# Patient Record
Sex: FEMALE | Race: ASIAN | NOT HISPANIC OR LATINO | URBAN - METROPOLITAN AREA
[De-identification: names, ages, dates, MRNs, and addresses within clinical notes are randomized per-mention and may not be internally consistent; named-entity substitution may affect disease eponyms.]

---

## 2020-07-17 ENCOUNTER — INPATIENT (INPATIENT)
Facility: HOSPITAL | Age: 85
LOS: 4 days | Discharge: NURSING, REHAB OR LONG-TERM CARE | DRG: 552 | End: 2020-07-22
Attending: INTERNAL MEDICINE | Admitting: INTERNAL MEDICINE
Payer: MEDICARE

## 2020-07-17 VITALS
OXYGEN SATURATION: 96 % | DIASTOLIC BLOOD PRESSURE: 65 MMHG | TEMPERATURE: 98 F | HEIGHT: 64 IN | HEART RATE: 79 BPM | WEIGHT: 154.98 LBS | SYSTOLIC BLOOD PRESSURE: 158 MMHG | RESPIRATION RATE: 18 BRPM

## 2020-07-17 DIAGNOSIS — E11.9 TYPE 2 DIABETES MELLITUS WITHOUT COMPLICATIONS: ICD-10-CM

## 2020-07-17 DIAGNOSIS — Z71.89 OTHER SPECIFIED COUNSELING: ICD-10-CM

## 2020-07-17 DIAGNOSIS — N17.9 ACUTE KIDNEY FAILURE, UNSPECIFIED: ICD-10-CM

## 2020-07-17 DIAGNOSIS — I10 ESSENTIAL (PRIMARY) HYPERTENSION: ICD-10-CM

## 2020-07-17 DIAGNOSIS — Z29.9 ENCOUNTER FOR PROPHYLACTIC MEASURES, UNSPECIFIED: ICD-10-CM

## 2020-07-17 DIAGNOSIS — S32.9XXA FRACTURE OF UNSPECIFIED PARTS OF LUMBOSACRAL SPINE AND PELVIS, INITIAL ENCOUNTER FOR CLOSED FRACTURE: ICD-10-CM
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
## 2020-07-17 DIAGNOSIS — R55 SYNCOPE AND COLLAPSE: ICD-10-CM

## 2020-07-17 LAB
ALBUMIN SERPL ELPH-MCNC: 3.5 G/DL — SIGNIFICANT CHANGE UP (ref 3.5–5)
ALP SERPL-CCNC: 141 U/L — HIGH (ref 40–120)
ALT FLD-CCNC: 99 U/L DA — HIGH (ref 10–60)
ANION GAP SERPL CALC-SCNC: 9 MMOL/L — SIGNIFICANT CHANGE UP (ref 5–17)
APTT BLD: 27.4 SEC — LOW (ref 27.5–35.5)
AST SERPL-CCNC: 80 U/L — HIGH (ref 10–40)
BASOPHILS # BLD AUTO: 0.06 K/UL — SIGNIFICANT CHANGE UP (ref 0–0.2)
BASOPHILS NFR BLD AUTO: 0.7 % — SIGNIFICANT CHANGE UP (ref 0–2)
BILIRUB SERPL-MCNC: 0.6 MG/DL — SIGNIFICANT CHANGE UP (ref 0.2–1.2)
BUN SERPL-MCNC: 35 MG/DL — HIGH (ref 7–18)
CALCIUM SERPL-MCNC: 9.9 MG/DL — SIGNIFICANT CHANGE UP (ref 8.4–10.5)
CHLORIDE SERPL-SCNC: 102 MMOL/L — SIGNIFICANT CHANGE UP (ref 96–108)
CO2 SERPL-SCNC: 26 MMOL/L — SIGNIFICANT CHANGE UP (ref 22–31)
CREAT SERPL-MCNC: 1.73 MG/DL — HIGH (ref 0.5–1.3)
EOSINOPHIL # BLD AUTO: 0.22 K/UL — SIGNIFICANT CHANGE UP (ref 0–0.5)
EOSINOPHIL NFR BLD AUTO: 2.7 % — SIGNIFICANT CHANGE UP (ref 0–6)
GLUCOSE BLDC GLUCOMTR-MCNC: 236 MG/DL — HIGH (ref 70–99)
GLUCOSE SERPL-MCNC: 177 MG/DL — HIGH (ref 70–99)
HCT VFR BLD CALC: 40.3 % — SIGNIFICANT CHANGE UP (ref 34.5–45)
HGB BLD-MCNC: 12.4 G/DL — SIGNIFICANT CHANGE UP (ref 11.5–15.5)
IMM GRANULOCYTES NFR BLD AUTO: 1.4 % — SIGNIFICANT CHANGE UP (ref 0–1.5)
INR BLD: 1.05 RATIO — SIGNIFICANT CHANGE UP (ref 0.88–1.16)
LYMPHOCYTES # BLD AUTO: 2.24 K/UL — SIGNIFICANT CHANGE UP (ref 1–3.3)
LYMPHOCYTES # BLD AUTO: 27.5 % — SIGNIFICANT CHANGE UP (ref 13–44)
MCHC RBC-ENTMCNC: 25.7 PG — LOW (ref 27–34)
MCHC RBC-ENTMCNC: 30.8 GM/DL — LOW (ref 32–36)
MCV RBC AUTO: 83.4 FL — SIGNIFICANT CHANGE UP (ref 80–100)
MONOCYTES # BLD AUTO: 0.72 K/UL — SIGNIFICANT CHANGE UP (ref 0–0.9)
MONOCYTES NFR BLD AUTO: 8.8 % — SIGNIFICANT CHANGE UP (ref 2–14)
NEUTROPHILS # BLD AUTO: 4.79 K/UL — SIGNIFICANT CHANGE UP (ref 1.8–7.4)
NEUTROPHILS NFR BLD AUTO: 58.9 % — SIGNIFICANT CHANGE UP (ref 43–77)
NRBC # BLD: 0 /100 WBCS — SIGNIFICANT CHANGE UP (ref 0–0)
PLATELET # BLD AUTO: 338 K/UL — SIGNIFICANT CHANGE UP (ref 150–400)
POTASSIUM SERPL-MCNC: 5.2 MMOL/L — SIGNIFICANT CHANGE UP (ref 3.5–5.3)
POTASSIUM SERPL-SCNC: 5.2 MMOL/L — SIGNIFICANT CHANGE UP (ref 3.5–5.3)
PROT SERPL-MCNC: 7.7 G/DL — SIGNIFICANT CHANGE UP (ref 6–8.3)
PROTHROM AB SERPL-ACNC: 12.3 SEC — SIGNIFICANT CHANGE UP (ref 10.6–13.6)
RBC # BLD: 4.83 M/UL — SIGNIFICANT CHANGE UP (ref 3.8–5.2)
RBC # FLD: 14.8 % — HIGH (ref 10.3–14.5)
SARS-COV-2 RNA SPEC QL NAA+PROBE: SIGNIFICANT CHANGE UP
SODIUM SERPL-SCNC: 137 MMOL/L — SIGNIFICANT CHANGE UP (ref 135–145)
TROPONIN I SERPL-MCNC: 0.01 NG/ML — SIGNIFICANT CHANGE UP (ref 0–0.04)
TROPONIN I SERPL-MCNC: <0.015 NG/ML — SIGNIFICANT CHANGE UP (ref 0–0.04)
WBC # BLD: 8.14 K/UL — SIGNIFICANT CHANGE UP (ref 3.8–10.5)
WBC # FLD AUTO: 8.14 K/UL — SIGNIFICANT CHANGE UP (ref 3.8–10.5)

## 2020-07-17 PROCEDURE — 72125 CT NECK SPINE W/O DYE: CPT | Mod: 26

## 2020-07-17 PROCEDURE — 73552 X-RAY EXAM OF FEMUR 2/>: CPT | Mod: 26,RT

## 2020-07-17 PROCEDURE — 74176 CT ABD & PELVIS W/O CONTRAST: CPT | Mod: 26

## 2020-07-17 PROCEDURE — 73590 X-RAY EXAM OF LOWER LEG: CPT | Mod: 26,RT

## 2020-07-17 PROCEDURE — 70450 CT HEAD/BRAIN W/O DYE: CPT | Mod: 26

## 2020-07-17 PROCEDURE — 99285 EMERGENCY DEPT VISIT HI MDM: CPT

## 2020-07-17 PROCEDURE — 73630 X-RAY EXAM OF FOOT: CPT | Mod: 26,RT

## 2020-07-17 RX ORDER — ENOXAPARIN SODIUM 100 MG/ML
30 INJECTION SUBCUTANEOUS DAILY
Refills: 0 | Status: DISCONTINUED | OUTPATIENT
Start: 2020-07-17 | End: 2020-07-21

## 2020-07-17 RX ORDER — ACETAMINOPHEN 500 MG
975 TABLET ORAL ONCE
Refills: 0 | Status: COMPLETED | OUTPATIENT
Start: 2020-07-17 | End: 2020-07-17

## 2020-07-17 RX ORDER — INSULIN LISPRO 100/ML
VIAL (ML) SUBCUTANEOUS
Refills: 0 | Status: DISCONTINUED | OUTPATIENT
Start: 2020-07-17 | End: 2020-07-20

## 2020-07-17 RX ORDER — OXYCODONE AND ACETAMINOPHEN 5; 325 MG/1; MG/1
1 TABLET ORAL EVERY 6 HOURS
Refills: 0 | Status: DISCONTINUED | OUTPATIENT
Start: 2020-07-17 | End: 2020-07-19

## 2020-07-17 RX ORDER — SODIUM CHLORIDE 9 MG/ML
1000 INJECTION INTRAMUSCULAR; INTRAVENOUS; SUBCUTANEOUS
Refills: 0 | Status: DISCONTINUED | OUTPATIENT
Start: 2020-07-17 | End: 2020-07-21

## 2020-07-17 RX ORDER — ACETAMINOPHEN 500 MG
650 TABLET ORAL EVERY 6 HOURS
Refills: 0 | Status: DISCONTINUED | OUTPATIENT
Start: 2020-07-17 | End: 2020-07-19

## 2020-07-17 RX ADMIN — Medication 975 MILLIGRAM(S): at 18:40

## 2020-07-17 RX ADMIN — Medication 975 MILLIGRAM(S): at 18:10

## 2020-07-17 NOTE — ED PROVIDER NOTE - PHYSICAL EXAMINATION
moderate distress secondary to R hip pain   alert and oriented x3   decreased ROM, to R hip , secondary to pain  tenderness to palpation to R lower extremity

## 2020-07-17 NOTE — ED PROVIDER NOTE - PROGRESS NOTE DETAILS
Patient's imaging shows sacral fx and pubic rami fx. Ortho consulted, stable fx however due to difficulty ambulating with admit to medicine for PT eval.

## 2020-07-17 NOTE — H&P ADULT - ASSESSMENT
89 y M from home, ambulates with walker, with PMHx of HTN, DM, Asthma presents to the ED with R hip and R leg pain after sustaining a fall 2 days ago. Patient [place] in the park [place] and was attempting to get up with walker , when she got up she felt dizzy and fell and landed on her R side. Patient denies any head injuries, back pain, neck pain , chest pain, dizziness, palpitations, diaphoresis, N/V/D, abdominal pain or any other acute complaints.    In ED, /71, HR 76    Pt will be admitted for R sacral and left pubic rami fracture.     PRIMARY TEAM TO CONFIRM MEDICATIONS WITH PHARMACY 89 y F from home, ambulates with walker, with PMHx of HTN, DM, Asthma presents to the ED with R hip and R leg pain after sustaining a fall 2 days ago. Patient states she was in the park 2 days ago and was attempting to get up with walker , when she got up she felt dizzy and fell and landed on her R side. Patient denies any head injuries, back pain, neck pain , chest pain, dizziness, palpitations, diaphoresis, N/V/D, abdominal pain or any other acute complaints.    In ED, /71, HR 76    Pt will be admitted for R sacral and left pubic rami fracture.     PRIMARY TEAM TO CONFIRM MEDICATIONS WITH PHARMACY

## 2020-07-17 NOTE — ED PROVIDER NOTE - OBJECTIVE STATEMENT
89 y.o female with no significant PMHx and no significant PSHx presents to the ED s/p fall and now is feeling pain to R hip and R leg. Patient states she was in the park x2 days ago and was attempting to get up with walker , when she got up she felt dizzy and fell and landed on her r side. Patient states she is on aspirin. Patient speaks adriana and history was obtained by adriana speaking staff. per EMS, patient fell out of bed today while sleeping, patient endorses she only fell when she was in the park. Patient denies any head injuries, back pain, neck pain or any other acute complaints. NKDA

## 2020-07-17 NOTE — ED ADULT NURSE REASSESSMENT NOTE - NS ED NURSE REASSESS COMMENT FT1
recievd pt awake alert ,not in distress,with saline lock intact,no redness no swelling noted ,waiting for telebed.

## 2020-07-17 NOTE — H&P ADULT - HISTORY OF PRESENT ILLNESS
89 y.o female with no significant PMHx and no significant PSHx presents to the ED s/p fall and now is feeling pain to R hip and R leg. Patient states she was in the park x2 days ago and was attempting to get up with walker , when she got up she felt dizzy and fell and landed on her r side. Patient states she is on aspirin. Patient speaks adriana and history was obtained by adriana speaking staff. per EMS, patient fell out of bed today while sleeping, patient endorses she only fell when she was in the park. Patient denies any head injuries, back pain, neck pain or any other acute complaints. 89 y M from home, ambulates with walker, with PMHx of HTN, DM, Asthma presents to the ED with R hip and R leg pain after sustaining a fall 2 days ago. Patient states she was in the park 2 days ago and was attempting to get up with walker , when she got up she felt dizzy and fell and landed on her R side. Patient denies any head injuries, back pain, neck pain , chest pain, dizziness, palpitations, diaphoresis, N/V/D, abdominal pain or any other acute complaints.    In ED, /71, HR 76 89 y F from home, ambulates with walker, with PMHx of HTN, DM, Asthma presents to the ED with R hip and R leg pain after sustaining a fall 2 days ago. Patient states she was in the park 2 days ago and was attempting to get up with walker , when she got up she felt dizzy and fell and landed on her R side. Patient denies any head injuries, back pain, neck pain , chest pain, dizziness, palpitations, diaphoresis, N/V/D, abdominal pain or any other acute complaints.    In ED, /71, HR 76

## 2020-07-17 NOTE — H&P ADULT - ATTENDING COMMENTS
Seen and examined in ED around 6.30 PM 07/20 . C/O RT leg especially foot pain. Ct scans noted. Pelvic and Sacral fracture. Pain control,DVT prophylaxis and PT consult.  Ortho consult pending.

## 2020-07-17 NOTE — ED ADULT NURSE NOTE - NSIMPLEMENTINTERV_GEN_ALL_ED
Implemented All Fall Risk Interventions:  [place] to call system. Call bell, personal items and telephone within reach. Instruct patient to call for assistance. Room bathroom lighting operational. Non-slip footwear when patient is off stretcher. Physically safe environment: no spills, clutter or unnecessary equipment. Stretcher in lowest position, wheels locked, appropriate side rails in place. Provide visual cue, wrist band, yellow gown, etc. Monitor gait and stability. Monitor for mental status changes and reorient to person, place, and time. Review medications for side effects contributing to fall risk. Reinforce activity limits and safety measures with patient and family.

## 2020-07-17 NOTE — H&P ADULT - PROBLEM SELECTOR PLAN 1
-p/w R hip and leg pain after sustaining a fall  -CT A/P: Fractures left superior pubic ramus and right sacrum  -CT head: no acute changes  -XRay Right T+F: no fracture  -XR R foot: no fracture  -XR R femur: no fracture  -Pain management with tylenol and percocet  -Ortho consulted  -PT consulted

## 2020-07-17 NOTE — ED ADULT TRIAGE NOTE - CCCP TRG CHIEF CMPLNT
[facility]  [address] [facility] [place], [address]  [place], [facility]  Office Phone: [phone]  Fax: [phone]      Reason for visit:  No chief complaint on file. HPI:   Mindy Billings is a 39 y.o.  female with past medical history including fibromyalgia, hypertension, hyperlipidemia, who I was asked to see in consultation at the request of Dr. Kim Hunter for evaluation for thrombocytosis. Patient was seen and examined today. She is awake alert oriented x3. On review of systems she denies any B symptoms or lumps and bumps. Reports some joint and muscle pain occasionally from for myalgia 7/10. Denies any epistaxis, hematemesis, or bright red blood per rectum. No hematuria. No menses for 2 years. She quit smoking cigarette in 2010 and only drinks alcohol occasionally. ECOG performance status 0. Independent with ADLs and IADLs. DX   Encounter Diagnosis   Name Primary?  Thrombocytosis ([facility] [address] Yes       Social History     Socioeconomic History    Marital status: UNKNOWN     Spouse name: Not on file    Number of children: Not on file    Years of education: Not on file    Highest education level: Not on file     Review of Systems  All 12 point review of system are negative except for what is in HPI. Objective:  Physical Exam:  There were no vitals taken for this visit. General:  Alert, cooperative, no distress, appears stated age. Head:  Normocephalic, without obvious abnormality, atraumatic. Eyes:  Conjunctivae/corneas clear. PERRL, EOMs intact. Throat: Lips, mucosa, and tongue normal.    Neck: Supple, symmetrical, trachea midline, no adenopathy, thyroid: no enlargement/tenderness/nodules   Back:   Symmetric, no curvature. ROM normal. No CVA tenderness. Lungs:   Clear to auscultation bilaterally. Chest wall:  No tenderness or deformity. Heart:  Regular rate and rhythm, S1, S2 normal, no murmur, click, rub or gallop.        Abdomen:   Soft, fall non-tender. Bowel sounds normal. No masses,  No organomegaly. Extremities: Extremities normal, atraumatic, no cyanosis or edema. Skin: Skin color, texture, turgor normal. No rashes or lesions. Lymph nodes: Cervical, supraclavicular, and axillary nodes normal.   Neurologic: CNII-XII intact. Diagnostic Imaging     No results found for this or any previous visit. Lab Results  No results found for: WBC, HGB, HGBP, HCT, PHCT, RBCH, PLT, MCV, HGBEXT, HCTEXT, PLTEXT    No results found for: NA, K, CL, CO2, AGAP, GLU, BUN, CREA, BUCR, GFRAA, GFRNA, CA, TBIL, GPT, SGOT, AP, TP, ALB, GLOB, AGRAT, ALT    Assessment/Plan:  39 y.o. female with   1. Thrombocytosis: Labs on 5/06/19 showed WBC 5.0, H&H 14.7/43, MCV 86, normal RDW, platelet 382 ([phone]). There was a mild neutrophilia with ANC of 8.4. LFTs were normal.  Platelets were 997 in November 6, 2018. Shewed twice last week. Her thrombocytosis is possibly due to iron deficiency versus myeloproliferative neoplasm.   Plan is  *Check CBC with differential, CMP  *Iron profile and ferritin  *Check JAK2, MPL and CALR    Return in one month

## 2020-07-17 NOTE — ED ADULT NURSE NOTE - OBJECTIVE STATEMENT
Pt arrived BIBA from home, fell 2 days ago in the park, denies LOC, today has severe pain to Rt hip and leg, unable to ambulate due to pain

## 2020-07-17 NOTE — CONSULT NOTE ADULT - SUBJECTIVE AND OBJECTIVE BOX
I saw and evaluatd pt in ER awake and alert but aggitated c/o most pain rith proximal leg and lateral hip.    istory of Present Illness:  History of Present Illness: 	  89 y M from home, ambulates with walker, with PMHx of HTN, DM, Asthma presents to the ED with R hip and R leg pain after sustaining a fall 2 days ago. Patient states she was in the park 2 days ago and was attempting to get up with walker , when she got up she felt dizzy and fell and landed on her R side. Patient denies any head injuries, back pain, neck pain , chest pain, dizziness, palpitations, diaphoresis, N/V/D, abdominal pain or any other acute complaints.    In ED, /71, HR 76     Review of Systems:  Other Review of Systems: All other review of systems negative, except as noted in HPI 	      Allergies and Intolerances:        Allergies:  	No Known Allergies:     Home Medications:   * Outpatient Medication Status not yet specified    Patient History:    Past Medical, Past Surgical, and Family History:  PAST MEDICAL HISTORY:  Asthma     DM (diabetes mellitus)     HTN (hypertension).     Social History:  Social History (marital status, living situation, occupation, tobacco use, alcohol and drug use, and sexual history): Non-smoker, non-alcoholic	    PE: Awake and alert but aggitated.       Tenderness posterior pelvis and most over right lateral hip most over greater trochanter.       No obvious groin pain on rotation of hips.       Right TKR with skin intact and no instability.       Moving legs with gross sensation.       Palpable DP pulses.    X-rays- CT of pelvis shows a small minimally displaced right sacral alar fracture and left pubic rami fractures. No hip fractures seen.              Right TKR in place with no leg fractures seen to foot.    A/P: Fall with minimally displaced right sacral alar fracture and left pelvic rami fractures.         Recommend pain control and mobilization with PT ambulation WBAT.         Follow clinical course.

## 2020-07-17 NOTE — H&P ADULT - PROBLEM SELECTOR PLAN 2
-p/w dizziness and fall  -Sustained Left superior pubic rami and R sacrum fracture  -EKG: NSR  -Troponin X1 negative  -will start on gentle hydration  -f/u US Carotid  -f/u Echo  -PT consulted

## 2020-07-18 LAB
24R-OH-CALCIDIOL SERPL-MCNC: 36.3 NG/ML — SIGNIFICANT CHANGE UP (ref 30–80)
A1C WITH ESTIMATED AVERAGE GLUCOSE RESULT: 7.7 % — HIGH (ref 4–5.6)
ALBUMIN SERPL ELPH-MCNC: 3.2 G/DL — LOW (ref 3.5–5)
ALP SERPL-CCNC: 127 U/L — HIGH (ref 40–120)
ALT FLD-CCNC: 88 U/L DA — HIGH (ref 10–60)
ANION GAP SERPL CALC-SCNC: 7 MMOL/L — SIGNIFICANT CHANGE UP (ref 5–17)
AST SERPL-CCNC: 65 U/L — HIGH (ref 10–40)
BASOPHILS # BLD AUTO: 0.08 K/UL — SIGNIFICANT CHANGE UP (ref 0–0.2)
BASOPHILS NFR BLD AUTO: 1 % — SIGNIFICANT CHANGE UP (ref 0–2)
BILIRUB SERPL-MCNC: 0.6 MG/DL — SIGNIFICANT CHANGE UP (ref 0.2–1.2)
BUN SERPL-MCNC: 29 MG/DL — HIGH (ref 7–18)
CALCIUM SERPL-MCNC: 9.1 MG/DL — SIGNIFICANT CHANGE UP (ref 8.4–10.5)
CHLORIDE SERPL-SCNC: 103 MMOL/L — SIGNIFICANT CHANGE UP (ref 96–108)
CHOLEST SERPL-MCNC: 145 MG/DL — SIGNIFICANT CHANGE UP (ref 10–199)
CO2 SERPL-SCNC: 28 MMOL/L — SIGNIFICANT CHANGE UP (ref 22–31)
CREAT SERPL-MCNC: 1.54 MG/DL — HIGH (ref 0.5–1.3)
EOSINOPHIL # BLD AUTO: 0.32 K/UL — SIGNIFICANT CHANGE UP (ref 0–0.5)
EOSINOPHIL NFR BLD AUTO: 4.2 % — SIGNIFICANT CHANGE UP (ref 0–6)
ESTIMATED AVERAGE GLUCOSE: 174 MG/DL — HIGH (ref 68–114)
FOLATE SERPL-MCNC: >20 NG/ML — SIGNIFICANT CHANGE UP
GLUCOSE BLDC GLUCOMTR-MCNC: 142 MG/DL — HIGH (ref 70–99)
GLUCOSE BLDC GLUCOMTR-MCNC: 155 MG/DL — HIGH (ref 70–99)
GLUCOSE BLDC GLUCOMTR-MCNC: 184 MG/DL — HIGH (ref 70–99)
GLUCOSE BLDC GLUCOMTR-MCNC: 216 MG/DL — HIGH (ref 70–99)
GLUCOSE SERPL-MCNC: 132 MG/DL — HIGH (ref 70–99)
HCT VFR BLD CALC: 37.9 % — SIGNIFICANT CHANGE UP (ref 34.5–45)
HDLC SERPL-MCNC: 41 MG/DL — LOW
HGB BLD-MCNC: 11.8 G/DL — SIGNIFICANT CHANGE UP (ref 11.5–15.5)
IMM GRANULOCYTES NFR BLD AUTO: 1.2 % — SIGNIFICANT CHANGE UP (ref 0–1.5)
INR BLD: 1.1 RATIO — SIGNIFICANT CHANGE UP (ref 0.88–1.16)
LIPID PNL WITH DIRECT LDL SERPL: 77 MG/DL — SIGNIFICANT CHANGE UP
LYMPHOCYTES # BLD AUTO: 2.25 K/UL — SIGNIFICANT CHANGE UP (ref 1–3.3)
LYMPHOCYTES # BLD AUTO: 29.3 % — SIGNIFICANT CHANGE UP (ref 13–44)
MAGNESIUM SERPL-MCNC: 2.4 MG/DL — SIGNIFICANT CHANGE UP (ref 1.6–2.6)
MCHC RBC-ENTMCNC: 25.6 PG — LOW (ref 27–34)
MCHC RBC-ENTMCNC: 31.1 GM/DL — LOW (ref 32–36)
MCV RBC AUTO: 82.2 FL — SIGNIFICANT CHANGE UP (ref 80–100)
MONOCYTES # BLD AUTO: 0.78 K/UL — SIGNIFICANT CHANGE UP (ref 0–0.9)
MONOCYTES NFR BLD AUTO: 10.1 % — SIGNIFICANT CHANGE UP (ref 2–14)
NEUTROPHILS # BLD AUTO: 4.17 K/UL — SIGNIFICANT CHANGE UP (ref 1.8–7.4)
NEUTROPHILS NFR BLD AUTO: 54.2 % — SIGNIFICANT CHANGE UP (ref 43–77)
NRBC # BLD: 0 /100 WBCS — SIGNIFICANT CHANGE UP (ref 0–0)
OSMOLALITY SERPL: 296 MOSMOL/KG — SIGNIFICANT CHANGE UP (ref 280–301)
PHOSPHATE SERPL-MCNC: 4.1 MG/DL — SIGNIFICANT CHANGE UP (ref 2.5–4.5)
PLATELET # BLD AUTO: 304 K/UL — SIGNIFICANT CHANGE UP (ref 150–400)
POTASSIUM SERPL-MCNC: 4.2 MMOL/L — SIGNIFICANT CHANGE UP (ref 3.5–5.3)
POTASSIUM SERPL-SCNC: 4.2 MMOL/L — SIGNIFICANT CHANGE UP (ref 3.5–5.3)
PROT SERPL-MCNC: 7.2 G/DL — SIGNIFICANT CHANGE UP (ref 6–8.3)
PROTHROM AB SERPL-ACNC: 12.8 SEC — SIGNIFICANT CHANGE UP (ref 10.6–13.6)
RBC # BLD: 4.61 M/UL — SIGNIFICANT CHANGE UP (ref 3.8–5.2)
RBC # FLD: 14.6 % — HIGH (ref 10.3–14.5)
SARS-COV-2 IGG SERPL QL IA: NEGATIVE — SIGNIFICANT CHANGE UP
SARS-COV-2 IGM SERPL IA-ACNC: <0.1 INDEX — SIGNIFICANT CHANGE UP
SODIUM SERPL-SCNC: 138 MMOL/L — SIGNIFICANT CHANGE UP (ref 135–145)
TOTAL CHOLESTEROL/HDL RATIO MEASUREMENT: 3.5 RATIO — SIGNIFICANT CHANGE UP (ref 3.3–7.1)
TRIGL SERPL-MCNC: 137 MG/DL — SIGNIFICANT CHANGE UP (ref 10–149)
TSH SERPL-MCNC: 0.94 UU/ML — SIGNIFICANT CHANGE UP (ref 0.34–4.82)
VIT B12 SERPL-MCNC: 316 PG/ML — SIGNIFICANT CHANGE UP (ref 232–1245)
WBC # BLD: 7.69 K/UL — SIGNIFICANT CHANGE UP (ref 3.8–10.5)
WBC # FLD AUTO: 7.69 K/UL — SIGNIFICANT CHANGE UP (ref 3.8–10.5)

## 2020-07-18 RX ORDER — GABAPENTIN 400 MG/1
100 CAPSULE ORAL
Refills: 0 | Status: DISCONTINUED | OUTPATIENT
Start: 2020-07-18 | End: 2020-07-19

## 2020-07-18 RX ADMIN — Medication 650 MILLIGRAM(S): at 12:14

## 2020-07-18 RX ADMIN — OXYCODONE AND ACETAMINOPHEN 1 TABLET(S): 5; 325 TABLET ORAL at 22:24

## 2020-07-18 RX ADMIN — OXYCODONE AND ACETAMINOPHEN 1 TABLET(S): 5; 325 TABLET ORAL at 14:26

## 2020-07-18 RX ADMIN — Medication 650 MILLIGRAM(S): at 13:02

## 2020-07-18 RX ADMIN — OXYCODONE AND ACETAMINOPHEN 1 TABLET(S): 5; 325 TABLET ORAL at 00:05

## 2020-07-18 RX ADMIN — OXYCODONE AND ACETAMINOPHEN 1 TABLET(S): 5; 325 TABLET ORAL at 10:50

## 2020-07-18 RX ADMIN — Medication 650 MILLIGRAM(S): at 07:53

## 2020-07-18 RX ADMIN — Medication 1: at 12:13

## 2020-07-18 RX ADMIN — OXYCODONE AND ACETAMINOPHEN 1 TABLET(S): 5; 325 TABLET ORAL at 07:53

## 2020-07-18 RX ADMIN — OXYCODONE AND ACETAMINOPHEN 1 TABLET(S): 5; 325 TABLET ORAL at 08:31

## 2020-07-18 RX ADMIN — Medication 650 MILLIGRAM(S): at 17:16

## 2020-07-18 RX ADMIN — Medication 650 MILLIGRAM(S): at 05:24

## 2020-07-18 RX ADMIN — SODIUM CHLORIDE 60 MILLILITER(S): 9 INJECTION INTRAMUSCULAR; INTRAVENOUS; SUBCUTANEOUS at 07:54

## 2020-07-18 RX ADMIN — OXYCODONE AND ACETAMINOPHEN 1 TABLET(S): 5; 325 TABLET ORAL at 23:20

## 2020-07-18 RX ADMIN — Medication 2: at 00:05

## 2020-07-18 RX ADMIN — Medication 2: at 17:15

## 2020-07-18 RX ADMIN — OXYCODONE AND ACETAMINOPHEN 1 TABLET(S): 5; 325 TABLET ORAL at 14:55

## 2020-07-18 NOTE — PROGRESS NOTE ADULT - ASSESSMENT
89 y M from home, ambulates with walker, with PMHx of HTN, DM, Asthma presents to the ED with R hip and R leg pain after sustaining a fall 2 days ago. Patient states she was in the park 2 days ago and was attempting to get up with walker , when she got up she felt dizzy and fell and landed on her R side. Patient denies any head injuries, back pain, neck pain , chest pain, dizziness, palpitations, diaphoresis, N/V/D, abdominal pain or any other acute complaints.    In ED, /71, HR 76    Pt will be admitted for R sacral and left pubic rami fracture.     PRIMARY TEAM TO CONFIRM MEDICATIONS WITH PHARMACY     Problem/Plan - 1:  ·  Problem: Fall.  Plan: -p/w R hip and leg pain after sustaining a fall  -CT A/P: Fractures left superior pubic ramus and right sacrum  -CT head: no acute changes  -XRay Right T+F: no fracture  -XR R foot: no fracture  -XR R femur: no fracture  -Pain management with tylenol and percocet  -Ortho consult noted.   -PT consulted.      Problem/Plan - 2:  ·  Problem: Near syncope with ? A fib .  Plan: -p/w dizziness and fall  -Sustained Left superior pubic rami and R sacrum fracture  -EKG: NSR  -Troponin X1 negative  -will start on gentle hydration  -f/u US Carotid  -f/u Echo  -PT consulted.      Problem/Plan - 3:  ·  Problem: TAMANNA (acute kidney injury).  Plan: -Renal consult noted.  -p/w BUN/Cr: 35/1.73  -Likely pre-renal due to dehydration  -on iv fluids  -f/u urine lytes.      Problem/Plan - 4:  ·  Problem: HTN (hypertension).  Plan: -/71  -Confirm meds with pharmacy.      Problem/Plan - 5:  ·  Problem: DM (diabetes mellitus).  Plan: -Confirm meds with pharmacy  -on HSS  -Monitor fingerstick  -f/u HbA1c.      Problem/Plan - 6:  Problem: Need for prophylactic measure. Plan: on lovenox for DVT ppx.     Problem/Plan - 7:  ·  Problem: Goals of care, counseling/discussion.  Plan: Unable to reach family for Goals of care.  Primary team to follow up.

## 2020-07-18 NOTE — CONSULT NOTE ADULT - SUBJECTIVE AND OBJECTIVE BOX
Patient is a 89y Female whom presented to the hospital with   S/P  FALL  IN THE  PARK  89 y FEMALE from home, ambulates with walker, with PMHx of HTN, DM, Asthma presents to the ED with R hip and R leg pain after sustaining a fall 2 days ago. Patient states she was in the park 2 days ago and was attempting to get up with walker , when she got up she felt dizzy and fell and landed on her R side.   ASKED  TO  EVALUATE  HER  AS  SHE  WAS NOTED TO  HAVE  HIGH CR    PAST MEDICAL & SURGICAL HISTORY:  Asthma  HTN (hypertension)  DM (diabetes mellitus)    No Known Allergies    Home Medications Reviewed  Hospital Medications:   MEDICATIONS  (STANDING):  enoxaparin Injectable 30 milliGRAM(s) SubCutaneous daily  insulin lispro (HumaLOG) corrective regimen sliding scale   SubCutaneous three times a day before meals  sodium chloride 0.9%. 1000 milliLiter(s) (60 mL/Hr) IV Continuous <Continuous>    SOCIAL HISTORY:  Denies ETOh,Smoking,   FAMILY HISTORY:    REVIEW OF SYSTEMS:  EATING LUNCH   DOESNT APPEAR  SOB   HAS NO  FEVER/CHILLS   NO COUGH   APPETITE IS OK,  NO  N/V   NO LEG SWELLING   HAS PAIN R  LEG,  JUST GOT PAIN MEDS    VITALS:  T(F): 97.4 (07-18-20 @ 10:22), Max: 98 (07-17-20 @ 20:22)  HR: 83 (07-18-20 @ 10:22)  BP: 120/55 (07-18-20 @ 10:22)  RR: 19 (07-18-20 @ 10:22)  SpO2: 96% (07-18-20 @ 10:22)  Wt(kg): --      PHYSICAL EXAM:  Constitutional: NAD  HEENT: CONJ PINK  Neck: No JVD  Respiratory: CTAB, no wheezes, rales or rhonchi  Cardiovascular: S1, S2, RRR  Gastrointestinal: BS+, soft, NT/ND  Extremities: No peripheral edema  Neurological: A/O x 3,   :  No pena.       LABS:  07-18    138  |  103  |  29<H>  ----------------------------<  132<H>  4.2   |  28  |  1.54<H>    Ca    9.1      18 Jul 2020 06:56  Phos  4.1     07-18  Mg     2.4     07-18    TPro  7.2  /  Alb  3.2<L>  /  TBili  0.6  /  DBili      /  AST  65<H>  /  ALT  88<H>  /  AlkPhos  127<H>  07-18    Creatinine Trend: 1.54 <--, 1.73 <--                        11.8   7.69  )-----------( 304      ( 18 Jul 2020 06:56 )             37.9     Urine Studies:      RADIOLOGY & ADDITIONAL STUDIES:

## 2020-07-18 NOTE — PROGRESS NOTE ADULT - SUBJECTIVE AND OBJECTIVE BOX
INTERVAL HPI/OVERNIGHT EVENTS: Right leg still hurting.   Vital Signs Last 24 Hrs  T(C): 36.4 (18 Jul 2020 15:00), Max: 36.7 (17 Jul 2020 20:22)  T(F): 97.6 (18 Jul 2020 15:00), Max: 98 (17 Jul 2020 20:22)  HR: 80 (18 Jul 2020 15:00) (74 - 87)  BP: 105/68 (18 Jul 2020 15:00) (105/68 - 163/70)  BP(mean): --  RR: 17 (18 Jul 2020 15:00) (17 - 19)  SpO2: 97% (18 Jul 2020 15:00) (96% - 97%)  I&O's Summary    18 Jul 2020 07:01  -  18 Jul 2020 18:45  --------------------------------------------------------  IN: 988 mL / OUT: 3 mL / NET: 985 mL      MEDICATIONS  (STANDING):  enoxaparin Injectable 30 milliGRAM(s) SubCutaneous daily  insulin lispro (HumaLOG) corrective regimen sliding scale   SubCutaneous three times a day before meals  sodium chloride 0.9%. 1000 milliLiter(s) (60 mL/Hr) IV Continuous <Continuous>    MEDICATIONS  (PRN):  acetaminophen   Tablet .. 650 milliGRAM(s) Oral every 6 hours PRN Mild Pain (1 - 3)  oxycodone    5 mG/acetaminophen 325 mG 1 Tablet(s) Oral every 6 hours PRN Moderate Pain (4 - 6)    LABS:                        11.8   7.69  )-----------( 304      ( 18 Jul 2020 06:56 )             37.9     07-18    138  |  103  |  29<H>  ----------------------------<  132<H>  4.2   |  28  |  1.54<H>    Ca    9.1      18 Jul 2020 06:56  Phos  4.1     07-18  Mg     2.4     07-18    TPro  7.2  /  Alb  3.2<L>  /  TBili  0.6  /  DBili  x   /  AST  65<H>  /  ALT  88<H>  /  AlkPhos  127<H>  07-18    PT/INR - ( 18 Jul 2020 06:56 )   PT: 12.8 sec;   INR: 1.10 ratio         PTT - ( 17 Jul 2020 14:50 )  PTT:27.4 sec    CAPILLARY BLOOD GLUCOSE      POCT Blood Glucose.: 216 mg/dL (18 Jul 2020 16:39)  POCT Blood Glucose.: 184 mg/dL (18 Jul 2020 11:55)  POCT Blood Glucose.: 142 mg/dL (18 Jul 2020 08:13)  POCT Blood Glucose.: 236 mg/dL (17 Jul 2020 23:19)          REVIEW OF SYSTEMS:  CONSTITUTIONAL: No fever, weight loss, or fatigue  EYES: No eye pain, visual disturbances, or discharge  ENMT:  No difficulty hearing, tinnitus, vertigo; No sinus or throat pain  NECK: No pain or stiffness  RESPIRATORY: No cough, wheezing, chills or hemoptysis; No shortness of breath  CARDIOVASCULAR: No chest pain, palpitations, dizziness, or leg swelling  GASTROINTESTINAL: No abdominal or epigastric pain. No nausea, vomiting, or hematemesis; No diarrhea or constipation. No melena or hematochezia.  GENITOURINARY: No dysuria, frequency, hematuria, or incontinence  NEUROLOGICAL: No headaches, memory loss, loss of strength, numbness, or tremors      RADIOLOGY & ADDITIONAL TESTS:    Consultant(s) Notes Reviewed:  [x ] YES  [ ] NO    PHYSICAL EXAM:  GENERAL: NAD, well-groomed, well-developed,not in any distress ,  HEAD:  Atraumatic, Normocephalic  EYES: EOMI, PERRLA, conjunctiva and sclera clear  ENMT: No tonsillar erythema, exudates, or enlargement; Moist mucous membranes, Good dentition, No lesions  NECK: Supple, No JVD, Normal thyroid  NERVOUS SYSTEM:  Alert & Oriented X3, No focal deficit   CHEST/LUNG: Good air entry bilateral with no  rales, rhonchi, wheezing, or rubs  HEART: Regular rate and rhythm; No murmurs, rubs, or gallops  ABDOMEN: Soft, Nontender, Nondistended; Bowel sounds present  EXTREMITIES:  2+ Peripheral Pulses, No clubbing, cyanosis, or edema    Care Discussed with Consultants/Other Providers [ x] YES  [ ] NO

## 2020-07-18 NOTE — CONSULT NOTE ADULT - SUBJECTIVE AND OBJECTIVE BOX
HISTORY OF PRESENT ILLNESS: HPI:  89 y M from home, ambulates with walker, with PMHx of HTN, DM, Asthma presents to the ED with R hip and R leg pain after sustaining a fall 2 days ago. Patient [place] was in the park [place] and was attempting to get up with walker , when she got up she felt dizzy and fell and landed on her R side. Patient denies any head injuries, back pain, neck pain , chest pain, LOC, palpitations, diaphoresis, N/V/D, abdominal pain or any other acute complaints.    In ED, /71, HR 76 (17 Jul 2020 18:34)      PAST MEDICAL & SURGICAL HISTORY:  Asthma  HTN (hypertension)  DM (diabetes mellitus)          MEDICATIONS:  MEDICATIONS  (STANDING):  enoxaparin Injectable 30 milliGRAM(s) SubCutaneous daily  insulin lispro (HumaLOG) corrective regimen sliding scale   SubCutaneous three times a day before meals  sodium chloride 0.9%. 1000 milliLiter(s) (60 mL/Hr) IV Continuous <Continuous>      Allergies    No Known Allergies    Intolerances        FAMILY HISTORY:    Non-contributary for premature coronary disease or sudden cardiac death    SOCIAL HISTORY:    [X ] Non-smoker  [ ] Smoker  [ ] Alcohol    REVIEW OF SYSTEMS:  [ ]chest pain  [  ]shortness of breath  [  ]palpitations  [  ]syncope  [ ]near syncope [ ]upper extremity weakness   [ ] lower extremity weakness  [  ]diplopia  [  ]altered mental status   [  ]fevers  [ ]chills [ ]nausea  [ ]vomitting  [  ]dysphagia    [ ]abdominal pain  [ ]melena  [ ]BRBPR    [  ]epistaxis  [  ]rash    [ ]lower extremity edema        [X ] All others negative	  [ ] Unable to obtain      LABS:	 	    CARDIAC MARKERS:  CARDIAC MARKERS ( 17 Jul 2020 21:04 )  <0.015 ng/mL / x     / x     / x     / x      CARDIAC MARKERS ( 17 Jul 2020 11:34 )  0.015 ng/mL / x     / x     / x     / x                                  11.8   7.69  )-----------( 304      ( 18 Jul 2020 06:56 )             37.9     Hb Trend: 11.8<--    07-18    138  |  103  |  29<H>  ----------------------------<  132<H>  4.2   |  28  |  1.54<H>    Ca    9.1      18 Jul 2020 06:56  Phos  4.1     07-18  Mg     2.4     07-18    TPro  7.2  /  Alb  3.2<L>  /  TBili  0.6  /  DBili  x   /  AST  65<H>  /  ALT  88<H>  /  AlkPhos  127<H>  07-18    Creatinine Trend: 1.54<--, 1.73<--    Coags:  PT/INR - ( 18 Jul 2020 06:56 )   PT: 12.8 sec;   INR: 1.10 ratio      TSH: Thyroid Stimulating Hormone, Serum: 0.94 uU/mL (07-18 @ 06:56)          PHYSICAL EXAM:  T(C): 36.4 (07-18-20 @ 15:00), Max: 36.7 (07-17-20 @ 20:22)  HR: 80 (07-18-20 @ 15:00) (74 - 87)  BP: 105/68 (07-18-20 @ 15:00) (105/68 - 163/70)  RR: 17 (07-18-20 @ 15:00) (17 - 19)  SpO2: 97% (07-18-20 @ 15:00) (96% - 97%)  Wt(kg): --   BMI (kg/m2): 26.6 (07-17-20 @ 10:26)  I&O's Summary      Gen: Appears well in NAD  HEENT:  (-)icterus (-)pallor  CV: N S1 S2 1/6 MADELYN (+)2 Pulses B/l  Resp:  Clear to ausculatation B/L, normal effort  GI: (+) BS Soft, NT, ND  Lymph:  (-)Edema, (-)obvious lymphadenopathy  Skin: Warm to touch, Normal turgor  Psych: Appropriate mood and affect        TELEMETRY: 	?afib      ECG:  	Sinus 72 BPM, LAD    RADIOLOGY:         CXR:  < from: Xray Foot AP + Lateral + Oblique, Right (07.17.20 @ 13:32) >  No acute radiographic osseous pathology.    < end of copied text >      ASSESSMENT/PLAN: 	89y Female  PMHx of HTN, DM, Asthma presents to the ED with R hip and R leg pain after sustaining a fall 2 days ago.    - Tele is unclear if she had afib overnight.  - Cont tele  check EKG for irregular rhythms  - If she is confirmed to have Afib would benefit from long term AC if ok from trauma prospective  - ortho f/u    I once again thank you for allowing me to participate in the care of your patient.  If you have any questions or concerns please do not hesitate to contact me.    Dionte Braun MD, [facility]  [facility]PER [phone]

## 2020-07-19 DIAGNOSIS — R74.8 ABNORMAL LEVELS OF OTHER SERUM ENZYMES: ICD-10-CM

## 2020-07-19 LAB
ALBUMIN SERPL ELPH-MCNC: 3.1 G/DL — LOW (ref 3.5–5)
ALP SERPL-CCNC: 139 U/L — HIGH (ref 40–120)
ALT FLD-CCNC: 88 U/L DA — HIGH (ref 10–60)
ANION GAP SERPL CALC-SCNC: 6 MMOL/L — SIGNIFICANT CHANGE UP (ref 5–17)
AST SERPL-CCNC: 65 U/L — HIGH (ref 10–40)
BASOPHILS # BLD AUTO: 0.08 K/UL — SIGNIFICANT CHANGE UP (ref 0–0.2)
BASOPHILS NFR BLD AUTO: 1 % — SIGNIFICANT CHANGE UP (ref 0–2)
BILIRUB SERPL-MCNC: 0.6 MG/DL — SIGNIFICANT CHANGE UP (ref 0.2–1.2)
BUN SERPL-MCNC: 25 MG/DL — HIGH (ref 7–18)
CALCIUM SERPL-MCNC: 9.3 MG/DL — SIGNIFICANT CHANGE UP (ref 8.4–10.5)
CHLORIDE SERPL-SCNC: 100 MMOL/L — SIGNIFICANT CHANGE UP (ref 96–108)
CO2 SERPL-SCNC: 28 MMOL/L — SIGNIFICANT CHANGE UP (ref 22–31)
CREAT SERPL-MCNC: 1.51 MG/DL — HIGH (ref 0.5–1.3)
EOSINOPHIL # BLD AUTO: 0.32 K/UL — SIGNIFICANT CHANGE UP (ref 0–0.5)
EOSINOPHIL NFR BLD AUTO: 4 % — SIGNIFICANT CHANGE UP (ref 0–6)
GLUCOSE BLDC GLUCOMTR-MCNC: 160 MG/DL — HIGH (ref 70–99)
GLUCOSE BLDC GLUCOMTR-MCNC: 206 MG/DL — HIGH (ref 70–99)
GLUCOSE BLDC GLUCOMTR-MCNC: 215 MG/DL — HIGH (ref 70–99)
GLUCOSE BLDC GLUCOMTR-MCNC: 224 MG/DL — HIGH (ref 70–99)
GLUCOSE SERPL-MCNC: 213 MG/DL — HIGH (ref 70–99)
HCT VFR BLD CALC: 39.3 % — SIGNIFICANT CHANGE UP (ref 34.5–45)
HGB BLD-MCNC: 12.2 G/DL — SIGNIFICANT CHANGE UP (ref 11.5–15.5)
IMM GRANULOCYTES NFR BLD AUTO: 0.9 % — SIGNIFICANT CHANGE UP (ref 0–1.5)
LYMPHOCYTES # BLD AUTO: 2.28 K/UL — SIGNIFICANT CHANGE UP (ref 1–3.3)
LYMPHOCYTES # BLD AUTO: 28.2 % — SIGNIFICANT CHANGE UP (ref 13–44)
MCHC RBC-ENTMCNC: 25.7 PG — LOW (ref 27–34)
MCHC RBC-ENTMCNC: 31 GM/DL — LOW (ref 32–36)
MCV RBC AUTO: 82.7 FL — SIGNIFICANT CHANGE UP (ref 80–100)
MONOCYTES # BLD AUTO: 0.72 K/UL — SIGNIFICANT CHANGE UP (ref 0–0.9)
MONOCYTES NFR BLD AUTO: 8.9 % — SIGNIFICANT CHANGE UP (ref 2–14)
NEUTROPHILS # BLD AUTO: 4.62 K/UL — SIGNIFICANT CHANGE UP (ref 1.8–7.4)
NEUTROPHILS NFR BLD AUTO: 57 % — SIGNIFICANT CHANGE UP (ref 43–77)
NRBC # BLD: 0 /100 WBCS — SIGNIFICANT CHANGE UP (ref 0–0)
PLATELET # BLD AUTO: 290 K/UL — SIGNIFICANT CHANGE UP (ref 150–400)
POTASSIUM SERPL-MCNC: 4.5 MMOL/L — SIGNIFICANT CHANGE UP (ref 3.5–5.3)
POTASSIUM SERPL-SCNC: 4.5 MMOL/L — SIGNIFICANT CHANGE UP (ref 3.5–5.3)
PROT SERPL-MCNC: 7.1 G/DL — SIGNIFICANT CHANGE UP (ref 6–8.3)
RBC # BLD: 4.75 M/UL — SIGNIFICANT CHANGE UP (ref 3.8–5.2)
RBC # FLD: 14.6 % — HIGH (ref 10.3–14.5)
SODIUM SERPL-SCNC: 134 MMOL/L — LOW (ref 135–145)
WBC # BLD: 8.09 K/UL — SIGNIFICANT CHANGE UP (ref 3.8–10.5)
WBC # FLD AUTO: 8.09 K/UL — SIGNIFICANT CHANGE UP (ref 3.8–10.5)

## 2020-07-19 RX ORDER — HYDROMORPHONE HYDROCHLORIDE 2 MG/ML
1 INJECTION INTRAMUSCULAR; INTRAVENOUS; SUBCUTANEOUS ONCE
Refills: 0 | Status: DISCONTINUED | OUTPATIENT
Start: 2020-07-19 | End: 2020-07-19

## 2020-07-19 RX ORDER — MORPHINE SULFATE 50 MG/1
1 CAPSULE, EXTENDED RELEASE ORAL ONCE
Refills: 0 | Status: DISCONTINUED | OUTPATIENT
Start: 2020-07-19 | End: 2020-07-19

## 2020-07-19 RX ORDER — HYDROMORPHONE HYDROCHLORIDE 2 MG/ML
0.5 INJECTION INTRAMUSCULAR; INTRAVENOUS; SUBCUTANEOUS EVERY 4 HOURS
Refills: 0 | Status: DISCONTINUED | OUTPATIENT
Start: 2020-07-19 | End: 2020-07-19

## 2020-07-19 RX ORDER — ACETAMINOPHEN 500 MG
1000 TABLET ORAL ONCE
Refills: 0 | Status: COMPLETED | OUTPATIENT
Start: 2020-07-19 | End: 2020-07-19

## 2020-07-19 RX ORDER — HYDROMORPHONE HYDROCHLORIDE 2 MG/ML
0.5 INJECTION INTRAMUSCULAR; INTRAVENOUS; SUBCUTANEOUS EVERY 4 HOURS
Refills: 0 | Status: DISCONTINUED | OUTPATIENT
Start: 2020-07-19 | End: 2020-07-21

## 2020-07-19 RX ORDER — MORPHINE SULFATE 50 MG/1
2 CAPSULE, EXTENDED RELEASE ORAL ONCE
Refills: 0 | Status: DISCONTINUED | OUTPATIENT
Start: 2020-07-19 | End: 2020-07-19

## 2020-07-19 RX ORDER — OXYCODONE AND ACETAMINOPHEN 5; 325 MG/1; MG/1
1 TABLET ORAL EVERY 4 HOURS
Refills: 0 | Status: DISCONTINUED | OUTPATIENT
Start: 2020-07-19 | End: 2020-07-20

## 2020-07-19 RX ORDER — LIDOCAINE 4 G/100G
1 CREAM TOPICAL EVERY 24 HOURS
Refills: 0 | Status: DISCONTINUED | OUTPATIENT
Start: 2020-07-19 | End: 2020-07-20

## 2020-07-19 RX ORDER — SENNA PLUS 8.6 MG/1
2 TABLET ORAL AT BEDTIME
Refills: 0 | Status: DISCONTINUED | OUTPATIENT
Start: 2020-07-19 | End: 2020-07-22

## 2020-07-19 RX ORDER — POLYETHYLENE GLYCOL 3350 17 G/17G
17 POWDER, FOR SOLUTION ORAL ONCE
Refills: 0 | Status: COMPLETED | OUTPATIENT
Start: 2020-07-19 | End: 2020-07-19

## 2020-07-19 RX ORDER — GABAPENTIN 400 MG/1
100 CAPSULE ORAL THREE TIMES A DAY
Refills: 0 | Status: DISCONTINUED | OUTPATIENT
Start: 2020-07-19 | End: 2020-07-20

## 2020-07-19 RX ADMIN — Medication 400 MILLIGRAM(S): at 08:45

## 2020-07-19 RX ADMIN — HYDROMORPHONE HYDROCHLORIDE 0.5 MILLIGRAM(S): 2 INJECTION INTRAMUSCULAR; INTRAVENOUS; SUBCUTANEOUS at 23:02

## 2020-07-19 RX ADMIN — HYDROMORPHONE HYDROCHLORIDE 0.5 MILLIGRAM(S): 2 INJECTION INTRAMUSCULAR; INTRAVENOUS; SUBCUTANEOUS at 11:39

## 2020-07-19 RX ADMIN — Medication 2: at 11:27

## 2020-07-19 RX ADMIN — GABAPENTIN 100 MILLIGRAM(S): 400 CAPSULE ORAL at 22:48

## 2020-07-19 RX ADMIN — HYDROMORPHONE HYDROCHLORIDE 0.5 MILLIGRAM(S): 2 INJECTION INTRAMUSCULAR; INTRAVENOUS; SUBCUTANEOUS at 11:24

## 2020-07-19 RX ADMIN — Medication 1: at 07:39

## 2020-07-19 RX ADMIN — HYDROMORPHONE HYDROCHLORIDE 0.5 MILLIGRAM(S): 2 INJECTION INTRAMUSCULAR; INTRAVENOUS; SUBCUTANEOUS at 17:13

## 2020-07-19 RX ADMIN — GABAPENTIN 100 MILLIGRAM(S): 400 CAPSULE ORAL at 05:50

## 2020-07-19 RX ADMIN — LIDOCAINE 1 PATCH: 4 CREAM TOPICAL at 20:03

## 2020-07-19 RX ADMIN — OXYCODONE AND ACETAMINOPHEN 1 TABLET(S): 5; 325 TABLET ORAL at 07:39

## 2020-07-19 RX ADMIN — SENNA PLUS 2 TABLET(S): 8.6 TABLET ORAL at 22:48

## 2020-07-19 RX ADMIN — POLYETHYLENE GLYCOL 3350 17 GRAM(S): 17 POWDER, FOR SOLUTION ORAL at 09:52

## 2020-07-19 RX ADMIN — HYDROMORPHONE HYDROCHLORIDE 0.5 MILLIGRAM(S): 2 INJECTION INTRAMUSCULAR; INTRAVENOUS; SUBCUTANEOUS at 23:40

## 2020-07-19 RX ADMIN — OXYCODONE AND ACETAMINOPHEN 1 TABLET(S): 5; 325 TABLET ORAL at 08:09

## 2020-07-19 RX ADMIN — GABAPENTIN 100 MILLIGRAM(S): 400 CAPSULE ORAL at 15:35

## 2020-07-19 RX ADMIN — Medication 2: at 17:08

## 2020-07-19 RX ADMIN — LIDOCAINE 1 PATCH: 4 CREAM TOPICAL at 09:52

## 2020-07-19 RX ADMIN — LIDOCAINE 1 PATCH: 4 CREAM TOPICAL at 22:42

## 2020-07-19 RX ADMIN — Medication 1000 MILLIGRAM(S): at 09:00

## 2020-07-19 RX ADMIN — HYDROMORPHONE HYDROCHLORIDE 0.5 MILLIGRAM(S): 2 INJECTION INTRAMUSCULAR; INTRAVENOUS; SUBCUTANEOUS at 17:30

## 2020-07-19 RX ADMIN — ENOXAPARIN SODIUM 30 MILLIGRAM(S): 100 INJECTION SUBCUTANEOUS at 11:24

## 2020-07-19 NOTE — PROGRESS NOTE ADULT - SUBJECTIVE AND OBJECTIVE BOX
Subjective: pt seen and examined, no complaints, ROS - .          acetaminophen   Tablet .. 650 milliGRAM(s) Oral every 6 hours PRN  enoxaparin Injectable 30 milliGRAM(s) SubCutaneous daily  gabapentin 100 milliGRAM(s) Oral two times a day  insulin lispro (HumaLOG) corrective regimen sliding scale   SubCutaneous three times a day before meals  oxycodone    5 mG/acetaminophen 325 mG 1 Tablet(s) Oral every 6 hours PRN  sodium chloride 0.9%. 1000 milliLiter(s) IV Continuous <Continuous>                            11.8   7.69  )-----------( 304      ( 18 Jul 2020 06:56 )             37.9       Hemoglobin: 11.8 g/dL (07-18 @ 06:56)  Hemoglobin: 12.4 g/dL (07-17 @ 14:50)      07-18    138  |  103  |  29<H>  ----------------------------<  132<H>  4.2   |  28  |  1.54<H>    Ca    9.1      18 Jul 2020 06:56  Phos  4.1     07-18  Mg     2.4     07-18    TPro  7.2  /  Alb  3.2<L>  /  TBili  0.6  /  DBili  x   /  AST  65<H>  /  ALT  88<H>  /  AlkPhos  127<H>  07-18    Creatinine Trend: 1.54<--, 1.73<--    COAGS:     CARDIAC MARKERS ( 17 Jul 2020 21:04 )  <0.015 ng/mL / x     / x     / x     / x      CARDIAC MARKERS ( 17 Jul 2020 11:34 )  0.015 ng/mL / x     / x     / x     / x            T(C): 36.4 (07-19-20 @ 05:25), Max: 36.8 (07-18-20 @ 18:52)  HR: 74 (07-19-20 @ 05:25) (72 - 83)  BP: 114/66 (07-19-20 @ 05:25) (105/68 - 134/59)  RR: 18 (07-19-20 @ 05:25) (17 - 19)  SpO2: 96% (07-19-20 @ 05:25) (95% - 99%)  Wt(kg): --    I&O's Summary    18 Jul 2020 07:01  -  19 Jul 2020 07:00  --------------------------------------------------------  IN: 988 mL / OUT: 3 mL / NET: 985 mL      Appearance: Normal	  HEENT:   Normal oral mucosa, PERRL, EOMI	  Lymphatic: No lymphadenopathy , no edema  Cardiovascular: Normal S1 S2, No JVD, No murmurs , Peripheral pulses palpable 2+ bilaterally  Respiratory: Lungs clear to auscultation, normal effort 	  Gastrointestinal:  Soft, Non-tender, + BS	  Skin: No rashes, No ecchymoses, No cyanosis, warm to touch  Musculoskeletal: Normal range of motion, normal strength  Psychiatry:  Mood & affect appropriate    TELEMETRY: 	 NSR      ECG:  	 < from: 12 Lead ECG (07.17.20 @ 11:41) >  Diagnosis Line Normal sinus rhythm  Left axis deviation  Abnormal ECG    < end of copied text >    RADIOLOGY:   DIAGNOSTIC TESTING:  [ ] Echocardiogram:  [ ]  Catheterization:  [ ] Stress Test:    OTHER: 	  CT < from: CT Cervical Spine No Cont (07.17.20 @ 12:36) >    IMPRESSION:    Degenerative change, craniocervical junction, loss left C1 lateral mass height sclerosis left lateral C1 and C2, right lateral displacement of C1 right lateral mass on C2, multilevel degenerative change, no prevertebral soft tissue swelling or acute fracture, CT insensitive for canal contents, MRI may be obtained for improved assessment of the soft tissues and ligaments if there is persistent cervical spine pain as clinically warranted.    MAKEDA BROWN M.D., ATTENDING RADIOLOGIST  This document has been electronically signed. Jul 17 2020  1:02PM    < end of copied text >        ASSESSMENT/PLAN: 	89y Female  PMHx of HTN, DM, Asthma presents to the ED with R hip and R leg pain after sustaining a fall 2 days ago.    Tele stable , NSR   Pain management    GI / DVT prophylaxis.   keep K>4, mag >2.0   no s/s of chf on exam   ECHO pending   If she is confirmed to have Afib would benefit from long term AC if ok from trauma prospective  ortho f/u

## 2020-07-19 NOTE — PROGRESS NOTE ADULT - ASSESSMENT
89 y M from home, ambulates with walker, with PMHx of HTN, DM, Asthma presents to the ED with R hip and R leg pain after sustaining a fall 2 days ago. Patient [place] the park [place] and was attempting to get up with walker , when she got up she felt dizzy and fell and landed on her R side. Patient denies any head injuries, back pain, neck pain , chest pain, dizziness, palpitations, diaphoresis, N/V/D, abdominal pain or any other acute complaints.       Problem/Plan - 1:  ·  Problem: Fall.  Plan: - Still in pain so adjusting pain meds. Will consult pain management in AM.   -p/w R hip and leg pain after sustaining a fall  -CT A/P: Fractures left superior pubic ramus and right sacrum  -CT head: no acute changes  -XRay Right T+F: no fracture  -XR R foot: no fracture  -XR R femur: no fracture  -Pain management with tylenol and percocet  -Ortho consult noted.   -PT consulted.      Problem/Plan - 2:  ·  Problem: Near syncope with ? A fib .  Plan: -p/w dizziness and fall  -Sustained Left superior pubic rami and R sacrum fracture  -EKG: NSR  -Troponin X1 negative  -will start on gentle hydration  -f/u US Carotid  -f/u Echo  -PT consulted.      Problem/Plan - 3:  ·  Problem: CKD stage 3.  Plan: -Renal helping. Creatinine stable.   -p/w BUN/Cr: 35/1.73  -Likely pre-renal due to dehydration  -on iv fluids  -f/u urine lytes.      Problem/Plan - 4:  ·  Problem: HTN (hypertension).  Plan: -/71  -Confirm meds with pharmacy.      Problem/Plan - 5:  ·  Problem: DM (diabetes mellitus).  Plan: -Sugars in acceptable range.   -on HSS  -Monitor fingerstick  -f/u HbA1c.      Problem/Plan - 6:  Problem: Need for prophylactic measure. Plan: on Lovenox for DVT ppx.     Problem/Plan - 7:  ·  Problem: Abnormal LFT .  Plan: Hepatitis profile and US as well trending LFT .   Avoid Hepatotoxic medications.

## 2020-07-19 NOTE — PROGRESS NOTE ADULT - PROBLEM SELECTOR PLAN 3
-p/w BUN/Cr: 35/1.73  -Likely pre-renal due to dehydration  -on iv fluids  -f/u urine lytes  - BUN/Cr (7/18): 25/1.51

## 2020-07-19 NOTE — PROGRESS NOTE ADULT - ASSESSMENT
A/P  ARF  POST  FALL,  CR  WAS  1.7  ON  ADMISSION ,  HAS BEEN  AT  1.5 SINCE  YESTERDAY   UNSURE  OF  HER  BASELINE  IS  NOT ON  NSAIDS OR  ANY  NEPHROTOXIC MEDS  COULD  BE  HER  BASELINE,  WILL  TREND  HAS NO  UA ,  WILL ORDER   S  NA  A  LITTLE  LOW HAS BEEN  ON  NS SINCE  YESTERDAY,   WILL  HOLD OFF ON  IT  FOR  NOW  AND  FOLLOW  S  CR  AND  SODIUM  LEVELS     S/P  FALL, BEING FOLLOWED BY  ORTHO  FOR  PUBIC  RAMUS  FRACTURE

## 2020-07-19 NOTE — PROGRESS NOTE ADULT - PROBLEM SELECTOR PLAN 1
-p/w R hip and leg pain after sustaining a fall  -CT A/P: Fractures left superior pubic ramus and right sacrum  -CT head: no acute changes  -XRay Right T+F: no fracture  -XR R foot: no fracture  -XR R femur: no fracture  -Pain management with percocet and dilaudid  -Ortho consulted: no intervention, F/u with PT  -PT consulted

## 2020-07-19 NOTE — PROGRESS NOTE ADULT - ASSESSMENT
89 y M from home, ambulates with walker, with PMHx of HTN, DM, Asthma presents to the ED with R hip and R leg pain after sustaining a fall 2 days ago. Patient states she was in the park 2 [place] ago and was attempting to get up with walker , when she got up she felt dizzy and fell and landed on her R side. CT showed R sacral and left pubic rami fracture.

## 2020-07-19 NOTE — PROGRESS NOTE ADULT - PROBLEM SELECTOR PLAN 4
- Elevated LFT  - CT abdomen: LIVER: Cirrhotic morphology., BILE DUCTS: Normal caliber, GALLBLADDER: Cholelithiasis  - F/u Hepatitis panel  - avoid hepatotoxic drugs

## 2020-07-19 NOTE — PROGRESS NOTE ADULT - SUBJECTIVE AND OBJECTIVE BOX
PGY-1 Progress Note discussed with attending    PAGER #: [[phone]] TILL 5:00 PM  PLEASE CONTACT ON CALL TEAM:  - On Call Team (Please refer to Agustina) FROM 5:00 PM - 8:30PM  - Nightfloat Team FROM 8:30 -7:30 AM    CHIEF COMPLAINT & BRIEF HOSPITAL COURSE:  89 y M from home, ambulates with walker, with PMHx of HTN, DM, Asthma presents to the ED with R hip and R leg pain after sustaining a fall 2 days ago. Patient states she was in the park 2 [place] ago and was attempting to get up with walker , when she got up she felt dizzy and fell and landed on her R side. CT showed Fractures of the left superior pubis ramus and right sacrum. CT head negative for acute bleed.     INTERVAL HPI/OVERNIGHT EVENTS:   Patient seen and examined at bedside.  used was Mary TEJADA. Patient complains of severe pain involving the right leg, starting from the Hip, down the thigh to the foot. She is unable to characterize the pain. Patient received Percocet and IV Tylenol to no improvement of pain. Her LFTs are noted to be elevated, after discussing with attending, will start patient on 0.5mg of Dilaudid q 4 PRN for severe pain, continue with percocet PRN for moderate pain, and Gabapentin 100mg TID for neuropathic pain. Pain management consult is pending. PT consult is pending. As per nephro, in setting of TAMANNA will avoid NSAIDS or nephrotoxin drugs.    REVIEW OF SYSTEMS:  CONSTITUTIONAL: No fever, weight loss, or fatigue  RESPIRATORY: No cough, wheezing, chills or hemoptysis; No shortness of breath  CARDIOVASCULAR: No chest pain, palpitations, dizziness, or leg swelling  GASTROINTESTINAL: Constipation as per nurse. No abdominal pain. No nausea, vomiting, or hematemesis, No melena or hematochezia.  GENITOURINARY: No dysuria or hematuria, urinary frequency  MUSCULOSKELETAL: Pain on Right lower extremity starting on Hip, down the thigh to the feet.   NEUROLOGICAL: No headaches, memory loss, loss of strength, numbness, or tremors  SKIN: No itching, burning, rashes, or lesions     MEDICATIONS  (STANDING):  enoxaparin Injectable 30 milliGRAM(s) SubCutaneous daily  gabapentin 100 milliGRAM(s) Oral three times a day  insulin lispro (HumaLOG) corrective regimen sliding scale   SubCutaneous three times a day before meals  lidocaine   Patch 1 Patch Transdermal every 24 hours  senna 2 Tablet(s) Oral at bedtime  sodium chloride 0.9%. 1000 milliLiter(s) (60 mL/Hr) IV Continuous <Continuous>    MEDICATIONS  (PRN):  HYDROmorphone  Injectable 0.5 milliGRAM(s) IV Push every 4 hours PRN Severe Pain (7 - 10)  oxycodone    5 mG/acetaminophen 325 mG 1 Tablet(s) Oral every 4 hours PRN Moderate Pain (4 - 6)      Vital Signs Last 24 Hrs  T(C): 36.4 (19 Jul 2020 10:46), Max: 36.8 (18 Jul 2020 18:52)  T(F): 97.6 (19 Jul 2020 10:46), Max: 98.2 (18 Jul 2020 18:52)  HR: 66 (19 Jul 2020 10:46) (66 - 80)  BP: 131/77 (19 Jul 2020 10:46) (105/68 - 134/59)  BP(mean): --  RR: 19 (19 Jul 2020 10:46) (17 - 19)  SpO2: 97% (19 Jul 2020 10:46) (95% - 99%)    PHYSICAL EXAMINATION:  GENERAL: In severe pain, restless in bed, crying.   HEAD:  Atraumatic, Normocephalic  EYES:  conjunctiva and sclera clear  NECK: Supple, No JVD, Normal thyroid  CHEST/LUNG: Clear to auscultation. Clear to percussion bilaterally; No rales, rhonchi, wheezing, or rubs  HEART: Regular rate and rhythm; No murmurs, rubs, or gallops  ABDOMEN: Distended, not tender; Bowel sounds present  NERVOUS SYSTEM:  Alert & Oriented X3,    EXTREMITIES:  2+ Peripheral Pulses; DP and PT present on palpation and doppler, No clubbing, cyanosis, or edema  SKIN: warm dry  MUSCULOSKELETAL: Limited range of motion on Right lower extremity due to pain.                           12.2   8.09  )-----------( 290      ( 19 Jul 2020 10:58 )             39.3     07-19    134<L>  |  100  |  25<H>  ----------------------------<  213<H>  4.5   |  28  |  1.51<H>    Ca    9.3      19 Jul 2020 10:58  Phos  4.1     07-18  Mg     2.4     07-18    TPro  7.1  /  Alb  3.1<L>  /  TBili  0.6  /  DBili  x   /  AST  65<H>  /  ALT  88<H>  /  AlkPhos  139<H>  07-19    LIVER FUNCTIONS - ( 19 Jul 2020 10:58 )  Alb: 3.1 g/dL / Pro: 7.1 g/dL / ALK PHOS: 139 U/L / ALT: 88 U/L DA / AST: 65 U/L / GGT: x           CARDIAC MARKERS ( 17 Jul 2020 21:04 )  <0.015 ng/mL / x     / x     / x     / x          PT/INR - ( 18 Jul 2020 06:56 )   PT: 12.8 sec;   INR: 1.10 ratio         PTT - ( 17 Jul 2020 14:50 )  PTT:27.4 sec    I&O's Summary    18 Jul 2020 07:01  -  19 Jul 2020 07:00  --------------------------------------------------------  IN: 988 mL / OUT: 3 mL / NET: 985 mL            CAPILLARY BLOOD GLUCOSE      RADIOLOGY & ADDITIONAL TESTS:  < from: CT Abdomen and Pelvis No Cont (07.17.20 @ 12:37) >  IMPRESSION:  Limited by lack of IV contrast  Fractures left superior pubic ramus right sacrum as described.  Cholelithiasis.  Diverticulosis coli without acute diverticulitis.    < end of copied text >    < from: CT Cervical Spine No Cont (07.17.20 @ 12:36) >  IMPRESSION:    Degenerative change, craniocervical junction, loss left C1 lateral mass height sclerosis left lateral C1 and C2, right lateral displacement of C1 right lateral mass on C2, multilevel degenerative change, no prevertebral soft tissue swelling or acute fracture, CT insensitive for canal contents, MRI may be obtained for improved assessment of the soft tissues and ligaments if there is persistent cervical spine pain as clinically warranted.    < end of copied text >  < from: CT Head No Cont (07.17.20 @ 12:34) >  IMPRESSION: No CT evidence of acute intracranial hemorrhage.     < end of copied text >      < from: Xray Femur 2 Views, Right (07.17.20 @ 13:30) >  IMPRESSION: No acute fracture. Old left inferior pelvic fracture and productive spondylitic change. Right knee replacement.    < end of copied text >  < from: Xray Tibia + Fibula 2 Views, Right (07.17.20 @ 13:32) >  RIGHT knee prosthetic components intact.  The tibia and fibula are intact. No fracture, gross osseous lytic or blastic lesion or soft tissue abnormality.    IMPRESSION :   No acute radiographic osseous pathology..    < end of copied text >    < from: Xray Foot AP + Lateral + Oblique, Right (07.17.20 @ 13:32) >  FINDINGS:    The bones and joint spaces are preserved.  There are no fractures or dislocations.  The soft tissues are normal.     IMPRESSION:    No acute radiographic osseous pathology.    < end of copied text >

## 2020-07-19 NOTE — PROGRESS NOTE ADULT - SUBJECTIVE AND OBJECTIVE BOX
Patient is a 89y Female with   S/P  FALL   WITH   PELVIC  FRACTURE   SLEEPING  ,  EASILY  AROUSABLE   APPEARS  OK  THOUGH CONT  TO  COMPLAINT OF  PAIN R  LEG  NO ACUTE EVENTS OVERNIGHT    No Known Allergies    Hospital Medications:   MEDICATIONS  (STANDING):  enoxaparin Injectable 30 milliGRAM(s) SubCutaneous daily  gabapentin 100 milliGRAM(s) Oral three times a day  insulin lispro (HumaLOG) corrective regimen sliding scale   SubCutaneous three times a day before meals  lidocaine   Patch 1 Patch Transdermal every 24 hours  senna 2 Tablet(s) Oral at bedtime  sodium chloride 0.9%. 1000 milliLiter(s) (60 mL/Hr) IV Continuous <Continuous>    REVIEW OF SYSTEMS:   HAS NO  FEVER/CHILLS  HAS NO  SOB   APPETITE IS OK ,  FINISHED  HER LUNCH  NO N/V   VOIDING  WELL   C/O  PAIN  R  LEG    VITALS:  T(F): 97.6 (07-19-20 @ 10:46), Max: 98.2 (07-18-20 @ 18:52)  HR: 66 (07-19-20 @ 10:46)  BP: 131/77 (07-19-20 @ 10:46)  RR: 19 (07-19-20 @ 10:46)  SpO2: 97% (07-19-20 @ 10:46)  Wt(kg): --    07-18 @ 07:01  -  07-19 @ 07:00  --------------------------------------------------------  IN: 988 mL / OUT: 3 mL / NET: 985 mL        PHYSICAL EXAM:  Constitutional: NAD  HEENT: CONJ  PINK  Neck: No JVD  Respiratory: CTAB, no wheezes, rales or rhonchi  Cardiovascular: S1, S2, RRR  Gastrointestinal: BS+, soft, NT/ND  Extremities: No peripheral edema  Neurological: A/O x 3,   : No pena.     :    LABS:  07-19    134<L>  |  100  |  25<H>  ----------------------------<  213<H>  4.5   |  28  |  1.51<H>    Ca    9.3      19 Jul 2020 10:58  Phos  4.1     07-18  Mg     2.4     07-18    TPro  7.1  /  Alb  3.1<L>  /  TBili  0.6  /  DBili      /  AST  65<H>  /  ALT  88<H>  /  AlkPhos  139<H>  07-19    Creatinine Trend: 1.51 <--, 1.54 <--, 1.73 <--                        12.2   8.09  )-----------( 294 ( 19 Jul 2020 10:58 )             39.3     Urine Studies:      RADIOLOGY & ADDITIONAL STUDIES:

## 2020-07-19 NOTE — PROGRESS NOTE ADULT - SUBJECTIVE AND OBJECTIVE BOX
INTERVAL HPI/OVERNIGHT EVENTS: Continues to have RT leg pain.   Vital Signs Last 24 Hrs  T(C): 36.4 (19 Jul 2020 10:46), Max: 36.8 (18 Jul 2020 18:52)  T(F): 97.6 (19 Jul 2020 10:46), Max: 98.2 (18 Jul 2020 18:52)  HR: 66 (19 Jul 2020 10:46) (66 - 80)  BP: 131/77 (19 Jul 2020 10:46) (105/68 - 134/59)  BP(mean): --  RR: 19 (19 Jul 2020 10:46) (17 - 19)  SpO2: 97% (19 Jul 2020 10:46) (95% - 99%)  I&O's Summary    18 Jul 2020 07:01  -  19 Jul 2020 07:00  --------------------------------------------------------  IN: 988 mL / OUT: 3 mL / NET: 985 mL      MEDICATIONS  (STANDING):  enoxaparin Injectable 30 milliGRAM(s) SubCutaneous daily  gabapentin 100 milliGRAM(s) Oral three times a day  insulin lispro (HumaLOG) corrective regimen sliding scale   SubCutaneous three times a day before meals  lidocaine   Patch 1 Patch Transdermal every 24 hours  senna 2 Tablet(s) Oral at bedtime  sodium chloride 0.9%. 1000 milliLiter(s) (60 mL/Hr) IV Continuous <Continuous>    MEDICATIONS  (PRN):  HYDROmorphone  Injectable 0.5 milliGRAM(s) IV Push every 4 hours PRN Severe Pain (7 - 10)  oxycodone    5 mG/acetaminophen 325 mG 1 Tablet(s) Oral every 4 hours PRN Moderate Pain (4 - 6)    LABS:                        12.2   8.09  )-----------( 290      ( 19 Jul 2020 10:58 )             39.3     07-19    134<L>  |  100  |  25<H>  ----------------------------<  213<H>  4.5   |  28  |  1.51<H>    Ca    9.3      19 Jul 2020 10:58  Phos  4.1     07-18  Mg     2.4     07-18    TPro  7.1  /  Alb  3.1<L>  /  TBili  0.6  /  DBili  x   /  AST  65<H>  /  ALT  88<H>  /  AlkPhos  139<H>  07-19    PT/INR - ( 18 Jul 2020 06:56 )   PT: 12.8 sec;   INR: 1.10 ratio         PTT - ( 17 Jul 2020 14:50 )  PTT:27.4 sec    CAPILLARY BLOOD GLUCOSE      POCT Blood Glucose.: 215 mg/dL (19 Jul 2020 11:25)  POCT Blood Glucose.: 160 mg/dL (19 Jul 2020 07:27)  POCT Blood Glucose.: 155 mg/dL (18 Jul 2020 22:19)  POCT Blood Glucose.: 216 mg/dL (18 Jul 2020 16:39)          REVIEW OF SYSTEMS:  CONSTITUTIONAL: No fever, weight loss, or fatigue  EYES: No eye pain, visual disturbances, or discharge  ENMT:  No difficulty hearing, tinnitus, vertigo; No sinus or throat pain  NECK: No pain or stiffness  RESPIRATORY: No cough, wheezing, chills or hemoptysis; No shortness of breath  CARDIOVASCULAR: No chest pain, palpitations, dizziness, or leg swelling  GASTROINTESTINAL: No abdominal or epigastric pain. No nausea, vomiting, or hematemesis; No diarrhea or constipation. No melena or hematochezia.  MUSCULOSKELETAL: RLE pain.     Consultant(s) Notes Reviewed:  [x ] YES  [ ] NO    PHYSICAL EXAM:  GENERAL: NAD, well-groomed, well-developed,not in any distress ,  HEAD:  Atraumatic, Normocephalic  EYES: EOMI, PERRLA, conjunctiva and sclera clear  ENMT: No tonsillar erythema, exudates, or enlargement; Moist mucous membranes, Good dentition, No lesions  NECK: Supple, No JVD, Normal thyroid  NERVOUS SYSTEM:  Alert & Oriented X3, No focal deficit   CHEST/LUNG: Good air entry bilateral with no  rales, rhonchi, wheezing, or rubs  HEART: Regular rate and rhythm; No murmurs, rubs, or gallops  ABDOMEN: Soft, Nontender, Nondistended; Bowel sounds present  EXTREMITIES:  2+ Peripheral Pulses, No clubbing, cyanosis, or edema  Rt lower Ext rom painful at hip and ankle.     Care Discussed with Consultants/Other Providers [ x] YES  [ ] NO

## 2020-07-20 ENCOUNTER — TRANSCRIPTION ENCOUNTER (OUTPATIENT)
Age: 85
End: 2020-07-20

## 2020-07-20 DIAGNOSIS — M25.571 PAIN IN RIGHT ANKLE AND JOINTS OF RIGHT FOOT: ICD-10-CM

## 2020-07-20 DIAGNOSIS — S32.10XA UNSPECIFIED FRACTURE OF SACRUM, INITIAL ENCOUNTER FOR CLOSED FRACTURE: ICD-10-CM

## 2020-07-20 LAB
ALBUMIN SERPL ELPH-MCNC: 3 G/DL — LOW (ref 3.5–5)
ALP SERPL-CCNC: 147 U/L — HIGH (ref 40–120)
ALT FLD-CCNC: 77 U/L DA — HIGH (ref 10–60)
ANION GAP SERPL CALC-SCNC: 7 MMOL/L — SIGNIFICANT CHANGE UP (ref 5–17)
AST SERPL-CCNC: 49 U/L — HIGH (ref 10–40)
BASOPHILS # BLD AUTO: 0.06 K/UL — SIGNIFICANT CHANGE UP (ref 0–0.2)
BASOPHILS NFR BLD AUTO: 0.8 % — SIGNIFICANT CHANGE UP (ref 0–2)
BILIRUB SERPL-MCNC: 0.4 MG/DL — SIGNIFICANT CHANGE UP (ref 0.2–1.2)
BUN SERPL-MCNC: 22 MG/DL — HIGH (ref 7–18)
CALCIUM SERPL-MCNC: 9.1 MG/DL — SIGNIFICANT CHANGE UP (ref 8.4–10.5)
CHLORIDE SERPL-SCNC: 105 MMOL/L — SIGNIFICANT CHANGE UP (ref 96–108)
CO2 SERPL-SCNC: 28 MMOL/L — SIGNIFICANT CHANGE UP (ref 22–31)
CREAT SERPL-MCNC: 1.41 MG/DL — HIGH (ref 0.5–1.3)
EOSINOPHIL # BLD AUTO: 0.3 K/UL — SIGNIFICANT CHANGE UP (ref 0–0.5)
EOSINOPHIL NFR BLD AUTO: 4 % — SIGNIFICANT CHANGE UP (ref 0–6)
GLUCOSE BLDC GLUCOMTR-MCNC: 231 MG/DL — HIGH (ref 70–99)
GLUCOSE BLDC GLUCOMTR-MCNC: 238 MG/DL — HIGH (ref 70–99)
GLUCOSE BLDC GLUCOMTR-MCNC: 256 MG/DL — HIGH (ref 70–99)
GLUCOSE BLDC GLUCOMTR-MCNC: 327 MG/DL — HIGH (ref 70–99)
GLUCOSE SERPL-MCNC: 170 MG/DL — HIGH (ref 70–99)
HAV IGM SER-ACNC: SIGNIFICANT CHANGE UP
HBV CORE IGM SER-ACNC: SIGNIFICANT CHANGE UP
HBV SURFACE AG SER-ACNC: SIGNIFICANT CHANGE UP
HCT VFR BLD CALC: 37.7 % — SIGNIFICANT CHANGE UP (ref 34.5–45)
HCV AB S/CO SERPL IA: 0.07 S/CO — SIGNIFICANT CHANGE UP (ref 0–0.99)
HCV AB SERPL-IMP: SIGNIFICANT CHANGE UP
HGB BLD-MCNC: 11.6 G/DL — SIGNIFICANT CHANGE UP (ref 11.5–15.5)
IMM GRANULOCYTES NFR BLD AUTO: 0.9 % — SIGNIFICANT CHANGE UP (ref 0–1.5)
LYMPHOCYTES # BLD AUTO: 2.5 K/UL — SIGNIFICANT CHANGE UP (ref 1–3.3)
LYMPHOCYTES # BLD AUTO: 33.2 % — SIGNIFICANT CHANGE UP (ref 13–44)
MCHC RBC-ENTMCNC: 25.6 PG — LOW (ref 27–34)
MCHC RBC-ENTMCNC: 30.8 GM/DL — LOW (ref 32–36)
MCV RBC AUTO: 83 FL — SIGNIFICANT CHANGE UP (ref 80–100)
MONOCYTES # BLD AUTO: 0.7 K/UL — SIGNIFICANT CHANGE UP (ref 0–0.9)
MONOCYTES NFR BLD AUTO: 9.3 % — SIGNIFICANT CHANGE UP (ref 2–14)
NEUTROPHILS # BLD AUTO: 3.9 K/UL — SIGNIFICANT CHANGE UP (ref 1.8–7.4)
NEUTROPHILS NFR BLD AUTO: 51.8 % — SIGNIFICANT CHANGE UP (ref 43–77)
NRBC # BLD: 0 /100 WBCS — SIGNIFICANT CHANGE UP (ref 0–0)
PLATELET # BLD AUTO: 279 K/UL — SIGNIFICANT CHANGE UP (ref 150–400)
POTASSIUM SERPL-MCNC: 4.4 MMOL/L — SIGNIFICANT CHANGE UP (ref 3.5–5.3)
POTASSIUM SERPL-SCNC: 4.4 MMOL/L — SIGNIFICANT CHANGE UP (ref 3.5–5.3)
PROT SERPL-MCNC: 6.9 G/DL — SIGNIFICANT CHANGE UP (ref 6–8.3)
RBC # BLD: 4.54 M/UL — SIGNIFICANT CHANGE UP (ref 3.8–5.2)
RBC # FLD: 14.7 % — HIGH (ref 10.3–14.5)
SODIUM SERPL-SCNC: 140 MMOL/L — SIGNIFICANT CHANGE UP (ref 135–145)
WBC # BLD: 7.53 K/UL — SIGNIFICANT CHANGE UP (ref 3.8–10.5)
WBC # FLD AUTO: 7.53 K/UL — SIGNIFICANT CHANGE UP (ref 3.8–10.5)

## 2020-07-20 PROCEDURE — 99222 1ST HOSP IP/OBS MODERATE 55: CPT

## 2020-07-20 RX ORDER — TRAMADOL HYDROCHLORIDE 50 MG/1
25 TABLET ORAL EVERY 8 HOURS
Refills: 0 | Status: DISCONTINUED | OUTPATIENT
Start: 2020-07-20 | End: 2020-07-21

## 2020-07-20 RX ORDER — MULTIVIT WITH MIN/MFOLATE/K2 340-15/3 G
1 POWDER (GRAM) ORAL ONCE
Refills: 0 | Status: COMPLETED | OUTPATIENT
Start: 2020-07-20 | End: 2020-07-20

## 2020-07-20 RX ORDER — LACTULOSE 10 G/15ML
10 SOLUTION ORAL ONCE
Refills: 0 | Status: COMPLETED | OUTPATIENT
Start: 2020-07-20 | End: 2020-07-20

## 2020-07-20 RX ORDER — GABAPENTIN 400 MG/1
200 CAPSULE ORAL EVERY 12 HOURS
Refills: 0 | Status: DISCONTINUED | OUTPATIENT
Start: 2020-07-20 | End: 2020-07-21

## 2020-07-20 RX ORDER — ACETAMINOPHEN 500 MG
1000 TABLET ORAL ONCE
Refills: 0 | Status: COMPLETED | OUTPATIENT
Start: 2020-07-20 | End: 2020-07-20

## 2020-07-20 RX ORDER — INSULIN LISPRO 100/ML
VIAL (ML) SUBCUTANEOUS
Refills: 0 | Status: DISCONTINUED | OUTPATIENT
Start: 2020-07-20 | End: 2020-07-22

## 2020-07-20 RX ORDER — ACETAMINOPHEN 500 MG
1000 TABLET ORAL ONCE
Refills: 0 | Status: DISCONTINUED | OUTPATIENT
Start: 2020-07-20 | End: 2020-07-20

## 2020-07-20 RX ORDER — POLYETHYLENE GLYCOL 3350 17 G/17G
17 POWDER, FOR SOLUTION ORAL DAILY
Refills: 0 | Status: DISCONTINUED | OUTPATIENT
Start: 2020-07-20 | End: 2020-07-22

## 2020-07-20 RX ORDER — ACETAMINOPHEN 500 MG
1000 TABLET ORAL ONCE
Refills: 0 | Status: COMPLETED | OUTPATIENT
Start: 2020-07-21 | End: 2020-07-21

## 2020-07-20 RX ADMIN — Medication 4: at 12:11

## 2020-07-20 RX ADMIN — Medication 1000 MILLIGRAM(S): at 18:05

## 2020-07-20 RX ADMIN — TRAMADOL HYDROCHLORIDE 25 MILLIGRAM(S): 50 TABLET ORAL at 22:22

## 2020-07-20 RX ADMIN — GABAPENTIN 100 MILLIGRAM(S): 400 CAPSULE ORAL at 05:52

## 2020-07-20 RX ADMIN — LIDOCAINE 1 PATCH: 4 CREAM TOPICAL at 07:59

## 2020-07-20 RX ADMIN — Medication 2: at 23:49

## 2020-07-20 RX ADMIN — Medication 400 MILLIGRAM(S): at 17:50

## 2020-07-20 RX ADMIN — GABAPENTIN 200 MILLIGRAM(S): 400 CAPSULE ORAL at 17:14

## 2020-07-20 RX ADMIN — POLYETHYLENE GLYCOL 3350 17 GRAM(S): 17 POWDER, FOR SOLUTION ORAL at 12:10

## 2020-07-20 RX ADMIN — HYDROMORPHONE HYDROCHLORIDE 0.5 MILLIGRAM(S): 2 INJECTION INTRAMUSCULAR; INTRAVENOUS; SUBCUTANEOUS at 08:43

## 2020-07-20 RX ADMIN — Medication 1: at 08:34

## 2020-07-20 RX ADMIN — HYDROMORPHONE HYDROCHLORIDE 0.5 MILLIGRAM(S): 2 INJECTION INTRAMUSCULAR; INTRAVENOUS; SUBCUTANEOUS at 14:47

## 2020-07-20 RX ADMIN — HYDROMORPHONE HYDROCHLORIDE 0.5 MILLIGRAM(S): 2 INJECTION INTRAMUSCULAR; INTRAVENOUS; SUBCUTANEOUS at 23:25

## 2020-07-20 RX ADMIN — SENNA PLUS 2 TABLET(S): 8.6 TABLET ORAL at 21:22

## 2020-07-20 RX ADMIN — HYDROMORPHONE HYDROCHLORIDE 0.5 MILLIGRAM(S): 2 INJECTION INTRAMUSCULAR; INTRAVENOUS; SUBCUTANEOUS at 09:00

## 2020-07-20 RX ADMIN — LACTULOSE 10 GRAM(S): 10 SOLUTION ORAL at 16:22

## 2020-07-20 RX ADMIN — ENOXAPARIN SODIUM 30 MILLIGRAM(S): 100 INJECTION SUBCUTANEOUS at 12:10

## 2020-07-20 RX ADMIN — HYDROMORPHONE HYDROCHLORIDE 0.5 MILLIGRAM(S): 2 INJECTION INTRAMUSCULAR; INTRAVENOUS; SUBCUTANEOUS at 15:15

## 2020-07-20 RX ADMIN — LIDOCAINE 1 PATCH: 4 CREAM TOPICAL at 16:36

## 2020-07-20 RX ADMIN — Medication 3: at 17:08

## 2020-07-20 RX ADMIN — TRAMADOL HYDROCHLORIDE 25 MILLIGRAM(S): 50 TABLET ORAL at 21:22

## 2020-07-20 NOTE — PROGRESS NOTE ADULT - PROBLEM SELECTOR PLAN 2
-p/w dizziness and fall  -Sustained Left superior pubic rami and R sacrum fracture  -EKG: NSR  -Troponin X1 negative  -will start on gentle hydration  - f/u US Carotid  - Echo: dyastolic dysfunction grade 1, mild enlarged left atrium  - PT consulted

## 2020-07-20 NOTE — PHYSICAL THERAPY INITIAL EVALUATION ADULT - PERTINENT HX OF CURRENT PROBLEM, REHAB EVAL
Pt. presented to the ED with right hip and right leg pain after sustaining a fall 2 days ago. Patient [place] the park [place] and was attempting to get up with walker , when she got up she felt dizzy and fell and landed on her right side. Imaging revealed no fractures.

## 2020-07-20 NOTE — PROGRESS NOTE ADULT - ASSESSMENT
A/P  ARF  POST  FALL,  CR  WAS  1.7  ON  ADMISSION, IMPROVED TO 1.4   UNCLEAR   BASELINE SCR  NOT ON  NSAIDS OR  ANY  NEPHROTOXIC MEDS  COULD  BE  HER  BASELINE,  WILL  TREND  URINALYSIS PENDING.  ON F/U BY    ORTHO  FOR  PUBIC  RAMUS  FRACTURE

## 2020-07-20 NOTE — PROGRESS NOTE ADULT - PROBLEM SELECTOR PLAN 3
-p/w BUN/Cr: 35/1.73  -Likely pre-renal due to dehydration  - on iv fluids  - f/u urine lytes  - BUN/Cr (7/20): 22/1.41

## 2020-07-20 NOTE — PHYSICAL THERAPY INITIAL EVALUATION ADULT - GENERAL OBSERVATIONS, REHAB EVAL
Pt. found lying supine; c/o right ankle pain. Pt. speaks Rivera only; offered  phone services but she declined and preferred her primary RN (who speaks Rivera) to interpret.

## 2020-07-20 NOTE — DISCHARGE NOTE PROVIDER - NSDCCPCAREPLAN_GEN_ALL_CORE_FT
PRINCIPAL DISCHARGE DIAGNOSIS  Diagnosis: Pelvic fracture  Assessment and Plan of Treatment: Patient presented to the ED with right hip and right leg pain after sustaining a fall 2 days prior to arrival.   Admitted for right sacral and left pubic rami fracture.   CT scan of abdomen showed fracture of left superior pubic ramus and right sacrum. CT head showed no acute changes. X-ray of foot showed no fracture. X-ray right foot showed no fracture. X-ray of right femur showed no fracture. Pain management consulted. Ortho consulted and recommended for conservative management. Physical therapy evaluated patient and recommended for rehab.   Continue care with physical therapy at facility. Continue pain control with medications as directed in medication reconciliation. Follow up with your PCP for further evaluation and management.      SECONDARY DISCHARGE DIAGNOSES  Diagnosis: Asthma  Assessment and Plan of Treatment: Continue your home medications for asthma. Follow up with your PCP for further evaluation and management.    Diagnosis: HTN (hypertension)  Assessment and Plan of Treatment: Recommended to have a low salt/DASH diet. Continue with blood pressure medications. Follow up with PCP for management of hypertension.    Diagnosis: TAMANNA (acute kidney injury)  Assessment and Plan of Treatment: Noted to have elevated creatinine level on admission. Creatinine level improved during stay and returned to normal limits. Ensure adequate hydration to prevent further rise in creatinine levels. Follow up with your PCP and have basic metabolic lab panel test done within 3 days to ensure creatinine is stable.    Diagnosis: DM (diabetes mellitus)  Assessment and Plan of Treatment: Recommended to have a low carbohydrate diet. Continue to take diabetic medications. Continue to monitor blood sugar. Follow up with PCP for management of diabetes.

## 2020-07-20 NOTE — PROGRESS NOTE ADULT - SUBJECTIVE AND OBJECTIVE BOX
Patient denies CP, SOB Review of systems otherwise (-)    enoxaparin Injectable 30 milliGRAM(s) SubCutaneous daily  gabapentin 200 milliGRAM(s) Oral every 12 hours  HYDROmorphone  Injectable 0.5 milliGRAM(s) IV Push every 4 hours PRN  insulin lispro (HumaLOG) corrective regimen sliding scale   SubCutaneous three times a day before meals  lidocaine   Patch 1 Patch Transdermal every 24 hours  oxycodone    5 mG/acetaminophen 325 mG 1 Tablet(s) Oral every 4 hours PRN  polyethylene glycol 3350 17 Gram(s) Oral daily  senna 2 Tablet(s) Oral at bedtime  sodium chloride 0.9%. 1000 milliLiter(s) IV Continuous <Continuous>                            11.6   7.53  )-----------( 279      ( 20 Jul 2020 05:38 )             37.7       Hemoglobin: 11.6 g/dL (07-20 @ 05:38)  Hemoglobin: 12.2 g/dL (07-19 @ 10:58)  Hemoglobin: 11.8 g/dL (07-18 @ 06:56)  Hemoglobin: 12.4 g/dL (07-17 @ 14:50)      07-20    140  |  105  |  22<H>  ----------------------------<  170<H>  4.4   |  28  |  1.41<H>    Ca    9.1      20 Jul 2020 05:38    TPro  6.9  /  Alb  3.0<L>  /  TBili  0.4  /  DBili  x   /  AST  49<H>  /  ALT  77<H>  /  AlkPhos  147<H>  07-20    Creatinine Trend: 1.41<--, 1.51<--, 1.54<--, 1.73<--    COAGS:     CARDIAC MARKERS ( 17 Jul 2020 21:04 )  <0.015 ng/mL / x     / x     / x     / x            T(C): 36.7 (07-20-20 @ 10:39), Max: 36.8 (07-19-20 @ 18:48)  HR: 94 (07-20-20 @ 11:00) (69 - 94)  BP: 97/65 (07-20-20 @ 11:00) (97/65 - 131/55)  RR: 18 (07-20-20 @ 10:39) (17 - 18)  SpO2: 93% (07-20-20 @ 11:00) (93% - 98%)  Wt(kg): --    Gen: Appears well in NAD  HEENT:  (-)icterus (-)pallor  CV: N S1 S2 1/6 MADELYN (+)2 Pulses B/l  Resp:  Clear to ausculatation B/L, normal effort  GI: (+) BS Soft, NT, ND  Lymph:  (-)Edema, (-)obvious lymphadenopathy  Skin: Warm to touch, Normal turgor  Psych: Appropriate mood and affect    tele: off    ASSESSMENT/PLAN: 	89y Female  PMHx of HTN, DM, Asthma presents to the ED with R hip and R leg pain after sustaining a fall 2 days ago.    Tele stable , NSR   Pain management    GI / DVT prophylaxis.   keep K>4, mag >2.0   no s/s of chf on exam   no pertinent findings on echo  If she is confirmed to have Afib would benefit from long term AC if ok from trauma prospective Patient denies CP, SOB Review of systems otherwise (-)    enoxaparin Injectable 30 milliGRAM(s) SubCutaneous daily  gabapentin 200 milliGRAM(s) Oral every 12 hours  HYDROmorphone  Injectable 0.5 milliGRAM(s) IV Push every 4 hours PRN  insulin lispro (HumaLOG) corrective regimen sliding scale   SubCutaneous three times a day before meals  lidocaine   Patch 1 Patch Transdermal every 24 hours  oxycodone    5 mG/acetaminophen 325 mG 1 Tablet(s) Oral every 4 hours PRN  polyethylene glycol 3350 17 Gram(s) Oral daily  senna 2 Tablet(s) Oral at bedtime  sodium chloride 0.9%. 1000 milliLiter(s) IV Continuous <Continuous>                            11.6   7.53  )-----------( 279      ( 20 Jul 2020 05:38 )             37.7       Hemoglobin: 11.6 g/dL (07-20 @ 05:38)  Hemoglobin: 12.2 g/dL (07-19 @ 10:58)  Hemoglobin: 11.8 g/dL (07-18 @ 06:56)  Hemoglobin: 12.4 g/dL (07-17 @ 14:50)      07-20    140  |  105  |  22<H>  ----------------------------<  170<H>  4.4   |  28  |  1.41<H>    Ca    9.1      20 Jul 2020 05:38    TPro  6.9  /  Alb  3.0<L>  /  TBili  0.4  /  DBili  x   /  AST  49<H>  /  ALT  77<H>  /  AlkPhos  147<H>  07-20    Creatinine Trend: 1.41<--, 1.51<--, 1.54<--, 1.73<--    COAGS:     CARDIAC MARKERS ( 17 Jul 2020 21:04 )  <0.015 ng/mL / x     / x     / x     / x            T(C): 36.7 (07-20-20 @ 10:39), Max: 36.8 (07-19-20 @ 18:48)  HR: 94 (07-20-20 @ 11:00) (69 - 94)  BP: 97/65 (07-20-20 @ 11:00) (97/65 - 131/55)  RR: 18 (07-20-20 @ 10:39) (17 - 18)  SpO2: 93% (07-20-20 @ 11:00) (93% - 98%)  Wt(kg): --    Gen: Appears well in NAD  HEENT:  (-)icterus (-)pallor  CV: N S1 S2 1/6 MADELYN (+)2 Pulses B/l  Resp:  Clear to ausculatation B/L, normal effort  GI: (+) BS Soft, NT, ND  Lymph:  (-)Edema, (-)obvious lymphadenopathy  Skin: Warm to touch, Normal turgor  Psych: Appropriate mood and affect    tele: off    ASSESSMENT/PLAN: 	89y Female  PMHx of HTN, DM, Asthma presents to the ED with R hip and R leg pain after sustaining a fall 2 days ago.    Tele stable , NSR   Pain management    GI / DVT prophylaxis.   keep K>4, mag >2.0   no s/s of chf on exam   no pertinent findings on echo  tele strips in chart reviewed, no evidence of afib    Dionte Braun MD, [facility]  BEEPER [phone]

## 2020-07-20 NOTE — PHYSICAL THERAPY INITIAL EVALUATION ADULT - LIVES WITH, PROFILE
Son and daughter in law in an apartment with elevator access; no stairs to negotiate to enter the building. Patient has [facility]HA for 20 hrs/5 days a week (9am-1pm mon through fri).

## 2020-07-20 NOTE — PHYSICAL THERAPY INITIAL EVALUATION ADULT - PASSIVE RANGE OF MOTION EXAMINATION, REHAB EVAL
bilateral upper extremity Passive ROM was WFL (within functional limits)/bilateral lower extremity Passive ROM was WFL (within functional limits)/Louis. knewe flex 0-90; right ankle 0-5 dorsiflex and 0-15 plabtarflexion./deficits as listed below

## 2020-07-20 NOTE — CONSULT NOTE ADULT - ASSESSMENT
A/P   PT  S/P  FAL  WITH   R  SACRAL AND  L   PUBIC  RAMUS  FRACTURE   WITH   H/O  DMII  AND  HIGH A1C    IN  WITH  CR  OF   1.7 ,  THIS  AM  IS  BETTER AT 1.5   UNSURE  OF  THE  BASELINE  WILL TREND   HAS NO  UA  AVAILABLE,  PL  SEND  URINE  FOR   PROTEIN,  M  ALB   BP  IS    CONTROLLED   HAS NO  VOL  ISSUES    BEING FOLLOWED BY ORHTO  AVOID  NSAID  AND  NEPHROTOXIC  MEDS,  CONTRAST   WILL FOLLOW
<javascript:void(0)>   Update Personal Info  </doh2/applinks/comdir/personupdate/?self=T> FAQ  </doh2/applinks/cspnp/FAQ> Search Results Help  </doh2/applinks/cspnp/SearchResultsHelp> Help  </doh2/applinks/cspnp/Help>  × If you receive an error when searching the  Registry clear your cache and log back in. Using Chrome: Delete your browsing History. Using Internet Explorer: Clear your browsing data.   Patient Search </doh2/applinks/cspnp/singlePatientSearch>   Multi-Patient Search </doh2/applinks/cspnp/multiPatientSearch>   Reports </doh2/applinks/cspnp/reports>   Drug Listing </doh2/applinks/cspnp/drugListing>   Designation </doh2/applinks/cspnp/designations>   My SAMPSON Numbers </doh2/applinks/cspnp/myDeaNumbers>  Data Detail Level: Printer-Friendly View Extended View   Confidential Drug Utilization Report  Search Terms: maria isabel yost, 05/01/1930   Search Date: 07/20/2020 16:34:13 PM   The Drug Utilization Report below displays all of the controlled substance prescriptions, if any, that your patient has filled in the last twelve months. The information displayed on this report is compiled from pharmacy submissions to the Department, and accurately reflects the information as submitted by the pharmacies.  This report was requested by: Virginie Pittman | Reference #: [id number]   There are no results for the search terms that you entered.

## 2020-07-20 NOTE — DISCHARGE NOTE PROVIDER - HOSPITAL COURSE
Patient is a 89 year old male, with PMH of HTN, DM, and asthma, who presented to the ED with right hip and right leg pain after sustaining a fall 2 days prior to arrival.         Patient admitted for right sacral and left pubic rami fracture.     CT scan of abdomen showed fracture of left superior pubic ramus and right sacrum.    CT head showed no acute changes. X-ray of foot showed no fracture. X-ray right foot showed no fracture. X-ray of right femur showed no fracture. Pain management consulted. Ortho consulted and recommeded for conservative management. PT evaluated patient and recommended for JAYSON. TAMANNA noted on presentation which improved during admission. ECHO done showed EF of more than 55%.     Mitral annular calcification. Mild mitral regurgitation. Mild aortic regurgitation.    Mildly dilated left atrium.  LA volume index = 38 cc/m2. Normal left ventricular internal dimensions and wall    thicknesses. Endocardium not well visualized; grossly normal left    ventricular systolic function.  Mild diastolic dysfunction    (stage I). Normal right ventricular size and function.  A prominent epicardial fat pad is noted.        Patient is medically stable for discharge. Case was discussed with attending. Patient is a 89 year old female, with PMH of HTN, DM, and asthma, who presented to the ED with right hip and right leg pain after sustaining a fall 2 days prior to arrival.         Patient admitted for right sacral and left pubic rami fracture.     CT scan of abdomen showed fracture of left superior pubic ramus and right sacrum.    CT head showed no acute changes. X-ray of foot showed no fracture. X-ray right foot showed no fracture. X-ray of right femur showed no fracture. Pain management consulted. Ortho consulted and recommeded for conservative management. PT evaluated patient and recommended for JAYSON. TAMANNA noted on presentation which improved during admission. ECHO done showed EF of more than 55%.     Mitral annular calcification. Mild mitral regurgitation. Mild aortic regurgitation.    Mildly dilated left atrium.  LA volume index = 38 cc/m2. Normal left ventricular internal dimensions and wall    thicknesses. Endocardium not well visualized; grossly normal left    ventricular systolic function.  Mild diastolic dysfunction    (stage I). Normal right ventricular size and function.  A prominent epicardial fat pad is noted.        Patient is medically stable for discharge. Case was discussed with attending.

## 2020-07-20 NOTE — CONSULT NOTE ADULT - PROBLEM SELECTOR RECOMMENDATION 9
- Xrays neg on right ankle. consider CT if pain persists.  Minimally comminuted slightly displaced fracture right sacral ala. Old left pubic rami fractures.    Mild Pain ( Pain Level 1-3)  -	Non-pharmacological agents such as imagery, relaxation, PT, distraction, ice or heat packs to affected area  Moderate Pain  (Pain Level - 4-6)  -	will hold off on this parameter  Severe pain ( Pain Level 7-10)  -	hydromorphone 0.5mg ivp q 4 hours prn  Bowel Regimen   -	Senna 2tabs po q hs  -	Miralax 17g po daily – hold for loose stool  -             Laculose stat     - PT/OOB  - pt in severe pain and tolerating iv hydromorphone - tramadol 25mg po q 8 hours for 3 days.    Nonopioid regimen - acetaminophen 1 gram ivpb for 2 doses as ordered, one today and one in am.  LFTS trending down.    - increase gabapentin to 200mg po q 12 hours and titrate upward  - lidocaine patch to right ankle  - High Risk Meds Reviewed - Avoid polypharmacy, benzodiazepines and anticholinergic medications.  Non-pharmacological strategies to reduce delirium include sensory and mobility enhancement, frequent re-orientation and cognitive stimulation, nutritional and fluid enhancement, sleep enhancement and medication review.

## 2020-07-20 NOTE — PROGRESS NOTE ADULT - SUBJECTIVE AND OBJECTIVE BOX
[facility] : Progress Note :: [phone], (office [phone]),   Dr Roper / Dr Wise / Dr Gann / Dr Grubbs / Dr Erum RASMUSSEN / Dr Howell / Dr Wilson / Dr Ayan grijalva  _____________________________________________________________________________________________    no events overnight    No Known Allergies    Hospital Medications:   MEDICATIONS  (STANDING):  enoxaparin Injectable 30 milliGRAM(s) SubCutaneous daily  gabapentin 200 milliGRAM(s) Oral every 12 hours  insulin lispro (HumaLOG) corrective regimen sliding scale   SubCutaneous three times a day before meals  lactulose Syrup 10 Gram(s) Oral once  polyethylene glycol 3350 17 Gram(s) Oral daily  senna 2 Tablet(s) Oral at bedtime  sodium chloride 0.9%. 1000 milliLiter(s) (60 mL/Hr) IV Continuous <Continuous>        VITALS:  T(F): 98.4 (07-20-20 @ 14:41), Max: 98.4 (07-20-20 @ 14:41)  HR: 83 (07-20-20 @ 14:41)  BP: 122/58 (07-20-20 @ 14:41)  RR: 18 (07-20-20 @ 14:41)  SpO2: 98% (07-20-20 @ 14:41)  Wt(kg): --      PHYSICAL EXAM:  Constitutional: elderly female in no distress  HEENT: anicteric sclera, oropharynx clear, MMM  Neck: No JVD  Respiratory: CTAB, no wheezes, rales or rhonchi  Cardiovascular: S1, S2, RRR  Gastrointestinal: BS+, soft, NT/ND  Extremities: No cyanosis or clubbing. peripheral edema+  Neurological: A/O x 3, no focal deficits  : No CVA tenderness. No pena.       LABS:  07-20    140  |  105  |  22<H>  ----------------------------<  170<H>  4.4   |  28  |  1.41<H>    Ca    9.1      20 Jul 2020 05:38    TPro  6.9  /  Alb  3.0<L>  /  TBili  0.4  /  DBili      /  AST  49<H>  /  ALT  77<H>  /  AlkPhos  147<H>  07-20    Creatinine Trend: 1.41 <--, 1.51 <--, 1.54 <--, 1.73 <--                        11.6   7.53  )-----------( 279      ( 20 Jul 2020 05:38 )             37.7     Urine Studies:      RADIOLOGY & ADDITIONAL STUDIES:

## 2020-07-20 NOTE — PHYSICAL THERAPY INITIAL EVALUATION ADULT - CRITERIA FOR SKILLED THERAPEUTIC INTERVENTIONS
anticipated discharge recommendation/rehab potential/therapy frequency/risk reduction/prevention/predicted duration of therapy intervention/impairments found/functional limitations in following categories

## 2020-07-20 NOTE — PROGRESS NOTE ADULT - PROBLEM SELECTOR PLAN 1
-p/w R hip and leg pain after sustaining a fall  -CT A/P: Fractures left superior pubic ramus and right sacrum  -CT head: no acute changes  -XRay Right T+F: no fracture  -XR R foot: no fracture  -XR R femur: no fracture  -continue Pain management with percocet and Dilaudid 0.5mg  -F/u with pain management consult  -Ortho consulted: no intervention, F/u with PT  -PT consulted

## 2020-07-20 NOTE — DISCHARGE NOTE PROVIDER - NSDCMRMEDTOKEN_GEN_ALL_CORE_FT
Advair Diskus 250 mcg-50 mcg inhalation powder: 1 puff(s) inhaled 2 times a day  ClearLax oral powder for reconstitution: Mix one cup (17 gram(s)) with 8oz of liquid and drink once orally , As Needed for constipation  diclofenac 1% topical gel: Apply topically to affected area 2 times a day  gabapentin 300 mg oral capsule: 1 cap(s) orally every 12 hours  glimepiride 2 mg oral tablet: 1 tab(s) orally 2 times a day with meals  lisinopril-hydrochlorothiazide 20 mg-12.5 mg oral tablet: 1 tab(s) orally once a day  loratadine 10 mg oral tablet: 1 tab(s) orally once a day  magnesium hydroxide 8% oral suspension: 30 milliliter(s) orally once a day, As needed, Constipation  metFORMIN 1000 mg oral tablet: 1 tab(s) orally 2 times a day  NovoLOG Mix 70/30 subcutaneous suspension: 40 unit(s) subcutaneous with breakfast and dinner 2 times a day  oxyCODONE 10 mg oral tablet: 1 tab(s) orally every 4 hours, As needed, Severe Pain (7 - 10)  oxyCODONE 5 mg oral tablet: 1 tab(s) orally every 4 hours, As needed, Moderate Pain (4 - 6)  ProAir HFA 90 mcg/inh inhalation aerosol: 2 puff(s) inhaled every 6 hours  Qtern 10 mg-5 mg oral tablet: 1 tab(s) orally once a day (in the morning)  senna oral tablet: 2 tab(s) orally once a day (at bedtime)  Tab-A-Rachael oral tablet: 1 tab(s) orally once a day

## 2020-07-20 NOTE — PROGRESS NOTE ADULT - SUBJECTIVE AND OBJECTIVE BOX
PGY-1 Progress Note discussed with attending    PAGER #: [[phone]] TILL 5:00 PM  PLEASE CONTACT ON CALL TEAM:  - On Call Team (Please refer to Agustina) FROM 5:00 PM - 8:30PM  - Nightfloat Team FROM 8:30 -7:30 AM    CHIEF COMPLAINT & BRIEF HOSPITAL COURSE:  89 y F [place] speaking, from home, ambulates with walker, with PMHx of HTN, DM, Asthma presents to the ED with R hip and R leg pain after sustaining a fall 2 days ago. Patient [place] was in the park 2 [place] and was attempting to get up with walker , when she got up she felt dizzy and fell and landed on her R side. CT showed Fractures of the left superior pubis ramus and right sacrum. CT head negative for acute bleed. Xray of ankle, femur, tibia and fibula are negative for fractures.       INTERVAL HPI/OVERNIGHT EVENTS:   Patient seen and examined at bedside.  used was [employer]. Patient complains of pain involving the right leg, slightly improved from yesterday, but she states meds aren't helping controlling the pain. She is unable to characterize the pain, localizes it more to the ankle than the knee or the hip, and mentions that it improves with ice packs. She also complains of constipation, will give Senna and Miralax.  Patient was on Percocet and IV Tylenol to no improvement of pain. Her LFTs are noted to be elevated, after discussing with attending, will start patient on 0.5mg of Dilaudid q 4 PRN for severe pain, continue with percocet PRN for moderate pain, and Gabapentin 100mg TID for neuropathic pain. Pain management consult is pending. PT consult is pending. As per nephro, in setting of TAMANNA will avoid NSAIDS or nephrotoxin drugs.    REVIEW OF SYSTEMS:  CONSTITUTIONAL: No fever, weight loss, or fatigue  RESPIRATORY: No cough, wheezing, chills or hemoptysis; No shortness of breath  CARDIOVASCULAR: No chest pain, palpitations, dizziness, or leg swelling  GASTROINTESTINAL: + Constipation, No abdominal pain. No nausea, vomiting, or hematemesis; No melena or hematochezia.  GENITOURINARY: No dysuria or hematuria, urinary frequency  MUSCULOSKELETAL: pain on right leg, mostly on ankle, Limited ROM due to pain  NEUROLOGICAL: No headaches, memory loss, loss of strength, numbness, or tremors  SKIN: No itching, burning, rashes, or lesions     MEDICATIONS  (STANDING):  enoxaparin Injectable 30 milliGRAM(s) SubCutaneous daily  gabapentin 100 milliGRAM(s) Oral three times a day  insulin lispro (HumaLOG) corrective regimen sliding scale   SubCutaneous three times a day before meals  lidocaine   Patch 1 Patch Transdermal every 24 hours  senna 2 Tablet(s) Oral at bedtime  sodium chloride 0.9%. 1000 milliLiter(s) (60 mL/Hr) IV Continuous <Continuous>    MEDICATIONS  (PRN):  HYDROmorphone  Injectable 0.5 milliGRAM(s) IV Push every 4 hours PRN Severe Pain (7 - 10)  oxycodone    5 mG/acetaminophen 325 mG 1 Tablet(s) Oral every 4 hours PRN Moderate Pain (4 - 6)      Vital Signs Last 24 Hrs  T(C): 36.6 (20 Jul 2020 05:44), Max: 36.8 (19 Jul 2020 18:48)  T(F): 97.8 (20 Jul 2020 05:44), Max: 98.2 (19 Jul 2020 18:48)  HR: 72 (20 Jul 2020 05:44) (66 - 82)  BP: 126/69 (20 Jul 2020 05:44) (113/74 - 131/77)  BP(mean): --  RR: 18 (20 Jul 2020 05:44) (17 - 19)  SpO2: 95% (20 Jul 2020 05:44) (94% - 97%)    PHYSICAL EXAMINATION:  GENERAL: In severe pain, restless in bed, crying.   HEAD:  Atraumatic, Normocephalic  EYES:  conjunctiva and sclera clear  NECK: Supple, No JVD, Normal thyroid  CHEST/LUNG: Clear to auscultation. Clear to percussion bilaterally; No rales, rhonchi, wheezing, or rubs  HEART: Regular rate and rhythm; No murmurs, rubs, or gallops  ABDOMEN: Distended, not tender; Bowel sounds present  NERVOUS SYSTEM:  Alert & Oriented X3,    EXTREMITIES:  2+ Peripheral Pulses; DP and PT present on palpation, No clubbing, cyanosis, or edema  SKIN: warm dry  MUSCULOSKELETAL: Lidocaine patch and ice packs in place in ankle, no swelling or hematomas noted, patient very sensitive to touch.                         11.6   7.53  )-----------( 279      ( 20 Jul 2020 05:38 )             37.7     07-20    140  |  105  |  22<H>  ----------------------------<  170<H>  4.4   |  28  |  1.41<H>    Ca    9.1      20 Jul 2020 05:38    TPro  6.9  /  Alb  3.0<L>  /  TBili  0.4  /  DBili  x   /  AST  49<H>  /  ALT  77<H>  /  AlkPhos  147<H>  07-20    LIVER FUNCTIONS - ( 20 Jul 2020 05:38 )  Alb: 3.0 g/dL / Pro: 6.9 g/dL / ALK PHOS: 147 U/L / ALT: 77 U/L DA / AST: 49 U/L / GGT: x                   I&O's Summary          CAPILLARY BLOOD GLUCOSE      RADIOLOGY & ADDITIONAL TESTS:  < from: CT Abdomen and Pelvis No Cont (07.17.20 @ 12:37) >  IMPRESSION:  Limited by lack of IV contrast  Fractures left superior pubic ramus right sacrum as described.  Cholelithiasis.  Diverticulosis coli without acute diverticulitis.    < end of copied text >    < from: CT Cervical Spine No Cont (07.17.20 @ 12:36) >  IMPRESSION:    Degenerative change, craniocervical junction, loss left C1 lateral mass height sclerosis left lateral C1 and C2, right lateral displacement of C1 right lateral mass on C2, multilevel degenerative change, no prevertebral soft tissue swelling or acute fracture, CT insensitive for canal contents, MRI may be obtained for improved assessment of the soft tissues and ligaments if there is persistent cervical spine pain as clinically warranted.    < end of copied text >  < from: CT Head No Cont (07.17.20 @ 12:34) >  IMPRESSION: No CT evidence of acute intracranial hemorrhage.     < end of copied text >      < from: Xray Femur 2 Views, Right (07.17.20 @ 13:30) >  IMPRESSION: No acute fracture. Old left inferior pelvic fracture and productive spondylitic change. Right knee replacement.    < end of copied text >  < from: Xray Tibia + Fibula 2 Views, Right (07.17.20 @ 13:32) >  RIGHT knee prosthetic components intact.  The tibia and fibula are intact. No fracture, gross osseous lytic or blastic lesion or soft tissue abnormality.    IMPRESSION :   No acute radiographic osseous pathology..    < end of copied text >    < from: Xray Foot AP + Lateral + Oblique, Right (07.17.20 @ 13:32) >  FINDINGS:    The bones and joint spaces are preserved.  There are no fractures or dislocations.  The soft tissues are normal.     IMPRESSION:    No acute radiographic osseous pathology.    < end of copied text >    < from: Transthoracic Echocardiogram (07.18.20 @ 06:55) >  CONCLUSIONS:  1. Mitral annular calcification. Mild mitral regurgitation.  2.  Mild aortic regurgitation.  3. Mildly dilated left atrium.  LA volume index = 38 cc/m2.  4. Normal left ventricular internal dimensions and wall  thicknesses.  5. Endocardium not well visualized; grossly normal left  ventricular systolic function.   Mild diastolic dysfunction  (stage I).  6. Normal right ventricular size and function.  7. A prominent epicardial fat pad is noted.    < end of copied text > PGY-1 Progress Note discussed with attending    PAGER #: [[phone]] TILL 5:00 PM  PLEASE CONTACT ON CALL TEAM:  - On Call Team (Please refer to Agustina) FROM 5:00 PM - 8:30PM  - Nightfloat Team FROM 8:30 -7:30 AM    CHIEF COMPLAINT & BRIEF HOSPITAL COURSE:  89 y F [place] speaking, from home, ambulates with walker, with PMHx of HTN, DM, Asthma presents to the ED with R hip and R leg pain after sustaining a fall 2 days ago. Patient states she was in the park 2 days ago and was attempting to get up with walker , when she got up she felt dizzy and fell and landed on her R side. CT showed Fractures of the left superior pubis ramus and right sacrum. CT head negative for acute bleed. Xray of ankle, femur, tibia and fibula are negative for fractures.       INTERVAL HPI/OVERNIGHT EVENTS:   Patient seen and examined at bedside.  used was [employer]. Patient complains of pain involving the right leg, slightly improved from yesterday, but she states meds aren't helping controlling the pain. She is unable to characterize the pain, localizes it more to the ankle than the knee or the hip, and mentions that it improves with ice packs. She also complains of constipation, will give Senna and Miralax.  Patient was on Percocet and IV Tylenol to no improvement of pain. Her LFTs are noted to be elevated, after discussing with attending, will start patient on 0.5mg of Dilaudid q 4 PRN for severe pain, continue with percocet PRN for moderate pain, and Gabapentin 100mg TID for neuropathic pain. Pain management consult is pending. PT consult is pending. As per nephro, in setting of TAMANNA will avoid NSAIDS or nephrotoxin drugs.    REVIEW OF SYSTEMS:  CONSTITUTIONAL: No fever, weight loss, or fatigue  RESPIRATORY: No cough, wheezing, chills or hemoptysis; No shortness of breath  CARDIOVASCULAR: No chest pain, palpitations, dizziness, or leg swelling  GASTROINTESTINAL: + Constipation, No abdominal pain. No nausea, vomiting, or hematemesis; No melena or hematochezia.  GENITOURINARY: No dysuria or hematuria, urinary frequency  MUSCULOSKELETAL: pain on right leg, mostly on ankle, Limited ROM due to pain  NEUROLOGICAL: No headaches, memory loss, loss of strength, numbness, or tremors  SKIN: No itching, burning, rashes, or lesions     MEDICATIONS  (STANDING):  enoxaparin Injectable 30 milliGRAM(s) SubCutaneous daily  gabapentin 200 milliGRAM(s) Oral every 12 hours  insulin lispro (HumaLOG) corrective regimen sliding scale   SubCutaneous three times a day before meals  senna 2 Tablet(s) Oral at bedtime  sodium chloride 0.9%. 1000 milliLiter(s) (60 mL/Hr) IV Continuous <Continuous>    MEDICATIONS  (PRN):  HYDROmorphone  Injectable 0.5 milliGRAM(s) IV Push every 4 hours PRN Severe Pain (7 - 10)  oxycodone    5 mG/acetaminophen 325 mG 1 Tablet(s) Oral every 4 hours PRN Moderate Pain (4 - 6)      Vital Signs Last 24 Hrs  T(C): 36.6 (20 Jul 2020 05:44), Max: 36.8 (19 Jul 2020 18:48)  T(F): 97.8 (20 Jul 2020 05:44), Max: 98.2 (19 Jul 2020 18:48)  HR: 72 (20 Jul 2020 05:44) (66 - 82)  BP: 126/69 (20 Jul 2020 05:44) (113/74 - 131/77)  BP(mean): --  RR: 18 (20 Jul 2020 05:44) (17 - 19)  SpO2: 95% (20 Jul 2020 05:44) (94% - 97%)    PHYSICAL EXAMINATION:  GENERAL: In severe pain, restless in bed, crying.   HEAD:  Atraumatic, Normocephalic  EYES:  conjunctiva and sclera clear  NECK: Supple, No JVD, Normal thyroid  CHEST/LUNG: Clear to auscultation. Clear to percussion bilaterally; No rales, rhonchi, wheezing, or rubs  HEART: Regular rate and rhythm; No murmurs, rubs, or gallops  ABDOMEN: Distended, not tender; Bowel sounds present  NERVOUS SYSTEM:  Alert & Oriented X3,    EXTREMITIES:  2+ Peripheral Pulses; DP and PT present on palpation, No clubbing, cyanosis, or edema  SKIN: warm dry  MUSCULOSKELETAL: Lidocaine patch and ice packs in place in ankle, no swelling or hematomas noted, patient very sensitive to touch.                         11.6   7.53  )-----------( 279      ( 20 Jul 2020 05:38 )             37.7     07-20    140  |  105  |  22<H>  ----------------------------<  170<H>  4.4   |  28  |  1.41<H>    Ca    9.1      20 Jul 2020 05:38    TPro  6.9  /  Alb  3.0<L>  /  TBili  0.4  /  DBili  x   /  AST  49<H>  /  ALT  77<H>  /  AlkPhos  147<H>  07-20    LIVER FUNCTIONS - ( 20 Jul 2020 05:38 )  Alb: 3.0 g/dL / Pro: 6.9 g/dL / ALK PHOS: 147 U/L / ALT: 77 U/L DA / AST: 49 U/L / GGT: x                   I&O's Summary          CAPILLARY BLOOD GLUCOSE      RADIOLOGY & ADDITIONAL TESTS:  < from: CT Abdomen and Pelvis No Cont (07.17.20 @ 12:37) >  IMPRESSION:  Limited by lack of IV contrast  Fractures left superior pubic ramus right sacrum as described.  Cholelithiasis.  Diverticulosis coli without acute diverticulitis.    < end of copied text >    < from: CT Cervical Spine No Cont (07.17.20 @ 12:36) >  IMPRESSION:    Degenerative change, craniocervical junction, loss left C1 lateral mass height sclerosis left lateral C1 and C2, right lateral displacement of C1 right lateral mass on C2, multilevel degenerative change, no prevertebral soft tissue swelling or acute fracture, CT insensitive for canal contents, MRI may be obtained for improved assessment of the soft tissues and ligaments if there is persistent cervical spine pain as clinically warranted.    < end of copied text >  < from: CT Head No Cont (07.17.20 @ 12:34) >  IMPRESSION: No CT evidence of acute intracranial hemorrhage.     < end of copied text >      < from: Xray Femur 2 Views, Right (07.17.20 @ 13:30) >  IMPRESSION: No acute fracture. Old left inferior pelvic fracture and productive spondylitic change. Right knee replacement.    < end of copied text >  < from: Xray Tibia + Fibula 2 Views, Right (07.17.20 @ 13:32) >  RIGHT knee prosthetic components intact.  The tibia and fibula are intact. No fracture, gross osseous lytic or blastic lesion or soft tissue abnormality.    IMPRESSION :   No acute radiographic osseous pathology..    < end of copied text >    < from: Xray Foot AP + Lateral + Oblique, Right (07.17.20 @ 13:32) >  FINDINGS:    The bones and joint spaces are preserved.  There are no fractures or dislocations.  The soft tissues are normal.     IMPRESSION:    No acute radiographic osseous pathology.    < end of copied text >    < from: Transthoracic Echocardiogram (07.18.20 @ 06:55) >  CONCLUSIONS:  1. Mitral annular calcification. Mild mitral regurgitation.  2.  Mild aortic regurgitation.  3. Mildly dilated left atrium.  LA volume index = 38 cc/m2.  4. Normal left ventricular internal dimensions and wall  thicknesses.  5. Endocardium not well visualized; grossly normal left  ventricular systolic function.   Mild diastolic dysfunction  (stage I).  6. Normal right ventricular size and function.  7. A prominent epicardial fat pad is noted.    < end of copied text >

## 2020-07-21 LAB
ALBUMIN SERPL ELPH-MCNC: 3 G/DL — LOW (ref 3.5–5)
ALP SERPL-CCNC: 164 U/L — HIGH (ref 40–120)
ALT FLD-CCNC: 66 U/L DA — HIGH (ref 10–60)
ANION GAP SERPL CALC-SCNC: 5 MMOL/L — SIGNIFICANT CHANGE UP (ref 5–17)
AST SERPL-CCNC: 40 U/L — SIGNIFICANT CHANGE UP (ref 10–40)
BASOPHILS # BLD AUTO: 0.06 K/UL — SIGNIFICANT CHANGE UP (ref 0–0.2)
BASOPHILS NFR BLD AUTO: 0.7 % — SIGNIFICANT CHANGE UP (ref 0–2)
BILIRUB SERPL-MCNC: 0.5 MG/DL — SIGNIFICANT CHANGE UP (ref 0.2–1.2)
BUN SERPL-MCNC: 21 MG/DL — HIGH (ref 7–18)
CALCIUM SERPL-MCNC: 9.3 MG/DL — SIGNIFICANT CHANGE UP (ref 8.4–10.5)
CHLORIDE SERPL-SCNC: 102 MMOL/L — SIGNIFICANT CHANGE UP (ref 96–108)
CO2 SERPL-SCNC: 28 MMOL/L — SIGNIFICANT CHANGE UP (ref 22–31)
CREAT SERPL-MCNC: 1.18 MG/DL — SIGNIFICANT CHANGE UP (ref 0.5–1.3)
EOSINOPHIL # BLD AUTO: 0.38 K/UL — SIGNIFICANT CHANGE UP (ref 0–0.5)
EOSINOPHIL NFR BLD AUTO: 4.7 % — SIGNIFICANT CHANGE UP (ref 0–6)
GLUCOSE BLDC GLUCOMTR-MCNC: 211 MG/DL — HIGH (ref 70–99)
GLUCOSE BLDC GLUCOMTR-MCNC: 249 MG/DL — HIGH (ref 70–99)
GLUCOSE BLDC GLUCOMTR-MCNC: 260 MG/DL — HIGH (ref 70–99)
GLUCOSE BLDC GLUCOMTR-MCNC: 277 MG/DL — HIGH (ref 70–99)
GLUCOSE SERPL-MCNC: 198 MG/DL — HIGH (ref 70–99)
HCT VFR BLD CALC: 39.7 % — SIGNIFICANT CHANGE UP (ref 34.5–45)
HGB BLD-MCNC: 12.1 G/DL — SIGNIFICANT CHANGE UP (ref 11.5–15.5)
IMM GRANULOCYTES NFR BLD AUTO: 1 % — SIGNIFICANT CHANGE UP (ref 0–1.5)
LYMPHOCYTES # BLD AUTO: 2.69 K/UL — SIGNIFICANT CHANGE UP (ref 1–3.3)
LYMPHOCYTES # BLD AUTO: 32.9 % — SIGNIFICANT CHANGE UP (ref 13–44)
MCHC RBC-ENTMCNC: 25.4 PG — LOW (ref 27–34)
MCHC RBC-ENTMCNC: 30.5 GM/DL — LOW (ref 32–36)
MCV RBC AUTO: 83.4 FL — SIGNIFICANT CHANGE UP (ref 80–100)
MONOCYTES # BLD AUTO: 0.76 K/UL — SIGNIFICANT CHANGE UP (ref 0–0.9)
MONOCYTES NFR BLD AUTO: 9.3 % — SIGNIFICANT CHANGE UP (ref 2–14)
NEUTROPHILS # BLD AUTO: 4.2 K/UL — SIGNIFICANT CHANGE UP (ref 1.8–7.4)
NEUTROPHILS NFR BLD AUTO: 51.4 % — SIGNIFICANT CHANGE UP (ref 43–77)
NRBC # BLD: 0 /100 WBCS — SIGNIFICANT CHANGE UP (ref 0–0)
PLATELET # BLD AUTO: 280 K/UL — SIGNIFICANT CHANGE UP (ref 150–400)
POTASSIUM SERPL-MCNC: 4.3 MMOL/L — SIGNIFICANT CHANGE UP (ref 3.5–5.3)
POTASSIUM SERPL-SCNC: 4.3 MMOL/L — SIGNIFICANT CHANGE UP (ref 3.5–5.3)
PROT SERPL-MCNC: 7 G/DL — SIGNIFICANT CHANGE UP (ref 6–8.3)
RBC # BLD: 4.76 M/UL — SIGNIFICANT CHANGE UP (ref 3.8–5.2)
RBC # FLD: 14.6 % — HIGH (ref 10.3–14.5)
SARS-COV-2 RNA SPEC QL NAA+PROBE: SIGNIFICANT CHANGE UP
SODIUM SERPL-SCNC: 135 MMOL/L — SIGNIFICANT CHANGE UP (ref 135–145)
WBC # BLD: 8.17 K/UL — SIGNIFICANT CHANGE UP (ref 3.8–10.5)
WBC # FLD AUTO: 8.17 K/UL — SIGNIFICANT CHANGE UP (ref 3.8–10.5)

## 2020-07-21 PROCEDURE — 72131 CT LUMBAR SPINE W/O DYE: CPT | Mod: 26

## 2020-07-21 PROCEDURE — 99231 SBSQ HOSP IP/OBS SF/LOW 25: CPT

## 2020-07-21 RX ORDER — GABAPENTIN 400 MG/1
300 CAPSULE ORAL EVERY 12 HOURS
Refills: 0 | Status: DISCONTINUED | OUTPATIENT
Start: 2020-07-21 | End: 2020-07-22

## 2020-07-21 RX ORDER — MAGNESIUM HYDROXIDE 400 MG/1
30 TABLET, CHEWABLE ORAL DAILY
Refills: 0 | Status: DISCONTINUED | OUTPATIENT
Start: 2020-07-21 | End: 2020-07-22

## 2020-07-21 RX ORDER — OXYCODONE HYDROCHLORIDE 5 MG/1
5 TABLET ORAL EVERY 4 HOURS
Refills: 0 | Status: DISCONTINUED | OUTPATIENT
Start: 2020-07-21 | End: 2020-07-22

## 2020-07-21 RX ORDER — OXYCODONE HYDROCHLORIDE 5 MG/1
10 TABLET ORAL EVERY 4 HOURS
Refills: 0 | Status: DISCONTINUED | OUTPATIENT
Start: 2020-07-21 | End: 2020-07-22

## 2020-07-21 RX ORDER — ENOXAPARIN SODIUM 100 MG/ML
40 INJECTION SUBCUTANEOUS DAILY
Refills: 0 | Status: DISCONTINUED | OUTPATIENT
Start: 2020-07-21 | End: 2020-07-22

## 2020-07-21 RX ADMIN — Medication 2: at 22:51

## 2020-07-21 RX ADMIN — GABAPENTIN 200 MILLIGRAM(S): 400 CAPSULE ORAL at 06:00

## 2020-07-21 RX ADMIN — Medication 1000 MILLIGRAM(S): at 09:40

## 2020-07-21 RX ADMIN — POLYETHYLENE GLYCOL 3350 17 GRAM(S): 17 POWDER, FOR SOLUTION ORAL at 11:38

## 2020-07-21 RX ADMIN — SENNA PLUS 2 TABLET(S): 8.6 TABLET ORAL at 21:52

## 2020-07-21 RX ADMIN — TRAMADOL HYDROCHLORIDE 25 MILLIGRAM(S): 50 TABLET ORAL at 06:00

## 2020-07-21 RX ADMIN — ENOXAPARIN SODIUM 40 MILLIGRAM(S): 100 INJECTION SUBCUTANEOUS at 11:38

## 2020-07-21 RX ADMIN — TRAMADOL HYDROCHLORIDE 25 MILLIGRAM(S): 50 TABLET ORAL at 07:55

## 2020-07-21 RX ADMIN — OXYCODONE HYDROCHLORIDE 10 MILLIGRAM(S): 5 TABLET ORAL at 22:45

## 2020-07-21 RX ADMIN — Medication 400 MILLIGRAM(S): at 09:10

## 2020-07-21 RX ADMIN — GABAPENTIN 300 MILLIGRAM(S): 400 CAPSULE ORAL at 17:44

## 2020-07-21 RX ADMIN — Medication 3: at 17:43

## 2020-07-21 RX ADMIN — Medication 2: at 08:02

## 2020-07-21 RX ADMIN — OXYCODONE HYDROCHLORIDE 10 MILLIGRAM(S): 5 TABLET ORAL at 21:52

## 2020-07-21 RX ADMIN — Medication 3: at 11:37

## 2020-07-21 RX ADMIN — HYDROMORPHONE HYDROCHLORIDE 0.5 MILLIGRAM(S): 2 INJECTION INTRAMUSCULAR; INTRAVENOUS; SUBCUTANEOUS at 00:10

## 2020-07-21 NOTE — PROGRESS NOTE ADULT - PROBLEM SELECTOR PLAN 1
Pt with right ankle pain which is somatic and neuropathic in nature. X ray negative Right ankle. CT shows Comminuted slightly displaced fracture left superior pubic ramus near the issue pubic junction. Minimally comminuted slightly displaced fracture right sacral ala.   Opioid pain recommendations   - Will discontinue Tramadol standing.  - Start Oxycodone 5/10 mg PO q4 hours PRN moderate/ severe pain. Monitor for sedation/ respiratory depression.   Non-opioid pain recommendations   - Continue Acetaminophen 1 gram IV daily x 24hrs. Monitor LFTs  - Increase Gabapentin to 300mg po q 12 hours. Monitor renal function.   Bowel Regimen  - Continue Miralax 17G PO daily  - Continue Senna 2 tablets at bedtime for constipation  - Continue Magnesium hydroxide 30ml daily PRN constipation  Mild pain   - Non-pharmacological pain treatment recommendations  - Warm/ Cool packs PRN   - Repositioning, imagery, relaxation, distraction.  - Physical therapy OOB if no contraindications   Recommendations discussed with primary team and RN

## 2020-07-21 NOTE — PROGRESS NOTE ADULT - SUBJECTIVE AND OBJECTIVE BOX
PGY-1 Progress Note discussed with attending    PAGER #: [[phone]] TILL 5:00 PM  PLEASE CONTACT ON CALL TEAM:  - On Call Team (Please refer to Agustina) FROM 5:00 PM - 8:30PM  - Nightfloat Team FROM 8:30 -7:30 AM    CHIEF COMPLAINT & BRIEF HOSPITAL COURSE:  89 y F [place] speaking, from home, ambulates with walker, with PMHx of HTN, DM, Asthma presents to the ED with R hip and R leg pain after sustaining a fall 2 days ago. Patient [place] was in the park [place] and was attempting to get up with walker , when she got up she felt dizzy and fell and landed on her R side. CT showed Fractures of the left superior pubis ramus and right sacrum. CT head negative for acute bleed. Xray of ankle, femur, tibia and fibula are negative for fractures.     Patient was on Percocet and IV Tylenol to no improvement of pain. Her LFTs were noted to be elevated, after discussing with attending, patient was started on 0.5mg of Dilaudid q 4 PRN for severe pain,  percocet PRN for moderate pain, and Gabapentin 100mg TID for neuropathic pain. Pain management saw the patient and modified her medications to Gabapentin 300 q12. PO oxycodone 5 prn for moderate pain and po oxycodone 10 PRN for severe pain.   PT consult recommends JAYSON. As per nephro, in setting of TAMANNA will avoid NSAIDS or nephrotoxin drugs.      INTERVAL HPI/OVERNIGHT EVENTS:   Patient seen and examined at bedside.  used. Patient still complains of pain involving the right leg, specifically the ankle, she states meds aren't helping controlling the pain. today she describes it more as a stabbing pain. She had a bowel movement yesterday after receiving Senna and Miralax.  Patient was seen by Pain management and her meds are being adjusted to control her pain. She went for CT of lumbar spine, results pending. Plan is to DC patient to [facility] soon.      REVIEW OF SYSTEMS:  CONSTITUTIONAL: No fever, weight loss, or fatigue  RESPIRATORY: No cough, wheezing, chills or hemoptysis; No shortness of breath  CARDIOVASCULAR: No chest pain, palpitations, dizziness, or leg swelling  GASTROINTESTINAL: No abdominal pain. No nausea, vomiting, or hematemesis; No diarrhea or constipation. No melena or hematochezia.  GENITOURINARY: No dysuria or hematuria, urinary frequency  MUSCULOSKELETAL: No pain, noLimited ROM  NEUROLOGICAL: No headaches, memory loss, loss of strength, numbness, or tremors  SKIN: No itching, burning, rashes, or lesions     MEDICATIONS  (STANDING):  enoxaparin Injectable 40 milliGRAM(s) SubCutaneous daily  gabapentin 300 milliGRAM(s) Oral every 12 hours  insulin lispro (HumaLOG) corrective regimen sliding scale   SubCutaneous Before meals and at bedtime  polyethylene glycol 3350 17 Gram(s) Oral daily  senna 2 Tablet(s) Oral at bedtime    MEDICATIONS  (PRN):  magnesium hydroxide Suspension 30 milliLiter(s) Oral daily PRN Constipation  oxyCODONE    IR 5 milliGRAM(s) Oral every 4 hours PRN Moderate Pain (4 - 6)  oxyCODONE    IR 10 milliGRAM(s) Oral every 4 hours PRN Severe Pain (7 - 10)      Vital Signs Last 24 Hrs  T(C): 36.7 (21 Jul 2020 05:13), Max: 36.9 (20 Jul 2020 14:41)  T(F): 98 (21 Jul 2020 05:13), Max: 98.4 (20 Jul 2020 14:41)  HR: 72 (21 Jul 2020 05:13) (72 - 83)  BP: 139/76 (21 Jul 2020 05:13) (92/56 - 139/76)  BP(mean): --  RR: 18 (21 Jul 2020 05:13) (18 - 18)  SpO2: 95% (21 Jul 2020 05:13) (95% - 98%)    PHYSICAL EXAMINATION:  GENERAL: NAD, well built  HEAD:  Atraumatic, Normocephalic  EYES:  conjunctiva and sclera clear  NECK: Supple, No JVD, Normal thyroid  CHEST/LUNG: Clear to auscultation. Clear to percussion bilaterally; No rales, rhonchi, wheezing, or rubs  HEART: Regular rate and rhythm; No murmurs, rubs, or gallops  ABDOMEN: Soft, Nontender, Nondistended; Bowel sounds present  NERVOUS SYSTEM:  Alert & Oriented X3,    EXTREMITIES:  2+ Peripheral Pulses, No clubbing, cyanosis, or edema  SKIN: warm dry                          12.1   8.17  )-----------( 280      ( 21 Jul 2020 06:39 )             39.7     07-21    135  |  102  |  21<H>  ----------------------------<  198<H>  4.3   |  28  |  1.18    Ca    9.3      21 Jul 2020 06:39    TPro  7.0  /  Alb  3.0<L>  /  TBili  0.5  /  DBili  x   /  AST  40  /  ALT  66<H>  /  AlkPhos  164<H>  07-21    LIVER FUNCTIONS - ( 21 Jul 2020 06:39 )  Alb: 3.0 g/dL / Pro: 7.0 g/dL / ALK PHOS: 164 U/L / ALT: 66 U/L DA / AST: 40 U/L / GGT: x                 I&O's Summary        RADIOLOGY & ADDITIONAL TESTS: PGY-1 Progress Note discussed with attending    PAGER #: [[phone]] TILL 5:00 PM  PLEASE CONTACT ON CALL TEAM:  - On Call Team (Please refer to Agustina) FROM 5:00 PM - 8:30PM  - Nightfloat Team FROM 8:30 -7:30 AM    CHIEF COMPLAINT & BRIEF HOSPITAL COURSE:  89 y F [place] speaking, from home, ambulates with walker, with PMHx of HTN, DM, Asthma presents to the ED with R hip and R leg pain after sustaining a fall 2 days ago. Patient states she was in the park [place] ago and was attempting to get up with walker , when she got up she felt dizzy and fell and landed on her R side. CT showed Fractures of the left superior pubis ramus and right sacrum. CT head negative for acute bleed. Xray of ankle, femur, tibia and fibula are negative for fractures.     Patient was on Percocet and IV Tylenol to no improvement of pain. Her LFTs were noted to be elevated, after discussing with attending, patient was started on 0.5mg of Dilaudid q 4 PRN for severe pain,  percocet PRN for moderate pain, and Gabapentin 100mg TID for neuropathic pain. Pain management saw the patient and modified her medications to Gabapentin 300 q12. PO oxycodone 5 prn for moderate pain and po oxycodone 10 PRN for severe pain.   PT consult recommends JAYSON. As per nephro, in setting of TAMANNA will avoid NSAIDS or nephrotoxin drugs.      INTERVAL HPI/OVERNIGHT EVENTS:   Patient seen and examined at bedside.  used. Patient still complains of pain involving the right leg, specifically the ankle, she states meds aren't helping controlling the pain. today she describes it more as a stabbing pain. She had a bowel movement yesterday after receiving Senna and Miralax.  Patient was seen by Pain management and her meds are being adjusted to control her pain. She went for CT of lumbar spine, results pending. Plan is to DC patient to [facility] soon.      REVIEW OF SYSTEMS:  CONSTITUTIONAL: No fever, weight loss, or fatigue  RESPIRATORY: No cough, wheezing, chills or hemoptysis; No shortness of breath  CARDIOVASCULAR: No chest pain, palpitations, dizziness, or leg swelling  GASTROINTESTINAL: + Constipation, No abdominal pain. No nausea, vomiting, or hematemesis; No melena or hematochezia.  GENITOURINARY: No dysuria or hematuria, urinary frequency  MUSCULOSKELETAL: pain on right leg, mostly on ankle, Limited ROM due to pain  NEUROLOGICAL: No headaches, memory loss, loss of strength, numbness, or tremors  SKIN: No itching, burning, rashes, or lesions       MEDICATIONS  (STANDING):  enoxaparin Injectable 40 milliGRAM(s) SubCutaneous daily  gabapentin 300 milliGRAM(s) Oral every 12 hours  insulin lispro (HumaLOG) corrective regimen sliding scale   SubCutaneous Before meals and at bedtime  polyethylene glycol 3350 17 Gram(s) Oral daily  senna 2 Tablet(s) Oral at bedtime    MEDICATIONS  (PRN):  magnesium hydroxide Suspension 30 milliLiter(s) Oral daily PRN Constipation  oxyCODONE    IR 5 milliGRAM(s) Oral every 4 hours PRN Moderate Pain (4 - 6)  oxyCODONE    IR 10 milliGRAM(s) Oral every 4 hours PRN Severe Pain (7 - 10)      Vital Signs Last 24 Hrs  T(C): 36.7 (21 Jul 2020 05:13), Max: 36.9 (20 Jul 2020 14:41)  T(F): 98 (21 Jul 2020 05:13), Max: 98.4 (20 Jul 2020 14:41)  HR: 72 (21 Jul 2020 05:13) (72 - 83)  BP: 139/76 (21 Jul 2020 05:13) (92/56 - 139/76)  BP(mean): --  RR: 18 (21 Jul 2020 05:13) (18 - 18)  SpO2: 95% (21 Jul 2020 05:13) (95% - 98%)    PHYSICAL EXAMINATION:  GENERAL: In pain, restless in bed, yelling and crying, when Drs or RNs go in the room. calm rest of the time.  HEAD:  Atraumatic, Normocephalic  EYES:  conjunctiva and sclera clear  NECK: Supple, No JVD, Normal thyroid  CHEST/LUNG: Clear to auscultation. Clear to percussion bilaterally; No rales, rhonchi, wheezing, or rubs  HEART: Regular rate and rhythm; No murmurs, rubs, or gallops  ABDOMEN: Soft, not tender; Bowel sounds present  NERVOUS SYSTEM:  Alert & Oriented X3,    EXTREMITIES:  2+ Peripheral Pulses; DP and PT present on palpation, No clubbing, cyanosis, or edema  SKIN: warm dry  MUSCULOSKELETAL: ice packs in place in ankle, no swelling or hematomas noted, patient very sensitive to touch.                           12.1   8.17  )-----------( 280      ( 21 Jul 2020 06:39 )             39.7     07-21    135  |  102  |  21<H>  ----------------------------<  198<H>  4.3   |  28  |  1.18    Ca    9.3      21 Jul 2020 06:39    TPro  7.0  /  Alb  3.0<L>  /  TBili  0.5  /  DBili  x   /  AST  40  /  ALT  66<H>  /  AlkPhos  164<H>  07-21    LIVER FUNCTIONS - ( 21 Jul 2020 06:39 )  Alb: 3.0 g/dL / Pro: 7.0 g/dL / ALK PHOS: 164 U/L / ALT: 66 U/L DA / AST: 40 U/L / GGT: x           I&O's Summary        RADIOLOGY & ADDITIONAL TESTS:

## 2020-07-21 NOTE — PROGRESS NOTE ADULT - SUBJECTIVE AND OBJECTIVE BOX
[facility] : Progress Note :: [phone], (office [phone]),   Dr Roper / Dr Wise / Dr Gann / Dr Grubbs / Dr Erum RASMUSSEN / Dr Howell / Dr Wilson / Dr Ayan grijalva  _____________________________________________________________________________________________  no events overnight    No Known Allergies    Hospital Medications:   MEDICATIONS  (STANDING):  enoxaparin Injectable 40 milliGRAM(s) SubCutaneous daily  gabapentin 300 milliGRAM(s) Oral every 12 hours  insulin lispro (HumaLOG) corrective regimen sliding scale   SubCutaneous Before meals and at bedtime  polyethylene glycol 3350 17 Gram(s) Oral daily  senna 2 Tablet(s) Oral at bedtime        VITALS:  T(F): 97.6 (07-21-20 @ 14:36), Max: 98.4 (07-20-20 @ 21:06)  HR: 77 (07-21-20 @ 14:36)  BP: 140/53 (07-21-20 @ 14:36)  RR: 18 (07-21-20 @ 14:36)  SpO2: 97% (07-21-20 @ 14:36)  Wt(kg): --      PHYSICAL EXAM:  Constitutional: NAD  HEENT: anicteric sclera, oropharynx clear.  Neck: No JVD  Respiratory: CTAB, no wheezes, rales or rhonchi  Cardiovascular: S1, S2, RRR  Gastrointestinal: BS+, soft, NT/ND  Extremities:   peripheral edema+  Neurological: A/O x 3, no focal deficits  : No CVA tenderness. No pena.       LABS:  07-21    135  |  102  |  21<H>  ----------------------------<  198<H>  4.3   |  28  |  1.18    Ca    9.3      21 Jul 2020 06:39    TPro  7.0  /  Alb  3.0<L>  /  TBili  0.5  /  DBili      /  AST  40  /  ALT  66<H>  /  AlkPhos  164<H>  07-21    Creatinine Trend: 1.18 <--, 1.41 <--, 1.51 <--, 1.54 <--, 1.73 <--                        12.1   8.17  )-----------( 388      ( 21 Jul 2020 06:39 )             39.7     Urine Studies:      RADIOLOGY & ADDITIONAL STUDIES:

## 2020-07-21 NOTE — PROGRESS NOTE ADULT - PROBLEM SELECTOR PLAN 1
-p/w R hip and leg pain after sustaining a fall  -CT A/P: Fractures left superior pubic ramus and right sacrum  -CT head: no acute changes  -XRay Right T+F: no fracture  -XR R foot: no fracture  -XR R femur: no fracture  -continue Pain management with percocet and Dilaudid 0.5mg  -F/u with pain management consult  -Ortho consulted: no intervention, F/u with PT  -PT consulted -p/w R hip and leg pain after sustaining a fall  -CT A/P: Fractures left superior pubic ramus and right sacrum  -CT head: no acute changes  -XRay Right T+F: no fracture  -XR R foot: no fracture  -XR R femur: no fracture  -continue Pain control as per Pain Management recommendation   -F/u with pain management consult  -Ortho consulted: no intervention, F/u with PT  -PT consulted: Recommend [facility]  - CT lumbar spine: pending results

## 2020-07-21 NOTE — PROGRESS NOTE ADULT - ASSESSMENT
<javascript:void(0)>   Update Personal Info  </[email or web address]/applinks/comdir/personupdate/?self=T> FAQ  </doh2/applinks/cspnp/FAQ> Search Results Help  </doh2/applinks/cspnp/SearchResultsHelp> Help  </doh2/applinks/cspnp/Help>  × If you receive an error when searching the  Registry clear your cache and log back in. Using Chrome: Delete your browsing History. Using Internet Explorer: Clear your browsing data.   Patient Search </doh2/applinks/cspnp/singlePatientSearch>   Multi-Patient Search </doh2/applinks/cspnp/multiPatientSearch>   Reports </doh2/applinks/cspnp/reports>   Drug Listing </doh2/applinks/cspnp/drugListing>   Designation </doh2/applinks/cspnp/designations>   My SAMPSON Numbers </doh2/applinks/cspnp/myDeaNumbers>  Data Detail Level: Printer-Friendly View Extended View   Confidential Drug Utilization Report  Search Terms: maria isabel yost, 05/01/1930   Search Date: 07/20/2020 16:34:13 PM   The Drug Utilization Report below displays all of the controlled substance prescriptions, if any, that your patient has filled in the last twelve months. The information displayed on this report is compiled from pharmacy submissions to the Department, and accurately reflects the information as submitted by the pharmacies.  This report was requested by: Virginie Pittman | Reference #: [id number]   There are no results for the search terms that you entered.

## 2020-07-21 NOTE — PROGRESS NOTE ADULT - ASSESSMENT
A/P  RESOLVING TAMANNA.  POST  FALL,  CR  WAS  1.7  ON  ADMISSION, IMPROVED TO 1.18  NOT ON  NSAIDS OR  ANY  NEPHROTOXIC MEDS  ON F/U BY    ORTHO  FOR  PUBIC  RAMUS  FRACTURE    WILL SIGN OFF.  THANKS

## 2020-07-21 NOTE — PROGRESS NOTE ADULT - SUBJECTIVE AND OBJECTIVE BOX
Source of information: , Chart review, patient  Patient language: Rivera  : [id number]    HPI:  89 y M from home, ambulates with walker, with PMHx of HTN, DM, Asthma presents to the ED with R hip and R leg pain after sustaining a fall 2 days ago. Patient states [place] in the park 2 [place] and was attempting to get up with walker , when she got up she felt dizzy and fell and landed on her R side. Patient denies any head injuries, back pain, neck pain , chest pain, dizziness, palpitations, diaphoresis, N/V/D, abdominal pain or any other acute complaints.    In ED, /71, HR 76 (17 Jul 2020 18:34)      This is a Patient is a 89y old  Female who presents with a chief complaint of Fall and right ankle pain.  Pt fell 2 days ago in park and landed on right side. CT shows Comminuted slightly displaced fracture left superior pubic ramus near the issue pubic junction. Minimally comminuted slightly displaced fracture right sacral ala.   Pt seen and examined at bedside.  Speaking via  phone  is having difficult time understanding patient. Pt speaking in a very loud volume. Per  pt states she is wants IV medication and she wants food. RN made aware.     PAST MEDICAL & SURGICAL HISTORY:  Asthma  HTN (hypertension)  DM (diabetes mellitus)    SOCIAL HISTORY:  [x ] Denies Smoking, Alcohol, or Drug Use    Allergies    No Known Allergies    MEDICATIONS  (STANDING):  enoxaparin Injectable 40 milliGRAM(s) SubCutaneous daily  gabapentin 300 milliGRAM(s) Oral every 12 hours  insulin lispro (HumaLOG) corrective regimen sliding scale   SubCutaneous Before meals and at bedtime  polyethylene glycol 3350 17 Gram(s) Oral daily  senna 2 Tablet(s) Oral at bedtime    MEDICATIONS  (PRN):  oxyCODONE    IR 5 milliGRAM(s) Oral every 4 hours PRN Moderate Pain (4 - 6)  oxyCODONE    IR 10 milliGRAM(s) Oral every 4 hours PRN Severe Pain (7 - 10)      Vital Signs Last 24 Hrs  T(C): 36.7 (21 Jul 2020 05:13), Max: 36.9 (20 Jul 2020 14:41)  T(F): 98 (21 Jul 2020 05:13), Max: 98.4 (20 Jul 2020 14:41)  HR: 72 (21 Jul 2020 05:13) (72 - 83)  BP: 139/76 (21 Jul 2020 05:13) (92/56 - 139/76)  BP(mean): --  RR: 18 (21 Jul 2020 05:13) (18 - 18)  SpO2: 95% (21 Jul 2020 05:13) (95% - 98%)  COVID-19 PCR: NotDetec (20 Jul 2020 15:50)    LABS: Reviewed                          12.1   8.17  )-----------( 280      ( 21 Jul 2020 06:39 )             39.7     07-21    135  |  102  |  21<H>  ----------------------------<  198<H>  4.3   |  28  |  1.18    Ca    9.3      21 Jul 2020 06:39    TPro  7.0  /  Alb  3.0<L>  /  TBili  0.5  /  DBili  x   /  AST  40  /  ALT  66<H>  /  AlkPhos  164<H>  07-21      LIVER FUNCTIONS - ( 21 Jul 2020 06:39 )  Alb: 3.0 g/dL / Pro: 7.0 g/dL / ALK PHOS: 164 U/L / ALT: 66 U/L DA / AST: 40 U/L / GGT: x             CAPILLARY BLOOD GLUCOSE      POCT Blood Glucose.: 277 mg/dL (21 Jul 2020 11:26)  POCT Blood Glucose.: 211 mg/dL (21 Jul 2020 07:50)  POCT Blood Glucose.: 231 mg/dL (20 Jul 2020 23:48)  POCT Blood Glucose.: 238 mg/dL (20 Jul 2020 21:18)  POCT Blood Glucose.: 256 mg/dL (20 Jul 2020 16:40)    COVID-19 PCR: NotDetec (20 Jul 2020 15:50)    REVIEW OF SYSTEMS:  CONSTITUTIONAL: No fever or fatigue  RESPIRATORY: No cough, wheezing, chills or hemoptysis; No shortness of breath  CARDIOVASCULAR: No chest pain, palpitations, dizziness, or leg swelling  GASTROINTESTINAL: No abdominal or epigastric pain. No nausea, vomiting; + constipation  GENITOURINARY: No dysuria, frequency, hematuria, retention or incontinence  MUSCULOSKELETAL: +  right ankle pain + left leg stiffness    NEURO: No weakness, no numbness   PSYCHIATRIC: No depression, anxiety, mood swings, or difficulty sleeping    PHYSICAL EXAM:  GENERAL:  Alert, NAD, Good concentration, anxious, Hard of hearing   CHEST/LUNG: Clear to auscultation bilaterally; No rales, rhonchi, wheezing, or rubs  HEART: Regular rate and rhythm; No murmurs, rubs, or gallops  ABDOMEN: distended, non tender   : no incontinence, no flank pain  EXTREMITIES:  2+ Peripheral Pulses, No cyanosis, or edema  MUSCULOSKELETAL:+ lower back discomfort, + right ankle hyperalgesia, + tenderness on palpation + decreased rom   NEUROLOGICAL: awake and alert and oriented   SKIN: No rashes or lesions    Radiology:  < from: Xray Foot AP + Lateral + Oblique, Right (07.17.20 @ 13:32) >  EXAM:  FOOT RIGHT (MINIMUM 3 VIEWS)                            PROCEDURE DATE:  07/17/2020          INTERPRETATION:  RIGHT foot     CLINICAL INFORMATION:Injury with  Pain.    TECHNIQUE: AP,lateral views.    FINDINGS:    The bones and joint spaces are preserved.  There are no fractures or dislocations.  The soft tissues are normal.     IMPRESSION:    No acute radiographic osseous pathology.    < end of copied text >      Drug Screen:  enoxaparin Injectable 30 milliGRAM(s) SubCutaneous daily      < from: Xray Femur 2 Views, Right (07.17.20 @ 13:30) >  EXAM:  XR FEMUR 2 VIEWS RT                            PROCEDURE DATE:  07/17/2020          INTERPRETATION:  Right femur. Patient had a fall with local trauma. 6 views.    There is productive lower lumbar spondylitic change.    There are old fractures of the lateral left pubic ramus and left ischio pubic ramus.    Hips are relatively free of degeneration and are symmetric.    There is a right knee replacement with normal-appearing alignment.    No acute fractures are seen.    IMPRESSION: No acute fracture. Old left inferior pelvic fracture and productive spondylitic change. Right knee replacement.    < end of copied text >  < from: CT Abdomen and Pelvis No Cont (07.17.20 @ 12:37) >    EXAM:  CT ABDOMEN AND PELVIS                            PROCEDURE DATE:  07/17/2020          INTERPRETATION:  CLINICAL INFORMATION: Fall    COMPARISON: None.    PROCEDURE:   CT of the Abdomen and Pelvis was performed without intravenous contrast.   Intravenous contrast: None.  Oral contrast: None.  Sagittal and coronal reformats were performed.    FINDINGS:  Lack of IV contrast limits evaluation vascular structures and solid organ parenchyma.  LOWER CHEST: Mild cardiomegaly with coronary arterycalcification.    LIVER: Cirrhotic morphology.  BILE DUCTS: Normal caliber.  GALLBLADDER: Cholelithiasis.  SPLEEN: Within normal limits.  PANCREAS: Within normal limits.  ADRENALS: Indeterminate subcentimeter right adrenal nodule. Correlate with MR if there are no contraindications.  KIDNEYS/URETERS: Within normal limits.    BLADDER: Within normal limits.  REPRODUCTIVE ORGANS: Normal for age    BOWEL: No bowel obstruction. Colonic diverticula without diverticulitis.. Appendix no appendicitis.  PERITONEUM: No ascites.  VESSELS: Nonaneurysmal.  RETROPERITONEUM/LYMPH NODES: No lymphadenopathy.    ABDOMINAL WALL: Small fat-containing umbilical hernia  BONES: Comminuted slightly displaced fracture left superior pubic ramus near the issue pubic junction. Minimally comminuted slightly displaced fracture right sacral ala.    IMPRESSION:  Limited by lack of IV contrast  Fractures left superior pubic ramus right sacrum as described.  Cholelithiasis.  Diverticulosis coli without acute diverticulitis.    < end of copied text >      ORT Score   Family Hx of substance abuse	Female	Male  Alcohol 	                                              1              3  Illegal drugs	                                      2              3  Rx drugs                                               4	      4    Personal Hx of substance abuse		  Alcohol 	                                               3	      3  Illegal drugs                                  	       4	      4  Rx drugs                                                5	      5  Age between 16- 45 years	               1             1  hx preadolescent sexual abuse	       3	      0  Psychological disease		  ADD, OCD, bipolar, schizophrenia	2	      2  Depression                                    	1	      1  Score totals : 0		  		  a score of 3 or lower indicates low risk for opioid abuse		  a score of 4-7 indicates moderate risk for opioid abuse		  a score of 8 or higher indicates high risk for opioid abuse	    Risk factors associated with adverse outcomes related to opioid treatment  [ ]  Concurrent benzodiazepine use  [ ]  History/ Active substance use or alcohol use disorder  [ ] Psychiatric co-morbidity  [ ] Sleep apnea  [ ] COPD  [ ] BMI> 35  [ ] Liver dysfunction  [ x Renal dysfunction  [ ] CHF  [ ] Smoker  [x]  Age > 60 years      [x ]  [place]  Reviewed and Copied to Chart. See below.    07-21-20 @ 12:59

## 2020-07-21 NOTE — PROGRESS NOTE ADULT - SUBJECTIVE AND OBJECTIVE BOX
Patient denies CP, SOB Review of systems otherwise (-)    acetaminophen  IVPB .. 1000 milliGRAM(s) IV Intermittent once  enoxaparin Injectable 30 milliGRAM(s) SubCutaneous daily  gabapentin 200 milliGRAM(s) Oral every 12 hours  HYDROmorphone  Injectable 0.5 milliGRAM(s) IV Push every 4 hours PRN  insulin lispro (HumaLOG) corrective regimen sliding scale   SubCutaneous Before meals and at bedtime  polyethylene glycol 3350 17 Gram(s) Oral daily  senna 2 Tablet(s) Oral at bedtime  sodium chloride 0.9%. 1000 milliLiter(s) IV Continuous <Continuous>  traMADol 25 milliGRAM(s) Oral every 8 hours                            12.1   8.17  )-----------( 280      ( 21 Jul 2020 06:39 )             39.7       Hemoglobin: 12.1 g/dL (07-21 @ 06:39)  Hemoglobin: 11.6 g/dL (07-20 @ 05:38)  Hemoglobin: 12.2 g/dL (07-19 @ 10:58)  Hemoglobin: 11.8 g/dL (07-18 @ 06:56)  Hemoglobin: 12.4 g/dL (07-17 @ 14:50)      07-21    135  |  102  |  21<H>  ----------------------------<  198<H>  4.3   |  28  |  1.18    Ca    9.3      21 Jul 2020 06:39    TPro  7.0  /  Alb  3.0<L>  /  TBili  0.5  /  DBili  x   /  AST  40  /  ALT  66<H>  /  AlkPhos  164<H>  07-21    Creatinine Trend: 1.18<--, 1.41<--, 1.51<--, 1.54<--, 1.73<--    COAGS:           T(C): 36.7 (07-21-20 @ 05:13), Max: 36.9 (07-20-20 @ 14:41)  HR: 72 (07-21-20 @ 05:13) (72 - 94)  BP: 139/76 (07-21-20 @ 05:13) (92/56 - 139/76)  RR: 18 (07-21-20 @ 05:13) (18 - 18)  SpO2: 95% (07-21-20 @ 05:13) (93% - 98%)  Wt(kg): --    I&O's Summary    Gen: Appears well in NAD  HEENT:  (-)icterus (-)pallor  CV: N S1 S2 1/6 MADELYN (+)2 Pulses B/l  Resp:  Clear to ausculatation B/L, normal effort  GI: (+) BS Soft, NT, ND  Lymph:  (-)Edema, (-)obvious lymphadenopathy  Skin: Warm to touch, Normal turgor  Psych: Appropriate mood and affect    tele: off    ASSESSMENT/PLAN: 	89y Female  PMHx of HTN, DM, Asthma presents to the ED with R hip and R leg pain after sustaining a fall 2 days ago.    Pain management    GI / DVT prophylaxis.   keep K>4, mag >2.0   no s/s of chf on exam   no pertinent findings on echo  tele strips in chart reviewed, no evidence of afib    Dionte Braun MD, FACC  BEEPER [phone]

## 2020-07-21 NOTE — PROGRESS NOTE ADULT - ATTENDING COMMENTS
Called son and left a message
Seen and examined . RTLE pain improving. Will get CT LS spine to R/O Nerve compression. PT helping. Pain management
Seen and examined . CT LS spine noted and has spinal stenosis . Will get spinal ortho to see pt. TAMANNA resolved. Pain control . DC planning to [facility].

## 2020-07-21 NOTE — PROGRESS NOTE ADULT - PROBLEM SELECTOR PROBLEM 7
HTN (hypertension)
Need for prophylactic measure

## 2020-07-21 NOTE — PROGRESS NOTE ADULT - PROBLEM SELECTOR PLAN 2
-p/w dizziness and fall  -Sustained Left superior pubic rami and R sacrum fracture  -EKG: NSR  -Troponin X1 negative  -will start on gentle hydration  - f/u US Carotid  - Echo: dyastolic dysfunction grade 1, mild enlarged left atrium  - PT consulted -p/w dizziness and fall  -Sustained Left superior pubic rami and R sacrum fracture  -EKG: NSR  -Troponin X1 negative  -will start on gentle hydration  - Echo: dyastolic dysfunction grade 1, mild enlarged left atrium  - PT consulted: [facility]

## 2020-07-22 ENCOUNTER — TRANSCRIPTION ENCOUNTER (OUTPATIENT)
Age: 85
End: 2020-07-22

## 2020-07-22 VITALS
RESPIRATION RATE: 19 BRPM | DIASTOLIC BLOOD PRESSURE: 58 MMHG | HEART RATE: 82 BPM | SYSTOLIC BLOOD PRESSURE: 153 MMHG | OXYGEN SATURATION: 100 % | TEMPERATURE: 98 F

## 2020-07-22 LAB
GLUCOSE BLDC GLUCOMTR-MCNC: 207 MG/DL — HIGH (ref 70–99)
GLUCOSE BLDC GLUCOMTR-MCNC: 338 MG/DL — HIGH (ref 70–99)

## 2020-07-22 PROCEDURE — 87635 SARS-COV-2 COVID-19 AMP PRB: CPT

## 2020-07-22 PROCEDURE — 84484 ASSAY OF TROPONIN QUANT: CPT

## 2020-07-22 PROCEDURE — 99285 EMERGENCY DEPT VISIT HI MDM: CPT | Mod: 25

## 2020-07-22 PROCEDURE — 80074 ACUTE HEPATITIS PANEL: CPT

## 2020-07-22 PROCEDURE — 84100 ASSAY OF PHOSPHORUS: CPT

## 2020-07-22 PROCEDURE — 72125 CT NECK SPINE W/O DYE: CPT

## 2020-07-22 PROCEDURE — 85027 COMPLETE CBC AUTOMATED: CPT

## 2020-07-22 PROCEDURE — 82746 ASSAY OF FOLIC ACID SERUM: CPT

## 2020-07-22 PROCEDURE — 93005 ELECTROCARDIOGRAM TRACING: CPT

## 2020-07-22 PROCEDURE — 73630 X-RAY EXAM OF FOOT: CPT

## 2020-07-22 PROCEDURE — 83735 ASSAY OF MAGNESIUM: CPT

## 2020-07-22 PROCEDURE — U0003: CPT

## 2020-07-22 PROCEDURE — 80053 COMPREHEN METABOLIC PANEL: CPT

## 2020-07-22 PROCEDURE — 93306 TTE W/DOPPLER COMPLETE: CPT

## 2020-07-22 PROCEDURE — 82607 VITAMIN B-12: CPT

## 2020-07-22 PROCEDURE — 84443 ASSAY THYROID STIM HORMONE: CPT

## 2020-07-22 PROCEDURE — 97162 PT EVAL MOD COMPLEX 30 MIN: CPT

## 2020-07-22 PROCEDURE — 70450 CT HEAD/BRAIN W/O DYE: CPT

## 2020-07-22 PROCEDURE — 86769 SARS-COV-2 COVID-19 ANTIBODY: CPT

## 2020-07-22 PROCEDURE — 82306 VITAMIN D 25 HYDROXY: CPT

## 2020-07-22 PROCEDURE — 73590 X-RAY EXAM OF LOWER LEG: CPT

## 2020-07-22 PROCEDURE — 85610 PROTHROMBIN TIME: CPT

## 2020-07-22 PROCEDURE — 80061 LIPID PANEL: CPT

## 2020-07-22 PROCEDURE — 72131 CT LUMBAR SPINE W/O DYE: CPT

## 2020-07-22 PROCEDURE — 83036 HEMOGLOBIN GLYCOSYLATED A1C: CPT

## 2020-07-22 PROCEDURE — 83930 ASSAY OF BLOOD OSMOLALITY: CPT

## 2020-07-22 PROCEDURE — 36415 COLL VENOUS BLD VENIPUNCTURE: CPT

## 2020-07-22 PROCEDURE — 73552 X-RAY EXAM OF FEMUR 2/>: CPT

## 2020-07-22 PROCEDURE — 82962 GLUCOSE BLOOD TEST: CPT

## 2020-07-22 PROCEDURE — 74176 CT ABD & PELVIS W/O CONTRAST: CPT

## 2020-07-22 PROCEDURE — 85730 THROMBOPLASTIN TIME PARTIAL: CPT

## 2020-07-22 RX ORDER — GABAPENTIN 400 MG/1
1 CAPSULE ORAL
Qty: 0 | Refills: 0 | DISCHARGE
Start: 2020-07-22

## 2020-07-22 RX ORDER — SENNA PLUS 8.6 MG/1
2 TABLET ORAL
Qty: 0 | Refills: 0 | DISCHARGE
Start: 2020-07-22

## 2020-07-22 RX ORDER — MAGNESIUM HYDROXIDE 400 MG/1
30 TABLET, CHEWABLE ORAL
Qty: 0 | Refills: 0 | DISCHARGE
Start: 2020-07-22

## 2020-07-22 RX ORDER — OXYCODONE HYDROCHLORIDE 5 MG/1
1 TABLET ORAL
Qty: 0 | Refills: 0 | DISCHARGE
Start: 2020-07-22

## 2020-07-22 RX ADMIN — GABAPENTIN 300 MILLIGRAM(S): 400 CAPSULE ORAL at 06:55

## 2020-07-22 RX ADMIN — Medication 4: at 11:37

## 2020-07-22 RX ADMIN — Medication 2: at 08:13

## 2020-07-22 RX ADMIN — OXYCODONE HYDROCHLORIDE 10 MILLIGRAM(S): 5 TABLET ORAL at 11:30

## 2020-07-22 RX ADMIN — POLYETHYLENE GLYCOL 3350 17 GRAM(S): 17 POWDER, FOR SOLUTION ORAL at 11:07

## 2020-07-22 RX ADMIN — ENOXAPARIN SODIUM 40 MILLIGRAM(S): 100 INJECTION SUBCUTANEOUS at 11:07

## 2020-07-22 RX ADMIN — OXYCODONE HYDROCHLORIDE 10 MILLIGRAM(S): 5 TABLET ORAL at 10:47

## 2020-07-22 NOTE — PROGRESS NOTE ADULT - PROVIDER SPECIALTY LIST ADULT
Cardiology
Cardiology
Internal Medicine
Nephrology
Pain Medicine
Cardiology
Cardiology

## 2020-07-22 NOTE — PROGRESS NOTE ADULT - ASSESSMENT
89 y M from home, ambulates with walker, with PMHx of HTN, DM, Asthma presents to the ED with R hip and R leg pain after sustaining a fall 2 days ago. Patient [place] and was attempting to get up with walker , when she got up she felt dizzy and fell and landed on her R side. CT showed R sacral and left pubic rami fracture.          Problem/Plan - 1:  ·  Problem: Fall.  Plan: -p/w R hip and leg pain after sustaining a fall  -CT A/P: Fractures left superior pubic ramus and right sacrum  -CT head: no acute changes  -XRay Right T+F: no fracture  -XR R foot: no fracture  -XR R femur: no fracture  -continue Pain control as per Pain Management recommendation   -F/u with pain management consult  -Ortho consulted: no intervention, F/u with PT  -PT consulted: Recommend [facility]  - CT lumbar spine noted. Ortho consulted and no surgical intervention .     Problem/Plan - 2:  ·  Problem: Near syncope.  Plan: -p/w dizziness and fall  -Sustained Left superior pubic rami and R sacrum fracture  -EKG: NSR  -Troponin X1 negative  -will start on gentle hydration  - Echo: dyastolic dysfunction grade 1, mild enlarged left atrium  - PT consulted: [facility].      Problem/Plan - 3:  ·  Problem: TAMANNA (acute kidney injury).  Plan: - Resolved. p/w BUN/Cr: 35/1.73  -Likely pre-renal due to dehydration  - on iv fluids  - f/u urine lytes  - BUN/Cr (7/20): 22/1.41.      Problem/Plan - 4:  ·  Problem: Liver enzyme elevation.  Plan: - Elevated LFT. Improved.   - CT abdomen: LIVER: Cirrhotic morphology., BILE DUCTS: Normal caliber, GALLBLADDER: Cholelithiasis  - F/u Hepatitis panel  - avoid hepatotoxic drugs.      Problem/Plan - 5:  ·  Problem: HTN (hypertension).  Plan: -/66  -Confirm meds with pharmacy.      Problem/Plan - 6:  Problem: DM (diabetes mellitus). Plan: -Confirm meds with pharmacy  -on HSS  -Monitor fingerstick  -HbA1c: 7.7.     Problem/Plan - 7:  ·  Problem: Need for prophylactic measure.  Plan: -on lovenox for DVT ppx.

## 2020-07-22 NOTE — PROGRESS NOTE ADULT - SUBJECTIVE AND OBJECTIVE BOX
INTERVAL HPI/OVERNIGHT EVENTS: Pain less.   Vital Signs Last 24 Hrs  T(C): 36.8 (22 Jul 2020 14:25), Max: 36.8 (22 Jul 2020 14:25)  T(F): 98.3 (22 Jul 2020 14:25), Max: 98.3 (22 Jul 2020 14:25)  HR: 82 (22 Jul 2020 14:25) (72 - 89)  BP: 153/58 (22 Jul 2020 14:25) (122/58 - 153/58)  BP(mean): 83 (22 Jul 2020 14:25) (83 - 83)  RR: 19 (22 Jul 2020 14:25) (17 - 19)  SpO2: 100% (22 Jul 2020 14:25) (95% - 100%)  I&O's Summary    MEDICATIONS  (STANDING):  enoxaparin Injectable 40 milliGRAM(s) SubCutaneous daily  gabapentin 300 milliGRAM(s) Oral every 12 hours  insulin lispro (HumaLOG) corrective regimen sliding scale   SubCutaneous Before meals and at bedtime  polyethylene glycol 3350 17 Gram(s) Oral daily  senna 2 Tablet(s) Oral at bedtime    MEDICATIONS  (PRN):  magnesium hydroxide Suspension 30 milliLiter(s) Oral daily PRN Constipation  oxyCODONE    IR 5 milliGRAM(s) Oral every 4 hours PRN Moderate Pain (4 - 6)  oxyCODONE    IR 10 milliGRAM(s) Oral every 4 hours PRN Severe Pain (7 - 10)    LABS:                        12.1   8.17  )-----------( 280      ( 21 Jul 2020 06:39 )             39.7     07-21    135  |  102  |  21<H>  ----------------------------<  198<H>  4.3   |  28  |  1.18    Ca    9.3      21 Jul 2020 06:39    TPro  7.0  /  Alb  3.0<L>  /  TBili  0.5  /  DBili  x   /  AST  40  /  ALT  66<H>  /  AlkPhos  164<H>  07-21        CAPILLARY BLOOD GLUCOSE      POCT Blood Glucose.: 338 mg/dL (22 Jul 2020 11:29)  POCT Blood Glucose.: 207 mg/dL (22 Jul 2020 07:54)  POCT Blood Glucose.: 249 mg/dL (21 Jul 2020 22:25)              Consultant(s) Notes Reviewed:  [x ] YES  [ ] NO    PHYSICAL EXAM:  GENERAL: NAD, well-groomed, well-developed ,not in any distress ,  HEAD:  Atraumatic, Normocephalic  EYES: EOMI, PERRLA, conjunctiva and sclera clear  ENMT: No tonsillar erythema, exudates, or enlargement; Moist mucous membranes, Good dentition, No lesions  NECK: Supple, No JVD, Normal thyroid  NERVOUS SYSTEM:  Alert & Oriented X3, No focal deficit   CHEST/LUNG: Good air entry bilateral with no  rales, rhonchi, wheezing, or rubs  HEART: Regular rate and rhythm; No murmurs, rubs, or gallops  ABDOMEN: Soft, Nontender, Nondistended; Bowel sounds present  EXTREMITIES:  2+ Peripheral Pulses, No clubbing, cyanosis, or edema  ROM at Rt hip painful     Care Discussed with Consultants/Other Providers [ x] YES  [ ] NO

## 2020-07-22 NOTE — PROGRESS NOTE ADULT - SUBJECTIVE AND OBJECTIVE BOX
Patient denies CP, SOB Review of systems otherwise (-)    enoxaparin Injectable 40 milliGRAM(s) SubCutaneous daily  gabapentin 300 milliGRAM(s) Oral every 12 hours  insulin lispro (HumaLOG) corrective regimen sliding scale   SubCutaneous Before meals and at bedtime  magnesium hydroxide Suspension 30 milliLiter(s) Oral daily PRN  oxyCODONE    IR 5 milliGRAM(s) Oral every 4 hours PRN  oxyCODONE    IR 10 milliGRAM(s) Oral every 4 hours PRN  polyethylene glycol 3350 17 Gram(s) Oral daily  senna 2 Tablet(s) Oral at bedtime                            12.1   8.17  )-----------( 280      ( 21 Jul 2020 06:39 )             39.7       Hemoglobin: 12.1 g/dL (07-21 @ 06:39)  Hemoglobin: 11.6 g/dL (07-20 @ 05:38)  Hemoglobin: 12.2 g/dL (07-19 @ 10:58)  Hemoglobin: 11.8 g/dL (07-18 @ 06:56)  Hemoglobin: 12.4 g/dL (07-17 @ 14:50)      07-21    135  |  102  |  21<H>  ----------------------------<  198<H>  4.3   |  28  |  1.18    Ca    9.3      21 Jul 2020 06:39    TPro  7.0  /  Alb  3.0<L>  /  TBili  0.5  /  DBili  x   /  AST  40  /  ALT  66<H>  /  AlkPhos  164<H>  07-21    Creatinine Trend: 1.18<--, 1.41<--, 1.51<--, 1.54<--, 1.73<--    COAGS:           T(C): 36.4 (07-22-20 @ 05:06), Max: 36.4 (07-21-20 @ 14:36)  HR: 73 (07-22-20 @ 05:06) (73 - 89)  BP: 146/64 (07-22-20 @ 05:06) (122/58 - 146/64)  RR: 18 (07-22-20 @ 05:06) (17 - 18)  SpO2: 95% (07-22-20 @ 05:06) (95% - 98%)  Wt(kg): --    I&O's Summary      Gen: Appears well in NAD  HEENT:  (-)icterus (-)pallor  CV: N S1 S2 1/6 MADELYN (+)2 Pulses B/l  Resp:  Clear to ausculatation B/L, normal effort  GI: (+) BS Soft, NT, ND  Lymph:  (-)Edema, (-)obvious lymphadenopathy  Skin: Warm to touch, Normal turgor  Psych: Appropriate mood and affect    tele: off    ASSESSMENT/PLAN: 	89y Female  PMHx of HTN, DM, Asthma presents to the ED with R hip and R leg pain after sustaining a fall 2 days ago.    Pain management    GI / DVT prophylaxis.  no pertinent findings on echo  tele strips in chart reviewed, no evidence of afib  D/C planning per malinda Braun MD, [facility]  BEEPER [phone]

## 2020-07-22 NOTE — DISCHARGE NOTE NURSING/CASE MANAGEMENT/SOCIAL WORK - PATIENT PORTAL LINK FT
You can access the FollowMyHealth Patient Portal offered by [facility] by registering at the following website: [email or web address]. By joining [facility]’s FollowMyHealth portal, you will also be able to view your health information using other applications (apps) compatible with our system.

## 2020-07-24 LAB — GLUCOSE BLDC GLUCOMTR-MCNC: 191 MG/DL — HIGH (ref 70–99)

## 2020-12-14 ENCOUNTER — INPATIENT (INPATIENT)
Facility: HOSPITAL | Age: 85
LOS: 20 days | Discharge: HOME | DRG: 177 | End: 2021-01-04
Attending: INTERNAL MEDICINE | Admitting: INTERNAL MEDICINE
Payer: MEDICARE

## 2020-12-14 VITALS
DIASTOLIC BLOOD PRESSURE: 74 MMHG | WEIGHT: 175.05 LBS | HEIGHT: 64 IN | SYSTOLIC BLOOD PRESSURE: 131 MMHG | OXYGEN SATURATION: 96 % | TEMPERATURE: 98 F | RESPIRATION RATE: 18 BRPM | HEART RATE: 95 BPM

## 2020-12-14 DIAGNOSIS — I10 ESSENTIAL (PRIMARY) HYPERTENSION: ICD-10-CM

## 2020-12-14 DIAGNOSIS — I24.9 ACUTE ISCHEMIC HEART DISEASE, UNSPECIFIED: ICD-10-CM

## 2020-12-14 DIAGNOSIS — B34.2 CORONAVIRUS INFECTION, UNSPECIFIED: ICD-10-CM

## 2020-12-14 DIAGNOSIS — D64.9 ANEMIA, UNSPECIFIED: ICD-10-CM

## 2020-12-14 DIAGNOSIS — N28.9 DISORDER OF KIDNEY AND URETER, UNSPECIFIED: ICD-10-CM

## 2020-12-14 DIAGNOSIS — J45.909 UNSPECIFIED ASTHMA, UNCOMPLICATED: ICD-10-CM

## 2020-12-14 DIAGNOSIS — U07.1 COVID-19: ICD-10-CM

## 2020-12-14 DIAGNOSIS — E11.9 TYPE 2 DIABETES MELLITUS WITHOUT COMPLICATIONS: ICD-10-CM

## 2020-12-14 DIAGNOSIS — Z29.9 ENCOUNTER FOR PROPHYLACTIC MEASURES, UNSPECIFIED: ICD-10-CM

## 2020-12-14 LAB
ALBUMIN SERPL ELPH-MCNC: 3 G/DL — LOW (ref 3.5–5)
ALP SERPL-CCNC: 100 U/L — SIGNIFICANT CHANGE UP (ref 40–120)
ALT FLD-CCNC: 28 U/L DA — SIGNIFICANT CHANGE UP (ref 10–60)
ANION GAP SERPL CALC-SCNC: 8 MMOL/L — SIGNIFICANT CHANGE UP (ref 5–17)
APTT BLD: 30.3 SEC — SIGNIFICANT CHANGE UP (ref 27.5–35.5)
AST SERPL-CCNC: 29 U/L — SIGNIFICANT CHANGE UP (ref 10–40)
BASOPHILS # BLD AUTO: 0.03 K/UL — SIGNIFICANT CHANGE UP (ref 0–0.2)
BASOPHILS NFR BLD AUTO: 0.4 % — SIGNIFICANT CHANGE UP (ref 0–2)
BILIRUB SERPL-MCNC: 0.3 MG/DL — SIGNIFICANT CHANGE UP (ref 0.2–1.2)
BUN SERPL-MCNC: 19 MG/DL — HIGH (ref 7–18)
CALCIUM SERPL-MCNC: 8.9 MG/DL — SIGNIFICANT CHANGE UP (ref 8.4–10.5)
CHLORIDE SERPL-SCNC: 105 MMOL/L — SIGNIFICANT CHANGE UP (ref 96–108)
CO2 SERPL-SCNC: 26 MMOL/L — SIGNIFICANT CHANGE UP (ref 22–31)
CREAT ?TM UR-MCNC: 20 MG/DL — SIGNIFICANT CHANGE UP
CREAT SERPL-MCNC: 1.61 MG/DL — HIGH (ref 0.5–1.3)
EOSINOPHIL # BLD AUTO: 0.02 K/UL — SIGNIFICANT CHANGE UP (ref 0–0.5)
EOSINOPHIL NFR BLD AUTO: 0.2 % — SIGNIFICANT CHANGE UP (ref 0–6)
GLUCOSE BLDC GLUCOMTR-MCNC: 234 MG/DL — HIGH (ref 70–99)
GLUCOSE BLDC GLUCOMTR-MCNC: 347 MG/DL — HIGH (ref 70–99)
GLUCOSE SERPL-MCNC: 143 MG/DL — HIGH (ref 70–99)
HCT VFR BLD CALC: 36.4 % — SIGNIFICANT CHANGE UP (ref 34.5–45)
HGB BLD-MCNC: 10.9 G/DL — LOW (ref 11.5–15.5)
IMM GRANULOCYTES NFR BLD AUTO: 0.7 % — SIGNIFICANT CHANGE UP (ref 0–1.5)
INR BLD: 0.98 RATIO — SIGNIFICANT CHANGE UP (ref 0.88–1.16)
LACTATE SERPL-SCNC: 1.7 MMOL/L — SIGNIFICANT CHANGE UP (ref 0.7–2)
LYMPHOCYTES # BLD AUTO: 1.65 K/UL — SIGNIFICANT CHANGE UP (ref 1–3.3)
LYMPHOCYTES # BLD AUTO: 20.4 % — SIGNIFICANT CHANGE UP (ref 13–44)
MAGNESIUM SERPL-MCNC: 2.3 MG/DL — SIGNIFICANT CHANGE UP (ref 1.6–2.6)
MCHC RBC-ENTMCNC: 26.2 PG — LOW (ref 27–34)
MCHC RBC-ENTMCNC: 29.9 GM/DL — LOW (ref 32–36)
MCV RBC AUTO: 87.5 FL — SIGNIFICANT CHANGE UP (ref 80–100)
MONOCYTES # BLD AUTO: 0.66 K/UL — SIGNIFICANT CHANGE UP (ref 0–0.9)
MONOCYTES NFR BLD AUTO: 8.1 % — SIGNIFICANT CHANGE UP (ref 2–14)
NEUTROPHILS # BLD AUTO: 5.68 K/UL — SIGNIFICANT CHANGE UP (ref 1.8–7.4)
NEUTROPHILS NFR BLD AUTO: 70.2 % — SIGNIFICANT CHANGE UP (ref 43–77)
NRBC # BLD: 0 /100 WBCS — SIGNIFICANT CHANGE UP (ref 0–0)
NT-PROBNP SERPL-SCNC: 458 PG/ML — HIGH (ref 0–450)
OSMOLALITY UR: 192 MOS/KG — SIGNIFICANT CHANGE UP (ref 50–1200)
PLATELET # BLD AUTO: 252 K/UL — SIGNIFICANT CHANGE UP (ref 150–400)
POTASSIUM SERPL-MCNC: 4.2 MMOL/L — SIGNIFICANT CHANGE UP (ref 3.5–5.3)
POTASSIUM SERPL-SCNC: 4.2 MMOL/L — SIGNIFICANT CHANGE UP (ref 3.5–5.3)
PROT SERPL-MCNC: 6.6 G/DL — SIGNIFICANT CHANGE UP (ref 6–8.3)
PROTHROM AB SERPL-ACNC: 11.7 SEC — SIGNIFICANT CHANGE UP (ref 10.6–13.6)
RAPID RVP RESULT: DETECTED
RBC # BLD: 4.16 M/UL — SIGNIFICANT CHANGE UP (ref 3.8–5.2)
RBC # FLD: 15 % — HIGH (ref 10.3–14.5)
SARS-COV-2 RNA SPEC QL NAA+PROBE: DETECTED
SODIUM SERPL-SCNC: 139 MMOL/L — SIGNIFICANT CHANGE UP (ref 135–145)
SODIUM UR-SCNC: 41 MMOL/L — SIGNIFICANT CHANGE UP
TROPONIN I SERPL-MCNC: 0.05 NG/ML — HIGH (ref 0–0.04)
TROPONIN I SERPL-MCNC: 0.06 NG/ML — HIGH (ref 0–0.04)
WBC # BLD: 8.1 K/UL — SIGNIFICANT CHANGE UP (ref 3.8–10.5)
WBC # FLD AUTO: 8.1 K/UL — SIGNIFICANT CHANGE UP (ref 3.8–10.5)

## 2020-12-14 PROCEDURE — 99285 EMERGENCY DEPT VISIT HI MDM: CPT | Mod: CS

## 2020-12-14 PROCEDURE — 71045 X-RAY EXAM CHEST 1 VIEW: CPT | Mod: 26

## 2020-12-14 RX ORDER — INSULIN GLARGINE 100 [IU]/ML
10 INJECTION, SOLUTION SUBCUTANEOUS AT BEDTIME
Refills: 0 | Status: DISCONTINUED | OUTPATIENT
Start: 2020-12-14 | End: 2020-12-19

## 2020-12-14 RX ORDER — LORATADINE 10 MG/1
10 TABLET ORAL DAILY
Refills: 0 | Status: DISCONTINUED | OUTPATIENT
Start: 2020-12-14 | End: 2021-01-04

## 2020-12-14 RX ORDER — DEXTROSE 50 % IN WATER 50 %
25 SYRINGE (ML) INTRAVENOUS ONCE
Refills: 0 | Status: DISCONTINUED | OUTPATIENT
Start: 2020-12-14 | End: 2020-12-15

## 2020-12-14 RX ORDER — BUDESONIDE AND FORMOTEROL FUMARATE DIHYDRATE 160; 4.5 UG/1; UG/1
2 AEROSOL RESPIRATORY (INHALATION)
Refills: 0 | Status: DISCONTINUED | OUTPATIENT
Start: 2020-12-14 | End: 2021-01-04

## 2020-12-14 RX ORDER — IPRATROPIUM/ALBUTEROL SULFATE 18-103MCG
3 AEROSOL WITH ADAPTER (GRAM) INHALATION ONCE
Refills: 0 | Status: COMPLETED | OUTPATIENT
Start: 2020-12-14 | End: 2020-12-14

## 2020-12-14 RX ORDER — HEPARIN SODIUM 5000 [USP'U]/ML
5000 INJECTION INTRAVENOUS; SUBCUTANEOUS EVERY 8 HOURS
Refills: 0 | Status: DISCONTINUED | OUTPATIENT
Start: 2020-12-14 | End: 2020-12-25

## 2020-12-14 RX ORDER — ALBUTEROL 90 UG/1
2 AEROSOL, METERED ORAL EVERY 6 HOURS
Refills: 0 | Status: DISCONTINUED | OUTPATIENT
Start: 2020-12-14 | End: 2020-12-17

## 2020-12-14 RX ORDER — GABAPENTIN 400 MG/1
300 CAPSULE ORAL EVERY 12 HOURS
Refills: 0 | Status: DISCONTINUED | OUTPATIENT
Start: 2020-12-14 | End: 2021-01-04

## 2020-12-14 RX ORDER — DEXTROSE 50 % IN WATER 50 %
15 SYRINGE (ML) INTRAVENOUS ONCE
Refills: 0 | Status: DISCONTINUED | OUTPATIENT
Start: 2020-12-14 | End: 2020-12-15

## 2020-12-14 RX ORDER — PANTOPRAZOLE SODIUM 20 MG/1
40 TABLET, DELAYED RELEASE ORAL
Refills: 0 | Status: DISCONTINUED | OUTPATIENT
Start: 2020-12-14 | End: 2021-01-04

## 2020-12-14 RX ORDER — SODIUM CHLORIDE 9 MG/ML
1000 INJECTION, SOLUTION INTRAVENOUS
Refills: 0 | Status: DISCONTINUED | OUTPATIENT
Start: 2020-12-14 | End: 2020-12-15

## 2020-12-14 RX ORDER — DEXAMETHASONE 0.5 MG/5ML
6 ELIXIR ORAL ONCE
Refills: 0 | Status: COMPLETED | OUTPATIENT
Start: 2020-12-14 | End: 2020-12-14

## 2020-12-14 RX ORDER — DICLOFENAC SODIUM 30 MG/G
1 GEL TOPICAL
Qty: 0 | Refills: 0 | DISCHARGE

## 2020-12-14 RX ORDER — ZINC SULFATE TAB 220 MG (50 MG ZINC EQUIVALENT) 220 (50 ZN) MG
220 TAB ORAL DAILY
Refills: 0 | Status: DISCONTINUED | OUTPATIENT
Start: 2020-12-14 | End: 2021-01-04

## 2020-12-14 RX ORDER — CHOLECALCIFEROL (VITAMIN D3) 125 MCG
1000 CAPSULE ORAL DAILY
Refills: 0 | Status: DISCONTINUED | OUTPATIENT
Start: 2020-12-14 | End: 2021-01-04

## 2020-12-14 RX ORDER — ASCORBIC ACID 60 MG
500 TABLET,CHEWABLE ORAL DAILY
Refills: 0 | Status: DISCONTINUED | OUTPATIENT
Start: 2020-12-14 | End: 2021-01-04

## 2020-12-14 RX ORDER — INSULIN LISPRO 100/ML
VIAL (ML) SUBCUTANEOUS
Refills: 0 | Status: DISCONTINUED | OUTPATIENT
Start: 2020-12-14 | End: 2020-12-31

## 2020-12-14 RX ORDER — DEXAMETHASONE 0.5 MG/5ML
6 ELIXIR ORAL DAILY
Refills: 0 | Status: DISCONTINUED | OUTPATIENT
Start: 2020-12-14 | End: 2020-12-19

## 2020-12-14 RX ORDER — GLUCAGON INJECTION, SOLUTION 0.5 MG/.1ML
1 INJECTION, SOLUTION SUBCUTANEOUS ONCE
Refills: 0 | Status: DISCONTINUED | OUTPATIENT
Start: 2020-12-14 | End: 2020-12-17

## 2020-12-14 RX ORDER — FERROUS SULFATE 325(65) MG
325 TABLET ORAL DAILY
Refills: 0 | Status: DISCONTINUED | OUTPATIENT
Start: 2020-12-14 | End: 2021-01-04

## 2020-12-14 RX ORDER — SODIUM CHLORIDE 9 MG/ML
1000 INJECTION INTRAMUSCULAR; INTRAVENOUS; SUBCUTANEOUS
Refills: 0 | Status: DISCONTINUED | OUTPATIENT
Start: 2020-12-14 | End: 2020-12-25

## 2020-12-14 RX ADMIN — INSULIN GLARGINE 10 UNIT(S): 100 INJECTION, SOLUTION SUBCUTANEOUS at 21:47

## 2020-12-14 RX ADMIN — BUDESONIDE AND FORMOTEROL FUMARATE DIHYDRATE 2 PUFF(S): 160; 4.5 AEROSOL RESPIRATORY (INHALATION) at 21:47

## 2020-12-14 RX ADMIN — Medication 6 MILLIGRAM(S): at 13:11

## 2020-12-14 RX ADMIN — SODIUM CHLORIDE 50 MILLILITER(S): 9 INJECTION INTRAMUSCULAR; INTRAVENOUS; SUBCUTANEOUS at 19:48

## 2020-12-14 RX ADMIN — Medication 3 MILLILITER(S): at 13:10

## 2020-12-14 RX ADMIN — Medication 2: at 17:40

## 2020-12-14 RX ADMIN — HEPARIN SODIUM 5000 UNIT(S): 5000 INJECTION INTRAVENOUS; SUBCUTANEOUS at 21:46

## 2020-12-14 NOTE — H&P ADULT - PROBLEM SELECTOR PLAN 8
RISK                                                          Points  [] Previous VTE                                           3  [] Thrombophilia                                        2  [] Lower limb paralysis                              2   [] Current Cancer                                       2   [x] Immobilization > 24 hrs                        1  [] ICU/CCU stay > 24 hours                       1  [x] Age > 60                                                   1  Improve score 2  Lovenox 40mg SQ

## 2020-12-14 NOTE — ED PROVIDER NOTE - OBJECTIVE STATEMENT
89 y/o female with PMHx of asthma, HTN, DM presents to the ED c/o shortness of breath x 3 days. Pt notes worsening shortness of breath with associated subjective fever, chills, chest pain and cough. Pt notes grandson is positive for COVID. Pt noted to be a poor historian. Pt also with chronic leg pain. Pt denies nausea, vomiting, or any other complaints. NKDA. 89 y/o female with PMHx of asthma, HTN, DM presents to the ED c/o shortness of breath x 3 days. Pt notes worsening shortness of breath with associated subjective fever, chills, chest pain and cough. Pt notes grandson is positive for COVID. Pt also with chronic leg pain. Denies nausea, emesis, abdominal pain, dysuria, hematuria, diarrhea, constipation, or any other acute complaints.

## 2020-12-14 NOTE — H&P ADULT - PROBLEM SELECTOR PLAN 5
RISK                                                          Points  [] Previous VTE                                           3  [] Thrombophilia                                        2  [] Lower limb paralysis                              2   [] Current Cancer                                       2   [x] Immobilization > 24 hrs                        1  [] ICU/CCU stay > 24 hours                       1  [x] Age > 60                                                   1 PAtient takes Novolog 70/30 40 U BID , Metformin and Glipizide  - blood sugar is elevated  - Will start on sliding scale , Lantus 10U at night,   - Will hold pre meal insulin for now   - Diabetic diet  - f/u A1C   f/u A1C, Continue Albuterol  Dexamethasone for covid infection Hb is 10.9  which is lower than her baseline 12 from July  She is taking iron supplement, will continue   Hold Laxatives as patient is having diarrhea  - will get anemia panel  - FOBT

## 2020-12-14 NOTE — ED ADULT TRIAGE NOTE - CHIEF COMPLAINT QUOTE
R sided chest pain ,cough ,fever with chills x 2-3 days ,reports grandson tested positive for covid recently

## 2020-12-14 NOTE — H&P ADULT - PROBLEM SELECTOR PLAN 4
Continue Albuterol  Dexamethasone for covid infection Patient takes Lisinopril, HCTZ   - blood pressure is in acceptable range for now  - will hold medications for now in light of infection,   primary team can start as needed  - f/u lipid profile, TSH, A1C,   - Diabetic abd dash diet

## 2020-12-14 NOTE — H&P ADULT - HISTORY OF PRESENT ILLNESS
89 y/o female with PMHx of asthma, HTN, DM presents to the ED c/o shortness of breath x 3 days. Pt notes worsening shortness of breath with associated subjective fever, chills, chest pain and cough. Pt notes grandson is positive for COVID. Pt also with chronic leg pain. Denies nausea, emesis, abdominal pain, dysuria, hematuria, diarrhea, constipation, or any other acute complaints. 89 y/o female Rivera Speaking, with PMHx of asthma, HTN, DM presents to the ED c/o shortness of breath and diarrhea, fever, chills x 1 days. Patient was very irritable at the time of encounter, AAo x3, but non-cooperative so main history was obtained from ED chart.  Pt reports worsening shortness of breath with associated subjective fever, chills, chest pain and cough. Pt reports that her grandson is positive for COVID-19.. Pt also has chronic leg pain.   Denies nausea, emesis, abdominal pain, dysuria, hematuria, dconstipation, or any other acute complaints.  ED Course:  Vital Signs Last 24 Hrs  T(C): 36.5 (14 Dec 2020 15:40), Max: 37.7 (14 Dec 2020 12:47)  T(F): 97.7 (14 Dec 2020 15:40), Max: 99.8 (14 Dec 2020 12:47)  HR: 84 (14 Dec 2020 15:40) (84 - 95)  BP: 116/66 (14 Dec 2020 15:40) (116/66 - 131/74)  RR: 18 (14 Dec 2020 15:40) (18 - 18)  SpO2: 96% (14 Dec 2020 15:40) (96% - 96%)    Troponin I : 0.056  EKG: NSR

## 2020-12-14 NOTE — ED ADULT NURSE NOTE - NSIMPLEMENTINTERV_GEN_ALL_ED
Implemented All Universal Safety Interventions:  [place] to call system. Call bell, personal items and telephone within reach. Instruct patient to call for assistance. Room bathroom lighting operational. Non-slip footwear when patient is off stretcher. Physically safe environment: no spills, clutter or unnecessary equipment. Stretcher in lowest position, wheels locked, appropriate side rails in place.

## 2020-12-14 NOTE — H&P ADULT - ATTENDING COMMENTS
89 F with pmhx asthma, dm, htn p/w Covid Pneumonia    T(C): 37.6 (12-14-20 @ 17:15), Max: 37.7 (12-14-20 @ 12:47)  HR: 90 (12-14-20 @ 17:15) (84 - 95)  BP: 121/53 (12-14-20 @ 17:15) (116/66 - 131/74)  RR: 19 (12-14-20 @ 17:15) (18 - 19)  SpO2: 97% (12-14-20 @ 17:15) (96% - 97%)    Reviewed labs and imaging.       Acute Hypoxic resp Failure   Pneumonia   ACS     Continue with Dexamethasone, add Remdesivir as studies shows no impact of renal clearance on Remdesivir.   Hold HTN meds.   HISS. Follow up A1C.

## 2020-12-14 NOTE — H&P ADULT - PROBLEM SELECTOR PLAN 1
Patient is coming with fever chills , diarrhea since 1 day,  - Sick contacts at home,   - CXR is negative for pleural effusion, infiltrates or consolidation  - patient is saturating 100% on room air, however, her saturation drops when she is agitated  - No chest pain.  - s/p 1 dose of Dexamethasone in ED, will start on IV Dexa 6mg for 10 doses  - CrCL <30, Patient wont qualify for Remdesivir,  - f/u Covid antibodies,   - ID consult..  - f/u covid markers in AM   - Patient is coming with fever chills , diarrhea since 1 day,  - Sick contacts at home,   - CXR is negative for pleural effusion, infiltrates or consolidation  - patient is saturating 100% on room air, however, her saturation drops when she is agitated  - No chest pain.  - s/p 1 dose of Dexamethasone in ED, will start on IV Dexa 6mg for 10 doses  - CrCL <30, Patient wont qualify for Remdesivir as per Dr Del Angel   - f/u Covid antibodies,   - f/u covid markers in AM

## 2020-12-14 NOTE — ED PROVIDER NOTE - PROGRESS NOTE DETAILS
patient feeling improved. With trace wheezing. Discussed admission with patient and agreeable. also discussed with son Henrique.

## 2020-12-14 NOTE — H&P ADULT - PROBLEM SELECTOR PLAN 7
RISK                                                          Points  [] Previous VTE                                           3  [] Thrombophilia                                        2  [] Lower limb paralysis                              2   [] Current Cancer                                       2   [x] Immobilization > 24 hrs                        1  [] ICU/CCU stay > 24 hours                       1  [x] Age > 60                                                   1  Improve score 2  Lovenox 40mg SQ PAtient takes Novolog 70/30 40 U BID , Metformin and Glipizide  - blood sugar is elevated  - Will start on sliding scale , Lantus 10U at night,   - Will hold pre meal insulin for now   - Diabetic diet  - f/u A1C   f/u A1C,

## 2020-12-14 NOTE — H&P ADULT - PROBLEM SELECTOR PLAN 3
Patient takes Lisinopril, HCTZ   - blood pressure is in acceptable range for now  - will hold medications for now in light of infection,   primary team can start as needed  - f/u lipid profile, TSH, A1C,   - Diabetic abd dash diet Cr 1.6 which is elevated from her baseline (1.180, Ericka pre-renal  - GFR 28,   - f/u UA, Urine lytes   - f/u SPEP and UPEP  - calculate FeNa  - If creatinine continues to trend up, can get renal ultrasound Cr 1.6 which is elevated from her baseline (1.180, Likely pre-renal  - GFR 28,   - f/u UA, Urine lytes   - f/u SPEP and UPEP  - calculate FeNa  - If creatinine continues to trend up, can get renal ultrasound Cr 1.6 which is elevated from her baseline (1.180, Likely pre-renal  - GFR 28,   - f/u UA, Urine lytes   -Will give gentle hydration fo 5 hours  - f/u SPEP and UPEP  - calculate FeNa  - If creatinine continues to trend up, can get renal ultrasound

## 2020-12-14 NOTE — PATIENT PROFILE ADULT - VISION (WITH CORRECTIVE LENSES IF THE PATIENT USUALLY WEARS THEM):
unable to asses as patient is becoming agitated and combative/Normal vision: sees adequately in most situations; can see medication labels, newsprint

## 2020-12-14 NOTE — H&P ADULT - ASSESSMENT
89 y/o female Rivera Speaking, with PMHx of asthma, HTN, DM is admitted to the hospital for covid-19 infection 89 y/o female Rivera Speaking, with PMHx of asthma, HTN, DM is admitted to the hospital for covid-19 infection

## 2020-12-14 NOTE — ED PROVIDER NOTE - PRINCIPAL DIAGNOSIS
REVIEW OF SYSTEMS:    GENERAL:  Patient denies fever, chills, tiredness, malaise.  EYES:  Patient denies blurred vision, double vision, pain, burning and itching.   NEUROLOGIC:  Patient denies tremors, dizzy spells, numbness, tingling, vision change, loss of balance.  ENDOCRINE:  Patient denies excessive thirst, heat intolerance, lymphnode enlargement.  GASTROINTESTINAL:  Patient denies abdominal pain, nausea/vomiting, indigestion/heartburn, diarrhea, constipation.  CARDIOVASCUALR:  Patient denies chest pain, varicose veins, high blood pressure.  SKIN:  Patient denies skin rashes, boils, persistent itching, acne.  MUSCULOSKELETAL:  Patient denies joint pain, neck pain, back pain, leg swelling.  ENT/MOUTH:  Patient denies ear infections, sore throats, sinus problems, hearing loss.  RESPIRATORY:  Patient denies wheezing, frequent cough, shortness of breath.   :  Patient denies urine retention, painful urination, urinary frequency, blood in urine, nocturia.  HEME/LYMPHATICE:  Patient denies swollen glands, blood clotting problems.   PSYCHOLOGICAL:  Patient denies anxiety, depression.       Coronavirus infection

## 2020-12-14 NOTE — H&P ADULT - PROBLEM SELECTOR PLAN 6
RISK                                                          Points  [] Previous VTE                                           3  [] Thrombophilia                                        2  [] Lower limb paralysis                              2   [] Current Cancer                                       2   [x] Immobilization > 24 hrs                        1  [] ICU/CCU stay > 24 hours                       1  [x] Age > 60                                                   1  Improve score 2  Lovenox 40mg SQ PAtient takes Novolog 70/30 40 U BID , Metformin and Glipizide  - blood sugar is elevated  - Will start on sliding scale , Lantus 10U at night,   - Will hold pre meal insulin for now   - Diabetic diet  - f/u A1C   f/u A1C, Continue Albuterol  Dexamethasone for covid infection

## 2020-12-14 NOTE — H&P ADULT - NSHPREVIEWOFSYSTEMS_GEN_ALL_CORE
.  CONSTITUTIONAL: No weakness, fevers or chills  EYES/ENT: No visual changes;  No vertigo or throat pain   NECK: No pain or stiffness  RESPIRATORY: No cough, wheezing, hemoptysis; No shortness of breath  CARDIOVASCULAR: No chest pain or palpitations  GASTROINTESTINAL: No abdominal or epigastric pain. No nausea, vomiting, or hematemesis; No diarrhea or constipation. No melena or hematochezia.  GENITOURINARY: No dysuria, frequency or hematuria  NEUROLOGICAL: No numbness or weakness  SKIN: No itching, burning, rashes, or lesions   All other review of systems is negative unless indicated above. CONSTITUTIONAL: No weakness, fevers or chills  EYES/ENT: No visual changes;  No vertigo or throat pain   NECK: No pain or stiffness  RESPIRATORY: No cough, wheezing, hemoptysis; shortness of breath  CARDIOVASCULAR: No chest pain or palpitations  GASTROINTESTINAL: No abdominal or epigastric pain. No nausea, vomiting, or hematemesis; No diarrhea or constipation. No melena or hematochezia.  GENITOURINARY: No dysuria, frequency or hematuria  NEUROLOGICAL: No numbness or weakness  SKIN: No itching, burning, rashes, or lesions   All other review of systems is negative unless indicated above.

## 2020-12-14 NOTE — H&P ADULT - PROBLEM SELECTOR PLAN 2
Patient has history of Hypertension and diabetes   -Coming with elevated troponin. however, denies chest pain.,  - CHELO score 3, ( 1 for age>65, Aspirin use, positive cardiac marker)  - Likely demand ischemia,  - admit to tele floor   - Start Aspirin and Statin   - cardio consult Dr Trevino Patient has history of Hypertension and diabetes   -Coming with elevated troponin. however, denies chest pain.,  - CHELO score 3, ( 1 for age>65, Aspirin use, positive cardiac marker)  - Likely demand ischemia, trend cardiac enzymes  - admit to tele floor   - Start Aspirin and Statin   - Echo from July shows EF >55% and Grossly normal Systolic function, grade I diastolic dysfunction

## 2020-12-14 NOTE — ED PROVIDER NOTE - CLINICAL SUMMARY MEDICAL DECISION MAKING FREE TEXT BOX
89 yo F with fevers and SOB. Patient with wheezing and tachypnea here today. Improved with decadron and neb treatment. Covid positive. Will admit for further treatment and evaluation. Endorsed to Dr. Carrizales.

## 2020-12-15 ENCOUNTER — TRANSCRIPTION ENCOUNTER (OUTPATIENT)
Age: 85
End: 2020-12-15

## 2020-12-15 LAB
% ALBUMIN: 53.4 % — SIGNIFICANT CHANGE UP
% ALPHA 1: 5.6 % — SIGNIFICANT CHANGE UP
% ALPHA 2: 16.2 % — SIGNIFICANT CHANGE UP
% BETA: 14.4 % — SIGNIFICANT CHANGE UP
% GAMMA: 10.4 % — SIGNIFICANT CHANGE UP
A1C WITH ESTIMATED AVERAGE GLUCOSE RESULT: 7.2 % — HIGH (ref 4–5.6)
ALBUMIN SERPL ELPH-MCNC: 2.7 G/DL — LOW (ref 3.5–5)
ALBUMIN SERPL ELPH-MCNC: 2.9 G/DL — LOW (ref 3.6–5.5)
ALBUMIN/GLOB SERPL ELPH: 1.1 RATIO — SIGNIFICANT CHANGE UP
ALP SERPL-CCNC: 81 U/L — SIGNIFICANT CHANGE UP (ref 40–120)
ALPHA1 GLOB SERPL ELPH-MCNC: 0.3 G/DL — SIGNIFICANT CHANGE UP (ref 0.1–0.4)
ALPHA2 GLOB SERPL ELPH-MCNC: 0.9 G/DL — SIGNIFICANT CHANGE UP (ref 0.5–1)
ALT FLD-CCNC: 23 U/L DA — SIGNIFICANT CHANGE UP (ref 10–60)
ANION GAP SERPL CALC-SCNC: 11 MMOL/L — SIGNIFICANT CHANGE UP (ref 5–17)
AST SERPL-CCNC: 19 U/L — SIGNIFICANT CHANGE UP (ref 10–40)
B-GLOBULIN SERPL ELPH-MCNC: 0.8 G/DL — SIGNIFICANT CHANGE UP (ref 0.5–1)
BASOPHILS # BLD AUTO: 0.01 K/UL — SIGNIFICANT CHANGE UP (ref 0–0.2)
BASOPHILS NFR BLD AUTO: 0.2 % — SIGNIFICANT CHANGE UP (ref 0–2)
BILIRUB SERPL-MCNC: 0.2 MG/DL — SIGNIFICANT CHANGE UP (ref 0.2–1.2)
BUN SERPL-MCNC: 20 MG/DL — HIGH (ref 7–18)
CALCIUM SERPL-MCNC: 10 MG/DL — SIGNIFICANT CHANGE UP (ref 8.4–10.5)
CHLORIDE SERPL-SCNC: 104 MMOL/L — SIGNIFICANT CHANGE UP (ref 96–108)
CHOLEST SERPL-MCNC: 139 MG/DL — SIGNIFICANT CHANGE UP
CO2 SERPL-SCNC: 25 MMOL/L — SIGNIFICANT CHANGE UP (ref 22–31)
CREAT SERPL-MCNC: 1.36 MG/DL — HIGH (ref 0.5–1.3)
EOSINOPHIL # BLD AUTO: 0.01 K/UL — SIGNIFICANT CHANGE UP (ref 0–0.5)
EOSINOPHIL NFR BLD AUTO: 0.2 % — SIGNIFICANT CHANGE UP (ref 0–6)
ESTIMATED AVERAGE GLUCOSE: 160 MG/DL — HIGH (ref 68–114)
FERRITIN SERPL-MCNC: 116 NG/ML — SIGNIFICANT CHANGE UP (ref 15–150)
FOLATE SERPL-MCNC: >20 NG/ML — SIGNIFICANT CHANGE UP
GAMMA GLOBULIN: 0.6 G/DL — SIGNIFICANT CHANGE UP (ref 0.6–1.6)
GLUCOSE BLDC GLUCOMTR-MCNC: 137 MG/DL — HIGH (ref 70–99)
GLUCOSE BLDC GLUCOMTR-MCNC: 284 MG/DL — HIGH (ref 70–99)
GLUCOSE BLDC GLUCOMTR-MCNC: 298 MG/DL — HIGH (ref 70–99)
GLUCOSE BLDC GLUCOMTR-MCNC: 346 MG/DL — HIGH (ref 70–99)
GLUCOSE SERPL-MCNC: 142 MG/DL — HIGH (ref 70–99)
HCT VFR BLD CALC: 35.4 % — SIGNIFICANT CHANGE UP (ref 34.5–45)
HDLC SERPL-MCNC: 46 MG/DL — LOW
HGB BLD-MCNC: 10.6 G/DL — LOW (ref 11.5–15.5)
IMM GRANULOCYTES NFR BLD AUTO: 0.5 % — SIGNIFICANT CHANGE UP (ref 0–1.5)
INTERPRETATION SERPL IFE-IMP: SIGNIFICANT CHANGE UP
IRON SATN MFR SERPL: 24 UG/DL — LOW (ref 40–150)
IRON SATN MFR SERPL: 9 % — LOW (ref 15–50)
LIPID PNL WITH DIRECT LDL SERPL: 67 MG/DL — SIGNIFICANT CHANGE UP
LYMPHOCYTES # BLD AUTO: 1.78 K/UL — SIGNIFICANT CHANGE UP (ref 1–3.3)
LYMPHOCYTES # BLD AUTO: 32 % — SIGNIFICANT CHANGE UP (ref 13–44)
MAGNESIUM SERPL-MCNC: 2.4 MG/DL — SIGNIFICANT CHANGE UP (ref 1.6–2.6)
MCHC RBC-ENTMCNC: 26 PG — LOW (ref 27–34)
MCHC RBC-ENTMCNC: 29.9 GM/DL — LOW (ref 32–36)
MCV RBC AUTO: 87 FL — SIGNIFICANT CHANGE UP (ref 80–100)
MONOCYTES # BLD AUTO: 0.72 K/UL — SIGNIFICANT CHANGE UP (ref 0–0.9)
MONOCYTES NFR BLD AUTO: 12.9 % — SIGNIFICANT CHANGE UP (ref 2–14)
NEUTROPHILS # BLD AUTO: 3.02 K/UL — SIGNIFICANT CHANGE UP (ref 1.8–7.4)
NEUTROPHILS NFR BLD AUTO: 54.2 % — SIGNIFICANT CHANGE UP (ref 43–77)
NON HDL CHOLESTEROL: 93 MG/DL — SIGNIFICANT CHANGE UP
NRBC # BLD: 0 /100 WBCS — SIGNIFICANT CHANGE UP (ref 0–0)
PHOSPHATE SERPL-MCNC: 3.8 MG/DL — SIGNIFICANT CHANGE UP (ref 2.5–4.5)
PLATELET # BLD AUTO: 230 K/UL — SIGNIFICANT CHANGE UP (ref 150–400)
POTASSIUM SERPL-MCNC: 4 MMOL/L — SIGNIFICANT CHANGE UP (ref 3.5–5.3)
POTASSIUM SERPL-SCNC: 4 MMOL/L — SIGNIFICANT CHANGE UP (ref 3.5–5.3)
PROT PATTERN SERPL ELPH-IMP: SIGNIFICANT CHANGE UP
PROT SERPL-MCNC: 5.5 G/DL — LOW (ref 6–8.3)
PROT SERPL-MCNC: 5.5 G/DL — LOW (ref 6–8.3)
PROT SERPL-MCNC: 6.1 G/DL — SIGNIFICANT CHANGE UP (ref 6–8.3)
RBC # BLD: 4.07 M/UL — SIGNIFICANT CHANGE UP (ref 3.8–5.2)
RBC # FLD: 14.9 % — HIGH (ref 10.3–14.5)
SARS-COV-2 IGG SERPL QL IA: NEGATIVE — SIGNIFICANT CHANGE UP
SARS-COV-2 IGM SERPL IA-ACNC: 0.06 INDEX — SIGNIFICANT CHANGE UP
SODIUM SERPL-SCNC: 140 MMOL/L — SIGNIFICANT CHANGE UP (ref 135–145)
TIBC SERPL-MCNC: 264 UG/DL — SIGNIFICANT CHANGE UP (ref 250–450)
TRIGL SERPL-MCNC: 128 MG/DL — SIGNIFICANT CHANGE UP
TROPONIN I SERPL-MCNC: 0.05 NG/ML — HIGH (ref 0–0.04)
TSH SERPL-MCNC: 0.38 UU/ML — SIGNIFICANT CHANGE UP (ref 0.34–4.82)
UIBC SERPL-MCNC: 240 UG/DL — SIGNIFICANT CHANGE UP (ref 110–370)
VIT B12 SERPL-MCNC: 446 PG/ML — SIGNIFICANT CHANGE UP (ref 232–1245)
WBC # BLD: 5.57 K/UL — SIGNIFICANT CHANGE UP (ref 3.8–10.5)
WBC # FLD AUTO: 5.57 K/UL — SIGNIFICANT CHANGE UP (ref 3.8–10.5)

## 2020-12-15 RX ADMIN — BUDESONIDE AND FORMOTEROL FUMARATE DIHYDRATE 2 PUFF(S): 160; 4.5 AEROSOL RESPIRATORY (INHALATION) at 21:53

## 2020-12-15 RX ADMIN — HEPARIN SODIUM 5000 UNIT(S): 5000 INJECTION INTRAVENOUS; SUBCUTANEOUS at 06:01

## 2020-12-15 RX ADMIN — LORATADINE 10 MILLIGRAM(S): 10 TABLET ORAL at 12:17

## 2020-12-15 RX ADMIN — GABAPENTIN 300 MILLIGRAM(S): 400 CAPSULE ORAL at 17:28

## 2020-12-15 RX ADMIN — PANTOPRAZOLE SODIUM 40 MILLIGRAM(S): 20 TABLET, DELAYED RELEASE ORAL at 06:02

## 2020-12-15 RX ADMIN — BUDESONIDE AND FORMOTEROL FUMARATE DIHYDRATE 2 PUFF(S): 160; 4.5 AEROSOL RESPIRATORY (INHALATION) at 10:15

## 2020-12-15 RX ADMIN — Medication 1 TABLET(S): at 12:17

## 2020-12-15 RX ADMIN — Medication 4: at 16:56

## 2020-12-15 RX ADMIN — HEPARIN SODIUM 5000 UNIT(S): 5000 INJECTION INTRAVENOUS; SUBCUTANEOUS at 21:53

## 2020-12-15 RX ADMIN — ZINC SULFATE TAB 220 MG (50 MG ZINC EQUIVALENT) 220 MILLIGRAM(S): 220 (50 ZN) TAB at 12:16

## 2020-12-15 RX ADMIN — HEPARIN SODIUM 5000 UNIT(S): 5000 INJECTION INTRAVENOUS; SUBCUTANEOUS at 14:25

## 2020-12-15 RX ADMIN — GABAPENTIN 300 MILLIGRAM(S): 400 CAPSULE ORAL at 06:01

## 2020-12-15 RX ADMIN — Medication 500 MILLIGRAM(S): at 12:16

## 2020-12-15 RX ADMIN — Medication 6 MILLIGRAM(S): at 06:01

## 2020-12-15 RX ADMIN — Medication 325 MILLIGRAM(S): at 12:17

## 2020-12-15 RX ADMIN — Medication 3: at 12:19

## 2020-12-15 RX ADMIN — Medication 1000 UNIT(S): at 12:16

## 2020-12-15 RX ADMIN — INSULIN GLARGINE 10 UNIT(S): 100 INJECTION, SOLUTION SUBCUTANEOUS at 21:52

## 2020-12-15 NOTE — PHYSICAL THERAPY INITIAL EVALUATION ADULT - CRITERIA FOR SKILLED THERAPEUTIC INTERVENTIONS
therapy frequency/anticipated discharge recommendation/risk reduction/prevention/rehab potential/functional limitations in following categories/impairments found/predicted duration of therapy intervention

## 2020-12-15 NOTE — PROGRESS NOTE ADULT - PROBLEM SELECTOR PLAN 7
PAtient takes Novolog 70/30 40 U BID , Metformin and Glipizide  - blood sugar is elevated  - Will start on sliding scale , Lantus 10U at night,   - Will hold pre meal insulin for now   - Diabetic diet  - f/u A1C   f/u A1C, PAtient takes Novolog 70/30 40 U BID , Metformin and Glipizide  - blood sugar is elevated  - C/W sliding scale , Lantus 10U at night,   - Will hold pre meal insulin for now   - Diabetic diet  - A1C 7.2

## 2020-12-15 NOTE — PROGRESS NOTE ADULT - SUBJECTIVE AND OBJECTIVE BOX
PGY-1 Progress Note discussed with attending    PAGER #: [[phone]] TILL 5:00 PM  PLEASE CONTACT ON CALL TEAM:  - On Call Team (Please refer to Agustina) FROM 5:00 PM - 8:30PM  - Nightfloat Team FROM 8:30 -7:30 AM    CHIEF COMPLAINT & BRIEF HOSPITAL COURSE:  89 y/o female Rivera Speaking, with PMHx of asthma, HTN, DM presents to the ED c/o shortness of breath and diarrhea, fever, chills x 1 days. Patient was very irritable at the time of encounter, AAo x3, but non-cooperative so main history was obtained from ED chart.  Pt reports worsening shortness of breath with associated subjective fever, chills, chest pain and cough. Pt reports that her grandson is positive for COVID-19.. Pt also has chronic leg pain.   Denies nausea, emesis, abdominal pain, dysuria, hematuria, dconstipation, or any other acute complaints.      INTERVAL HPI/OVERNIGHT EVENTS:   No acute overnight events. Pt saturating well on room air. On Decadron     MEDICATIONS  (STANDING):  ascorbic acid 500 milliGRAM(s) Oral daily  budesonide  80 MICROgram(s)/formoterol 4.5 MICROgram(s) Inhaler 2 Puff(s) Inhalation two times a day  cholecalciferol 1000 Unit(s) Oral daily  dexAMETHasone  Injectable 6 milliGRAM(s) IV Push daily  dextrose 40% Gel 15 Gram(s) Oral once  dextrose 5%. 1000 milliLiter(s) (50 mL/Hr) IV Continuous <Continuous>  dextrose 5%. 1000 milliLiter(s) (100 mL/Hr) IV Continuous <Continuous>  dextrose 50% Injectable 25 Gram(s) IV Push once  ferrous    sulfate 325 milliGRAM(s) Oral daily  gabapentin 300 milliGRAM(s) Oral every 12 hours  glucagon  Injectable 1 milliGRAM(s) IntraMuscular once  heparin   Injectable 5000 Unit(s) SubCutaneous every 8 hours  insulin glargine Injectable (LANTUS) 10 Unit(s) SubCutaneous at bedtime  insulin lispro (ADMELOG) corrective regimen sliding scale   SubCutaneous three times a day before meals  loratadine 10 milliGRAM(s) Oral daily  multivitamin 1 Tablet(s) Oral daily  pantoprazole    Tablet 40 milliGRAM(s) Oral before breakfast  sodium chloride 0.9%. 1000 milliLiter(s) (50 mL/Hr) IV Continuous <Continuous>  zinc sulfate 220 milliGRAM(s) Oral daily    MEDICATIONS  (PRN):  ALBUTerol    90 MICROgram(s) HFA Inhaler 2 Puff(s) Inhalation every 6 hours PRN Bronchospasm      REVIEW OF SYSTEMS:  CONSTITUTIONAL: No fever, weight loss, or fatigue  RESPIRATORY: No cough, wheezing, chills or hemoptysis; No shortness of breath  CARDIOVASCULAR: No chest pain, palpitations, dizziness, or leg swelling  GASTROINTESTINAL: No abdominal pain. No nausea, vomiting, or hematemesis; No diarrhea or constipation. No melena or hematochezia.  GENITOURINARY: No dysuria or hematuria, urinary frequency  NEUROLOGICAL: No headaches, memory loss, loss of strength, numbness, or tremors  SKIN: No itching, burning, rashes, or lesions     Vital Signs Last 24 Hrs  T(C): 36.8 (15 Dec 2020 11:23), Max: 37.6 (14 Dec 2020 17:15)  T(F): 98.2 (15 Dec 2020 11:23), Max: 99.6 (14 Dec 2020 17:15)  HR: 83 (15 Dec 2020 11:23) (68 - 90)  BP: 122/50 (15 Dec 2020 11:23) (102/50 - 127/54)  BP(mean): 65 (15 Dec 2020 09:28) (65 - 65)  RR: 18 (15 Dec 2020 11:23) (18 - 19)  SpO2: 97% (15 Dec 2020 11:23) (96% - 100%)    PHYSICAL EXAMINATION:  GENERAL: NAD, well built  HEAD:  Atraumatic, Normocephalic  EYES:  conjunctiva and sclera clear  NECK: Supple, No JVD, Normal thyroid  CHEST/LUNG: Clear to auscultation. Clear to percussion bilaterally; No rales, rhonchi, wheezing, or rubs  HEART: Regular rate and rhythm; No murmurs, rubs, or gallops  ABDOMEN: Soft, Nontender, Nondistended; Bowel sounds present  NERVOUS SYSTEM:  Alert & Oriented X3,    EXTREMITIES:  2+ Peripheral Pulses, No clubbing, cyanosis, or edema  SKIN: warm dry                          10.6   5.57  )-----------( 230      ( 15 Dec 2020 07:46 )             35.4     12-15    140  |  104  |  20<H>  ----------------------------<  142<H>  4.0   |  25  |  1.36<H>    Ca    10.0      15 Dec 2020 07:46  Phos  3.8     12-15  Mg     2.4     12-15    TPro  5.5<L>  /  Alb  x   /  TBili  x   /  DBili  x   /  AST  x   /  ALT  x   /  AlkPhos  x   12-15    LIVER FUNCTIONS - ( 15 Dec 2020 11:01 )  Alb: x     / Pro: 5.5 g/dL / ALK PHOS: x     / ALT: x     / AST: x     / GGT: x           CARDIAC MARKERS ( 15 Dec 2020 07:46 )  0.049 ng/mL / x     / x     / x     / x      CARDIAC MARKERS ( 14 Dec 2020 21:36 )  0.048 ng/mL / x     / x     / x     / x      CARDIAC MARKERS ( 14 Dec 2020 13:31 )  0.056 ng/mL / x     / x     / x     / x          PT/INR - ( 14 Dec 2020 13:31 )   PT: 11.7 sec;   INR: 0.98 ratio         PTT - ( 14 Dec 2020 13:31 )  PTT:30.3 sec    CAPILLARY BLOOD GLUCOSE      POCT Blood Glucose.: 298 mg/dL (15 Dec 2020 11:37)          RADIOLOGY & ADDITIONAL TESTS:                   PGY-1 Progress Note discussed with attending    PAGER #: [[phone]] TILL 5:00 PM  PLEASE CONTACT ON CALL TEAM:  - On Call Team (Please refer to Agustina) FROM 5:00 PM - 8:30PM  - Nightfloat Team FROM 8:30 -7:30 AM    CHIEF COMPLAINT & BRIEF HOSPITAL COURSE:  89 y/o female Rivera Speaking, with PMHx of asthma, HTN, DM presents to the ED c/o shortness of breath and diarrhea, fever, chills x 1 days. Patient was very irritable at the time of encounter, AAo x3, but non-cooperative so main history was obtained from ED chart.  Pt reports worsening shortness of breath with associated subjective fever, chills, chest pain and cough. Pt reports that her grandson is positive for COVID-19.. Pt also has chronic leg pain.   Denies nausea, emesis, abdominal pain, dysuria, hematuria, dconstipation, or any other acute complaints.      INTERVAL HPI/OVERNIGHT EVENTS:   No acute overnight events. Pt saturating well on room air. On Decadron Day#2    MEDICATIONS  (STANDING):  ascorbic acid 500 milliGRAM(s) Oral daily  budesonide  80 MICROgram(s)/formoterol 4.5 MICROgram(s) Inhaler 2 Puff(s) Inhalation two times a day  cholecalciferol 1000 Unit(s) Oral daily  dexAMETHasone  Injectable 6 milliGRAM(s) IV Push daily  dextrose 40% Gel 15 Gram(s) Oral once  dextrose 5%. 1000 milliLiter(s) (50 mL/Hr) IV Continuous <Continuous>  dextrose 5%. 1000 milliLiter(s) (100 mL/Hr) IV Continuous <Continuous>  dextrose 50% Injectable 25 Gram(s) IV Push once  ferrous    sulfate 325 milliGRAM(s) Oral daily  gabapentin 300 milliGRAM(s) Oral every 12 hours  glucagon  Injectable 1 milliGRAM(s) IntraMuscular once  heparin   Injectable 5000 Unit(s) SubCutaneous every 8 hours  insulin glargine Injectable (LANTUS) 10 Unit(s) SubCutaneous at bedtime  insulin lispro (ADMELOG) corrective regimen sliding scale   SubCutaneous three times a day before meals  loratadine 10 milliGRAM(s) Oral daily  multivitamin 1 Tablet(s) Oral daily  pantoprazole    Tablet 40 milliGRAM(s) Oral before breakfast  sodium chloride 0.9%. 1000 milliLiter(s) (50 mL/Hr) IV Continuous <Continuous>  zinc sulfate 220 milliGRAM(s) Oral daily    MEDICATIONS  (PRN):  ALBUTerol    90 MICROgram(s) HFA Inhaler 2 Puff(s) Inhalation every 6 hours PRN Bronchospasm      REVIEW OF SYSTEMS:  CONSTITUTIONAL: No fever, weight loss, or fatigue  RESPIRATORY: No cough, wheezing, chills or hemoptysis; No shortness of breath  CARDIOVASCULAR: No chest pain, palpitations, dizziness, or leg swelling  GASTROINTESTINAL: No abdominal pain. No nausea, vomiting, or hematemesis; No diarrhea or constipation. No melena or hematochezia.  GENITOURINARY: No dysuria or hematuria, urinary frequency  NEUROLOGICAL: No headaches, memory loss, loss of strength, numbness, or tremors  SKIN: No itching, burning, rashes, or lesions     Vital Signs Last 24 Hrs  T(C): 36.8 (15 Dec 2020 11:23), Max: 37.6 (14 Dec 2020 17:15)  T(F): 98.2 (15 Dec 2020 11:23), Max: 99.6 (14 Dec 2020 17:15)  HR: 83 (15 Dec 2020 11:23) (68 - 90)  BP: 122/50 (15 Dec 2020 11:23) (102/50 - 127/54)  BP(mean): 65 (15 Dec 2020 09:28) (65 - 65)  RR: 18 (15 Dec 2020 11:23) (18 - 19)  SpO2: 97% (15 Dec 2020 11:23) (96% - 100%)    PHYSICAL EXAMINATION:  GENERAL: NAD, well built  HEAD:  Atraumatic, Normocephalic  EYES:  conjunctiva and sclera clear  NECK: Supple, No JVD, Normal thyroid  CHEST/LUNG: Clear to auscultation. Clear to percussion bilaterally; No rales, rhonchi, wheezing, or rubs  HEART: Regular rate and rhythm; No murmurs, rubs, or gallops  ABDOMEN: Soft, Nontender, Nondistended; Bowel sounds present  NERVOUS SYSTEM:  Alert & Oriented X3,    EXTREMITIES:  2+ Peripheral Pulses, No clubbing, cyanosis, or edema  SKIN: warm dry                          10.6   5.57  )-----------( 230      ( 15 Dec 2020 07:46 )             35.4     12-15    140  |  104  |  20<H>  ----------------------------<  142<H>  4.0   |  25  |  1.36<H>    Ca    10.0      15 Dec 2020 07:46  Phos  3.8     12-15  Mg     2.4     12-15    TPro  5.5<L>  /  Alb  x   /  TBili  x   /  DBili  x   /  AST  x   /  ALT  x   /  AlkPhos  x   12-15    LIVER FUNCTIONS - ( 15 Dec 2020 11:01 )  Alb: x     / Pro: 5.5 g/dL / ALK PHOS: x     / ALT: x     / AST: x     / GGT: x           CARDIAC MARKERS ( 15 Dec 2020 07:46 )  0.049 ng/mL / x     / x     / x     / x      CARDIAC MARKERS ( 14 Dec 2020 21:36 )  0.048 ng/mL / x     / x     / x     / x      CARDIAC MARKERS ( 14 Dec 2020 13:31 )  0.056 ng/mL / x     / x     / x     / x          PT/INR - ( 14 Dec 2020 13:31 )   PT: 11.7 sec;   INR: 0.98 ratio         PTT - ( 14 Dec 2020 13:31 )  PTT:30.3 sec    CAPILLARY BLOOD GLUCOSE      POCT Blood Glucose.: 298 mg/dL (15 Dec 2020 11:37)          RADIOLOGY & ADDITIONAL TESTS:

## 2020-12-15 NOTE — PROGRESS NOTE ADULT - PROBLEM SELECTOR PLAN 1
Patient is coming with fever chills , diarrhea since 1 day,  - Sick contacts at home,   - CXR is negative for pleural effusion, infiltrates or consolidation  - patient is saturating 98% on room air, however, her saturation drops when she is agitated  - No chest pain.  - s/p 1 dose of Dexamethasone in ED, c/w IV Dexa 6mg for 10 doses  - CrCL <30, Patient wont qualify for Remdesivir as per Dr Del Anegl   - Covid antibodies negative  - f/u covid markers in AM Patient is coming with fever chills , diarrhea since 1 day,  - Sick contacts at home,   - CXR is negative for pleural effusion, infiltrates or consolidation  - patient is saturating 98% on room air, however, her saturation drops when she is agitated  - No chest pain.  - s/p 1 dose of Dexamethasone in ED, c/w IV Dexa 6mg for 10 doses  - CrCL <30, Patient wont qualify for Remdesivir as per Dr Del Angel   - Covid antibodies negative

## 2020-12-15 NOTE — PROGRESS NOTE ADULT - PROBLEM SELECTOR PLAN 5
Hb is 10.9  which is lower than her baseline 12 from July  She is taking iron supplement, will continue   Hold Laxatives as patient is having diarrhea  - will get anemia panel  - FOBT

## 2020-12-15 NOTE — DISCHARGE NOTE PROVIDER - NSDCCPCAREPLAN_GEN_ALL_CORE_FT
PRINCIPAL DISCHARGE DIAGNOSIS  Diagnosis: Coronavirus infection  Assessment and Plan of Treatment:       SECONDARY DISCHARGE DIAGNOSES  Diagnosis: HTN (hypertension)  Assessment and Plan of Treatment: HTN (hypertension)    Diagnosis: DM (diabetes mellitus)  Assessment and Plan of Treatment: DM (diabetes mellitus)     PRINCIPAL DISCHARGE DIAGNOSIS  Diagnosis: Coronavirus infection  Assessment and Plan of Treatment:       SECONDARY DISCHARGE DIAGNOSES  Diagnosis: HTN (hypertension)  Assessment and Plan of Treatment: You have high blood pressure, for which you have been started on medications. It is important to continue to take your medications on time,  monitor your blood pressure at home, keep a log of your home readings and follow up with your Primary Care Physician. As per AHA/ACC guidelines, it is important to adhere to a DASH Diet of fresh fruits, vegetables, lean meats such as poultry and fish, and whole wheat carbs. 30 minutes of aerobic exercise per day 3-4 times a week, reducing salt intake <1.5g/day, and cutting down on highly processed foods are also shown to reduce BP. Uncontrolled BP may result in organ damage to your eyes, brain, heart, and kidneys by causing strokes, heart attacks, kidney failure, and bleeds in your eye.    Diagnosis: DM (diabetes mellitus)  Assessment and Plan of Treatment: Your hemoglobin A1C is  7.2%. Please eat healthy and low sugar diet (less sweets & bread or rice more vegetables), take prescribed medications and follow up with your primary care doctor and have eye and foot exam regularly.     PRINCIPAL DISCHARGE DIAGNOSIS  Diagnosis: Coronavirus infection  Assessment and Plan of Treatment: You came with worsening shortness of breath with associated subjective fever, chills, chest pain and cough. Your oxygen saturation was 96% (14 Dec 2020 15:40) on room air and your COVID PCR nasal swab was positive. You were admitted for management of Covid-19 pneumonia. Your cardiac enzyme troponin was elevated on admission, which trended down : 0.056, 0.048, 0.049. You were started on dexamethasone 6 g daily and your symptoms improved. Your blood cultures were negative. Repeat chest x-ray on 12/18 shows opacities at lung bases on both sides, slightly progressed on left. Plan was for CT chest to evaluate the infiltrate but you refused. You improved clinically and are to be discharged home on a taper dose of steroids.      SECONDARY DISCHARGE DIAGNOSES  Diagnosis: HTN (hypertension)  Assessment and Plan of Treatment: You have high blood pressure, for which you have been started on medications. It is important to continue to take your medications on time,  monitor your blood pressure at home, keep a log of your home readings and follow up with your Primary Care Physician. As per AHA/ACC guidelines, it is important to adhere to a DASH Diet of fresh fruits, vegetables, lean meats such as poultry and fish, and whole wheat carbs. 30 minutes of aerobic exercise per day 3-4 times a week, reducing salt intake <1.5g/day, and cutting down on highly processed foods are also shown to reduce BP. Uncontrolled BP may result in organ damage to your eyes, brain, heart, and kidneys by causing strokes, heart attacks, kidney failure, and bleeds in your eye.    Diagnosis: DM (diabetes mellitus)  Assessment and Plan of Treatment: Your hemoglobin A1C is  7.2%. Please eat healthy and low sugar diet (less sweets & bread or rice more vegetables), take prescribed medications and follow up with your primary care doctor and have eye and foot exam regularly. Endocrine Dr. Britt evaluated you for elevated sugars in the setting of steroid medication use, we have optimized your insulin. Please follow up with your primary physician.     PRINCIPAL DISCHARGE DIAGNOSIS  Diagnosis: Coronavirus infection  Assessment and Plan of Treatment: You came with worsening shortness of breath with associated subjective fever, chills, chest pain and cough. Your oxygen saturation was 96% (14 Dec 2020 15:40) on room air and your COVID PCR nasal swab was positive. You were admitted for management of Covid-19 pneumonia. Your cardiac enzyme troponin was elevated on admission, which trended down : 0.056, 0.048, 0.049. You were started on dexamethasone 6 g daily and your symptoms improved. Your blood cultures were negative. Repeat chest x-ray on 12/18 shows opacities at lung bases on both sides, slightly progressed on left. Plan was for CT chest to evaluate the infiltrate but you refused. You improved clinically and are to be discharged home on a taper dose of steroids.      SECONDARY DISCHARGE DIAGNOSES  Diagnosis: HTN (hypertension)  Assessment and Plan of Treatment: You have high blood pressure, for which you have been started on medications. It is important to continue to take your medications on time,  monitor your blood pressure at home, keep a log of your home readings and follow up with your Primary Care Physician. As per AHA/ACC guidelines, it is important to adhere to a DASH Diet of fresh fruits, vegetables, lean meats such as poultry and fish, and whole wheat carbs. 30 minutes of aerobic exercise per day 3-4 times a week, reducing salt intake <1.5g/day, and cutting down on highly processed foods are also shown to reduce BP. Uncontrolled BP may result in organ damage to your eyes, brain, heart, and kidneys by causing strokes, heart attacks, kidney failure, and bleeds in your eye.    Diagnosis: DM (diabetes mellitus)  Assessment and Plan of Treatment: Your hemoglobin A1C is  7.2%. Please eat healthy and low sugar diet (less sweets & bread or rice more vegetables), take prescribed medications and follow up with your primary care doctor and have eye and foot exam regularly. Endocrine Dr. Britt evaluated you for elevated sugars in the setting of steroid medication use, we have optimized your insulin. Please follow up with your primary physician.  Please take Insulin 70/30 40 units in the morning and 30 units at night. Please do not take oral medications for diabetes. Please follow up with your primary care doctor in 5 days and you might need to decrease Insulin 70/30 once your are off prednisone. PLease check your blood glucose level at home daily.

## 2020-12-15 NOTE — PHYSICAL THERAPY INITIAL EVALUATION ADULT - GENERAL OBSERVATIONS, REHAB EVAL
Pt. received supine in bed, on telemetry. She was very irritated stating that she needs to be washed. Nursing staff aware. Pt offered  phone but refused as she was becoming restless as well. Pt. however appears oriented x 3; not confused.

## 2020-12-15 NOTE — DISCHARGE NOTE PROVIDER - HOSPITAL COURSE
89 y/o female Rivera Speaking, with PMHx of asthma, HTN, DM presents to the ED c/o shortness of breath and diarrhea, fever, chills x 1 days. Patient was very irritable at the time of encounter, AAo x3, but non-cooperative so main history was obtained from ED chart.  Pt reports worsening shortness of breath with associated subjective fever, chills, chest pain and cough. Pt reports that her grandson is positive for COVID-19.. Pt also has chronic leg pain.   Denies nausea, emesis, abdominal pain, dysuria, hematuria, dconstipation, or any other acute complaint  SpO2: 96% (14 Dec 2020 15:40) on RA.   EKG: NSR  Pt was admitted for management of Covid-19 PNA.    Troponin was elevated on admission, which trended : 0.056, 0.048, 0.049.  Pt was started on dexamethasone 6g daily and symptoms improved.    The patient is medically stable and ready for discharge. Case discussed with attending doctor.       89 y/o female Rivera Speaking, with PMHx of asthma, HTN, DM presents to the ED c/o shortness of breath and diarrhea, fever, chills x 1 days. Patient was very irritable at the time of encounter, AAo x3, but non-cooperative so main history was obtained from ED chart.  Pt reports worsening shortness of breath with associated subjective fever, chills, chest pain and cough. Pt reports that her grandson is positive for COVID-19.. Pt also has chronic leg pain. Denied nausea, emesis, abdominal pain, dysuria, hematuria, dconstipation, or any other acute complaints. SpO2: 96% (14 Dec 2020 15:40) on RA. EKG: NSR. Pt was admitted for management of Covid-19 PNA.  Troponin was elevated on admission, which trended : 0.056, 0.048, 0.049. Pt was started on dexamethasone 6g daily and symptoms improved. BCx NGTD. Repeat CXR on 12/18 shows b/l opacities at lung bases, slightly progressed on left. Plan for CT chest to eval infiltrate but patient refused. 3 episodes of loose stools prior to any Abx, sent for C.diff 12/18 which was negative. Endocrine Dr. Britt consulted for elevated blood sugars in the setting of steroid and refusal of insulin    The patient is medically stable and ready for discharge. Case discussed with attending doctor. 89 y/o female Rivera Speaking, with PMHx of asthma, HTN, DM presents to the ED c/o worsening shortness of breath with associated subjective fever, chills, chest pain and cough found positive for COVID19, admitted to medicine for acute respiratory distress due to CIVID PNA.  Pt was started on dexamethasone 6g daily, not a Remdesevir candidate due to low GFR, and symptoms improved. BCx NGTD. Repeat CXR on 12/18 shows b/l opacities at lung bases, slightly progressed on left.  CXR 12/18/20->  Mild opacities lung bases slightly progressed on the left.  No pleural effusion  Heart size cannot be accurately assessed in this projection.  Mildly prominent right hilum which may be related to pulmonary vessel  US Duplex LEs->No evidence of deep venous thrombosis in either lower extremity.  CXR 12/28/20->  Bilateral airspace opacities overall improving.  No pleural effusion.  Heart size cannot be accurately assessed in this projection.    Plan for CT chest to eval infiltrate but patient refused. 3 episodes of loose stools prior to any Abx, sent for C.diff 12/18 which was negative. Endocrine Dr. Britt consulted for elevated blood sugars in the setting of steroid and refusal of insulin    The patient is medically stable and ready for discharge. Case discussed with attending doctor. 89 y/o female Rivera Speaking, with PMHx of asthma, HTN, DM presents to the ED c/o worsening shortness of breath with associated subjective fever, chills, chest pain and cough found positive for COVID19, admitted to medicine for acute respiratory distress due to CIVID PNA.  Pt was started on dexamethasone 6g daily, not a Remdesevir candidate due to low GFR, and symptoms improved. BCx NGTD. Repeat CXR on 12/18 shows b/l opacities at lung bases, slightly progressed on left.  CXR 12/18/20->  Mild opacities lung bases slightly progressed on the left.  No pleural effusion  Heart size cannot be accurately assessed in this projection.  Mildly prominent right hilum which may be related to pulmonary vessel  US Duplex LEs->No evidence of deep venous thrombosis in either lower extremity.  CXR 12/28/20->  Bilateral airspace opacities overall improving.  No pleural effusion.  Heart size cannot be accurately assessed in this projection.    Plan for CT chest to eval infiltrate but patient refused. 3 episodes of loose stools prior to any Abx, sent for C.diff 12/18 which was negative. Endocrine Dr. Britt consulted for elevated blood sugars in the setting of steroid and refusal of insulin  Pt. was recommended [facility]YSON by PT however pt. and family want discharge to hoe.    The patient is medically stable and ready for discharge. Case discussed with attending doctor. 89 y/o female Rivera Speaking, with PMHx of asthma, HTN, DM presents to the ED c/o worsening shortness of breath with associated subjective fever, chills, chest pain and cough found positive for COVID19, admitted to medicine for acute respiratory distress due to COVID PNA.  Pt was started on dexamethasone 6g daily, not a Remdesevir candidate due to low GFR, and symptoms improved. BCx NGTD. Repeat CXR on 12/18 shows b/l opacities at lung bases, slightly progressed on left.  CXR 12/18/20->  Mild opacities lung bases slightly progressed on the left.  No pleural effusion  Heart size cannot be accurately assessed in this projection.  Mildly prominent right hilum which may be related to pulmonary vessel  US Duplex LEs->No evidence of deep venous thrombosis in either lower extremity.  CXR 12/28/20->  Bilateral airspace opacities overall improving.  No pleural effusion.  Heart size cannot be accurately assessed in this projection.    Plan for CT chest to eval infiltrate but patient refused. 3 episodes of loose stools prior to any Abx, sent for C.diff 12/18 which was negative. Endocrine Dr. Britt consulted for elevated blood sugars in the setting of steroid and refusal of insulin  Pt. was recommended [facility]YSON by PT however pt. and family want discharge to home.  Pt. saturates 94% on RA, attempts made to assess pt.'s O2 sat with ambulation as she will likely desaturate with exertion, however pt. is unable to ambulate more than 2 steps with PT, unable to obtain O2 sat justifying home O2.  Pt.'s son and daughter called several times today requesting discharge to home despite explanations that pt. is very weak, unable to ambulate and likely will need O2.  Family understand explanations but keep insisting that pt. discharges to home as pt. is calling them and crying, begging to go home.    Case discussed with attending doctor. 89 y/o female Rivera Speaking, with PMHx of asthma, HTN, DM presents to the ED c/o worsening shortness of breath with associated subjective fever, chills, chest pain and cough found positive for COVID19, admitted to medicine for acute respiratory distress due to COVID PNA.  Pt was started on dexamethasone 6g daily, not a Remdesevir candidate due to low GFR, and symptoms improved. BCx NGTD. Repeat CXR on 12/18 shows b/l opacities at lung bases, slightly progressed on left.  CXR 12/18/20->  Mild opacities lung bases slightly progressed on the left.  No pleural effusion  Heart size cannot be accurately assessed in this projection.  Mildly prominent right hilum which may be related to pulmonary vessel  US Duplex LEs->No evidence of deep venous thrombosis in either lower extremity.  CXR 12/28/20->  Bilateral airspace opacities overall improving.  No pleural effusion.  Heart size cannot be accurately assessed in this projection.    Plan for CT chest to eval infiltrate but patient refused. 3 episodes of loose stools prior to any Abx, sent for C.diff 12/18 which was negative. Endocrine Dr. Britt consulted for elevated blood sugars in the setting of steroid and refusal of insulin  Pt. was recommended [facility]YSON by PT however pt. and family want discharge to home.  Pt. saturates 94% on RA, attempts made to assess pt.'s O2 sat with ambulation as she will likely desaturate with exertion, however pt. is unable to ambulate more than 2 steps with PT, O2 sat with two steps ambulation 94%.  Pt.'s son and daughter called several times today requesting discharge to home despite explanations that pt. is very weak and unable to ambulate.  Family understand explanations but keep insisting that pt. discharges to home as pt. is calling them and crying, begging to go home.    Case discussed with attending doctor. 89 y/o female Rivera Speaking, with PMHx of asthma, HTN, DM presents to the ED c/o worsening shortness of breath with associated subjective fever, chills, chest pain and cough found positive for COVID19, admitted to medicine for acute respiratory distress due to COVID PNA.  Pt was started on dexamethasone 6g daily, not a Remdesevir candidate due to low GFR, and symptoms improved. BCx NGTD. Repeat CXR on 12/18 shows b/l opacities at lung bases, slightly progressed on left.  CXR 12/18/20->  Mild opacities lung bases slightly progressed on the left.  No pleural effusion  Heart size cannot be accurately assessed in this projection.  Mildly prominent right hilum which may be related to pulmonary vessel  US Duplex LEs->No evidence of deep venous thrombosis in either lower extremity.  CXR 12/28/20->  Bilateral airspace opacities overall improving.  No pleural effusion.  Heart size cannot be accurately assessed in this projection.    Plan for CT chest to eval infiltrate but patient refused. 3 episodes of loose stools prior to any Abx, sent for C.diff 12/18 which was negative. Endocrine Dr. Britt consulted for elevated blood sugars in the setting of steroid and refusal of insulin  Pt. was recommended [facility] by PT however pt. and family want discharge to home.  Pt. saturates 94% on RA, attempts made to assess pt.'s O2 sat with ambulation as she will likely desaturate with exertion, however pt. is unable to ambulate more than 2 steps with PT, O2 sat with two steps ambulation 94%.  Pt.'s son and daughter called several times today requesting discharge to home despite explanations that pt. is very weak and unable to ambulate.  Family understand explanations but keep insisting that pt. discharges to home as pt. is calling them and crying, begging to go home.    Case discussed with attending doctor.  Patient ambulted 2 steps with PT with Sat around 93-94%, safe to go home without Oxygen.

## 2020-12-15 NOTE — PROGRESS NOTE ADULT - PROBLEM SELECTOR PLAN 2
Patient has history of Hypertension and diabetes   -Coming with elevated troponin. however, denies chest pain,  - CHELO score 3, ( 1 for age>65, Aspirin use, positive cardiac marker)  - Likely demand ischemia  - admit to tele floor   - c/w Aspirin and Statin   - Echo from July shows EF >55% and Grossly normal Systolic function, grade I diastolic dysfunction

## 2020-12-15 NOTE — DISCHARGE NOTE PROVIDER - NSDCMRMEDTOKEN_GEN_ALL_CORE_FT
Advair Diskus 250 mcg-50 mcg inhalation powder: 1 puff(s) inhaled 2 times a day  ClearLax oral powder for reconstitution: Mix one cup (17 gram(s)) with 8oz of liquid and drink once orally , As Needed for constipation  gabapentin 300 mg oral capsule: 1 cap(s) orally every 12 hours  glimepiride 2 mg oral tablet: 1 tab(s) orally 2 times a day with meals  lisinopril-hydrochlorothiazide 20 mg-12.5 mg oral tablet: 1 tab(s) orally once a day  loratadine 10 mg oral tablet: 1 tab(s) orally once a day  magnesium hydroxide 8% oral suspension: 30 milliliter(s) orally once a day, As needed, Constipation  metFORMIN 1000 mg oral tablet: 1 tab(s) orally 2 times a day  NovoLOG Mix 70/30 subcutaneous suspension: 40 unit(s) subcutaneous with breakfast and dinner 2 times a day  ProAir HFA 90 mcg/inh inhalation aerosol: 2 puff(s) inhaled every 6 hours  Qtern 10 mg-5 mg oral tablet: 1 tab(s) orally once a day (in the morning)  senna oral tablet: 2 tab(s) orally once a day (at bedtime)  Slow Fe (as elemental iron) 45 mg oral tablet, extended release: 1 tab(s) orally once a day  Tab-A-Rachael oral tablet: 1 tab(s) orally once a day   acetaminophen 325 mg oral tablet: 2 tab(s) orally every 6 hours, As needed, Temp greater or equal to 38C (100.4F), Mild Pain (1 - 3)  Advair Diskus 250 mcg-50 mcg inhalation powder: 1 puff(s) inhaled 2 times a day  budesonide-formoterol 80 mcg-4.5 mcg/inh inhalation aerosol: 2 puff(s) inhaled 2 times a day   ClearLax oral powder for reconstitution: Mix one cup (17 gram(s)) with 8oz of liquid and drink once orally , As Needed for constipation  gabapentin 300 mg oral capsule: 1 cap(s) orally every 12 hours  lisinopril-hydrochlorothiazide 20 mg-12.5 mg oral tablet: 1 tab(s) orally once a day  loratadine 10 mg oral tablet: 1 tab(s) orally once a day  magnesium hydroxide 8% oral suspension: 30 milliliter(s) orally once a day, As needed, Constipation  montelukast 10 mg oral tablet: 1 tab(s) orally once a day  NovoLOG Mix 70/30 subcutaneous suspension:  40 unit(s) subcutaneous in the morning and 30 Units at bedtime   pantoprazole 40 mg oral delayed release tablet: 1 tab(s) orally once a day (before a meal)  predniSONE 20 mg oral tablet: 2 tab(s) orally once a day  ProAir HFA 90 mcg/inh inhalation aerosol: 2 puff(s) inhaled every 6 hours  senna oral tablet: 2 tab(s) orally once a day (at bedtime)  Slow Fe (as elemental iron) 45 mg oral tablet, extended release: 1 tab(s) orally once a day  Tab-A-Rachael oral tablet: 1 tab(s) orally once a day  zinc sulfate 220 mg oral capsule: 1 cap(s) orally once a day   acetaminophen 325 mg oral tablet: 2 tab(s) orally every 6 hours, As needed, Temp greater or equal to 38C (100.4F), Mild Pain (1 - 3)  Advair Diskus 250 mcg-50 mcg inhalation powder: 1 puff(s) inhaled 2 times a day  budesonide-formoterol 80 mcg-4.5 mcg/inh inhalation aerosol: 2 puff(s) inhaled 2 times a day   ClearLax oral powder for reconstitution: Mix one cup (17 gram(s)) with 8oz of liquid and drink once orally , As Needed for constipation  gabapentin 300 mg oral capsule: 1 cap(s) orally every 12 hours  lisinopril-hydrochlorothiazide 20 mg-12.5 mg oral tablet: 1 tab(s) orally once a day  loratadine 10 mg oral tablet: 1 tab(s) orally once a day  magnesium hydroxide 8% oral suspension: 30 milliliter(s) orally once a day, As needed, Constipation  montelukast 10 mg oral tablet: 1 tab(s) orally once a day  NovoLOG Mix 70/30 subcutaneous suspension:  40 unit(s) subcutaneous in the morning and 30 Units at bedtime   BEFORE MEALS  pantoprazole 40 mg oral delayed release tablet: 1 tab(s) orally once a day (before a meal)  predniSONE 20 mg oral tablet: 2 tab(s) orally once a day  ProAir HFA 90 mcg/inh inhalation aerosol: 2 puff(s) inhaled every 6 hours  senna oral tablet: 2 tab(s) orally once a day (at bedtime)  Slow Fe (as elemental iron) 45 mg oral tablet, extended release: 1 tab(s) orally once a day  Tab-A-Rachael oral tablet: 1 tab(s) orally once a day  zinc sulfate 220 mg oral capsule: 1 cap(s) orally once a day

## 2020-12-15 NOTE — PROGRESS NOTE ADULT - PROBLEM SELECTOR PLAN 4
Patient takes Lisinopril, HCTZ   - blood pressure is in acceptable range for now  - will hold medications for now in light of infection,   primary team can start as needed  - f/u lipid profile, TSH, A1C,   - Diabetic abd dash diet Patient takes Lisinopril, HCTZ   - blood pressure is in acceptable range for now  - will hold medications for now in light of infection,   primary team can start as needed  - Diabetic and dash diet

## 2020-12-15 NOTE — PROGRESS NOTE ADULT - PROBLEM SELECTOR PLAN 3
Cr 1.6 on admission, 1.36 today. Baseline (1.180)  - GFR 34  - f/u UA  - f/u SPEP and UPEP  - calculate FeNa  - If creatinine continues to trend up, can get renal ultrasound Cr 1.6 on admission, 1.36 today. Baseline (1.180)  - GFR 34  - f/u UA  - f/u SPEP and UPEP  Nephrologist Dr Dove following

## 2020-12-15 NOTE — PHYSICAL THERAPY INITIAL EVALUATION ADULT - PASSIVE RANGE OF MOTION EXAMINATION, REHAB EVAL
no Passive ROM deficits were identified/except for bilateral knees in flexion (right 0-40 and left 0-45 degrees)

## 2020-12-16 LAB
ANION GAP SERPL CALC-SCNC: 10 MMOL/L — SIGNIFICANT CHANGE UP (ref 5–17)
BUN SERPL-MCNC: 20 MG/DL — HIGH (ref 7–18)
CALCIUM SERPL-MCNC: 8.9 MG/DL — SIGNIFICANT CHANGE UP (ref 8.4–10.5)
CHLORIDE SERPL-SCNC: 104 MMOL/L — SIGNIFICANT CHANGE UP (ref 96–108)
CO2 SERPL-SCNC: 25 MMOL/L — SIGNIFICANT CHANGE UP (ref 22–31)
CREAT SERPL-MCNC: 1.31 MG/DL — HIGH (ref 0.5–1.3)
GLUCOSE BLDC GLUCOMTR-MCNC: 115 MG/DL — HIGH (ref 70–99)
GLUCOSE BLDC GLUCOMTR-MCNC: 193 MG/DL — HIGH (ref 70–99)
GLUCOSE BLDC GLUCOMTR-MCNC: 266 MG/DL — HIGH (ref 70–99)
GLUCOSE BLDC GLUCOMTR-MCNC: 299 MG/DL — HIGH (ref 70–99)
GLUCOSE SERPL-MCNC: 107 MG/DL — HIGH (ref 70–99)
HCT VFR BLD CALC: 35.7 % — SIGNIFICANT CHANGE UP (ref 34.5–45)
HGB BLD-MCNC: 10.9 G/DL — LOW (ref 11.5–15.5)
MAGNESIUM SERPL-MCNC: 1.9 MG/DL — SIGNIFICANT CHANGE UP (ref 1.6–2.6)
MCHC RBC-ENTMCNC: 26.1 PG — LOW (ref 27–34)
MCHC RBC-ENTMCNC: 30.5 GM/DL — LOW (ref 32–36)
MCV RBC AUTO: 85.6 FL — SIGNIFICANT CHANGE UP (ref 80–100)
NRBC # BLD: 0 /100 WBCS — SIGNIFICANT CHANGE UP (ref 0–0)
PHOSPHATE SERPL-MCNC: 2.7 MG/DL — SIGNIFICANT CHANGE UP (ref 2.5–4.5)
PLATELET # BLD AUTO: 243 K/UL — SIGNIFICANT CHANGE UP (ref 150–400)
POTASSIUM SERPL-MCNC: 4 MMOL/L — SIGNIFICANT CHANGE UP (ref 3.5–5.3)
POTASSIUM SERPL-SCNC: 4 MMOL/L — SIGNIFICANT CHANGE UP (ref 3.5–5.3)
RBC # BLD: 4.17 M/UL — SIGNIFICANT CHANGE UP (ref 3.8–5.2)
RBC # FLD: 15 % — HIGH (ref 10.3–14.5)
SODIUM SERPL-SCNC: 139 MMOL/L — SIGNIFICANT CHANGE UP (ref 135–145)
TROPONIN I SERPL-MCNC: 0.06 NG/ML — HIGH (ref 0–0.04)
WBC # BLD: 7.33 K/UL — SIGNIFICANT CHANGE UP (ref 3.8–10.5)
WBC # FLD AUTO: 7.33 K/UL — SIGNIFICANT CHANGE UP (ref 3.8–10.5)

## 2020-12-16 RX ADMIN — BUDESONIDE AND FORMOTEROL FUMARATE DIHYDRATE 2 PUFF(S): 160; 4.5 AEROSOL RESPIRATORY (INHALATION) at 16:55

## 2020-12-16 RX ADMIN — HEPARIN SODIUM 5000 UNIT(S): 5000 INJECTION INTRAVENOUS; SUBCUTANEOUS at 15:01

## 2020-12-16 RX ADMIN — Medication 3: at 16:54

## 2020-12-16 RX ADMIN — PANTOPRAZOLE SODIUM 40 MILLIGRAM(S): 20 TABLET, DELAYED RELEASE ORAL at 06:43

## 2020-12-16 RX ADMIN — Medication 1 TABLET(S): at 12:14

## 2020-12-16 RX ADMIN — LORATADINE 10 MILLIGRAM(S): 10 TABLET ORAL at 12:14

## 2020-12-16 RX ADMIN — Medication 1000 UNIT(S): at 12:14

## 2020-12-16 RX ADMIN — INSULIN GLARGINE 10 UNIT(S): 100 INJECTION, SOLUTION SUBCUTANEOUS at 21:46

## 2020-12-16 RX ADMIN — GABAPENTIN 300 MILLIGRAM(S): 400 CAPSULE ORAL at 06:43

## 2020-12-16 RX ADMIN — ZINC SULFATE TAB 220 MG (50 MG ZINC EQUIVALENT) 220 MILLIGRAM(S): 220 (50 ZN) TAB at 12:14

## 2020-12-16 RX ADMIN — Medication 500 MILLIGRAM(S): at 12:14

## 2020-12-16 RX ADMIN — GABAPENTIN 300 MILLIGRAM(S): 400 CAPSULE ORAL at 17:35

## 2020-12-16 RX ADMIN — Medication 325 MILLIGRAM(S): at 12:32

## 2020-12-16 RX ADMIN — Medication 6 MILLIGRAM(S): at 06:42

## 2020-12-16 RX ADMIN — HEPARIN SODIUM 5000 UNIT(S): 5000 INJECTION INTRAVENOUS; SUBCUTANEOUS at 06:42

## 2020-12-16 RX ADMIN — ALBUTEROL 2 PUFF(S): 90 AEROSOL, METERED ORAL at 12:14

## 2020-12-16 RX ADMIN — BUDESONIDE AND FORMOTEROL FUMARATE DIHYDRATE 2 PUFF(S): 160; 4.5 AEROSOL RESPIRATORY (INHALATION) at 21:45

## 2020-12-16 RX ADMIN — ALBUTEROL 2 PUFF(S): 90 AEROSOL, METERED ORAL at 04:34

## 2020-12-16 RX ADMIN — HEPARIN SODIUM 5000 UNIT(S): 5000 INJECTION INTRAVENOUS; SUBCUTANEOUS at 21:45

## 2020-12-16 RX ADMIN — Medication 3: at 12:14

## 2020-12-16 NOTE — PROGRESS NOTE ADULT - SUBJECTIVE AND OBJECTIVE BOX
[facility]   MD FAUSTO PALM MD RUORU WONG, PA    TEL:  OFFICE: [phone]  DR LEE CELL: [phone]  CHARLEY WILLIAM CELL: [phone]  DR. GARCIA CELL: [phone]  DR. MARTIN CELL: [phone]    FROM 5 PM - 7 AM PLEASE CALL ANSWERING SERVICE: [phone]    RENAL FOLLOW UP NOTE--Date of Service 12-16-20 @ 16:29  --------------------------------------------------------------------------------  HPI:      Pt covid 19+    PAST HISTORY  --------------------------------------------------------------------------------  No significant changes to PMH, PSH, FHx, SHx, unless otherwise noted    ALLERGIES & MEDICATIONS  --------------------------------------------------------------------------------  Allergies    No Known Allergies    Intolerances      Standing Inpatient Medications  ascorbic acid 500 milliGRAM(s) Oral daily  budesonide  80 MICROgram(s)/formoterol 4.5 MICROgram(s) Inhaler 2 Puff(s) Inhalation two times a day  cholecalciferol 1000 Unit(s) Oral daily  dexAMETHasone  Injectable 6 milliGRAM(s) IV Push daily  ferrous    sulfate 325 milliGRAM(s) Oral daily  gabapentin 300 milliGRAM(s) Oral every 12 hours  glucagon  Injectable 1 milliGRAM(s) IntraMuscular once  heparin   Injectable 5000 Unit(s) SubCutaneous every 8 hours  insulin glargine Injectable (LANTUS) 10 Unit(s) SubCutaneous at bedtime  insulin lispro (ADMELOG) corrective regimen sliding scale   SubCutaneous three times a day before meals  loratadine 10 milliGRAM(s) Oral daily  multivitamin 1 Tablet(s) Oral daily  pantoprazole    Tablet 40 milliGRAM(s) Oral before breakfast  sodium chloride 0.9%. 1000 milliLiter(s) IV Continuous <Continuous>  zinc sulfate 220 milliGRAM(s) Oral daily    PRN Inpatient Medications  ALBUTerol    90 MICROgram(s) HFA Inhaler 2 Puff(s) Inhalation every 6 hours PRN      REVIEW OF SYSTEMS  --------------------------------------------------------------------------------  General: no fever    MSK: no edema     VITALS/PHYSICAL EXAM  --------------------------------------------------------------------------------  T(C): 36.6 (12-16-20 @ 15:20), Max: 37.6 (12-16-20 @ 11:30)  HR: 78 (12-16-20 @ 15:20) (72 - 90)  BP: 134/57 (12-16-20 @ 15:20) (121/66 - 134/57)  RR: 19 (12-16-20 @ 15:20) (19 - 22)  SpO2: 99% (12-16-20 @ 15:20) (95% - 99%)  Wt(kg): --        12-15-20 @ 07:01  -  12-16-20 @ 07:00  --------------------------------------------------------  IN: 0 mL / OUT: 4 mL / NET: -4 mL        LABS/STUDIES  --------------------------------------------------------------------------------              10.9   7.33  >-----------<  243      [12-16-20 @ 08:04]              35.7     139  |  104  |  20  ----------------------------<  107      [12-16-20 @ 08:04]  4.0   |  25  |  1.31        Ca     8.9     [12-16-20 @ 08:04]      Mg     1.9     [12-16-20 @ 08:04]      Phos  2.7     [12-16-20 @ 08:04]    TPro  5.5  /  Alb  2.9  /  TBili  x   /  DBili  x   /  AST  x   /  ALT  x   /  AlkPhos  x   [12-15-20 @ 11:01]        Troponin 0.059      [12-16-20 @ 08:04]    Creatinine Trend:  SCr 1.31 [12-16 @ 08:04]  SCr 1.36 [12-15 @ 07:46]  SCr 1.61 [12-14 @ 13:31]      Urine Creatinine 20      [12-14-20 @ 17:02]  Urine Sodium 41      [12-14-20 @ 17:02]  Urine Osmolality 192      [12-14-20 @ 17:02]    Iron 24, TIBC 264, %sat 9      [12-15-20 @ 07:46]  Ferritin 116      [12-15-20 @ 10:25]  Vitamin D (25OH) 36.3      [07-18-20 @ 11:35]  TSH 0.38      [12-15-20 @ 07:46]  Lipid: chol 139, , HDL 46, LDL --      [12-15-20 @ 07:46]      Immunofixation Serum:   No Monoclonal Band Identified    Reference Range: None Detected      [12-15-20 @ 11:01]  SPEP Interpretation: Normal Electrophoresis Pattern      [12-15-20 @ 11:01]

## 2020-12-16 NOTE — PROGRESS NOTE ADULT - PROBLEM SELECTOR PLAN 2
Patient has history of Hypertension and diabetes   -Coming with mildly elevated troponin. however, denies chest pain,  - CHELO score 3, ( 1 for age>65, Aspirin use, positive cardiac marker)  - Likely demand ischemia 2/2 covid  - admitted to tele floor   - c/w Aspirin and Statin   - Echo from July shows EF >55% and Grossly normal Systolic function, grade I diastolic dysfunction

## 2020-12-16 NOTE — PROGRESS NOTE ADULT - PROBLEM SELECTOR PLAN 7
PAtient takes Novolog 70/30 40 U BID , Metformin and Glipizide  - blood sugar is elevated  - C/W sliding scale , Lantus 10U at night,   - Will hold pre meal insulin for now   - Diabetic diet  - A1C 7.2

## 2020-12-16 NOTE — PROGRESS NOTE ADULT - PROBLEM SELECTOR PLAN 3
Cr 1.6 on admission, 1.31 today. Baseline (1.180)  - GFR 36  - f/u UA    Nephrologist Dr Dove following

## 2020-12-16 NOTE — PROGRESS NOTE ADULT - SUBJECTIVE AND OBJECTIVE BOX
PGY-1 Progress Note discussed with attending    PAGER #: [[phone]] TILL 5:00 PM  PLEASE CONTACT ON CALL TEAM:  - On Call Team (Please refer to Agustina) FROM 5:00 PM - 8:30PM  - Nightfloat Team FROM 8:30 -7:30 AM    CHIEF COMPLAINT & BRIEF HOSPITAL COURSE:    89 y/o female [place] Speaking, with PMHx of asthma, HTN, DM presents to the ED c/o shortness of breath and diarrhea, fever, chills x 1 days. Patient was very irritable at the time of encounter, AAo x3, but non-cooperative so main history was obtained from ED chart.  Pt reports worsening shortness of breath with associated subjective fever, chills, chest pain and cough. Pt reports that her grandson is positive for COVID-19.. Pt also has chronic leg pain.   Denies nausea, emesis, abdominal pain, dysuria, hematuria, dconstipation, or any other acute complaints.  Patient tested Pt covid + and was started on Decadron for wheezing. Saturating well on room air and not spiking fevers.     INTERVAL HPI/OVERNIGHT EVENTS:   No acute overnight events. Complaining of generalized muscle aches. Pt still wheezing on exam but not desaturating. Still on room air and decadron (day3).     MEDICATIONS  (STANDING):  ascorbic acid 500 milliGRAM(s) Oral daily  budesonide  80 MICROgram(s)/formoterol 4.5 MICROgram(s) Inhaler 2 Puff(s) Inhalation two times a day  cholecalciferol 1000 Unit(s) Oral daily  dexAMETHasone  Injectable 6 milliGRAM(s) IV Push daily  ferrous    sulfate 325 milliGRAM(s) Oral daily  gabapentin 300 milliGRAM(s) Oral every 12 hours  glucagon  Injectable 1 milliGRAM(s) IntraMuscular once  heparin   Injectable 5000 Unit(s) SubCutaneous every 8 hours  insulin glargine Injectable (LANTUS) 10 Unit(s) SubCutaneous at bedtime  insulin lispro (ADMELOG) corrective regimen sliding scale   SubCutaneous three times a day before meals  loratadine 10 milliGRAM(s) Oral daily  multivitamin 1 Tablet(s) Oral daily  pantoprazole    Tablet 40 milliGRAM(s) Oral before breakfast  sodium chloride 0.9%. 1000 milliLiter(s) (50 mL/Hr) IV Continuous <Continuous>  zinc sulfate 220 milliGRAM(s) Oral daily    MEDICATIONS  (PRN):  ALBUTerol    90 MICROgram(s) HFA Inhaler 2 Puff(s) Inhalation every 6 hours PRN Bronchospasm      REVIEW OF SYSTEMS:  CONSTITUTIONAL: Fatigue and muscle aches  RESPIRATORY: No cough, wheezing, chills or hemoptysis; No shortness of breath  CARDIOVASCULAR: No chest pain, palpitations, dizziness, or leg swelling  GASTROINTESTINAL: No abdominal pain. No nausea, vomiting, or hematemesis; No diarrhea or constipation. No melena or hematochezia.  GENITOURINARY: No dysuria or hematuria, urinary frequency  NEUROLOGICAL: No headaches, memory loss, loss of strength, numbness, or tremors  SKIN: No itching, burning, rashes, or lesions     Vital Signs Last 24 Hrs  T(C): 37.6 (16 Dec 2020 11:30), Max: 37.6 (16 Dec 2020 11:30)  T(F): 99.7 (16 Dec 2020 11:30), Max: 99.7 (16 Dec 2020 11:30)  HR: 90 (16 Dec 2020 11:30) (72 - 91)  BP: 121/66 (16 Dec 2020 11:30) (121/66 - 130/58)  BP(mean): 66 (15 Dec 2020 16:17) (66 - 66)  RR: 20 (16 Dec 2020 11:30) (19 - 22)  SpO2: 96% (16 Dec 2020 11:30) (95% - 99%)    PHYSICAL EXAMINATION:  GENERAL: NAD  HEAD:  Atraumatic, Normocephalic  EYES:  conjunctiva and sclera clear  NECK: Supple, No JVD, Normal thyroid  CHEST/LUNG: Bilateral wheezing  HEART: Regular rate and rhythm; No murmurs, rubs, or gallops  ABDOMEN: Soft, Nontender, Nondistended; Bowel sounds present  NERVOUS SYSTEM:  Alert & Oriented X3,    EXTREMITIES:  2+ Peripheral Pulses, No clubbing, cyanosis, or edema  SKIN: warm dry                          10.9   7.33  )-----------( 243      ( 16 Dec 2020 08:04 )             35.7     12-16    139  |  104  |  20<H>  ----------------------------<  107<H>  4.0   |  25  |  1.31<H>    Ca    8.9      16 Dec 2020 08:04  Phos  2.7     12-16  Mg     1.9     12-16    TPro  5.5<L>  /  Alb  2.9<L>  /  TBili  x   /  DBili  x   /  AST  x   /  ALT  x   /  AlkPhos  x   12-15    LIVER FUNCTIONS - ( 15 Dec 2020 11:01 )  Alb: 2.9 g/dL / Pro: 5.5 g/dL / ALK PHOS: x     / ALT: x     / AST: x     / GGT: x           CARDIAC MARKERS ( 16 Dec 2020 08:04 )  0.059 ng/mL / x     / x     / x     / x      CARDIAC MARKERS ( 15 Dec 2020 07:46 )  0.049 ng/mL / x     / x     / x     / x      CARDIAC MARKERS ( 14 Dec 2020 21:36 )  0.048 ng/mL / x     / x     / x     / x              CAPILLARY BLOOD GLUCOSE      POCT Blood Glucose.: 299 mg/dL (16 Dec 2020 11:46)        Culture - Blood (collected 14 Dec 2020 18:59)  Source: .Blood Blood-Peripheral  Preliminary Report (15 Dec 2020 19:01):    No growth to date.    Culture - Blood (collected 14 Dec 2020 18:58)  Source: .Blood Blood-Peripheral  Preliminary Report (15 Dec 2020 19:01):    No growth to date.        RADIOLOGY & ADDITIONAL TESTS:

## 2020-12-16 NOTE — PROGRESS NOTE ADULT - PROBLEM SELECTOR PLAN 5
Hb is 10.9  which is lower than her baseline 12 from July  She is taking iron supplement, will continue

## 2020-12-16 NOTE — PROGRESS NOTE ADULT - PROBLEM SELECTOR PLAN 1
Patient is coming with fever chills , diarrhea since 1 day,  - Sick contacts at home,   - CXR is negative for pleural effusion, infiltrates or consolidation  - patient is saturating 98% on room air, however, her saturation drops when she is agitated  - No chest pain.  - s/p 1 dose of Dexamethasone in ED, c/w IV Dexa 6mg for 10 doses (started 12/14)  - CrCL <30, Patient wont qualify for Remdesivir as per Dr Del Angel   - Covid antibodies negative

## 2020-12-16 NOTE — PROGRESS NOTE ADULT - PROBLEM SELECTOR PLAN 4
Patient takes Lisinopril, HCTZ   - blood pressure is in acceptable range for now  - will hold medications for now in light of infection,   primary team can start as needed  - Diabetic and dash diet

## 2020-12-17 LAB
D DIMER BLD IA.RAPID-MCNC: 446 NG/ML DDU — HIGH
GLUCOSE BLDC GLUCOMTR-MCNC: 162 MG/DL — HIGH (ref 70–99)
GLUCOSE BLDC GLUCOMTR-MCNC: 224 MG/DL — HIGH (ref 70–99)
GLUCOSE BLDC GLUCOMTR-MCNC: 301 MG/DL — HIGH (ref 70–99)
GLUCOSE BLDC GLUCOMTR-MCNC: 343 MG/DL — HIGH (ref 70–99)

## 2020-12-17 RX ORDER — ACETAMINOPHEN 500 MG
650 TABLET ORAL EVERY 6 HOURS
Refills: 0 | Status: DISCONTINUED | OUTPATIENT
Start: 2020-12-17 | End: 2021-01-04

## 2020-12-17 RX ORDER — MONTELUKAST 4 MG/1
10 TABLET, CHEWABLE ORAL DAILY
Refills: 0 | Status: DISCONTINUED | OUTPATIENT
Start: 2020-12-17 | End: 2021-01-04

## 2020-12-17 RX ORDER — ALBUTEROL 90 UG/1
2 AEROSOL, METERED ORAL EVERY 6 HOURS
Refills: 0 | Status: DISCONTINUED | OUTPATIENT
Start: 2020-12-17 | End: 2020-12-23

## 2020-12-17 RX ORDER — ALPRAZOLAM 0.25 MG
0.25 TABLET ORAL ONCE
Refills: 0 | Status: DISCONTINUED | OUTPATIENT
Start: 2020-12-17 | End: 2020-12-17

## 2020-12-17 RX ADMIN — LORATADINE 10 MILLIGRAM(S): 10 TABLET ORAL at 12:27

## 2020-12-17 RX ADMIN — ALBUTEROL 2 PUFF(S): 90 AEROSOL, METERED ORAL at 17:34

## 2020-12-17 RX ADMIN — GABAPENTIN 300 MILLIGRAM(S): 400 CAPSULE ORAL at 05:53

## 2020-12-17 RX ADMIN — Medication 1 TABLET(S): at 12:26

## 2020-12-17 RX ADMIN — Medication 500 MILLIGRAM(S): at 12:27

## 2020-12-17 RX ADMIN — GABAPENTIN 300 MILLIGRAM(S): 400 CAPSULE ORAL at 17:45

## 2020-12-17 RX ADMIN — MONTELUKAST 10 MILLIGRAM(S): 4 TABLET, CHEWABLE ORAL at 21:21

## 2020-12-17 RX ADMIN — PANTOPRAZOLE SODIUM 40 MILLIGRAM(S): 20 TABLET, DELAYED RELEASE ORAL at 08:38

## 2020-12-17 RX ADMIN — Medication 0.25 MILLIGRAM(S): at 10:08

## 2020-12-17 RX ADMIN — Medication 325 MILLIGRAM(S): at 12:26

## 2020-12-17 RX ADMIN — BUDESONIDE AND FORMOTEROL FUMARATE DIHYDRATE 2 PUFF(S): 160; 4.5 AEROSOL RESPIRATORY (INHALATION) at 10:08

## 2020-12-17 RX ADMIN — HEPARIN SODIUM 5000 UNIT(S): 5000 INJECTION INTRAVENOUS; SUBCUTANEOUS at 05:49

## 2020-12-17 RX ADMIN — Medication 650 MILLIGRAM(S): at 15:55

## 2020-12-17 RX ADMIN — Medication 1000 UNIT(S): at 12:26

## 2020-12-17 RX ADMIN — BUDESONIDE AND FORMOTEROL FUMARATE DIHYDRATE 2 PUFF(S): 160; 4.5 AEROSOL RESPIRATORY (INHALATION) at 21:22

## 2020-12-17 RX ADMIN — ZINC SULFATE TAB 220 MG (50 MG ZINC EQUIVALENT) 220 MILLIGRAM(S): 220 (50 ZN) TAB at 12:26

## 2020-12-17 RX ADMIN — HEPARIN SODIUM 5000 UNIT(S): 5000 INJECTION INTRAVENOUS; SUBCUTANEOUS at 13:37

## 2020-12-17 RX ADMIN — INSULIN GLARGINE 10 UNIT(S): 100 INJECTION, SOLUTION SUBCUTANEOUS at 21:22

## 2020-12-17 RX ADMIN — Medication 4: at 12:18

## 2020-12-17 RX ADMIN — Medication 6 MILLIGRAM(S): at 05:57

## 2020-12-17 RX ADMIN — ALBUTEROL 2 PUFF(S): 90 AEROSOL, METERED ORAL at 21:22

## 2020-12-17 RX ADMIN — ALBUTEROL 2 PUFF(S): 90 AEROSOL, METERED ORAL at 10:13

## 2020-12-17 RX ADMIN — Medication 4: at 16:59

## 2020-12-17 RX ADMIN — HEPARIN SODIUM 5000 UNIT(S): 5000 INJECTION INTRAVENOUS; SUBCUTANEOUS at 21:20

## 2020-12-17 RX ADMIN — Medication 650 MILLIGRAM(S): at 13:37

## 2020-12-17 RX ADMIN — Medication 1: at 08:38

## 2020-12-17 NOTE — PROGRESS NOTE ADULT - PROBLEM SELECTOR PLAN 6
Continue Albuterol  Dexamethasone for covid infection - Continue Albuterol, incr PRN to standing on 12/17  - Dexamethasone for covid infection  - Pulm Dr. Templeton consulter 12/16; pending recs

## 2020-12-17 NOTE — PROGRESS NOTE ADULT - PROBLEM SELECTOR PLAN 8
RISK                                                          Points  [] Previous VTE                                           3  [] Thrombophilia                                        2  [] Lower limb paralysis                              2   [] Current Cancer                                       2   [x] Immobilization > 24 hrs                        1  [] ICU/CCU stay > 24 hours                       1  [x] Age > 60                                                   1  Improve score 2  Lovenox 40mg SQ RISK                                                          Points  [] Previous VTE                                           3  [] Thrombophilia                                        2  [] Lower limb paralysis                              2   [] Current Cancer                                       2   [x] Immobilization > 24 hrs                        1  [] ICU/CCU stay > 24 hours                       1  [x] Age > 60                                                   1  Improve score 2  Heparin 5000 sc q8

## 2020-12-17 NOTE — PROGRESS NOTE ADULT - PROBLEM SELECTOR PLAN 3
Cr 1.6 on admission, 1.31 today. Baseline (1.180)  - GFR 36  - f/u UA    Nephrologist Dr Dove following Cr 1.6 on admission, 1.31 today. Baseline (1.180)  - GFR 36  - Ucreat 20, Brice 41, UOsmol 192  Nephrologist Dr Dove following  - - Scr 1.6 on admission   - - Renal function improving  - - Check UA  - - MOnitor BMP   - - AVoid further nephrotoxics, NSAIDS RCA

## 2020-12-17 NOTE — PROGRESS NOTE ADULT - PROBLEM SELECTOR PLAN 1
Patient is coming with fever chills , diarrhea since 1 day,  - Sick contacts at home,   - CXR is negative for pleural effusion, infiltrates or consolidation  - patient is saturating 98% on room air, however, her saturation drops when she is agitated  - No chest pain.  - s/p 1 dose of Dexamethasone in ED, c/w IV Dexa 6mg for 10 doses (started 12/14)  - CrCL <30, Patient wont qualify for Remdesivir as per Dr Del Angel   - Covid antibodies negative Patient is coming with fever chills , diarrhea since 1 day,  - Sick contacts at home,   - CXR is negative for pleural effusion, infiltrates or consolidation  - patient is saturating 98% on room air, however, her saturation drops when she is agitated  - No chest pain  - s/p 1 dose of Dexamethasone in ED, c/w IV Dexa 6mg for 10 doses (started 12/14 - 12/24)  - CrCL <30, Patient wont qualify for Remdesivir as per Dr Del Angel   - COVID Ab negative  - stable on Room Air, tx underlying asthma as patient is wheezing

## 2020-12-17 NOTE — PROGRESS NOTE ADULT - SUBJECTIVE AND OBJECTIVE BOX
[facility]   MD FAUSTO PALM MD RUORU WONG, PA    TEL:  OFFICE: [phone]  DR LEE CELL: [phone]  CHARLEY WILLIAM CELL: [phone]  DR. GARCIA CELL: [phone]  DR. MARTIN CELL: [phone]    FROM 5 PM - 7 AM PLEASE CALL ANSWERING SERVICE: [phone]    RENAL FOLLOW UP NOTE--Date of Service 12-17-20 @ 13:40  --------------------------------------------------------------------------------  HPI:      Pt covid 19+  PAST HISTORY  --------------------------------------------------------------------------------  No significant changes to PMH, PSH, FHx, SHx, unless otherwise noted    ALLERGIES & MEDICATIONS  --------------------------------------------------------------------------------  Allergies    No Known Allergies    Intolerances      Standing Inpatient Medications  ALBUTerol    90 MICROgram(s) HFA Inhaler 2 Puff(s) Inhalation every 6 hours  ascorbic acid 500 milliGRAM(s) Oral daily  budesonide  80 MICROgram(s)/formoterol 4.5 MICROgram(s) Inhaler 2 Puff(s) Inhalation two times a day  cholecalciferol 1000 Unit(s) Oral daily  dexAMETHasone  Injectable 6 milliGRAM(s) IV Push daily  ferrous    sulfate 325 milliGRAM(s) Oral daily  gabapentin 300 milliGRAM(s) Oral every 12 hours  heparin   Injectable 5000 Unit(s) SubCutaneous every 8 hours  insulin glargine Injectable (LANTUS) 10 Unit(s) SubCutaneous at bedtime  insulin lispro (ADMELOG) corrective regimen sliding scale   SubCutaneous three times a day before meals  loratadine 10 milliGRAM(s) Oral daily  multivitamin 1 Tablet(s) Oral daily  pantoprazole    Tablet 40 milliGRAM(s) Oral before breakfast  sodium chloride 0.9%. 1000 milliLiter(s) IV Continuous <Continuous>  zinc sulfate 220 milliGRAM(s) Oral daily    PRN Inpatient Medications  acetaminophen   Tablet .. 650 milliGRAM(s) Oral every 6 hours PRN      REVIEW OF SYSTEMS  --------------------------------------------------------------------------------  General: no fever    MSK: no edema     VITALS/PHYSICAL EXAM  --------------------------------------------------------------------------------  T(C): 37.8 (12-17-20 @ 11:06), Max: 38 (12-17-20 @ 08:09)  HR: 87 (12-17-20 @ 11:06) (78 - 89)  BP: 128/53 (12-17-20 @ 11:06) (112/50 - 144/58)  RR: 18 (12-17-20 @ 11:06) (18 - 19)  SpO2: 96% (12-17-20 @ 11:06) (95% - 99%)  Wt(kg): --          LABS/STUDIES  --------------------------------------------------------------------------------              10.9   7.33  >-----------<  243      [12-16-20 @ 08:04]              35.7     139  |  104  |  20  ----------------------------<  107      [12-16-20 @ 08:04]  4.0   |  25  |  1.31        Ca     8.9     [12-16-20 @ 08:04]      Mg     1.9     [12-16-20 @ 08:04]      Phos  2.7     [12-16-20 @ 08:04]          Troponin 0.059      [12-16-20 @ 08:04]    Creatinine Trend:  SCr 1.31 [12-16 @ 08:04]  SCr 1.36 [12-15 @ 07:46]  SCr 1.61 [12-14 @ 13:31]      Urine Creatinine 20      [12-14-20 @ 17:02]  Urine Sodium 41      [12-14-20 @ 17:02]  Urine Osmolality 192      [12-14-20 @ 17:02]    Iron 24, TIBC 264, %sat 9      [12-15-20 @ 07:46]  Ferritin 116      [12-15-20 @ 10:25]  Vitamin D (25OH) 36.3      [07-18-20 @ 11:35]  TSH 0.38      [12-15-20 @ 07:46]  Lipid: chol 139, , HDL 46, LDL --      [12-15-20 @ 07:46]      Immunofixation Serum:   No Monoclonal Band Identified    Reference Range: None Detected      [12-15-20 @ 11:01]  SPEP Interpretation: Normal Electrophoresis Pattern      [12-15-20 @ 11:01]

## 2020-12-17 NOTE — PROGRESS NOTE ADULT - SUBJECTIVE AND OBJECTIVE BOX
PGY-1 Progress Note discussed with attending    PAGER #: [[phone]] TILL 5:00 PM  PLEASE CONTACT ON CALL TEAM:   - On Call Team (Please refer to Agustina) FROM 5:00 PM - 8:30PM  - Nightfloat Team FROM 8:30 -7:30 AM    CHIEF COMPLAINT & BRIEF HOSPITAL COURSE:      INTERVAL HPI/OVERNIGHT EVENTS:       REVIEW OF SYSTEMS:  CONSTITUTIONAL: No fever, weight loss, or fatigue  RESPIRATORY: No cough, wheezing, chills or hemoptysis; No shortness of breath  CARDIOVASCULAR: No chest pain, palpitations, dizziness, or leg swelling  GASTROINTESTINAL: No abdominal pain. No nausea, vomiting, or hematemesis; No diarrhea or constipation. No melena or hematochezia.  GENITOURINARY: No dysuria or hematuria, urinary frequency  NEUROLOGICAL: No headaches, memory loss, loss of strength, numbness, or tremors  SKIN: No itching, burning, rashes, or lesions     MEDICATIONS  (STANDING):  ascorbic acid 500 milliGRAM(s) Oral daily  budesonide  80 MICROgram(s)/formoterol 4.5 MICROgram(s) Inhaler 2 Puff(s) Inhalation two times a day  cholecalciferol 1000 Unit(s) Oral daily  dexAMETHasone  Injectable 6 milliGRAM(s) IV Push daily  ferrous    sulfate 325 milliGRAM(s) Oral daily  gabapentin 300 milliGRAM(s) Oral every 12 hours  glucagon  Injectable 1 milliGRAM(s) IntraMuscular once  heparin   Injectable 5000 Unit(s) SubCutaneous every 8 hours  insulin glargine Injectable (LANTUS) 10 Unit(s) SubCutaneous at bedtime  insulin lispro (ADMELOG) corrective regimen sliding scale   SubCutaneous three times a day before meals  loratadine 10 milliGRAM(s) Oral daily  multivitamin 1 Tablet(s) Oral daily  pantoprazole    Tablet 40 milliGRAM(s) Oral before breakfast  sodium chloride 0.9%. 1000 milliLiter(s) (50 mL/Hr) IV Continuous <Continuous>  zinc sulfate 220 milliGRAM(s) Oral daily    MEDICATIONS  (PRN):  ALBUTerol    90 MICROgram(s) HFA Inhaler 2 Puff(s) Inhalation every 6 hours PRN Bronchospasm      Vital Signs Last 24 Hrs  T(C): 38 (17 Dec 2020 08:09), Max: 38 (17 Dec 2020 08:09)  T(F): 100.4 (17 Dec 2020 08:09), Max: 100.4 (17 Dec 2020 08:09)  HR: 89 (17 Dec 2020 08:09) (78 - 90)  BP: 144/58 (17 Dec 2020 08:09) (112/50 - 144/58)  BP(mean): --  RR: 18 (17 Dec 2020 08:09) (18 - 20)  SpO2: 95% (17 Dec 2020 08:09) (95% - 99%)    PHYSICAL EXAMINATION:  GENERAL: NAD, well built  HEAD:  Atraumatic, Normocephalic  EYES:  conjunctiva and sclera clear  NECK: Supple, No JVD, Normal thyroid  CHEST/LUNG: Clear to auscultation. Clear to percussion bilaterally; No rales, rhonchi, wheezing, or rubs  HEART: Regular rate and rhythm; No murmurs, rubs, or gallops  ABDOMEN: Soft, Nontender, Nondistended; Bowel sounds present  NERVOUS SYSTEM:  Alert & Oriented X3,    EXTREMITIES:  2+ Peripheral Pulses, No clubbing, cyanosis, or edema  SKIN: warm dry                          10.9   7.33  )-----------( 243      ( 16 Dec 2020 08:04 )             35.7     12-16    139  |  104  |  20<H>  ----------------------------<  107<H>  4.0   |  25  |  1.31<H>    Ca    8.9      16 Dec 2020 08:04  Phos  2.7     12-16  Mg     1.9     12-16    TPro  5.5<L>  /  Alb  2.9<L>  /  TBili  x   /  DBili  x   /  AST  x   /  ALT  x   /  AlkPhos  x   12-15    LIVER FUNCTIONS - ( 15 Dec 2020 11:01 )  Alb: 2.9 g/dL / Pro: 5.5 g/dL / ALK PHOS: x     / ALT: x     / AST: x     / GGT: x           CARDIAC MARKERS ( 16 Dec 2020 08:04 )  0.059 ng/mL / x     / x     / x     / x                Culture - Blood (collected 14 Dec 2020 18:59)  Source: .Blood Blood-Peripheral  Preliminary Report (15 Dec 2020 19:01):    No growth to date.    Culture - Blood (collected 14 Dec 2020 18:58)  Source: .Blood Blood-Peripheral  Preliminary Report (15 Dec 2020 19:01):    No growth to date.        I&O's Summary      CAPILLARY BLOOD GLUCOSE      POCT Blood Glucose.: 162 mg/dL (17 Dec 2020 07:55)    CAPILLARY BLOOD GLUCOSE      POCT Blood Glucose.: 162 mg/dL (17 Dec 2020 07:55)  POCT Blood Glucose.: 193 mg/dL (16 Dec 2020 21:30)  POCT Blood Glucose.: 266 mg/dL (16 Dec 2020 16:46)  POCT Blood Glucose.: 299 mg/dL (16 Dec 2020 11:46)      RADIOLOGY & ADDITIONAL TESTS:                   PGY-1 Progress Note discussed with attending    PAGER #: [[phone]] TILL 5:00 PM  PLEASE CONTACT ON CALL TEAM:   - On Call Team (Please refer to Agustina) FROM 5:00 PM - 8:30PM  - Nightfloat Team FROM 8:30 -7:30 AM    CHIEF COMPLAINT & BRIEF HOSPITAL COURSE:  89 y/o female Rivera Speaking, with PMHx of asthma, HTN, DM p/w c/o shortness of breath and diarrhea, fever, chills x 1 days. Pt's grandson is positive for COVID-19. Admitted for COVID PCR + and Ab negative, CXR w/out consolidation or pleural effusion. Pt started on Decadron 12/14 for wheezing. Did NOT qualify for remdesivir per ID Dr. Del Angel as creatinine clearance was low. Saturating well on room air. BCx NGTD.    INTERVAL HPI/OVERNIGHT EVENTS:    # [id number] Mars   Patient continues to be wheezing on Exam and developing low grade fever T max 100.4, on day 4 steroid. Changed rescue inhaler to around the clock. No complaints of SOB but is tearful on exam and asking for more food. States she hasnt been fed but the lunch tray is in front of her with eaten food. Family called and will be bringing home cooked meal again today. She was given meal yesterday and ate it. Likely an element of delirium combined with underlying dementia with illness and hospitalization. Pulmonology to see patient today.      REVIEW OF SYSTEMS:  CONSTITUTIONAL: + low fever, weight loss, or fatigue + malaise  RESPIRATORY: + cough, + wheezing, + chills No hemoptysis; No shortness of breath  CARDIOVASCULAR: No chest pain, palpitations, dizziness, or leg swelling  GASTROINTESTINAL: No abdominal pain. No nausea, vomiting, or hematemesis; No diarrhea or constipation. No melena or hematochezia.  GENITOURINARY: No dysuria or hematuria, urinary frequency  NEUROLOGICAL: No headaches, memory loss, loss of strength, numbness, or tremors  SKIN: No itching, burning, rashes, or lesions     MEDICATIONS  (STANDING):  ascorbic acid 500 milliGRAM(s) Oral daily  budesonide  80 MICROgram(s)/formoterol 4.5 MICROgram(s) Inhaler 2 Puff(s) Inhalation two times a day  cholecalciferol 1000 Unit(s) Oral daily  dexAMETHasone  Injectable 6 milliGRAM(s) IV Push daily  ferrous    sulfate 325 milliGRAM(s) Oral daily  gabapentin 300 milliGRAM(s) Oral every 12 hours  glucagon  Injectable 1 milliGRAM(s) IntraMuscular once  heparin   Injectable 5000 Unit(s) SubCutaneous every 8 hours  insulin glargine Injectable (LANTUS) 10 Unit(s) SubCutaneous at bedtime  insulin lispro (ADMELOG) corrective regimen sliding scale   SubCutaneous three times a day before meals  loratadine 10 milliGRAM(s) Oral daily  multivitamin 1 Tablet(s) Oral daily  pantoprazole    Tablet 40 milliGRAM(s) Oral before breakfast  sodium chloride 0.9%. 1000 milliLiter(s) (50 mL/Hr) IV Continuous <Continuous>  zinc sulfate 220 milliGRAM(s) Oral daily    MEDICATIONS  (PRN):  ALBUTerol    90 MICROgram(s) HFA Inhaler 2 Puff(s) Inhalation every 6 hours PRN Bronchospasm      Vital Signs Last 24 Hrs  T(C): 38 (17 Dec 2020 08:09), Max: 38 (17 Dec 2020 08:09)  T(F): 100.4 (17 Dec 2020 08:09), Max: 100.4 (17 Dec 2020 08:09)  HR: 89 (17 Dec 2020 08:09) (78 - 90)  BP: 144/58 (17 Dec 2020 08:09) (112/50 - 144/58)  BP(mean): --  RR: 18 (17 Dec 2020 08:09) (18 - 20)  SpO2: 95% (17 Dec 2020 08:09) (95% - 99%)    PHYSICAL EXAMINATION:  GENERAL: NAD, overweight, tearful, breakfast plate in front of patient with consumed food  HEAD:  Atraumatic, Normocephalic  EYES:  conjunctiva and sclera clear  NECK: Supple, No JVD,   CHEST/LUNG: Diffuse wheezing throughout lung fields; No rales, rhonci  HEART: Regular rate and rhythm; No murmurs, rubs, or gallops  ABDOMEN: Soft, Nontender, Nondistended; Bowel sounds present  NERVOUS SYSTEM:  Unable to ascertain orientation as patient not responding to questions appropriately, keeps stating that she has not been fed. Re-assured patient she has food and family will bring food.  EXTREMITIES:  2+ Peripheral Pulses, No clubbing, cyanosis, or edema  SKIN: warm dry                          10.9   7.33  )-----------( 243      ( 16 Dec 2020 08:04 )             35.7     12-16    139  |  104  |  20<H>  ----------------------------<  107<H>  4.0   |  25  |  1.31<H>    Ca    8.9      16 Dec 2020 08:04  Phos  2.7     12-16  Mg     1.9     12-16    TPro  5.5<L>  /  Alb  2.9<L>  /  TBili  x   /  DBili  x   /  AST  x   /  ALT  x   /  AlkPhos  x   12-15    LIVER FUNCTIONS - ( 15 Dec 2020 11:01 )  Alb: 2.9 g/dL / Pro: 5.5 g/dL / ALK PHOS: x     / ALT: x     / AST: x     / GGT: x           CARDIAC MARKERS ( 16 Dec 2020 08:04 )  0.059 ng/mL / x     / x     / x     / x          Culture - Blood (collected 14 Dec 2020 18:59)  Source: .Blood Blood-Peripheral  Preliminary Report (15 Dec 2020 19:01):    No growth to date.    Culture - Blood (collected 14 Dec 2020 18:58)  Source: .Blood Blood-Peripheral  Preliminary Report (15 Dec 2020 19:01):    No growth to date.        I&O's Summary      CAPILLARY BLOOD GLUCOSE  POCT Blood Glucose.: 162 mg/dL (17 Dec 2020 07:55)    CAPILLARY BLOOD GLUCOSE  POCT Blood Glucose.: 162 mg/dL (17 Dec 2020 07:55)  POCT Blood Glucose.: 193 mg/dL (16 Dec 2020 21:30)  POCT Blood Glucose.: 266 mg/dL (16 Dec 2020 16:46)  POCT Blood Glucose.: 299 mg/dL (16 Dec 2020 11:46)      RADIOLOGY & ADDITIONAL TESTS:    < from: Xray Chest 1 View- PORTABLE-Urgent (12.14.20 @ 12:49) >    EXAM:  XR CHEST PORTABLE URGENT 1V                        PROCEDURE DATE:  12/14/2020        INTERPRETATION:  CLINICAL STATEMENT: Chest Pain.    TECHNIQUE: AP view of the chest.    COMPARISON: None available.    FINDINGS/  IMPRESSION:  No consolidation or pleural effusion    Heart size cannot be accurately assessed in this projection.    PRAVEENA DAMON MD; Attending Radiologist  This document has been electronically signed. Dec 14 2020  2:22PM    < end of copied text >                 PGY-1 Progress Note discussed with attending    PAGER #: [[phone]] TILL 5:00 PM  PLEASE CONTACT ON CALL TEAM:   - On Call Team (Please refer to Agustina) FROM 5:00 PM - 8:30PM  - Nightfloat Team FROM 8:30 -7:30 AM    CHIEF COMPLAINT & BRIEF HOSPITAL COURSE:  89 y/o female Rivera Speaking, with PMHx of asthma, HTN, DM p/w c/o shortness of breath and diarrhea, fever, chills x 1 days. Pt's grandson is positive for COVID-19. Admitted for COVID PCR + and Ab negative, CXR w/out consolidation or pleural effusion. Pt started on Decadron 12/14 for wheezing. Did NOT qualify for remdesivir per ID Dr. Del Angel as creatinine clearance was low. Saturating well on room air. BCx NGTD.    INTERVAL HPI/OVERNIGHT EVENTS:    # [id number] Mars   REFUSING LABS this AM. Agitated and tearful. Patient continues to be wheezing on Exam and developing low grade fever T max 100.4, on day 4 steroid. Changed rescue inhaler to around the clock. No complaints of SOB but is tearful on exam and asking for more food. States she hasn't been fed but the lunch tray is in front of her with eaten food. Family called and will be bringing home cooked meal again today. She was given meal yesterday and ate it. Likely an element of delirium combined with underlying dementia with illness and hospitalization. Pulmonology to see patient today.       REVIEW OF SYSTEMS:  CONSTITUTIONAL: + low fever, weight loss, or fatigue + malaise  RESPIRATORY: + cough, + wheezing, + chills No hemoptysis; No shortness of breath  CARDIOVASCULAR: No chest pain, palpitations, dizziness, or leg swelling  GASTROINTESTINAL: No abdominal pain. No nausea, vomiting, or hematemesis; No diarrhea or constipation. No melena or hematochezia.  GENITOURINARY: No dysuria or hematuria, urinary frequency  NEUROLOGICAL: No headaches, memory loss, loss of strength, numbness, or tremors  SKIN: No itching, burning, rashes, or lesions     MEDICATIONS  (STANDING):  ascorbic acid 500 milliGRAM(s) Oral daily  budesonide  80 MICROgram(s)/formoterol 4.5 MICROgram(s) Inhaler 2 Puff(s) Inhalation two times a day  cholecalciferol 1000 Unit(s) Oral daily  dexAMETHasone  Injectable 6 milliGRAM(s) IV Push daily  ferrous    sulfate 325 milliGRAM(s) Oral daily  gabapentin 300 milliGRAM(s) Oral every 12 hours  glucagon  Injectable 1 milliGRAM(s) IntraMuscular once  heparin   Injectable 5000 Unit(s) SubCutaneous every 8 hours  insulin glargine Injectable (LANTUS) 10 Unit(s) SubCutaneous at bedtime  insulin lispro (ADMELOG) corrective regimen sliding scale   SubCutaneous three times a day before meals  loratadine 10 milliGRAM(s) Oral daily  multivitamin 1 Tablet(s) Oral daily  pantoprazole    Tablet 40 milliGRAM(s) Oral before breakfast  sodium chloride 0.9%. 1000 milliLiter(s) (50 mL/Hr) IV Continuous <Continuous>  zinc sulfate 220 milliGRAM(s) Oral daily    MEDICATIONS  (PRN):  ALBUTerol    90 MICROgram(s) HFA Inhaler 2 Puff(s) Inhalation every 6 hours PRN Bronchospasm      Vital Signs Last 24 Hrs  T(C): 38 (17 Dec 2020 08:09), Max: 38 (17 Dec 2020 08:09)  T(F): 100.4 (17 Dec 2020 08:09), Max: 100.4 (17 Dec 2020 08:09)  HR: 89 (17 Dec 2020 08:09) (78 - 90)  BP: 144/58 (17 Dec 2020 08:09) (112/50 - 144/58)  BP(mean): --  RR: 18 (17 Dec 2020 08:09) (18 - 20)  SpO2: 95% (17 Dec 2020 08:09) (95% - 99%)    PHYSICAL EXAMINATION:  GENERAL: NAD, overweight, tearful, breakfast plate in front of patient with consumed food  HEAD:  Atraumatic, Normocephalic  EYES:  conjunctiva and sclera clear  NECK: Supple, No JVD,   CHEST/LUNG: Diffuse wheezing throughout lung fields; No rales, rhonci  HEART: Regular rate and rhythm; No murmurs, rubs, or gallops  ABDOMEN: Soft, Nontender, Nondistended; Bowel sounds present  NERVOUS SYSTEM:  Unable to ascertain orientation as patient not responding to questions appropriately, keeps stating that she has not been fed. Re-assured patient she has food and family will bring food.  EXTREMITIES:  2+ Peripheral Pulses, No clubbing, cyanosis, or edema  SKIN: warm dry                          10.9   7.33  )-----------( 243      ( 16 Dec 2020 08:04 )             35.7     12-16    139  |  104  |  20<H>  ----------------------------<  107<H>  4.0   |  25  |  1.31<H>    Ca    8.9      16 Dec 2020 08:04  Phos  2.7     12-16  Mg     1.9     12-16    TPro  5.5<L>  /  Alb  2.9<L>  /  TBili  x   /  DBili  x   /  AST  x   /  ALT  x   /  AlkPhos  x   12-15    LIVER FUNCTIONS - ( 15 Dec 2020 11:01 )  Alb: 2.9 g/dL / Pro: 5.5 g/dL / ALK PHOS: x     / ALT: x     / AST: x     / GGT: x           CARDIAC MARKERS ( 16 Dec 2020 08:04 )  0.059 ng/mL / x     / x     / x     / x          Culture - Blood (collected 14 Dec 2020 18:59)  Source: .Blood Blood-Peripheral  Preliminary Report (15 Dec 2020 19:01):    No growth to date.    Culture - Blood (collected 14 Dec 2020 18:58)  Source: .Blood Blood-Peripheral  Preliminary Report (15 Dec 2020 19:01):    No growth to date.        I&O's Summary      CAPILLARY BLOOD GLUCOSE  POCT Blood Glucose.: 162 mg/dL (17 Dec 2020 07:55)    CAPILLARY BLOOD GLUCOSE  POCT Blood Glucose.: 162 mg/dL (17 Dec 2020 07:55)  POCT Blood Glucose.: 193 mg/dL (16 Dec 2020 21:30)  POCT Blood Glucose.: 266 mg/dL (16 Dec 2020 16:46)  POCT Blood Glucose.: 299 mg/dL (16 Dec 2020 11:46)      RADIOLOGY & ADDITIONAL TESTS:    < from: Xray Chest 1 View- PORTABLE-Urgent (12.14.20 @ 12:49) >    EXAM:  XR CHEST PORTABLE URGENT 1V                        PROCEDURE DATE:  12/14/2020        INTERPRETATION:  CLINICAL STATEMENT: Chest Pain.    TECHNIQUE: AP view of the chest.    COMPARISON: None available.    FINDINGS/  IMPRESSION:  No consolidation or pleural effusion    Heart size cannot be accurately assessed in this projection.    PRAVEENA DAMON MD; Attending Radiologist  This document has been electronically signed. Dec 14 2020  2:22PM    < end of copied text >

## 2020-12-17 NOTE — PROGRESS NOTE ADULT - PROBLEM SELECTOR PLAN 5
Hb is 10.9  which is lower than her baseline 12 from July  She is taking iron supplement, will continue Hb is 10.9  which is lower than her baseline 12 from July  She is taking iron supplement, will continue  - c/w home med

## 2020-12-17 NOTE — PROGRESS NOTE ADULT - PROBLEM SELECTOR PLAN 7
PAtient takes Novolog 70/30 40 U BID , Metformin and Glipizide  - blood sugar is elevated  - C/W sliding scale , Lantus 10U at night,   - Will hold pre meal insulin for now   - Diabetic diet  - A1C 7.2 Patient takes Novolog 70/30 40 U BID , Metformin and Glipizide  - blood sugar is elevated  - C/W sliding scale , Lantus 10U at night,   - Will hold pre meal insulin for now   - Diabetic diet  - A1C 7.2

## 2020-12-18 LAB
ANION GAP SERPL CALC-SCNC: 10 MMOL/L — SIGNIFICANT CHANGE UP (ref 5–17)
BUN SERPL-MCNC: 23 MG/DL — HIGH (ref 7–18)
CALCIUM SERPL-MCNC: 9.1 MG/DL — SIGNIFICANT CHANGE UP (ref 8.4–10.5)
CHLORIDE SERPL-SCNC: 100 MMOL/L — SIGNIFICANT CHANGE UP (ref 96–108)
CO2 SERPL-SCNC: 25 MMOL/L — SIGNIFICANT CHANGE UP (ref 22–31)
CREAT SERPL-MCNC: 1.36 MG/DL — HIGH (ref 0.5–1.3)
GLUCOSE BLDC GLUCOMTR-MCNC: 169 MG/DL — HIGH (ref 70–99)
GLUCOSE BLDC GLUCOMTR-MCNC: 259 MG/DL — HIGH (ref 70–99)
GLUCOSE BLDC GLUCOMTR-MCNC: 371 MG/DL — HIGH (ref 70–99)
GLUCOSE BLDC GLUCOMTR-MCNC: 384 MG/DL — HIGH (ref 70–99)
GLUCOSE SERPL-MCNC: 153 MG/DL — HIGH (ref 70–99)
HCT VFR BLD CALC: 40.2 % — SIGNIFICANT CHANGE UP (ref 34.5–45)
HGB BLD-MCNC: 12.3 G/DL — SIGNIFICANT CHANGE UP (ref 11.5–15.5)
MAGNESIUM SERPL-MCNC: 1.7 MG/DL — SIGNIFICANT CHANGE UP (ref 1.6–2.6)
MCHC RBC-ENTMCNC: 25.8 PG — LOW (ref 27–34)
MCHC RBC-ENTMCNC: 30.6 GM/DL — LOW (ref 32–36)
MCV RBC AUTO: 84.5 FL — SIGNIFICANT CHANGE UP (ref 80–100)
NRBC # BLD: 0 /100 WBCS — SIGNIFICANT CHANGE UP (ref 0–0)
PHOSPHATE SERPL-MCNC: 2.6 MG/DL — SIGNIFICANT CHANGE UP (ref 2.5–4.5)
PLATELET # BLD AUTO: 267 K/UL — SIGNIFICANT CHANGE UP (ref 150–400)
POTASSIUM SERPL-MCNC: 4.2 MMOL/L — SIGNIFICANT CHANGE UP (ref 3.5–5.3)
POTASSIUM SERPL-SCNC: 4.2 MMOL/L — SIGNIFICANT CHANGE UP (ref 3.5–5.3)
RBC # BLD: 4.76 M/UL — SIGNIFICANT CHANGE UP (ref 3.8–5.2)
RBC # FLD: 14.6 % — HIGH (ref 10.3–14.5)
SODIUM SERPL-SCNC: 135 MMOL/L — SIGNIFICANT CHANGE UP (ref 135–145)
WBC # BLD: 6.92 K/UL — SIGNIFICANT CHANGE UP (ref 3.8–10.5)
WBC # FLD AUTO: 6.92 K/UL — SIGNIFICANT CHANGE UP (ref 3.8–10.5)

## 2020-12-18 PROCEDURE — 71045 X-RAY EXAM CHEST 1 VIEW: CPT | Mod: 26

## 2020-12-18 RX ORDER — MAGNESIUM SULFATE 500 MG/ML
1 VIAL (ML) INJECTION ONCE
Refills: 0 | Status: COMPLETED | OUTPATIENT
Start: 2020-12-18 | End: 2020-12-18

## 2020-12-18 RX ORDER — AZITHROMYCIN 500 MG/1
500 TABLET, FILM COATED ORAL EVERY 24 HOURS
Refills: 0 | Status: DISCONTINUED | OUTPATIENT
Start: 2020-12-18 | End: 2020-12-18

## 2020-12-18 RX ORDER — SODIUM,POTASSIUM PHOSPHATES 278-250MG
1 POWDER IN PACKET (EA) ORAL THREE TIMES A DAY
Refills: 0 | Status: DISCONTINUED | OUTPATIENT
Start: 2020-12-18 | End: 2021-01-04

## 2020-12-18 RX ORDER — INSULIN LISPRO 100/ML
3 VIAL (ML) SUBCUTANEOUS
Refills: 0 | Status: DISCONTINUED | OUTPATIENT
Start: 2020-12-18 | End: 2020-12-19

## 2020-12-18 RX ORDER — ALPRAZOLAM 0.25 MG
0.25 TABLET ORAL ONCE
Refills: 0 | Status: DISCONTINUED | OUTPATIENT
Start: 2020-12-18 | End: 2020-12-18

## 2020-12-18 RX ORDER — CEFTRIAXONE 500 MG/1
1000 INJECTION, POWDER, FOR SOLUTION INTRAMUSCULAR; INTRAVENOUS EVERY 24 HOURS
Refills: 0 | Status: DISCONTINUED | OUTPATIENT
Start: 2020-12-18 | End: 2020-12-18

## 2020-12-18 RX ADMIN — Medication 0.25 MILLIGRAM(S): at 10:38

## 2020-12-18 RX ADMIN — LORATADINE 10 MILLIGRAM(S): 10 TABLET ORAL at 13:52

## 2020-12-18 RX ADMIN — BUDESONIDE AND FORMOTEROL FUMARATE DIHYDRATE 2 PUFF(S): 160; 4.5 AEROSOL RESPIRATORY (INHALATION) at 22:23

## 2020-12-18 RX ADMIN — Medication 3 UNIT(S): at 17:15

## 2020-12-18 RX ADMIN — INSULIN GLARGINE 10 UNIT(S): 100 INJECTION, SOLUTION SUBCUTANEOUS at 22:22

## 2020-12-18 RX ADMIN — HEPARIN SODIUM 5000 UNIT(S): 5000 INJECTION INTRAVENOUS; SUBCUTANEOUS at 22:21

## 2020-12-18 RX ADMIN — PANTOPRAZOLE SODIUM 40 MILLIGRAM(S): 20 TABLET, DELAYED RELEASE ORAL at 06:13

## 2020-12-18 RX ADMIN — HEPARIN SODIUM 5000 UNIT(S): 5000 INJECTION INTRAVENOUS; SUBCUTANEOUS at 06:13

## 2020-12-18 RX ADMIN — Medication 5: at 17:15

## 2020-12-18 RX ADMIN — AZITHROMYCIN 255 MILLIGRAM(S): 500 TABLET, FILM COATED ORAL at 13:50

## 2020-12-18 RX ADMIN — GABAPENTIN 300 MILLIGRAM(S): 400 CAPSULE ORAL at 18:13

## 2020-12-18 RX ADMIN — Medication 1 TABLET(S): at 13:51

## 2020-12-18 RX ADMIN — HEPARIN SODIUM 5000 UNIT(S): 5000 INJECTION INTRAVENOUS; SUBCUTANEOUS at 13:56

## 2020-12-18 RX ADMIN — Medication 6 MILLIGRAM(S): at 06:23

## 2020-12-18 RX ADMIN — Medication 5: at 12:07

## 2020-12-18 RX ADMIN — ALBUTEROL 2 PUFF(S): 90 AEROSOL, METERED ORAL at 03:00

## 2020-12-18 RX ADMIN — MONTELUKAST 10 MILLIGRAM(S): 4 TABLET, CHEWABLE ORAL at 15:13

## 2020-12-18 RX ADMIN — GABAPENTIN 300 MILLIGRAM(S): 400 CAPSULE ORAL at 06:23

## 2020-12-18 RX ADMIN — Medication 1000 UNIT(S): at 13:53

## 2020-12-18 RX ADMIN — Medication 1: at 08:24

## 2020-12-18 RX ADMIN — ALBUTEROL 2 PUFF(S): 90 AEROSOL, METERED ORAL at 09:56

## 2020-12-18 RX ADMIN — ALBUTEROL 2 PUFF(S): 90 AEROSOL, METERED ORAL at 15:14

## 2020-12-18 RX ADMIN — Medication 325 MILLIGRAM(S): at 13:50

## 2020-12-18 RX ADMIN — ALBUTEROL 2 PUFF(S): 90 AEROSOL, METERED ORAL at 22:25

## 2020-12-18 RX ADMIN — Medication 100 GRAM(S): at 17:16

## 2020-12-18 RX ADMIN — BUDESONIDE AND FORMOTEROL FUMARATE DIHYDRATE 2 PUFF(S): 160; 4.5 AEROSOL RESPIRATORY (INHALATION) at 10:45

## 2020-12-18 RX ADMIN — CEFTRIAXONE 100 MILLIGRAM(S): 500 INJECTION, POWDER, FOR SOLUTION INTRAMUSCULAR; INTRAVENOUS at 13:50

## 2020-12-18 RX ADMIN — ZINC SULFATE TAB 220 MG (50 MG ZINC EQUIVALENT) 220 MILLIGRAM(S): 220 (50 ZN) TAB at 13:51

## 2020-12-18 RX ADMIN — Medication 500 MILLIGRAM(S): at 13:55

## 2020-12-18 RX ADMIN — Medication 1 TABLET(S): at 22:26

## 2020-12-18 NOTE — PROGRESS NOTE ADULT - SUBJECTIVE AND OBJECTIVE BOX
[facility]   MD FAUSTO PALM MD RUORU WONG, PA    TEL:  OFFICE: [phone]  DR LEE CELL: [phone]  CHARLEY WILLIAM CELL: [phone]  DR. GARCIA CELL: [phone]  DR. MARTIN CELL: [phone]    FROM 5 PM - 7 AM PLEASE CALL ANSWERING SERVICE: [phone]    RENAL FOLLOW UP NOTE--Date of Service 12-18-20 @ 14:04  --------------------------------------------------------------------------------  HPI:      Pt covid 19+    PAST HISTORY  --------------------------------------------------------------------------------  No significant changes to PMH, PSH, FHx, SHx, unless otherwise noted    ALLERGIES & MEDICATIONS  --------------------------------------------------------------------------------  Allergies    No Known Allergies    Intolerances      Standing Inpatient Medications  ALBUTerol    90 MICROgram(s) HFA Inhaler 2 Puff(s) Inhalation every 6 hours  ascorbic acid 500 milliGRAM(s) Oral daily  azithromycin  IVPB 500 milliGRAM(s) IV Intermittent every 24 hours  budesonide  80 MICROgram(s)/formoterol 4.5 MICROgram(s) Inhaler 2 Puff(s) Inhalation two times a day  cefTRIAXone   IVPB 1000 milliGRAM(s) IV Intermittent every 24 hours  cholecalciferol 1000 Unit(s) Oral daily  dexAMETHasone  Injectable 6 milliGRAM(s) IV Push daily  ferrous    sulfate 325 milliGRAM(s) Oral daily  gabapentin 300 milliGRAM(s) Oral every 12 hours  heparin   Injectable 5000 Unit(s) SubCutaneous every 8 hours  insulin glargine Injectable (LANTUS) 10 Unit(s) SubCutaneous at bedtime  insulin lispro (ADMELOG) corrective regimen sliding scale   SubCutaneous three times a day before meals  loratadine 10 milliGRAM(s) Oral daily  montelukast 10 milliGRAM(s) Oral daily  multivitamin 1 Tablet(s) Oral daily  pantoprazole    Tablet 40 milliGRAM(s) Oral before breakfast  sodium chloride 0.9%. 1000 milliLiter(s) IV Continuous <Continuous>  zinc sulfate 220 milliGRAM(s) Oral daily    PRN Inpatient Medications  acetaminophen   Tablet .. 650 milliGRAM(s) Oral every 6 hours PRN      REVIEW OF SYSTEMS  --------------------------------------------------------------------------------  General: no fever    MSK: no edema     VITALS/PHYSICAL EXAM  --------------------------------------------------------------------------------  T(C): 37.7 (12-18-20 @ 11:12), Max: 37.7 (12-18-20 @ 11:12)  HR: 91 (12-18-20 @ 11:12) (68 - 91)  BP: 104/48 (12-18-20 @ 11:12) (104/48 - 153/74)  RR: 18 (12-18-20 @ 11:12) (17 - 22)  SpO2: 95% (12-18-20 @ 11:12) (95% - 97%)  Wt(kg): --        LABS/STUDIES  --------------------------------------------------------------------------------              12.3   6.92  >-----------<  267      [12-18-20 @ 08:19]              40.2     135  |  100  |  23  ----------------------------<  153      [12-18-20 @ 08:19]  4.2   |  25  |  1.36        Ca     9.1     [12-18-20 @ 08:19]      Mg     1.7     [12-18-20 @ 08:19]      Phos  2.6     [12-18-20 @ 08:19]            Creatinine Trend:  SCr 1.36 [12-18 @ 08:19]  SCr 1.31 [12-16 @ 08:04]  SCr 1.36 [12-15 @ 07:46]  SCr 1.61 [12-14 @ 13:31]      Urine Creatinine 20      [12-14-20 @ 17:02]  Urine Sodium 41      [12-14-20 @ 17:02]  Urine Osmolality 192      [12-14-20 @ 17:02]    Iron 24, TIBC 264, %sat 9      [12-15-20 @ 07:46]  Ferritin 116      [12-15-20 @ 10:25]  Vitamin D (25OH) 36.3      [07-18-20 @ 11:35]  TSH 0.38      [12-15-20 @ 07:46]  Lipid: chol 139, , HDL 46, LDL --      [12-15-20 @ 07:46]      Immunofixation Serum:   No Monoclonal Band Identified    Reference Range: None Detected      [12-15-20 @ 11:01]  SPEP Interpretation: Normal Electrophoresis Pattern      [12-15-20 @ 11:01]

## 2020-12-18 NOTE — PROGRESS NOTE ADULT - PROBLEM SELECTOR PLAN 7
Patient takes Novolog 70/30 40 U BID , Metformin and Glipizide  - blood sugar is elevated  - C/W sliding scale , Lantus 10U at night,   - added pre-meal admelog 3U TID on 12/18 for elevated daytime sugars  - Diabetic diet  - A1C 7.2

## 2020-12-18 NOTE — PROGRESS NOTE ADULT - PROBLEM SELECTOR PLAN 6
- Continue Albuterol, incr PRN to standing on 12/17  - Dexamethasone for covid infection  - added Montelukast 12/17 with improvement in wheezing  - Pulm Dr. Templeton/Sachin consulted 12/16; pending recs  - f/u CT chest

## 2020-12-18 NOTE — PROGRESS NOTE ADULT - SUBJECTIVE AND OBJECTIVE BOX
PGY-1 Progress Note discussed with attending    PAGER #: [[phone]] TILL 5:00 PM  PLEASE CONTACT ON CALL TEAM:   - On Call Team (Please refer to Agustina) FROM 5:00 PM - 8:30PM  - Nightfloat Team FROM 8:30 -7:30 AM    CHIEF COMPLAINT & BRIEF HOSPITAL COURSE:  89 y/o female Rivera Speaking, with PMHx of asthma, HTN, DM p/w c/o shortness of breath and diarrhea, fever, chills x 1 days. Pt's grandson is positive for COVID-19. Admitted for COVID PCR + and Ab negative, CXR w/out consolidation or pleural effusion. Pt started on Decadron 12/14 for wheezing. Did NOT qualify for remdesivir per ID Dr. Del Angel as creatinine clearance was low. Saturating well on room air. BCx NGTD. Repeat CXR on 12/18 shows b/l opacities at lung bases, slightly progressed on left. Plan for CT chest to eval infiltrate. 3 episodes of loose stools prior to any Abx, sent for C.diff 12/18.    INTERVAL HPI/OVERNIGHT EVENTS:   T max 99.9 on day 5 steroid. No wheezing on exam today, comfortable on RA saturating well. Vital signs stable. 3 episodes of diarrhea today will send for C.diff and place on isolation. Pending CT chest non-contrast today. BG elevated during day, incr pre-meal admelog to 3U TID.    REVIEW OF SYSTEMS:  CONSTITUTIONAL: + sub-febrile (99.9), + anxiety  RESPIRATORY: + cough, wheezing (resolved); No shortness of breath  CARDIOVASCULAR: No chest pain, palpitations, dizziness, or leg swelling  GASTROINTESTINAL: No abdominal pain. No nausea, vomiting, or hematemesis; No diarrhea or constipation. No melena or hematochezia.  GENITOURINARY: No dysuria or hematuria, urinary frequency  NEUROLOGICAL: No headaches, memory loss, loss of strength, numbness, or tremors  SKIN: No itching, burning, rashes, or lesions     MEDICATIONS  (STANDING):  ALBUTerol    90 MICROgram(s) HFA Inhaler 2 Puff(s) Inhalation every 6 hours  ascorbic acid 500 milliGRAM(s) Oral daily  budesonide  80 MICROgram(s)/formoterol 4.5 MICROgram(s) Inhaler 2 Puff(s) Inhalation two times a day  cholecalciferol 1000 Unit(s) Oral daily  dexAMETHasone  Injectable 6 milliGRAM(s) IV Push daily  ferrous    sulfate 325 milliGRAM(s) Oral daily  gabapentin 300 milliGRAM(s) Oral every 12 hours  heparin   Injectable 5000 Unit(s) SubCutaneous every 8 hours  insulin glargine Injectable (LANTUS) 10 Unit(s) SubCutaneous at bedtime  insulin lispro (ADMELOG) corrective regimen sliding scale   SubCutaneous three times a day before meals  loratadine 10 milliGRAM(s) Oral daily  montelukast 10 milliGRAM(s) Oral daily  multivitamin 1 Tablet(s) Oral daily  pantoprazole    Tablet 40 milliGRAM(s) Oral before breakfast  sodium chloride 0.9%. 1000 milliLiter(s) (50 mL/Hr) IV Continuous <Continuous>  zinc sulfate 220 milliGRAM(s) Oral daily    MEDICATIONS  (PRN):  acetaminophen   Tablet .. 650 milliGRAM(s) Oral every 6 hours PRN Temp greater or equal to 38C (100.4F), Mild Pain (1 - 3)      Vital Signs Last 24 Hrs  T(C): 37.7 (18 Dec 2020 11:12), Max: 37.7 (18 Dec 2020 11:12)  T(F): 99.9 (18 Dec 2020 11:12), Max: 99.9 (18 Dec 2020 11:12)  HR: 91 (18 Dec 2020 11:12) (68 - 91)  BP: 104/48 (18 Dec 2020 11:12) (104/48 - 153/74)  BP(mean): --  RR: 18 (18 Dec 2020 11:12) (17 - 22)  SpO2: 95% (18 Dec 2020 11:12) (95% - 97%)    PHYSICAL EXAMINATION:  GENERAL: NAD, overweight, tearful,   HEAD:  Atraumatic, Normocephalic  EYES:  conjunctiva and sclera clear  NECK: Supple, No JVD,   CHEST/LUNG: Diffuse wheezing throughout lung fields; No rales, rhonci  HEART: Regular rate and rhythm; No murmurs, rubs, or gallops  ABDOMEN: Soft, Nontender, Nondistended; Bowel sounds present  NERVOUS SYSTEM:  Unable to ascertain orientation as patient not responding to questions appropriately, keeps stating that she has not been fed. Re-assured patient she has food and family will bring food.  EXTREMITIES:  2+ Peripheral Pulses, No clubbing, cyanosis, or edema  SKIN: warm dry                        12.3   6.92  )-----------( 267      ( 18 Dec 2020 08:19 )             40.2     12-18    135  |  100  |  23<H>  ----------------------------<  153<H>  4.2   |  25  |  1.36<H>    Ca    9.1      18 Dec 2020 08:19  Phos  2.6     12-18  Mg     1.7     12-18    I&O's Summary      CAPILLARY BLOOD GLUCOSE  POCT Blood Glucose.: 371 mg/dL (18 Dec 2020 11:27)    CAPILLARY BLOOD GLUCOSE  POCT Blood Glucose.: 371 mg/dL (18 Dec 2020 11:27)  POCT Blood Glucose.: 169 mg/dL (18 Dec 2020 07:38)  POCT Blood Glucose.: 224 mg/dL (17 Dec 2020 21:17)  POCT Blood Glucose.: 343 mg/dL (17 Dec 2020 16:42)      RADIOLOGY & ADDITIONAL TESTS:    < from: Xray Chest 1 View- PORTABLE-Urgent (Xray Chest 1 View- PORTABLE-Urgent .) (12.18.20 @ 11:50) >    EXAM:  XR CHEST PORTABLE URGENT 1V                        PROCEDURE DATE:  12/18/2020      INTERPRETATION:  CLINICAL STATEMENT: Follow-up chest pain.    TECHNIQUE: AP view of the chest.    COMPARISON: 12/14/2020    FINDINGS/  IMPRESSION:  Mild opacities lung bases slightly progressed on the left.    No pleural effusion    Heart size cannot be accurately assessed in this projection.    Mildly prominent right hilum which may be related to pulmonary vessel      PRAVEENA DAMON MD; Attending Radiologist  This document has been electronically signed. Dec 18 2020  2:00PM    < end of copied text >

## 2020-12-18 NOTE — PROGRESS NOTE ADULT - PROBLEM SELECTOR PLAN 1
Patient is coming with fever chills , diarrhea since 1 day,  - Sick contacts at home,   - CXR is negative for pleural effusion, infiltrates or consolidation  - patient is saturating 98% on room air, however, her saturation drops when she is agitated  - No chest pain  - s/p 1 dose of Dexamethasone in ED  - c/w IV Dexa 6mg for 10 doses (started 12/14 - 12/24)  - CrCL <30, Patient wont qualify for Remdesivir as per Dr Del Angel   - COVID Ab negative  - stable on Room Air  - CXR on 12/18 with mild opacities lung bases slightly progressed on the left.  - CT chest ordered 12/18

## 2020-12-18 NOTE — PROGRESS NOTE ADULT - PROBLEM SELECTOR PLAN 2
Patient has history of Hypertension and diabetes   - Coming with mildly elevated troponin. however, denies chest pain,  - CHELO score 3, ( 1 for age>65, Aspirin use, positive cardiac marker)  - Likely demand ischemia 2/2 covid  - admitted to tele floor   - c/w Aspirin and Statin   - Echo from July shows EF >55% and Grossly normal Systolic function, grade I diastolic dysfunction

## 2020-12-18 NOTE — PROGRESS NOTE ADULT - PROBLEM SELECTOR PLAN 3
Cr 1.6 on admission, 1.31 today. Baseline (1.180)  - GFR 36  - Ucreat 20, Brice 41, UOsmol 192  - 23/1.36 on 12/18  Nephrologist Dr Dove following  - - Scr 1.6 on admission   - - Renal function improving  - - Check UA  - - MOnitor BMP   - - AVoid further nephrotoxics, NSAIDS RCA

## 2020-12-18 NOTE — PROGRESS NOTE ADULT - PROBLEM SELECTOR PLAN 5
Hb is 10.9  which is lower than her baseline 12 from July  She is taking iron supplement, will continue  - c/w home med

## 2020-12-18 NOTE — PROGRESS NOTE ADULT - PROBLEM SELECTOR PLAN 8
RISK                                                          Points  [] Previous VTE                                           3  [] Thrombophilia                                        2  [] Lower limb paralysis                              2   [] Current Cancer                                       2   [x] Immobilization > 24 hrs                        1  [] ICU/CCU stay > 24 hours                       1  [x] Age > 60                                                   1  Improve score 2  Heparin 5000 sc q8

## 2020-12-19 LAB
ANION GAP SERPL CALC-SCNC: 13 MMOL/L — SIGNIFICANT CHANGE UP (ref 5–17)
BUN SERPL-MCNC: 25 MG/DL — HIGH (ref 7–18)
C DIFF BY PCR RESULT: SIGNIFICANT CHANGE UP
C DIFF TOX GENS STL QL NAA+PROBE: SIGNIFICANT CHANGE UP
CALCIUM SERPL-MCNC: 9.1 MG/DL — SIGNIFICANT CHANGE UP (ref 8.4–10.5)
CHLORIDE SERPL-SCNC: 98 MMOL/L — SIGNIFICANT CHANGE UP (ref 96–108)
CO2 SERPL-SCNC: 24 MMOL/L — SIGNIFICANT CHANGE UP (ref 22–31)
CREAT SERPL-MCNC: 1.46 MG/DL — HIGH (ref 0.5–1.3)
CULTURE RESULTS: SIGNIFICANT CHANGE UP
CULTURE RESULTS: SIGNIFICANT CHANGE UP
GLUCOSE BLDC GLUCOMTR-MCNC: 243 MG/DL — HIGH (ref 70–99)
GLUCOSE BLDC GLUCOMTR-MCNC: 289 MG/DL — HIGH (ref 70–99)
GLUCOSE BLDC GLUCOMTR-MCNC: 319 MG/DL — HIGH (ref 70–99)
GLUCOSE BLDC GLUCOMTR-MCNC: 326 MG/DL — HIGH (ref 70–99)
GLUCOSE SERPL-MCNC: 188 MG/DL — HIGH (ref 70–99)
HCT VFR BLD CALC: 41.5 % — SIGNIFICANT CHANGE UP (ref 34.5–45)
HGB BLD-MCNC: 12.8 G/DL — SIGNIFICANT CHANGE UP (ref 11.5–15.5)
MAGNESIUM SERPL-MCNC: 2.2 MG/DL — SIGNIFICANT CHANGE UP (ref 1.6–2.6)
MCHC RBC-ENTMCNC: 26.2 PG — LOW (ref 27–34)
MCHC RBC-ENTMCNC: 30.8 GM/DL — LOW (ref 32–36)
MCV RBC AUTO: 84.9 FL — SIGNIFICANT CHANGE UP (ref 80–100)
NRBC # BLD: 0 /100 WBCS — SIGNIFICANT CHANGE UP (ref 0–0)
PHOSPHATE SERPL-MCNC: 3.5 MG/DL — SIGNIFICANT CHANGE UP (ref 2.5–4.5)
PLATELET # BLD AUTO: 277 K/UL — SIGNIFICANT CHANGE UP (ref 150–400)
POTASSIUM SERPL-MCNC: 4.1 MMOL/L — SIGNIFICANT CHANGE UP (ref 3.5–5.3)
POTASSIUM SERPL-SCNC: 4.1 MMOL/L — SIGNIFICANT CHANGE UP (ref 3.5–5.3)
RBC # BLD: 4.89 M/UL — SIGNIFICANT CHANGE UP (ref 3.8–5.2)
RBC # FLD: 14.5 % — SIGNIFICANT CHANGE UP (ref 10.3–14.5)
SODIUM SERPL-SCNC: 135 MMOL/L — SIGNIFICANT CHANGE UP (ref 135–145)
SPECIMEN SOURCE: SIGNIFICANT CHANGE UP
SPECIMEN SOURCE: SIGNIFICANT CHANGE UP
WBC # BLD: 7.33 K/UL — SIGNIFICANT CHANGE UP (ref 3.8–10.5)
WBC # FLD AUTO: 7.33 K/UL — SIGNIFICANT CHANGE UP (ref 3.8–10.5)

## 2020-12-19 RX ORDER — SODIUM CHLORIDE 9 MG/ML
1000 INJECTION, SOLUTION INTRAVENOUS
Refills: 0 | Status: DISCONTINUED | OUTPATIENT
Start: 2020-12-19 | End: 2021-01-04

## 2020-12-19 RX ORDER — INSULIN LISPRO 100/ML
5 VIAL (ML) SUBCUTANEOUS
Refills: 0 | Status: DISCONTINUED | OUTPATIENT
Start: 2020-12-19 | End: 2020-12-21

## 2020-12-19 RX ORDER — INSULIN GLARGINE 100 [IU]/ML
16 INJECTION, SOLUTION SUBCUTANEOUS AT BEDTIME
Refills: 0 | Status: DISCONTINUED | OUTPATIENT
Start: 2020-12-19 | End: 2020-12-21

## 2020-12-19 RX ORDER — INSULIN LISPRO 100/ML
VIAL (ML) SUBCUTANEOUS AT BEDTIME
Refills: 0 | Status: DISCONTINUED | OUTPATIENT
Start: 2020-12-19 | End: 2021-01-04

## 2020-12-19 RX ORDER — GLUCAGON INJECTION, SOLUTION 0.5 MG/.1ML
1 INJECTION, SOLUTION SUBCUTANEOUS ONCE
Refills: 0 | Status: DISCONTINUED | OUTPATIENT
Start: 2020-12-19 | End: 2021-01-04

## 2020-12-19 RX ADMIN — BUDESONIDE AND FORMOTEROL FUMARATE DIHYDRATE 2 PUFF(S): 160; 4.5 AEROSOL RESPIRATORY (INHALATION) at 12:05

## 2020-12-19 RX ADMIN — Medication 4: at 12:03

## 2020-12-19 RX ADMIN — GABAPENTIN 300 MILLIGRAM(S): 400 CAPSULE ORAL at 06:41

## 2020-12-19 RX ADMIN — ALBUTEROL 2 PUFF(S): 90 AEROSOL, METERED ORAL at 08:31

## 2020-12-19 RX ADMIN — Medication 1 TABLET(S): at 06:39

## 2020-12-19 RX ADMIN — Medication 1 TABLET(S): at 12:06

## 2020-12-19 RX ADMIN — ALBUTEROL 2 PUFF(S): 90 AEROSOL, METERED ORAL at 21:29

## 2020-12-19 RX ADMIN — INSULIN GLARGINE 16 UNIT(S): 100 INJECTION, SOLUTION SUBCUTANEOUS at 21:51

## 2020-12-19 RX ADMIN — Medication 3 UNIT(S): at 17:30

## 2020-12-19 RX ADMIN — Medication 2: at 21:40

## 2020-12-19 RX ADMIN — GABAPENTIN 300 MILLIGRAM(S): 400 CAPSULE ORAL at 18:23

## 2020-12-19 RX ADMIN — Medication 325 MILLIGRAM(S): at 12:06

## 2020-12-19 RX ADMIN — HEPARIN SODIUM 5000 UNIT(S): 5000 INJECTION INTRAVENOUS; SUBCUTANEOUS at 14:00

## 2020-12-19 RX ADMIN — Medication 3 UNIT(S): at 08:31

## 2020-12-19 RX ADMIN — ALBUTEROL 2 PUFF(S): 90 AEROSOL, METERED ORAL at 03:00

## 2020-12-19 RX ADMIN — LORATADINE 10 MILLIGRAM(S): 10 TABLET ORAL at 12:07

## 2020-12-19 RX ADMIN — Medication 40 MILLIGRAM(S): at 21:32

## 2020-12-19 RX ADMIN — Medication 100 MILLIGRAM(S): at 21:37

## 2020-12-19 RX ADMIN — Medication 3: at 17:30

## 2020-12-19 RX ADMIN — Medication 1 TABLET(S): at 21:30

## 2020-12-19 RX ADMIN — Medication 2: at 08:30

## 2020-12-19 RX ADMIN — BUDESONIDE AND FORMOTEROL FUMARATE DIHYDRATE 2 PUFF(S): 160; 4.5 AEROSOL RESPIRATORY (INHALATION) at 21:29

## 2020-12-19 RX ADMIN — Medication 3 UNIT(S): at 12:03

## 2020-12-19 RX ADMIN — HEPARIN SODIUM 5000 UNIT(S): 5000 INJECTION INTRAVENOUS; SUBCUTANEOUS at 06:39

## 2020-12-19 RX ADMIN — ALBUTEROL 2 PUFF(S): 90 AEROSOL, METERED ORAL at 15:30

## 2020-12-19 RX ADMIN — ZINC SULFATE TAB 220 MG (50 MG ZINC EQUIVALENT) 220 MILLIGRAM(S): 220 (50 ZN) TAB at 12:06

## 2020-12-19 RX ADMIN — Medication 1000 UNIT(S): at 12:06

## 2020-12-19 RX ADMIN — PANTOPRAZOLE SODIUM 40 MILLIGRAM(S): 20 TABLET, DELAYED RELEASE ORAL at 06:43

## 2020-12-19 RX ADMIN — Medication 1 TABLET(S): at 18:20

## 2020-12-19 RX ADMIN — Medication 6 MILLIGRAM(S): at 06:47

## 2020-12-19 RX ADMIN — HEPARIN SODIUM 5000 UNIT(S): 5000 INJECTION INTRAVENOUS; SUBCUTANEOUS at 21:29

## 2020-12-19 RX ADMIN — Medication 500 MILLIGRAM(S): at 12:06

## 2020-12-19 RX ADMIN — MONTELUKAST 10 MILLIGRAM(S): 4 TABLET, CHEWABLE ORAL at 12:06

## 2020-12-19 NOTE — PROGRESS NOTE ADULT - SUBJECTIVE AND OBJECTIVE BOX
CHIEF COMPLAINT & BRIEF HOSPITAL COURSE:  89 y/o female Rivera Speaking, with PMHx of asthma, HTN, DM p/w c/o shortness of breath and diarrhea, fever, chills x 1 days. Pt's grandson is positive for COVID-19. Admitted for COVID PCR + and Ab negative, CXR w/out consolidation or pleural effusion. Pt started on Decadron 12/14 for wheezing. Did NOT qualify for remdesivir per ID Dr. Del Angel as creatinine clearance was low. Saturating well on room air. BCx NGTD. Repeat CXR on 12/18 shows b/l opacities at lung bases, slightly progressed on left. Plan for CT chest to eval infiltrate. 3 episodes of loose stools prior to any Abx, sent for C.diff 12/18.    INTERVAL HPI/OVERNIGHT EVENTS:   T max 99.9 on day 5 steroid. No wheezing on exam today, comfortable on RA saturating well. Vital signs stable. 3 episodes of diarrhea today will send for C.diff and place on isolation. Pending CT chest non-contrast today. BG elevated during day, incr pre-meal admelog to 3U TID.    REVIEW OF SYSTEMS:  CONSTITUTIONAL: + sub-febrile (99.9), + anxiety  RESPIRATORY: + cough, wheezing (resolved); No shortness of breath  CARDIOVASCULAR: No chest pain, palpitations, dizziness, or leg swelling  GASTROINTESTINAL: No abdominal pain. No nausea, vomiting, or hematemesis; No diarrhea or constipation. No melena or hematochezia.  GENITOURINARY: No dysuria or hematuria, urinary frequency  NEUROLOGICAL: No headaches, memory loss, loss of strength, numbness, or tremors  SKIN: No itching, burning, rashes, or lesions     T(C): 37.1 (12-19-20 @ 15:22), Max: 37.3 (12-19-20 @ 11:38)  HR: 86 (12-19-20 @ 19:39) (79 - 89)  BP: 137/58 (12-19-20 @ 19:39) (120/47 - 137/58)  RR: 18 (12-19-20 @ 19:39) (18 - 20)  SpO2: 93% (12-19-20 @ 19:39) (93% - 96%)        MEDICATIONS  (STANDING):  ALBUTerol    90 MICROgram(s) HFA Inhaler 2 Puff(s) Inhalation every 6 hours  ascorbic acid 500 milliGRAM(s) Oral daily  benzonatate 100 milliGRAM(s) Oral every 8 hours  budesonide  80 MICROgram(s)/formoterol 4.5 MICROgram(s) Inhaler 2 Puff(s) Inhalation two times a day  cholecalciferol 1000 Unit(s) Oral daily  dextrose 5%. 1000 milliLiter(s) (100 mL/Hr) IV Continuous <Continuous>  ferrous    sulfate 325 milliGRAM(s) Oral daily  gabapentin 300 milliGRAM(s) Oral every 12 hours  glucagon  Injectable 1 milliGRAM(s) IntraMuscular once  heparin   Injectable 5000 Unit(s) SubCutaneous every 8 hours  insulin glargine Injectable (LANTUS) 16 Unit(s) SubCutaneous at bedtime  insulin lispro (ADMELOG) corrective regimen sliding scale   SubCutaneous at bedtime  insulin lispro (ADMELOG) corrective regimen sliding scale   SubCutaneous three times a day before meals  insulin lispro Injectable (ADMELOG) 5 Unit(s) SubCutaneous three times a day before meals  loratadine 10 milliGRAM(s) Oral daily  methylPREDNISolone sodium succinate Injectable 40 milliGRAM(s) IV Push every 8 hours  montelukast 10 milliGRAM(s) Oral daily  multivitamin 1 Tablet(s) Oral daily  pantoprazole    Tablet 40 milliGRAM(s) Oral before breakfast  potassium phosphate / sodium phosphate Tablet (K-PHOS No. 2) 1 Tablet(s) Oral three times a day  sodium chloride 0.9%. 1000 milliLiter(s) (50 mL/Hr) IV Continuous <Continuous>  zinc sulfate 220 milliGRAM(s) Oral daily    MEDICATIONS  (PRN):  acetaminophen   Tablet .. 650 milliGRAM(s) Oral every 6 hours PRN Temp greater or equal to 38C (100.4F), Mild Pain (1 - 3)  PHYSICAL EXAMINATION:  GENERAL: NAD, overweight, tearful,   HEAD:  Atraumatic, Normocephalic  EYES:  conjunctiva and sclera clear  NECK: Supple, No JVD,   CHEST/LUNG: Diffuse wheezing throughout lung fields; No rales, rhonci  HEART: Regular rate and rhythm; No murmurs, rubs, or gallops  ABDOMEN: Soft, Nontender, Nondistended; Bowel sounds present  NERVOUS SYSTEM:  Unable to ascertain orientation as patient not responding to questions appropriately, keeps stating that she has not been fed. Re-assured patient she has food and family will bring food.  EXTREMITIES:  2+ Peripheral Pulses, No clubbing, cyanosis, or edema  SKIN: warm dry                                             12.8   7.33  )-----------( 277      ( 19 Dec 2020 08:21 )             41.5               135|98|25<188  4.1|24|1.46  9.1,2.2,3.5  12-19 @ 08:21  RADIOLOGY & ADDITIONAL TESTS:    < from: Xray Chest 1 View- PORTABLE-Urgent (Xray Chest 1 View- PORTABLE-Urgent .) (12.18.20 @ 11:50) >    EXAM:  XR CHEST PORTABLE URGENT 1V                        PROCEDURE DATE:  12/18/2020      INTERPRETATION:  CLINICAL STATEMENT: Follow-up chest pain.    TECHNIQUE: AP view of the chest.    COMPARISON: 12/14/2020    FINDINGS/  IMPRESSION:  Mild opacities lung bases slightly progressed on the left.    No pleural effusion    Heart size cannot be accurately assessed in this projection.    Mildly prominent right hilum which may be related to pulmonary vessel      PRAVEENA DAMON MD; Attending Radiologist  This document has been electronically signed. Dec 18 2020  2:00PM    < end of copied text >

## 2020-12-19 NOTE — PROGRESS NOTE ADULT - PROBLEM SELECTOR PLAN 3
Cr 1.6 on admission, 1.31 today. Baseline (1.180)  - GFR 36  - Ucreat 20, Brice 41, UOsmol 192  - 23/1.36 on 12/18  Nephrologist Dr Dove following  - - Scr 1.6 on admission   - - Renal function improving  - - MOnitor BMP   - - AVoid further nephrotoxics, NSAIDS RCA

## 2020-12-19 NOTE — PROGRESS NOTE ADULT - SUBJECTIVE AND OBJECTIVE BOX
[facility]   MD Grady Betancourt MD, D.O. Ruoru Wong, PA    From 7 AM - 5 PM:  OFFICE: [phone]  Dr. Teixeira cell: [phone]  Dr. Avila cell: [phone]  Dr. Funes cell: [phone]  KARRIE Martínez cell: [phone]    From 5 PM - 7 AM: Answering Service: [phone]  Date of serivice 12-19-20 @ 15:05    RENAL FOLLOW UP NOTE  --------------------------------------------------------------------------------  HPI:  Pt covid +    PAST HISTORY  --------------------------------------------------------------------------------  No significant changes to PMH, PSH, FHx, SHx, unless otherwise noted    ALLERGIES & MEDICATIONS  --------------------------------------------------------------------------------  Allergies    No Known Allergies    Intolerances      Standing Inpatient Medications  ALBUTerol    90 MICROgram(s) HFA Inhaler 2 Puff(s) Inhalation every 6 hours  ascorbic acid 500 milliGRAM(s) Oral daily  budesonide  80 MICROgram(s)/formoterol 4.5 MICROgram(s) Inhaler 2 Puff(s) Inhalation two times a day  cholecalciferol 1000 Unit(s) Oral daily  dexAMETHasone  Injectable 6 milliGRAM(s) IV Push daily  ferrous    sulfate 325 milliGRAM(s) Oral daily  gabapentin 300 milliGRAM(s) Oral every 12 hours  heparin   Injectable 5000 Unit(s) SubCutaneous every 8 hours  insulin glargine Injectable (LANTUS) 10 Unit(s) SubCutaneous at bedtime  insulin lispro (ADMELOG) corrective regimen sliding scale   SubCutaneous three times a day before meals  insulin lispro Injectable (ADMELOG) 3 Unit(s) SubCutaneous three times a day with meals  loratadine 10 milliGRAM(s) Oral daily  montelukast 10 milliGRAM(s) Oral daily  multivitamin 1 Tablet(s) Oral daily  pantoprazole    Tablet 40 milliGRAM(s) Oral before breakfast  potassium phosphate / sodium phosphate Tablet (K-PHOS No. 2) 1 Tablet(s) Oral three times a day  sodium chloride 0.9%. 1000 milliLiter(s) IV Continuous <Continuous>  zinc sulfate 220 milliGRAM(s) Oral daily    PRN Inpatient Medications  acetaminophen   Tablet .. 650 milliGRAM(s) Oral every 6 hours PRN      REVIEW OF SYSTEMS  --------------------------------------------------------------------------------  Unable to obtain     VITALS/PHYSICAL EXAM  --------------------------------------------------------------------------------  T(C): 37.3 (12-19-20 @ 11:38), Max: 37.7 (12-19-20 @ 08:51)  HR: 89 (12-19-20 @ 11:38) (72 - 91)  BP: 126/53 (12-19-20 @ 11:38) (119/46 - 138/58)  RR: 20 (12-19-20 @ 11:38) (18 - 20)  SpO2: 96% (12-19-20 @ 11:38) (95% - 98%)  Wt(kg): --      LABS/STUDIES  --------------------------------------------------------------------------------              12.8   7.33  >-----------<  277      [12-19-20 @ 08:21]              41.5     135  |  98  |  25  ----------------------------<  188      [12-19-20 @ 08:21]  4.1   |  24  |  1.46        Ca     9.1     [12-19-20 @ 08:21]      Mg     2.2     [12-19-20 @ 08:21]      Phos  3.5     [12-19-20 @ 08:21]      Creatinine Trend:  SCr 1.46 [12-19 @ 08:21]  SCr 1.36 [12-18 @ 08:19]  SCr 1.31 [12-16 @ 08:04]  SCr 1.36 [12-15 @ 07:46]  SCr 1.61 [12-14 @ 13:31]      Urine Creatinine 20      [12-14-20 @ 17:02]  Urine Sodium 41      [12-14-20 @ 17:02]  Urine Osmolality 192      [12-14-20 @ 17:02]    Iron 24, TIBC 264, %sat 9      [12-15-20 @ 07:46]  Ferritin 116      [12-15-20 @ 10:25]  Vitamin D (25OH) 36.3      [07-18-20 @ 11:35]  TSH 0.38      [12-15-20 @ 07:46]  Lipid: chol 139, , HDL 46, LDL --      [12-15-20 @ 07:46]      Immunofixation Serum:   No Monoclonal Band Identified    Reference Range: None Detected      [12-15-20 @ 11:01]  SPEP Interpretation: Normal Electrophoresis Pattern      [12-15-20 @ 11:01]

## 2020-12-19 NOTE — PROGRESS NOTE ADULT - PROBLEM SELECTOR PLAN 7
Patient takes Novolog 70/30 40 U BID , Metformin and Glipizide  - blood sugar is elevated  - C/W sliding scale , Lantus 10U at night,   - added pre-meal admelog 3U TID on 12/18 for elevated daytime sugars  - Diabetic diet  - A1C 7.2 Patient takes Novolog 70/30 40 U BID , Metformin and Glipizide  - blood sugar is elevated  - C/W sliding scale , Lantus 19 U at night, pre-meal admelog 5 U TID on 12/18 for elevated daytime sugars  - Diabetic diet  - A1C 7.2

## 2020-12-19 NOTE — PROGRESS NOTE ADULT - PROBLEM SELECTOR PLAN 1
Patient is coming with fever chills , diarrhea since 1 day,  - Sick contacts at home,   - CXR is negative for pleural effusion, infiltrates or consolidation  - patient is saturating 98% on room air, however, her saturation drops when she is agitated  - No chest pain  - s/p 1 dose of Dexamethasone in ED  - s/p IV Dexa 6mg for 10 doses (started 12/14 - 12/24)  - CrCL <30, Patient wont qualify for Remdesivir as per Dr Del Angel   - COVID Ab negative  - stable on Room Air  - CXR on 12/18 with mild opacities lung bases slightly progressed on the left.  - CT chest ordered 12/18  -Started patient on Solumedrol

## 2020-12-20 LAB
GLUCOSE BLDC GLUCOMTR-MCNC: 368 MG/DL — HIGH (ref 70–99)
GLUCOSE BLDC GLUCOMTR-MCNC: 487 MG/DL — CRITICAL HIGH (ref 70–99)

## 2020-12-20 RX ADMIN — Medication 6: at 17:25

## 2020-12-20 RX ADMIN — PANTOPRAZOLE SODIUM 40 MILLIGRAM(S): 20 TABLET, DELAYED RELEASE ORAL at 08:37

## 2020-12-20 RX ADMIN — ALBUTEROL 2 PUFF(S): 90 AEROSOL, METERED ORAL at 17:51

## 2020-12-20 RX ADMIN — Medication 100 MILLIGRAM(S): at 05:28

## 2020-12-20 RX ADMIN — Medication 500 MILLIGRAM(S): at 12:29

## 2020-12-20 RX ADMIN — Medication 100 MILLIGRAM(S): at 21:40

## 2020-12-20 RX ADMIN — BUDESONIDE AND FORMOTEROL FUMARATE DIHYDRATE 2 PUFF(S): 160; 4.5 AEROSOL RESPIRATORY (INHALATION) at 12:30

## 2020-12-20 RX ADMIN — HEPARIN SODIUM 5000 UNIT(S): 5000 INJECTION INTRAVENOUS; SUBCUTANEOUS at 21:39

## 2020-12-20 RX ADMIN — Medication 1000 UNIT(S): at 12:29

## 2020-12-20 RX ADMIN — ALBUTEROL 2 PUFF(S): 90 AEROSOL, METERED ORAL at 21:39

## 2020-12-20 RX ADMIN — Medication 5 UNIT(S): at 17:32

## 2020-12-20 RX ADMIN — Medication 100 MILLIGRAM(S): at 17:50

## 2020-12-20 RX ADMIN — Medication 325 MILLIGRAM(S): at 12:29

## 2020-12-20 RX ADMIN — ALBUTEROL 2 PUFF(S): 90 AEROSOL, METERED ORAL at 12:31

## 2020-12-20 RX ADMIN — Medication 1 TABLET(S): at 12:29

## 2020-12-20 RX ADMIN — Medication 40 MILLIGRAM(S): at 17:50

## 2020-12-20 RX ADMIN — Medication 5: at 08:23

## 2020-12-20 RX ADMIN — Medication 1 TABLET(S): at 05:28

## 2020-12-20 RX ADMIN — Medication 1 TABLET(S): at 17:50

## 2020-12-20 RX ADMIN — LORATADINE 10 MILLIGRAM(S): 10 TABLET ORAL at 12:29

## 2020-12-20 RX ADMIN — Medication 5 UNIT(S): at 12:14

## 2020-12-20 RX ADMIN — Medication 40 MILLIGRAM(S): at 05:29

## 2020-12-20 RX ADMIN — ZINC SULFATE TAB 220 MG (50 MG ZINC EQUIVALENT) 220 MILLIGRAM(S): 220 (50 ZN) TAB at 12:29

## 2020-12-20 RX ADMIN — Medication 5 UNIT(S): at 08:24

## 2020-12-20 RX ADMIN — Medication 6: at 12:14

## 2020-12-20 RX ADMIN — BUDESONIDE AND FORMOTEROL FUMARATE DIHYDRATE 2 PUFF(S): 160; 4.5 AEROSOL RESPIRATORY (INHALATION) at 21:39

## 2020-12-20 RX ADMIN — Medication 40 MILLIGRAM(S): at 21:41

## 2020-12-20 RX ADMIN — HEPARIN SODIUM 5000 UNIT(S): 5000 INJECTION INTRAVENOUS; SUBCUTANEOUS at 17:50

## 2020-12-20 RX ADMIN — MONTELUKAST 10 MILLIGRAM(S): 4 TABLET, CHEWABLE ORAL at 12:29

## 2020-12-20 RX ADMIN — GABAPENTIN 300 MILLIGRAM(S): 400 CAPSULE ORAL at 17:58

## 2020-12-20 RX ADMIN — HEPARIN SODIUM 5000 UNIT(S): 5000 INJECTION INTRAVENOUS; SUBCUTANEOUS at 05:29

## 2020-12-20 RX ADMIN — GABAPENTIN 300 MILLIGRAM(S): 400 CAPSULE ORAL at 05:31

## 2020-12-20 RX ADMIN — Medication 1 TABLET(S): at 21:41

## 2020-12-20 NOTE — PROGRESS NOTE ADULT - SUBJECTIVE AND OBJECTIVE BOX
PGY-1 Progress Note discussed with attending    PAGER #: [[phone]] TILL 5:00 PM  PLEASE CONTACT ON CALL TEAM:   - On Call Team (Please refer to Agustina) FROM 5:00 PM - 8:30PM  - Nightfloat Team FROM 8:30 -7:30 AM    CHIEF COMPLAINT & BRIEF HOSPITAL COURSE:  89 y/o female Rivera Speaking, with PMHx of asthma, HTN, DM p/w c/o shortness of breath and diarrhea, fever, chills x 1 days. Pt's grandson is positive for COVID-19. Admitted for COVID PCR + and Ab negative, CXR w/out consolidation or pleural effusion. Pt started on Decadron 12/14 for wheezing. Did NOT qualify for remdesivir per ID Dr. Del Angel as creatinine clearance was low. Saturating well on room air. BCx NGTD. Repeat CXR on 12/18 shows b/l opacities at lung bases, slightly progressed on left. Plan for CT chest to eval infiltrate. 3 episodes of loose stools prior to any Abx, sent for C.diff 12/18 which was negative.     INTERVAL HPI/OVERNIGHT EVENTS:   Lantus changed to 16 and pre-meal to 5 and added bedtime sliding scale for high blood sugars. CT chest pending.    REVIEW OF SYSTEMS:  CONSTITUTIONAL: No fever, weight loss, or fatigue  RESPIRATORY: No cough, wheezing, chills or hemoptysis; No shortness of breath  CARDIOVASCULAR: No chest pain, palpitations, dizziness, or leg swelling  GASTROINTESTINAL: No abdominal pain. No nausea, vomiting, or hematemesis; No diarrhea or constipation. No melena or hematochezia.  GENITOURINARY: No dysuria or hematuria, urinary frequency  NEUROLOGICAL: No headaches, memory loss, loss of strength, numbness, or tremors  SKIN: No itching, burning, rashes, or lesions     MEDICATIONS  (STANDING):  ALBUTerol    90 MICROgram(s) HFA Inhaler 2 Puff(s) Inhalation every 6 hours  ascorbic acid 500 milliGRAM(s) Oral daily  benzonatate 100 milliGRAM(s) Oral every 8 hours  budesonide  80 MICROgram(s)/formoterol 4.5 MICROgram(s) Inhaler 2 Puff(s) Inhalation two times a day  cholecalciferol 1000 Unit(s) Oral daily  dextrose 5%. 1000 milliLiter(s) (100 mL/Hr) IV Continuous <Continuous>  ferrous    sulfate 325 milliGRAM(s) Oral daily  gabapentin 300 milliGRAM(s) Oral every 12 hours  glucagon  Injectable 1 milliGRAM(s) IntraMuscular once  heparin   Injectable 5000 Unit(s) SubCutaneous every 8 hours  insulin glargine Injectable (LANTUS) 16 Unit(s) SubCutaneous at bedtime  insulin lispro (ADMELOG) corrective regimen sliding scale   SubCutaneous at bedtime  insulin lispro (ADMELOG) corrective regimen sliding scale   SubCutaneous three times a day before meals  insulin lispro Injectable (ADMELOG) 5 Unit(s) SubCutaneous three times a day before meals  loratadine 10 milliGRAM(s) Oral daily  methylPREDNISolone sodium succinate Injectable 40 milliGRAM(s) IV Push every 8 hours  montelukast 10 milliGRAM(s) Oral daily  multivitamin 1 Tablet(s) Oral daily  pantoprazole    Tablet 40 milliGRAM(s) Oral before breakfast  potassium phosphate / sodium phosphate Tablet (K-PHOS No. 2) 1 Tablet(s) Oral three times a day  sodium chloride 0.9%. 1000 milliLiter(s) (50 mL/Hr) IV Continuous <Continuous>  zinc sulfate 220 milliGRAM(s) Oral daily    MEDICATIONS  (PRN):  acetaminophen   Tablet .. 650 milliGRAM(s) Oral every 6 hours PRN Temp greater or equal to 38C (100.4F), Mild Pain (1 - 3)      Vital Signs Last 24 Hrs  T(C): 36.8 (19 Dec 2020 23:55), Max: 37.7 (19 Dec 2020 08:51)  T(F): 98.2 (19 Dec 2020 23:55), Max: 99.9 (19 Dec 2020 08:51)  HR: 81 (19 Dec 2020 23:55) (79 - 91)  BP: 128/63 (19 Dec 2020 23:55) (120/47 - 138/58)  BP(mean): --  RR: 18 (19 Dec 2020 23:55) (18 - 20)  SpO2: 96% (19 Dec 2020 23:55) (93% - 96%)    PHYSICAL EXAMINATION:  GENERAL: NAD, well built  HEAD:  Atraumatic, Normocephalic  EYES:  conjunctiva and sclera clear  NECK: Supple, No JVD, Normal thyroid  CHEST/LUNG: Clear to auscultation. Clear to percussion bilaterally; No rales, rhonchi, wheezing, or rubs  HEART: Regular rate and rhythm; No murmurs, rubs, or gallops  ABDOMEN: Soft, Nontender, Nondistended; Bowel sounds present  NERVOUS SYSTEM:  Alert & Oriented X3,    EXTREMITIES:  2+ Peripheral Pulses, No clubbing, cyanosis, or edema  SKIN: warm dry                          12.8   7.33  )-----------( 277      ( 19 Dec 2020 08:21 )             41.5     12-19    135  |  98  |  25<H>  ----------------------------<  188<H>  4.1   |  24  |  1.46<H>    Ca    9.1      19 Dec 2020 08:21  Phos  3.5     12-19  Mg     2.2     12-19                    I&O's Summary      CAPILLARY BLOOD GLUCOSE      POCT Blood Glucose.: 326 mg/dL (19 Dec 2020 21:17)    CAPILLARY BLOOD GLUCOSE      POCT Blood Glucose.: 326 mg/dL (19 Dec 2020 21:17)  POCT Blood Glucose.: 289 mg/dL (19 Dec 2020 17:03)  POCT Blood Glucose.: 319 mg/dL (19 Dec 2020 11:46)  POCT Blood Glucose.: 243 mg/dL (19 Dec 2020 08:18)      RADIOLOGY & ADDITIONAL TESTS:                   PGY-1 Progress Note discussed with attending    PAGER #: [[phone]] TILL 5:00 PM  PLEASE CONTACT ON CALL TEAM:   - On Call Team (Please refer to Agustina) FROM 5:00 PM - 8:30PM  - Nightfloat Team FROM 8:30 -7:30 AM    CHIEF COMPLAINT & BRIEF HOSPITAL COURSE:  89 y/o female Rivera Speaking, with PMHx of asthma, HTN, DM p/w c/o shortness of breath and diarrhea, fever, chills x 1 days. Pt's grandson is positive for COVID-19. Admitted for COVID PCR + and Ab negative, CXR w/out consolidation or pleural effusion. Pt started on Decadron 12/14 for wheezing. Did NOT qualify for remdesivir per ID Dr. Del Angel as creatinine clearance was low. Saturating well on room air. BCx NGTD. Repeat CXR on 12/18 shows b/l opacities at lung bases, slightly progressed on left. Plan for CT chest to eval infiltrate. 3 episodes of loose stools prior to any Abx, sent for C.diff 12/18 which was negative.     INTERVAL HPI/OVERNIGHT EVENTS:   Lantus changed to 16 and pre-meal to 5 and added bedtime sliding scale for high blood sugars. CT chest pending. No new complaints. Anxiety improved.    REVIEW OF SYSTEMS:  CONSTITUTIONAL: no fever + anxiety  RESPIRATORY: + cough, wheezing (resolved); No shortness of breath  CARDIOVASCULAR: No chest pain, palpitations, dizziness, or leg swelling  GASTROINTESTINAL: No abdominal pain. No nausea, vomiting, or hematemesis; No diarrhea or constipation. No melena or hematochezia.  GENITOURINARY: No dysuria or hematuria, urinary frequency  NEUROLOGICAL: No headaches, memory loss, loss of strength, numbness, or tremors  SKIN: No itching, burning, rashes, or lesions     MEDICATIONS  (STANDING):  ALBUTerol    90 MICROgram(s) HFA Inhaler 2 Puff(s) Inhalation every 6 hours  ascorbic acid 500 milliGRAM(s) Oral daily  benzonatate 100 milliGRAM(s) Oral every 8 hours  budesonide  80 MICROgram(s)/formoterol 4.5 MICROgram(s) Inhaler 2 Puff(s) Inhalation two times a day  cholecalciferol 1000 Unit(s) Oral daily  dextrose 5%. 1000 milliLiter(s) (100 mL/Hr) IV Continuous <Continuous>  ferrous    sulfate 325 milliGRAM(s) Oral daily  gabapentin 300 milliGRAM(s) Oral every 12 hours  glucagon  Injectable 1 milliGRAM(s) IntraMuscular once  heparin   Injectable 5000 Unit(s) SubCutaneous every 8 hours  insulin glargine Injectable (LANTUS) 16 Unit(s) SubCutaneous at bedtime  insulin lispro (ADMELOG) corrective regimen sliding scale   SubCutaneous at bedtime  insulin lispro (ADMELOG) corrective regimen sliding scale   SubCutaneous three times a day before meals  insulin lispro Injectable (ADMELOG) 5 Unit(s) SubCutaneous three times a day before meals  loratadine 10 milliGRAM(s) Oral daily  methylPREDNISolone sodium succinate Injectable 40 milliGRAM(s) IV Push every 8 hours  montelukast 10 milliGRAM(s) Oral daily  multivitamin 1 Tablet(s) Oral daily  pantoprazole    Tablet 40 milliGRAM(s) Oral before breakfast  potassium phosphate / sodium phosphate Tablet (K-PHOS No. 2) 1 Tablet(s) Oral three times a day  sodium chloride 0.9%. 1000 milliLiter(s) (50 mL/Hr) IV Continuous <Continuous>  zinc sulfate 220 milliGRAM(s) Oral daily    MEDICATIONS  (PRN):  acetaminophen   Tablet .. 650 milliGRAM(s) Oral every 6 hours PRN Temp greater or equal to 38C (100.4F), Mild Pain (1 - 3)      Vital Signs Last 24 Hrs  T(C): 36.8 (19 Dec 2020 23:55), Max: 37.7 (19 Dec 2020 08:51)  T(F): 98.2 (19 Dec 2020 23:55), Max: 99.9 (19 Dec 2020 08:51)  HR: 81 (19 Dec 2020 23:55) (79 - 91)  BP: 128/63 (19 Dec 2020 23:55) (120/47 - 138/58)  BP(mean): --  RR: 18 (19 Dec 2020 23:55) (18 - 20)  SpO2: 96% (19 Dec 2020 23:55) (93% - 96%)    PHYSICAL EXAMINATION:  GENERAL: NAD, overweight, tearful,   HEAD:  Atraumatic, Normocephalic  EYES:  conjunctiva and sclera clear  NECK: Supple, No JVD,   CHEST/LUNG: Diffuse wheezing throughout lung fields; No rales, rhonchi  HEART: Regular rate and rhythm; No murmurs, rubs, or gallops  ABDOMEN: Soft, Nontender, Nondistended; Bowel sounds present  NERVOUS SYSTEM:  Unable to ascertain orientation as patient not responding to questions appropriately, keeps stating that she has not been fed. Re-assured patient she has food and family will bring food.  EXTREMITIES:  2+ Peripheral Pulses, No clubbing, cyanosis, or edema  SKIN: warm dry                          12.8   7.33  )-----------( 277      ( 19 Dec 2020 08:21 )             41.5     12-19    135  |  98  |  25<H>  ----------------------------<  188<H>  4.1   |  24  |  1.46<H>    Ca    9.1      19 Dec 2020 08:21  Phos  3.5     12-19  Mg     2.2     12-19    I&O's Summary      CAPILLARY BLOOD GLUCOSE  POCT Blood Glucose.: 326 mg/dL (19 Dec 2020 21:17)    CAPILLARY BLOOD GLUCOSE  POCT Blood Glucose.: 326 mg/dL (19 Dec 2020 21:17)  POCT Blood Glucose.: 289 mg/dL (19 Dec 2020 17:03)  POCT Blood Glucose.: 319 mg/dL (19 Dec 2020 11:46)  POCT Blood Glucose.: 243 mg/dL (19 Dec 2020 08:18)                       PGY-1 Progress Note discussed with attending    PAGER #: [[phone]] TILL 5:00 PM  PLEASE CONTACT ON CALL TEAM:   - On Call Team (Please refer to Agustina) FROM 5:00 PM - 8:30PM  - Nightfloat Team FROM 8:30 -7:30 AM    CHIEF COMPLAINT & BRIEF HOSPITAL COURSE:  89 y/o female Rivera Speaking, with PMHx of asthma, HTN, DM p/w c/o shortness of breath and diarrhea, fever, chills x 1 days. Pt's grandson is positive for COVID-19. Admitted for COVID PCR + and Ab negative, CXR w/out consolidation or pleural effusion. Pt started on Decadron 12/14 for wheezing. Did NOT qualify for remdesivir per ID Dr. Del Angel as creatinine clearance was low. Saturating well on room air. BCx NGTD. Repeat CXR on 12/18 shows b/l opacities at lung bases, slightly progressed on left. Plan for CT chest to eval infiltrate. 3 episodes of loose stools prior to any Abx, sent for C.diff 12/18 which was negative.     INTERVAL HPI/OVERNIGHT EVENTS:   Lantus changed to 16 and pre-meal to 5 and added bedtime sliding scale for high blood sugars. CT chest pending.  Anxiety improved.    REVIEW OF SYSTEMS:  CONSTITUTIONAL: no fever + anxiety  RESPIRATORY: + cough, wheezing (resolved); No shortness of breath  CARDIOVASCULAR: No chest pain, palpitations, dizziness, or leg swelling  GASTROINTESTINAL: No abdominal pain. No nausea, vomiting, or hematemesis; No diarrhea or constipation. No melena or hematochezia.  GENITOURINARY: No dysuria or hematuria, urinary frequency  NEUROLOGICAL: No headaches, memory loss, loss of strength, numbness, or tremors  SKIN: No itching, burning, rashes, or lesions     MEDICATIONS  (STANDING):  ALBUTerol    90 MICROgram(s) HFA Inhaler 2 Puff(s) Inhalation every 6 hours  ascorbic acid 500 milliGRAM(s) Oral daily  benzonatate 100 milliGRAM(s) Oral every 8 hours  budesonide  80 MICROgram(s)/formoterol 4.5 MICROgram(s) Inhaler 2 Puff(s) Inhalation two times a day  cholecalciferol 1000 Unit(s) Oral daily  dextrose 5%. 1000 milliLiter(s) (100 mL/Hr) IV Continuous <Continuous>  ferrous    sulfate 325 milliGRAM(s) Oral daily  gabapentin 300 milliGRAM(s) Oral every 12 hours  glucagon  Injectable 1 milliGRAM(s) IntraMuscular once  heparin   Injectable 5000 Unit(s) SubCutaneous every 8 hours  insulin glargine Injectable (LANTUS) 16 Unit(s) SubCutaneous at bedtime  insulin lispro (ADMELOG) corrective regimen sliding scale   SubCutaneous at bedtime  insulin lispro (ADMELOG) corrective regimen sliding scale   SubCutaneous three times a day before meals  insulin lispro Injectable (ADMELOG) 5 Unit(s) SubCutaneous three times a day before meals  loratadine 10 milliGRAM(s) Oral daily  methylPREDNISolone sodium succinate Injectable 40 milliGRAM(s) IV Push every 8 hours  montelukast 10 milliGRAM(s) Oral daily  multivitamin 1 Tablet(s) Oral daily  pantoprazole    Tablet 40 milliGRAM(s) Oral before breakfast  potassium phosphate / sodium phosphate Tablet (K-PHOS No. 2) 1 Tablet(s) Oral three times a day  sodium chloride 0.9%. 1000 milliLiter(s) (50 mL/Hr) IV Continuous <Continuous>  zinc sulfate 220 milliGRAM(s) Oral daily    MEDICATIONS  (PRN):  acetaminophen   Tablet .. 650 milliGRAM(s) Oral every 6 hours PRN Temp greater or equal to 38C (100.4F), Mild Pain (1 - 3)      Vital Signs Last 24 Hrs  T(C): 36.8 (19 Dec 2020 23:55), Max: 37.7 (19 Dec 2020 08:51)  T(F): 98.2 (19 Dec 2020 23:55), Max: 99.9 (19 Dec 2020 08:51)  HR: 81 (19 Dec 2020 23:55) (79 - 91)  BP: 128/63 (19 Dec 2020 23:55) (120/47 - 138/58)  BP(mean): --  RR: 18 (19 Dec 2020 23:55) (18 - 20)  SpO2: 96% (19 Dec 2020 23:55) (93% - 96%)    PHYSICAL EXAMINATION:  GENERAL: NAD, overweight, tearful,   HEAD:  Atraumatic, Normocephalic  EYES:  conjunctiva and sclera clear  NECK: Supple, No JVD,   CHEST/LUNG: Diffuse wheezing throughout lung fields; No rales, rhonchi  HEART: Regular rate and rhythm; No murmurs, rubs, or gallops  ABDOMEN: Soft, Nontender, Nondistended; Bowel sounds present  NERVOUS SYSTEM:  Unable to ascertain orientation as patient not responding to questions appropriately  EXTREMITIES:  2+ Peripheral Pulses, No clubbing, cyanosis, or edema  SKIN: warm dry                          12.8   7.33  )-----------( 277      ( 19 Dec 2020 08:21 )             41.5     12-19    135  |  98  |  25<H>  ----------------------------<  188<H>  4.1   |  24  |  1.46<H>    Ca    9.1      19 Dec 2020 08:21  Phos  3.5     12-19  Mg     2.2     12-19    I&O's Summary      CAPILLARY BLOOD GLUCOSE  POCT Blood Glucose.: 326 mg/dL (19 Dec 2020 21:17)    CAPILLARY BLOOD GLUCOSE  POCT Blood Glucose.: 326 mg/dL (19 Dec 2020 21:17)  POCT Blood Glucose.: 289 mg/dL (19 Dec 2020 17:03)  POCT Blood Glucose.: 319 mg/dL (19 Dec 2020 11:46)  POCT Blood Glucose.: 243 mg/dL (19 Dec 2020 08:18)

## 2020-12-20 NOTE — PROGRESS NOTE ADULT - PROBLEM SELECTOR PLAN 3
Cr 1.6 on admission, 1.31 today. Baseline (1.180)  - GFR 36  - Ucreat 20, Brice 41, UOsmol 192  - 23/1.36 on 12/18  Nephrologist Dr Dove following  - - Scr 1.6 on admission   - - Renal function improving  - - Check UA  - - MOnitor BMP   - - AVoid further nephrotoxics, NSAIDS RCA Cr 1.6 on admission, 1.31 today. Baseline (1.180)  - GFR 36  - U creat 20, Brice 41, UOsmol 192  - 25/1.46 on 12/19  Nephrologist Dr Dove following  - - Scr 1.6 on admission   - - Renal function improving  - - Check UA  - - Monitor BMP   - - AVoid further nephrotoxics, NSAIDS RCA

## 2020-12-20 NOTE — PROGRESS NOTE ADULT - PROBLEM SELECTOR PLAN 1
Patient is coming with fever chills , diarrhea since 1 day,  - Sick contacts at home,   - CXR is negative for pleural effusion, infiltrates or consolidation  - patient is saturating 98% on room air, however, her saturation drops when she is agitated  - No chest pain  - s/p 1 dose of Dexamethasone in ED  - c/w IV Dexa 6mg for 10 doses (started 12/14 - 12/24)  - CrCL <30, Patient wont qualify for Remdesivir as per Dr Del Angel   - COVID Ab negative  - stable on Room Air  - CXR on 12/18 with mild opacities lung bases slightly progressed on the left.  - CT chest ordered 12/18 Patient is coming with fever chills , diarrhea since 1 day,  - Sick contacts at home,   - CXR is negative for pleural effusion, infiltrates or consolidation  - patient is saturating 98% on room air, however, her saturation drops when she is agitated  - No chest pain  - s/p 1 dose of Dexamethasone in ED  - c/w IV Dexa 6mg for 10 doses (started 12/14 - 12/24)  - CrCL <30, Patient wont qualify for Remdesivir as per Dr Del Angel   - COVID Ab negative  - stable on Room Air  - CXR on 12/18 with mild opacities lung bases slightly progressed on the left.  - CT chest ordered 12/18 - pending

## 2020-12-20 NOTE — PROGRESS NOTE ADULT - PROBLEM SELECTOR PLAN 7
Patient takes Novolog 70/30 40 U BID , Metformin and Glipizide  - blood sugar is elevated  - C/W sliding scale , Lantus 10U at night,   - added pre-meal admelog 3U TID on 12/18 for elevated daytime sugars  - Diabetic diet  - A1C 7.2 Patient takes Novolog 70/30 40 U BID , Metformin and Glipizide  - blood sugar is elevated  - C/W sliding scale , incr Lantus 16 U at night,   - incr pre-meal admelog 3U > 5U TID on 12/18 for elevated daytime sugars  - Diabetic diet  - A1C 7.2

## 2020-12-20 NOTE — PROGRESS NOTE ADULT - SUBJECTIVE AND OBJECTIVE BOX
[facility]   MD Grady Betancourt MD, D.O. Ruoru Wong, PA    From 7 AM - 5 PM:  OFFICE: [phone]  Dr. Teixeira cell: [phone]  Dr. Avila cell: [phone]  Dr. Funes cell: [phone]  KARRIE Martínez cell: [phone]    From 5 PM - 7 AM: Answering Service: [phone]  Date of service: 12-20-20 @ 12:41    RENAL FOLLOW UP NOTE  --------------------------------------------------------------------------------  HPI:  Pt covid +    PAST HISTORY  --------------------------------------------------------------------------------  No significant changes to PMH, PSH, FHx, SHx, unless otherwise noted    ALLERGIES & MEDICATIONS  --------------------------------------------------------------------------------  Allergies    No Known Allergies    Intolerances      Standing Inpatient Medications  ALBUTerol    90 MICROgram(s) HFA Inhaler 2 Puff(s) Inhalation every 6 hours  ascorbic acid 500 milliGRAM(s) Oral daily  benzonatate 100 milliGRAM(s) Oral every 8 hours  budesonide  80 MICROgram(s)/formoterol 4.5 MICROgram(s) Inhaler 2 Puff(s) Inhalation two times a day  cholecalciferol 1000 Unit(s) Oral daily  dextrose 5%. 1000 milliLiter(s) IV Continuous <Continuous>  ferrous    sulfate 325 milliGRAM(s) Oral daily  gabapentin 300 milliGRAM(s) Oral every 12 hours  glucagon  Injectable 1 milliGRAM(s) IntraMuscular once  heparin   Injectable 5000 Unit(s) SubCutaneous every 8 hours  insulin glargine Injectable (LANTUS) 16 Unit(s) SubCutaneous at bedtime  insulin lispro (ADMELOG) corrective regimen sliding scale   SubCutaneous at bedtime  insulin lispro (ADMELOG) corrective regimen sliding scale   SubCutaneous three times a day before meals  insulin lispro Injectable (ADMELOG) 5 Unit(s) SubCutaneous three times a day before meals  loratadine 10 milliGRAM(s) Oral daily  methylPREDNISolone sodium succinate Injectable 40 milliGRAM(s) IV Push every 8 hours  montelukast 10 milliGRAM(s) Oral daily  multivitamin 1 Tablet(s) Oral daily  pantoprazole    Tablet 40 milliGRAM(s) Oral before breakfast  potassium phosphate / sodium phosphate Tablet (K-PHOS No. 2) 1 Tablet(s) Oral three times a day  sodium chloride 0.9%. 1000 milliLiter(s) IV Continuous <Continuous>  zinc sulfate 220 milliGRAM(s) Oral daily    PRN Inpatient Medications  acetaminophen   Tablet .. 650 milliGRAM(s) Oral every 6 hours PRN      REVIEW OF SYSTEMS  --------------------------------------------------------------------------------  Unable to obtain   VITALS/PHYSICAL EXAM  --------------------------------------------------------------------------------  T(C): 36.5 (12-20-20 @ 12:10), Max: 37.1 (12-19-20 @ 15:22)  HR: 83 (12-20-20 @ 12:10) (79 - 86)  BP: 135/59 (12-20-20 @ 12:10) (120/47 - 148/66)  RR: 20 (12-20-20 @ 12:10) (18 - 20)  SpO2: 98% (12-20-20 @ 12:10) (93% - 98%)  Wt(kg): --    LABS/STUDIES  --------------------------------------------------------------------------------              12.8   7.33  >-----------<  277      [12-19-20 @ 08:21]              41.5     135  |  98  |  25  ----------------------------<  188      [12-19-20 @ 08:21]  4.1   |  24  |  1.46        Ca     9.1     [12-19-20 @ 08:21]      Mg     2.2     [12-19-20 @ 08:21]      Phos  3.5     [12-19-20 @ 08:21]      Creatinine Trend:  SCr 1.46 [12-19 @ 08:21]  SCr 1.36 [12-18 @ 08:19]  SCr 1.31 [12-16 @ 08:04]  SCr 1.36 [12-15 @ 07:46]  SCr 1.61 [12-14 @ 13:31]      Urine Creatinine 20      [12-14-20 @ 17:02]  Urine Sodium 41      [12-14-20 @ 17:02]  Urine Osmolality 192      [12-14-20 @ 17:02]    Iron 24, TIBC 264, %sat 9      [12-15-20 @ 07:46]  Ferritin 116      [12-15-20 @ 10:25]  Vitamin D (25OH) 36.3      [07-18-20 @ 11:35]  TSH 0.38      [12-15-20 @ 07:46]  Lipid: chol 139, , HDL 46, LDL --      [12-15-20 @ 07:46]      Immunofixation Serum:   No Monoclonal Band Identified    Reference Range: None Detected      [12-15-20 @ 11:01]  SPEP Interpretation: Normal Electrophoresis Pattern      [12-15-20 @ 11:01]

## 2020-12-21 DIAGNOSIS — E11.65 TYPE 2 DIABETES MELLITUS WITH HYPERGLYCEMIA: ICD-10-CM

## 2020-12-21 LAB
ANION GAP SERPL CALC-SCNC: 10 MMOL/L — SIGNIFICANT CHANGE UP (ref 5–17)
BUN SERPL-MCNC: 32 MG/DL — HIGH (ref 7–18)
CALCIUM SERPL-MCNC: 9.1 MG/DL — SIGNIFICANT CHANGE UP (ref 8.4–10.5)
CHLORIDE SERPL-SCNC: 103 MMOL/L — SIGNIFICANT CHANGE UP (ref 96–108)
CO2 SERPL-SCNC: 21 MMOL/L — LOW (ref 22–31)
CREAT SERPL-MCNC: 1.3 MG/DL — SIGNIFICANT CHANGE UP (ref 0.5–1.3)
GLUCOSE BLDC GLUCOMTR-MCNC: 128 MG/DL — HIGH (ref 70–99)
GLUCOSE BLDC GLUCOMTR-MCNC: 245 MG/DL — HIGH (ref 70–99)
GLUCOSE BLDC GLUCOMTR-MCNC: 376 MG/DL — HIGH (ref 70–99)
GLUCOSE SERPL-MCNC: 356 MG/DL — HIGH (ref 70–99)
HCT VFR BLD CALC: 38.7 % — SIGNIFICANT CHANGE UP (ref 34.5–45)
HGB BLD-MCNC: 12.1 G/DL — SIGNIFICANT CHANGE UP (ref 11.5–15.5)
MAGNESIUM SERPL-MCNC: 2.2 MG/DL — SIGNIFICANT CHANGE UP (ref 1.6–2.6)
MCHC RBC-ENTMCNC: 26.2 PG — LOW (ref 27–34)
MCHC RBC-ENTMCNC: 31.3 GM/DL — LOW (ref 32–36)
MCV RBC AUTO: 83.8 FL — SIGNIFICANT CHANGE UP (ref 80–100)
NRBC # BLD: 0 /100 WBCS — SIGNIFICANT CHANGE UP (ref 0–0)
PHOSPHATE SERPL-MCNC: 4.2 MG/DL — SIGNIFICANT CHANGE UP (ref 2.5–4.5)
PLATELET # BLD AUTO: 315 K/UL — SIGNIFICANT CHANGE UP (ref 150–400)
POTASSIUM SERPL-MCNC: 4.9 MMOL/L — SIGNIFICANT CHANGE UP (ref 3.5–5.3)
POTASSIUM SERPL-SCNC: 4.9 MMOL/L — SIGNIFICANT CHANGE UP (ref 3.5–5.3)
RBC # BLD: 4.62 M/UL — SIGNIFICANT CHANGE UP (ref 3.8–5.2)
RBC # FLD: 14 % — SIGNIFICANT CHANGE UP (ref 10.3–14.5)
SODIUM SERPL-SCNC: 134 MMOL/L — LOW (ref 135–145)
WBC # BLD: 9.97 K/UL — SIGNIFICANT CHANGE UP (ref 3.8–10.5)
WBC # FLD AUTO: 9.97 K/UL — SIGNIFICANT CHANGE UP (ref 3.8–10.5)

## 2020-12-21 RX ORDER — INSULIN GLARGINE 100 [IU]/ML
16 INJECTION, SOLUTION SUBCUTANEOUS AT BEDTIME
Refills: 0 | Status: DISCONTINUED | OUTPATIENT
Start: 2020-12-21 | End: 2020-12-22

## 2020-12-21 RX ORDER — INSULIN GLARGINE 100 [IU]/ML
20 INJECTION, SOLUTION SUBCUTANEOUS AT BEDTIME
Refills: 0 | Status: DISCONTINUED | OUTPATIENT
Start: 2020-12-21 | End: 2020-12-21

## 2020-12-21 RX ORDER — INSULIN LISPRO 100/ML
8 VIAL (ML) SUBCUTANEOUS
Refills: 0 | Status: DISCONTINUED | OUTPATIENT
Start: 2020-12-21 | End: 2020-12-24

## 2020-12-21 RX ORDER — ALPRAZOLAM 0.25 MG
0.25 TABLET ORAL ONCE
Refills: 0 | Status: DISCONTINUED | OUTPATIENT
Start: 2020-12-21 | End: 2020-12-21

## 2020-12-21 RX ADMIN — GABAPENTIN 300 MILLIGRAM(S): 400 CAPSULE ORAL at 06:11

## 2020-12-21 RX ADMIN — Medication 500 MILLIGRAM(S): at 16:31

## 2020-12-21 RX ADMIN — ALBUTEROL 2 PUFF(S): 90 AEROSOL, METERED ORAL at 21:26

## 2020-12-21 RX ADMIN — Medication 0.25 MILLIGRAM(S): at 09:09

## 2020-12-21 RX ADMIN — ZINC SULFATE TAB 220 MG (50 MG ZINC EQUIVALENT) 220 MILLIGRAM(S): 220 (50 ZN) TAB at 12:40

## 2020-12-21 RX ADMIN — INSULIN GLARGINE 16 UNIT(S): 100 INJECTION, SOLUTION SUBCUTANEOUS at 21:29

## 2020-12-21 RX ADMIN — Medication 1 TABLET(S): at 06:08

## 2020-12-21 RX ADMIN — Medication 1 TABLET(S): at 12:40

## 2020-12-21 RX ADMIN — Medication 8 UNIT(S): at 17:34

## 2020-12-21 RX ADMIN — Medication 40 MILLIGRAM(S): at 06:09

## 2020-12-21 RX ADMIN — Medication 1 TABLET(S): at 21:25

## 2020-12-21 RX ADMIN — BUDESONIDE AND FORMOTEROL FUMARATE DIHYDRATE 2 PUFF(S): 160; 4.5 AEROSOL RESPIRATORY (INHALATION) at 21:26

## 2020-12-21 RX ADMIN — HEPARIN SODIUM 5000 UNIT(S): 5000 INJECTION INTRAVENOUS; SUBCUTANEOUS at 21:26

## 2020-12-21 RX ADMIN — LORATADINE 10 MILLIGRAM(S): 10 TABLET ORAL at 12:40

## 2020-12-21 RX ADMIN — Medication 100 MILLIGRAM(S): at 21:25

## 2020-12-21 RX ADMIN — BUDESONIDE AND FORMOTEROL FUMARATE DIHYDRATE 2 PUFF(S): 160; 4.5 AEROSOL RESPIRATORY (INHALATION) at 12:42

## 2020-12-21 RX ADMIN — Medication 40 MILLIGRAM(S): at 19:07

## 2020-12-21 RX ADMIN — GABAPENTIN 300 MILLIGRAM(S): 400 CAPSULE ORAL at 19:26

## 2020-12-21 RX ADMIN — Medication 5: at 17:34

## 2020-12-21 RX ADMIN — ALBUTEROL 2 PUFF(S): 90 AEROSOL, METERED ORAL at 16:35

## 2020-12-21 RX ADMIN — Medication 0.5 MILLIGRAM(S): at 16:31

## 2020-12-21 RX ADMIN — HEPARIN SODIUM 5000 UNIT(S): 5000 INJECTION INTRAVENOUS; SUBCUTANEOUS at 06:09

## 2020-12-21 RX ADMIN — Medication 1 TABLET(S): at 19:07

## 2020-12-21 RX ADMIN — HEPARIN SODIUM 5000 UNIT(S): 5000 INJECTION INTRAVENOUS; SUBCUTANEOUS at 16:31

## 2020-12-21 RX ADMIN — MONTELUKAST 10 MILLIGRAM(S): 4 TABLET, CHEWABLE ORAL at 12:40

## 2020-12-21 RX ADMIN — PANTOPRAZOLE SODIUM 40 MILLIGRAM(S): 20 TABLET, DELAYED RELEASE ORAL at 06:08

## 2020-12-21 RX ADMIN — Medication 100 MILLIGRAM(S): at 06:08

## 2020-12-21 RX ADMIN — Medication 325 MILLIGRAM(S): at 16:31

## 2020-12-21 RX ADMIN — ALBUTEROL 2 PUFF(S): 90 AEROSOL, METERED ORAL at 12:42

## 2020-12-21 RX ADMIN — Medication 1000 UNIT(S): at 12:40

## 2020-12-21 RX ADMIN — Medication 100 MILLIGRAM(S): at 16:31

## 2020-12-21 NOTE — DIETITIAN INITIAL EVALUATION ADULT. - PERTINENT MEDS FT
MEDICATIONS  (STANDING):  ALBUTerol    90 MICROgram(s) HFA Inhaler 2 Puff(s) Inhalation every 6 hours  ascorbic acid 500 milliGRAM(s) Oral daily  benzonatate 100 milliGRAM(s) Oral every 8 hours  budesonide  80 MICROgram(s)/formoterol 4.5 MICROgram(s) Inhaler 2 Puff(s) Inhalation two times a day  cholecalciferol 1000 Unit(s) Oral daily  dextrose 5%. 1000 milliLiter(s) (100 mL/Hr) IV Continuous <Continuous>  ferrous    sulfate 325 milliGRAM(s) Oral daily  gabapentin 300 milliGRAM(s) Oral every 12 hours  glucagon  Injectable 1 milliGRAM(s) IntraMuscular once  heparin   Injectable 5000 Unit(s) SubCutaneous every 8 hours  insulin glargine Injectable (LANTUS) 16 Unit(s) SubCutaneous at bedtime  insulin lispro (ADMELOG) corrective regimen sliding scale   SubCutaneous at bedtime  insulin lispro (ADMELOG) corrective regimen sliding scale   SubCutaneous three times a day before meals  insulin lispro Injectable (ADMELOG) 5 Unit(s) SubCutaneous three times a day before meals  loratadine 10 milliGRAM(s) Oral daily  methylPREDNISolone sodium succinate Injectable 40 milliGRAM(s) IV Push once  montelukast 10 milliGRAM(s) Oral daily  multivitamin 1 Tablet(s) Oral daily  pantoprazole    Tablet 40 milliGRAM(s) Oral before breakfast  potassium phosphate / sodium phosphate Tablet (K-PHOS No. 2) 1 Tablet(s) Oral three times a day  sodium chloride 0.9%. 1000 milliLiter(s) (50 mL/Hr) IV Continuous <Continuous>  zinc sulfate 220 milliGRAM(s) Oral daily

## 2020-12-21 NOTE — PROGRESS NOTE ADULT - PROBLEM SELECTOR PLAN 6
- Continue Albuterol, incr PRN to standing on 12/17  - Dexamethasone for covid infection  - added Montelukast 12/17 with improvement in wheezing  - Pulm Dr. Templeton/Sachin consulted 12/16; pending recs  - f/u CT chest pending

## 2020-12-21 NOTE — PROGRESS NOTE ADULT - PROBLEM SELECTOR PLAN 7
Patient takes Novolog 70/30 40 U BID , Metformin and Glipizide  - blood sugar is elevated  - C/W sliding scale , incr Lantus 16 U at night,   - incr pre-meal admelog 3U > 5U TID on 12/18 for elevated daytime sugars  - Diabetic diet  - A1C 7.2  - Dr. Britt Endocrine consulted 12/21

## 2020-12-21 NOTE — DIETITIAN INITIAL EVALUATION ADULT. - OTHER INFO
Pt lives home with family PTA, alert, oriented, COVID19+hubert, in airborne/contact isolation room, Unable to conduct a face to face interview due to limited contact restrictions related to pt's medical condition and isolation precautions, Rivera speaking, attempt made to contact pt (bedside # [phone]) via [employer]  ID # [id number], not answering, then attempt made to contact family ( Son Henrique at [phone]), not available at present; pt tolerating meals well per nursing, no nutrition related issues reported at present Pt lives home with family PTA, alert, oriented, COVID19+hubert, in airborne/contact isolation room, Unable to conduct a face to face interview due to limited contact restrictions related to pt's medical condition and isolation precautions, Rivera speaking, attempt made to contact pt (bedside # [phone]) via [employer]  ID # [phone], not answering, then attempt made to contact family ( Son Henrique at [phone]), not available at present. Pt very agitated at times, refusing fingersticks drawn, but tolerating meals well per nursing, no nutrition related issues reported at present

## 2020-12-21 NOTE — CONSULT NOTE ADULT - SUBJECTIVE AND OBJECTIVE BOX
[facility]   MD FAUSTO PALM MD RUORU WONG, PA    TEL:  OFFICE: [phone]  DR LEE CELL: [phone]  CHARLEY WILLIAM CELL: [phone]  DR. GARCIA CELL: [phone]  DR. MARTIN CELl: [phone]    FROM 5 PM- 7 AM PLEASE CALL ANSWERING SERVICE AT [phone]    -- INITIAL RENAL CONSULT NOTE --- Date Of service 12-15-20 @ 14:33  --------------------------------------------------------------------------------  HPI:    89 y/o female Rivera Speaking, with PMHx of asthma, HTN, DM presents to the ED c/o shortness of breath and diarrhea, fever, chills x 1 days  Nephrology consulted for TAMANNA    PAST HISTORY  --------------------------------------------------------------------------------  PAST MEDICAL & SURGICAL HISTORY:  Asthma    HTN (hypertension)    DM (diabetes mellitus)    No significant past surgical history      FAMILY HISTORY:    PAST SOCIAL HISTORY:    ALLERGIES & MEDICATIONS  --------------------------------------------------------------------------------  Allergies    No Known Allergies    Intolerances      Standing Inpatient Medications  ascorbic acid 500 milliGRAM(s) Oral daily  budesonide  80 MICROgram(s)/formoterol 4.5 MICROgram(s) Inhaler 2 Puff(s) Inhalation two times a day  cholecalciferol 1000 Unit(s) Oral daily  dexAMETHasone  Injectable 6 milliGRAM(s) IV Push daily  dextrose 40% Gel 15 Gram(s) Oral once  dextrose 5%. 1000 milliLiter(s) IV Continuous <Continuous>  dextrose 5%. 1000 milliLiter(s) IV Continuous <Continuous>  dextrose 50% Injectable 25 Gram(s) IV Push once  ferrous    sulfate 325 milliGRAM(s) Oral daily  gabapentin 300 milliGRAM(s) Oral every 12 hours  glucagon  Injectable 1 milliGRAM(s) IntraMuscular once  heparin   Injectable 5000 Unit(s) SubCutaneous every 8 hours  insulin glargine Injectable (LANTUS) 10 Unit(s) SubCutaneous at bedtime  insulin lispro (ADMELOG) corrective regimen sliding scale   SubCutaneous three times a day before meals  loratadine 10 milliGRAM(s) Oral daily  multivitamin 1 Tablet(s) Oral daily  pantoprazole    Tablet 40 milliGRAM(s) Oral before breakfast  sodium chloride 0.9%. 1000 milliLiter(s) IV Continuous <Continuous>  zinc sulfate 220 milliGRAM(s) Oral daily    PRN Inpatient Medications  ALBUTerol    90 MICROgram(s) HFA Inhaler 2 Puff(s) Inhalation every 6 hours PRN      REVIEW OF SYSTEMS  --------------------------------------------------------------------------------  Gen: No fevers/chills  Skin: No rashes  Head/Eyes/Ears: Normal hearing,  Normal vision   Respiratory: + dyspnea, cough  CV: No chest pain  GI: +, diarrhea,   : No dysuria, hematuria  MSK: No  edema  Heme: No easy bruising or bleeding  Psych: No significant depression    All other systems were reviewed and are negative, except as noted.    VITALS/PHYSICAL EXAM  --------------------------------------------------------------------------------  T(C): 36.8 (12-15-20 @ 11:23), Max: 37.6 (12-14-20 @ 17:15)  HR: 83 (12-15-20 @ 11:23) (68 - 90)  BP: 122/50 (12-15-20 @ 11:23) (102/50 - 127/54)  RR: 18 (12-15-20 @ 11:23) (18 - 19)  SpO2: 97% (12-15-20 @ 11:23) (96% - 100%)  Wt(kg): --  Height (cm): 162.6 (12-14-20 @ 11:53)  Weight (kg): 79.4 (12-14-20 @ 11:53)  BMI (kg/m2): 30 (12-14-20 @ 11:53)  BSA (m2): 1.85 (12-14-20 @ 11:53)      12-14-20 @ 07:01  -  12-15-20 @ 07:00  --------------------------------------------------------  IN: 0 mL / OUT: 1 mL / NET: -1 mL        LABS/STUDIES  --------------------------------------------------------------------------------              10.6   5.57  >-----------<  230      [12-15-20 @ 07:46]              35.4     140  |  104  |  20  ----------------------------<  142      [12-15-20 @ 07:46]  4.0   |  25  |  1.36        Ca     10.0     [12-15-20 @ 07:46]      Mg     2.4     [12-15-20 @ 07:46]      Phos  3.8     [12-15-20 @ 07:46]    TPro  5.5  /  Alb  x   /  TBili  x   /  DBili  x   /  AST  x   /  ALT  x   /  AlkPhos  x   [12-15-20 @ 11:01]    PT/INR: PT 11.7 , INR 0.98       [12-14-20 @ 13:31]  PTT: 30.3       [12-14-20 @ 13:31]    Troponin 0.049      [12-15-20 @ 07:46]    Creatinine Trend:  SCr 1.36 [12-15 @ 07:46]  SCr 1.61 [12-14 @ 13:31]      Urine Creatinine 20      [12-14-20 @ 17:02]  Urine Sodium 41      [12-14-20 @ 17:02]  Urine Osmolality 192      [12-14-20 @ 17:02]    Iron 24, TIBC 264, %sat 9      [12-15-20 @ 07:46]  Ferritin 116      [12-15-20 @ 10:25]  Vitamin D (25OH) 36.3      [07-18-20 @ 11:35]  TSH 0.38      [12-15-20 @ 07:46]  Lipid: chol 139, , HDL 46, LDL --      [12-15-20 @ 07:46]    HBsAg Nonreact      [07-20-20 @ 09:21]  HCV 0.07, Nonreact      [07-20-20 @ 09:21]    
Time of visit:    CHIEF COMPLAINT: Patient is a 89y old  Female who presents with a chief complaint of Fever, chills, Covid positive (20 Dec 2020 12:41)      HPI:  89 y/o female Rivera Speaking, with PMHx of asthma, HTN, DM presents to the ED c/o shortness of breath and diarrhea, fever, chills x 1 days. Patient was very irritable at the time of encounter, AAo x3, but non-cooperative so main history was obtained from ED chart.  Pt reports worsening shortness of breath with associated subjective fever, chills, chest pain and cough. Pt reports that her grandson is positive for COVID-19.. Pt also has chronic leg pain.   Denies nausea, emesis, abdominal pain, dysuria, hematuria, dconstipation, or any other acute complaints.  ED Course:  Vital Signs Last 24 Hrs  T(C): 36.5 (14 Dec 2020 15:40), Max: 37.7 (14 Dec 2020 12:47)  T(F): 97.7 (14 Dec 2020 15:40), Max: 99.8 (14 Dec 2020 12:47)  HR: 84 (14 Dec 2020 15:40) (84 - 95)  BP: 116/66 (14 Dec 2020 15:40) (116/66 - 131/74)  RR: 18 (14 Dec 2020 15:40) (18 - 18)  SpO2: 96% (14 Dec 2020 15:40) (96% - 96%)    Troponin I : 0.056  EKG: NSR   (14 Dec 2020 15:41)   Patient seen and examined.     PAST MEDICAL & SURGICAL HISTORY:  Asthma    HTN (hypertension)    DM (diabetes mellitus)    No significant past surgical history        Allergies    No Known Allergies    Intolerances        MEDICATIONS  (STANDING):  ALBUTerol    90 MICROgram(s) HFA Inhaler 2 Puff(s) Inhalation every 6 hours  ascorbic acid 500 milliGRAM(s) Oral daily  benzonatate 100 milliGRAM(s) Oral every 8 hours  budesonide  80 MICROgram(s)/formoterol 4.5 MICROgram(s) Inhaler 2 Puff(s) Inhalation two times a day  cholecalciferol 1000 Unit(s) Oral daily  dextrose 5%. 1000 milliLiter(s) (100 mL/Hr) IV Continuous <Continuous>  ferrous    sulfate 325 milliGRAM(s) Oral daily  gabapentin 300 milliGRAM(s) Oral every 12 hours  glucagon  Injectable 1 milliGRAM(s) IntraMuscular once  heparin   Injectable 5000 Unit(s) SubCutaneous every 8 hours  insulin glargine Injectable (LANTUS) 16 Unit(s) SubCutaneous at bedtime  insulin lispro (ADMELOG) corrective regimen sliding scale   SubCutaneous at bedtime  insulin lispro (ADMELOG) corrective regimen sliding scale   SubCutaneous three times a day before meals  insulin lispro Injectable (ADMELOG) 5 Unit(s) SubCutaneous three times a day before meals  loratadine 10 milliGRAM(s) Oral daily  methylPREDNISolone sodium succinate Injectable 40 milliGRAM(s) IV Push every 8 hours  montelukast 10 milliGRAM(s) Oral daily  multivitamin 1 Tablet(s) Oral daily  pantoprazole    Tablet 40 milliGRAM(s) Oral before breakfast  potassium phosphate / sodium phosphate Tablet (K-PHOS No. 2) 1 Tablet(s) Oral three times a day  sodium chloride 0.9%. 1000 milliLiter(s) (50 mL/Hr) IV Continuous <Continuous>  zinc sulfate 220 milliGRAM(s) Oral daily      MEDICATIONS  (PRN):  acetaminophen   Tablet .. 650 milliGRAM(s) Oral every 6 hours PRN Temp greater or equal to 38C (100.4F), Mild Pain (1 - 3)   Medications up to date at time of exam.    Medications up to date at time of exam.    FAMILY HISTORY:      SOCIAL HISTORY   Smoking History: [   ]  non smoking/smoke exposure, [   ] former smoker  Living Condition: [  x ] apartment, [   ] private house  Work History:   Travel History: denies recent travel  Illicit Substance Use: denies  Alcohol Use: denies    REVIEW OF SYSTEMS: as per EMR    CONSTITUTIONAL:  denies fevers, chills, sweats, weight loss    HEENT:  denies diplopia or blurred vision, sore throat or runny nose.    CARDIOVASCULAR:  denies pressure, squeezing, tightness, or heaviness about the chest; no palpitations.    RESPIRATORY:  denies SOB, cough, DELGADO, wheezing.    GASTROINTESTINAL:  denies abdominal pain, nausea, vomiting or diarrhea.    GENITOURINARY: denies dysuria, frequency or urgency.    NEUROLOGIC:  denies numbness, tingling, seizures or weakness.    PSYCHIATRIC:  denies disorder of thought or mood.    MSK: denies swelling, redness      PHYSICAL EXAMINATION:    GENERAL: The patient is a well-developed, well-nourished, in no apparent distress.     Vital Signs Last 24 Hrs  T(C): 36.5 (20 Dec 2020 12:10), Max: 36.8 (19 Dec 2020 23:55)  T(F): 97.7 (20 Dec 2020 12:10), Max: 98.2 (19 Dec 2020 23:55)  HR: 83 (20 Dec 2020 12:10) (81 - 86)  BP: 135/59 (20 Dec 2020 12:10) (127/61 - 148/66)  BP(mean): --  RR: 20 (20 Dec 2020 12:10) (18 - 20)  SpO2: 98% (20 Dec 2020 12:10) (93% - 98%)   (if applicable)    Chest Tube (if applicable)    HEENT: Head is normocephalic and atraumatic. Extraocular muscles are intact. Mucous membranes are moist.     NECK: Supple, no palpable adenopathy.    LUNGS: Clear to auscultation, no wheezing, rales, or rhonchi.    HEART: Regular rate and rhythm without murmur.    ABDOMEN: Soft, nontender, and nondistended.  No hepatosplenomegaly is noted.    RENAL: No difficulty voiding, no pelvic pain    EXTREMITIES: Without any cyanosis, clubbing, rash, lesions or edema.    NEUROLOGIC: Awake, alert    SKIN: Warm, dry, good turgor.      LABS:                        12.8   7.33  )-----------( 277      ( 19 Dec 2020 08:21 )             41.5     12-19    135  |  98  |  25<H>  ----------------------------<  188<H>  4.1   |  24  |  1.46<H>    Ca    9.1      19 Dec 2020 08:21  Phos  3.5     12-19  Mg     2.2     12-19                          MICROBIOLOGY: (if applicable)    RADIOLOGY & ADDITIONAL STUDIES:  EKG:   CXR:< from: Xray Chest 1 View- PORTABLE-Urgent (Xray Chest 1 View- PORTABLE-Urgent .) (12.18.20 @ 11:50) >    EXAM:  XR CHEST PORTABLE URGENT 1V                            PROCEDURE DATE:  12/18/2020          INTERPRETATION:  CLINICAL STATEMENT: Follow-up chest pain.    TECHNIQUE: AP view of the chest.    COMPARISON: 12/14/2020    FINDINGS/  IMPRESSION:  Mild opacities lung bases slightly progressed on the left.    No pleural effusion    Heart size cannot be accurately assessed in this projection.    Mildly prominent right hilum which may be related to pulmonary vessel              PRAVEENA DAMON MD; Attending Radiologist  This document has been electronically signed. Dec 18 2020  2:00PM    < end of copied text >    ECHO:    IMPRESSION: 89y Female PAST MEDICAL & SURGICAL HISTORY:  Asthma    HTN (hypertension)    DM (diabetes mellitus)    No significant past surgical history     p/w           IMP: This is a 89 yr old woman Rivera Speaking, with  asthma, HTN, DM presents to the ED c/o shortness of breath and diarrhea, fever, chills x 1 days.   Pt reports worsening shortness of breath with associated subjective fever, chills, chest pain and cough. Pt reports that her grandson is positive for COVID-19..     - Acute Hypoxic Resp failure  - Covid-19 pna   - PNA b/l   - DM uncontrol   - HTN  - Asthma   - TAMANNA on CKD      Sugg;  -continue isolation : air borne and contact   -blood sugar control   -completed 10 days of decadron 12/9  -please taper decadron daily .. pat is gettting solumedrol .. taper solumedrol every day   -pat is not a candidate for Remdesivir due to low GFR  -continue O2 supp as needed to keep Sat >90%  -doubt PE but agreed with CT chest non contrast  -if pat become more symptomatic or requiring higher O2 supp , then check biomarkers  -dvt/gi prophy  -monitor renal fx   -PPI/ DVT  -PT eval  -OOB to chair     
Patient is a 89y old  Female who presents with a chief complaint of Fever, chills, Covid positive (21 Dec 2020 12:21)      HPI:  89 y/o female Rivera Speaking, with PMHx of asthma, HTN, DM presents to the ED c/o shortness of breath and diarrhea, fever, chills x 1 days. Patient was very irritable at the time of encounter, AAo x3, but non-cooperative so main history was obtained from ED chart.  Pt reports worsening shortness of breath with associated subjective fever, chills, chest pain and cough. Pt reports that her grandson is positive for COVID-19.. Pt also has chronic leg pain.   Denies nausea, emesis, abdominal pain, dysuria, hematuria, dconstipation, or any other acute complaints.    Endocrinology consult: Pt keeps asking to go to home. Pt seems disoriented. Likely has dementia. She was able to mention that she has DM since > 15 years. She mentioned shr takes insulin injection once a day. She was not sure of doses.           Vital Signs Last 24 Hrs  T(C): 36.5 (14 Dec 2020 15:40), Max: 37.7 (14 Dec 2020 12:47)  T(F): 97.7 (14 Dec 2020 15:40), Max: 99.8 (14 Dec 2020 12:47)  HR: 84 (14 Dec 2020 15:40) (84 - 95)  BP: 116/66 (14 Dec 2020 15:40) (116/66 - 131/74)  RR: 18 (14 Dec 2020 15:40) (18 - 18)  SpO2: 96% (14 Dec 2020 15:40) (96% - 96%)    Troponin I : 0.056  EKG: NSR   (14 Dec 2020 15:41)      PAST MEDICAL & SURGICAL HISTORY:  Asthma    HTN (hypertension)    DM (diabetes mellitus)    No significant past surgical history           MEDICATIONS  (STANDING):  ALBUTerol    90 MICROgram(s) HFA Inhaler 2 Puff(s) Inhalation every 6 hours  ascorbic acid 500 milliGRAM(s) Oral daily  benzonatate 100 milliGRAM(s) Oral every 8 hours  budesonide  80 MICROgram(s)/formoterol 4.5 MICROgram(s) Inhaler 2 Puff(s) Inhalation two times a day  cholecalciferol 1000 Unit(s) Oral daily  dextrose 5%. 1000 milliLiter(s) (100 mL/Hr) IV Continuous <Continuous>  ferrous    sulfate 325 milliGRAM(s) Oral daily  gabapentin 300 milliGRAM(s) Oral every 12 hours  glucagon  Injectable 1 milliGRAM(s) IntraMuscular once  heparin   Injectable 5000 Unit(s) SubCutaneous every 8 hours  insulin glargine Injectable (LANTUS) 16 Unit(s) SubCutaneous at bedtime  insulin lispro (ADMELOG) corrective regimen sliding scale   SubCutaneous three times a day before meals  insulin lispro (ADMELOG) corrective regimen sliding scale   SubCutaneous at bedtime  insulin lispro Injectable (ADMELOG) 5 Unit(s) SubCutaneous three times a day before meals  loratadine 10 milliGRAM(s) Oral daily  methylPREDNISolone sodium succinate Injectable 40 milliGRAM(s) IV Push once  montelukast 10 milliGRAM(s) Oral daily  multivitamin 1 Tablet(s) Oral daily  pantoprazole    Tablet 40 milliGRAM(s) Oral before breakfast  potassium phosphate / sodium phosphate Tablet (K-PHOS No. 2) 1 Tablet(s) Oral three times a day  sodium chloride 0.9%. 1000 milliLiter(s) (50 mL/Hr) IV Continuous <Continuous>  zinc sulfate 220 milliGRAM(s) Oral daily    MEDICATIONS  (PRN):  acetaminophen   Tablet .. 650 milliGRAM(s) Oral every 6 hours PRN Temp greater or equal to 38C (100.4F), Mild Pain (1 - 3)      FAMILY HISTORY:      SOCIAL HISTORY:      REVIEW OF SYSTEMS:  Unable to access in detail as pt is demented. Keeps asking to go home. 	        Vital Signs Last 24 Hrs  T(C): 36.8 (21 Dec 2020 11:15), Max: 37.1 (20 Dec 2020 16:08)  T(F): 98.3 (21 Dec 2020 11:15), Max: 98.7 (20 Dec 2020 16:08)  HR: 76 (21 Dec 2020 11:15) (68 - 76)  BP: 120/73 (21 Dec 2020 11:15) (119/50 - 137/65)  BP(mean): --  RR: 20 (21 Dec 2020 11:15) (18 - 20)  SpO2: 97% (21 Dec 2020 11:15) (95% - 99%)      Constitutional:    NC/AT:    HEENT:    Neck:  No JVD, bruits or thyromegaly    Respiratory:  B/L wheeze    Cardiovascular:  RR without murmur, rub or gallop.    Gastrointestinal: Soft without hepatosplenomegaly.    Extremities: without cyanosis, clubbing or edema.    Neurological:  Oriented   x  2    . No gross sensory or motor defects.        LABS:                        12.1   9.97  )-----------( 315      ( 21 Dec 2020 07:12 )             38.7     12-21    134<L>  |  103  |  32<H>  ----------------------------<  356<H>  4.9   |  21<L>  |  1.30    Ca    9.1      21 Dec 2020 07:12  Phos  4.2     12-21  Mg     2.2     12-21              CAPILLARY BLOOD GLUCOSE      POCT Blood Glucose.: 128 mg/dL (21 Dec 2020 07:58)      RADIOLOGY & ADDITIONAL STUDIES: < from: Xray Chest 1 View- PORTABLE-Urgent (Xray Chest 1 View- PORTABLE-Urgent .) (12.18.20 @ 11:50) >    EXAM:  XR CHEST PORTABLE URGENT 1V                            PROCEDURE DATE:  12/18/2020          INTERPRETATION:  CLINICAL STATEMENT: Follow-up chest pain.    TECHNIQUE: AP view of the chest.    COMPARISON: 12/14/2020    FINDINGS/  IMPRESSION:  Mild opacities lung bases slightly progressed on the left.    No pleural effusion    Heart size cannot be accurately assessed in this projection.    Mildly prominent right hilum which may be related to pulmonary

## 2020-12-21 NOTE — PROGRESS NOTE ADULT - SUBJECTIVE AND OBJECTIVE BOX
Time of visit:    CHIEF COMPLAINT: Patient is a 89y old  Female who presents with a chief complaint of Fever, chills, Covid positive (21 Dec 2020 13:28)      HPI:  89 y/o female Rivera Speaking, with PMHx of asthma, HTN, DM presents to the ED c/o shortness of breath and diarrhea, fever, chills x 1 days. Patient was very irritable at the time of encounter, AAo x3, but non-cooperative so main history was obtained from ED chart.  Pt reports worsening shortness of breath with associated subjective fever, chills, chest pain and cough. Pt reports that her grandson is positive for COVID-19.. Pt also has chronic leg pain.   Denies nausea, emesis, abdominal pain, dysuria, hematuria, dconstipation, or any other acute complaints.  ED Course:  Vital Signs Last 24 Hrs  T(C): 36.5 (14 Dec 2020 15:40), Max: 37.7 (14 Dec 2020 12:47)  T(F): 97.7 (14 Dec 2020 15:40), Max: 99.8 (14 Dec 2020 12:47)  HR: 84 (14 Dec 2020 15:40) (84 - 95)  BP: 116/66 (14 Dec 2020 15:40) (116/66 - 131/74)  RR: 18 (14 Dec 2020 15:40) (18 - 18)  SpO2: 96% (14 Dec 2020 15:40) (96% - 96%)    Troponin I : 0.056  EKG: NSR   (14 Dec 2020 15:41)   Patient seen and examined.     PAST MEDICAL & SURGICAL HISTORY:  Asthma    HTN (hypertension)    DM (diabetes mellitus)    No significant past surgical history        Allergies    No Known Allergies    Intolerances        MEDICATIONS  (STANDING):  ALBUTerol    90 MICROgram(s) HFA Inhaler 2 Puff(s) Inhalation every 6 hours  ascorbic acid 500 milliGRAM(s) Oral daily  benzonatate 100 milliGRAM(s) Oral every 8 hours  budesonide  80 MICROgram(s)/formoterol 4.5 MICROgram(s) Inhaler 2 Puff(s) Inhalation two times a day  cholecalciferol 1000 Unit(s) Oral daily  dextrose 5%. 1000 milliLiter(s) (100 mL/Hr) IV Continuous <Continuous>  ferrous    sulfate 325 milliGRAM(s) Oral daily  gabapentin 300 milliGRAM(s) Oral every 12 hours  glucagon  Injectable 1 milliGRAM(s) IntraMuscular once  heparin   Injectable 5000 Unit(s) SubCutaneous every 8 hours  insulin glargine Injectable (LANTUS) 16 Unit(s) SubCutaneous at bedtime  insulin lispro (ADMELOG) corrective regimen sliding scale   SubCutaneous three times a day before meals  insulin lispro (ADMELOG) corrective regimen sliding scale   SubCutaneous at bedtime  insulin lispro Injectable (ADMELOG) 8 Unit(s) SubCutaneous three times a day before meals  loratadine 10 milliGRAM(s) Oral daily  methylPREDNISolone sodium succinate Injectable 40 milliGRAM(s) IV Push once  montelukast 10 milliGRAM(s) Oral daily  multivitamin 1 Tablet(s) Oral daily  pantoprazole    Tablet 40 milliGRAM(s) Oral before breakfast  potassium phosphate / sodium phosphate Tablet (K-PHOS No. 2) 1 Tablet(s) Oral three times a day  sodium chloride 0.9%. 1000 milliLiter(s) (50 mL/Hr) IV Continuous <Continuous>  zinc sulfate 220 milliGRAM(s) Oral daily      MEDICATIONS  (PRN):  acetaminophen   Tablet .. 650 milliGRAM(s) Oral every 6 hours PRN Temp greater or equal to 38C (100.4F), Mild Pain (1 - 3)   Medications up to date at time of exam.    Medications up to date at time of exam.    FAMILY HISTORY:      SOCIAL HISTORY  Smoking History: [   ] smoking/smoke exposure, [   ] former smoker  Living Condition: [   ] apartment, [   ] private house  Work History:   Travel History: denies recent travel  Illicit Substance Use: denies  Alcohol Use: denies    REVIEW OF SYSTEMS:    CONSTITUTIONAL:  denies fevers, chills, sweats, weight loss    HEENT:  denies diplopia or blurred vision, sore throat or runny nose.    CARDIOVASCULAR:  denies pressure, squeezing, tightness, or heaviness about the chest; no palpitations.    RESPIRATORY:  denies SOB, cough, DELGADO, wheezing.    GASTROINTESTINAL:  denies abdominal pain, nausea, vomiting or diarrhea.    GENITOURINARY: denies dysuria, frequency or urgency.    NEUROLOGIC:  denies numbness, tingling, seizures or weakness.    PSYCHIATRIC:  denies disorder of thought or mood.    MSK: denies swelling, redness      PHYSICAL EXAMINATION:    GENERAL: The patient is a well-developed, well-nourished, in no apparent distress.     Vital Signs Last 24 Hrs  T(C): 36.8 (21 Dec 2020 11:15), Max: 37.1 (20 Dec 2020 16:08)  T(F): 98.3 (21 Dec 2020 11:15), Max: 98.7 (20 Dec 2020 16:08)  HR: 76 (21 Dec 2020 11:15) (68 - 76)  BP: 120/73 (21 Dec 2020 11:15) (119/50 - 137/65)  BP(mean): --  RR: 20 (21 Dec 2020 11:15) (18 - 20)  SpO2: 97% (21 Dec 2020 11:15) (95% - 99%)   (if applicable)    Chest Tube (if applicable)    HEENT: Head is normocephalic and atraumatic. Extraocular muscles are intact. Mucous membranes are moist.     NECK: Supple, no palpable adenopathy.    LUNGS: Clear to auscultation, no wheezing, rales, or rhonchi.    HEART: Regular rate and rhythm without murmur.    ABDOMEN: Soft, nontender, and nondistended.  No hepatosplenomegaly is noted.    RENAL: No difficulty voiding, no pelvic pain    EXTREMITIES: Without any cyanosis, clubbing, rash, lesions or edema.    NEUROLOGIC: Awake, alert, oriented, grossly intact    SKIN: Warm, dry, good turgor.      LABS:                        12.1   9.97  )-----------( 315      ( 21 Dec 2020 07:12 )             38.7     12-21    134<L>  |  103  |  32<H>  ----------------------------<  356<H>  4.9   |  21<L>  |  1.30    Ca    9.1      21 Dec 2020 07:12  Phos  4.2     12-21  Mg     2.2     12-21                          MICROBIOLOGY: (if applicable)    RADIOLOGY & ADDITIONAL STUDIES:  EKG:   CXR:  ECHO:    IMPRESSION: 89y Female PAST MEDICAL & SURGICAL HISTORY:  Asthma    HTN (hypertension)    DM (diabetes mellitus)    No significant past surgical history     p/w                   RECOMMENDATIONS:   Time of visit:    CHIEF COMPLAINT: Patient is a 89y old  Female who presents with a chief complaint of Fever, chills, Covid positive (21 Dec 2020 13:28)      HPI:  89 y/o female Rivera Speaking, with PMHx of asthma, HTN, DM presents to the ED c/o shortness of breath and diarrhea, fever, chills x 1 days. Patient was very irritable at the time of encounter, AAo x3, but non-cooperative so main history was obtained from ED chart.  Pt reports worsening shortness of breath with associated subjective fever, chills, chest pain and cough. Pt reports that her grandson is positive for COVID-19.. Pt also has chronic leg pain.   Denies nausea, emesis, abdominal pain, dysuria, hematuria, dconstipation, or any other acute complaints.  ED Course:  Vital Signs Last 24 Hrs  T(C): 36.5 (14 Dec 2020 15:40), Max: 37.7 (14 Dec 2020 12:47)  T(F): 97.7 (14 Dec 2020 15:40), Max: 99.8 (14 Dec 2020 12:47)  HR: 84 (14 Dec 2020 15:40) (84 - 95)  BP: 116/66 (14 Dec 2020 15:40) (116/66 - 131/74)  RR: 18 (14 Dec 2020 15:40) (18 - 18)  SpO2: 96% (14 Dec 2020 15:40) (96% - 96%)    Troponin I : 0.056  EKG: NSR   (14 Dec 2020 15:41)   Patient seen and examined.     PAST MEDICAL & SURGICAL HISTORY:  Asthma    HTN (hypertension)    DM (diabetes mellitus)    No significant past surgical history        Allergies    No Known Allergies    Intolerances        MEDICATIONS  (STANDING):  ALBUTerol    90 MICROgram(s) HFA Inhaler 2 Puff(s) Inhalation every 6 hours  ascorbic acid 500 milliGRAM(s) Oral daily  benzonatate 100 milliGRAM(s) Oral every 8 hours  budesonide  80 MICROgram(s)/formoterol 4.5 MICROgram(s) Inhaler 2 Puff(s) Inhalation two times a day  cholecalciferol 1000 Unit(s) Oral daily  dextrose 5%. 1000 milliLiter(s) (100 mL/Hr) IV Continuous <Continuous>  ferrous    sulfate 325 milliGRAM(s) Oral daily  gabapentin 300 milliGRAM(s) Oral every 12 hours  glucagon  Injectable 1 milliGRAM(s) IntraMuscular once  heparin   Injectable 5000 Unit(s) SubCutaneous every 8 hours  insulin glargine Injectable (LANTUS) 16 Unit(s) SubCutaneous at bedtime  insulin lispro (ADMELOG) corrective regimen sliding scale   SubCutaneous three times a day before meals  insulin lispro (ADMELOG) corrective regimen sliding scale   SubCutaneous at bedtime  insulin lispro Injectable (ADMELOG) 8 Unit(s) SubCutaneous three times a day before meals  loratadine 10 milliGRAM(s) Oral daily  methylPREDNISolone sodium succinate Injectable 40 milliGRAM(s) IV Push once  montelukast 10 milliGRAM(s) Oral daily  multivitamin 1 Tablet(s) Oral daily  pantoprazole    Tablet 40 milliGRAM(s) Oral before breakfast  potassium phosphate / sodium phosphate Tablet (K-PHOS No. 2) 1 Tablet(s) Oral three times a day  sodium chloride 0.9%. 1000 milliLiter(s) (50 mL/Hr) IV Continuous <Continuous>  zinc sulfate 220 milliGRAM(s) Oral daily      MEDICATIONS  (PRN):  acetaminophen   Tablet .. 650 milliGRAM(s) Oral every 6 hours PRN Temp greater or equal to 38C (100.4F), Mild Pain (1 - 3)   Medications up to date at time of exam.    Medications up to date at time of exam.    FAMILY HISTORY:      SOCIAL HISTORY  Smoking History: [   ] smoking/smoke exposure, [   ] former smoker  Living Condition: [   ] apartment, [   ] private house  Work History:   Travel History: denies recent travel  Illicit Substance Use: denies  Alcohol Use: denies    REVIEW OF SYSTEMS:    CONSTITUTIONAL:  denies fevers, chills, sweats, weight loss    HEENT:  denies diplopia or blurred vision, sore throat or runny nose.    CARDIOVASCULAR:  denies pressure, squeezing, tightness, or heaviness about the chest; no palpitations.    RESPIRATORY:  denies SOB, cough, DELGADO, wheezing.    GASTROINTESTINAL:  denies abdominal pain, nausea, vomiting or diarrhea.    GENITOURINARY: denies dysuria, frequency or urgency.    NEUROLOGIC:  denies numbness, tingling, seizures or weakness.    PSYCHIATRIC:  denies disorder of thought or mood.    MSK: denies swelling, redness      PHYSICAL EXAMINATION:    GENERAL: The patient is a well-developed, well-nourished, in no apparent distress.     Vital Signs Last 24 Hrs  T(C): 36.8 (21 Dec 2020 11:15), Max: 37.1 (20 Dec 2020 16:08)  T(F): 98.3 (21 Dec 2020 11:15), Max: 98.7 (20 Dec 2020 16:08)  HR: 76 (21 Dec 2020 11:15) (68 - 76)  BP: 120/73 (21 Dec 2020 11:15) (119/50 - 137/65)  BP(mean): --  RR: 20 (21 Dec 2020 11:15) (18 - 20)  SpO2: 97% (21 Dec 2020 11:15) (95% - 99%)   (if applicable)    Chest Tube (if applicable)    HEENT: Head is normocephalic and atraumatic. Extraocular muscles are intact. Mucous membranes are moist.     NECK: Supple, no palpable adenopathy.    LUNGS: Clear to auscultation, no wheezing, rales, or rhonchi.    HEART: Regular rate and rhythm without murmur.    ABDOMEN: Soft, nontender, and nondistended.  No hepatosplenomegaly is noted.    RENAL: No difficulty voiding, no pelvic pain    EXTREMITIES: Without any cyanosis, clubbing, rash, lesions or edema.    NEUROLOGIC: Awake, alert, oriented, grossly intact    SKIN: Warm, dry, good turgor.      LABS:                        12.1   9.97  )-----------( 315      ( 21 Dec 2020 07:12 )             38.7     12-21    134<L>  |  103  |  32<H>  ----------------------------<  356<H>  4.9   |  21<L>  |  1.30    Ca    9.1      21 Dec 2020 07:12  Phos  4.2     12-21  Mg     2.2     12-21                          MICROBIOLOGY: (if applicable)    RADIOLOGY & ADDITIONAL STUDIES:  EKG:   CXR:  ECHO:    IMPRESSION: 89y Female PAST MEDICAL & SURGICAL HISTORY:  Asthma    HTN (hypertension)    DM (diabetes mellitus)    No significant past surgical history     p/w               IMP: This is a 89 yr old woman Rivera Speaking, with  asthma, HTN, DM presents to the ED c/o shortness of breath and diarrhea, fever, chills x 1 days.   Pt reports worsening shortness of breath with associated subjective fever, chills, chest pain and cough. Pt reports that her grandson is positive for COVID-19..     - Acute Hypoxic Resp failure  - Covid-19 pna   - PNA b/l   - DM uncontrol   - HTN  - Asthma   - TAMANNA on CKD      Sugg;  -continue isolation : air borne and contact   -blood sugar control   -completed 10 days of decadron 12/9  -please taper decadron daily .. pat is gettting solumedrol .. taper solumedrol every day   -pat is not a candidate for Remdesivir due to low GFR  -continue O2 supp as needed to keep Sat >90%  -doubt PE but agreed with CT chest non contrast  -if pat become more symptomatic or requiring higher O2 supp , then check biomarkers  -dvt/gi prophy  -monitor renal fx   -PPI/ DVT  -PT eval  -OOB to chair

## 2020-12-21 NOTE — PROGRESS NOTE ADULT - PROBLEM SELECTOR PLAN 3
Cr 1.6 on admission, 1.31 today. Baseline (1.180)  - GFR 36  - U creat 20, Brice 41, UOsmol 192  - 32/1.3 on 12/19  Nephrologist Dr Dove following  - - Scr 1.6 on admission   - - Renal function improving  - - Check UA  - - Monitor BMP   - - AVoid further nephrotoxics, NSAIDS RCA

## 2020-12-21 NOTE — CONSULT NOTE ADULT - ASSESSMENT
89 y/o female Rivera Speaking, with PMHx of asthma, HTN, DM presents to the ED c/o shortness of breath and diarrhea, fever, chills x 1 days. Admitted for covid-19 related pneumonia.  Endocrinology was consulted for uncontrolled DM.
89 y/o female Rivera Speaking, with PMHx of asthma, HTN, DM presents to the ED c/o shortness of breath and diarrhea, fever, chills x 1 days    TAMANNA  Scr 1.6 on admission   Renal function improving  Check UA  MOnitor BMP   AVoid further nephrotoxics, NSAIDS RCA    HTN  BP stable  Monitor BP    COVID PNA  Monitor temp/ O2  Follow up ID/ Pulm

## 2020-12-21 NOTE — CONSULT NOTE ADULT - PROBLEM SELECTOR RECOMMENDATION 9
Hyperglycemia likely  due to uncontrolled DM in addition to iv steroid   HbA1C;  7.2  Pt takes  Novolog 70/30 40 U BID , Metformin and Glipizide  Continue moderate sliding scale Humalog Hyperglycemia likely  due to uncontrolled DM in addition to iv steroid   HbA1C;  7.2  Pt takes  Novolog 70/30 40 U BID , Metformin and Glipizide  Continue moderate sliding scale Humalog  Continue Lantus 16 U  Increase pre meal Humalog to 8 u tid  Monitor blood glucose Hyperglycemia likely  due to uncontrolled DM in addition to iv steroid   HbA1C;  7.2  Pt takes  Novolog 70/30 40 U BID , Metformin and Glipizide  Continue moderate sliding scale Humalog  Continue Lantus 16 U- was held last night dose  Increase pre meal Humalog to 8 u tid  Monitor blood glucose

## 2020-12-21 NOTE — PROGRESS NOTE ADULT - PROBLEM SELECTOR PLAN 1
Patient is coming with fever chills , diarrhea since 1 day,  - Sick contacts at home,   - CXR is negative for pleural effusion, infiltrates or consolidation  - patient is saturating 98% on room air, however, her saturation drops when she is agitated  - No chest pain  - s/p 1 dose of Dexamethasone in ED  - c/w IV Dexa 6mg for 10 doses (started 12/14 - 12/24)  - CrCL <30, Patient wont qualify for Remdesivir as per Dr Del Angel   - COVID Ab negative  - stable on Room Air  - CXR on 12/18 with mild opacities lung bases slightly progressed on the left.  - CT chest ordered 12/18 - pending

## 2020-12-21 NOTE — PROGRESS NOTE ADULT - SUBJECTIVE AND OBJECTIVE BOX
PGY-1 Progress Note discussed with attending    PAGER #: [[phone]] TILL 5:00 PM  PLEASE CONTACT ON CALL TEAM:   - On Call Team (Please refer to Agustina) FROM 5:00 PM - 8:30PM  - Nightfloat Team FROM 8:30 -7:30 AM    CHIEF COMPLAINT & BRIEF HOSPITAL COURSE:  89 y/o female Rivera Speaking, with PMHx of asthma, HTN, DM p/w c/o shortness of breath and diarrhea, fever, chills x 1 days. Pt's grandson is positive for COVID-19. Admitted for COVID PCR + and Ab negative, CXR w/out consolidation or pleural effusion. Pt started on Decadron 12/14 for wheezing. Did NOT qualify for remdesivir per ID Dr. Del Angel as creatinine clearance was low. Saturating well on room air. BCx NGTD. Repeat CXR on 12/18 shows b/l opacities at lung bases, slightly progressed on left. Plan for CT chest to eval infiltrate. 3 episodes of loose stools prior to any Abx, sent for C.diff 12/18 which was negative. Endocrine consulted ***    INTERVAL HPI/OVERNIGHT EVENTS:       REVIEW OF SYSTEMS:  CONSTITUTIONAL: No fever, weight loss, or fatigue  RESPIRATORY: No cough, wheezing, chills or hemoptysis; No shortness of breath  CARDIOVASCULAR: No chest pain, palpitations, dizziness, or leg swelling  GASTROINTESTINAL: No abdominal pain. No nausea, vomiting, or hematemesis; No diarrhea or constipation. No melena or hematochezia.  GENITOURINARY: No dysuria or hematuria, urinary frequency  NEUROLOGICAL: No headaches, memory loss, loss of strength, numbness, or tremors  SKIN: No itching, burning, rashes, or lesions     MEDICATIONS  (STANDING):  ALBUTerol    90 MICROgram(s) HFA Inhaler 2 Puff(s) Inhalation every 6 hours  ascorbic acid 500 milliGRAM(s) Oral daily  benzonatate 100 milliGRAM(s) Oral every 8 hours  budesonide  80 MICROgram(s)/formoterol 4.5 MICROgram(s) Inhaler 2 Puff(s) Inhalation two times a day  cholecalciferol 1000 Unit(s) Oral daily  dextrose 5%. 1000 milliLiter(s) (100 mL/Hr) IV Continuous <Continuous>  ferrous    sulfate 325 milliGRAM(s) Oral daily  gabapentin 300 milliGRAM(s) Oral every 12 hours  glucagon  Injectable 1 milliGRAM(s) IntraMuscular once  heparin   Injectable 5000 Unit(s) SubCutaneous every 8 hours  insulin glargine Injectable (LANTUS) 16 Unit(s) SubCutaneous at bedtime  insulin lispro (ADMELOG) corrective regimen sliding scale   SubCutaneous at bedtime  insulin lispro (ADMELOG) corrective regimen sliding scale   SubCutaneous three times a day before meals  insulin lispro Injectable (ADMELOG) 5 Unit(s) SubCutaneous three times a day before meals  loratadine 10 milliGRAM(s) Oral daily  methylPREDNISolone sodium succinate Injectable 40 milliGRAM(s) IV Push once  montelukast 10 milliGRAM(s) Oral daily  multivitamin 1 Tablet(s) Oral daily  pantoprazole    Tablet 40 milliGRAM(s) Oral before breakfast  potassium phosphate / sodium phosphate Tablet (K-PHOS No. 2) 1 Tablet(s) Oral three times a day  sodium chloride 0.9%. 1000 milliLiter(s) (50 mL/Hr) IV Continuous <Continuous>  zinc sulfate 220 milliGRAM(s) Oral daily    MEDICATIONS  (PRN):  acetaminophen   Tablet .. 650 milliGRAM(s) Oral every 6 hours PRN Temp greater or equal to 38C (100.4F), Mild Pain (1 - 3)      Vital Signs Last 24 Hrs  T(C): 36.8 (21 Dec 2020 11:15), Max: 37.1 (20 Dec 2020 16:08)  T(F): 98.3 (21 Dec 2020 11:15), Max: 98.7 (20 Dec 2020 16:08)  HR: 76 (21 Dec 2020 11:15) (68 - 76)  BP: 120/73 (21 Dec 2020 11:15) (119/50 - 137/65)  BP(mean): --  RR: 20 (21 Dec 2020 11:15) (18 - 20)  SpO2: 97% (21 Dec 2020 11:15) (95% - 99%)    PHYSICAL EXAMINATION:  GENERAL: NAD, well built  HEAD:  Atraumatic, Normocephalic  EYES:  conjunctiva and sclera clear  NECK: Supple, No JVD, Normal thyroid  CHEST/LUNG: Clear to auscultation. Clear to percussion bilaterally; No rales, rhonchi, wheezing, or rubs  HEART: Regular rate and rhythm; No murmurs, rubs, or gallops  ABDOMEN: Soft, Nontender, Nondistended; Bowel sounds present  NERVOUS SYSTEM:  Alert & Oriented X3,    EXTREMITIES:  2+ Peripheral Pulses, No clubbing, cyanosis, or edema  SKIN: warm dry                          12.1   9.97  )-----------( 315      ( 21 Dec 2020 07:12 )             38.7     12-21    134<L>  |  103  |  32<H>  ----------------------------<  356<H>  4.9   |  21<L>  |  1.30    Ca    9.1      21 Dec 2020 07:12  Phos  4.2     12-21  Mg     2.2     12-21                    I&O's Summary      CAPILLARY BLOOD GLUCOSE      POCT Blood Glucose.: 128 mg/dL (21 Dec 2020 07:58)    CAPILLARY BLOOD GLUCOSE      POCT Blood Glucose.: 128 mg/dL (21 Dec 2020 07:58)      RADIOLOGY & ADDITIONAL TESTS:                   PGY-1 Progress Note discussed with attending    PAGER #: [[phone]] TILL 5:00 PM  PLEASE CONTACT ON CALL TEAM:   - On Call Team (Please refer to Agustina) FROM 5:00 PM - 8:30PM  - Nightfloat Team FROM 8:30 -7:30 AM    CHIEF COMPLAINT & BRIEF HOSPITAL COURSE:  89 y/o female Rivera Speaking, with PMHx of asthma, HTN, DM p/w c/o shortness of breath and diarrhea, fever, chills x 1 days. Pt's grandson is positive for COVID-19. Admitted for COVID PCR + and Ab negative, CXR w/out consolidation or pleural effusion. Pt started on Decadron 12/14 for wheezing. Did NOT qualify for remdesivir per ID Dr. Del Angel as creatinine clearance was low. Saturating well on room air. BCx NGTD. Repeat CXR on 12/18 shows b/l opacities at lung bases, slightly progressed on left. Plan for CT chest to eval infiltrate. 3 episodes of loose stools prior to any Abx, sent for C.diff 12/18 which was negative. Endocrine Dr. Britt consulted for elevated blood sugars in the setting of steroid and refusal of insulin. Pending CT chest.    INTERVAL HPI/OVERNIGHT EVENTS:   Patient refused her Lantus 16 U last night, was having BG elevations 300-400+. This AM patient is tearful and asking to leave. No new complaints. Still wheezing when upset.      REVIEW OF SYSTEMS:  CONSTITUTIONAL: No fever, weight loss, or fatigue  RESPIRATORY: + cough, + wheezing. No chills or hemoptysis; + shortness of breath  CARDIOVASCULAR: No chest pain, palpitations, dizziness, or leg swelling  GASTROINTESTINAL: No abdominal pain. No nausea, vomiting, or hematemesis; No diarrhea or constipation. No melena or hematochezia.  GENITOURINARY: No dysuria or hematuria, urinary frequency  NEUROLOGICAL: No headaches, memory loss, loss of strength, numbness, or tremors  SKIN: No itching, burning, rashes, or lesions     MEDICATIONS  (STANDING):  ALBUTerol    90 MICROgram(s) HFA Inhaler 2 Puff(s) Inhalation every 6 hours  ascorbic acid 500 milliGRAM(s) Oral daily  benzonatate 100 milliGRAM(s) Oral every 8 hours  budesonide  80 MICROgram(s)/formoterol 4.5 MICROgram(s) Inhaler 2 Puff(s) Inhalation two times a day  cholecalciferol 1000 Unit(s) Oral daily  dextrose 5%. 1000 milliLiter(s) (100 mL/Hr) IV Continuous <Continuous>  ferrous    sulfate 325 milliGRAM(s) Oral daily  gabapentin 300 milliGRAM(s) Oral every 12 hours  glucagon  Injectable 1 milliGRAM(s) IntraMuscular once  heparin   Injectable 5000 Unit(s) SubCutaneous every 8 hours  insulin glargine Injectable (LANTUS) 16 Unit(s) SubCutaneous at bedtime  insulin lispro (ADMELOG) corrective regimen sliding scale   SubCutaneous at bedtime  insulin lispro (ADMELOG) corrective regimen sliding scale   SubCutaneous three times a day before meals  insulin lispro Injectable (ADMELOG) 5 Unit(s) SubCutaneous three times a day before meals  loratadine 10 milliGRAM(s) Oral daily  methylPREDNISolone sodium succinate Injectable 40 milliGRAM(s) IV Push once  montelukast 10 milliGRAM(s) Oral daily  multivitamin 1 Tablet(s) Oral daily  pantoprazole    Tablet 40 milliGRAM(s) Oral before breakfast  potassium phosphate / sodium phosphate Tablet (K-PHOS No. 2) 1 Tablet(s) Oral three times a day  sodium chloride 0.9%. 1000 milliLiter(s) (50 mL/Hr) IV Continuous <Continuous>  zinc sulfate 220 milliGRAM(s) Oral daily    MEDICATIONS  (PRN):  acetaminophen   Tablet .. 650 milliGRAM(s) Oral every 6 hours PRN Temp greater or equal to 38C (100.4F), Mild Pain (1 - 3)      Vital Signs Last 24 Hrs  T(C): 36.8 (21 Dec 2020 11:15), Max: 37.1 (20 Dec 2020 16:08)  T(F): 98.3 (21 Dec 2020 11:15), Max: 98.7 (20 Dec 2020 16:08)  HR: 76 (21 Dec 2020 11:15) (68 - 76)  BP: 120/73 (21 Dec 2020 11:15) (119/50 - 137/65)  BP(mean): --  RR: 20 (21 Dec 2020 11:15) (18 - 20)  SpO2: 97% (21 Dec 2020 11:15) (95% - 99%)    PHYSICAL EXAMINATION:  GENERAL: NAD, overweight, tearful,   HEAD:  Atraumatic, Normocephalic  EYES:  conjunctiva and sclera clear  NECK: Supple, No JVD,   CHEST/LUNG: Diffuse wheezing throughout lung fields; No rales, rhonchi  HEART: Regular rate and rhythm; No murmurs, rubs, or gallops  ABDOMEN: Soft, Nontender, Nondistended; Bowel sounds present  NERVOUS SYSTEM:  Unable to ascertain orientation as patient not responding to questions appropriately  EXTREMITIES:  2+ Peripheral Pulses, No clubbing, cyanosis, or edema  SKIN: warm dry                          12.1   9.97  )-----------( 315      ( 21 Dec 2020 07:12 )             38.7     12-21    134<L>  |  103  |  32<H>  ----------------------------<  356<H>  4.9   |  21<L>  |  1.30    Ca    9.1      21 Dec 2020 07:12  Phos  4.2     12-21  Mg     2.2     12-21      I&O's Summary      CAPILLARY BLOOD GLUCOSE  POCT Blood Glucose.: 128 mg/dL (21 Dec 2020 07:58)    CAPILLARY BLOOD GLUCOSE  POCT Blood Glucose.: 128 mg/dL (21 Dec 2020 07:58)      RADIOLOGY & ADDITIONAL TESTS:                     CHIEF COMPLAINT & BRIEF HOSPITAL COURSE:  89 y/o female Rivera Speaking, with PMHx of asthma, HTN, DM p/w c/o shortness of breath and diarrhea, fever, chills x 1 days. Pt's grandson is positive for COVID-19. Admitted for COVID PCR + and Ab negative, CXR w/out consolidation or pleural effusion. Pt started on Decadron 12/14 for wheezing. Did NOT qualify for remdesivir per ID Dr. Del Angel as creatinine clearance was low. Saturating well on room air. BCx NGTD. Repeat CXR on 12/18 shows b/l opacities at lung bases, slightly progressed on left. Plan for CT chest to eval infiltrate. 3 episodes of loose stools prior to any Abx, sent for C.diff 12/18 which was negative. Endocrine Dr. Britt consulted for elevated blood sugars in the setting of steroid and refusal of insulin. Pending CT chest.    INTERVAL HPI/OVERNIGHT EVENTS:   Patient refused her Lantus 16 U last night, was having BG elevations 300-400+. This AM patient is tearful and asking to leave. No new complaints. Still wheezing when upset.      REVIEW OF SYSTEMS:  CONSTITUTIONAL: No fever, weight loss, or fatigue  RESPIRATORY: + cough, + wheezing. No chills or hemoptysis; + shortness of breath  CARDIOVASCULAR: No chest pain, palpitations, dizziness, or leg swelling  GASTROINTESTINAL: No abdominal pain. No nausea, vomiting, or hematemesis; No diarrhea or constipation. No melena or hematochezia.  GENITOURINARY: No dysuria or hematuria, urinary frequency  NEUROLOGICAL: No headaches, memory loss, loss of strength, numbness, or tremors  SKIN: No itching, burning, rashes, or lesions     MEDICATIONS  (STANDING):  ALBUTerol    90 MICROgram(s) HFA Inhaler 2 Puff(s) Inhalation every 6 hours  ascorbic acid 500 milliGRAM(s) Oral daily  benzonatate 100 milliGRAM(s) Oral every 8 hours  budesonide  80 MICROgram(s)/formoterol 4.5 MICROgram(s) Inhaler 2 Puff(s) Inhalation two times a day  cholecalciferol 1000 Unit(s) Oral daily  dextrose 5%. 1000 milliLiter(s) (100 mL/Hr) IV Continuous <Continuous>  ferrous    sulfate 325 milliGRAM(s) Oral daily  gabapentin 300 milliGRAM(s) Oral every 12 hours  glucagon  Injectable 1 milliGRAM(s) IntraMuscular once  heparin   Injectable 5000 Unit(s) SubCutaneous every 8 hours  insulin glargine Injectable (LANTUS) 16 Unit(s) SubCutaneous at bedtime  insulin lispro (ADMELOG) corrective regimen sliding scale   SubCutaneous at bedtime  insulin lispro (ADMELOG) corrective regimen sliding scale   SubCutaneous three times a day before meals  insulin lispro Injectable (ADMELOG) 5 Unit(s) SubCutaneous three times a day before meals  loratadine 10 milliGRAM(s) Oral daily  methylPREDNISolone sodium succinate Injectable 40 milliGRAM(s) IV Push once  montelukast 10 milliGRAM(s) Oral daily  multivitamin 1 Tablet(s) Oral daily  pantoprazole    Tablet 40 milliGRAM(s) Oral before breakfast  potassium phosphate / sodium phosphate Tablet (K-PHOS No. 2) 1 Tablet(s) Oral three times a day  sodium chloride 0.9%. 1000 milliLiter(s) (50 mL/Hr) IV Continuous <Continuous>  zinc sulfate 220 milliGRAM(s) Oral daily    MEDICATIONS  (PRN):  acetaminophen   Tablet .. 650 milliGRAM(s) Oral every 6 hours PRN Temp greater or equal to 38C (100.4F), Mild Pain (1 - 3)      Vital Signs Last 24 Hrs  T(C): 36.8 (21 Dec 2020 11:15), Max: 37.1 (20 Dec 2020 16:08)  T(F): 98.3 (21 Dec 2020 11:15), Max: 98.7 (20 Dec 2020 16:08)  HR: 76 (21 Dec 2020 11:15) (68 - 76)  BP: 120/73 (21 Dec 2020 11:15) (119/50 - 137/65)  BP(mean): --  RR: 20 (21 Dec 2020 11:15) (18 - 20)  SpO2: 97% (21 Dec 2020 11:15) (95% - 99%)    PHYSICAL EXAMINATION:  GENERAL: NAD, overweight, tearful,   HEAD:  Atraumatic, Normocephalic  EYES:  conjunctiva and sclera clear  NECK: Supple, No JVD,   CHEST/LUNG: Diffuse wheezing throughout lung fields; No rales, rhonchi  HEART: Regular rate and rhythm; No murmurs, rubs, or gallops  ABDOMEN: Soft, Nontender, Nondistended; Bowel sounds present  NERVOUS SYSTEM:  Unable to ascertain orientation as patient not responding to questions appropriately  EXTREMITIES:  2+ Peripheral Pulses, No clubbing, cyanosis, or edema  SKIN: warm dry                          12.1   9.97  )-----------( 315      ( 21 Dec 2020 07:12 )             38.7     12-21    134<L>  |  103  |  32<H>  ----------------------------<  356<H>  4.9   |  21<L>  |  1.30    Ca    9.1      21 Dec 2020 07:12  Phos  4.2     12-21  Mg     2.2     12-21      I&O's Summary      CAPILLARY BLOOD GLUCOSE  POCT Blood Glucose.: 128 mg/dL (21 Dec 2020 07:58)    CAPILLARY BLOOD GLUCOSE  POCT Blood Glucose.: 128 mg/dL (21 Dec 2020 07:58)      RADIOLOGY & ADDITIONAL TESTS:

## 2020-12-21 NOTE — PROGRESS NOTE ADULT - SUBJECTIVE AND OBJECTIVE BOX
Dr. Teixeira  Office [phone]  Cell [phone]  Vidhi YOON  Cell [phone]    RENAL PROGRESS NOTE: DATE OF SERVICE 12-21-20 @ 15:25    Patient is a 89y old  Female who presents with a chief complaint of Fever, chills, Covid positive (21 Dec 2020 14:51)      Patient seen and examined at bedside. No apparent distress    VITALS:  T(F): 98.3 (12-21-20 @ 11:15), Max: 98.7 (12-20-20 @ 16:08)  HR: 76 (12-21-20 @ 11:15)  BP: 120/73 (12-21-20 @ 11:15)  RR: 20 (12-21-20 @ 11:15)  SpO2: 97% (12-21-20 @ 11:15)  Wt(kg): --        Hospital Medications:   MEDICATIONS  (STANDING):  ALBUTerol    90 MICROgram(s) HFA Inhaler 2 Puff(s) Inhalation every 6 hours  ascorbic acid 500 milliGRAM(s) Oral daily  benzonatate 100 milliGRAM(s) Oral every 8 hours  budesonide  80 MICROgram(s)/formoterol 4.5 MICROgram(s) Inhaler 2 Puff(s) Inhalation two times a day  cholecalciferol 1000 Unit(s) Oral daily  dextrose 5%. 1000 milliLiter(s) (100 mL/Hr) IV Continuous <Continuous>  ferrous    sulfate 325 milliGRAM(s) Oral daily  gabapentin 300 milliGRAM(s) Oral every 12 hours  glucagon  Injectable 1 milliGRAM(s) IntraMuscular once  heparin   Injectable 5000 Unit(s) SubCutaneous every 8 hours  insulin glargine Injectable (LANTUS) 16 Unit(s) SubCutaneous at bedtime  insulin lispro (ADMELOG) corrective regimen sliding scale   SubCutaneous three times a day before meals  insulin lispro (ADMELOG) corrective regimen sliding scale   SubCutaneous at bedtime  insulin lispro Injectable (ADMELOG) 8 Unit(s) SubCutaneous three times a day before meals  loratadine 10 milliGRAM(s) Oral daily  methylPREDNISolone sodium succinate Injectable 40 milliGRAM(s) IV Push once  montelukast 10 milliGRAM(s) Oral daily  multivitamin 1 Tablet(s) Oral daily  pantoprazole    Tablet 40 milliGRAM(s) Oral before breakfast  potassium phosphate / sodium phosphate Tablet (K-PHOS No. 2) 1 Tablet(s) Oral three times a day  sodium chloride 0.9%. 1000 milliLiter(s) (50 mL/Hr) IV Continuous <Continuous>  zinc sulfate 220 milliGRAM(s) Oral daily      LABS:  12-21    134<L>  |  103  |  32<H>  ----------------------------<  356<H>  4.9   |  21<L>  |  1.30    Ca    9.1      21 Dec 2020 07:12  Phos  4.2     12-21  Mg     2.2     12-21      Creatinine Trend: 1.30 <--, 1.46 <--, 1.36 <--, 1.31 <--, 1.36 <--    Phosphorus Level, Serum: 4.2 mg/dL (12-21 @ 07:12)                              12.1   9.97  )-----------( 315      ( 21 Dec 2020 07:12 )             38.7     Urine Studies:    Urine Creatinine 20      [12-14-20 @ 17:02]  Urine Sodium 41      [12-14-20 @ 17:02]  Urine Osmolality 192      [12-14-20 @ 17:02]    Iron 24, TIBC 264, %sat 9      [12-15-20 @ 07:46]  Ferritin 116      [12-15-20 @ 10:25]  Vitamin D (25OH) 36.3      [07-18-20 @ 11:35]  TSH 0.38      [12-15-20 @ 07:46]  Lipid: chol 139, , HDL 46, LDL --      [12-15-20 @ 07:46]      Immunofixation Serum:   No Monoclonal Band Identified    Reference Range: None Detected      [12-15-20 @ 11:01]  SPEP Interpretation: Normal Electrophoresis Pattern      [12-15-20 @ 11:01]    RADIOLOGY & ADDITIONAL STUDIES:

## 2020-12-21 NOTE — DIETITIAN INITIAL EVALUATION ADULT. - PERTINENT LABORATORY DATA
12-21 Na134 mmol/L<L> Glu 356 mg/dL<H> K+ 4.9 mmol/L Cr  1.30 mg/dL BUN 32 mg/dL<H>   12-21 Phos 4.2 mg/dL   12-15 Alb 2.9 g/dL<L>    Finger stick wbnrs=054 to 487   12-15 Chol 139 mg/dL LDL --    HDL 46 mg/dL<L> Trig 128 mg/dL  12-15-20 @ 10:17 HgbA1C 7.2 [4.0 - 5.6]

## 2020-12-21 NOTE — CONSULT NOTE ADULT - PROBLEM SELECTOR RECOMMENDATION 2
Isolation precaution   Pt is on solumedrol  Not on remdesevir due to low Cr clearance Isolation precaution   Pt is on solumedrol  Not on remdesevir due to low Cr clearance  Increase Lantus to 20 U at bedtime and pre meal Humalog to 8 u tid  Monitor blood glucose

## 2020-12-22 LAB
ALBUMIN SERPL ELPH-MCNC: 2.8 G/DL — LOW (ref 3.5–5)
ALP SERPL-CCNC: 101 U/L — SIGNIFICANT CHANGE UP (ref 40–120)
ALT FLD-CCNC: 56 U/L DA — SIGNIFICANT CHANGE UP (ref 10–60)
ANION GAP SERPL CALC-SCNC: 12 MMOL/L — SIGNIFICANT CHANGE UP (ref 5–17)
APTT BLD: 53.8 SEC — HIGH (ref 27.5–35.5)
AST SERPL-CCNC: 51 U/L — HIGH (ref 10–40)
BASOPHILS # BLD AUTO: 0 K/UL — SIGNIFICANT CHANGE UP (ref 0–0.2)
BASOPHILS NFR BLD AUTO: 0 % — SIGNIFICANT CHANGE UP (ref 0–2)
BILIRUB SERPL-MCNC: 0.4 MG/DL — SIGNIFICANT CHANGE UP (ref 0.2–1.2)
BUN SERPL-MCNC: 40 MG/DL — HIGH (ref 7–18)
CALCIUM SERPL-MCNC: 9.2 MG/DL — SIGNIFICANT CHANGE UP (ref 8.4–10.5)
CHLORIDE SERPL-SCNC: 100 MMOL/L — SIGNIFICANT CHANGE UP (ref 96–108)
CO2 SERPL-SCNC: 22 MMOL/L — SIGNIFICANT CHANGE UP (ref 22–31)
CREAT SERPL-MCNC: 1.42 MG/DL — HIGH (ref 0.5–1.3)
D DIMER BLD IA.RAPID-MCNC: 259 NG/ML DDU — HIGH
EOSINOPHIL # BLD AUTO: 0 K/UL — SIGNIFICANT CHANGE UP (ref 0–0.5)
EOSINOPHIL NFR BLD AUTO: 0 % — SIGNIFICANT CHANGE UP (ref 0–6)
GLUCOSE BLDC GLUCOMTR-MCNC: 226 MG/DL — HIGH (ref 70–99)
GLUCOSE BLDC GLUCOMTR-MCNC: 256 MG/DL — HIGH (ref 70–99)
GLUCOSE BLDC GLUCOMTR-MCNC: 284 MG/DL — HIGH (ref 70–99)
GLUCOSE BLDC GLUCOMTR-MCNC: 351 MG/DL — HIGH (ref 70–99)
GLUCOSE SERPL-MCNC: 334 MG/DL — HIGH (ref 70–99)
HCT VFR BLD CALC: 42.2 % — SIGNIFICANT CHANGE UP (ref 34.5–45)
HGB BLD-MCNC: 13.3 G/DL — SIGNIFICANT CHANGE UP (ref 11.5–15.5)
INR BLD: 1.02 RATIO — SIGNIFICANT CHANGE UP (ref 0.88–1.16)
LYMPHOCYTES # BLD AUTO: 1.42 K/UL — SIGNIFICANT CHANGE UP (ref 1–3.3)
LYMPHOCYTES # BLD AUTO: 12 % — LOW (ref 13–44)
MAGNESIUM SERPL-MCNC: 2.2 MG/DL — SIGNIFICANT CHANGE UP (ref 1.6–2.6)
MCHC RBC-ENTMCNC: 26.3 PG — LOW (ref 27–34)
MCHC RBC-ENTMCNC: 31.5 GM/DL — LOW (ref 32–36)
MCV RBC AUTO: 83.6 FL — SIGNIFICANT CHANGE UP (ref 80–100)
MONOCYTES # BLD AUTO: 0.59 K/UL — SIGNIFICANT CHANGE UP (ref 0–0.9)
MONOCYTES NFR BLD AUTO: 5 % — SIGNIFICANT CHANGE UP (ref 2–14)
NEUTROPHILS # BLD AUTO: 9.84 K/UL — HIGH (ref 1.8–7.4)
NEUTROPHILS NFR BLD AUTO: 83 % — HIGH (ref 43–77)
PLATELET # BLD AUTO: 319 K/UL — SIGNIFICANT CHANGE UP (ref 150–400)
POTASSIUM SERPL-MCNC: 5.4 MMOL/L — HIGH (ref 3.5–5.3)
POTASSIUM SERPL-SCNC: 5.4 MMOL/L — HIGH (ref 3.5–5.3)
PROT SERPL-MCNC: 7.6 G/DL — SIGNIFICANT CHANGE UP (ref 6–8.3)
PROTHROM AB SERPL-ACNC: 12.1 SEC — SIGNIFICANT CHANGE UP (ref 10.6–13.6)
RBC # BLD: 5.05 M/UL — SIGNIFICANT CHANGE UP (ref 3.8–5.2)
RBC # FLD: 13.9 % — SIGNIFICANT CHANGE UP (ref 10.3–14.5)
SODIUM SERPL-SCNC: 134 MMOL/L — LOW (ref 135–145)
WBC # BLD: 11.86 K/UL — HIGH (ref 3.8–10.5)
WBC # FLD AUTO: 11.86 K/UL — HIGH (ref 3.8–10.5)

## 2020-12-22 PROCEDURE — 71045 X-RAY EXAM CHEST 1 VIEW: CPT | Mod: 26

## 2020-12-22 RX ORDER — INSULIN HUMAN 100 [IU]/ML
5 INJECTION, SOLUTION SUBCUTANEOUS ONCE
Refills: 0 | Status: COMPLETED | OUTPATIENT
Start: 2020-12-22 | End: 2020-12-22

## 2020-12-22 RX ORDER — SODIUM ZIRCONIUM CYCLOSILICATE 10 G/10G
5 POWDER, FOR SUSPENSION ORAL THREE TIMES A DAY
Refills: 0 | Status: DISCONTINUED | OUTPATIENT
Start: 2020-12-22 | End: 2020-12-22

## 2020-12-22 RX ORDER — SODIUM ZIRCONIUM CYCLOSILICATE 10 G/10G
5 POWDER, FOR SUSPENSION ORAL THREE TIMES A DAY
Refills: 0 | Status: DISCONTINUED | OUTPATIENT
Start: 2020-12-22 | End: 2020-12-23

## 2020-12-22 RX ORDER — INSULIN GLARGINE 100 [IU]/ML
22 INJECTION, SOLUTION SUBCUTANEOUS AT BEDTIME
Refills: 0 | Status: DISCONTINUED | OUTPATIENT
Start: 2020-12-22 | End: 2020-12-24

## 2020-12-22 RX ADMIN — Medication 1000 UNIT(S): at 12:07

## 2020-12-22 RX ADMIN — ALBUTEROL 2 PUFF(S): 90 AEROSOL, METERED ORAL at 23:00

## 2020-12-22 RX ADMIN — Medication 100 MILLIGRAM(S): at 06:49

## 2020-12-22 RX ADMIN — BUDESONIDE AND FORMOTEROL FUMARATE DIHYDRATE 2 PUFF(S): 160; 4.5 AEROSOL RESPIRATORY (INHALATION) at 09:31

## 2020-12-22 RX ADMIN — Medication 1 TABLET(S): at 15:25

## 2020-12-22 RX ADMIN — GABAPENTIN 300 MILLIGRAM(S): 400 CAPSULE ORAL at 17:46

## 2020-12-22 RX ADMIN — INSULIN GLARGINE 22 UNIT(S): 100 INJECTION, SOLUTION SUBCUTANEOUS at 22:50

## 2020-12-22 RX ADMIN — MONTELUKAST 10 MILLIGRAM(S): 4 TABLET, CHEWABLE ORAL at 12:07

## 2020-12-22 RX ADMIN — Medication 650 MILLIGRAM(S): at 01:14

## 2020-12-22 RX ADMIN — Medication 3: at 08:31

## 2020-12-22 RX ADMIN — Medication 8 UNIT(S): at 08:31

## 2020-12-22 RX ADMIN — ALBUTEROL 2 PUFF(S): 90 AEROSOL, METERED ORAL at 06:48

## 2020-12-22 RX ADMIN — BUDESONIDE AND FORMOTEROL FUMARATE DIHYDRATE 2 PUFF(S): 160; 4.5 AEROSOL RESPIRATORY (INHALATION) at 23:02

## 2020-12-22 RX ADMIN — Medication 40 MILLIGRAM(S): at 06:50

## 2020-12-22 RX ADMIN — PANTOPRAZOLE SODIUM 40 MILLIGRAM(S): 20 TABLET, DELAYED RELEASE ORAL at 07:42

## 2020-12-22 RX ADMIN — INSULIN HUMAN 5 UNIT(S): 100 INJECTION, SOLUTION SUBCUTANEOUS at 17:06

## 2020-12-22 RX ADMIN — ALBUTEROL 2 PUFF(S): 90 AEROSOL, METERED ORAL at 15:26

## 2020-12-22 RX ADMIN — Medication 100 MILLIGRAM(S): at 22:47

## 2020-12-22 RX ADMIN — Medication 650 MILLIGRAM(S): at 02:15

## 2020-12-22 RX ADMIN — LORATADINE 10 MILLIGRAM(S): 10 TABLET ORAL at 12:07

## 2020-12-22 RX ADMIN — Medication 1 TABLET(S): at 06:49

## 2020-12-22 RX ADMIN — SODIUM ZIRCONIUM CYCLOSILICATE 5 GRAM(S): 10 POWDER, FOR SUSPENSION ORAL at 21:00

## 2020-12-22 RX ADMIN — Medication 500 MILLIGRAM(S): at 12:06

## 2020-12-22 RX ADMIN — Medication 5: at 12:06

## 2020-12-22 RX ADMIN — HEPARIN SODIUM 5000 UNIT(S): 5000 INJECTION INTRAVENOUS; SUBCUTANEOUS at 06:49

## 2020-12-22 RX ADMIN — Medication 325 MILLIGRAM(S): at 12:06

## 2020-12-22 RX ADMIN — Medication 8 UNIT(S): at 17:07

## 2020-12-22 RX ADMIN — Medication 100 MILLIGRAM(S): at 15:25

## 2020-12-22 RX ADMIN — Medication 8 UNIT(S): at 12:06

## 2020-12-22 RX ADMIN — Medication 1 TABLET(S): at 22:51

## 2020-12-22 RX ADMIN — HEPARIN SODIUM 5000 UNIT(S): 5000 INJECTION INTRAVENOUS; SUBCUTANEOUS at 15:25

## 2020-12-22 RX ADMIN — Medication 1 TABLET(S): at 12:06

## 2020-12-22 RX ADMIN — GABAPENTIN 300 MILLIGRAM(S): 400 CAPSULE ORAL at 06:52

## 2020-12-22 RX ADMIN — HEPARIN SODIUM 5000 UNIT(S): 5000 INJECTION INTRAVENOUS; SUBCUTANEOUS at 22:47

## 2020-12-22 RX ADMIN — ZINC SULFATE TAB 220 MG (50 MG ZINC EQUIVALENT) 220 MILLIGRAM(S): 220 (50 ZN) TAB at 12:06

## 2020-12-22 RX ADMIN — Medication 3: at 17:07

## 2020-12-22 NOTE — PROGRESS NOTE ADULT - PROBLEM SELECTOR PLAN 6
- Continue Albuterol, incr PRN to standing on 12/17  - on IV solumedrol   - added Montelukast 12/17 with improvement in wheezing  - Pulm Dr. Templeton/Sachin consulted 12/16  - f/u CT chest pending

## 2020-12-22 NOTE — PROGRESS NOTE ADULT - SUBJECTIVE AND OBJECTIVE BOX
Per MD request, pt called and notified that blood work today confirms she is in menopause. Pt verbalizes understanding and has no further questions at this time.   [facility]   MD FAUSTO PALM MD RUORU WONG, PA    TEL:  OFFICE: [phone]  DR LEE CELL: [phone]  CHARLEY WILLIAM CELL: [phone]  DR. GARCIA CELL: [phone]  DR. MARTIN CELL: [phone]    FROM 5 PM - 7 AM PLEASE CALL ANSWERING SERVICE: [phone]    RENAL FOLLOW UP NOTE--Date of Service 12-22-20 @ 11:13  --------------------------------------------------------------------------------  HPI:      Pt covid 19+    PAST HISTORY  --------------------------------------------------------------------------------  No significant changes to PMH, PSH, FHx, SHx, unless otherwise noted    ALLERGIES & MEDICATIONS  --------------------------------------------------------------------------------  Allergies    No Known Allergies    Intolerances      Standing Inpatient Medications  ALBUTerol    90 MICROgram(s) HFA Inhaler 2 Puff(s) Inhalation every 6 hours  ascorbic acid 500 milliGRAM(s) Oral daily  benzonatate 100 milliGRAM(s) Oral every 8 hours  budesonide  80 MICROgram(s)/formoterol 4.5 MICROgram(s) Inhaler 2 Puff(s) Inhalation two times a day  cholecalciferol 1000 Unit(s) Oral daily  dextrose 5%. 1000 milliLiter(s) IV Continuous <Continuous>  ferrous    sulfate 325 milliGRAM(s) Oral daily  gabapentin 300 milliGRAM(s) Oral every 12 hours  glucagon  Injectable 1 milliGRAM(s) IntraMuscular once  heparin   Injectable 5000 Unit(s) SubCutaneous every 8 hours  insulin glargine Injectable (LANTUS) 22 Unit(s) SubCutaneous at bedtime  insulin lispro (ADMELOG) corrective regimen sliding scale   SubCutaneous at bedtime  insulin lispro (ADMELOG) corrective regimen sliding scale   SubCutaneous three times a day before meals  insulin lispro Injectable (ADMELOG) 8 Unit(s) SubCutaneous three times a day before meals  loratadine 10 milliGRAM(s) Oral daily  methylPREDNISolone sodium succinate Injectable 40 milliGRAM(s) IV Push daily  montelukast 10 milliGRAM(s) Oral daily  multivitamin 1 Tablet(s) Oral daily  pantoprazole    Tablet 40 milliGRAM(s) Oral before breakfast  potassium phosphate / sodium phosphate Tablet (K-PHOS No. 2) 1 Tablet(s) Oral three times a day  sodium chloride 0.9%. 1000 milliLiter(s) IV Continuous <Continuous>  zinc sulfate 220 milliGRAM(s) Oral daily    PRN Inpatient Medications  acetaminophen   Tablet .. 650 milliGRAM(s) Oral every 6 hours PRN      REVIEW OF SYSTEMS  --------------------------------------------------------------------------------  General: no fever    MSK: no edema     VITALS/PHYSICAL EXAM  --------------------------------------------------------------------------------  T(C): 36.5 (12-22-20 @ 08:43), Max: 38 (12-22-20 @ 01:14)  HR: 76 (12-22-20 @ 08:43) (69 - 83)  BP: 150/61 (12-22-20 @ 08:43) (102/77 - 150/61)  RR: 18 (12-22-20 @ 08:43) (17 - 20)  SpO2: 99% (12-22-20 @ 08:43) (94% - 100%)  Wt(kg): --          LABS/STUDIES  --------------------------------------------------------------------------------              12.1   9.97  >-----------<  315      [12-21-20 @ 07:12]              38.7     134  |  103  |  32  ----------------------------<  356      [12-21-20 @ 07:12]  4.9   |  21  |  1.30        Ca     9.1     [12-21-20 @ 07:12]      Mg     2.2     [12-21-20 @ 07:12]      Phos  4.2     [12-21-20 @ 07:12]            Creatinine Trend:  SCr 1.30 [12-21 @ 07:12]  SCr 1.46 [12-19 @ 08:21]  SCr 1.36 [12-18 @ 08:19]  SCr 1.31 [12-16 @ 08:04]  SCr 1.36 [12-15 @ 07:46]        Iron 24, TIBC 264, %sat 9      [12-15-20 @ 07:46]  Ferritin 116      [12-15-20 @ 10:25]  Vitamin D (25OH) 36.3      [07-18-20 @ 11:35]  TSH 0.38      [12-15-20 @ 07:46]  Lipid: chol 139, , HDL 46, LDL --      [12-15-20 @ 07:46]      Immunofixation Serum:   No Monoclonal Band Identified    Reference Range: None Detected      [12-15-20 @ 11:01]  SPEP Interpretation: Normal Electrophoresis Pattern      [12-15-20 @ 11:01]

## 2020-12-22 NOTE — PROGRESS NOTE ADULT - PROBLEM SELECTOR PLAN 2
Patient has history of Hypertension and diabetes   - Coming with mildly elevated troponin. however, denies chest pain,  - CHELO score 3, ( 1 for age>65, Aspirin use, positive cardiac marker)  - Likely demand ischemia 2/2 covid  - c/w Aspirin and Statin   - Echo from July shows EF >55% and Grossly normal Systolic function, grade I diastolic dysfunction  - Tele discontinued   - Out pt cardiology follow up

## 2020-12-22 NOTE — PROGRESS NOTE ADULT - PROBLEM SELECTOR PLAN 1
Hyperglycemia likely  due to uncontrolled DM in addition to iv steroid   HbA1C;  7.2  Pt takes  Novolog 70/30 40 U BID , Metformin and Glipizide  Continue moderate sliding scale Humalog  Increase Lantus too 22 U HS  Continue pre meal Humalog to 8 u tid  Monitor blood glucose. Hyperglycemia likely  due to uncontrolled DM in addition to iv steroid   HbA1C;  7.2  Pt takes  Novolog 70/30 40 U BID , Metformin and Glipizide- likely hypoglycemic   Continue moderate sliding scale Humalog  Increase Lantus to 22 U HS  Continue pre meal Humalog to 8 u tid  Monitor blood glucose.

## 2020-12-22 NOTE — PROGRESS NOTE ADULT - PROBLEM SELECTOR PLAN 3
Cr 1.6 on admission, 1.31 today. Baseline (1.180)  - GFR 36  - U creat 20, Brice 41, UOsmol 192  - 32/1.3 on 12/19  Nephrologist Dr Dove following  - - Scr 1.6 on admission   - - Renal function improving   - - Check UA  - - Monitor BMP   - - AVoid further nephrotoxics, NSAIDS RCA Cr 1.6 on admission, 1.31 today. Baseline (1.180)  - GFR 36  - U creat 20, Brice 41, UOsmol 192  - 32/1.3 on 12/19  Nephrologist Dr Dove following  - - Scr 1.6 on admission   - - Renal function improving   - - Check UA  - - Monitor BMP   - - AVoid further nephrotoxics, NSAIDS RCA  --hyperkalemia

## 2020-12-22 NOTE — PROGRESS NOTE ADULT - SUBJECTIVE AND OBJECTIVE BOX
PGY 3 Note discussed with primary attending    Patient is a 89y old  Female who presents with a chief complaint of Fever, chills, Covid positive (21 Dec 2020 15:25)      INTERVAL HPI/OVERNIGHT EVENTS: offers no new complaints; current symptoms resolving    MEDICATIONS  (STANDING):  ALBUTerol    90 MICROgram(s) HFA Inhaler 2 Puff(s) Inhalation every 6 hours  ascorbic acid 500 milliGRAM(s) Oral daily  benzonatate 100 milliGRAM(s) Oral every 8 hours  budesonide  80 MICROgram(s)/formoterol 4.5 MICROgram(s) Inhaler 2 Puff(s) Inhalation two times a day  cholecalciferol 1000 Unit(s) Oral daily  dextrose 5%. 1000 milliLiter(s) (100 mL/Hr) IV Continuous <Continuous>  ferrous    sulfate 325 milliGRAM(s) Oral daily  gabapentin 300 milliGRAM(s) Oral every 12 hours  glucagon  Injectable 1 milliGRAM(s) IntraMuscular once  heparin   Injectable 5000 Unit(s) SubCutaneous every 8 hours  insulin glargine Injectable (LANTUS) 16 Unit(s) SubCutaneous at bedtime  insulin lispro (ADMELOG) corrective regimen sliding scale   SubCutaneous at bedtime  insulin lispro (ADMELOG) corrective regimen sliding scale   SubCutaneous three times a day before meals  insulin lispro Injectable (ADMELOG) 8 Unit(s) SubCutaneous three times a day before meals  loratadine 10 milliGRAM(s) Oral daily  methylPREDNISolone sodium succinate Injectable 40 milliGRAM(s) IV Push daily  montelukast 10 milliGRAM(s) Oral daily  multivitamin 1 Tablet(s) Oral daily  pantoprazole    Tablet 40 milliGRAM(s) Oral before breakfast  potassium phosphate / sodium phosphate Tablet (K-PHOS No. 2) 1 Tablet(s) Oral three times a day  sodium chloride 0.9%. 1000 milliLiter(s) (50 mL/Hr) IV Continuous <Continuous>  zinc sulfate 220 milliGRAM(s) Oral daily    MEDICATIONS  (PRN):  acetaminophen   Tablet .. 650 milliGRAM(s) Oral every 6 hours PRN Temp greater or equal to 38C (100.4F), Mild Pain (1 - 3)      __________________________________________________    Vital Signs Last 24 Hrs  T(C): 37.2 (22 Dec 2020 02:15), Max: 38 (22 Dec 2020 01:14)  T(F): 98.9 (22 Dec 2020 02:15), Max: 100.4 (22 Dec 2020 01:14)  HR: 71 (22 Dec 2020 06:20) (69 - 83)  BP: 132/63 (22 Dec 2020 06:20) (102/77 - 144/63)  BP(mean): --  RR: 17 (22 Dec 2020 01:14) (17 - 20)  SpO2: 100% (22 Dec 2020 06:20) (94% - 100%)    ________________________________________________  PHYSICAL EXAM:  GENERAL: NAD  HEENT: Normocephalic;  conjunctivae and sclerae clear; moist mucous membranes;   NECK : supple  CHEST/LUNG: Clear to auscultation bilaterally with good air entry   HEART: S1 S2  regular; no murmurs, gallops or rubs  ABDOMEN: Soft, Nontender, Nondistended; Bowel sounds present  EXTREMITIES: no cyanosis; no edema; no calf tenderness  SKIN: warm and dry; no rash  NERVOUS SYSTEM:  Awake and alert; Oriented  to place, person and time ; no new deficits    _________________________________________________  LABS:                        12.1   9.97  )-----------( 315      ( 21 Dec 2020 07:12 )             38.7     12-21    134<L>  |  103  |  32<H>  ----------------------------<  356<H>  4.9   |  21<L>  |  1.30    Ca    9.1      21 Dec 2020 07:12  Phos  4.2     12-21  Mg     2.2     12-21          CAPILLARY BLOOD GLUCOSE      POCT Blood Glucose.: 245 mg/dL (21 Dec 2020 21:22)  POCT Blood Glucose.: 376 mg/dL (21 Dec 2020 16:50)        RADIOLOGY & ADDITIONAL TESTS:    Imaging Personally Reviewed:  YES/NO    Consultant(s) Notes Reviewed:   YES/ No    Care Discussed with Consultants :     Plan of care was discussed with patient and /or primary care giver; all questions and concerns were addressed and care was aligned with patient's wishes.

## 2020-12-22 NOTE — PROGRESS NOTE ADULT - PROBLEM SELECTOR PLAN 4
Patient takes Lisinopril, HCTZ   - blood pressure is in acceptable range for now  - currently off HTN meds   primary team can start as needed  - Diabetic and dash diet

## 2020-12-22 NOTE — PROGRESS NOTE ADULT - PROBLEM SELECTOR PLAN 1
Patient is coming with fever chills , diarrhea since 1 day,  - Sick contacts at home,   - CXR is negative for pleural effusion, infiltrates or consolidation  - patient is saturating 98% on room air, however, her saturation drops when she is agitated  - No chest pain  - s/p 1 dose of Dexamethasone in ED  - was on IV Dexa 6mg (started 12/14 - 12/19) but was started on IV solumedrol on 12/19 c/w taper steroid   - did not get remdesivir as her CrCl was <30  - COVID Ab negative  - stable on Room Air  - CXR on 12/18 with mild opacities lung bases slightly progressed on the left.  - CT chest ordered 12/18 - pending

## 2020-12-22 NOTE — PROGRESS NOTE ADULT - SUBJECTIVE AND OBJECTIVE BOX
Interval Events: NAD. Pt keeps asking to go home. Pt seems disoriented.       Allergies    No Known Allergies    Intolerances      Endocrine/Metabolic Medications:  glucagon  Injectable 1 milliGRAM(s) IntraMuscular once  insulin glargine Injectable (LANTUS) 22 Unit(s) SubCutaneous at bedtime  insulin lispro (ADMELOG) corrective regimen sliding scale   SubCutaneous at bedtime  insulin lispro (ADMELOG) corrective regimen sliding scale   SubCutaneous three times a day before meals  insulin lispro Injectable (ADMELOG) 8 Unit(s) SubCutaneous three times a day before meals  methylPREDNISolone sodium succinate Injectable 40 milliGRAM(s) IV Push daily      Vital Signs Last 24 Hrs  T(C): 36.5 (22 Dec 2020 08:43), Max: 38 (22 Dec 2020 01:14)  T(F): 97.7 (22 Dec 2020 08:43), Max: 100.4 (22 Dec 2020 01:14)  HR: 76 (22 Dec 2020 08:43) (69 - 83)  BP: 150/61 (22 Dec 2020 08:43) (102/77 - 150/61)  BP(mean): --  RR: 18 (22 Dec 2020 08:43) (17 - 20)  SpO2: 99% (22 Dec 2020 08:43) (94% - 100%)      PHYSICAL EXAM  All physical exam findings normal, except those marked:  General:	Disoriented   Neck		Normal: supple, no cervical adenopathy, no palpable thyroid  		Cardiovascular	Normal: regular rate, normal S1, S2, no murmurs  .		  Respiratory	[] Abnormal: B/l Wheeze noted   Abdominal	Normal: soft, ND, NT, bowel sounds present, no masses, no organomegaly  .		[] Abnormal:    Extremities	Normal: FROM x4  .		[] Abnormal:  Skin		Normal: intact and not indurated, no rash, no acanthosis nigricans  .		[] Abnormal:  Neurologic	  .		[] Abnormal: AOx1 1-2. No focal deficit    LABS        CAPILLARY BLOOD GLUCOSE      POCT Blood Glucose.: 256 mg/dL (22 Dec 2020 08:25)  POCT Blood Glucose.: 245 mg/dL (21 Dec 2020 21:22)  POCT Blood Glucose.: 376 mg/dL (21 Dec 2020 16:50)

## 2020-12-23 DIAGNOSIS — Z02.9 ENCOUNTER FOR ADMINISTRATIVE EXAMINATIONS, UNSPECIFIED: ICD-10-CM

## 2020-12-23 LAB
ALBUMIN SERPL ELPH-MCNC: 2.9 G/DL — LOW (ref 3.5–5)
ALP SERPL-CCNC: 100 U/L — SIGNIFICANT CHANGE UP (ref 40–120)
ALT FLD-CCNC: 63 U/L DA — HIGH (ref 10–60)
ANION GAP SERPL CALC-SCNC: 12 MMOL/L — SIGNIFICANT CHANGE UP (ref 5–17)
AST SERPL-CCNC: 51 U/L — HIGH (ref 10–40)
BILIRUB SERPL-MCNC: 0.4 MG/DL — SIGNIFICANT CHANGE UP (ref 0.2–1.2)
BUN SERPL-MCNC: 36 MG/DL — HIGH (ref 7–18)
CALCIUM SERPL-MCNC: 9.1 MG/DL — SIGNIFICANT CHANGE UP (ref 8.4–10.5)
CHLORIDE SERPL-SCNC: 100 MMOL/L — SIGNIFICANT CHANGE UP (ref 96–108)
CO2 SERPL-SCNC: 23 MMOL/L — SIGNIFICANT CHANGE UP (ref 22–31)
CREAT SERPL-MCNC: 1.22 MG/DL — SIGNIFICANT CHANGE UP (ref 0.5–1.3)
CRP SERPL-MCNC: 2.31 MG/DL — HIGH (ref 0–0.4)
FERRITIN SERPL-MCNC: 395 NG/ML — HIGH (ref 15–150)
GLUCOSE BLDC GLUCOMTR-MCNC: 154 MG/DL — HIGH (ref 70–99)
GLUCOSE BLDC GLUCOMTR-MCNC: 178 MG/DL — HIGH (ref 70–99)
GLUCOSE BLDC GLUCOMTR-MCNC: 265 MG/DL — HIGH (ref 70–99)
GLUCOSE BLDC GLUCOMTR-MCNC: 277 MG/DL — HIGH (ref 70–99)
GLUCOSE BLDC GLUCOMTR-MCNC: 312 MG/DL — HIGH (ref 70–99)
GLUCOSE BLDC GLUCOMTR-MCNC: 551 MG/DL — CRITICAL HIGH (ref 70–99)
GLUCOSE SERPL-MCNC: 200 MG/DL — HIGH (ref 70–99)
HCT VFR BLD CALC: 42.4 % — SIGNIFICANT CHANGE UP (ref 34.5–45)
HGB BLD-MCNC: 13.2 G/DL — SIGNIFICANT CHANGE UP (ref 11.5–15.5)
MCHC RBC-ENTMCNC: 25.6 PG — LOW (ref 27–34)
MCHC RBC-ENTMCNC: 31.1 GM/DL — LOW (ref 32–36)
MCV RBC AUTO: 82.3 FL — SIGNIFICANT CHANGE UP (ref 80–100)
NRBC # BLD: 0 /100 WBCS — SIGNIFICANT CHANGE UP (ref 0–0)
PLATELET # BLD AUTO: 359 K/UL — SIGNIFICANT CHANGE UP (ref 150–400)
POTASSIUM SERPL-MCNC: 4.3 MMOL/L — SIGNIFICANT CHANGE UP (ref 3.5–5.3)
POTASSIUM SERPL-SCNC: 4.3 MMOL/L — SIGNIFICANT CHANGE UP (ref 3.5–5.3)
PROT SERPL-MCNC: 7.3 G/DL — SIGNIFICANT CHANGE UP (ref 6–8.3)
RBC # BLD: 5.15 M/UL — SIGNIFICANT CHANGE UP (ref 3.8–5.2)
RBC # FLD: 13.9 % — SIGNIFICANT CHANGE UP (ref 10.3–14.5)
SARS-COV-2 RNA SPEC QL NAA+PROBE: DETECTED
SODIUM SERPL-SCNC: 135 MMOL/L — SIGNIFICANT CHANGE UP (ref 135–145)
WBC # BLD: 11.81 K/UL — HIGH (ref 3.8–10.5)
WBC # FLD AUTO: 11.81 K/UL — HIGH (ref 3.8–10.5)

## 2020-12-23 PROCEDURE — 93970 EXTREMITY STUDY: CPT | Mod: 26

## 2020-12-23 RX ORDER — PANTOPRAZOLE SODIUM 20 MG/1
1 TABLET, DELAYED RELEASE ORAL
Qty: 14 | Refills: 0
Start: 2020-12-23 | End: 2021-01-05

## 2020-12-23 RX ORDER — MONTELUKAST 4 MG/1
1 TABLET, CHEWABLE ORAL
Qty: 30 | Refills: 0
Start: 2020-12-23 | End: 2021-01-21

## 2020-12-23 RX ORDER — METFORMIN HYDROCHLORIDE 850 MG/1
1 TABLET ORAL
Qty: 0 | Refills: 0 | DISCHARGE

## 2020-12-23 RX ORDER — ALBUTEROL 90 UG/1
2 AEROSOL, METERED ORAL EVERY 6 HOURS
Refills: 0 | Status: DISCONTINUED | OUTPATIENT
Start: 2020-12-23 | End: 2021-01-04

## 2020-12-23 RX ORDER — INSULIN ASPART 100 [IU]/ML
40 INJECTION, SUSPENSION SUBCUTANEOUS
Qty: 0 | Refills: 0 | DISCHARGE

## 2020-12-23 RX ORDER — GLIMEPIRIDE 1 MG
1 TABLET ORAL
Qty: 0 | Refills: 0 | DISCHARGE

## 2020-12-23 RX ORDER — DAPAGLIFLOZIN AND SAXAGLIPTIN 5; 5 MG/1; MG/1
1 TABLET, FILM COATED ORAL
Qty: 0 | Refills: 0 | DISCHARGE

## 2020-12-23 RX ORDER — ACETAMINOPHEN 500 MG
2 TABLET ORAL
Qty: 0 | Refills: 0 | DISCHARGE
Start: 2020-12-23

## 2020-12-23 RX ORDER — ZINC SULFATE TAB 220 MG (50 MG ZINC EQUIVALENT) 220 (50 ZN) MG
1 TAB ORAL
Qty: 30 | Refills: 0
Start: 2020-12-23 | End: 2021-01-21

## 2020-12-23 RX ORDER — BUDESONIDE AND FORMOTEROL FUMARATE DIHYDRATE 160; 4.5 UG/1; UG/1
2 AEROSOL RESPIRATORY (INHALATION)
Qty: 1 | Refills: 0
Start: 2020-12-23

## 2020-12-23 RX ADMIN — HEPARIN SODIUM 5000 UNIT(S): 5000 INJECTION INTRAVENOUS; SUBCUTANEOUS at 05:07

## 2020-12-23 RX ADMIN — Medication 1 TABLET(S): at 12:20

## 2020-12-23 RX ADMIN — GABAPENTIN 300 MILLIGRAM(S): 400 CAPSULE ORAL at 06:26

## 2020-12-23 RX ADMIN — ALBUTEROL 2 PUFF(S): 90 AEROSOL, METERED ORAL at 03:04

## 2020-12-23 RX ADMIN — Medication 100 MILLIGRAM(S): at 21:16

## 2020-12-23 RX ADMIN — LORATADINE 10 MILLIGRAM(S): 10 TABLET ORAL at 12:20

## 2020-12-23 RX ADMIN — Medication 1000 UNIT(S): at 12:20

## 2020-12-23 RX ADMIN — Medication 1 TABLET(S): at 06:27

## 2020-12-23 RX ADMIN — MONTELUKAST 10 MILLIGRAM(S): 4 TABLET, CHEWABLE ORAL at 12:20

## 2020-12-23 RX ADMIN — Medication 40 MILLIGRAM(S): at 05:06

## 2020-12-23 RX ADMIN — Medication 3: at 09:05

## 2020-12-23 RX ADMIN — Medication 650 MILLIGRAM(S): at 21:15

## 2020-12-23 RX ADMIN — Medication 100 MILLIGRAM(S): at 13:45

## 2020-12-23 RX ADMIN — Medication 650 MILLIGRAM(S): at 20:50

## 2020-12-23 RX ADMIN — BUDESONIDE AND FORMOTEROL FUMARATE DIHYDRATE 2 PUFF(S): 160; 4.5 AEROSOL RESPIRATORY (INHALATION) at 23:37

## 2020-12-23 RX ADMIN — Medication 500 MILLIGRAM(S): at 12:20

## 2020-12-23 RX ADMIN — SODIUM ZIRCONIUM CYCLOSILICATE 5 GRAM(S): 10 POWDER, FOR SUSPENSION ORAL at 05:05

## 2020-12-23 RX ADMIN — Medication 8 UNIT(S): at 12:20

## 2020-12-23 RX ADMIN — Medication 3: at 12:21

## 2020-12-23 RX ADMIN — PANTOPRAZOLE SODIUM 40 MILLIGRAM(S): 20 TABLET, DELAYED RELEASE ORAL at 07:28

## 2020-12-23 RX ADMIN — HEPARIN SODIUM 5000 UNIT(S): 5000 INJECTION INTRAVENOUS; SUBCUTANEOUS at 21:16

## 2020-12-23 RX ADMIN — Medication 8 UNIT(S): at 09:05

## 2020-12-23 RX ADMIN — HEPARIN SODIUM 5000 UNIT(S): 5000 INJECTION INTRAVENOUS; SUBCUTANEOUS at 13:45

## 2020-12-23 RX ADMIN — BUDESONIDE AND FORMOTEROL FUMARATE DIHYDRATE 2 PUFF(S): 160; 4.5 AEROSOL RESPIRATORY (INHALATION) at 12:22

## 2020-12-23 RX ADMIN — Medication 1 TABLET(S): at 21:16

## 2020-12-23 RX ADMIN — ALBUTEROL 2 PUFF(S): 90 AEROSOL, METERED ORAL at 09:06

## 2020-12-23 RX ADMIN — Medication 325 MILLIGRAM(S): at 12:20

## 2020-12-23 RX ADMIN — INSULIN GLARGINE 22 UNIT(S): 100 INJECTION, SOLUTION SUBCUTANEOUS at 21:16

## 2020-12-23 RX ADMIN — Medication 100 MILLIGRAM(S): at 06:25

## 2020-12-23 RX ADMIN — Medication 1 TABLET(S): at 13:45

## 2020-12-23 RX ADMIN — GABAPENTIN 300 MILLIGRAM(S): 400 CAPSULE ORAL at 17:29

## 2020-12-23 RX ADMIN — Medication 4: at 17:27

## 2020-12-23 RX ADMIN — ZINC SULFATE TAB 220 MG (50 MG ZINC EQUIVALENT) 220 MILLIGRAM(S): 220 (50 ZN) TAB at 12:20

## 2020-12-23 RX ADMIN — Medication 8 UNIT(S): at 17:27

## 2020-12-23 NOTE — SWALLOW BEDSIDE ASSESSMENT ADULT - SLP PERTINENT HISTORY OF CURRENT PROBLEM
89 y/o female [place]jar[place] Speaking, with PMHx of asthma, HTN, DM is admitted to the hospital for COVID-19 infection

## 2020-12-23 NOTE — PROGRESS NOTE ADULT - PROBLEM SELECTOR PLAN 1
Improving   not requiring oxygen supplementation   not a candidate for remdesivir initially given TAMANNA, but currently not requiring supplemental oxygen  afebrile over last 24 hours   d-dmier in 200s but will do doppler given pedal edema  repeat covid today

## 2020-12-23 NOTE — SWALLOW BEDSIDE ASSESSMENT ADULT - SWALLOW EVAL: RECOMMENDED FEEDING/EATING TECHNIQUES
allow for swallow between intakes/alternate food with liquid/oral hygiene/position upright (90 degrees)

## 2020-12-23 NOTE — PROGRESS NOTE ADULT - PROBLEM SELECTOR PLAN 7
Patient takes Novolog 70/30 40 U BID , Metformin and Glipizide  - blood sugar is elevated, likely in the setting of iv steroids (currently being tapered)  - currently on lantus 22 units and premeal 8 units   - Diabetic diet  - A1C 7.2  - Discussed with Dr. Britt upon discharge pt would go on insulin 70/30 40 units in the morning and 30 units at home if she is on prednisone and adjust insulin out pt.   - no po diabetic meds upon discharge given concern for hypoglycemia and lopez  - Dr. Britt Endocrine consulted 12/21

## 2020-12-23 NOTE — PROGRESS NOTE ADULT - SUBJECTIVE AND OBJECTIVE BOX
[facility]   MD FAUSTO PALM MD RUORU WONG, PA    TEL:  OFFICE: [phone]  DR LEE CELL: [phone]  CHARLEY WILLIAM CELL: [phone]  DR. GARCIA CELL: [phone]  DR. MARTIN CELL: [phone]    FROM 5 PM - 7 AM PLEASE CALL ANSWERING SERVICE: [phone]    RENAL FOLLOW UP NOTE--Date of Service 12-23-20 @ 11:26  --------------------------------------------------------------------------------  HPI:      Pt covid 19+    PAST HISTORY  --------------------------------------------------------------------------------  No significant changes to PMH, PSH, FHx, SHx, unless otherwise noted    ALLERGIES & MEDICATIONS  --------------------------------------------------------------------------------  Allergies    No Known Allergies    Intolerances      Standing Inpatient Medications  ALBUTerol    90 MICROgram(s) HFA Inhaler 2 Puff(s) Inhalation every 6 hours  ascorbic acid 500 milliGRAM(s) Oral daily  benzonatate 100 milliGRAM(s) Oral every 8 hours  budesonide  80 MICROgram(s)/formoterol 4.5 MICROgram(s) Inhaler 2 Puff(s) Inhalation two times a day  cholecalciferol 1000 Unit(s) Oral daily  dextrose 5%. 1000 milliLiter(s) IV Continuous <Continuous>  ferrous    sulfate 325 milliGRAM(s) Oral daily  gabapentin 300 milliGRAM(s) Oral every 12 hours  glucagon  Injectable 1 milliGRAM(s) IntraMuscular once  heparin   Injectable 5000 Unit(s) SubCutaneous every 8 hours  insulin glargine Injectable (LANTUS) 22 Unit(s) SubCutaneous at bedtime  insulin lispro (ADMELOG) corrective regimen sliding scale   SubCutaneous at bedtime  insulin lispro (ADMELOG) corrective regimen sliding scale   SubCutaneous three times a day before meals  insulin lispro Injectable (ADMELOG) 8 Unit(s) SubCutaneous three times a day before meals  loratadine 10 milliGRAM(s) Oral daily  montelukast 10 milliGRAM(s) Oral daily  multivitamin 1 Tablet(s) Oral daily  pantoprazole    Tablet 40 milliGRAM(s) Oral before breakfast  potassium phosphate / sodium phosphate Tablet (K-PHOS No. 2) 1 Tablet(s) Oral three times a day  sodium chloride 0.9%. 1000 milliLiter(s) IV Continuous <Continuous>  sodium zirconium cyclosilicate 5 Gram(s) Oral three times a day  zinc sulfate 220 milliGRAM(s) Oral daily    PRN Inpatient Medications  acetaminophen   Tablet .. 650 milliGRAM(s) Oral every 6 hours PRN      REVIEW OF SYSTEMS  --------------------------------------------------------------------------------  General: no fever    MSK: no edema     VITALS/PHYSICAL EXAM  --------------------------------------------------------------------------------  T(C): 36.7 (12-23-20 @ 08:12), Max: 36.7 (12-23-20 @ 08:12)  HR: 87 (12-23-20 @ 08:12) (73 - 88)  BP: 128/73 (12-23-20 @ 08:12) (124/54 - 137/58)  RR: 18 (12-23-20 @ 08:12) (18 - 18)  SpO2: 97% (12-23-20 @ 08:12) (96% - 97%)  Wt(kg): --          	  LABS/STUDIES  --------------------------------------------------------------------------------              13.2   11.81 >-----------<  359      [12-23-20 @ 07:42]              42.4     135  |  100  |  36  ----------------------------<  200      [12-23-20 @ 07:42]  4.3   |  23  |  1.22        Ca     9.1     [12-23-20 @ 07:42]      Mg     2.2     [12-22-20 @ 14:56]    TPro  7.3  /  Alb  2.9  /  TBili  0.4  /  DBili  x   /  AST  51  /  ALT  63  /  AlkPhos  100  [12-23-20 @ 07:42]    PT/INR: PT 12.1 , INR 1.02       [12-22-20 @ 14:57]  PTT: 53.8       [12-22-20 @ 14:57]      Creatinine Trend:  SCr 1.22 [12-23 @ 07:42]  SCr 1.42 [12-22 @ 14:56]  SCr 1.30 [12-21 @ 07:12]  SCr 1.46 [12-19 @ 08:21]  SCr 1.36 [12-18 @ 08:19]        Iron 24, TIBC 264, %sat 9      [12-15-20 @ 07:46]  Ferritin 395      [12-23-20 @ 00:51]  Vitamin D (25OH) 36.3      [07-18-20 @ 11:35]  TSH 0.38      [12-15-20 @ 07:46]  Lipid: chol 139, , HDL 46, LDL --      [12-15-20 @ 07:46]      Immunofixation Serum:   No Monoclonal Band Identified    Reference Range: None Detected      [12-15-20 @ 11:01]  SPEP Interpretation: Normal Electrophoresis Pattern      [12-15-20 @ 11:01]

## 2020-12-23 NOTE — SWALLOW BEDSIDE ASSESSMENT ADULT - COMMENTS
Pt Alert/awake; OOB, seated upright in chair for exam. Very [place], unable to hear Rivera phone . Refused all solids, offered c/o severe discomfort 2/2 "acid" & gas. Pt Alert/awake; OOB, seated upright in chair for exam. Very [place], unable to hear [place] phone . Refused all solids, offered c/o severe discomfort 2/2 "acid" & gas. Continually screamed on phone to  that she wanted to go home, and "does not need to be here".

## 2020-12-23 NOTE — PROGRESS NOTE ADULT - PROBLEM SELECTOR PLAN 6
- Improving   - on IV solumedrol   - prednisone 40 daily for 4 days from tomorrow   - c/w bronchodilators   - added Montelukast 12/17 with improvement in wheezing  - Pulm Dr. Templeton/Sachin consulted 12/16

## 2020-12-23 NOTE — PHARMACOTHERAPY INTERVENTION NOTE - COMMENTS
COVID+ Pt is on albuterol HFA q6h standing and on concomitant Symbicort, recommended to change Albuterol to PRN as duplication with LABA in symbicort

## 2020-12-23 NOTE — PROGRESS NOTE ADULT - PROBLEM SELECTOR PLAN 3
Cr 1.6 on admission, 1.31 today. Baseline (1.180)  - GFR 36  - U creat 20, Brice 41, UOsmol 192  - 32/1.3 on 12/19  Nephrologist Dr Dove following  - - Scr 1.6 on admission   - - Renal function improving   - - Check UA  - - Monitor BMP   - - AVoid further nephrotoxics, NSAIDS RCA  --hyperkalemia- resolved with Insulin and lokelma

## 2020-12-23 NOTE — PROGRESS NOTE ADULT - SUBJECTIVE AND OBJECTIVE BOX
Interval Events:  NAD. Pt eating well.    Allergies    No Known Allergies    Intolerances      Endocrine/Metabolic Medications:  glucagon  Injectable 1 milliGRAM(s) IntraMuscular once  insulin glargine Injectable (LANTUS) 22 Unit(s) SubCutaneous at bedtime  insulin lispro (ADMELOG) corrective regimen sliding scale   SubCutaneous at bedtime  insulin lispro (ADMELOG) corrective regimen sliding scale   SubCutaneous three times a day before meals  insulin lispro Injectable (ADMELOG) 8 Unit(s) SubCutaneous three times a day before meals      Vital Signs Last 24 Hrs  T(C): 36.7 (23 Dec 2020 08:12), Max: 36.7 (23 Dec 2020 08:12)  T(F): 98 (23 Dec 2020 08:12), Max: 98 (23 Dec 2020 08:12)  HR: 87 (23 Dec 2020 08:12) (73 - 88)  BP: 128/73 (23 Dec 2020 08:12) (124/54 - 137/58)  BP(mean): --  RR: 18 (23 Dec 2020 08:12) (18 - 18)  SpO2: 97% (23 Dec 2020 08:12) (96% - 97%)      PHYSICAL EXAM  All physical exam findings normal, except those marked:  General:	Disoriented.		[] Abnormal:  Neck		Normal: supple, no cervical adenopathy, no palpable thyroid  .		[] Abnormal:  Cardiovascular	Normal: regular rate, normal S1, S2, no murmurs  .		[] Abnormal:  Respiratory	Normal: no chest wall deformity, normal respiratory pattern, CTA B/L  .		[] Abnormal:  Abdominal	Normal: soft, ND, NT, bowel sounds present, no masses, no organomegaly  .		[] Abnormal:    Extremities	Normal: FROM x4  .		[] Abnormal:  Skin		Normal: intact and not indurated, no rash, no acanthosis nigricans  .		[] Abnormal:  Neurologic	Normal: grossly intact  .		[] Abnormal: AOx 2     LABS                        13.2   11.81 )-----------( 359      ( 23 Dec 2020 07:42 )             42.4                               135    |  100    |  36                  Calcium: 9.1   / iCa: x      (12-23 @ 07:42)    ----------------------------<  200       Magnesium: x                                4.3     |  23     |  1.22             Phosphorous: x        TPro  7.3    /  Alb  2.9    /  TBili  0.4    /  DBili  x      /  AST  51     /  ALT  63     /  AlkPhos  100    23 Dec 2020 07:42    CAPILLARY BLOOD GLUCOSE      POCT Blood Glucose.: 277 mg/dL (23 Dec 2020 08:35)  POCT Blood Glucose.: 178 mg/dL (23 Dec 2020 06:16)  POCT Blood Glucose.: 226 mg/dL (22 Dec 2020 21:11)  POCT Blood Glucose.: 284 mg/dL (22 Dec 2020 17:02)  POCT Blood Glucose.: 351 mg/dL (22 Dec 2020 11:32)        Assesment/plan

## 2020-12-23 NOTE — SWALLOW BEDSIDE ASSESSMENT ADULT - SWALLOW EVAL: DIAGNOSIS
Pt p/w oral & pharyngeal phases swallow WNL for tolerating thin liquids: Good labial seal, Functional bolus manipulation & propulsion; intact oropharyngeal swallow with no overt s/s of laryngeal penetration or aspiration at this exam.

## 2020-12-23 NOTE — PROGRESS NOTE ADULT - PROBLEM SELECTOR PLAN 1
Hyperglycemia likely  due to uncontrolled DM in addition to iv steroid   HbA1C;  7.2  Pt takes  Novolog 70/30 40 U BID , Metformin and Glipizide- likely hypoglycemic   Continue moderate sliding scale Humalog  Continue  Lantus 22 U HS  Continue pre meal Humalog to 8 u tid  Upon discharge Continue Novolog 70/30 40 U at am and 30 U at bedtime before meal. Discontinue oral hypoglycemic agents.   Monitor blood glucose. Hyperglycemia likely  due to uncontrolled DM in addition to iv steroid   HbA1C;  7.2  Pt takes  Novolog 70/30 40 U BID , Metformin and Glipizide- likely hypoglycemic   Continue moderate sliding scale Humalog  Continue  Lantus 22 U HS  Continue pre meal Humalog to 8 u tid  Upon discharge Continue Novolog 70/30 40 U at am and 30 U at bedtime before meal. Discontinue oral hypoglycemic agents.   Monitor blood glucose.  fine tuning as out pt

## 2020-12-23 NOTE — PROGRESS NOTE ADULT - SUBJECTIVE AND OBJECTIVE BOX
Time of Visit:  Patient seen and examined.     MEDICATIONS  (STANDING):  ascorbic acid 500 milliGRAM(s) Oral daily  benzonatate 100 milliGRAM(s) Oral every 8 hours  budesonide  80 MICROgram(s)/formoterol 4.5 MICROgram(s) Inhaler 2 Puff(s) Inhalation two times a day  cholecalciferol 1000 Unit(s) Oral daily  dextrose 5%. 1000 milliLiter(s) (100 mL/Hr) IV Continuous <Continuous>  ferrous    sulfate 325 milliGRAM(s) Oral daily  gabapentin 300 milliGRAM(s) Oral every 12 hours  glucagon  Injectable 1 milliGRAM(s) IntraMuscular once  heparin   Injectable 5000 Unit(s) SubCutaneous every 8 hours  insulin glargine Injectable (LANTUS) 22 Unit(s) SubCutaneous at bedtime  insulin lispro (ADMELOG) corrective regimen sliding scale   SubCutaneous at bedtime  insulin lispro (ADMELOG) corrective regimen sliding scale   SubCutaneous three times a day before meals  insulin lispro Injectable (ADMELOG) 8 Unit(s) SubCutaneous three times a day before meals  loratadine 10 milliGRAM(s) Oral daily  montelukast 10 milliGRAM(s) Oral daily  multivitamin 1 Tablet(s) Oral daily  pantoprazole    Tablet 40 milliGRAM(s) Oral before breakfast  potassium phosphate / sodium phosphate Tablet (K-PHOS No. 2) 1 Tablet(s) Oral three times a day  sodium chloride 0.9%. 1000 milliLiter(s) (50 mL/Hr) IV Continuous <Continuous>  zinc sulfate 220 milliGRAM(s) Oral daily      MEDICATIONS  (PRN):  acetaminophen   Tablet .. 650 milliGRAM(s) Oral every 6 hours PRN Temp greater or equal to 38C (100.4F), Mild Pain (1 - 3)  ALBUTerol    90 MICROgram(s) HFA Inhaler 2 Puff(s) Inhalation every 6 hours PRN Shortness of Breath and/or Wheezing       Medications up to date at time of exam.      PHYSICAL EXAMINATION:  Patient has no new complaints.  GENERAL: The patient is a well-developed, well-nourished, in no apparent distress.     Vital Signs Last 24 Hrs  T(C): 36.9 (23 Dec 2020 15:25), Max: 36.9 (23 Dec 2020 15:25)  T(F): 98.4 (23 Dec 2020 15:25), Max: 98.4 (23 Dec 2020 15:25)  HR: 82 (23 Dec 2020 15:25) (82 - 88)  BP: 131/60 (23 Dec 2020 15:25) (108/54 - 137/58)  BP(mean): --  RR: 18 (23 Dec 2020 15:25) (18 - 18)  SpO2: 100% (23 Dec 2020 15:25) (96% - 100%)   (if applicable)    Chest Tube (if applicable)    HEENT: Head is normocephalic and atraumatic. Extraocular muscles are intact. Mucous membranes are moist.     NECK: Supple, no palpable adenopathy.    LUNGS: Clear to auscultation, no wheezing, rales, or rhonchi.    HEART: Regular rate and rhythm without murmur.    ABDOMEN: Soft, nontender, and nondistended.  No hepatosplenomegaly is noted.    : No painful voiding, no pelvic pain    EXTREMITIES: Without any cyanosis, clubbing, rash, lesions or edema.    NEUROLOGIC: Awake, alert,     SKIN: Warm, dry, good turgor.      LABS:                        13.2   11.81 )-----------( 359      ( 23 Dec 2020 07:42 )             42.4     12-23    135  |  100  |  36<H>  ----------------------------<  200<H>  4.3   |  23  |  1.22    Ca    9.1      23 Dec 2020 07:42  Mg     2.2     12-22    TPro  7.3  /  Alb  2.9<L>  /  TBili  0.4  /  DBili  x   /  AST  51<H>  /  ALT  63<H>  /  AlkPhos  100  12-23    PT/INR - ( 22 Dec 2020 14:57 )   PT: 12.1 sec;   INR: 1.02 ratio         PTT - ( 22 Dec 2020 14:57 )  PTT:53.8 sec                    MICROBIOLOGY: (if applicable)    RADIOLOGY & ADDITIONAL STUDIES:  EKG:   CXR:  ECHO:    IMPRESSION: 89y Female PAST MEDICAL & SURGICAL HISTORY:  Asthma    HTN (hypertension)    DM (diabetes mellitus)    No significant past surgical history     p/w           IMP: This is a 89 yr old woman [place] Speaking, with  asthma, HTN, DM presents to the ED c/o shortness of breath and diarrhea, fever, chills x 1 days.   Pt reports worsening shortness of breath with associated subjective fever, chills, chest pain and cough. Pt reports that her grandson is positive for COVID-19..     - Acute Hypoxic Resp failure  - Covid-19 pna   - PNA b/l   - DM uncontrol   - HTN  - Asthma   - TAMANNA on CKD      Sugg;  -continue isolation : air borne and contact   -repeat covid-19 pcr  -blood sugar control   -completed 10 days of decadron 12/9  -pat is not a candidate for Remdesivir due to low GFR  -continue O2 supp as needed to keep Sat >90%  -doubt PE but agreed with CT chest non contrast.. f/u   -if pat become more symptomatic or requiring higher O2 supp , then check biomarkers  -dvt/gi prophy  -monitor renal fx   -PPI/ DVT  -PT eval  -OOB to chair

## 2020-12-23 NOTE — PROGRESS NOTE ADULT - PROBLEM SELECTOR PLAN 9
Pt is medically stable for discharge but Me and  are not able to get in touch with family tried to call son Henrique twice today   Pt has HHA

## 2020-12-24 LAB
ALBUMIN SERPL ELPH-MCNC: 2.4 G/DL — LOW (ref 3.5–5)
ALP SERPL-CCNC: 112 U/L — SIGNIFICANT CHANGE UP (ref 40–120)
ALT FLD-CCNC: 77 U/L DA — HIGH (ref 10–60)
ANION GAP SERPL CALC-SCNC: 10 MMOL/L — SIGNIFICANT CHANGE UP (ref 5–17)
AST SERPL-CCNC: 62 U/L — HIGH (ref 10–40)
BILIRUB SERPL-MCNC: 0.4 MG/DL — SIGNIFICANT CHANGE UP (ref 0.2–1.2)
BUN SERPL-MCNC: 37 MG/DL — HIGH (ref 7–18)
CALCIUM SERPL-MCNC: 9.1 MG/DL — SIGNIFICANT CHANGE UP (ref 8.4–10.5)
CHLORIDE SERPL-SCNC: 99 MMOL/L — SIGNIFICANT CHANGE UP (ref 96–108)
CO2 SERPL-SCNC: 24 MMOL/L — SIGNIFICANT CHANGE UP (ref 22–31)
CREAT SERPL-MCNC: 1.25 MG/DL — SIGNIFICANT CHANGE UP (ref 0.5–1.3)
GLUCOSE BLDC GLUCOMTR-MCNC: 185 MG/DL — HIGH (ref 70–99)
GLUCOSE BLDC GLUCOMTR-MCNC: 247 MG/DL — HIGH (ref 70–99)
GLUCOSE BLDC GLUCOMTR-MCNC: 252 MG/DL — HIGH (ref 70–99)
GLUCOSE BLDC GLUCOMTR-MCNC: 364 MG/DL — HIGH (ref 70–99)
GLUCOSE SERPL-MCNC: 385 MG/DL — HIGH (ref 70–99)
HCT VFR BLD CALC: 40.4 % — SIGNIFICANT CHANGE UP (ref 34.5–45)
HGB BLD-MCNC: 12.8 G/DL — SIGNIFICANT CHANGE UP (ref 11.5–15.5)
MCHC RBC-ENTMCNC: 26.1 PG — LOW (ref 27–34)
MCHC RBC-ENTMCNC: 31.7 GM/DL — LOW (ref 32–36)
MCV RBC AUTO: 82.3 FL — SIGNIFICANT CHANGE UP (ref 80–100)
NRBC # BLD: 0 /100 WBCS — SIGNIFICANT CHANGE UP (ref 0–0)
PLATELET # BLD AUTO: 368 K/UL — SIGNIFICANT CHANGE UP (ref 150–400)
POTASSIUM SERPL-MCNC: 4.4 MMOL/L — SIGNIFICANT CHANGE UP (ref 3.5–5.3)
POTASSIUM SERPL-SCNC: 4.4 MMOL/L — SIGNIFICANT CHANGE UP (ref 3.5–5.3)
PROT SERPL-MCNC: 6.8 G/DL — SIGNIFICANT CHANGE UP (ref 6–8.3)
RBC # BLD: 4.91 M/UL — SIGNIFICANT CHANGE UP (ref 3.8–5.2)
RBC # FLD: 13.5 % — SIGNIFICANT CHANGE UP (ref 10.3–14.5)
SODIUM SERPL-SCNC: 133 MMOL/L — LOW (ref 135–145)
WBC # BLD: 12.04 K/UL — HIGH (ref 3.8–10.5)
WBC # FLD AUTO: 12.04 K/UL — HIGH (ref 3.8–10.5)

## 2020-12-24 RX ORDER — INSULIN GLARGINE 100 [IU]/ML
30 INJECTION, SOLUTION SUBCUTANEOUS AT BEDTIME
Refills: 0 | Status: DISCONTINUED | OUTPATIENT
Start: 2020-12-24 | End: 2020-12-28

## 2020-12-24 RX ORDER — INSULIN LISPRO 100/ML
12 VIAL (ML) SUBCUTANEOUS
Refills: 0 | Status: DISCONTINUED | OUTPATIENT
Start: 2020-12-24 | End: 2020-12-28

## 2020-12-24 RX ADMIN — Medication 2: at 17:18

## 2020-12-24 RX ADMIN — Medication 100 MILLIGRAM(S): at 21:40

## 2020-12-24 RX ADMIN — GABAPENTIN 300 MILLIGRAM(S): 400 CAPSULE ORAL at 06:03

## 2020-12-24 RX ADMIN — HEPARIN SODIUM 5000 UNIT(S): 5000 INJECTION INTRAVENOUS; SUBCUTANEOUS at 13:02

## 2020-12-24 RX ADMIN — ZINC SULFATE TAB 220 MG (50 MG ZINC EQUIVALENT) 220 MILLIGRAM(S): 220 (50 ZN) TAB at 12:21

## 2020-12-24 RX ADMIN — Medication 1 TABLET(S): at 12:21

## 2020-12-24 RX ADMIN — Medication 500 MILLIGRAM(S): at 12:21

## 2020-12-24 RX ADMIN — PANTOPRAZOLE SODIUM 40 MILLIGRAM(S): 20 TABLET, DELAYED RELEASE ORAL at 06:03

## 2020-12-24 RX ADMIN — Medication 12 UNIT(S): at 17:17

## 2020-12-24 RX ADMIN — Medication 1000 UNIT(S): at 12:21

## 2020-12-24 RX ADMIN — INSULIN GLARGINE 30 UNIT(S): 100 INJECTION, SOLUTION SUBCUTANEOUS at 21:49

## 2020-12-24 RX ADMIN — Medication 8 UNIT(S): at 08:36

## 2020-12-24 RX ADMIN — LORATADINE 10 MILLIGRAM(S): 10 TABLET ORAL at 12:22

## 2020-12-24 RX ADMIN — Medication 5: at 12:20

## 2020-12-24 RX ADMIN — GABAPENTIN 300 MILLIGRAM(S): 400 CAPSULE ORAL at 17:20

## 2020-12-24 RX ADMIN — HEPARIN SODIUM 5000 UNIT(S): 5000 INJECTION INTRAVENOUS; SUBCUTANEOUS at 21:40

## 2020-12-24 RX ADMIN — BUDESONIDE AND FORMOTEROL FUMARATE DIHYDRATE 2 PUFF(S): 160; 4.5 AEROSOL RESPIRATORY (INHALATION) at 21:47

## 2020-12-24 RX ADMIN — HEPARIN SODIUM 5000 UNIT(S): 5000 INJECTION INTRAVENOUS; SUBCUTANEOUS at 06:02

## 2020-12-24 RX ADMIN — Medication 1 TABLET(S): at 06:02

## 2020-12-24 RX ADMIN — Medication 325 MILLIGRAM(S): at 12:21

## 2020-12-24 RX ADMIN — Medication 100 MILLIGRAM(S): at 13:02

## 2020-12-24 RX ADMIN — Medication 1 TABLET(S): at 13:02

## 2020-12-24 RX ADMIN — Medication 100 MILLIGRAM(S): at 06:03

## 2020-12-24 RX ADMIN — Medication 3: at 08:36

## 2020-12-24 RX ADMIN — BUDESONIDE AND FORMOTEROL FUMARATE DIHYDRATE 2 PUFF(S): 160; 4.5 AEROSOL RESPIRATORY (INHALATION) at 12:22

## 2020-12-24 RX ADMIN — MONTELUKAST 10 MILLIGRAM(S): 4 TABLET, CHEWABLE ORAL at 12:21

## 2020-12-24 RX ADMIN — Medication 40 MILLIGRAM(S): at 06:03

## 2020-12-24 RX ADMIN — Medication 8 UNIT(S): at 12:20

## 2020-12-24 RX ADMIN — Medication 1 TABLET(S): at 21:39

## 2020-12-24 NOTE — PROGRESS NOTE ADULT - PROBLEM SELECTOR PLAN 6
- Improving   - on IV solumedrol   - prednisone 40 daily for 3 days from tomorrow   - c/w bronchodilators   - added Montelukast 12/17 with improvement in wheezing  - Pulm Dr. Templeton/Sachin consulted 12/16

## 2020-12-24 NOTE — PROGRESS NOTE ADULT - PROBLEM SELECTOR PLAN 1
Hyperglycemia likely  due to uncontrolled DM in addition to steroid   HbA1C;  7.2  Pt takes  Novolog 70/30 40 U BID , Metformin and Glipizide- likely hypoglycemic   Continue moderate sliding scale Humalog  Increase  Lantus to  30 U HS  Increase pre meal Humalog to 12  u tid before meal   Upon discharge Continue Novolog 70/30 40 U at am and 30 U at bedtime before meal. Discontinue oral hypoglycemic agents.   Monitor blood glucose.  fine tuning as out pt Hyperglycemia likely  due to uncontrolled DM in addition to steroid   HbA1C;  7.2  Pt takes  Novolog 70/30 40 U BID , Metformin and Glipizide- likely hypoglycemic   Continue moderate sliding scale Humalog  Increase  Lantus to  30 U HS  Increase pre meal Humalog to 12  u tid before meal   Upon discharge Continue Novolog 70/30 40 U at am and 30 U at bedtime before meal. Discontinue oral hypoglycemic agents.   Monitor blood glucose.  fine tuning as out pt.  d/w hs

## 2020-12-24 NOTE — PROGRESS NOTE ADULT - SUBJECTIVE AND OBJECTIVE BOX
PGY-1 Progress Note discussed with attending    PAGER #: [-----] TILL 5:00 PM  PLEASE CONTACT ON CALL TEAM:  - On Call Team (Please refer to Agustina) FROM 5:00 PM - 8:30PM  - Nightfloat Team FROM 8:30 -7:30 AM      INTERVAL HPI/OVERNIGHT EVENTS: No acute events; Patient is saturating 92% on room air.     MEDICATIONS:  acetaminophen   Tablet .. 650 milliGRAM(s) Oral every 6 hours PRN  ALBUTerol    90 MICROgram(s) HFA Inhaler 2 Puff(s) Inhalation every 6 hours PRN  ascorbic acid 500 milliGRAM(s) Oral daily  benzonatate 100 milliGRAM(s) Oral every 8 hours  budesonide  80 MICROgram(s)/formoterol 4.5 MICROgram(s) Inhaler 2 Puff(s) Inhalation two times a day  cholecalciferol 1000 Unit(s) Oral daily  dextrose 5%. 1000 milliLiter(s) IV Continuous <Continuous>  ferrous    sulfate 325 milliGRAM(s) Oral daily  gabapentin 300 milliGRAM(s) Oral every 12 hours  glucagon  Injectable 1 milliGRAM(s) IntraMuscular once  heparin   Injectable 5000 Unit(s) SubCutaneous every 8 hours  insulin glargine Injectable (LANTUS) 22 Unit(s) SubCutaneous at bedtime  insulin lispro (ADMELOG) corrective regimen sliding scale   SubCutaneous at bedtime  insulin lispro (ADMELOG) corrective regimen sliding scale   SubCutaneous three times a day before meals  insulin lispro Injectable (ADMELOG) 8 Unit(s) SubCutaneous three times a day before meals  loratadine 10 milliGRAM(s) Oral daily  montelukast 10 milliGRAM(s) Oral daily  multivitamin 1 Tablet(s) Oral daily  pantoprazole    Tablet 40 milliGRAM(s) Oral before breakfast  potassium phosphate / sodium phosphate Tablet (K-PHOS No. 2) 1 Tablet(s) Oral three times a day  predniSONE   Tablet 40 milliGRAM(s) Oral daily  sodium chloride 0.9%. 1000 milliLiter(s) IV Continuous <Continuous>  zinc sulfate 220 milliGRAM(s) Oral daily      REVIEW OF SYSTEMS:  CONSTITUTIONAL: No fever, weight loss, or fatigue  RESPIRATORY: No cough, wheezing, chills or hemoptysis; No shortness of breath  CARDIOVASCULAR: No chest pain, palpitations, dizziness, or leg swelling  GASTROINTESTINAL: No abdominal pain. No nausea, vomiting, or hematemesis; No diarrhea or constipation. No melena or hematochezia.  GENITOURINARY: No dysuria or hematuria, urinary frequency  NEUROLOGICAL: No headaches, memory loss, loss of strength, numbness, or tremors  SKIN: No itching, burning, rashes, or lesions     Vital Signs Last 24 Hrs  T(C): 36.7 (24 Dec 2020 08:44), Max: 36.9 (23 Dec 2020 15:25)  T(F): 98 (24 Dec 2020 08:44), Max: 98.4 (23 Dec 2020 15:25)  HR: 82 (24 Dec 2020 08:44) (82 - 82)  BP: 145/68 (24 Dec 2020 08:44) (131/60 - 151/71)  BP(mean): --  RR: 18 (24 Dec 2020 08:44) (18 - 18)  SpO2: 96% (24 Dec 2020 09:30) (93% - 100%)    PHYSICAL EXAMINATION:  GENERAL: NAD, well built  HEAD:  Atraumatic, Normocephalic  EYES:  conjunctiva and sclera clear  NECK: Supple, No JVD, Normal thyroid  CHEST/LUNG: Clear to auscultation. Clear to percussion bilaterally; No rales, rhonchi, wheezing, or rubs  HEART: Regular rate and rhythm; No murmurs, rubs, or gallops  ABDOMEN: Soft, Nontender, Nondistended; Bowel sounds present  NERVOUS SYSTEM:  Alert & Oriented X3,    EXTREMITIES:  2+ Peripheral Pulses, No clubbing, cyanosis, or edema  SKIN: warm dry                          13.2   11.81 )-----------( 359      ( 23 Dec 2020 07:42 )             42.4     12-23    135  |  100  |  36<H>  ----------------------------<  200<H>  4.3   |  23  |  1.22    Ca    9.1      23 Dec 2020 07:42  Mg     2.2     12-22    TPro  7.3  /  Alb  2.9<L>  /  TBili  0.4  /  DBili  x   /  AST  51<H>  /  ALT  63<H>  /  AlkPhos  100  12-23    LIVER FUNCTIONS - ( 23 Dec 2020 07:42 )  Alb: 2.9 g/dL / Pro: 7.3 g/dL / ALK PHOS: 100 U/L / ALT: 63 U/L DA / AST: 51 U/L / GGT: x               PT/INR - ( 22 Dec 2020 14:57 )   PT: 12.1 sec;   INR: 1.02 ratio         PTT - ( 22 Dec 2020 14:57 )  PTT:53.8 sec    CAPILLARY BLOOD GLUCOSE      RADIOLOGY & ADDITIONAL TESTS:

## 2020-12-24 NOTE — PROGRESS NOTE ADULT - PROBLEM SELECTOR PLAN 3
Cr 1.6 on admission, 1.2 today. Baseline (1.180)  - GFR 36  - U creat 20, Brice 41, UOsmol 192  - 32/1.3 on 12/19  Nephrologist Dr Dove following  - - Scr 1.6 on admission   - - Renal function improving   - - Check UA  - - Monitor BMP   - - AVoid further nephrotoxics, NSAIDS RCA  --hyperkalemia- resolved with Insulin and lokelma

## 2020-12-24 NOTE — PROGRESS NOTE ADULT - PROBLEM SELECTOR PLAN 1
Improving   not requiring oxygen supplementation   not a candidate for remdesivir initially given TAMANNA, but currently not requiring supplemental oxygen  afebrile over last 24 hours   d-dmier in 200s but will do doppler given pedal edema  repeated covid is still positive

## 2020-12-24 NOTE — PROGRESS NOTE ADULT - SUBJECTIVE AND OBJECTIVE BOX
[facility] [place] [facility]   MD FAUSTO PALM MD RUORU WONG, PA    TEL:  OFFICE: [phone]  DR LEE CELL: [phone]  CHARLEY WILLIAM CELL: [phone]  DR. GARCIA CELL: [phone]  DR. MARTIN CELL: [phone]    FROM 5 PM - 7 AM PLEASE CALL ANSWERING SERVICE: [phone]    RENAL FOLLOW UP NOTE--Date of Service 12-24-20 @ 11:50  --------------------------------------------------------------------------------  HPI:      Pt covid 19+    PAST HISTORY  --------------------------------------------------------------------------------  No significant changes to PMH, PSH, FHx, SHx, unless otherwise noted    ALLERGIES & MEDICATIONS  --------------------------------------------------------------------------------  Allergies    No Known Allergies    Intolerances      Standing Inpatient Medications  ascorbic acid 500 milliGRAM(s) Oral daily  benzonatate 100 milliGRAM(s) Oral every 8 hours  budesonide  80 MICROgram(s)/formoterol 4.5 MICROgram(s) Inhaler 2 Puff(s) Inhalation two times a day  cholecalciferol 1000 Unit(s) Oral daily  dextrose 5%. 1000 milliLiter(s) IV Continuous <Continuous>  ferrous    sulfate 325 milliGRAM(s) Oral daily  gabapentin 300 milliGRAM(s) Oral every 12 hours  glucagon  Injectable 1 milliGRAM(s) IntraMuscular once  heparin   Injectable 5000 Unit(s) SubCutaneous every 8 hours  insulin glargine Injectable (LANTUS) 30 Unit(s) SubCutaneous at bedtime  insulin lispro (ADMELOG) corrective regimen sliding scale   SubCutaneous at bedtime  insulin lispro (ADMELOG) corrective regimen sliding scale   SubCutaneous three times a day before meals  insulin lispro Injectable (ADMELOG) 8 Unit(s) SubCutaneous three times a day before meals  loratadine 10 milliGRAM(s) Oral daily  montelukast 10 milliGRAM(s) Oral daily  multivitamin 1 Tablet(s) Oral daily  pantoprazole    Tablet 40 milliGRAM(s) Oral before breakfast  potassium phosphate / sodium phosphate Tablet (K-PHOS No. 2) 1 Tablet(s) Oral three times a day  predniSONE   Tablet 40 milliGRAM(s) Oral daily  sodium chloride 0.9%. 1000 milliLiter(s) IV Continuous <Continuous>  zinc sulfate 220 milliGRAM(s) Oral daily    PRN Inpatient Medications  acetaminophen   Tablet .. 650 milliGRAM(s) Oral every 6 hours PRN  ALBUTerol    90 MICROgram(s) HFA Inhaler 2 Puff(s) Inhalation every 6 hours PRN      REVIEW OF SYSTEMS  --------------------------------------------------------------------------------  General: no fever    MSK: no edema     VITALS/PHYSICAL EXAM  --------------------------------------------------------------------------------  T(C): 36.7 (12-24-20 @ 08:44), Max: 36.9 (12-23-20 @ 15:25)  HR: 82 (12-24-20 @ 08:44) (82 - 82)  BP: 145/68 (12-24-20 @ 08:44) (131/60 - 151/71)  RR: 18 (12-24-20 @ 08:44) (18 - 18)  SpO2: 96% (12-24-20 @ 09:30) (93% - 100%)  Wt(kg): --        LABS/STUDIES  --------------------------------------------------------------------------------              12.8   12.04 >-----------<  368      [12-24-20 @ 10:39]              40.4     133  |  99  |  37  ----------------------------<  385      [12-24-20 @ 10:39]  4.4   |  24  |  1.25        Ca     9.1     [12-24-20 @ 10:39]      Mg     2.2     [12-22-20 @ 14:56]    TPro  6.8  /  Alb  2.4  /  TBili  0.4  /  DBili  x   /  AST  62  /  ALT  77  /  AlkPhos  112  [12-24-20 @ 10:39]    PT/INR: PT 12.1 , INR 1.02       [12-22-20 @ 14:57]  PTT: 53.8       [12-22-20 @ 14:57]      Creatinine Trend:  SCr 1.25 [12-24 @ 10:39]  SCr 1.22 [12-23 @ 07:42]  SCr 1.42 [12-22 @ 14:56]  SCr 1.30 [12-21 @ 07:12]  SCr 1.46 [12-19 @ 08:21]        Iron 24, TIBC 264, %sat 9      [12-15-20 @ 07:46]  Ferritin 395      [12-23-20 @ 00:51]  Vitamin D (25OH) 36.3      [07-18-20 @ 11:35]  TSH 0.38      [12-15-20 @ 07:46]  Lipid: chol 139, , HDL 46, LDL --      [12-15-20 @ 07:46]      Immunofixation Serum:   No Monoclonal Band Identified    Reference Range: None Detected      [12-15-20 @ 11:01]  SPEP Interpretation: Normal Electrophoresis Pattern      [12-15-20 @ 11:01]

## 2020-12-24 NOTE — PROGRESS NOTE ADULT - SUBJECTIVE AND OBJECTIVE BOX
Interval Events: NAD. Pt comfortably lying on bed.  Blood glucose elevated today, likely due to steroids      Allergies    No Known Allergies    Intolerances      Endocrine/Metabolic Medications:  glucagon  Injectable 1 milliGRAM(s) IntraMuscular once  insulin glargine Injectable (LANTUS) 30 Unit(s) SubCutaneous at bedtime  insulin lispro (ADMELOG) corrective regimen sliding scale   SubCutaneous at bedtime  insulin lispro (ADMELOG) corrective regimen sliding scale   SubCutaneous three times a day before meals  insulin lispro Injectable (ADMELOG) 8 Unit(s) SubCutaneous three times a day before meals  predniSONE   Tablet 40 milliGRAM(s) Oral daily      Vital Signs Last 24 Hrs  T(C): 36.7 (24 Dec 2020 08:44), Max: 36.9 (23 Dec 2020 15:25)  T(F): 98 (24 Dec 2020 08:44), Max: 98.4 (23 Dec 2020 15:25)  HR: 82 (24 Dec 2020 08:44) (82 - 82)  BP: 145/68 (24 Dec 2020 08:44) (131/60 - 151/71)  BP(mean): --  RR: 18 (24 Dec 2020 08:44) (18 - 18)  SpO2: 96% (24 Dec 2020 09:30) (93% - 100%)      PHYSICAL EXAM  All physical exam findings normal, except those marked:  General:	 NAD  .		[] Abnormal:  Neck		Normal: supple, no cervical adenopathy, no palpable thyroid  .		[] Abnormal:  Cardiovascular	Normal: regular rate, normal S1, S2, no murmurs  .		[] Abnormal:  Respiratory	Normal: no chest wall deformity, normal respiratory pattern, CTA B/L  .		[] Abnormal:  Abdominal	Normal: soft, ND, NT, bowel sounds present, no masses, no organomegaly  .		[] Abnormal:    Extremities	Normal: FROM x4  .		[] Abnormal:  Skin		Normal: intact and not indurated, no rash, no acanthosis nigricans  .		[] Abnormal:  Neurologic	Normal:   .		[] Abnormal: AOx 2     LABS                        12.8   12.04 )-----------( 368      ( 24 Dec 2020 10:39 )             40.4                               133    |  99     |  37                  Calcium: 9.1   / iCa: x      (12-24 @ 10:39)    ----------------------------<  385       Magnesium: x                                4.4     |  24     |  1.25             Phosphorous: x        TPro  6.8    /  Alb  2.4    /  TBili  0.4    /  DBili  x      /  AST  62     /  ALT  77     /  AlkPhos  112    24 Dec 2020 10:39    CAPILLARY BLOOD GLUCOSE      POCT Blood Glucose.: 364 mg/dL (24 Dec 2020 12:07)  POCT Blood Glucose.: 252 mg/dL (24 Dec 2020 08:30)  POCT Blood Glucose.: 154 mg/dL (23 Dec 2020 21:07)  POCT Blood Glucose.: 312 mg/dL (23 Dec 2020 16:58)  POCT Blood Glucose.: 551 mg/dL (23 Dec 2020 16:56)

## 2020-12-25 LAB
ALBUMIN SERPL ELPH-MCNC: 2.5 G/DL — LOW (ref 3.5–5)
ALP SERPL-CCNC: 114 U/L — SIGNIFICANT CHANGE UP (ref 40–120)
ALT FLD-CCNC: 78 U/L DA — HIGH (ref 10–60)
ANION GAP SERPL CALC-SCNC: 10 MMOL/L — SIGNIFICANT CHANGE UP (ref 5–17)
AST SERPL-CCNC: 45 U/L — HIGH (ref 10–40)
BASOPHILS # BLD AUTO: 0 K/UL — SIGNIFICANT CHANGE UP (ref 0–0.2)
BASOPHILS NFR BLD AUTO: 0 % — SIGNIFICANT CHANGE UP (ref 0–2)
BILIRUB SERPL-MCNC: 0.6 MG/DL — SIGNIFICANT CHANGE UP (ref 0.2–1.2)
BUN SERPL-MCNC: 28 MG/DL — HIGH (ref 7–18)
CALCIUM SERPL-MCNC: 8.9 MG/DL — SIGNIFICANT CHANGE UP (ref 8.4–10.5)
CHLORIDE SERPL-SCNC: 102 MMOL/L — SIGNIFICANT CHANGE UP (ref 96–108)
CO2 SERPL-SCNC: 24 MMOL/L — SIGNIFICANT CHANGE UP (ref 22–31)
CREAT SERPL-MCNC: 1.08 MG/DL — SIGNIFICANT CHANGE UP (ref 0.5–1.3)
EOSINOPHIL # BLD AUTO: 0 K/UL — SIGNIFICANT CHANGE UP (ref 0–0.5)
EOSINOPHIL NFR BLD AUTO: 0 % — SIGNIFICANT CHANGE UP (ref 0–6)
GLUCOSE BLDC GLUCOMTR-MCNC: 186 MG/DL — HIGH (ref 70–99)
GLUCOSE BLDC GLUCOMTR-MCNC: 207 MG/DL — HIGH (ref 70–99)
GLUCOSE BLDC GLUCOMTR-MCNC: 257 MG/DL — HIGH (ref 70–99)
GLUCOSE SERPL-MCNC: 173 MG/DL — HIGH (ref 70–99)
HCT VFR BLD CALC: 41.5 % — SIGNIFICANT CHANGE UP (ref 34.5–45)
HGB BLD-MCNC: 13.1 G/DL — SIGNIFICANT CHANGE UP (ref 11.5–15.5)
LYMPHOCYTES # BLD AUTO: 2.68 K/UL — SIGNIFICANT CHANGE UP (ref 1–3.3)
LYMPHOCYTES # BLD AUTO: 21 % — SIGNIFICANT CHANGE UP (ref 13–44)
MANUAL SMEAR VERIFICATION: SIGNIFICANT CHANGE UP
MCHC RBC-ENTMCNC: 25.8 PG — LOW (ref 27–34)
MCHC RBC-ENTMCNC: 31.6 GM/DL — LOW (ref 32–36)
MCV RBC AUTO: 81.9 FL — SIGNIFICANT CHANGE UP (ref 80–100)
MONOCYTES # BLD AUTO: 0.89 K/UL — SIGNIFICANT CHANGE UP (ref 0–0.9)
MONOCYTES NFR BLD AUTO: 7 % — SIGNIFICANT CHANGE UP (ref 2–14)
NEUTROPHILS # BLD AUTO: 8.92 K/UL — HIGH (ref 1.8–7.4)
NEUTROPHILS NFR BLD AUTO: 70 % — SIGNIFICANT CHANGE UP (ref 43–77)
NRBC # BLD: 0 /100 — SIGNIFICANT CHANGE UP (ref 0–0)
OVALOCYTES BLD QL SMEAR: SLIGHT — SIGNIFICANT CHANGE UP
PLAT MORPH BLD: NORMAL — SIGNIFICANT CHANGE UP
PLATELET # BLD AUTO: 345 K/UL — SIGNIFICANT CHANGE UP (ref 150–400)
POIKILOCYTOSIS BLD QL AUTO: SLIGHT — SIGNIFICANT CHANGE UP
POTASSIUM SERPL-MCNC: 4.2 MMOL/L — SIGNIFICANT CHANGE UP (ref 3.5–5.3)
POTASSIUM SERPL-SCNC: 4.2 MMOL/L — SIGNIFICANT CHANGE UP (ref 3.5–5.3)
PROT SERPL-MCNC: 7 G/DL — SIGNIFICANT CHANGE UP (ref 6–8.3)
RBC # BLD: 5.07 M/UL — SIGNIFICANT CHANGE UP (ref 3.8–5.2)
RBC # FLD: 13.6 % — SIGNIFICANT CHANGE UP (ref 10.3–14.5)
RBC BLD AUTO: ABNORMAL
SODIUM SERPL-SCNC: 136 MMOL/L — SIGNIFICANT CHANGE UP (ref 135–145)
VARIANT LYMPHS # BLD: 2 % — SIGNIFICANT CHANGE UP (ref 0–6)
WBC # BLD: 12.74 K/UL — HIGH (ref 3.8–10.5)
WBC # FLD AUTO: 12.74 K/UL — HIGH (ref 3.8–10.5)

## 2020-12-25 RX ORDER — ENOXAPARIN SODIUM 100 MG/ML
40 INJECTION SUBCUTANEOUS
Refills: 0 | Status: DISCONTINUED | OUTPATIENT
Start: 2020-12-25 | End: 2021-01-04

## 2020-12-25 RX ADMIN — Medication 1 TABLET(S): at 05:57

## 2020-12-25 RX ADMIN — Medication 2: at 08:50

## 2020-12-25 RX ADMIN — Medication 650 MILLIGRAM(S): at 23:00

## 2020-12-25 RX ADMIN — Medication 40 MILLIGRAM(S): at 05:58

## 2020-12-25 RX ADMIN — GABAPENTIN 300 MILLIGRAM(S): 400 CAPSULE ORAL at 17:52

## 2020-12-25 RX ADMIN — GABAPENTIN 300 MILLIGRAM(S): 400 CAPSULE ORAL at 05:57

## 2020-12-25 RX ADMIN — Medication 100 MILLIGRAM(S): at 13:28

## 2020-12-25 RX ADMIN — Medication 325 MILLIGRAM(S): at 11:21

## 2020-12-25 RX ADMIN — INSULIN GLARGINE 30 UNIT(S): 100 INJECTION, SOLUTION SUBCUTANEOUS at 22:08

## 2020-12-25 RX ADMIN — PANTOPRAZOLE SODIUM 40 MILLIGRAM(S): 20 TABLET, DELAYED RELEASE ORAL at 05:57

## 2020-12-25 RX ADMIN — Medication 3: at 17:53

## 2020-12-25 RX ADMIN — Medication 12 UNIT(S): at 12:14

## 2020-12-25 RX ADMIN — Medication 1000 UNIT(S): at 11:21

## 2020-12-25 RX ADMIN — Medication 12 UNIT(S): at 08:50

## 2020-12-25 RX ADMIN — ZINC SULFATE TAB 220 MG (50 MG ZINC EQUIVALENT) 220 MILLIGRAM(S): 220 (50 ZN) TAB at 11:21

## 2020-12-25 RX ADMIN — ENOXAPARIN SODIUM 40 MILLIGRAM(S): 100 INJECTION SUBCUTANEOUS at 17:53

## 2020-12-25 RX ADMIN — Medication 1 TABLET(S): at 11:21

## 2020-12-25 RX ADMIN — Medication 500 MILLIGRAM(S): at 11:21

## 2020-12-25 RX ADMIN — HEPARIN SODIUM 5000 UNIT(S): 5000 INJECTION INTRAVENOUS; SUBCUTANEOUS at 05:58

## 2020-12-25 RX ADMIN — Medication 1 TABLET(S): at 13:28

## 2020-12-25 RX ADMIN — Medication 2: at 12:13

## 2020-12-25 RX ADMIN — Medication 650 MILLIGRAM(S): at 22:09

## 2020-12-25 RX ADMIN — Medication 12 UNIT(S): at 17:53

## 2020-12-25 RX ADMIN — Medication 100 MILLIGRAM(S): at 05:57

## 2020-12-25 RX ADMIN — BUDESONIDE AND FORMOTEROL FUMARATE DIHYDRATE 2 PUFF(S): 160; 4.5 AEROSOL RESPIRATORY (INHALATION) at 22:08

## 2020-12-25 RX ADMIN — Medication 1 TABLET(S): at 22:09

## 2020-12-25 RX ADMIN — Medication 100 MILLIGRAM(S): at 22:09

## 2020-12-25 RX ADMIN — MONTELUKAST 10 MILLIGRAM(S): 4 TABLET, CHEWABLE ORAL at 11:21

## 2020-12-25 RX ADMIN — BUDESONIDE AND FORMOTEROL FUMARATE DIHYDRATE 2 PUFF(S): 160; 4.5 AEROSOL RESPIRATORY (INHALATION) at 10:37

## 2020-12-25 RX ADMIN — LORATADINE 10 MILLIGRAM(S): 10 TABLET ORAL at 11:21

## 2020-12-25 NOTE — PROGRESS NOTE ADULT - PROBLEM SELECTOR PLAN 7
Patient takes Novolog 70/30 40 U BID , Metformin and Glipizide  - blood sugar is elevated, likely in the setting of iv steroids (currently being tapered)  - currently on lantus 30 units and premeal 12 units,  today morning    - Diabetic diet  - A1C 7.2  - Discussed with Dr. Britt upon discharge pt would go on insulin 70/30 40 units in the morning and 30 units at home if she is on prednisone and adjust insulin out pt.   - no po diabetic meds upon discharge given concern for hypoglycemia and lopez  - Dr. Britt Endocrine consulted 12/21

## 2020-12-25 NOTE — PROGRESS NOTE ADULT - PROBLEM SELECTOR PLAN 3
Cr 1.6 on admission, 1.0 today. Baseline (1.180)  - GFR 36  - U creat 20, Brice 41, UOsmol 192  - 32/1.3 on 12/19  Nephrologist Dr Dove following  - - Scr 1.6 on admission   - - Renal function improving   - - Check UA  - - Monitor BMP   - - AVoid further nephrotoxics, NSAIDS RCA  --hyperkalemia- resolved with Insulin and lokelma

## 2020-12-25 NOTE — PROGRESS NOTE ADULT - PROBLEM SELECTOR PLAN 8
RISK                                                          Points  [] Previous VTE                                           3  [] Thrombophilia                                        2  [] Lower limb paralysis                              2   [] Current Cancer                                       2   [x] Immobilization > 24 hrs                        1  [] ICU/CCU stay > 24 hours                       1  [x] Age > 60                                                   1  Improve score 2  Lovenox 40 sq bid

## 2020-12-25 NOTE — PROGRESS NOTE ADULT - PROBLEM SELECTOR PLAN 1
Improving   not requiring oxygen supplementation   not a candidate for remdesivir initially given TAMANNA, but currently not requiring supplemental oxygen try to taper off   d-dmier in 200s, doppler negative   repeated covid is still positive

## 2020-12-25 NOTE — PROGRESS NOTE ADULT - SUBJECTIVE AND OBJECTIVE BOX
PAULA MARIO  89y  Patient is a 89y old  Female who presents with a chief complaint of Fever, chills, Covid positive (25 Dec 2020 09:26)    HPI:  Admitted with COVID-19, followed for TAMANNA. No new complaints.  Not Oliguric.    HEALTH ISSUES - PROBLEM Dx:  Discharge planning issues  Discharge planning issues    Uncontrolled diabetes mellitus  Uncontrolled diabetes mellitus    Anemia  Anemia    Acute on chronic renal insufficiency  Acute on chronic renal insufficiency    ACS (acute coronary syndrome)  ACS (acute coronary syndrome)    Prophylactic measure  Prophylactic measure    DM (diabetes mellitus)  DM (diabetes mellitus)    Asthma  Asthma    HTN (hypertension)  HTN (hypertension)    Pneumonia due to COVID-19 virus  Pneumonia due to COVID-19 virus          MEDICATIONS  (STANDING):  ascorbic acid 500 milliGRAM(s) Oral daily  benzonatate 100 milliGRAM(s) Oral every 8 hours  budesonide  80 MICROgram(s)/formoterol 4.5 MICROgram(s) Inhaler 2 Puff(s) Inhalation two times a day  cholecalciferol 1000 Unit(s) Oral daily  dextrose 5%. 1000 milliLiter(s) (100 mL/Hr) IV Continuous <Continuous>  enoxaparin Injectable 40 milliGRAM(s) SubCutaneous two times a day  ferrous    sulfate 325 milliGRAM(s) Oral daily  gabapentin 300 milliGRAM(s) Oral every 12 hours  glucagon  Injectable 1 milliGRAM(s) IntraMuscular once  insulin glargine Injectable (LANTUS) 30 Unit(s) SubCutaneous at bedtime  insulin lispro (ADMELOG) corrective regimen sliding scale   SubCutaneous three times a day before meals  insulin lispro (ADMELOG) corrective regimen sliding scale   SubCutaneous at bedtime  insulin lispro Injectable (ADMELOG) 12 Unit(s) SubCutaneous three times a day before meals  loratadine 10 milliGRAM(s) Oral daily  montelukast 10 milliGRAM(s) Oral daily  multivitamin 1 Tablet(s) Oral daily  pantoprazole    Tablet 40 milliGRAM(s) Oral before breakfast  potassium phosphate / sodium phosphate Tablet (K-PHOS No. 2) 1 Tablet(s) Oral three times a day  predniSONE   Tablet 40 milliGRAM(s) Oral daily  zinc sulfate 220 milliGRAM(s) Oral daily    MEDICATIONS  (PRN):  acetaminophen   Tablet .. 650 milliGRAM(s) Oral every 6 hours PRN Temp greater or equal to 38C (100.4F), Mild Pain (1 - 3)  ALBUTerol    90 MICROgram(s) HFA Inhaler 2 Puff(s) Inhalation every 6 hours PRN Shortness of Breath and/or Wheezing    Vital Signs Last 24 Hrs  T(C): 36.4 (25 Dec 2020 15:39), Max: 36.4 (25 Dec 2020 15:39)  T(F): 97.6 (25 Dec 2020 15:39), Max: 97.6 (25 Dec 2020 15:39)  HR: 75 (25 Dec 2020 15:39) (75 - 83)  BP: 135/60 (25 Dec 2020 15:39) (125/38 - 135/60)  BP(mean): --  RR: 18 (25 Dec 2020 15:39) (17 - 18)  SpO2: 94% (25 Dec 2020 15:39) (94% - 95%)  Daily     Daily     PHYSICAL EXAM:  Constitutional:  She appears comfortable and not distressed. Not diaphoretic.    Respiratory: The lungs are clear to auscultation. No dullness and expansion is normal.    Cardiovascular: S1 and S2 are normal. No mummurs, rubs or gallops are present.    Gastrointestinal: The abdomen is soft. No tenderness is present. No masses are present. Bowel sounds are normal.    Genitourinary: The bladder is not distended. No CVA tenderness is present.    Extremities: No edema is noted. No deformities are present.    Neurological: . Tone, power and sensation are normal.     Skin: No lesions are seen  or palpated.                            13.1   12.74 )-----------( 345      ( 25 Dec 2020 07:53 )             41.5     12-25    136  |  102  |  28<H>  ----------------------------<  173<H>  4.2   |  24  |  1.08    Ca    8.9      25 Dec 2020 07:53    TPro  7.0  /  Alb  2.5<L>  /  TBili  0.6  /  DBili  x   /  AST  45<H>  /  ALT  78<H>  /  AlkPhos  114  12-25

## 2020-12-25 NOTE — PROGRESS NOTE ADULT - SUBJECTIVE AND OBJECTIVE BOX
PGY 3 Note discussed with primary attending    Patient is a 89y old  Female who presents with a chief complaint of Fever, chills, Covid positive (24 Dec 2020 14:16)      INTERVAL HPI/OVERNIGHT EVENTS: offers no new complaints; current symptoms resolving    MEDICATIONS  (STANDING):  ascorbic acid 500 milliGRAM(s) Oral daily  benzonatate 100 milliGRAM(s) Oral every 8 hours  budesonide  80 MICROgram(s)/formoterol 4.5 MICROgram(s) Inhaler 2 Puff(s) Inhalation two times a day  cholecalciferol 1000 Unit(s) Oral daily  dextrose 5%. 1000 milliLiter(s) (100 mL/Hr) IV Continuous <Continuous>  ferrous    sulfate 325 milliGRAM(s) Oral daily  gabapentin 300 milliGRAM(s) Oral every 12 hours  glucagon  Injectable 1 milliGRAM(s) IntraMuscular once  heparin   Injectable 5000 Unit(s) SubCutaneous every 8 hours  insulin glargine Injectable (LANTUS) 30 Unit(s) SubCutaneous at bedtime  insulin lispro (ADMELOG) corrective regimen sliding scale   SubCutaneous at bedtime  insulin lispro (ADMELOG) corrective regimen sliding scale   SubCutaneous three times a day before meals  insulin lispro Injectable (ADMELOG) 12 Unit(s) SubCutaneous three times a day before meals  loratadine 10 milliGRAM(s) Oral daily  montelukast 10 milliGRAM(s) Oral daily  multivitamin 1 Tablet(s) Oral daily  pantoprazole    Tablet 40 milliGRAM(s) Oral before breakfast  potassium phosphate / sodium phosphate Tablet (K-PHOS No. 2) 1 Tablet(s) Oral three times a day  predniSONE   Tablet 40 milliGRAM(s) Oral daily  sodium chloride 0.9%. 1000 milliLiter(s) (50 mL/Hr) IV Continuous <Continuous>  zinc sulfate 220 milliGRAM(s) Oral daily    MEDICATIONS  (PRN):  acetaminophen   Tablet .. 650 milliGRAM(s) Oral every 6 hours PRN Temp greater or equal to 38C (100.4F), Mild Pain (1 - 3)  ALBUTerol    90 MICROgram(s) HFA Inhaler 2 Puff(s) Inhalation every 6 hours PRN Shortness of Breath and/or Wheezing      __________________________________________________  REVIEW OF SYSTEMS:    CONSTITUTIONAL: No fever,   EYES: no acute visual disturbances  NECK: No pain or stiffness  RESPIRATORY: No cough; No shortness of breath  CARDIOVASCULAR: No chest pain, no palpitations  GASTROINTESTINAL: No pain. No nausea or vomiting; No diarrhea   NEUROLOGICAL: No headache or numbness, no tremors  MUSCULOSKELETAL: No joint pain, no muscle pain  GENITOURINARY: no dysuria, no frequency, no hesitancy  PSYCHIATRY: no depression , no anxiety  ALL OTHER  ROS negative        Vital Signs Last 24 Hrs  T(C): 36.2 (25 Dec 2020 08:12), Max: 36.4 (24 Dec 2020 16:44)  T(F): 97.2 (25 Dec 2020 08:12), Max: 97.5 (24 Dec 2020 16:44)  HR: 83 (25 Dec 2020 08:12) (80 - 83)  BP: 125/38 (25 Dec 2020 08:12) (125/38 - 147/63)  BP(mean): --  RR: 17 (25 Dec 2020 08:12) (17 - 18)  SpO2: 94% (25 Dec 2020 08:12) (94% - 96%)    ________________________________________________  PHYSICAL EXAM:  GENERAL: NAD  HEENT: Normocephalic;  conjunctivae and sclerae clear; moist mucous membranes;   NECK : supple  CHEST/LUNG: Clear to auscultation bilaterally with good air entry   HEART: S1 S2  regular; no murmurs, gallops or rubs  ABDOMEN: Soft, Nontender, Nondistended; Bowel sounds present  EXTREMITIES: no cyanosis; no edema; no calf tenderness  SKIN: warm and dry; no rash  NERVOUS SYSTEM:  Awake and alert; Oriented  to place, person and time ; no new deficits    _________________________________________________  LABS:                        13.1   12.74 )-----------( 345      ( 25 Dec 2020 07:53 )             41.5     12-25    136  |  102  |  28<H>  ----------------------------<  173<H>  4.2   |  24  |  1.08    Ca    8.9      25 Dec 2020 07:53    TPro  7.0  /  Alb  2.5<L>  /  TBili  0.6  /  DBili  x   /  AST  45<H>  /  ALT  78<H>  /  AlkPhos  114  12-25        CAPILLARY BLOOD GLUCOSE      POCT Blood Glucose.: 207 mg/dL (25 Dec 2020 08:38)  POCT Blood Glucose.: 185 mg/dL (24 Dec 2020 21:42)  POCT Blood Glucose.: 247 mg/dL (24 Dec 2020 16:53)  POCT Blood Glucose.: 364 mg/dL (24 Dec 2020 12:07)        RADIOLOGY & ADDITIONAL TESTS:    Imaging Personally Reviewed:  YES/NO    Consultant(s) Notes Reviewed:   YES/ No    Care Discussed with Consultants :     Plan of care was discussed with patient and /or primary care giver; all questions and concerns were addressed and care was aligned with patient's wishes.

## 2020-12-25 NOTE — PROGRESS NOTE ADULT - PROBLEM SELECTOR PLAN 6
- Improving   - prednisone 40 daily for 2/4 days   - c/w bronchodilators   - C/w Montelukast 12/17 with improvement in wheezing  - Pulm Dr. Templeton/Sachin consulted 12/16

## 2020-12-25 NOTE — PROGRESS NOTE ADULT - PROBLEM SELECTOR PLAN 9
Called Son yesterday with Social work, Son did not answer left voice mail   will f/u with son today regarding discharge planning

## 2020-12-25 NOTE — PROGRESS NOTE ADULT - PROBLEM SELECTOR PLAN 4
Patient takes Lisinopril, HCTZ   - blood pressure is in acceptable range for now  - currently off HTN meds   - Restart as needed  - Diabetic and dash diet

## 2020-12-26 LAB
GLUCOSE BLDC GLUCOMTR-MCNC: 191 MG/DL — HIGH (ref 70–99)
GLUCOSE BLDC GLUCOMTR-MCNC: 251 MG/DL — HIGH (ref 70–99)
GLUCOSE BLDC GLUCOMTR-MCNC: 296 MG/DL — HIGH (ref 70–99)
GLUCOSE BLDC GLUCOMTR-MCNC: 348 MG/DL — HIGH (ref 70–99)
SARS-COV-2 RNA SPEC QL NAA+PROBE: DETECTED

## 2020-12-26 RX ADMIN — ENOXAPARIN SODIUM 40 MILLIGRAM(S): 100 INJECTION SUBCUTANEOUS at 05:50

## 2020-12-26 RX ADMIN — LORATADINE 10 MILLIGRAM(S): 10 TABLET ORAL at 11:39

## 2020-12-26 RX ADMIN — MONTELUKAST 10 MILLIGRAM(S): 4 TABLET, CHEWABLE ORAL at 11:40

## 2020-12-26 RX ADMIN — Medication 100 MILLIGRAM(S): at 05:49

## 2020-12-26 RX ADMIN — Medication 12 UNIT(S): at 08:49

## 2020-12-26 RX ADMIN — Medication 1 TABLET(S): at 11:39

## 2020-12-26 RX ADMIN — Medication 12 UNIT(S): at 17:10

## 2020-12-26 RX ADMIN — Medication 1: at 08:49

## 2020-12-26 RX ADMIN — INSULIN GLARGINE 30 UNIT(S): 100 INJECTION, SOLUTION SUBCUTANEOUS at 21:31

## 2020-12-26 RX ADMIN — ZINC SULFATE TAB 220 MG (50 MG ZINC EQUIVALENT) 220 MILLIGRAM(S): 220 (50 ZN) TAB at 11:39

## 2020-12-26 RX ADMIN — BUDESONIDE AND FORMOTEROL FUMARATE DIHYDRATE 2 PUFF(S): 160; 4.5 AEROSOL RESPIRATORY (INHALATION) at 10:14

## 2020-12-26 RX ADMIN — Medication 1 TABLET(S): at 13:14

## 2020-12-26 RX ADMIN — Medication 325 MILLIGRAM(S): at 11:39

## 2020-12-26 RX ADMIN — BUDESONIDE AND FORMOTEROL FUMARATE DIHYDRATE 2 PUFF(S): 160; 4.5 AEROSOL RESPIRATORY (INHALATION) at 21:31

## 2020-12-26 RX ADMIN — Medication 1000 UNIT(S): at 11:39

## 2020-12-26 RX ADMIN — Medication 4: at 12:05

## 2020-12-26 RX ADMIN — Medication 12 UNIT(S): at 12:06

## 2020-12-26 RX ADMIN — Medication 500 MILLIGRAM(S): at 11:39

## 2020-12-26 RX ADMIN — Medication 100 MILLIGRAM(S): at 13:14

## 2020-12-26 RX ADMIN — Medication 100 MILLIGRAM(S): at 21:32

## 2020-12-26 RX ADMIN — Medication 1 TABLET(S): at 21:32

## 2020-12-26 RX ADMIN — Medication 1: at 21:31

## 2020-12-26 RX ADMIN — GABAPENTIN 300 MILLIGRAM(S): 400 CAPSULE ORAL at 17:06

## 2020-12-26 RX ADMIN — Medication 40 MILLIGRAM(S): at 05:49

## 2020-12-26 RX ADMIN — GABAPENTIN 300 MILLIGRAM(S): 400 CAPSULE ORAL at 05:49

## 2020-12-26 RX ADMIN — ENOXAPARIN SODIUM 40 MILLIGRAM(S): 100 INJECTION SUBCUTANEOUS at 17:06

## 2020-12-26 RX ADMIN — PANTOPRAZOLE SODIUM 40 MILLIGRAM(S): 20 TABLET, DELAYED RELEASE ORAL at 05:51

## 2020-12-26 RX ADMIN — Medication 1 TABLET(S): at 05:50

## 2020-12-26 RX ADMIN — Medication 3: at 17:10

## 2020-12-26 NOTE — PROGRESS NOTE ADULT - SUBJECTIVE AND OBJECTIVE BOX
Interval Events:  Pt in nad    Allergies    No Known Allergies    Intolerances      Endocrine/Metabolic Medications:  glucagon  Injectable 1 milliGRAM(s) IntraMuscular once  insulin glargine Injectable (LANTUS) 30 Unit(s) SubCutaneous at bedtime  insulin lispro (ADMELOG) corrective regimen sliding scale   SubCutaneous three times a day before meals  insulin lispro (ADMELOG) corrective regimen sliding scale   SubCutaneous at bedtime  insulin lispro Injectable (ADMELOG) 12 Unit(s) SubCutaneous three times a day before meals  predniSONE   Tablet 40 milliGRAM(s) Oral daily      Vital Signs Last 24 Hrs  T(C): 36.8 (26 Dec 2020 09:03), Max: 36.8 (26 Dec 2020 09:03)  T(F): 98.2 (26 Dec 2020 09:03), Max: 98.2 (26 Dec 2020 09:03)  HR: 89 (26 Dec 2020 09:03) (72 - 89)  BP: 139/59 (26 Dec 2020 09:03) (135/60 - 145/60)  BP(mean): --  RR: 18 (26 Dec 2020 09:03) (17 - 18)  SpO2: 97% (26 Dec 2020 09:03) (94% - 97%)      PHYSICAL EXAM  All physical exam findings normal, except those marked:  General:	Alert, active, cooperative, NAD, well hydrated  .		[] Abnormal:  Neck		Normal: supple, no cervical adenopathy, no palpable thyroid  .		[] Abnormal:  Cardiovascular	Normal: regular rate, normal S1, S2, no murmurs  .		[] Abnormal:  Respiratory	Normal: no chest wall deformity, normal respiratory pattern, CTA B/L  .		[] Abnormal:  Abdominal	Normal: soft, ND, NT, bowel sounds present, no masses, no organomegaly  .		[] Abnormal:  		Normal normal genitalia, testes descended, circumcised/uncircumcised  .		Uma stage:			Breast uma:  .		Menstrual history:  .		[] Abnormal:  Extremities	Normal: FROM x4  .		[] Abnormal:  Skin		Normal: intact and not indurated, no rash, no acanthosis nigricans  .		[] Abnormal:  Neurologic	Normal: grossly intact  .		[] Abnormal:    LABS        CAPILLARY BLOOD GLUCOSE      POCT Blood Glucose.: 348 mg/dL (26 Dec 2020 11:56)  POCT Blood Glucose.: 191 mg/dL (26 Dec 2020 08:35)  POCT Blood Glucose.: 186 mg/dL (25 Dec 2020 21:53)  POCT Blood Glucose.: 257 mg/dL (25 Dec 2020 17:36)        Assesment/plan    89 y/o female Rivera Speaking, with PMHx of asthma, HTN, DM presents to the ED c/o shortness of breath and diarrhea, fever, chills x 1 days. Admitted for covid-19 related pneumonia.  Endocrinology was consulted for uncontrolled DM.     Problem/Plan - 1:  ·  Problem: Uncontrolled diabetes mellitus.  Plan: Hyperglycemia likely  due to uncontrolled DM in addition to steroid   HbA1C;  7.2  Pt takes  Novolog 70/30 40 U BID , Metformin and Glipizide- likely hypoglycemic   Continue moderate sliding scale Humalog  cont Lantus 30 U HS  and pre meal Humalog 12 u tid before meals  as pt cont to be on prednisone   Upon discharge Continue Novolog 70/30 40 U at am and 30 U at bedtime before meal. Discontinue oral hypoglycemic agents.   Monitor blood glucose.  fine tuning as out pt.  d/w hs.     Problem/Plan - 2:  ·  Problem: Pneumonia due to COVID-19 virus.  Plan: Isolation precaution   Not on remdesevir due to low Cr clearance.      Problem/Plan - 3:  ·  Problem: Acute on chronic renal insufficiency.  Plan: Monitor BMP  Management as per primary team.      Problem/Plan - 4:  ·  Problem: HTN (hypertension).  Plan: Continue BP meds  Monitor blood pressure.

## 2020-12-26 NOTE — PROGRESS NOTE ADULT - SUBJECTIVE AND OBJECTIVE BOX
PGY 3 Note discussed with primary attending    Patient is a 89y old  Female who presents with a chief complaint of Fever, chills, Covid positive (24 Dec 2020 14:16)      INTERVAL HPI/OVERNIGHT EVENTS: offers no new complaints; current symptoms resolving    T(C): 36.4 (12-26-20 @ 16:29), Max: 36.4 (12-26-20 @ 16:29)  HR: 79 (12-26-20 @ 16:29) (79 - 79)  BP: 135/59 (12-26-20 @ 16:29) (135/59 - 135/59)  RR: 18 (12-26-20 @ 16:29) (18 - 18)  SpO2: 97% (12-26-20 @ 16:29) (97% - 97%)    MEDICATIONS  (STANDING):  ascorbic acid 500 milliGRAM(s) Oral daily  benzonatate 100 milliGRAM(s) Oral every 8 hours  budesonide  80 MICROgram(s)/formoterol 4.5 MICROgram(s) Inhaler 2 Puff(s) Inhalation two times a day  cholecalciferol 1000 Unit(s) Oral daily  dextrose 5%. 1000 milliLiter(s) (100 mL/Hr) IV Continuous <Continuous>  enoxaparin Injectable 40 milliGRAM(s) SubCutaneous two times a day  ferrous    sulfate 325 milliGRAM(s) Oral daily  gabapentin 300 milliGRAM(s) Oral every 12 hours  glucagon  Injectable 1 milliGRAM(s) IntraMuscular once  insulin glargine Injectable (LANTUS) 30 Unit(s) SubCutaneous at bedtime  insulin lispro (ADMELOG) corrective regimen sliding scale   SubCutaneous at bedtime  insulin lispro (ADMELOG) corrective regimen sliding scale   SubCutaneous three times a day before meals  insulin lispro Injectable (ADMELOG) 12 Unit(s) SubCutaneous three times a day before meals  loratadine 10 milliGRAM(s) Oral daily  montelukast 10 milliGRAM(s) Oral daily  multivitamin 1 Tablet(s) Oral daily  pantoprazole    Tablet 40 milliGRAM(s) Oral before breakfast  potassium phosphate / sodium phosphate Tablet (K-PHOS No. 2) 1 Tablet(s) Oral three times a day  predniSONE   Tablet 40 milliGRAM(s) Oral daily  zinc sulfate 220 milliGRAM(s) Oral daily    MEDICATIONS  (PRN):  acetaminophen   Tablet .. 650 milliGRAM(s) Oral every 6 hours PRN Temp greater or equal to 38C (100.4F), Mild Pain (1 - 3)  ALBUTerol    90 MICROgram(s) HFA Inhaler 2 Puff(s) Inhalation every 6 hours PRN Shortness of Breath and/or Wheezing    __________________________________________________  REVIEW OF SYSTEMS:    CONSTITUTIONAL: No fever,   EYES: no acute visual disturbances  NECK: No pain or stiffness  RESPIRATORY: No cough; No shortness of breath  CARDIOVASCULAR: No chest pain, no palpitations  GASTROINTESTINAL: No pain. No nausea or vomiting; No diarrhea   NEUROLOGICAL: No headache or numbness, no tremors  MUSCULOSKELETAL: No joint pain, no muscle pain  GENITOURINARY: no dysuria, no frequency, no hesitancy  PSYCHIATRY: no depression , no anxiety  ALL OTHER  ROS negative        ________________________________________________  PHYSICAL EXAM:  GENERAL: NAD  HEENT: Normocephalic;  conjunctivae and sclerae clear; moist mucous membranes;   NECK : supple  CHEST/LUNG: Clear to auscultation bilaterally with good air entry   HEART: S1 S2  regular; no murmurs, gallops or rubs  ABDOMEN: Soft, Nontender, Nondistended; Bowel sounds present  EXTREMITIES: no cyanosis; no edema; no calf tenderness  SKIN: warm and dry; no rash  NERVOUS SYSTEM:  Awake and alert; Oriented  to place, person and time ; no new deficits    _________________________________________________                        13.1   12.74 )-----------( 345      ( 25 Dec 2020 07:53 )             41.5           LIVER FUNCTIONS - ( 25 Dec 2020 07:53 )  Alb: 2.5 g/dL / Pro: 7.0 g/dL / ALK PHOS: 114 U/L / ALT: 78 U/L DA / AST: 45 U/L / GGT: x             CAPILLARY BLOOD GLUCOSE      POCT Blood Glucose.: 251 mg/dL (26 Dec 2020 21:26)  POCT Blood Glucose.: 296 mg/dL (26 Dec 2020 17:07)  POCT Blood Glucose.: 348 mg/dL (26 Dec 2020 11:56)  POCT Blood Glucose.: 191 mg/dL (26 Dec 2020 08:35)      RADIOLOGY & ADDITIONAL TESTS:    Imaging Personally Reviewed:  YES/NO    Consultant(s) Notes Reviewed:   YES/ No    Care Discussed with Consultants :     Plan of care was discussed with patient and /or primary care giver; all questions and concerns were addressed and care was aligned with patient's wishes.

## 2020-12-27 LAB
ALBUMIN SERPL ELPH-MCNC: 2.4 G/DL — LOW (ref 3.5–5)
ALP SERPL-CCNC: 120 U/L — SIGNIFICANT CHANGE UP (ref 40–120)
ALT FLD-CCNC: 75 U/L DA — HIGH (ref 10–60)
ANION GAP SERPL CALC-SCNC: 11 MMOL/L — SIGNIFICANT CHANGE UP (ref 5–17)
AST SERPL-CCNC: 45 U/L — HIGH (ref 10–40)
BILIRUB SERPL-MCNC: 0.6 MG/DL — SIGNIFICANT CHANGE UP (ref 0.2–1.2)
BUN SERPL-MCNC: 23 MG/DL — HIGH (ref 7–18)
CALCIUM SERPL-MCNC: 9.4 MG/DL — SIGNIFICANT CHANGE UP (ref 8.4–10.5)
CHLORIDE SERPL-SCNC: 102 MMOL/L — SIGNIFICANT CHANGE UP (ref 96–108)
CO2 SERPL-SCNC: 24 MMOL/L — SIGNIFICANT CHANGE UP (ref 22–31)
CREAT SERPL-MCNC: 1 MG/DL — SIGNIFICANT CHANGE UP (ref 0.5–1.3)
GLUCOSE BLDC GLUCOMTR-MCNC: 192 MG/DL — HIGH (ref 70–99)
GLUCOSE BLDC GLUCOMTR-MCNC: 194 MG/DL — HIGH (ref 70–99)
GLUCOSE BLDC GLUCOMTR-MCNC: 235 MG/DL — HIGH (ref 70–99)
GLUCOSE BLDC GLUCOMTR-MCNC: 278 MG/DL — HIGH (ref 70–99)
GLUCOSE BLDC GLUCOMTR-MCNC: 373 MG/DL — HIGH (ref 70–99)
GLUCOSE SERPL-MCNC: 148 MG/DL — HIGH (ref 70–99)
HCT VFR BLD CALC: 41.1 % — SIGNIFICANT CHANGE UP (ref 34.5–45)
HGB BLD-MCNC: 12.9 G/DL — SIGNIFICANT CHANGE UP (ref 11.5–15.5)
MCHC RBC-ENTMCNC: 25.9 PG — LOW (ref 27–34)
MCHC RBC-ENTMCNC: 31.4 GM/DL — LOW (ref 32–36)
MCV RBC AUTO: 82.5 FL — SIGNIFICANT CHANGE UP (ref 80–100)
NRBC # BLD: 0 /100 WBCS — SIGNIFICANT CHANGE UP (ref 0–0)
PLATELET # BLD AUTO: 432 K/UL — HIGH (ref 150–400)
POTASSIUM SERPL-MCNC: 4.8 MMOL/L — SIGNIFICANT CHANGE UP (ref 3.5–5.3)
POTASSIUM SERPL-SCNC: 4.8 MMOL/L — SIGNIFICANT CHANGE UP (ref 3.5–5.3)
PROT SERPL-MCNC: 7.1 G/DL — SIGNIFICANT CHANGE UP (ref 6–8.3)
RBC # BLD: 4.98 M/UL — SIGNIFICANT CHANGE UP (ref 3.8–5.2)
RBC # FLD: 13.5 % — SIGNIFICANT CHANGE UP (ref 10.3–14.5)
SODIUM SERPL-SCNC: 137 MMOL/L — SIGNIFICANT CHANGE UP (ref 135–145)
WBC # BLD: 11.67 K/UL — HIGH (ref 3.8–10.5)
WBC # FLD AUTO: 11.67 K/UL — HIGH (ref 3.8–10.5)

## 2020-12-27 RX ADMIN — INSULIN GLARGINE 30 UNIT(S): 100 INJECTION, SOLUTION SUBCUTANEOUS at 21:21

## 2020-12-27 RX ADMIN — Medication 1 TABLET(S): at 11:50

## 2020-12-27 RX ADMIN — GABAPENTIN 300 MILLIGRAM(S): 400 CAPSULE ORAL at 05:42

## 2020-12-27 RX ADMIN — BUDESONIDE AND FORMOTEROL FUMARATE DIHYDRATE 2 PUFF(S): 160; 4.5 AEROSOL RESPIRATORY (INHALATION) at 10:18

## 2020-12-27 RX ADMIN — PANTOPRAZOLE SODIUM 40 MILLIGRAM(S): 20 TABLET, DELAYED RELEASE ORAL at 05:41

## 2020-12-27 RX ADMIN — Medication 40 MILLIGRAM(S): at 05:42

## 2020-12-27 RX ADMIN — ZINC SULFATE TAB 220 MG (50 MG ZINC EQUIVALENT) 220 MILLIGRAM(S): 220 (50 ZN) TAB at 11:50

## 2020-12-27 RX ADMIN — ENOXAPARIN SODIUM 40 MILLIGRAM(S): 100 INJECTION SUBCUTANEOUS at 18:23

## 2020-12-27 RX ADMIN — Medication 5: at 11:47

## 2020-12-27 RX ADMIN — MONTELUKAST 10 MILLIGRAM(S): 4 TABLET, CHEWABLE ORAL at 11:50

## 2020-12-27 RX ADMIN — Medication 100 MILLIGRAM(S): at 05:42

## 2020-12-27 RX ADMIN — Medication 1: at 08:23

## 2020-12-27 RX ADMIN — GABAPENTIN 300 MILLIGRAM(S): 400 CAPSULE ORAL at 18:24

## 2020-12-27 RX ADMIN — Medication 100 MILLIGRAM(S): at 21:21

## 2020-12-27 RX ADMIN — ENOXAPARIN SODIUM 40 MILLIGRAM(S): 100 INJECTION SUBCUTANEOUS at 05:42

## 2020-12-27 RX ADMIN — Medication 1 TABLET(S): at 05:42

## 2020-12-27 RX ADMIN — Medication 3: at 18:32

## 2020-12-27 RX ADMIN — Medication 12 UNIT(S): at 18:33

## 2020-12-27 RX ADMIN — Medication 12 UNIT(S): at 11:49

## 2020-12-27 RX ADMIN — Medication 1 TABLET(S): at 13:59

## 2020-12-27 RX ADMIN — Medication 500 MILLIGRAM(S): at 11:50

## 2020-12-27 RX ADMIN — Medication 325 MILLIGRAM(S): at 11:50

## 2020-12-27 RX ADMIN — Medication 1 TABLET(S): at 21:21

## 2020-12-27 RX ADMIN — Medication 1000 UNIT(S): at 11:50

## 2020-12-27 RX ADMIN — LORATADINE 10 MILLIGRAM(S): 10 TABLET ORAL at 11:50

## 2020-12-27 RX ADMIN — BUDESONIDE AND FORMOTEROL FUMARATE DIHYDRATE 2 PUFF(S): 160; 4.5 AEROSOL RESPIRATORY (INHALATION) at 21:19

## 2020-12-27 RX ADMIN — Medication 100 MILLIGRAM(S): at 13:59

## 2020-12-27 RX ADMIN — Medication 12 UNIT(S): at 08:24

## 2020-12-27 NOTE — PROGRESS NOTE ADULT - PROBLEM SELECTOR PLAN 7
Patient takes Novolog 70/30 40 U BID , Metformin and Glipizide  - currently on lantus 30 units and premeal 12 units,   - A1C 7.2  - Discussed with Dr. Britt upon discharge pt would go on insulin 70/30 40 units   - Dr. Britt Endocrine consulted 12/21

## 2020-12-27 NOTE — PROGRESS NOTE ADULT - PROBLEM SELECTOR PLAN 3
Nephrologist Dr Dove following  - - Scr 1.6 on admission   - - Renal function improving   - - Monitor BMP   - - AVoid further nephrotoxics, NSAIDS RCA  --hyperkalemia- resolved with Insulin and lokelma

## 2020-12-27 NOTE — PROGRESS NOTE ADULT - SUBJECTIVE AND OBJECTIVE BOX
Patient is a 89y old  Female who presents with a chief complaint of Fever, chills, Covid positive (24 Dec 2020 14:16)      INTERVAL HPI/OVERNIGHT EVENTS: offers no new complaints; current symptoms resolving    T(C): 36.5 (12-27-20 @ 15:33), Max: 36.5 (12-27-20 @ 15:33)  HR: 77 (12-27-20 @ 15:33) (77 - 80)  BP: 131/51 (12-27-20 @ 15:33) (131/51 - 132/55)  RR: 18 (12-27-20 @ 15:33) (18 - 18)  SpO2: 98% (12-27-20 @ 15:33) (92% - 98%)    MEDICATIONS  (STANDING):  ascorbic acid 500 milliGRAM(s) Oral daily  benzonatate 100 milliGRAM(s) Oral every 8 hours  budesonide  80 MICROgram(s)/formoterol 4.5 MICROgram(s) Inhaler 2 Puff(s) Inhalation two times a day  cholecalciferol 1000 Unit(s) Oral daily  dextrose 5%. 1000 milliLiter(s) (100 mL/Hr) IV Continuous <Continuous>  enoxaparin Injectable 40 milliGRAM(s) SubCutaneous two times a day  ferrous    sulfate 325 milliGRAM(s) Oral daily  gabapentin 300 milliGRAM(s) Oral every 12 hours  glucagon  Injectable 1 milliGRAM(s) IntraMuscular once  insulin glargine Injectable (LANTUS) 30 Unit(s) SubCutaneous at bedtime  insulin lispro (ADMELOG) corrective regimen sliding scale   SubCutaneous at bedtime  insulin lispro (ADMELOG) corrective regimen sliding scale   SubCutaneous three times a day before meals  insulin lispro Injectable (ADMELOG) 12 Unit(s) SubCutaneous three times a day before meals  loratadine 10 milliGRAM(s) Oral daily  montelukast 10 milliGRAM(s) Oral daily  multivitamin 1 Tablet(s) Oral daily  pantoprazole    Tablet 40 milliGRAM(s) Oral before breakfast  potassium phosphate / sodium phosphate Tablet (K-PHOS No. 2) 1 Tablet(s) Oral three times a day  zinc sulfate 220 milliGRAM(s) Oral daily    MEDICATIONS  (PRN):  acetaminophen   Tablet .. 650 milliGRAM(s) Oral every 6 hours PRN Temp greater or equal to 38C (100.4F), Mild Pain (1 - 3)  ALBUTerol    90 MICROgram(s) HFA Inhaler 2 Puff(s) Inhalation every 6 hours PRN Shortness of Breath and/or Wheezing      __________________________________________________  REVIEW OF SYSTEMS:    CONSTITUTIONAL: No fever,   EYES: no acute visual disturbances  NECK: No pain or stiffness  RESPIRATORY: No cough; No shortness of breath  CARDIOVASCULAR: No chest pain, no palpitations  GASTROINTESTINAL: No pain. No nausea or vomiting; No diarrhea   NEUROLOGICAL: No headache or numbness, no tremors  MUSCULOSKELETAL: No joint pain, no muscle pain  GENITOURINARY: no dysuria, no frequency, no hesitancy  PSYCHIATRY: no depression , no anxiety  ALL OTHER  ROS negative        ________________________________________________  PHYSICAL EXAM:  GENERAL: NAD  HEENT: Normocephalic;  conjunctivae and sclerae clear; moist mucous membranes;   NECK : supple  CHEST/LUNG: Clear to auscultation bilaterally with good air entry   HEART: S1 S2  regular; no murmurs, gallops or rubs  ABDOMEN: Soft, Nontender, Nondistended; Bowel sounds present  EXTREMITIES: no cyanosis; no edema; no calf tenderness  SKIN: warm and dry; no rash  NERVOUS SYSTEM:  Awake and alert; Oriented  to place, person and time ; no new deficits    _________________________________________________                                   12.9   11.67 )-----------( 432      ( 27 Dec 2020 06:39 )             41.1           LIVER FUNCTIONS - ( 27 Dec 2020 06:39 )  Alb: 2.4 g/dL / Pro: 7.1 g/dL / ALK PHOS: 120 U/L / ALT: 75 U/L DA / AST: 45 U/L / GGT: x             137|102|23<148  4.8|24|1.00  9.4,--,--  12-27 @ 06:39        CAPILLARY BLOOD GLUCOSE      POCT Blood Glucose.: 278 mg/dL (27 Dec 2020 18:28)  POCT Blood Glucose.: 194 mg/dL (27 Dec 2020 16:54)  POCT Blood Glucose.: 373 mg/dL (27 Dec 2020 11:34)  POCT Blood Glucose.: 192 mg/dL (27 Dec 2020 08:02)  POCT Blood Glucose.: 251 mg/dL (26 Dec 2020 21:26)    RADIOLOGY & ADDITIONAL TESTS:    Imaging Personally Reviewed:  YES/NO    Consultant(s) Notes Reviewed:   YES/ No    Care Discussed with Consultants :     Plan of care was discussed with patient and /or primary care giver; all questions and concerns were addressed and care was aligned with patient's wishes.

## 2020-12-27 NOTE — PROGRESS NOTE ADULT - SUBJECTIVE AND OBJECTIVE BOX
Interval Events:  pt in nad    Allergies    No Known Allergies    Intolerances      Endocrine/Metabolic Medications:  glucagon  Injectable 1 milliGRAM(s) IntraMuscular once  insulin glargine Injectable (LANTUS) 30 Unit(s) SubCutaneous at bedtime  insulin lispro (ADMELOG) corrective regimen sliding scale   SubCutaneous at bedtime  insulin lispro (ADMELOG) corrective regimen sliding scale   SubCutaneous three times a day before meals  insulin lispro Injectable (ADMELOG) 12 Unit(s) SubCutaneous three times a day before meals      Vital Signs Last 24 Hrs  T(C): 36.4 (27 Dec 2020 08:13), Max: 36.7 (27 Dec 2020 00:13)  T(F): 97.6 (27 Dec 2020 08:13), Max: 98.1 (27 Dec 2020 00:13)  HR: 80 (27 Dec 2020 08:13) (79 - 80)  BP: 132/55 (27 Dec 2020 08:13) (132/55 - 143/61)  BP(mean): --  RR: 18 (27 Dec 2020 08:13) (17 - 18)  SpO2: 92% (27 Dec 2020 08:13) (92% - 97%)      PHYSICAL EXAM  All physical exam findings normal, except those marked:  General:	Alert, active, cooperative, NAD, well hydrated  .		[] Abnormal:  Neck		Normal: supple, no cervical adenopathy, no palpable thyroid  .		[] Abnormal:  Cardiovascular	Normal: regular rate, normal S1, S2, no murmurs  .		[] Abnormal:  Respiratory	Normal: no chest wall deformity, normal respiratory pattern, CTA B/L  .		[] Abnormal:  Abdominal	Normal: soft, ND, NT, bowel sounds present, no masses, no organomegaly  .		[] Abnormal:  		Normal normal genitalia, testes descended, circumcised/uncircumcised  .		Uma stage:			Breast uma:  .		Menstrual history:  .		[] Abnormal:  Extremities	Normal: FROM x4  .		[] Abnormal:  Skin		Normal: intact and not indurated, no rash, no acanthosis nigricans  .		[] Abnormal:  Neurologic	Normal: grossly intact  .		[] Abnormal:    LABS                        12.9   11.67 )-----------( 432      ( 27 Dec 2020 06:39 )             41.1                               137    |  102    |  23                  Calcium: 9.4   / iCa: x      (12-27 @ 06:39)    ----------------------------<  148       Magnesium: x                                4.8     |  24     |  1.00             Phosphorous: x        TPro  7.1    /  Alb  2.4    /  TBili  0.6    /  DBili  x      /  AST  45     /  ALT  75     /  AlkPhos  120    27 Dec 2020 06:39    CAPILLARY BLOOD GLUCOSE      POCT Blood Glucose.: 192 mg/dL (27 Dec 2020 08:02)  POCT Blood Glucose.: 251 mg/dL (26 Dec 2020 21:26)  POCT Blood Glucose.: 296 mg/dL (26 Dec 2020 17:07)  POCT Blood Glucose.: 348 mg/dL (26 Dec 2020 11:56)        Assesment/plan    89 y/o female [place] Speaking, with PMHx of asthma, HTN, DM presents to the ED c/o shortness of breath and diarrhea, fever, chills x 1 days. Admitted for covid-19 related pneumonia.  Endocrinology was consulted for uncontrolled DM.     Problem/Plan - 1:  ·  Problem: Uncontrolled diabetes mellitus.  Plan: Hyperglycemia likely  due to uncontrolled DM in addition to steroid   HbA1C;  7.2  Pt takes  Novolog 70/30 40 U BID , Metformin and Glipizide- likely hypoglycemic  now post meal hyperglycemia due to prednisone   cont Lantus 30 U HS  and pre meal Humalog 12 u tid before meals  prednisone last dose today?   Upon discharge Continue Novolog 70/30 40 U at am and 30 U at bedtime before meal. Discontinue oral hypoglycemic agents.   Monitor blood glucose.  fine tuning as out pt.  d/w hs.     Problem/Plan - 2:  ·  Problem: Pneumonia due to COVID-19 virus.  Plan: Isolation precaution   Not on remdesevir due to low Cr clearance.      Problem/Plan - 3:  ·  Problem: Acute on chronic renal insufficiency.  Plan: Monitor BMP  Management as per primary team.      Problem/Plan - 4:  ·  Problem: HTN (hypertension).  Plan: Continue BP meds  Monitor blood pressure.

## 2020-12-27 NOTE — PROGRESS NOTE ADULT - PROBLEM SELECTOR PLAN 6
- Improving   - COMPLETED prednisone   - c/w bronchodilators   - C/w Montelukast 12/17 with improvement in wheezing  - Pulm Dr. Templeton/Sachin consulted 12/16

## 2020-12-28 DIAGNOSIS — J45.901 UNSPECIFIED ASTHMA WITH (ACUTE) EXACERBATION: ICD-10-CM

## 2020-12-28 LAB
ALBUMIN SERPL ELPH-MCNC: 2.5 G/DL — LOW (ref 3.5–5)
ALP SERPL-CCNC: 123 U/L — HIGH (ref 40–120)
ALT FLD-CCNC: 74 U/L DA — HIGH (ref 10–60)
ANION GAP SERPL CALC-SCNC: 10 MMOL/L — SIGNIFICANT CHANGE UP (ref 5–17)
AST SERPL-CCNC: 40 U/L — SIGNIFICANT CHANGE UP (ref 10–40)
BILIRUB SERPL-MCNC: 0.5 MG/DL — SIGNIFICANT CHANGE UP (ref 0.2–1.2)
BUN SERPL-MCNC: 20 MG/DL — HIGH (ref 7–18)
CALCIUM SERPL-MCNC: 9.5 MG/DL — SIGNIFICANT CHANGE UP (ref 8.4–10.5)
CHLORIDE SERPL-SCNC: 101 MMOL/L — SIGNIFICANT CHANGE UP (ref 96–108)
CO2 SERPL-SCNC: 26 MMOL/L — SIGNIFICANT CHANGE UP (ref 22–31)
CREAT SERPL-MCNC: 1 MG/DL — SIGNIFICANT CHANGE UP (ref 0.5–1.3)
D DIMER BLD IA.RAPID-MCNC: 248 NG/ML DDU — HIGH
GLUCOSE BLDC GLUCOMTR-MCNC: 117 MG/DL — HIGH (ref 70–99)
GLUCOSE BLDC GLUCOMTR-MCNC: 178 MG/DL — HIGH (ref 70–99)
GLUCOSE BLDC GLUCOMTR-MCNC: 236 MG/DL — HIGH (ref 70–99)
GLUCOSE BLDC GLUCOMTR-MCNC: 90 MG/DL — SIGNIFICANT CHANGE UP (ref 70–99)
GLUCOSE SERPL-MCNC: 64 MG/DL — LOW (ref 70–99)
HCT VFR BLD CALC: 41.6 % — SIGNIFICANT CHANGE UP (ref 34.5–45)
HGB BLD-MCNC: 12.9 G/DL — SIGNIFICANT CHANGE UP (ref 11.5–15.5)
MCHC RBC-ENTMCNC: 25.7 PG — LOW (ref 27–34)
MCHC RBC-ENTMCNC: 31 GM/DL — LOW (ref 32–36)
MCV RBC AUTO: 82.9 FL — SIGNIFICANT CHANGE UP (ref 80–100)
NRBC # BLD: 0 /100 WBCS — SIGNIFICANT CHANGE UP (ref 0–0)
PLATELET # BLD AUTO: 481 K/UL — HIGH (ref 150–400)
POTASSIUM SERPL-MCNC: 4.6 MMOL/L — SIGNIFICANT CHANGE UP (ref 3.5–5.3)
POTASSIUM SERPL-SCNC: 4.6 MMOL/L — SIGNIFICANT CHANGE UP (ref 3.5–5.3)
PROT SERPL-MCNC: 7 G/DL — SIGNIFICANT CHANGE UP (ref 6–8.3)
RBC # BLD: 5.02 M/UL — SIGNIFICANT CHANGE UP (ref 3.8–5.2)
RBC # FLD: 13.5 % — SIGNIFICANT CHANGE UP (ref 10.3–14.5)
SODIUM SERPL-SCNC: 137 MMOL/L — SIGNIFICANT CHANGE UP (ref 135–145)
WBC # BLD: 11.59 K/UL — HIGH (ref 3.8–10.5)
WBC # FLD AUTO: 11.59 K/UL — HIGH (ref 3.8–10.5)

## 2020-12-28 PROCEDURE — 71045 X-RAY EXAM CHEST 1 VIEW: CPT | Mod: 26

## 2020-12-28 RX ORDER — INSULIN GLARGINE 100 [IU]/ML
24 INJECTION, SOLUTION SUBCUTANEOUS AT BEDTIME
Refills: 0 | Status: DISCONTINUED | OUTPATIENT
Start: 2020-12-28 | End: 2020-12-29

## 2020-12-28 RX ORDER — INSULIN LISPRO 100/ML
6 VIAL (ML) SUBCUTANEOUS
Refills: 0 | Status: DISCONTINUED | OUTPATIENT
Start: 2020-12-28 | End: 2020-12-29

## 2020-12-28 RX ADMIN — Medication 100 MILLIGRAM(S): at 05:33

## 2020-12-28 RX ADMIN — Medication 100 MILLIGRAM(S): at 21:33

## 2020-12-28 RX ADMIN — BUDESONIDE AND FORMOTEROL FUMARATE DIHYDRATE 2 PUFF(S): 160; 4.5 AEROSOL RESPIRATORY (INHALATION) at 08:32

## 2020-12-28 RX ADMIN — Medication 1000 UNIT(S): at 12:14

## 2020-12-28 RX ADMIN — PANTOPRAZOLE SODIUM 40 MILLIGRAM(S): 20 TABLET, DELAYED RELEASE ORAL at 05:34

## 2020-12-28 RX ADMIN — Medication 100 MILLIGRAM(S): at 12:17

## 2020-12-28 RX ADMIN — Medication 1 TABLET(S): at 05:33

## 2020-12-28 RX ADMIN — Medication 1: at 12:14

## 2020-12-28 RX ADMIN — ZINC SULFATE TAB 220 MG (50 MG ZINC EQUIVALENT) 220 MILLIGRAM(S): 220 (50 ZN) TAB at 12:14

## 2020-12-28 RX ADMIN — BUDESONIDE AND FORMOTEROL FUMARATE DIHYDRATE 2 PUFF(S): 160; 4.5 AEROSOL RESPIRATORY (INHALATION) at 21:33

## 2020-12-28 RX ADMIN — Medication 500 MILLIGRAM(S): at 12:15

## 2020-12-28 RX ADMIN — GABAPENTIN 300 MILLIGRAM(S): 400 CAPSULE ORAL at 17:40

## 2020-12-28 RX ADMIN — GABAPENTIN 300 MILLIGRAM(S): 400 CAPSULE ORAL at 05:33

## 2020-12-28 RX ADMIN — Medication 1 TABLET(S): at 21:33

## 2020-12-28 RX ADMIN — Medication 6 UNIT(S): at 12:13

## 2020-12-28 RX ADMIN — INSULIN GLARGINE 24 UNIT(S): 100 INJECTION, SOLUTION SUBCUTANEOUS at 21:33

## 2020-12-28 RX ADMIN — LORATADINE 10 MILLIGRAM(S): 10 TABLET ORAL at 12:14

## 2020-12-28 RX ADMIN — Medication 325 MILLIGRAM(S): at 12:14

## 2020-12-28 RX ADMIN — Medication 12 UNIT(S): at 08:31

## 2020-12-28 RX ADMIN — Medication 1 TABLET(S): at 12:15

## 2020-12-28 RX ADMIN — Medication 1 TABLET(S): at 12:14

## 2020-12-28 RX ADMIN — ENOXAPARIN SODIUM 40 MILLIGRAM(S): 100 INJECTION SUBCUTANEOUS at 17:36

## 2020-12-28 RX ADMIN — Medication 6 UNIT(S): at 17:28

## 2020-12-28 RX ADMIN — MONTELUKAST 10 MILLIGRAM(S): 4 TABLET, CHEWABLE ORAL at 12:14

## 2020-12-28 RX ADMIN — ENOXAPARIN SODIUM 40 MILLIGRAM(S): 100 INJECTION SUBCUTANEOUS at 05:34

## 2020-12-28 NOTE — PROGRESS NOTE ADULT - SUBJECTIVE AND OBJECTIVE BOX
NP Note discussed with  Primary Attending    Patient is a 89y old  Female who presents with a chief complaint of Fever, chills, Covid positive (28 Dec 2020 08:51)      INTERVAL HPI/OVERNIGHT EVENTS: no new complaints    MEDICATIONS  (STANDING):  ascorbic acid 500 milliGRAM(s) Oral daily  benzonatate 100 milliGRAM(s) Oral every 8 hours  budesonide  80 MICROgram(s)/formoterol 4.5 MICROgram(s) Inhaler 2 Puff(s) Inhalation two times a day  cholecalciferol 1000 Unit(s) Oral daily  dextrose 5%. 1000 milliLiter(s) (100 mL/Hr) IV Continuous <Continuous>  enoxaparin Injectable 40 milliGRAM(s) SubCutaneous two times a day  ferrous    sulfate 325 milliGRAM(s) Oral daily  gabapentin 300 milliGRAM(s) Oral every 12 hours  glucagon  Injectable 1 milliGRAM(s) IntraMuscular once  insulin glargine Injectable (LANTUS) 24 Unit(s) SubCutaneous at bedtime  insulin lispro (ADMELOG) corrective regimen sliding scale   SubCutaneous at bedtime  insulin lispro (ADMELOG) corrective regimen sliding scale   SubCutaneous three times a day before meals  insulin lispro Injectable (ADMELOG) 6 Unit(s) SubCutaneous three times a day before meals  loratadine 10 milliGRAM(s) Oral daily  montelukast 10 milliGRAM(s) Oral daily  multivitamin 1 Tablet(s) Oral daily  pantoprazole    Tablet 40 milliGRAM(s) Oral before breakfast  potassium phosphate / sodium phosphate Tablet (K-PHOS No. 2) 1 Tablet(s) Oral three times a day  zinc sulfate 220 milliGRAM(s) Oral daily    MEDICATIONS  (PRN):  acetaminophen   Tablet .. 650 milliGRAM(s) Oral every 6 hours PRN Temp greater or equal to 38C (100.4F), Mild Pain (1 - 3)  ALBUTerol    90 MICROgram(s) HFA Inhaler 2 Puff(s) Inhalation every 6 hours PRN Shortness of Breath and/or Wheezing      __________________________________________________  REVIEW OF SYSTEMS:  Rivera speaking, slightly anxious    CONSTITUTIONAL: No fever,   EYES: no acute visual disturbances  NECK: No pain or stiffness  RESPIRATORY: No cough; No shortness of breath  CARDIOVASCULAR: No chest pain, no palpitations  GASTROINTESTINAL: No pain. No nausea or vomiting; No diarrhea   NEUROLOGICAL: No headache or numbness, no tremors  MUSCULOSKELETAL: No joint pain, no muscle pain  GENITOURINARY: no dysuria, no frequency, no hesitancy  PSYCHIATRY: no depression , no anxiety  ALL OTHER  ROS negative        Vital Signs Last 24 Hrs  T(C): 36.4 (28 Dec 2020 08:34), Max: 36.5 (27 Dec 2020 15:33)  T(F): 97.6 (28 Dec 2020 08:34), Max: 97.7 (27 Dec 2020 15:33)  HR: 84 (28 Dec 2020 08:34) (76 - 84)  BP: 126/62 (28 Dec 2020 08:34) (126/62 - 134/57)  BP(mean): 75 (28 Dec 2020 08:34) (75 - 75)  RR: 18 (28 Dec 2020 08:34) (17 - 18)  SpO2: 93% (28 Dec 2020 08:34) (93% - 98%)    ________________________________________________  PHYSICAL EXAM:  In mod resp distress  GENERAL: NAD  HEENT: Normocephalic;  conjunctivae and sclerae clear; moist mucous membranes;   NECK : supple  CHEST/LUNG: Left upper and lower lungs wheezing, intermittent cough, on N/C 2L 100%  HEART: S1 S2  regular; no murmurs, gallops or rubs  ABDOMEN: Soft, Nontender, Nondistended; Bowel sounds present  EXTREMITIES: no cyanosis; no edema; no calf tenderness  SKIN: warm and dry; no rash  NERVOUS SYSTEM:  Awake and alert; Oriented  to place, person and time ; no new deficits    _________________________________________________  LABS:                        12.9   11.59 )-----------( 481      ( 28 Dec 2020 06:55 )             41.6     12-28    137  |  101  |  20<H>  ----------------------------<  64<L>  4.6   |  26  |  1.00    Ca    9.5      28 Dec 2020 06:55    TPro  7.0  /  Alb  2.5<L>  /  TBili  0.5  /  DBili  x   /  AST  40  /  ALT  74<H>  /  AlkPhos  123<H>  12-28        CAPILLARY BLOOD GLUCOSE      POCT Blood Glucose.: 117 mg/dL (28 Dec 2020 08:21)  POCT Blood Glucose.: 235 mg/dL (27 Dec 2020 20:52)  POCT Blood Glucose.: 278 mg/dL (27 Dec 2020 18:28)  POCT Blood Glucose.: 194 mg/dL (27 Dec 2020 16:54)  POCT Blood Glucose.: 373 mg/dL (27 Dec 2020 11:34)        RADIOLOGY & ADDITIONAL TESTS:  US Duplex Venous Lower Ext Complete, Bilateral (12.23.20 @ 11:22) >  EXAM:  US DPLX LWR EXT VEINS COMPL BI                            PROCEDURE DATE:  12/23/2020          INTERPRETATION:  CLINICAL INFORMATION: COVID protocol, lower extremity swelling, rule out DVT.    COMPARISON: None available.    TECHNIQUE: Duplexsonography of the BILATERAL LOWER extremity veins with color and spectral Doppler, with and without compression.    FINDINGS:    There is normal compressibility of the bilateral common femoral, femoral and popliteal veins.  Doppler examination shows normal spontaneous and phasic flow.    The calf veins were not diagnostically imaged.    IMPRESSION:  No evidence of deep venous thrombosis in either lower extremity.    < end of copied text >    Xray Chest 1 View- PORTABLE-Routine (Xray Chest 1 View- PORTABLE-Routine .) (12.22.20 @ 13:33) >  EXAM:  XR CHEST PORTABLE ROUTINE 1V                            PROCEDURE DATE:  12/22/2020          INTERPRETATION:  INDICATION: Shortness of breath; Covid disease.    PRIORS: 12/18/2020.    VIEWS: Portable AP radiography of the chest performed.    FINDINGS: Heart size appears within normal limits. No superior mediastinal widening is identified. Bilateral lung parenchymal airspace opacities are redemonstrated, increasing. No pleural effusion. No pneumothorax. No mediastinal shift. No acute osseous fractures are noted.    IMPRESSION: Increasing bilateral lung parenchymal airspace opacities.    < end of copied text >    Xray Chest 1 View- PORTABLE-Urgent (Xray Chest 1 View- PORTABLE-Urgent .) (12.18.20 @ 11:50) >  EXAM:  XR CHEST PORTABLE URGENT 1V                            PROCEDURE DATE:  12/18/2020          INTERPRETATION:  CLINICAL STATEMENT: Follow-up chest pain.    TECHNIQUE: AP view of the chest.    COMPARISON: 12/14/2020    FINDINGS/  IMPRESSION:  Mild opacities lung bases slightly progressed on the left.    No pleural effusion    Heart size cannot be accurately assessed in this projection.    Mildly prominent right hilum which may be related to pulmonary vessel    < end of copied text >    COVID-19 PCR . (12.26.20 @ 14:57)    COVID-19 PCR: Detected:     COVID-19 PCR . (12.23.20 @ 21:04)    COVID-19 PCR:     COVID-19  Antibody - for prior infection screening (12.15.20 @ 00:52)    COVID-19 IgG Antibody Index: 0.06: Roche ECLIA Total AB (TEO)  NOTE: This result index represents a total antibody measurement,  which  includes IgG, IgA, and IgM  Negative <= 0.99 Index  Positive >= 1.00 Index Index    COVID-19 IgG Antibody Interpretation: Negative:           Imaging Personally Reviewed:  YES    Consultant(s) Notes Reviewed:   YES  Care Discussed with Consultants :     Plan of care was discussed with patient and /or primary care giver; all questions and concerns were addressed and care was aligned with patient's wishes.

## 2020-12-28 NOTE — PROGRESS NOTE ADULT - PROBLEM SELECTOR PLAN 7
- Improving   - prednisone 40 daily for 2/4 days   - c/w bronchodilators   - C/w Montelukast 12/17 with improvement in wheezing  - Pulm Dr. Templeton/Sachin consulted 12/16 Patient takes Novolog 70/30 40 U BID , Metformin and Glipizide  - blood sugar is elevated, likely in the setting of iv steroids (currently being tapered)  - currently on lantus 30 units and premeal 12 units,  today morning    - Diabetic diet  - A1C 7.2  - Discussed with Dr. Britt upon discharge pt would go on insulin 70/30 40 units in the morning and 30 units at home if she is on prednisone and adjust insulin out pt.   - no po diabetic meds upon discharge given concern for hypoglycemia and lopez  - Dr. Britt Endocrine consulted 12/21

## 2020-12-28 NOTE — PROGRESS NOTE ADULT - PROBLEM SELECTOR PLAN 4
Cr 1.6 on admission, 1.0 today. Baseline (1.180)  - GFR 36  - U creat 20, Brice 41, UOsmol 192  - 32/1.3 on 12/19  Nephrologist Dr Dove following  - - Scr 1.6 on admission   - - Renal function improving   - - Check UA  - - Monitor BMP   - - AVoid further nephrotoxics, NSAIDS RCA  --hyperkalemia- resolved with Insulin and lokelma TAMANNA resolved  -Cr 1.6 on admission, 1.0 today. Baseline (1.180)  -GFR 36  - U creat 20, Brice 41, UOsmol 192  - 32/1.3 on 12/19  -Nephrologist Dr Dove following  -AVoid further nephrotoxics, NSAIDS RCA

## 2020-12-28 NOTE — PROGRESS NOTE ADULT - PROBLEM SELECTOR PLAN 8
Patient takes Novolog 70/30 40 U BID , Metformin and Glipizide  - blood sugar is elevated, likely in the setting of iv steroids (currently being tapered)  - currently on lantus 30 units and premeal 12 units,  today morning    - Diabetic diet  - A1C 7.2  - Discussed with Dr. Britt upon discharge pt would go on insulin 70/30 40 units in the morning and 30 units at home if she is on prednisone and adjust insulin out pt.   - no po diabetic meds upon discharge given concern for hypoglycemia and lopez  - Dr. Britt Endocrine consulted 12/21 RISK                                                          Points  [] Previous VTE                                           3  [] Thrombophilia                                        2  [] Lower limb paralysis                              2   [] Current Cancer                                       2   [x] Immobilization > 24 hrs                        1  [] ICU/CCU stay > 24 hours                       1  [x] Age > 60                                                   1  Improve score 2  Lovenox 40 sq bid

## 2020-12-28 NOTE — PROGRESS NOTE ADULT - SUBJECTIVE AND OBJECTIVE BOX
Time of Visit:  Patient seen and examined.     MEDICATIONS  (STANDING):  ascorbic acid 500 milliGRAM(s) Oral daily  benzonatate 100 milliGRAM(s) Oral every 8 hours  budesonide  80 MICROgram(s)/formoterol 4.5 MICROgram(s) Inhaler 2 Puff(s) Inhalation two times a day  cholecalciferol 1000 Unit(s) Oral daily  dextrose 5%. 1000 milliLiter(s) (100 mL/Hr) IV Continuous <Continuous>  enoxaparin Injectable 40 milliGRAM(s) SubCutaneous two times a day  ferrous    sulfate 325 milliGRAM(s) Oral daily  gabapentin 300 milliGRAM(s) Oral every 12 hours  glucagon  Injectable 1 milliGRAM(s) IntraMuscular once  insulin glargine Injectable (LANTUS) 24 Unit(s) SubCutaneous at bedtime  insulin lispro (ADMELOG) corrective regimen sliding scale   SubCutaneous three times a day before meals  insulin lispro (ADMELOG) corrective regimen sliding scale   SubCutaneous at bedtime  insulin lispro Injectable (ADMELOG) 6 Unit(s) SubCutaneous three times a day before meals  loratadine 10 milliGRAM(s) Oral daily  montelukast 10 milliGRAM(s) Oral daily  multivitamin 1 Tablet(s) Oral daily  pantoprazole    Tablet 40 milliGRAM(s) Oral before breakfast  potassium phosphate / sodium phosphate Tablet (K-PHOS No. 2) 1 Tablet(s) Oral three times a day  predniSONE   Tablet 40 milliGRAM(s) Oral daily  zinc sulfate 220 milliGRAM(s) Oral daily      MEDICATIONS  (PRN):  acetaminophen   Tablet .. 650 milliGRAM(s) Oral every 6 hours PRN Temp greater or equal to 38C (100.4F), Mild Pain (1 - 3)  ALBUTerol    90 MICROgram(s) HFA Inhaler 2 Puff(s) Inhalation every 6 hours PRN Shortness of Breath and/or Wheezing       Medications up to date at time of exam.      PHYSICAL EXAMINATION:  Patient has no new complaints.  GENERAL: The patient is a well-developed, well-nourished, in no apparent distress.     Vital Signs Last 24 Hrs  T(C): 37 (28 Dec 2020 16:18), Max: 37 (28 Dec 2020 16:18)  T(F): 98.6 (28 Dec 2020 16:18), Max: 98.6 (28 Dec 2020 16:18)  HR: 95 (28 Dec 2020 16:18) (76 - 95)  BP: 134/59 (28 Dec 2020 16:18) (126/62 - 134/59)  BP(mean): 75 (28 Dec 2020 08:34) (75 - 75)  RR: 18 (28 Dec 2020 16:18) (17 - 18)  SpO2: 96% (28 Dec 2020 16:18) (93% - 96%)   (if applicable)    Chest Tube (if applicable)    HEENT: Head is normocephalic and atraumatic. Extraocular muscles are intact. Mucous membranes are moist.     NECK: Supple, no palpable adenopathy.    LUNGS: Clear to auscultation, no wheezing, rales, or rhonchi.    HEART: Regular rate and rhythm without murmur.    ABDOMEN: Soft, nontender, and nondistended.  No hepatosplenomegaly is noted.    : No painful voiding, no pelvic pain    EXTREMITIES: Without any cyanosis, clubbing, rash, lesions or edema.    NEUROLOGIC: Awake, alert, oriented, grossly intact    SKIN: Warm, dry, good turgor.      LABS:                        12.9   11.59 )-----------( 481      ( 28 Dec 2020 06:55 )             41.6     12-28    137  |  101  |  20<H>  ----------------------------<  64<L>  4.6   |  26  |  1.00    Ca    9.5      28 Dec 2020 06:55    TPro  7.0  /  Alb  2.5<L>  /  TBili  0.5  /  DBili  x   /  AST  40  /  ALT  74<H>  /  AlkPhos  123<H>  12-28              D-Dimer Assay, Quantitative: 248 ng/mL DDU (12-28-20 @ 06:55)            MICROBIOLOGY: (if applicable)  Covid-19 :COVID-19 PCR . (12.26.20 @ 14:57)    COVID-19 PCR: Detected:     RADIOLOGY & ADDITIONAL STUDIES:  EKG:   CXR:  ECHO:    IMPRESSION: 89y Female PAST MEDICAL & SURGICAL HISTORY:  Asthma    HTN (hypertension)    DM (diabetes mellitus)    No significant past surgical history     p/w         IMP: This is a 89 yr old woman Rivera Speaking, with  asthma, HTN, DM presents to the ED c/o shortness of breath and diarrhea, fever, chills x 1 days.   Pt reports worsening shortness of breath with associated subjective fever, chills, chest pain and cough. Pt reports that her grandson is positive for COVID-19..     - Acute Hypoxic Resp failure  - Covid-19 pna   - PNA b/l   - DM uncontrol   - HTN  - Asthma   - TAMANNA on CKD      Sugg;  -continue isolation : air borne and contact   -covid-19 positive   -blood sugar control   -completed 10 days of decadron 12/9  -pat is not a candidate for Remdesivir due to low GFR  -continue O2 supp as needed to keep Sat >90%  -may need O2 supp to go home or to [facility]  -dvt/gi prophy  -monitor renal fx   -PPI/ DVT  -PT eval  -OOB to chair   -D/C planning

## 2020-12-28 NOTE — PROGRESS NOTE ADULT - SUBJECTIVE AND OBJECTIVE BOX
Interval Events:  pt in nad    Allergies    No Known Allergies    Intolerances      Endocrine/Metabolic Medications:  glucagon  Injectable 1 milliGRAM(s) IntraMuscular once  insulin glargine Injectable (LANTUS) 30 Unit(s) SubCutaneous at bedtime  insulin lispro (ADMELOG) corrective regimen sliding scale   SubCutaneous at bedtime  insulin lispro (ADMELOG) corrective regimen sliding scale   SubCutaneous three times a day before meals  insulin lispro Injectable (ADMELOG) 12 Unit(s) SubCutaneous three times a day before meals      Vital Signs Last 24 Hrs  T(C): 36.4 (28 Dec 2020 08:34), Max: 36.5 (27 Dec 2020 15:33)  T(F): 97.6 (28 Dec 2020 08:34), Max: 97.7 (27 Dec 2020 15:33)  HR: 84 (28 Dec 2020 08:34) (76 - 84)  BP: 126/62 (28 Dec 2020 08:34) (126/62 - 134/57)  BP(mean): 75 (28 Dec 2020 08:34) (75 - 75)  RR: 18 (28 Dec 2020 08:34) (17 - 18)  SpO2: 93% (28 Dec 2020 08:34) (93% - 98%)      PHYSICAL EXAM  All physical exam findings normal, except those marked:  General:	Alert, active, cooperative, NAD, well hydrated  .		[] Abnormal:  Neck		Normal: supple, no cervical adenopathy, no palpable thyroid  .		[] Abnormal:  Cardiovascular	Normal: regular rate, normal S1, S2, no murmurs  .		[] Abnormal:  Respiratory	Normal: no chest wall deformity, normal respiratory pattern, CTA B/L  .		[] Abnormal:  Abdominal	Normal: soft, ND, NT, bowel sounds present, no masses, no organomegaly  .		[] Abnormal:  		Normal normal genitalia, testes descended, circumcised/uncircumcised  .		Uma stage:			Breast mua:  .		Menstrual history:  .		[] Abnormal:  Extremities	Normal: FROM x4  .		[] Abnormal:  Skin		Normal: intact and not indurated, no rash, no acanthosis nigricans  .		[] Abnormal:  Neurologic	Normal: grossly intact  .		[] Abnormal:    LABS                        12.9   11.59 )-----------( 481      ( 28 Dec 2020 06:55 )             41.6                               137    |  101    |  20                  Calcium: 9.5   / iCa: x      (12-28 @ 06:55)    ----------------------------<  64        Magnesium: x                                4.6     |  26     |  1.00             Phosphorous: x        TPro  7.0    /  Alb  2.5    /  TBili  0.5    /  DBili  x      /  AST  40     /  ALT  74     /  AlkPhos  123    28 Dec 2020 06:55    CAPILLARY BLOOD GLUCOSE      POCT Blood Glucose.: 117 mg/dL (28 Dec 2020 08:21)  POCT Blood Glucose.: 235 mg/dL (27 Dec 2020 20:52)  POCT Blood Glucose.: 278 mg/dL (27 Dec 2020 18:28)  POCT Blood Glucose.: 194 mg/dL (27 Dec 2020 16:54)  POCT Blood Glucose.: 373 mg/dL (27 Dec 2020 11:34)        Assesment/plan    89 y/o female Rivera Speaking, with PMHx of asthma, HTN, DM presents to the ED c/o shortness of breath and diarrhea, fever, chills x 1 days. Admitted for covid-19 related pneumonia.  Endocrinology was consulted for uncontrolled DM.     Problem/Plan - 1:  ·  Problem: Uncontrolled diabetes mellitus.  Plan: Hyperglycemia likely  due to uncontrolled DM in addition to steroid   HbA1C;  7.2  Pt takes  Novolog 70/30 40 U BID , Metformin and Glipizide- likely hypoglycemic  now with hypoglycemia off of prednisone    chnage Lantus to 24 U HS  and pre meal Humalog to 6 u tid before meals  Upon discharge Continue Novolog 70/30 40 U at am and 30 U at bedtime before meal. Discontinue oral hypoglycemic agents.   Monitor blood glucose.  fine tuning as out pt.  d/w hs.     Problem/Plan - 2:  ·  Problem: Pneumonia due to COVID-19 virus.  Plan: Isolation precaution   Not on remdesevir due to low Cr clearance.      Problem/Plan - 3:  ·  Problem: Acute on chronic renal insufficiency.  Plan: Monitor BMP  Management as per primary team.      Problem/Plan - 4:  ·  Problem: HTN (hypertension).  Plan: Continue BP meds  Monitor blood pressure.

## 2020-12-28 NOTE — PROGRESS NOTE ADULT - PROBLEM SELECTOR PLAN 2
Asthma exacerbation due to COVID PNA  -Left sided wheezing persists  -Cont bronchodilation, benzonatate  -f/u repeat CXR Asthma exacerbation due to COVID PNA  -Left sided wheezing persists  -Cont bronchodilation, benzonatate  -f/u repeat CXR  -Pulm Dr. Templeton/Sachin consulted 12/16

## 2020-12-28 NOTE — PROGRESS NOTE ADULT - PROBLEM SELECTOR PLAN 1
CXR 12/22-> Increasing bilateral lung parenchymal airspace opacities.  -Left lung wheezing,  intermittent productive cough  -Still on 2L N/C despite O2 sat 100% as nursing reports increased dyspnea on RA   -D-dmier in 200s, doppler negative   -Repeated COVID 12/22 is still positive

## 2020-12-28 NOTE — PROGRESS NOTE ADULT - PROBLEM SELECTOR PLAN 9
RISK                                                          Points  [] Previous VTE                                           3  [] Thrombophilia                                        2  [] Lower limb paralysis                              2   [] Current Cancer                                       2   [x] Immobilization > 24 hrs                        1  [] ICU/CCU stay > 24 hours                       1  [x] Age > 60                                                   1  Improve score 2  Lovenox 40 sq bid C/M collaborating with pt.'s son re LTC placement as family.  As reported pt.'s son stated pt. can not return home however pt. is refusing NH placement.  Will f/u with C/M

## 2020-12-28 NOTE — PROGRESS NOTE ADULT - PROBLEM SELECTOR PLAN 6
Hb is 10.9  which is lower than her baseline 12 from July  She is taking iron supplement, will continue  - c/w home med Improved  Hb is 12. today, her baseline 12 from July  -Cont iron, MVI, Vit D3, Zinc  -Noted with excellent appetite

## 2020-12-28 NOTE — PROGRESS NOTE ADULT - SUBJECTIVE AND OBJECTIVE BOX
[facility] [place] [facility]   MD FAUSTO PALM MD RUORU WONG, PA    TEL:  OFFICE: [phone]  DR LEE CELL: [phone]  CHARLEY WILLIAM CELL: [phone]  DR. GARCIA CELL: [phone]  DR. MARTIN CELL: [phone]    FROM 5 PM - 7 AM PLEASE CALL ANSWERING SERVICE: [phone]    RENAL FOLLOW UP NOTE--Date of Service 12-28-20 @ 13:11  --------------------------------------------------------------------------------  HPI:      Pt covid 19+  PAST HISTORY  --------------------------------------------------------------------------------  No significant changes to PMH, PSH, FHx, SHx, unless otherwise noted    ALLERGIES & MEDICATIONS  --------------------------------------------------------------------------------  Allergies    No Known Allergies    Intolerances      Standing Inpatient Medications  ascorbic acid 500 milliGRAM(s) Oral daily  benzonatate 100 milliGRAM(s) Oral every 8 hours  budesonide  80 MICROgram(s)/formoterol 4.5 MICROgram(s) Inhaler 2 Puff(s) Inhalation two times a day  cholecalciferol 1000 Unit(s) Oral daily  dextrose 5%. 1000 milliLiter(s) IV Continuous <Continuous>  enoxaparin Injectable 40 milliGRAM(s) SubCutaneous two times a day  ferrous    sulfate 325 milliGRAM(s) Oral daily  gabapentin 300 milliGRAM(s) Oral every 12 hours  glucagon  Injectable 1 milliGRAM(s) IntraMuscular once  insulin glargine Injectable (LANTUS) 24 Unit(s) SubCutaneous at bedtime  insulin lispro (ADMELOG) corrective regimen sliding scale   SubCutaneous at bedtime  insulin lispro (ADMELOG) corrective regimen sliding scale   SubCutaneous three times a day before meals  insulin lispro Injectable (ADMELOG) 6 Unit(s) SubCutaneous three times a day before meals  loratadine 10 milliGRAM(s) Oral daily  montelukast 10 milliGRAM(s) Oral daily  multivitamin 1 Tablet(s) Oral daily  pantoprazole    Tablet 40 milliGRAM(s) Oral before breakfast  potassium phosphate / sodium phosphate Tablet (K-PHOS No. 2) 1 Tablet(s) Oral three times a day  zinc sulfate 220 milliGRAM(s) Oral daily    PRN Inpatient Medications  acetaminophen   Tablet .. 650 milliGRAM(s) Oral every 6 hours PRN  ALBUTerol    90 MICROgram(s) HFA Inhaler 2 Puff(s) Inhalation every 6 hours PRN      REVIEW OF SYSTEMS  --------------------------------------------------------------------------------  General: no fever    MSK: no edema     VITALS/PHYSICAL EXAM  --------------------------------------------------------------------------------  T(C): 36.4 (12-28-20 @ 08:34), Max: 36.5 (12-27-20 @ 15:33)  HR: 85 (12-28-20 @ 10:34) (76 - 85)  BP: 126/62 (12-28-20 @ 08:34) (126/62 - 134/57)  RR: 18 (12-28-20 @ 08:34) (17 - 18)  SpO2: 93% (12-28-20 @ 10:34) (93% - 98%)  Wt(kg): --        12-27-20 @ 07:01  -  12-28-20 @ 07:00  --------------------------------------------------------  IN: 250 mL / OUT: 0 mL / NET: 250 mL      LABS/STUDIES  --------------------------------------------------------------------------------              12.9   11.59 >-----------<  481      [12-28-20 @ 06:55]              41.6     137  |  101  |  20  ----------------------------<  64      [12-28-20 @ 06:55]  4.6   |  26  |  1.00        Ca     9.5     [12-28-20 @ 06:55]    TPro  7.0  /  Alb  2.5  /  TBili  0.5  /  DBili  x   /  AST  40  /  ALT  74  /  AlkPhos  123  [12-28-20 @ 06:55]          Creatinine Trend:  SCr 1.00 [12-28 @ 06:55]  SCr 1.00 [12-27 @ 06:39]  SCr 1.08 [12-25 @ 07:53]  SCr 1.25 [12-24 @ 10:39]  SCr 1.22 [12-23 @ 07:42]        Iron 24, TIBC 264, %sat 9      [12-15-20 @ 07:46]  Ferritin 395      [12-23-20 @ 00:51]  Vitamin D (25OH) 36.3      [07-18-20 @ 11:35]  TSH 0.38      [12-15-20 @ 07:46]  Lipid: chol 139, , HDL 46, LDL --      [12-15-20 @ 07:46]      Immunofixation Serum:   No Monoclonal Band Identified    Reference Range: None Detected      [12-15-20 @ 11:01]  SPEP Interpretation: Normal Electrophoresis Pattern      [12-15-20 @ 11:01]

## 2020-12-29 LAB
ALBUMIN SERPL ELPH-MCNC: 2.6 G/DL — LOW (ref 3.5–5)
ALP SERPL-CCNC: 135 U/L — HIGH (ref 40–120)
ALT FLD-CCNC: 67 U/L DA — HIGH (ref 10–60)
ANION GAP SERPL CALC-SCNC: 15 MMOL/L — SIGNIFICANT CHANGE UP (ref 5–17)
AST SERPL-CCNC: 36 U/L — SIGNIFICANT CHANGE UP (ref 10–40)
BILIRUB SERPL-MCNC: 0.7 MG/DL — SIGNIFICANT CHANGE UP (ref 0.2–1.2)
BUN SERPL-MCNC: 20 MG/DL — HIGH (ref 7–18)
CALCIUM SERPL-MCNC: 9.1 MG/DL — SIGNIFICANT CHANGE UP (ref 8.4–10.5)
CHLORIDE SERPL-SCNC: 96 MMOL/L — SIGNIFICANT CHANGE UP (ref 96–108)
CO2 SERPL-SCNC: 23 MMOL/L — SIGNIFICANT CHANGE UP (ref 22–31)
CREAT SERPL-MCNC: 1.09 MG/DL — SIGNIFICANT CHANGE UP (ref 0.5–1.3)
GLUCOSE BLDC GLUCOMTR-MCNC: 161 MG/DL — HIGH (ref 70–99)
GLUCOSE BLDC GLUCOMTR-MCNC: 230 MG/DL — HIGH (ref 70–99)
GLUCOSE BLDC GLUCOMTR-MCNC: 358 MG/DL — HIGH (ref 70–99)
GLUCOSE BLDC GLUCOMTR-MCNC: 395 MG/DL — HIGH (ref 70–99)
GLUCOSE BLDC GLUCOMTR-MCNC: 416 MG/DL — HIGH (ref 70–99)
GLUCOSE SERPL-MCNC: 219 MG/DL — HIGH (ref 70–99)
HCT VFR BLD CALC: 40.8 % — SIGNIFICANT CHANGE UP (ref 34.5–45)
HGB BLD-MCNC: 12.9 G/DL — SIGNIFICANT CHANGE UP (ref 11.5–15.5)
MCHC RBC-ENTMCNC: 25.8 PG — LOW (ref 27–34)
MCHC RBC-ENTMCNC: 31.6 GM/DL — LOW (ref 32–36)
MCV RBC AUTO: 81.6 FL — SIGNIFICANT CHANGE UP (ref 80–100)
NRBC # BLD: 0 /100 WBCS — SIGNIFICANT CHANGE UP (ref 0–0)
PLATELET # BLD AUTO: 462 K/UL — HIGH (ref 150–400)
POTASSIUM SERPL-MCNC: 4.7 MMOL/L — SIGNIFICANT CHANGE UP (ref 3.5–5.3)
POTASSIUM SERPL-SCNC: 4.7 MMOL/L — SIGNIFICANT CHANGE UP (ref 3.5–5.3)
PROT SERPL-MCNC: 6.9 G/DL — SIGNIFICANT CHANGE UP (ref 6–8.3)
RBC # BLD: 5 M/UL — SIGNIFICANT CHANGE UP (ref 3.8–5.2)
RBC # FLD: 13.5 % — SIGNIFICANT CHANGE UP (ref 10.3–14.5)
SODIUM SERPL-SCNC: 134 MMOL/L — LOW (ref 135–145)
WBC # BLD: 10.18 K/UL — SIGNIFICANT CHANGE UP (ref 3.8–10.5)
WBC # FLD AUTO: 10.18 K/UL — SIGNIFICANT CHANGE UP (ref 3.8–10.5)

## 2020-12-29 RX ORDER — INSULIN LISPRO 100/ML
12 VIAL (ML) SUBCUTANEOUS
Refills: 0 | Status: DISCONTINUED | OUTPATIENT
Start: 2020-12-29 | End: 2021-01-04

## 2020-12-29 RX ORDER — INSULIN GLARGINE 100 [IU]/ML
30 INJECTION, SOLUTION SUBCUTANEOUS AT BEDTIME
Refills: 0 | Status: DISCONTINUED | OUTPATIENT
Start: 2020-12-29 | End: 2021-01-04

## 2020-12-29 RX ADMIN — Medication 1 TABLET(S): at 05:23

## 2020-12-29 RX ADMIN — ZINC SULFATE TAB 220 MG (50 MG ZINC EQUIVALENT) 220 MILLIGRAM(S): 220 (50 ZN) TAB at 11:22

## 2020-12-29 RX ADMIN — MONTELUKAST 10 MILLIGRAM(S): 4 TABLET, CHEWABLE ORAL at 11:22

## 2020-12-29 RX ADMIN — Medication 12 UNIT(S): at 17:06

## 2020-12-29 RX ADMIN — Medication 2: at 17:05

## 2020-12-29 RX ADMIN — GABAPENTIN 300 MILLIGRAM(S): 400 CAPSULE ORAL at 05:23

## 2020-12-29 RX ADMIN — Medication 1 TABLET(S): at 11:22

## 2020-12-29 RX ADMIN — Medication 6: at 08:43

## 2020-12-29 RX ADMIN — GABAPENTIN 300 MILLIGRAM(S): 400 CAPSULE ORAL at 17:09

## 2020-12-29 RX ADMIN — Medication 100 MILLIGRAM(S): at 13:32

## 2020-12-29 RX ADMIN — BUDESONIDE AND FORMOTEROL FUMARATE DIHYDRATE 2 PUFF(S): 160; 4.5 AEROSOL RESPIRATORY (INHALATION) at 21:54

## 2020-12-29 RX ADMIN — Medication 325 MILLIGRAM(S): at 11:22

## 2020-12-29 RX ADMIN — Medication 1 TABLET(S): at 21:52

## 2020-12-29 RX ADMIN — Medication 6 UNIT(S): at 08:44

## 2020-12-29 RX ADMIN — Medication 100 MILLIGRAM(S): at 05:23

## 2020-12-29 RX ADMIN — Medication 12 UNIT(S): at 12:23

## 2020-12-29 RX ADMIN — Medication 40 MILLIGRAM(S): at 05:23

## 2020-12-29 RX ADMIN — LORATADINE 10 MILLIGRAM(S): 10 TABLET ORAL at 11:22

## 2020-12-29 RX ADMIN — PANTOPRAZOLE SODIUM 40 MILLIGRAM(S): 20 TABLET, DELAYED RELEASE ORAL at 05:23

## 2020-12-29 RX ADMIN — Medication 1000 UNIT(S): at 11:22

## 2020-12-29 RX ADMIN — BUDESONIDE AND FORMOTEROL FUMARATE DIHYDRATE 2 PUFF(S): 160; 4.5 AEROSOL RESPIRATORY (INHALATION) at 09:17

## 2020-12-29 RX ADMIN — Medication 1 TABLET(S): at 13:32

## 2020-12-29 RX ADMIN — Medication 500 MILLIGRAM(S): at 11:22

## 2020-12-29 RX ADMIN — ENOXAPARIN SODIUM 40 MILLIGRAM(S): 100 INJECTION SUBCUTANEOUS at 05:23

## 2020-12-29 RX ADMIN — INSULIN GLARGINE 30 UNIT(S): 100 INJECTION, SOLUTION SUBCUTANEOUS at 21:53

## 2020-12-29 RX ADMIN — Medication 100 MILLIGRAM(S): at 21:52

## 2020-12-29 RX ADMIN — Medication 0: at 21:53

## 2020-12-29 RX ADMIN — ENOXAPARIN SODIUM 40 MILLIGRAM(S): 100 INJECTION SUBCUTANEOUS at 17:10

## 2020-12-29 RX ADMIN — Medication 5: at 12:22

## 2020-12-29 NOTE — PROGRESS NOTE ADULT - SUBJECTIVE AND OBJECTIVE BOX
HPI:  89 YO Rivera Speaking female, with PMHx of asthma, HTN, DM presents to the ED c/o shortness of breath and diarrhea, fever, chills x 1 days.  Pt completed course of decadron.  Family has agreed for patient to come home when she is medically stable for discharge.  Pt is still COVID+ and requiring 2L via n/c.  Patient examined at bedside, resting comfortably, denies pain or NV.    OVERNIGHT EVENTS:  No new overnight events.     REVIEW OF SYSTEMS:      CONSTITUTIONAL: No fever,   EYES: no acute visual disturbances  NECK: No pain or stiffness  RESPIRATORY: No cough; No shortness of breath  CARDIOVASCULAR: No chest pain, no palpitations  GASTROINTESTINAL: No pain. No nausea, vomiting or diarrhea   NEUROLOGICAL: No headache or numbness, no tremors  MUSCULOSKELETAL: No joint pain, no muscle pain  GENITOURINARY: no dysuria, no frequency, no hesitancy  PSYCHIATRY: no depression , no anxiety  ALL OTHER  ROS negative        Vital Signs Last 24 Hrs  T(C): 37 (29 Dec 2020 08:00), Max: 37 (28 Dec 2020 16:18)  T(F): 98.6 (29 Dec 2020 08:00), Max: 98.6 (28 Dec 2020 16:18)  HR: 96 (29 Dec 2020 08:00) (88 - 96)  BP: 140/66 (29 Dec 2020 08:00) (117/58 - 140/66)  BP(mean): --  RR: 18 (29 Dec 2020 08:00) (18 - 18)  SpO2: 96% (29 Dec 2020 08:00) (96% - 98%)    ________________________________________________  PHYSICAL EXAM:    GENERAL: NAD  HEENT: Normocephalic; conjunctivae and sclerae clear; moist mucous membranes;   NECK : supple, no JVD  CHEST/LUNG: Scattered bilat wheeze to auscultation  HEART: S1 S2  regular  ABDOMEN: Soft, Nontender, Nondistended; Bowel sounds present  EXTREMITIES: no cyanosis; no LE edema; no calf tenderness  SKIN: warm and dry; no rash  NERVOUS SYSTEM:  Alert & Oriented x3; no new deficits    _________________________________________________  CURRENT MEDICATIONS:    MEDICATIONS  (STANDING):  ascorbic acid 500 milliGRAM(s) Oral daily  benzonatate 100 milliGRAM(s) Oral every 8 hours  budesonide  80 MICROgram(s)/formoterol 4.5 MICROgram(s) Inhaler 2 Puff(s) Inhalation two times a day  cholecalciferol 1000 Unit(s) Oral daily  dextrose 5%. 1000 milliLiter(s) (100 mL/Hr) IV Continuous <Continuous>  enoxaparin Injectable 40 milliGRAM(s) SubCutaneous two times a day  ferrous    sulfate 325 milliGRAM(s) Oral daily  gabapentin 300 milliGRAM(s) Oral every 12 hours  glucagon  Injectable 1 milliGRAM(s) IntraMuscular once  insulin glargine Injectable (LANTUS) 30 Unit(s) SubCutaneous at bedtime  insulin lispro (ADMELOG) corrective regimen sliding scale   SubCutaneous at bedtime  insulin lispro (ADMELOG) corrective regimen sliding scale   SubCutaneous three times a day before meals  insulin lispro Injectable (ADMELOG) 12 Unit(s) SubCutaneous three times a day before meals  loratadine 10 milliGRAM(s) Oral daily  montelukast 10 milliGRAM(s) Oral daily  multivitamin 1 Tablet(s) Oral daily  pantoprazole    Tablet 40 milliGRAM(s) Oral before breakfast  potassium phosphate / sodium phosphate Tablet (K-PHOS No. 2) 1 Tablet(s) Oral three times a day  predniSONE   Tablet 40 milliGRAM(s) Oral daily  zinc sulfate 220 milliGRAM(s) Oral daily    MEDICATIONS  (PRN):  acetaminophen   Tablet .. 650 milliGRAM(s) Oral every 6 hours PRN Temp greater or equal to 38C (100.4F), Mild Pain (1 - 3)  ALBUTerol    90 MICROgram(s) HFA Inhaler 2 Puff(s) Inhalation every 6 hours PRN Shortness of Breath and/or Wheezing      __________________________________________________  LABS:                          12.9   10.18 )-----------( 462      ( 29 Dec 2020 06:26 )             40.8     12-29    134<L>  |  96  |  20<H>  ----------------------------<  219<H>  4.7   |  23  |  1.09    Ca    9.1      29 Dec 2020 06:26    TPro  6.9  /  Alb  2.6<L>  /  TBili  0.7  /  DBili  x   /  AST  36  /  ALT  67<H>  /  AlkPhos  135<H>  12-29        CAPILLARY BLOOD GLUCOSE      POCT Blood Glucose.: 358 mg/dL (29 Dec 2020 12:18)  POCT Blood Glucose.: 416 mg/dL (29 Dec 2020 08:40)  POCT Blood Glucose.: 395 mg/dL (29 Dec 2020 08:36)  POCT Blood Glucose.: 236 mg/dL (28 Dec 2020 21:26)  POCT Blood Glucose.: 90 mg/dL (28 Dec 2020 16:55)      __________________________________________________  RADIOLOGY & ADDITIONAL TESTS:    Imaging Personally Reviewed:  YES    < from: Xray Chest 1 View-PORTABLE IMMEDIATE (Xray Chest 1 View-PORTABLE IMMEDIATE .) (12.28.20 @ 11:25) >  FINDINGS/  IMPRESSION:  Bilateral airspace opacities overall improving.    No pleural effusion.    Heart size cannot be accurately assessed in this projection.    < end of copied text >    Consultant(s) Notes Reviewed:   YES     Plan of care was discussed with patient and /or primary care giver; all questions and concerns were addressed and care was aligned with patient's wishes.    Plan discussed with attending and consulting physicians.

## 2020-12-29 NOTE — PROGRESS NOTE ADULT - PROBLEM SELECTOR PLAN 4
Controlled  Pt takes Lisinopril 20 mg and HCTZ 12.5 mg at home  Resume home medications with parameters when medically appropriate  Continue to monitor BP   Continue with diabetic/DASH diet

## 2020-12-29 NOTE — PROGRESS NOTE ADULT - PROBLEM SELECTOR PLAN 8
Dr. Carrizales spoke to the pts son on 12/28 and he has agreed to take patient home when medically ready  CM & SW following

## 2020-12-29 NOTE — PROGRESS NOTE ADULT - PROBLEM SELECTOR PLAN 2
Asthma exacerbation likely secondary to COVID PNA  Scattered bilat wheezes  Continue with bronchodilation  Dr. Templeton (Pulmonary) following

## 2020-12-29 NOTE — PROGRESS NOTE ADULT - SUBJECTIVE AND OBJECTIVE BOX
[facility]   MD FAUSTO PALM MD RUORU WONG, PA    TEL:  OFFICE: [phone]  DR LEE CELL: [phone]  CHARLEY WILLIAM CELL: [phone]  DR. GARCIA CELL: [phone]  DR. MARTIN CELL: [phone]    FROM 5 PM - 7 AM PLEASE CALL ANSWERING SERVICE: [phone]    RENAL FOLLOW UP NOTE--Date of Service 12-29-20 @ 11:22  --------------------------------------------------------------------------------  HPI:      Pt covid 19+    PAST HISTORY  --------------------------------------------------------------------------------  No significant changes to PMH, PSH, FHx, SHx, unless otherwise noted    ALLERGIES & MEDICATIONS  --------------------------------------------------------------------------------  Allergies    No Known Allergies    Intolerances      Standing Inpatient Medications  ascorbic acid 500 milliGRAM(s) Oral daily  benzonatate 100 milliGRAM(s) Oral every 8 hours  budesonide  80 MICROgram(s)/formoterol 4.5 MICROgram(s) Inhaler 2 Puff(s) Inhalation two times a day  cholecalciferol 1000 Unit(s) Oral daily  dextrose 5%. 1000 milliLiter(s) IV Continuous <Continuous>  enoxaparin Injectable 40 milliGRAM(s) SubCutaneous two times a day  ferrous    sulfate 325 milliGRAM(s) Oral daily  gabapentin 300 milliGRAM(s) Oral every 12 hours  glucagon  Injectable 1 milliGRAM(s) IntraMuscular once  insulin glargine Injectable (LANTUS) 30 Unit(s) SubCutaneous at bedtime  insulin lispro (ADMELOG) corrective regimen sliding scale   SubCutaneous at bedtime  insulin lispro (ADMELOG) corrective regimen sliding scale   SubCutaneous three times a day before meals  insulin lispro Injectable (ADMELOG) 12 Unit(s) SubCutaneous three times a day before meals  loratadine 10 milliGRAM(s) Oral daily  montelukast 10 milliGRAM(s) Oral daily  multivitamin 1 Tablet(s) Oral daily  pantoprazole    Tablet 40 milliGRAM(s) Oral before breakfast  potassium phosphate / sodium phosphate Tablet (K-PHOS No. 2) 1 Tablet(s) Oral three times a day  predniSONE   Tablet 40 milliGRAM(s) Oral daily  zinc sulfate 220 milliGRAM(s) Oral daily    PRN Inpatient Medications  acetaminophen   Tablet .. 650 milliGRAM(s) Oral every 6 hours PRN  ALBUTerol    90 MICROgram(s) HFA Inhaler 2 Puff(s) Inhalation every 6 hours PRN      REVIEW OF SYSTEMS  --------------------------------------------------------------------------------  General: no fever  MSK: no edema     VITALS/PHYSICAL EXAM  --------------------------------------------------------------------------------  T(C): 37 (12-29-20 @ 08:00), Max: 37 (12-28-20 @ 16:18)  HR: 96 (12-29-20 @ 08:00) (88 - 96)  BP: 140/66 (12-29-20 @ 08:00) (117/58 - 140/66)  RR: 18 (12-29-20 @ 08:00) (18 - 18)  SpO2: 96% (12-29-20 @ 08:00) (96% - 98%)  Wt(kg): --          LABS/STUDIES  --------------------------------------------------------------------------------              12.9   10.18 >-----------<  462      [12-29-20 @ 06:26]              40.8     134  |  96  |  20  ----------------------------<  219      [12-29-20 @ 06:26]  4.7   |  23  |  1.09        Ca     9.1     [12-29-20 @ 06:26]    TPro  6.9  /  Alb  2.6  /  TBili  0.7  /  DBili  x   /  AST  36  /  ALT  67  /  AlkPhos  135  [12-29-20 @ 06:26]          Creatinine Trend:  SCr 1.09 [12-29 @ 06:26]  SCr 1.00 [12-28 @ 06:55]  SCr 1.00 [12-27 @ 06:39]  SCr 1.08 [12-25 @ 07:53]  SCr 1.25 [12-24 @ 10:39]        Iron 24, TIBC 264, %sat 9      [12-15-20 @ 07:46]  Ferritin 395      [12-23-20 @ 00:51]  Vitamin D (25OH) 36.3      [07-18-20 @ 11:35]  TSH 0.38      [12-15-20 @ 07:46]  Lipid: chol 139, , HDL 46, LDL --      [12-15-20 @ 07:46]      Immunofixation Serum:   No Monoclonal Band Identified    Reference Range: None Detected      [12-15-20 @ 11:01]  SPEP Interpretation: Normal Electrophoresis Pattern      [12-15-20 @ 11:01]

## 2020-12-29 NOTE — PROGRESS NOTE ADULT - SUBJECTIVE AND OBJECTIVE BOX
Interval Events:      Allergies    No Known Allergies    Intolerances      Endocrine/Metabolic Medications:  glucagon  Injectable 1 milliGRAM(s) IntraMuscular once  insulin glargine Injectable (LANTUS) 24 Unit(s) SubCutaneous at bedtime  insulin lispro (ADMELOG) corrective regimen sliding scale   SubCutaneous at bedtime  insulin lispro (ADMELOG) corrective regimen sliding scale   SubCutaneous three times a day before meals  insulin lispro Injectable (ADMELOG) 6 Unit(s) SubCutaneous three times a day before meals  predniSONE   Tablet 40 milliGRAM(s) Oral daily      Vital Signs Last 24 Hrs  T(C): 37 (29 Dec 2020 08:00), Max: 37 (28 Dec 2020 16:18)  T(F): 98.6 (29 Dec 2020 08:00), Max: 98.6 (28 Dec 2020 16:18)  HR: 96 (29 Dec 2020 08:00) (85 - 96)  BP: 140/66 (29 Dec 2020 08:00) (117/58 - 140/66)  BP(mean): --  RR: 18 (29 Dec 2020 08:00) (18 - 18)  SpO2: 96% (29 Dec 2020 08:00) (93% - 98%)      PHYSICAL EXAM  All physical exam findings normal, except those marked:  General:	Alert, active, cooperative, NAD, well hydrated  .		[] Abnormal:  Neck		Normal: supple, no cervical adenopathy, no palpable thyroid  .		[] Abnormal:  Cardiovascular	Normal: regular rate, normal S1, S2, no murmurs  .		[] Abnormal:  Respiratory	Normal: no chest wall deformity, normal respiratory pattern, CTA B/L  .		[] Abnormal:  Abdominal	Normal: soft, ND, NT, bowel sounds present, no masses, no organomegaly  .		[] Abnormal:  		Normal normal genitalia, testes descended, circumcised/uncircumcised  .		Uma stage:			Breast uma:  .		Menstrual history:  .		[] Abnormal:  Extremities	Normal: FROM x4  .		[] Abnormal:  Skin		Normal: intact and not indurated, no rash, no acanthosis nigricans  .		[] Abnormal:  Neurologic	Normal: grossly intact  .		[] Abnormal:    LABS                        12.9   10.18 )-----------( 462      ( 29 Dec 2020 06:26 )             40.8                               134    |  96     |  20                  Calcium: 9.1   / iCa: x      (12-29 @ 06:26)    ----------------------------<  219       Magnesium: x                                4.7     |  23     |  1.09             Phosphorous: x        TPro  6.9    /  Alb  2.6    /  TBili  0.7    /  DBili  x      /  AST  36     /  ALT  67     /  AlkPhos  135    29 Dec 2020 06:26    CAPILLARY BLOOD GLUCOSE      POCT Blood Glucose.: 416 mg/dL (29 Dec 2020 08:40)  POCT Blood Glucose.: 395 mg/dL (29 Dec 2020 08:36)  POCT Blood Glucose.: 236 mg/dL (28 Dec 2020 21:26)  POCT Blood Glucose.: 90 mg/dL (28 Dec 2020 16:55)  POCT Blood Glucose.: 178 mg/dL (28 Dec 2020 11:48)        Assesment/plan       Interval Events:  pt in nad    Allergies    No Known Allergies    Intolerances      Endocrine/Metabolic Medications:  glucagon  Injectable 1 milliGRAM(s) IntraMuscular once  insulin glargine Injectable (LANTUS) 24 Unit(s) SubCutaneous at bedtime  insulin lispro (ADMELOG) corrective regimen sliding scale   SubCutaneous at bedtime  insulin lispro (ADMELOG) corrective regimen sliding scale   SubCutaneous three times a day before meals  insulin lispro Injectable (ADMELOG) 6 Unit(s) SubCutaneous three times a day before meals  predniSONE   Tablet 40 milliGRAM(s) Oral daily      Vital Signs Last 24 Hrs  T(C): 37 (29 Dec 2020 08:00), Max: 37 (28 Dec 2020 16:18)  T(F): 98.6 (29 Dec 2020 08:00), Max: 98.6 (28 Dec 2020 16:18)  HR: 96 (29 Dec 2020 08:00) (85 - 96)  BP: 140/66 (29 Dec 2020 08:00) (117/58 - 140/66)  BP(mean): --  RR: 18 (29 Dec 2020 08:00) (18 - 18)  SpO2: 96% (29 Dec 2020 08:00) (93% - 98%)      PHYSICAL EXAM  All physical exam findings normal, except those marked:  General:	Alert, active, cooperative, NAD, well hydrated  .		[] Abnormal:  Neck		Normal: supple, no cervical adenopathy, no palpable thyroid  .		[] Abnormal:  Cardiovascular	Normal: regular rate, normal S1, S2, no murmurs  .		[] Abnormal:  Respiratory	Normal: no chest wall deformity, normal respiratory pattern, CTA B/L  .		[] Abnormal:  Abdominal	Normal: soft, ND, NT, bowel sounds present, no masses, no organomegaly  .		[] Abnormal:  		Normal normal genitalia, testes descended, circumcised/uncircumcised  .		Uma stage:			Breast uma:  .		Menstrual history:  .		[] Abnormal:  Extremities	Normal: FROM x4  .		[] Abnormal:  Skin		Normal: intact and not indurated, no rash, no acanthosis nigricans  .		[] Abnormal:  Neurologic	Normal: grossly intact  .		[] Abnormal:    LABS                        12.9   10.18 )-----------( 462      ( 29 Dec 2020 06:26 )             40.8                               134    |  96     |  20                  Calcium: 9.1   / iCa: x      (12-29 @ 06:26)    ----------------------------<  219       Magnesium: x                                4.7     |  23     |  1.09             Phosphorous: x        TPro  6.9    /  Alb  2.6    /  TBili  0.7    /  DBili  x      /  AST  36     /  ALT  67     /  AlkPhos  135    29 Dec 2020 06:26    CAPILLARY BLOOD GLUCOSE      POCT Blood Glucose.: 416 mg/dL (29 Dec 2020 08:40)  POCT Blood Glucose.: 395 mg/dL (29 Dec 2020 08:36)  POCT Blood Glucose.: 236 mg/dL (28 Dec 2020 21:26)  POCT Blood Glucose.: 90 mg/dL (28 Dec 2020 16:55)  POCT Blood Glucose.: 178 mg/dL (28 Dec 2020 11:48)        Assesment/plan      89 y/o female Rivera Speaking, with PMHx of asthma, HTN, DM presents to the ED c/o shortness of breath and diarrhea, fever, chills x 1 days. Admitted for covid-19 related pneumonia.  Endocrinology was consulted for uncontrolled DM.     Problem/Plan - 1:  ·  Problem: Uncontrolled diabetes mellitus.  Plan: Hyperglycemia likely  due to uncontrolled DM in addition to steroid   HbA1C;  7.2  Pt takes  Novolog 70/30 40 U BID , Metformin and Glipizide- likely hypoglycemic as out pt  now hyperglycemic back on prednisone    change Lantus to 30 U HS  and pre meal Humalog to 12 u tid before meals  fsg ac and hs  fine tuning as out pt.  d/w hs.     Problem/Plan - 2:  ·  Problem: Pneumonia due to COVID-19 virus.  Plan: Isolation precaution   Not on remdesevir due to low Cr clearance.      Problem/Plan - 3:  ·  Problem: Acute on chronic renal insufficiency.  Plan: Monitor BMP  Management as per primary team.      Problem/Plan - 4:  ·  Problem: HTN (hypertension).  Plan: Continue BP meds  Monitor blood pressure.

## 2020-12-29 NOTE — PROGRESS NOTE ADULT - PROBLEM SELECTOR PLAN 6
Uncontrolled as evidenced by A1c 7.2  Pt takes Novolog 70/30 40 U BID  at home  Continue with sliding scale and standing dose insulin coverage  Continue with Lantus coverage QHS   Continue with glucose monitoring  Continue with diabetic/DASH diet    Per Dr. Britt (Endocrine) pt should be discharged on 70/30 insulin, 40 units in the morning and 30 units at home if she is on prednisone and adjust insulin out pt. No PO diabetic meds upon discharge given concern for hypoglycemia and TAMANNA.  Dr. Britt (Endocrine) following

## 2020-12-30 LAB
GLUCOSE BLDC GLUCOMTR-MCNC: 205 MG/DL — HIGH (ref 70–99)
GLUCOSE BLDC GLUCOMTR-MCNC: 210 MG/DL — HIGH (ref 70–99)
GLUCOSE BLDC GLUCOMTR-MCNC: 221 MG/DL — HIGH (ref 70–99)
GLUCOSE BLDC GLUCOMTR-MCNC: 307 MG/DL — HIGH (ref 70–99)
SARS-COV-2 RNA SPEC QL NAA+PROBE: DETECTED

## 2020-12-30 RX ADMIN — ZINC SULFATE TAB 220 MG (50 MG ZINC EQUIVALENT) 220 MILLIGRAM(S): 220 (50 ZN) TAB at 11:48

## 2020-12-30 RX ADMIN — Medication 325 MILLIGRAM(S): at 11:48

## 2020-12-30 RX ADMIN — Medication 2: at 08:47

## 2020-12-30 RX ADMIN — MONTELUKAST 10 MILLIGRAM(S): 4 TABLET, CHEWABLE ORAL at 11:48

## 2020-12-30 RX ADMIN — INSULIN GLARGINE 30 UNIT(S): 100 INJECTION, SOLUTION SUBCUTANEOUS at 23:10

## 2020-12-30 RX ADMIN — BUDESONIDE AND FORMOTEROL FUMARATE DIHYDRATE 2 PUFF(S): 160; 4.5 AEROSOL RESPIRATORY (INHALATION) at 21:28

## 2020-12-30 RX ADMIN — Medication 1 TABLET(S): at 06:16

## 2020-12-30 RX ADMIN — Medication 100 MILLIGRAM(S): at 23:10

## 2020-12-30 RX ADMIN — Medication 1 TABLET(S): at 13:18

## 2020-12-30 RX ADMIN — Medication 1 TABLET(S): at 11:48

## 2020-12-30 RX ADMIN — Medication 40 MILLIGRAM(S): at 06:16

## 2020-12-30 RX ADMIN — Medication 12 UNIT(S): at 17:24

## 2020-12-30 RX ADMIN — BUDESONIDE AND FORMOTEROL FUMARATE DIHYDRATE 2 PUFF(S): 160; 4.5 AEROSOL RESPIRATORY (INHALATION) at 09:16

## 2020-12-30 RX ADMIN — Medication 1000 UNIT(S): at 11:48

## 2020-12-30 RX ADMIN — Medication 2: at 17:24

## 2020-12-30 RX ADMIN — ENOXAPARIN SODIUM 40 MILLIGRAM(S): 100 INJECTION SUBCUTANEOUS at 06:16

## 2020-12-30 RX ADMIN — Medication 500 MILLIGRAM(S): at 11:48

## 2020-12-30 RX ADMIN — PANTOPRAZOLE SODIUM 40 MILLIGRAM(S): 20 TABLET, DELAYED RELEASE ORAL at 06:16

## 2020-12-30 RX ADMIN — Medication 100 MILLIGRAM(S): at 13:18

## 2020-12-30 RX ADMIN — Medication 12 UNIT(S): at 08:48

## 2020-12-30 RX ADMIN — LORATADINE 10 MILLIGRAM(S): 10 TABLET ORAL at 11:48

## 2020-12-30 RX ADMIN — Medication 4: at 11:53

## 2020-12-30 RX ADMIN — GABAPENTIN 300 MILLIGRAM(S): 400 CAPSULE ORAL at 17:23

## 2020-12-30 RX ADMIN — ENOXAPARIN SODIUM 40 MILLIGRAM(S): 100 INJECTION SUBCUTANEOUS at 17:23

## 2020-12-30 RX ADMIN — GABAPENTIN 300 MILLIGRAM(S): 400 CAPSULE ORAL at 06:18

## 2020-12-30 RX ADMIN — Medication 1 TABLET(S): at 21:28

## 2020-12-30 RX ADMIN — Medication 100 MILLIGRAM(S): at 06:16

## 2020-12-30 RX ADMIN — Medication 12 UNIT(S): at 11:53

## 2020-12-30 NOTE — PROGRESS NOTE ADULT - SUBJECTIVE AND OBJECTIVE BOX
HPI:  89 YO [place] Speaking female, with PMHx of asthma, HTN, DM presents to the ED c/o shortness of breath and diarrhea, fever, chills x 1 days.  Pt completed course of decadron.  Family has agreed for patient to come home when she is medically stable for discharge.  Pt is still COVID+ and requiring 2L via n/c.  Patient examined at bedside, resting comfortably, denies pain or NV.    OVERNIGHT EVENTS:  No new overnight events.    REVIEW OF SYSTEMS:      CONSTITUTIONAL: No fever,   EYES: no acute visual disturbances  NECK: No pain or stiffness  RESPIRATORY: No cough; No shortness of breath  CARDIOVASCULAR: No chest pain, no palpitations  GASTROINTESTINAL: No pain. No nausea, vomiting or diarrhea   NEUROLOGICAL: No headache or numbness, no tremors  MUSCULOSKELETAL: No joint pain, no muscle pain  GENITOURINARY: no dysuria, no frequency, no hesitancy  PSYCHIATRY: no depression , no anxiety  ALL OTHER  ROS negative        Vital Signs Last 24 Hrs  T(C): 36.8 (30 Dec 2020 15:44), Max: 36.8 (30 Dec 2020 15:44)  T(F): 98.3 (30 Dec 2020 15:44), Max: 98.3 (30 Dec 2020 15:44)  HR: 84 (30 Dec 2020 15:44) (80 - 84)  BP: 123/56 (30 Dec 2020 15:44) (123/56 - 146/65)  BP(mean): --  RR: 18 (30 Dec 2020 15:44) (16 - 18)  SpO2: 96% (30 Dec 2020 15:44) (95% - 98%)    ________________________________________________  PHYSICAL EXAM:    GENERAL: NAD  HEENT: Normocephalic; conjunctivae and sclerae clear; moist mucous membranes;   NECK : supple, no JVD  CHEST/LUNG: Clear to auscultation bilaterally   HEART: S1 S2  regular  ABDOMEN: Soft, Nontender, Nondistended; Bowel sounds present  EXTREMITIES: no cyanosis; no LE edema; no calf tenderness  SKIN: warm and dry; no rash  NERVOUS SYSTEM:  Alert & Oriented x3; no new deficits    _________________________________________________  CURRENT MEDICATIONS:    MEDICATIONS  (STANDING):  ascorbic acid 500 milliGRAM(s) Oral daily  benzonatate 100 milliGRAM(s) Oral every 8 hours  budesonide  80 MICROgram(s)/formoterol 4.5 MICROgram(s) Inhaler 2 Puff(s) Inhalation two times a day  cholecalciferol 1000 Unit(s) Oral daily  dextrose 5%. 1000 milliLiter(s) (100 mL/Hr) IV Continuous <Continuous>  enoxaparin Injectable 40 milliGRAM(s) SubCutaneous two times a day  ferrous    sulfate 325 milliGRAM(s) Oral daily  gabapentin 300 milliGRAM(s) Oral every 12 hours  glucagon  Injectable 1 milliGRAM(s) IntraMuscular once  insulin glargine Injectable (LANTUS) 30 Unit(s) SubCutaneous at bedtime  insulin lispro (ADMELOG) corrective regimen sliding scale   SubCutaneous at bedtime  insulin lispro (ADMELOG) corrective regimen sliding scale   SubCutaneous three times a day before meals  insulin lispro Injectable (ADMELOG) 12 Unit(s) SubCutaneous three times a day before meals  loratadine 10 milliGRAM(s) Oral daily  montelukast 10 milliGRAM(s) Oral daily  multivitamin 1 Tablet(s) Oral daily  pantoprazole    Tablet 40 milliGRAM(s) Oral before breakfast  potassium phosphate / sodium phosphate Tablet (K-PHOS No. 2) 1 Tablet(s) Oral three times a day  predniSONE   Tablet 40 milliGRAM(s) Oral daily  zinc sulfate 220 milliGRAM(s) Oral daily    MEDICATIONS  (PRN):  acetaminophen   Tablet .. 650 milliGRAM(s) Oral every 6 hours PRN Temp greater or equal to 38C (100.4F), Mild Pain (1 - 3)  ALBUTerol    90 MICROgram(s) HFA Inhaler 2 Puff(s) Inhalation every 6 hours PRN Shortness of Breath and/or Wheezing      __________________________________________________  LABS:                          12.9   10.18 )-----------( 462      ( 29 Dec 2020 06:26 )             40.8     12-29    134<L>  |  96  |  20<H>  ----------------------------<  219<H>  4.7   |  23  |  1.09    Ca    9.1      29 Dec 2020 06:26    TPro  6.9  /  Alb  2.6<L>  /  TBili  0.7  /  DBili  x   /  AST  36  /  ALT  67<H>  /  AlkPhos  135<H>  12-29        CAPILLARY BLOOD GLUCOSE      POCT Blood Glucose.: 205 mg/dL (30 Dec 2020 17:15)  POCT Blood Glucose.: 307 mg/dL (30 Dec 2020 11:37)  POCT Blood Glucose.: 221 mg/dL (30 Dec 2020 08:38)  POCT Blood Glucose.: 161 mg/dL (29 Dec 2020 21:36)      __________________________________________________  RADIOLOGY & ADDITIONAL TESTS:    Imaging Personally Reviewed:  YES    < from: Xray Chest 1 View-PORTABLE IMMEDIATE (Xray Chest 1 View-PORTABLE IMMEDIATE .) (12.28.20 @ 11:25) >  FINDINGS/  IMPRESSION:  Bilateral airspace opacities overall improving.    No pleural effusion.    Heart size cannot be accurately assessed in this projection.    < end of copied text >    Consultant(s) Notes Reviewed:   YES     Plan of care was discussed with patient and /or primary care giver; all questions and concerns were addressed and care was aligned with patient's wishes.    Plan discussed with attending and consulting physicians.

## 2020-12-30 NOTE — PROGRESS NOTE ADULT - SUBJECTIVE AND OBJECTIVE BOX
[facility] [place] [facility]   MD FAUSTO PALM MD RUORU WONG, PA    TEL:  OFFICE: [phone]  DR LEE CELL: [phone]  CHARLEY WILLIAM CELL: [phone]  DR. GARCIA CELL: [phone]  DR. MARTIN CELL: [phone]    FROM 5 PM - 7 AM PLEASE CALL ANSWERING SERVICE: [phone]    RENAL FOLLOW UP NOTE--Date of Service 12-30-20 @ 10:41  --------------------------------------------------------------------------------  HPI:      Pt  covid 19+    PAST HISTORY  --------------------------------------------------------------------------------  No significant changes to PMH, PSH, FHx, SHx, unless otherwise noted    ALLERGIES & MEDICATIONS  --------------------------------------------------------------------------------  Allergies    No Known Allergies    Intolerances      Standing Inpatient Medications  ascorbic acid 500 milliGRAM(s) Oral daily  benzonatate 100 milliGRAM(s) Oral every 8 hours  budesonide  80 MICROgram(s)/formoterol 4.5 MICROgram(s) Inhaler 2 Puff(s) Inhalation two times a day  cholecalciferol 1000 Unit(s) Oral daily  dextrose 5%. 1000 milliLiter(s) IV Continuous <Continuous>  enoxaparin Injectable 40 milliGRAM(s) SubCutaneous two times a day  ferrous    sulfate 325 milliGRAM(s) Oral daily  gabapentin 300 milliGRAM(s) Oral every 12 hours  glucagon  Injectable 1 milliGRAM(s) IntraMuscular once  insulin glargine Injectable (LANTUS) 30 Unit(s) SubCutaneous at bedtime  insulin lispro (ADMELOG) corrective regimen sliding scale   SubCutaneous at bedtime  insulin lispro (ADMELOG) corrective regimen sliding scale   SubCutaneous three times a day before meals  insulin lispro Injectable (ADMELOG) 12 Unit(s) SubCutaneous three times a day before meals  loratadine 10 milliGRAM(s) Oral daily  montelukast 10 milliGRAM(s) Oral daily  multivitamin 1 Tablet(s) Oral daily  pantoprazole    Tablet 40 milliGRAM(s) Oral before breakfast  potassium phosphate / sodium phosphate Tablet (K-PHOS No. 2) 1 Tablet(s) Oral three times a day  predniSONE   Tablet 40 milliGRAM(s) Oral daily  zinc sulfate 220 milliGRAM(s) Oral daily    PRN Inpatient Medications  acetaminophen   Tablet .. 650 milliGRAM(s) Oral every 6 hours PRN  ALBUTerol    90 MICROgram(s) HFA Inhaler 2 Puff(s) Inhalation every 6 hours PRN      REVIEW OF SYSTEMS  --------------------------------------------------------------------------------  General: no fever    MSK: no edema     VITALS/PHYSICAL EXAM  --------------------------------------------------------------------------------  T(C): 36.6 (12-30-20 @ 08:00), Max: 36.6 (12-30-20 @ 00:24)  HR: 83 (12-30-20 @ 08:00) (80 - 88)  BP: 143/67 (12-30-20 @ 08:00) (118/52 - 146/65)  RR: 18 (12-30-20 @ 08:00) (16 - 18)  SpO2: 95% (12-30-20 @ 08:00) (95% - 98%)  Wt(kg): --        12-29-20 @ 07:01  -  12-30-20 @ 07:00  --------------------------------------------------------  IN: 0 mL / OUT: 2 mL / NET: -2 mL        LABS/STUDIES  --------------------------------------------------------------------------------              12.9   10.18 >-----------<  462      [12-29-20 @ 06:26]              40.8     134  |  96  |  20  ----------------------------<  219      [12-29-20 @ 06:26]  4.7   |  23  |  1.09        Ca     9.1     [12-29-20 @ 06:26]    TPro  6.9  /  Alb  2.6  /  TBili  0.7  /  DBili  x   /  AST  36  /  ALT  67  /  AlkPhos  135  [12-29-20 @ 06:26]          Creatinine Trend:  SCr 1.09 [12-29 @ 06:26]  SCr 1.00 [12-28 @ 06:55]  SCr 1.00 [12-27 @ 06:39]  SCr 1.08 [12-25 @ 07:53]  SCr 1.25 [12-24 @ 10:39]        Iron 24, TIBC 264, %sat 9      [12-15-20 @ 07:46]  Ferritin 395      [12-23-20 @ 00:51]  Vitamin D (25OH) 36.3      [07-18-20 @ 11:35]  TSH 0.38      [12-15-20 @ 07:46]  Lipid: chol 139, , HDL 46, LDL --      [12-15-20 @ 07:46]      Immunofixation Serum:   No Monoclonal Band Identified    Reference Range: None Detected      [12-15-20 @ 11:01]  SPEP Interpretation: Normal Electrophoresis Pattern      [12-15-20 @ 11:01]

## 2020-12-30 NOTE — PROGRESS NOTE ADULT - SUBJECTIVE AND OBJECTIVE BOX
Interval Events:  pt in nad    Allergies    No Known Allergies    Intolerances      Endocrine/Metabolic Medications:  glucagon  Injectable 1 milliGRAM(s) IntraMuscular once  insulin glargine Injectable (LANTUS) 30 Unit(s) SubCutaneous at bedtime  insulin lispro (ADMELOG) corrective regimen sliding scale   SubCutaneous at bedtime  insulin lispro (ADMELOG) corrective regimen sliding scale   SubCutaneous three times a day before meals  insulin lispro Injectable (ADMELOG) 12 Unit(s) SubCutaneous three times a day before meals  predniSONE   Tablet 40 milliGRAM(s) Oral daily      Vital Signs Last 24 Hrs  T(C): 36.6 (30 Dec 2020 08:00), Max: 36.6 (30 Dec 2020 00:24)  T(F): 97.8 (30 Dec 2020 08:00), Max: 97.9 (30 Dec 2020 00:24)  HR: 83 (30 Dec 2020 08:00) (80 - 88)  BP: 143/67 (30 Dec 2020 08:00) (118/52 - 146/65)  BP(mean): --  RR: 18 (30 Dec 2020 08:00) (16 - 18)  SpO2: 95% (30 Dec 2020 08:00) (95% - 98%)      PHYSICAL EXAM  All physical exam findings normal, except those marked:  General:	Alert, active, cooperative, NAD, well hydrated  .		[] Abnormal:  Neck		Normal: supple, no cervical adenopathy, no palpable thyroid  .		[] Abnormal:  Cardiovascular	Normal: regular rate, normal S1, S2, no murmurs  .		[] Abnormal:  Respiratory	Normal: no chest wall deformity, normal respiratory pattern, CTA B/L  .		[] Abnormal:  Abdominal	Normal: soft, ND, NT, bowel sounds present, no masses, no organomegaly  .		[] Abnormal:  		Normal normal genitalia, testes descended, circumcised/uncircumcised  .		Uma stage:			Breast uma:  .		Menstrual history:  .		[] Abnormal:  Extremities	Normal: FROM x4  .		[] Abnormal:  Skin		Normal: intact and not indurated, no rash, no acanthosis nigricans  .		[] Abnormal:  Neurologic	Normal: grossly intact  .		[] Abnormal:    LABS        CAPILLARY BLOOD GLUCOSE      POCT Blood Glucose.: 221 mg/dL (30 Dec 2020 08:38)  POCT Blood Glucose.: 161 mg/dL (29 Dec 2020 21:36)  POCT Blood Glucose.: 230 mg/dL (29 Dec 2020 16:45)  POCT Blood Glucose.: 358 mg/dL (29 Dec 2020 12:18)        Assesment/plan    89 y/o female [place] Speaking, with PMHx of asthma, HTN, DM presents to the ED c/o shortness of breath and diarrhea, fever, chills x 1 days. Admitted for covid-19 related pneumonia.  Endocrinology was consulted for uncontrolled DM.     Problem/Plan - 1:  ·  Problem: Uncontrolled diabetes mellitus.  Plan: Hyperglycemia likely due to uncontrolled DM in addition to steroid   HbA1C;  7.2  Pt takes  Novolog 70/30 40 U BID , Metformin and Glipizide- likely hypoglycemic  now with hypoglycemia off of prednisone    cont Lantus to 30 U HS  and pre meal Humalog to 12 u tid before meals  Upon discharge Continue Novolog 70/30 40 U at am and 30 U at bedtime before meal. Discontinue oral hypoglycemic agents.   Monitor blood glucose.  fine tuning as out pt.  d/w hs/ np

## 2020-12-30 NOTE — PROGRESS NOTE ADULT - PROBLEM SELECTOR PLAN 8
Dr. Carrizales spoke to the pts son on 12/28 and he has agreed to take patient home when medically ready  SW was able to speak with son 12/30, he is aware of patients need for oxygen at home.  Discharge pending home oxygen delivery

## 2020-12-31 LAB
GLUCOSE BLDC GLUCOMTR-MCNC: 118 MG/DL — HIGH (ref 70–99)
GLUCOSE BLDC GLUCOMTR-MCNC: 136 MG/DL — HIGH (ref 70–99)
GLUCOSE BLDC GLUCOMTR-MCNC: 250 MG/DL — HIGH (ref 70–99)
GLUCOSE BLDC GLUCOMTR-MCNC: 479 MG/DL — CRITICAL HIGH (ref 70–99)

## 2020-12-31 RX ORDER — DEXTROSE 50 % IN WATER 50 %
25 SYRINGE (ML) INTRAVENOUS ONCE
Refills: 0 | Status: DISCONTINUED | OUTPATIENT
Start: 2020-12-31 | End: 2021-01-04

## 2020-12-31 RX ORDER — INSULIN LISPRO 100/ML
VIAL (ML) SUBCUTANEOUS
Refills: 0 | Status: DISCONTINUED | OUTPATIENT
Start: 2020-12-31 | End: 2021-01-04

## 2020-12-31 RX ORDER — DEXTROSE 50 % IN WATER 50 %
12.5 SYRINGE (ML) INTRAVENOUS ONCE
Refills: 0 | Status: DISCONTINUED | OUTPATIENT
Start: 2020-12-31 | End: 2021-01-04

## 2020-12-31 RX ORDER — INSULIN LISPRO 100/ML
VIAL (ML) SUBCUTANEOUS
Refills: 0 | Status: DISCONTINUED | OUTPATIENT
Start: 2020-12-31 | End: 2020-12-31

## 2020-12-31 RX ORDER — DEXTROSE 50 % IN WATER 50 %
15 SYRINGE (ML) INTRAVENOUS ONCE
Refills: 0 | Status: DISCONTINUED | OUTPATIENT
Start: 2020-12-31 | End: 2021-01-04

## 2020-12-31 RX ADMIN — Medication 100 MILLIGRAM(S): at 13:47

## 2020-12-31 RX ADMIN — Medication 500 MILLIGRAM(S): at 11:36

## 2020-12-31 RX ADMIN — Medication 325 MILLIGRAM(S): at 11:36

## 2020-12-31 RX ADMIN — BUDESONIDE AND FORMOTEROL FUMARATE DIHYDRATE 2 PUFF(S): 160; 4.5 AEROSOL RESPIRATORY (INHALATION) at 21:38

## 2020-12-31 RX ADMIN — Medication 1000 UNIT(S): at 11:36

## 2020-12-31 RX ADMIN — Medication 100 MILLIGRAM(S): at 05:59

## 2020-12-31 RX ADMIN — Medication 6: at 11:37

## 2020-12-31 RX ADMIN — PANTOPRAZOLE SODIUM 40 MILLIGRAM(S): 20 TABLET, DELAYED RELEASE ORAL at 06:00

## 2020-12-31 RX ADMIN — INSULIN GLARGINE 30 UNIT(S): 100 INJECTION, SOLUTION SUBCUTANEOUS at 21:37

## 2020-12-31 RX ADMIN — Medication 100 MILLIGRAM(S): at 21:37

## 2020-12-31 RX ADMIN — Medication 12 UNIT(S): at 09:03

## 2020-12-31 RX ADMIN — GABAPENTIN 300 MILLIGRAM(S): 400 CAPSULE ORAL at 06:03

## 2020-12-31 RX ADMIN — Medication 12 UNIT(S): at 11:38

## 2020-12-31 RX ADMIN — MONTELUKAST 10 MILLIGRAM(S): 4 TABLET, CHEWABLE ORAL at 11:37

## 2020-12-31 RX ADMIN — Medication 1 TABLET(S): at 06:00

## 2020-12-31 RX ADMIN — LORATADINE 10 MILLIGRAM(S): 10 TABLET ORAL at 11:36

## 2020-12-31 RX ADMIN — ZINC SULFATE TAB 220 MG (50 MG ZINC EQUIVALENT) 220 MILLIGRAM(S): 220 (50 ZN) TAB at 11:36

## 2020-12-31 RX ADMIN — ENOXAPARIN SODIUM 40 MILLIGRAM(S): 100 INJECTION SUBCUTANEOUS at 06:00

## 2020-12-31 RX ADMIN — Medication 1 TABLET(S): at 21:37

## 2020-12-31 RX ADMIN — Medication 1 TABLET(S): at 11:37

## 2020-12-31 RX ADMIN — ENOXAPARIN SODIUM 40 MILLIGRAM(S): 100 INJECTION SUBCUTANEOUS at 17:12

## 2020-12-31 RX ADMIN — Medication 12 UNIT(S): at 17:12

## 2020-12-31 RX ADMIN — GABAPENTIN 300 MILLIGRAM(S): 400 CAPSULE ORAL at 17:14

## 2020-12-31 RX ADMIN — BUDESONIDE AND FORMOTEROL FUMARATE DIHYDRATE 2 PUFF(S): 160; 4.5 AEROSOL RESPIRATORY (INHALATION) at 09:04

## 2020-12-31 RX ADMIN — Medication 1 TABLET(S): at 13:47

## 2020-12-31 RX ADMIN — Medication 40 MILLIGRAM(S): at 05:59

## 2020-12-31 NOTE — PROGRESS NOTE ADULT - SUBJECTIVE AND OBJECTIVE BOX
HPI:  89 YO [place] Speaking female, with PMHx of asthma, HTN, DM presents to the ED c/o shortness of breath and diarrhea, fever, chills x 1 days.  Pt completed course of decadron.  Family has agreed for patient to come home when she is medically stable for discharge.  Pt is still COVID+ and requiring 2L via n/c.  Patient examined at bedside, resting comfortably, denies pain or NV.    OVERNIGHT EVENTS:  No new overnight events.     REVIEW OF SYSTEMS:      CONSTITUTIONAL: No fever,   EYES: no acute visual disturbances  NECK: No pain or stiffness  RESPIRATORY: No cough; No shortness of breath  CARDIOVASCULAR: No chest pain, no palpitations  GASTROINTESTINAL: No pain. No nausea, vomiting or diarrhea   NEUROLOGICAL: No headache or numbness, no tremors  MUSCULOSKELETAL: No joint pain, no muscle pain  GENITOURINARY: no dysuria, no frequency, no hesitancy  PSYCHIATRY: no depression , no anxiety  ALL OTHER  ROS negative        Vital Signs Last 24 Hrs  T(C): 36.7 (31 Dec 2020 16:17), Max: 36.7 (31 Dec 2020 16:17)  T(F): 98 (31 Dec 2020 16:17), Max: 98 (31 Dec 2020 16:17)  HR: 74 (31 Dec 2020 16:17) (74 - 88)  BP: 117/59 (31 Dec 2020 16:17) (117/59 - 147/58)  BP(mean): --  RR: 18 (31 Dec 2020 16:17) (18 - 18)  SpO2: 96% (31 Dec 2020 16:17) (94% - 99%)    ________________________________________________  PHYSICAL EXAM:    GENERAL: NAD  HEENT: Normocephalic; conjunctivae and sclerae clear; moist mucous membranes;   NECK : supple, no JVD  CHEST/LUNG: Clear to auscultation bilaterally   HEART: S1 S2  regular  ABDOMEN: Soft, Nontender, Nondistended; Bowel sounds present  EXTREMITIES: no cyanosis; no LE edema; no calf tenderness  SKIN: warm and dry; no rash  NERVOUS SYSTEM:  Alert & Oriented x3; no new deficits    _________________________________________________  CURRENT MEDICATIONS:    MEDICATIONS  (STANDING):  ascorbic acid 500 milliGRAM(s) Oral daily  benzonatate 100 milliGRAM(s) Oral every 8 hours  budesonide  80 MICROgram(s)/formoterol 4.5 MICROgram(s) Inhaler 2 Puff(s) Inhalation two times a day  cholecalciferol 1000 Unit(s) Oral daily  dextrose 40% Gel 15 Gram(s) Oral once  dextrose 5%. 1000 milliLiter(s) (100 mL/Hr) IV Continuous <Continuous>  dextrose 50% Injectable 25 Gram(s) IV Push once  dextrose 50% Injectable 12.5 Gram(s) IV Push once  dextrose 50% Injectable 25 Gram(s) IV Push once  enoxaparin Injectable 40 milliGRAM(s) SubCutaneous two times a day  ferrous    sulfate 325 milliGRAM(s) Oral daily  gabapentin 300 milliGRAM(s) Oral every 12 hours  glucagon  Injectable 1 milliGRAM(s) IntraMuscular once  insulin glargine Injectable (LANTUS) 30 Unit(s) SubCutaneous at bedtime  insulin lispro (ADMELOG) corrective regimen sliding scale   SubCutaneous at bedtime  insulin lispro Injectable (ADMELOG) 12 Unit(s) SubCutaneous three times a day before meals  loratadine 10 milliGRAM(s) Oral daily  montelukast 10 milliGRAM(s) Oral daily  multivitamin 1 Tablet(s) Oral daily  pantoprazole    Tablet 40 milliGRAM(s) Oral before breakfast  potassium phosphate / sodium phosphate Tablet (K-PHOS No. 2) 1 Tablet(s) Oral three times a day  predniSONE   Tablet 40 milliGRAM(s) Oral daily  zinc sulfate 220 milliGRAM(s) Oral daily    MEDICATIONS  (PRN):  acetaminophen   Tablet .. 650 milliGRAM(s) Oral every 6 hours PRN Temp greater or equal to 38C (100.4F), Mild Pain (1 - 3)  ALBUTerol    90 MICROgram(s) HFA Inhaler 2 Puff(s) Inhalation every 6 hours PRN Shortness of Breath and/or Wheezing      __________________________________________________  LABS:                CAPILLARY BLOOD GLUCOSE      POCT Blood Glucose.: 250 mg/dL (31 Dec 2020 16:59)  POCT Blood Glucose.: 479 mg/dL (31 Dec 2020 11:23)  POCT Blood Glucose.: 118 mg/dL (31 Dec 2020 08:28)  POCT Blood Glucose.: 210 mg/dL (30 Dec 2020 21:34)      __________________________________________________  RADIOLOGY & ADDITIONAL TESTS:    Imaging Personally Reviewed:  YES    < from: Xray Chest 1 View-PORTABLE IMMEDIATE (Xray Chest 1 View-PORTABLE IMMEDIATE .) (12.28.20 @ 11:25) >  No pleural effusion.    Heart size cannot be accurately assessed in this projection.    < end of copied text >    Consultant(s) Notes Reviewed:   YES     Plan of care was discussed with patient and /or primary care giver; all questions and concerns were addressed and care was aligned with patient's wishes.    Plan discussed with attending and consulting physicians.

## 2020-12-31 NOTE — PROGRESS NOTE ADULT - SUBJECTIVE AND OBJECTIVE BOX
[facility]   MD FAUSTO PALM MD RUORU WONG, PA    TEL:  OFFICE: [phone]  DR LEE CELL: [phone]  CHARLEY WILLIAM CELL: [phone]  DR. GARCIA CELL: [phone]  DR. MARTIN CELL: [phone]    FROM 5 PM - 7 AM PLEASE CALL ANSWERING SERVICE: [phone]    RENAL FOLLOW UP NOTE--Date of Service 12-31-20 @ 11:43  --------------------------------------------------------------------------------  HPI:      Pt covid 19+    PAST HISTORY  --------------------------------------------------------------------------------  No significant changes to PMH, PSH, FHx, SHx, unless otherwise noted    ALLERGIES & MEDICATIONS  --------------------------------------------------------------------------------  Allergies    No Known Allergies    Intolerances      Standing Inpatient Medications  ascorbic acid 500 milliGRAM(s) Oral daily  benzonatate 100 milliGRAM(s) Oral every 8 hours  budesonide  80 MICROgram(s)/formoterol 4.5 MICROgram(s) Inhaler 2 Puff(s) Inhalation two times a day  cholecalciferol 1000 Unit(s) Oral daily  dextrose 5%. 1000 milliLiter(s) IV Continuous <Continuous>  enoxaparin Injectable 40 milliGRAM(s) SubCutaneous two times a day  ferrous    sulfate 325 milliGRAM(s) Oral daily  gabapentin 300 milliGRAM(s) Oral every 12 hours  glucagon  Injectable 1 milliGRAM(s) IntraMuscular once  insulin glargine Injectable (LANTUS) 30 Unit(s) SubCutaneous at bedtime  insulin lispro (ADMELOG) corrective regimen sliding scale   SubCutaneous at bedtime  insulin lispro (ADMELOG) corrective regimen sliding scale   SubCutaneous three times a day before meals  insulin lispro Injectable (ADMELOG) 12 Unit(s) SubCutaneous three times a day before meals  loratadine 10 milliGRAM(s) Oral daily  montelukast 10 milliGRAM(s) Oral daily  multivitamin 1 Tablet(s) Oral daily  pantoprazole    Tablet 40 milliGRAM(s) Oral before breakfast  potassium phosphate / sodium phosphate Tablet (K-PHOS No. 2) 1 Tablet(s) Oral three times a day  predniSONE   Tablet 40 milliGRAM(s) Oral daily  zinc sulfate 220 milliGRAM(s) Oral daily    PRN Inpatient Medications  acetaminophen   Tablet .. 650 milliGRAM(s) Oral every 6 hours PRN  ALBUTerol    90 MICROgram(s) HFA Inhaler 2 Puff(s) Inhalation every 6 hours PRN      REVIEW OF SYSTEMS  --------------------------------------------------------------------------------  General: no fever    MSK: no edema     VITALS/PHYSICAL EXAM  --------------------------------------------------------------------------------  T(C): 36.3 (12-31-20 @ 09:04), Max: 36.8 (12-30-20 @ 15:44)  HR: 88 (12-31-20 @ 09:04) (77 - 88)  BP: 147/58 (12-31-20 @ 09:04) (123/56 - 147/58)  RR: 18 (12-31-20 @ 09:04) (18 - 18)  SpO2: 99% (12-31-20 @ 09:04) (94% - 99%)  Wt(kg): --          LABS/STUDIES  --------------------------------------------------------------------------------                Creatinine Trend:  SCr 1.09 [12-29 @ 06:26]  SCr 1.00 [12-28 @ 06:55]  SCr 1.00 [12-27 @ 06:39]  SCr 1.08 [12-25 @ 07:53]  SCr 1.25 [12-24 @ 10:39]        Iron 24, TIBC 264, %sat 9      [12-15-20 @ 07:46]  Ferritin 395      [12-23-20 @ 00:51]  Vitamin D (25OH) 36.3      [07-18-20 @ 11:35]  TSH 0.38      [12-15-20 @ 07:46]  Lipid: chol 139, , HDL 46, LDL --      [12-15-20 @ 07:46]      Immunofixation Serum:   No Monoclonal Band Identified    Reference Range: None Detected      [12-15-20 @ 11:01]  SPEP Interpretation: Normal Electrophoresis Pattern      [12-15-20 @ 11:01]

## 2020-12-31 NOTE — PROGRESS NOTE ADULT - SUBJECTIVE AND OBJECTIVE BOX
Interval Events:  pt in nad    Allergies    No Known Allergies    Intolerances      Endocrine/Metabolic Medications:  glucagon  Injectable 1 milliGRAM(s) IntraMuscular once  insulin glargine Injectable (LANTUS) 30 Unit(s) SubCutaneous at bedtime  insulin lispro (ADMELOG) corrective regimen sliding scale   SubCutaneous at bedtime  insulin lispro (ADMELOG) corrective regimen sliding scale   SubCutaneous three times a day before meals  insulin lispro Injectable (ADMELOG) 12 Unit(s) SubCutaneous three times a day before meals  predniSONE   Tablet 40 milliGRAM(s) Oral daily      Vital Signs Last 24 Hrs  T(C): 36.3 (31 Dec 2020 09:04), Max: 36.8 (30 Dec 2020 15:44)  T(F): 97.3 (31 Dec 2020 09:04), Max: 98.3 (30 Dec 2020 15:44)  HR: 88 (31 Dec 2020 09:04) (77 - 88)  BP: 147/58 (31 Dec 2020 09:04) (123/56 - 147/58)  BP(mean): --  RR: 18 (31 Dec 2020 09:04) (18 - 18)  SpO2: 99% (31 Dec 2020 09:04) (94% - 99%)      PHYSICAL EXAM  All physical exam findings normal, except those marked:  General:	Alert, active, cooperative, NAD, well hydrated  .		[] Abnormal:  Neck		Normal: supple, no cervical adenopathy, no palpable thyroid  .		[] Abnormal:  Cardiovascular	Normal: regular rate, normal S1, S2, no murmurs  .		[] Abnormal:  Respiratory	Normal: no chest wall deformity, normal respiratory pattern, CTA B/L  .		[] Abnormal:  Abdominal	Normal: soft, ND, NT, bowel sounds present, no masses, no organomegaly  .		[] Abnormal:  		Normal normal genitalia, testes descended, circumcised/uncircumcised  .		Uma stage:			Breast uma:  .		Menstrual history:  .		[] Abnormal:  Extremities	Normal: FROM x4  .		[] Abnormal:  Skin		Normal: intact and not indurated, no rash, no acanthosis nigricans  .		[] Abnormal:  Neurologic	Normal: grossly intact  .		[] Abnormal:    LABS        CAPILLARY BLOOD GLUCOSE      POCT Blood Glucose.: 479 mg/dL (31 Dec 2020 11:23)  POCT Blood Glucose.: 118 mg/dL (31 Dec 2020 08:28)  POCT Blood Glucose.: 210 mg/dL (30 Dec 2020 21:34)  POCT Blood Glucose.: 205 mg/dL (30 Dec 2020 17:15)        Assesment/plan    89 y/o female [place] Speaking, with PMHx of asthma, HTN, DM presents to the ED c/o shortness of breath and diarrhea, fever, chills x 1 days. Admitted for covid-19 related pneumonia.  Endocrinology was consulted for uncontrolled DM.     Problem/Plan - 1:  ·  Problem: Uncontrolled diabetes mellitus.  Plan: Hyperglycemia likely due to uncontrolled DM in addition to steroid   HbA1C;  7.2  now hyperglycemic   cont Lantus to 30 U HS  and pre meal Humalog to 12 u tid before meals  change correction doses to moderate scale for pre meals   Upon discharge Continue Novolog 70/30 40 U at am and 30 U at bedtime before meal. Discontinue oral hypoglycemic agents.   Monitor blood glucose.  fine tuning as out pt.  d/w hs/ np

## 2020-12-31 NOTE — PROGRESS NOTE ADULT - PROBLEM SELECTOR PLAN 1
12/28 CXR noted above  Satting well on 2L via n/c   COVID+ 12/26 and 12/29  Follow up repeat PCR 1/1

## 2020-12-31 NOTE — PROGRESS NOTE ADULT - PROBLEM SELECTOR PLAN 2
Asthma exacerbation likely secondary to COVID PNA  Improved lung sounds  Continue with bronchodilation  Dr. Templeton (Pulmonary) following

## 2020-12-31 NOTE — PROGRESS NOTE ADULT - PROBLEM SELECTOR PLAN 5
Uncontrolled as evidenced by A1c 7.2  Pt takes Novolog 70/30 40 U BID  at home  Increase to Mod sliding scale AC  Continue with sliding scale and standing dose insulin coverage  Continue with Lantus coverage QHS   Continue with glucose monitoring  Continue with diabetic/DASH diet    Per Dr. Britt (Endocrine) pt should be discharged on 70/30 insulin, 40 units in the morning and 30 units at home if she is on prednisone and adjust insulin out pt. No PO diabetic meds upon discharge given concern for hypoglycemia and TAMANNA.  Dr. Britt (Endocrine) following

## 2021-01-01 LAB
ANION GAP SERPL CALC-SCNC: 11 MMOL/L — SIGNIFICANT CHANGE UP (ref 5–17)
BUN SERPL-MCNC: 22 MG/DL — HIGH (ref 7–18)
CALCIUM SERPL-MCNC: 9.3 MG/DL — SIGNIFICANT CHANGE UP (ref 8.4–10.5)
CHLORIDE SERPL-SCNC: 103 MMOL/L — SIGNIFICANT CHANGE UP (ref 96–108)
CO2 SERPL-SCNC: 24 MMOL/L — SIGNIFICANT CHANGE UP (ref 22–31)
CREAT SERPL-MCNC: 1.01 MG/DL — SIGNIFICANT CHANGE UP (ref 0.5–1.3)
D DIMER BLD IA.RAPID-MCNC: 195 NG/ML DDU — SIGNIFICANT CHANGE UP
GLUCOSE BLDC GLUCOMTR-MCNC: 121 MG/DL — HIGH (ref 70–99)
GLUCOSE BLDC GLUCOMTR-MCNC: 132 MG/DL — HIGH (ref 70–99)
GLUCOSE BLDC GLUCOMTR-MCNC: 145 MG/DL — HIGH (ref 70–99)
GLUCOSE BLDC GLUCOMTR-MCNC: 224 MG/DL — HIGH (ref 70–99)
GLUCOSE SERPL-MCNC: 104 MG/DL — HIGH (ref 70–99)
HCT VFR BLD CALC: 41.7 % — SIGNIFICANT CHANGE UP (ref 34.5–45)
HGB BLD-MCNC: 13 G/DL — SIGNIFICANT CHANGE UP (ref 11.5–15.5)
MAGNESIUM SERPL-MCNC: 2 MG/DL — SIGNIFICANT CHANGE UP (ref 1.6–2.6)
MCHC RBC-ENTMCNC: 25.5 PG — LOW (ref 27–34)
MCHC RBC-ENTMCNC: 31.2 GM/DL — LOW (ref 32–36)
MCV RBC AUTO: 81.9 FL — SIGNIFICANT CHANGE UP (ref 80–100)
NRBC # BLD: 0 /100 WBCS — SIGNIFICANT CHANGE UP (ref 0–0)
PHOSPHATE SERPL-MCNC: 3.8 MG/DL — SIGNIFICANT CHANGE UP (ref 2.5–4.5)
PLATELET # BLD AUTO: 436 K/UL — HIGH (ref 150–400)
POTASSIUM SERPL-MCNC: 4.1 MMOL/L — SIGNIFICANT CHANGE UP (ref 3.5–5.3)
POTASSIUM SERPL-SCNC: 4.1 MMOL/L — SIGNIFICANT CHANGE UP (ref 3.5–5.3)
RBC # BLD: 5.09 M/UL — SIGNIFICANT CHANGE UP (ref 3.8–5.2)
RBC # FLD: 13.3 % — SIGNIFICANT CHANGE UP (ref 10.3–14.5)
SODIUM SERPL-SCNC: 138 MMOL/L — SIGNIFICANT CHANGE UP (ref 135–145)
WBC # BLD: 10.91 K/UL — HIGH (ref 3.8–10.5)
WBC # FLD AUTO: 10.91 K/UL — HIGH (ref 3.8–10.5)

## 2021-01-01 RX ADMIN — Medication 1 TABLET(S): at 21:17

## 2021-01-01 RX ADMIN — Medication 1 TABLET(S): at 05:35

## 2021-01-01 RX ADMIN — Medication 12 UNIT(S): at 12:10

## 2021-01-01 RX ADMIN — Medication 325 MILLIGRAM(S): at 12:11

## 2021-01-01 RX ADMIN — BUDESONIDE AND FORMOTEROL FUMARATE DIHYDRATE 2 PUFF(S): 160; 4.5 AEROSOL RESPIRATORY (INHALATION) at 21:17

## 2021-01-01 RX ADMIN — Medication 100 MILLIGRAM(S): at 05:35

## 2021-01-01 RX ADMIN — Medication 1 TABLET(S): at 12:11

## 2021-01-01 RX ADMIN — Medication 1000 UNIT(S): at 12:10

## 2021-01-01 RX ADMIN — Medication 1 TABLET(S): at 14:09

## 2021-01-01 RX ADMIN — Medication 4: at 12:10

## 2021-01-01 RX ADMIN — BUDESONIDE AND FORMOTEROL FUMARATE DIHYDRATE 2 PUFF(S): 160; 4.5 AEROSOL RESPIRATORY (INHALATION) at 09:05

## 2021-01-01 RX ADMIN — Medication 12 UNIT(S): at 16:32

## 2021-01-01 RX ADMIN — ZINC SULFATE TAB 220 MG (50 MG ZINC EQUIVALENT) 220 MILLIGRAM(S): 220 (50 ZN) TAB at 12:11

## 2021-01-01 RX ADMIN — GABAPENTIN 300 MILLIGRAM(S): 400 CAPSULE ORAL at 05:35

## 2021-01-01 RX ADMIN — Medication 100 MILLIGRAM(S): at 14:09

## 2021-01-01 RX ADMIN — Medication 500 MILLIGRAM(S): at 12:11

## 2021-01-01 RX ADMIN — Medication 40 MILLIGRAM(S): at 05:35

## 2021-01-01 RX ADMIN — PANTOPRAZOLE SODIUM 40 MILLIGRAM(S): 20 TABLET, DELAYED RELEASE ORAL at 05:35

## 2021-01-01 RX ADMIN — ENOXAPARIN SODIUM 40 MILLIGRAM(S): 100 INJECTION SUBCUTANEOUS at 17:21

## 2021-01-01 RX ADMIN — MONTELUKAST 10 MILLIGRAM(S): 4 TABLET, CHEWABLE ORAL at 12:11

## 2021-01-01 RX ADMIN — LORATADINE 10 MILLIGRAM(S): 10 TABLET ORAL at 12:11

## 2021-01-01 RX ADMIN — GABAPENTIN 300 MILLIGRAM(S): 400 CAPSULE ORAL at 17:24

## 2021-01-01 RX ADMIN — ENOXAPARIN SODIUM 40 MILLIGRAM(S): 100 INJECTION SUBCUTANEOUS at 05:34

## 2021-01-01 RX ADMIN — Medication 100 MILLIGRAM(S): at 21:17

## 2021-01-01 RX ADMIN — Medication 12 UNIT(S): at 08:56

## 2021-01-01 RX ADMIN — INSULIN GLARGINE 30 UNIT(S): 100 INJECTION, SOLUTION SUBCUTANEOUS at 21:17

## 2021-01-01 NOTE — PROGRESS NOTE ADULT - ATTENDING COMMENTS
Note not  saved in the system
Discharge planning pending Home Oxygen delivery
D/C planning home with home Oxygen.
Patient seen and examined.     T(C): 36.8 (12-30-20 @ 15:44), Max: 36.8 (12-30-20 @ 15:44)  HR: 84 (12-30-20 @ 15:44) (83 - 84)  BP: 123/56 (12-30-20 @ 15:44) (123/56 - 143/67)  RR: 18 (12-30-20 @ 15:44) (18 - 18)  SpO2: 96% (12-30-20 @ 15:44) (95% - 96%)    Reviewed labs and imaging.   Spoke to family at length agreed to take patient home.   D/C planning home with Home Oxygen am tomorrow.
Patient seen and examined.     T(C): 37.6 (12-16-20 @ 19:48), Max: 37.6 (12-16-20 @ 11:30)  HR: 80 (12-16-20 @ 19:48) (78 - 90)  BP: 112/50 (12-16-20 @ 19:48) (112/50 - 134/57)  RR: 19 (12-16-20 @ 19:48) (19 - 20)  SpO2: 99% (12-16-20 @ 19:48) (96% - 99%)      Covid pneumonia- still wheezing, continue with Decadron.   Pulmonary consult.
Patient with covid pneumonia, on steroid taper, Insulin for hyperglycemia.  Pulm following.
Patient seen and examined.   T(C): 37.6 (12-21-20 @ 20:15), Max: 37.6 (12-21-20 @ 20:15)  HR: 75 (12-21-20 @ 20:15) (73 - 76)  BP: 144/63 (12-21-20 @ 20:15) (120/73 - 144/63)  RR: 19 (12-21-20 @ 20:15) (19 - 20)  SpO2: 96% (12-21-20 @ 20:15) (96% - 100%)    COVID pneumonia   DM   Hyperglycemia     Endo consulted. Continue with Steroid taper. Nebs.   Pulm following.
D/C planning
D/C planning on Home Oxygen.
D/C planning on steroid taper
COVID pneumonia On Dexamethasone  Pulmonary consult called, follow up recs.   Hyperglycemia- Endo consult increase Lantus and pre meal
Patient seen and examined.     COVID pneumonia   Continue with Decadron, nebs.   D/C planning am tomorrow- Spoke to Patient s Son at length
Spoke to son Henrique at length today, agreeing to take patient home when medically ready.    Needs Home Oxygen, will discuss with CM.

## 2021-01-01 NOTE — PROGRESS NOTE ADULT - SUBJECTIVE AND OBJECTIVE BOX
Dr. Teixeira  Office [phone]  Cell [phone]  Vidhi YOON  Cell [phone]    RENAL PROGRESS NOTE: DATE OF SERVICE 01-01-21 @ 15:23    Patient is a 89y old  Female who presents with a chief complaint of Fever, chills, Covid positive (31 Dec 2020 17:25)      Patient seen and examined at bedside.      VITALS:  T(F): 97.8 (01-01-21 @ 08:06), Max: 98 (12-31-20 @ 16:17)  HR: 72 (01-01-21 @ 08:06)  BP: 150/59 (01-01-21 @ 08:06)  RR: 18 (01-01-21 @ 08:06)  SpO2: 96% (01-01-21 @ 08:06)  Wt(kg): --        Hospital Medications:   MEDICATIONS  (STANDING):  ascorbic acid 500 milliGRAM(s) Oral daily  benzonatate 100 milliGRAM(s) Oral every 8 hours  budesonide  80 MICROgram(s)/formoterol 4.5 MICROgram(s) Inhaler 2 Puff(s) Inhalation two times a day  cholecalciferol 1000 Unit(s) Oral daily  dextrose 40% Gel 15 Gram(s) Oral once  dextrose 5%. 1000 milliLiter(s) (100 mL/Hr) IV Continuous <Continuous>  dextrose 50% Injectable 25 Gram(s) IV Push once  dextrose 50% Injectable 12.5 Gram(s) IV Push once  dextrose 50% Injectable 25 Gram(s) IV Push once  enoxaparin Injectable 40 milliGRAM(s) SubCutaneous two times a day  ferrous    sulfate 325 milliGRAM(s) Oral daily  gabapentin 300 milliGRAM(s) Oral every 12 hours  glucagon  Injectable 1 milliGRAM(s) IntraMuscular once  insulin glargine Injectable (LANTUS) 30 Unit(s) SubCutaneous at bedtime  insulin lispro (ADMELOG) corrective regimen sliding scale   SubCutaneous at bedtime  insulin lispro (ADMELOG) corrective regimen sliding scale   SubCutaneous three times a day before meals  insulin lispro Injectable (ADMELOG) 12 Unit(s) SubCutaneous three times a day before meals  loratadine 10 milliGRAM(s) Oral daily  montelukast 10 milliGRAM(s) Oral daily  multivitamin 1 Tablet(s) Oral daily  pantoprazole    Tablet 40 milliGRAM(s) Oral before breakfast  potassium phosphate / sodium phosphate Tablet (K-PHOS No. 2) 1 Tablet(s) Oral three times a day  predniSONE   Tablet 40 milliGRAM(s) Oral daily  zinc sulfate 220 milliGRAM(s) Oral daily      LABS:  01-01    138  |  103  |  22<H>  ----------------------------<  104<H>  4.1   |  24  |  1.01    Ca    9.3      01 Jan 2021 06:17  Phos  3.8     01-01  Mg     2.0     01-01      Creatinine Trend: 1.01 <--, 1.09 <--, 1.00 <--, 1.00 <--    Phosphorus Level, Serum: 3.8 mg/dL (01-01 @ 06:17)                              13.0   10.91 )-----------( 436      ( 01 Jan 2021 06:17 )             41.7     Urine Studies:      Iron 24, TIBC 264, %sat 9      [12-15-20 @ 07:46]  Ferritin 395      [12-23-20 @ 00:51]  Vitamin D (25OH) 36.3      [07-18-20 @ 11:35]  TSH 0.38      [12-15-20 @ 07:46]  Lipid: chol 139, , HDL 46, LDL --      [12-15-20 @ 07:46]      Immunofixation Serum:   No Monoclonal Band Identified    Reference Range: None Detected      [12-15-20 @ 11:01]  SPEP Interpretation: Normal Electrophoresis Pattern      [12-15-20 @ 11:01]    RADIOLOGY & ADDITIONAL STUDIES:

## 2021-01-01 NOTE — PROGRESS NOTE ADULT - PROBLEM SELECTOR PLAN 1
12/28 CXR noted above  Satting well on 2L via n/c   COVID+ 12/26 and 12/29  D/C planning with home Oxygen

## 2021-01-01 NOTE — PROGRESS NOTE ADULT - SUBJECTIVE AND OBJECTIVE BOX
HPI:  89 YO Rivera Speaking female, with PMHx of asthma, HTN, DM presents to the ED c/o shortness of breath and diarrhea, fever, chills x 1 days.  Pt completed course of decadron.  Family has agreed for patient to come home when she is medically stable for discharge.  Pt is still COVID+ and requiring 2L via n/c.  Patient examined at bedside, resting comfortably, denies pain or NV.    OVERNIGHT EVENTS:  No new overnight events.     REVIEW OF SYSTEMS:      CONSTITUTIONAL: No fever,   EYES: no acute visual disturbances  NECK: No pain or stiffness  RESPIRATORY: No cough; No shortness of breath  CARDIOVASCULAR: No chest pain, no palpitations  GASTROINTESTINAL: No pain. No nausea, vomiting or diarrhea   NEUROLOGICAL: No headache or numbness, no tremors  MUSCULOSKELETAL: No joint pain, no muscle pain  GENITOURINARY: no dysuria, no frequency, no hesitancy  PSYCHIATRY: no depression , no anxiety  ALL OTHER  ROS negative      T(C): 36.4 (01-02-21 @ 16:36), Max: 36.4 (01-02-21 @ 16:36)  HR: 98 (01-02-21 @ 16:36) (98 - 98)  BP: 124/52 (01-02-21 @ 16:36) (124/52 - 124/52)  RR: 19 (01-02-21 @ 16:36) (19 - 19)  SpO2: 98% (01-02-21 @ 16:36) (98% - 98%)      MEDICATIONS  (STANDING):  ascorbic acid 500 milliGRAM(s) Oral daily  benzonatate 100 milliGRAM(s) Oral every 8 hours  budesonide  80 MICROgram(s)/formoterol 4.5 MICROgram(s) Inhaler 2 Puff(s) Inhalation two times a day  cholecalciferol 1000 Unit(s) Oral daily  dextrose 40% Gel 15 Gram(s) Oral once  dextrose 5%. 1000 milliLiter(s) (100 mL/Hr) IV Continuous <Continuous>  dextrose 50% Injectable 12.5 Gram(s) IV Push once  dextrose 50% Injectable 25 Gram(s) IV Push once  dextrose 50% Injectable 25 Gram(s) IV Push once  enoxaparin Injectable 40 milliGRAM(s) SubCutaneous two times a day  ferrous    sulfate 325 milliGRAM(s) Oral daily  gabapentin 300 milliGRAM(s) Oral every 12 hours  glucagon  Injectable 1 milliGRAM(s) IntraMuscular once  insulin glargine Injectable (LANTUS) 30 Unit(s) SubCutaneous at bedtime  insulin lispro (ADMELOG) corrective regimen sliding scale   SubCutaneous at bedtime  insulin lispro (ADMELOG) corrective regimen sliding scale   SubCutaneous three times a day before meals  insulin lispro Injectable (ADMELOG) 12 Unit(s) SubCutaneous three times a day before meals  loratadine 10 milliGRAM(s) Oral daily  montelukast 10 milliGRAM(s) Oral daily  multivitamin 1 Tablet(s) Oral daily  pantoprazole    Tablet 40 milliGRAM(s) Oral before breakfast  potassium phosphate / sodium phosphate Tablet (K-PHOS No. 2) 1 Tablet(s) Oral three times a day  predniSONE   Tablet 40 milliGRAM(s) Oral daily  zinc sulfate 220 milliGRAM(s) Oral daily    MEDICATIONS  (PRN):  acetaminophen   Tablet .. 650 milliGRAM(s) Oral every 6 hours PRN Temp greater or equal to 38C (100.4F), Mild Pain (1 - 3)  ALBUTerol    90 MICROgram(s) HFA Inhaler 2 Puff(s) Inhalation every 6 hours PRN Shortness of Breath and/or Wheezing  ________________________________________________  PHYSICAL EXAM:    GENERAL: NAD  HEENT: Normocephalic; conjunctivae and sclerae clear; moist mucous membranes;   NECK : supple, no JVD  CHEST/LUNG: Clear to auscultation bilaterally   HEART: S1 S2  regular  ABDOMEN: Soft, Nontender, Nondistended; Bowel sounds present  EXTREMITIES: no cyanosis; no LE edema; no calf tenderness  SKIN: warm and dry; no rash  NERVOUS SYSTEM:  Alert & Oriented x3; no new deficits    _________________________________________________  CURRENT MEDICATIONS:    MEDICATIONS  (STANDING):  ascorbic acid 500 milliGRAM(s) Oral daily  benzonatate 100 milliGRAM(s) Oral every 8 hours  budesonide  80 MICROgram(s)/formoterol 4.5 MICROgram(s) Inhaler 2 Puff(s) Inhalation two times a day  cholecalciferol 1000 Unit(s) Oral daily  dextrose 40% Gel 15 Gram(s) Oral once  dextrose 5%. 1000 milliLiter(s) (100 mL/Hr) IV Continuous <Continuous>  dextrose 50% Injectable 25 Gram(s) IV Push once  dextrose 50% Injectable 12.5 Gram(s) IV Push once  dextrose 50% Injectable 25 Gram(s) IV Push once  enoxaparin Injectable 40 milliGRAM(s) SubCutaneous two times a day  ferrous    sulfate 325 milliGRAM(s) Oral daily  gabapentin 300 milliGRAM(s) Oral every 12 hours  glucagon  Injectable 1 milliGRAM(s) IntraMuscular once  insulin glargine Injectable (LANTUS) 30 Unit(s) SubCutaneous at bedtime  insulin lispro (ADMELOG) corrective regimen sliding scale   SubCutaneous at bedtime  insulin lispro Injectable (ADMELOG) 12 Unit(s) SubCutaneous three times a day before meals  loratadine 10 milliGRAM(s) Oral daily  montelukast 10 milliGRAM(s) Oral daily  multivitamin 1 Tablet(s) Oral daily  pantoprazole    Tablet 40 milliGRAM(s) Oral before breakfast  potassium phosphate / sodium phosphate Tablet (K-PHOS No. 2) 1 Tablet(s) Oral three times a day  predniSONE   Tablet 40 milliGRAM(s) Oral daily  zinc sulfate 220 milliGRAM(s) Oral daily    MEDICATIONS  (PRN):  acetaminophen   Tablet .. 650 milliGRAM(s) Oral every 6 hours PRN Temp greater or equal to 38C (100.4F), Mild Pain (1 - 3)  ALBUTerol    90 MICROgram(s) HFA Inhaler 2 Puff(s) Inhalation every 6 hours PRN Shortness of Breath and/or Wheezing      __________________________________________________  LABS:                CAPILLARY BLOOD GLUCOSE      POCT Blood Glucose.: 250 mg/dL (31 Dec 2020 16:59)  POCT Blood Glucose.: 479 mg/dL (31 Dec 2020 11:23)  POCT Blood Glucose.: 118 mg/dL (31 Dec 2020 08:28)  POCT Blood Glucose.: 210 mg/dL (30 Dec 2020 21:34)      __________________________________________________  RADIOLOGY & ADDITIONAL TESTS:    Imaging Personally Reviewed:  YES    < from: Xray Chest 1 View-PORTABLE IMMEDIATE (Xray Chest 1 View-PORTABLE IMMEDIATE .) (12.28.20 @ 11:25) >  No pleural effusion.    Heart size cannot be accurately assessed in this projection.    < end of copied text >    Consultant(s) Notes Reviewed:   YES     Plan of care was discussed with patient and /or primary care giver; all questions and concerns were addressed and care was aligned with patient's wishes.    Plan discussed with attending and consulting physicians.

## 2021-01-02 LAB
ANION GAP SERPL CALC-SCNC: 9 MMOL/L — SIGNIFICANT CHANGE UP (ref 5–17)
BUN SERPL-MCNC: 20 MG/DL — HIGH (ref 7–18)
CALCIUM SERPL-MCNC: 9.1 MG/DL — SIGNIFICANT CHANGE UP (ref 8.4–10.5)
CHLORIDE SERPL-SCNC: 104 MMOL/L — SIGNIFICANT CHANGE UP (ref 96–108)
CO2 SERPL-SCNC: 26 MMOL/L — SIGNIFICANT CHANGE UP (ref 22–31)
CREAT SERPL-MCNC: 0.96 MG/DL — SIGNIFICANT CHANGE UP (ref 0.5–1.3)
GLUCOSE BLDC GLUCOMTR-MCNC: 117 MG/DL — HIGH (ref 70–99)
GLUCOSE BLDC GLUCOMTR-MCNC: 169 MG/DL — HIGH (ref 70–99)
GLUCOSE BLDC GLUCOMTR-MCNC: 241 MG/DL — HIGH (ref 70–99)
GLUCOSE BLDC GLUCOMTR-MCNC: 251 MG/DL — HIGH (ref 70–99)
GLUCOSE SERPL-MCNC: 91 MG/DL — SIGNIFICANT CHANGE UP (ref 70–99)
HCT VFR BLD CALC: 41.7 % — SIGNIFICANT CHANGE UP (ref 34.5–45)
HGB BLD-MCNC: 13.2 G/DL — SIGNIFICANT CHANGE UP (ref 11.5–15.5)
MAGNESIUM SERPL-MCNC: 2.1 MG/DL — SIGNIFICANT CHANGE UP (ref 1.6–2.6)
MCHC RBC-ENTMCNC: 26.1 PG — LOW (ref 27–34)
MCHC RBC-ENTMCNC: 31.7 GM/DL — LOW (ref 32–36)
MCV RBC AUTO: 82.4 FL — SIGNIFICANT CHANGE UP (ref 80–100)
NRBC # BLD: 0 /100 WBCS — SIGNIFICANT CHANGE UP (ref 0–0)
PHOSPHATE SERPL-MCNC: 4.6 MG/DL — HIGH (ref 2.5–4.5)
PLATELET # BLD AUTO: 381 K/UL — SIGNIFICANT CHANGE UP (ref 150–400)
POTASSIUM SERPL-MCNC: 4 MMOL/L — SIGNIFICANT CHANGE UP (ref 3.5–5.3)
POTASSIUM SERPL-SCNC: 4 MMOL/L — SIGNIFICANT CHANGE UP (ref 3.5–5.3)
RBC # BLD: 5.06 M/UL — SIGNIFICANT CHANGE UP (ref 3.8–5.2)
RBC # FLD: 13.6 % — SIGNIFICANT CHANGE UP (ref 10.3–14.5)
SODIUM SERPL-SCNC: 139 MMOL/L — SIGNIFICANT CHANGE UP (ref 135–145)
WBC # BLD: 9.25 K/UL — SIGNIFICANT CHANGE UP (ref 3.8–10.5)
WBC # FLD AUTO: 9.25 K/UL — SIGNIFICANT CHANGE UP (ref 3.8–10.5)

## 2021-01-02 RX ADMIN — BUDESONIDE AND FORMOTEROL FUMARATE DIHYDRATE 2 PUFF(S): 160; 4.5 AEROSOL RESPIRATORY (INHALATION) at 21:31

## 2021-01-02 RX ADMIN — BUDESONIDE AND FORMOTEROL FUMARATE DIHYDRATE 2 PUFF(S): 160; 4.5 AEROSOL RESPIRATORY (INHALATION) at 10:16

## 2021-01-02 RX ADMIN — MONTELUKAST 10 MILLIGRAM(S): 4 TABLET, CHEWABLE ORAL at 11:11

## 2021-01-02 RX ADMIN — Medication 1 TABLET(S): at 05:40

## 2021-01-02 RX ADMIN — Medication 1: at 21:31

## 2021-01-02 RX ADMIN — Medication 100 MILLIGRAM(S): at 21:31

## 2021-01-02 RX ADMIN — Medication 1 TABLET(S): at 11:11

## 2021-01-02 RX ADMIN — ENOXAPARIN SODIUM 40 MILLIGRAM(S): 100 INJECTION SUBCUTANEOUS at 05:40

## 2021-01-02 RX ADMIN — PANTOPRAZOLE SODIUM 40 MILLIGRAM(S): 20 TABLET, DELAYED RELEASE ORAL at 05:51

## 2021-01-02 RX ADMIN — GABAPENTIN 300 MILLIGRAM(S): 400 CAPSULE ORAL at 17:00

## 2021-01-02 RX ADMIN — ENOXAPARIN SODIUM 40 MILLIGRAM(S): 100 INJECTION SUBCUTANEOUS at 17:22

## 2021-01-02 RX ADMIN — Medication 2: at 17:01

## 2021-01-02 RX ADMIN — INSULIN GLARGINE 30 UNIT(S): 100 INJECTION, SOLUTION SUBCUTANEOUS at 21:32

## 2021-01-02 RX ADMIN — Medication 4: at 11:30

## 2021-01-02 RX ADMIN — Medication 40 MILLIGRAM(S): at 05:40

## 2021-01-02 RX ADMIN — Medication 12 UNIT(S): at 11:30

## 2021-01-02 RX ADMIN — Medication 12 UNIT(S): at 08:28

## 2021-01-02 RX ADMIN — Medication 100 MILLIGRAM(S): at 05:40

## 2021-01-02 RX ADMIN — Medication 12 UNIT(S): at 17:00

## 2021-01-02 RX ADMIN — Medication 1 TABLET(S): at 21:31

## 2021-01-02 RX ADMIN — ZINC SULFATE TAB 220 MG (50 MG ZINC EQUIVALENT) 220 MILLIGRAM(S): 220 (50 ZN) TAB at 11:11

## 2021-01-02 RX ADMIN — GABAPENTIN 300 MILLIGRAM(S): 400 CAPSULE ORAL at 05:40

## 2021-01-02 NOTE — PROGRESS NOTE ADULT - SUBJECTIVE AND OBJECTIVE BOX
Interval Events:  pt in nad    Allergies    No Known Allergies    Intolerances      Endocrine/Metabolic Medications:  dextrose 40% Gel 15 Gram(s) Oral once  dextrose 50% Injectable 25 Gram(s) IV Push once  dextrose 50% Injectable 12.5 Gram(s) IV Push once  dextrose 50% Injectable 25 Gram(s) IV Push once  glucagon  Injectable 1 milliGRAM(s) IntraMuscular once  insulin glargine Injectable (LANTUS) 30 Unit(s) SubCutaneous at bedtime  insulin lispro (ADMELOG) corrective regimen sliding scale   SubCutaneous three times a day before meals  insulin lispro (ADMELOG) corrective regimen sliding scale   SubCutaneous at bedtime  insulin lispro Injectable (ADMELOG) 12 Unit(s) SubCutaneous three times a day before meals  predniSONE   Tablet 40 milliGRAM(s) Oral daily      Vital Signs Last 24 Hrs  T(C): 36.4 (02 Jan 2021 07:26), Max: 37 (01 Jan 2021 15:36)  T(F): 97.5 (02 Jan 2021 07:26), Max: 98.6 (01 Jan 2021 15:36)  HR: 84 (02 Jan 2021 07:26) (73 - 85)  BP: 146/60 (02 Jan 2021 07:26) (113/53 - 146/60)  BP(mean): --  RR: 18 (02 Jan 2021 07:26) (18 - 18)  SpO2: 97% (02 Jan 2021 07:26) (95% - 100%)      PHYSICAL EXAM  All physical exam findings normal, except those marked:  General:	Alert, active, cooperative, NAD, well hydrated  .		[] Abnormal:  Neck		Normal: supple, no cervical adenopathy, no palpable thyroid  .		[] Abnormal:  Cardiovascular	Normal: regular rate, normal S1, S2, no murmurs  .		[] Abnormal:  Respiratory	Normal: no chest wall deformity, normal respiratory pattern, CTA B/L  .		[] Abnormal:  Abdominal	Normal: soft, ND, NT, bowel sounds present, no masses, no organomegaly  .		[] Abnormal:  		Normal normal genitalia, testes descended, circumcised/uncircumcised  .		Uma stage:			Breast uma:  .		Menstrual history:  .		[] Abnormal:  Extremities	Normal: FROM x4  .		[] Abnormal:  Skin		Normal: intact and not indurated, no rash, no acanthosis nigricans  .		[] Abnormal:  Neurologic	Normal: grossly intact  .		[] Abnormal:    LABS                        13.2   9.25  )-----------( 381      ( 02 Jan 2021 07:27 )             41.7                               139    |  104    |  20                  Calcium: 9.1   / iCa: x      (01-02 @ 07:27)    ----------------------------<  91        Magnesium: 2.1                              4.0     |  26     |  0.96             Phosphorous: 4.6        CAPILLARY BLOOD GLUCOSE      POCT Blood Glucose.: 241 mg/dL (02 Jan 2021 11:27)  POCT Blood Glucose.: 117 mg/dL (02 Jan 2021 08:26)  POCT Blood Glucose.: 121 mg/dL (01 Jan 2021 21:13)  POCT Blood Glucose.: 145 mg/dL (01 Jan 2021 16:06)        Assesment/plan    89 y/o female [place] Speaking, with PMHx of asthma, HTN, DM presents to the ED c/o shortness of breath and diarrhea, fever, chills x 1 days. Admitted for covid-19 related pneumonia.  Endocrinology was consulted for uncontrolled DM.     Problem/Plan - 1:  ·  Problem: Uncontrolled diabetes mellitus.  Plan: Hyperglycemia likely due to uncontrolled DM in addition to steroid   HbA1C;  7.2  now hyperglycemic   cont Lantus to 30 U HS  and pre meal Humalog to 12 u tid before meals  change correction doses to moderate scale for pre meals   Upon discharge Continue Novolog 70/30 40 U at am and 30 U at bedtime before meal. Discontinue oral hypoglycemic agents.   Monitor blood glucose.  fine tuning as out pt.  d/w hs/ np

## 2021-01-02 NOTE — PROGRESS NOTE ADULT - SUBJECTIVE AND OBJECTIVE BOX
Dr. Teixeira  Office [phone]  Cell [phone]  Vidhi YOON  Cell [phone]    RENAL PROGRESS NOTE: DATE OF SERVICE 01-02-21 @ 15:38    Patient is a 89y old  Female who presents with a chief complaint of Fever, chills, Covid positive (02 Jan 2021 11:59)      Patient seen and examined at bedside. No chest pain/sob    VITALS:  T(F): 97.5 (01-02-21 @ 07:26), Max: 97.5 (01-02-21 @ 00:27)  HR: 84 (01-02-21 @ 07:26)  BP: 146/60 (01-02-21 @ 07:26)  RR: 18 (01-02-21 @ 07:26)  SpO2: 97% (01-02-21 @ 07:26)  Wt(kg): --        PHYSICAL EXAM:  Constitutional: NAD  Neck: No JVD  Respiratory: CTAB, no wheezes, rales or rhonchi  Cardiovascular: S1, S2, RRR  Gastrointestinal: BS+, soft, NT/ND  Extremities: No peripheral edema    Hospital Medications:   MEDICATIONS  (STANDING):  ascorbic acid 500 milliGRAM(s) Oral daily  benzonatate 100 milliGRAM(s) Oral every 8 hours  budesonide  80 MICROgram(s)/formoterol 4.5 MICROgram(s) Inhaler 2 Puff(s) Inhalation two times a day  cholecalciferol 1000 Unit(s) Oral daily  dextrose 40% Gel 15 Gram(s) Oral once  dextrose 5%. 1000 milliLiter(s) (100 mL/Hr) IV Continuous <Continuous>  dextrose 50% Injectable 25 Gram(s) IV Push once  dextrose 50% Injectable 12.5 Gram(s) IV Push once  dextrose 50% Injectable 25 Gram(s) IV Push once  enoxaparin Injectable 40 milliGRAM(s) SubCutaneous two times a day  ferrous    sulfate 325 milliGRAM(s) Oral daily  gabapentin 300 milliGRAM(s) Oral every 12 hours  glucagon  Injectable 1 milliGRAM(s) IntraMuscular once  insulin glargine Injectable (LANTUS) 30 Unit(s) SubCutaneous at bedtime  insulin lispro (ADMELOG) corrective regimen sliding scale   SubCutaneous three times a day before meals  insulin lispro (ADMELOG) corrective regimen sliding scale   SubCutaneous at bedtime  insulin lispro Injectable (ADMELOG) 12 Unit(s) SubCutaneous three times a day before meals  loratadine 10 milliGRAM(s) Oral daily  montelukast 10 milliGRAM(s) Oral daily  multivitamin 1 Tablet(s) Oral daily  pantoprazole    Tablet 40 milliGRAM(s) Oral before breakfast  potassium phosphate / sodium phosphate Tablet (K-PHOS No. 2) 1 Tablet(s) Oral three times a day  predniSONE   Tablet 40 milliGRAM(s) Oral daily  zinc sulfate 220 milliGRAM(s) Oral daily      LABS:  01-02    139  |  104  |  20<H>  ----------------------------<  91  4.0   |  26  |  0.96    Ca    9.1      02 Jan 2021 07:27  Phos  4.6     01-02  Mg     2.1     01-02      Creatinine Trend: 0.96 <--, 1.01 <--, 1.09 <--, 1.00 <--, 1.00 <--    Phosphorus Level, Serum: 4.6 mg/dL (01-02 @ 07:27)                              13.2   9.25  )-----------( 381      ( 02 Jan 2021 07:27 )             41.7     Urine Studies:      Iron 24, TIBC 264, %sat 9      [12-15-20 @ 07:46]  Ferritin 395      [12-23-20 @ 00:51]  Vitamin D (25OH) 36.3      [07-18-20 @ 11:35]  TSH 0.38      [12-15-20 @ 07:46]  Lipid: chol 139, , HDL 46, LDL --      [12-15-20 @ 07:46]      Immunofixation Serum:   No Monoclonal Band Identified    Reference Range: None Detected      [12-15-20 @ 11:01]  SPEP Interpretation: Normal Electrophoresis Pattern      [12-15-20 @ 11:01]    RADIOLOGY & ADDITIONAL STUDIES:

## 2021-01-03 LAB
ANION GAP SERPL CALC-SCNC: 11 MMOL/L — SIGNIFICANT CHANGE UP (ref 5–17)
BUN SERPL-MCNC: 20 MG/DL — HIGH (ref 7–18)
CALCIUM SERPL-MCNC: 9.1 MG/DL — SIGNIFICANT CHANGE UP (ref 8.4–10.5)
CHLORIDE SERPL-SCNC: 103 MMOL/L — SIGNIFICANT CHANGE UP (ref 96–108)
CO2 SERPL-SCNC: 25 MMOL/L — SIGNIFICANT CHANGE UP (ref 22–31)
CREAT SERPL-MCNC: 0.89 MG/DL — SIGNIFICANT CHANGE UP (ref 0.5–1.3)
GLUCOSE BLDC GLUCOMTR-MCNC: 122 MG/DL — HIGH (ref 70–99)
GLUCOSE BLDC GLUCOMTR-MCNC: 181 MG/DL — HIGH (ref 70–99)
GLUCOSE BLDC GLUCOMTR-MCNC: 237 MG/DL — HIGH (ref 70–99)
GLUCOSE BLDC GLUCOMTR-MCNC: 243 MG/DL — HIGH (ref 70–99)
GLUCOSE SERPL-MCNC: 68 MG/DL — LOW (ref 70–99)
HCT VFR BLD CALC: 41 % — SIGNIFICANT CHANGE UP (ref 34.5–45)
HGB BLD-MCNC: 12.9 G/DL — SIGNIFICANT CHANGE UP (ref 11.5–15.5)
MAGNESIUM SERPL-MCNC: 2.1 MG/DL — SIGNIFICANT CHANGE UP (ref 1.6–2.6)
MCHC RBC-ENTMCNC: 26.1 PG — LOW (ref 27–34)
MCHC RBC-ENTMCNC: 31.5 GM/DL — LOW (ref 32–36)
MCV RBC AUTO: 83 FL — SIGNIFICANT CHANGE UP (ref 80–100)
NRBC # BLD: 0 /100 WBCS — SIGNIFICANT CHANGE UP (ref 0–0)
PHOSPHATE SERPL-MCNC: 3.9 MG/DL — SIGNIFICANT CHANGE UP (ref 2.5–4.5)
PLATELET # BLD AUTO: 352 K/UL — SIGNIFICANT CHANGE UP (ref 150–400)
POTASSIUM SERPL-MCNC: 3.9 MMOL/L — SIGNIFICANT CHANGE UP (ref 3.5–5.3)
POTASSIUM SERPL-SCNC: 3.9 MMOL/L — SIGNIFICANT CHANGE UP (ref 3.5–5.3)
RBC # BLD: 4.94 M/UL — SIGNIFICANT CHANGE UP (ref 3.8–5.2)
RBC # FLD: 13.5 % — SIGNIFICANT CHANGE UP (ref 10.3–14.5)
SODIUM SERPL-SCNC: 139 MMOL/L — SIGNIFICANT CHANGE UP (ref 135–145)
WBC # BLD: 11.1 K/UL — HIGH (ref 3.8–10.5)
WBC # FLD AUTO: 11.1 K/UL — HIGH (ref 3.8–10.5)

## 2021-01-03 RX ORDER — MULTIVIT WITH MIN/MFOLATE/K2 340-15/3 G
1 POWDER (GRAM) ORAL ONCE
Refills: 0 | Status: COMPLETED | OUTPATIENT
Start: 2021-01-03 | End: 2021-01-03

## 2021-01-03 RX ADMIN — Medication 325 MILLIGRAM(S): at 12:15

## 2021-01-03 RX ADMIN — Medication 100 MILLIGRAM(S): at 05:44

## 2021-01-03 RX ADMIN — BUDESONIDE AND FORMOTEROL FUMARATE DIHYDRATE 2 PUFF(S): 160; 4.5 AEROSOL RESPIRATORY (INHALATION) at 21:31

## 2021-01-03 RX ADMIN — ZINC SULFATE TAB 220 MG (50 MG ZINC EQUIVALENT) 220 MILLIGRAM(S): 220 (50 ZN) TAB at 12:15

## 2021-01-03 RX ADMIN — Medication 500 MILLIGRAM(S): at 12:14

## 2021-01-03 RX ADMIN — Medication 100 MILLIGRAM(S): at 21:30

## 2021-01-03 RX ADMIN — Medication 1 TABLET(S): at 21:30

## 2021-01-03 RX ADMIN — Medication 12 UNIT(S): at 12:15

## 2021-01-03 RX ADMIN — GABAPENTIN 300 MILLIGRAM(S): 400 CAPSULE ORAL at 05:44

## 2021-01-03 RX ADMIN — Medication 1 BOTTLE: at 17:06

## 2021-01-03 RX ADMIN — MONTELUKAST 10 MILLIGRAM(S): 4 TABLET, CHEWABLE ORAL at 12:15

## 2021-01-03 RX ADMIN — Medication 4: at 12:15

## 2021-01-03 RX ADMIN — Medication 100 MILLIGRAM(S): at 14:04

## 2021-01-03 RX ADMIN — Medication 1000 UNIT(S): at 12:14

## 2021-01-03 RX ADMIN — LORATADINE 10 MILLIGRAM(S): 10 TABLET ORAL at 12:15

## 2021-01-03 RX ADMIN — Medication 12 UNIT(S): at 17:05

## 2021-01-03 RX ADMIN — Medication 12 UNIT(S): at 08:34

## 2021-01-03 RX ADMIN — ENOXAPARIN SODIUM 40 MILLIGRAM(S): 100 INJECTION SUBCUTANEOUS at 17:05

## 2021-01-03 RX ADMIN — GABAPENTIN 300 MILLIGRAM(S): 400 CAPSULE ORAL at 17:06

## 2021-01-03 RX ADMIN — INSULIN GLARGINE 30 UNIT(S): 100 INJECTION, SOLUTION SUBCUTANEOUS at 21:31

## 2021-01-03 RX ADMIN — Medication 4: at 17:05

## 2021-01-03 RX ADMIN — ENOXAPARIN SODIUM 40 MILLIGRAM(S): 100 INJECTION SUBCUTANEOUS at 05:44

## 2021-01-03 RX ADMIN — Medication 1 TABLET(S): at 05:44

## 2021-01-03 RX ADMIN — BUDESONIDE AND FORMOTEROL FUMARATE DIHYDRATE 2 PUFF(S): 160; 4.5 AEROSOL RESPIRATORY (INHALATION) at 09:05

## 2021-01-03 RX ADMIN — Medication 40 MILLIGRAM(S): at 05:44

## 2021-01-03 RX ADMIN — Medication 1 TABLET(S): at 12:14

## 2021-01-03 RX ADMIN — Medication 1 TABLET(S): at 14:04

## 2021-01-03 RX ADMIN — PANTOPRAZOLE SODIUM 40 MILLIGRAM(S): 20 TABLET, DELAYED RELEASE ORAL at 05:44

## 2021-01-03 NOTE — PROGRESS NOTE ADULT - SUBJECTIVE AND OBJECTIVE BOX
HPI:  89 YO [place] Speaking female, with PMHx of asthma, HTN, DM presents to the ED c/o shortness of breath and diarrhea, fever, chills x 1 days.  Pt completed course of decadron.  Family has agreed for patient to come home when she is medically stable for discharge.  Pt is still COVID+ and requiring 2L via n/c.  Patient examined at bedside, resting comfortably, denies pain or NV.    OVERNIGHT EVENTS:  No new overnight events.     REVIEW OF SYSTEMS:      CONSTITUTIONAL: No fever,   EYES: no acute visual disturbances  NECK: No pain or stiffness  RESPIRATORY: No cough; No shortness of breath  CARDIOVASCULAR: No chest pain, no palpitations  GASTROINTESTINAL: No pain. No nausea, vomiting or diarrhea   NEUROLOGICAL: No headache or numbness, no tremors  MUSCULOSKELETAL: No joint pain, no muscle pain  GENITOURINARY: no dysuria, no frequency, no hesitancy  PSYCHIATRY: no depression , no anxiety  ALL OTHER  ROS negative      T(C): 36.4 (01-03-21 @ 16:23), Max: 36.4 (01-03-21 @ 16:23)  HR: 79 (01-03-21 @ 16:23) (79 - 79)  BP: 108/53 (01-03-21 @ 16:23) (108/53 - 108/53)  RR: 19 (01-03-21 @ 16:23) (19 - 19)  SpO2: 98% (01-03-21 @ 16:23) (98% - 98%)      MEDICATIONS  (STANDING):  ascorbic acid 500 milliGRAM(s) Oral daily  benzonatate 100 milliGRAM(s) Oral every 8 hours  budesonide  80 MICROgram(s)/formoterol 4.5 MICROgram(s) Inhaler 2 Puff(s) Inhalation two times a day  cholecalciferol 1000 Unit(s) Oral daily  dextrose 40% Gel 15 Gram(s) Oral once  dextrose 5%. 1000 milliLiter(s) (100 mL/Hr) IV Continuous <Continuous>  dextrose 50% Injectable 25 Gram(s) IV Push once  dextrose 50% Injectable 12.5 Gram(s) IV Push once  dextrose 50% Injectable 25 Gram(s) IV Push once  enoxaparin Injectable 40 milliGRAM(s) SubCutaneous two times a day  ferrous    sulfate 325 milliGRAM(s) Oral daily  gabapentin 300 milliGRAM(s) Oral every 12 hours  glucagon  Injectable 1 milliGRAM(s) IntraMuscular once  insulin glargine Injectable (LANTUS) 30 Unit(s) SubCutaneous at bedtime  insulin lispro (ADMELOG) corrective regimen sliding scale   SubCutaneous three times a day before meals  insulin lispro (ADMELOG) corrective regimen sliding scale   SubCutaneous at bedtime  insulin lispro Injectable (ADMELOG) 12 Unit(s) SubCutaneous three times a day before meals  loratadine 10 milliGRAM(s) Oral daily  montelukast 10 milliGRAM(s) Oral daily  multivitamin 1 Tablet(s) Oral daily  pantoprazole    Tablet 40 milliGRAM(s) Oral before breakfast  potassium phosphate / sodium phosphate Tablet (K-PHOS No. 2) 1 Tablet(s) Oral three times a day  predniSONE   Tablet 40 milliGRAM(s) Oral daily  zinc sulfate 220 milliGRAM(s) Oral daily    MEDICATIONS  (PRN):  acetaminophen   Tablet .. 650 milliGRAM(s) Oral every 6 hours PRN Temp greater or equal to 38C (100.4F), Mild Pain (1 - 3)  ALBUTerol    90 MICROgram(s) HFA Inhaler 2 Puff(s) Inhalation every 6 hours PRN Shortness of Breath and/or Wheezing    ________________________________________________  PHYSICAL EXAM:    GENERAL: NAD  HEENT: Normocephalic; conjunctivae and sclerae clear; moist mucous membranes;   NECK : supple, no JVD  CHEST/LUNG: Clear to auscultation bilaterally   HEART: S1 S2  regular  ABDOMEN: Soft, Nontender, Nondistended; Bowel sounds present  EXTREMITIES: no cyanosis; no LE edema; no calf tenderness  SKIN: warm and dry; no rash  NERVOUS SYSTEM:  Alert & Oriented x3; no new deficits    _________________________________________________  CURRENT MEDICATIONS:    MEDICATIONS  (STANDING):  ascorbic acid 500 milliGRAM(s) Oral daily  benzonatate 100 milliGRAM(s) Oral every 8 hours  budesonide  80 MICROgram(s)/formoterol 4.5 MICROgram(s) Inhaler 2 Puff(s) Inhalation two times a day  cholecalciferol 1000 Unit(s) Oral daily  dextrose 40% Gel 15 Gram(s) Oral once  dextrose 5%. 1000 milliLiter(s) (100 mL/Hr) IV Continuous <Continuous>  dextrose 50% Injectable 25 Gram(s) IV Push once  dextrose 50% Injectable 12.5 Gram(s) IV Push once  dextrose 50% Injectable 25 Gram(s) IV Push once  enoxaparin Injectable 40 milliGRAM(s) SubCutaneous two times a day  ferrous    sulfate 325 milliGRAM(s) Oral daily  gabapentin 300 milliGRAM(s) Oral every 12 hours  glucagon  Injectable 1 milliGRAM(s) IntraMuscular once  insulin glargine Injectable (LANTUS) 30 Unit(s) SubCutaneous at bedtime  insulin lispro (ADMELOG) corrective regimen sliding scale   SubCutaneous at bedtime  insulin lispro Injectable (ADMELOG) 12 Unit(s) SubCutaneous three times a day before meals  loratadine 10 milliGRAM(s) Oral daily  montelukast 10 milliGRAM(s) Oral daily  multivitamin 1 Tablet(s) Oral daily  pantoprazole    Tablet 40 milliGRAM(s) Oral before breakfast  potassium phosphate / sodium phosphate Tablet (K-PHOS No. 2) 1 Tablet(s) Oral three times a day  predniSONE   Tablet 40 milliGRAM(s) Oral daily  zinc sulfate 220 milliGRAM(s) Oral daily    MEDICATIONS  (PRN):  acetaminophen   Tablet .. 650 milliGRAM(s) Oral every 6 hours PRN Temp greater or equal to 38C (100.4F), Mild Pain (1 - 3)  ALBUTerol    90 MICROgram(s) HFA Inhaler 2 Puff(s) Inhalation every 6 hours PRN Shortness of Breath and/or Wheezing      __________________________________________________  LABS:            CAPILLARY BLOOD GLUCOSE      POCT Blood Glucose.: 181 mg/dL (03 Jan 2021 20:58)  POCT Blood Glucose.: 237 mg/dL (03 Jan 2021 16:50)  POCT Blood Glucose.: 243 mg/dL (03 Jan 2021 12:01)  POCT Blood Glucose.: 122 mg/dL (03 Jan 2021 08:25)    __________________________________________________  RADIOLOGY & ADDITIONAL TESTS:    Imaging Personally Reviewed:  YES    < from: Xray Chest 1 View-PORTABLE IMMEDIATE (Xray Chest 1 View-PORTABLE IMMEDIATE .) (12.28.20 @ 11:25) >  No pleural effusion.    Heart size cannot be accurately assessed in this projection.    < end of copied text >    Consultant(s) Notes Reviewed:   YES     Plan of care was discussed with patient and /or primary care giver; all questions and concerns were addressed and care was aligned with patient's wishes.    Plan discussed with attending and consulting physicians.

## 2021-01-03 NOTE — PROGRESS NOTE ADULT - SUBJECTIVE AND OBJECTIVE BOX
Dr. Teixeira  Office [phone]  Cell [phone]  Vidhi YOON  Cell [phone]    RENAL PROGRESS NOTE: DATE OF SERVICE 01-03-21 @ 13:10    Patient is a 89y old  Female who presents with a chief complaint of Fever, chills, Covid positive (02 Jan 2021 16:31)      Patient seen and examined at bedside. No chest pain/sob    VITALS:  T(F): 98.2 (01-03-21 @ 07:20), Max: 98.2 (01-03-21 @ 07:20)  HR: 77 (01-03-21 @ 07:20)  BP: 125/48 (01-03-21 @ 07:20)  RR: 19 (01-03-21 @ 07:20)  SpO2: 97% (01-03-21 @ 07:20)  Wt(kg): --        PHYSICAL EXAM:  Constitutional: NAD  Neck: No JVD  Respiratory: CTAB, no wheezes, rales or rhonchi  Cardiovascular: S1, S2, RRR  Gastrointestinal: BS+, soft, NT/ND  Extremities: No peripheral edema    Hospital Medications:   MEDICATIONS  (STANDING):  ascorbic acid 500 milliGRAM(s) Oral daily  benzonatate 100 milliGRAM(s) Oral every 8 hours  budesonide  80 MICROgram(s)/formoterol 4.5 MICROgram(s) Inhaler 2 Puff(s) Inhalation two times a day  cholecalciferol 1000 Unit(s) Oral daily  dextrose 40% Gel 15 Gram(s) Oral once  dextrose 5%. 1000 milliLiter(s) (100 mL/Hr) IV Continuous <Continuous>  dextrose 50% Injectable 25 Gram(s) IV Push once  dextrose 50% Injectable 12.5 Gram(s) IV Push once  dextrose 50% Injectable 25 Gram(s) IV Push once  enoxaparin Injectable 40 milliGRAM(s) SubCutaneous two times a day  ferrous    sulfate 325 milliGRAM(s) Oral daily  gabapentin 300 milliGRAM(s) Oral every 12 hours  glucagon  Injectable 1 milliGRAM(s) IntraMuscular once  insulin glargine Injectable (LANTUS) 30 Unit(s) SubCutaneous at bedtime  insulin lispro (ADMELOG) corrective regimen sliding scale   SubCutaneous three times a day before meals  insulin lispro (ADMELOG) corrective regimen sliding scale   SubCutaneous at bedtime  insulin lispro Injectable (ADMELOG) 12 Unit(s) SubCutaneous three times a day before meals  loratadine 10 milliGRAM(s) Oral daily  montelukast 10 milliGRAM(s) Oral daily  multivitamin 1 Tablet(s) Oral daily  pantoprazole    Tablet 40 milliGRAM(s) Oral before breakfast  potassium phosphate / sodium phosphate Tablet (K-PHOS No. 2) 1 Tablet(s) Oral three times a day  predniSONE   Tablet 40 milliGRAM(s) Oral daily  zinc sulfate 220 milliGRAM(s) Oral daily      LABS:  01-03    139  |  103  |  20<H>  ----------------------------<  68<L>  3.9   |  25  |  0.89    Ca    9.1      03 Jan 2021 06:43  Phos  3.9     01-03  Mg     2.1     01-03      Creatinine Trend: 0.89 <--, 0.96 <--, 1.01 <--, 1.09 <--, 1.00 <--    Phosphorus Level, Serum: 3.9 mg/dL (01-03 @ 06:43)                              12.9   11.10 )-----------( 352      ( 03 Jan 2021 06:43 )             41.0     Urine Studies:      Iron 24, TIBC 264, %sat 9      [12-15-20 @ 07:46]  Ferritin 395      [12-23-20 @ 00:51]  Vitamin D (25OH) 36.3      [07-18-20 @ 11:35]  TSH 0.38      [12-15-20 @ 07:46]  Lipid: chol 139, , HDL 46, LDL --      [12-15-20 @ 07:46]      Immunofixation Serum:   No Monoclonal Band Identified    Reference Range: None Detected      [12-15-20 @ 11:01]  SPEP Interpretation: Normal Electrophoresis Pattern      [12-15-20 @ 11:01]    RADIOLOGY & ADDITIONAL STUDIES:

## 2021-01-04 ENCOUNTER — TRANSCRIPTION ENCOUNTER (OUTPATIENT)
Age: 86
End: 2021-01-04

## 2021-01-04 VITALS
HEART RATE: 91 BPM | TEMPERATURE: 98 F | OXYGEN SATURATION: 95 % | DIASTOLIC BLOOD PRESSURE: 65 MMHG | SYSTOLIC BLOOD PRESSURE: 146 MMHG | RESPIRATION RATE: 18 BRPM

## 2021-01-04 LAB
ANION GAP SERPL CALC-SCNC: 9 MMOL/L — SIGNIFICANT CHANGE UP (ref 5–17)
BUN SERPL-MCNC: 20 MG/DL — HIGH (ref 7–18)
CALCIUM SERPL-MCNC: 9.5 MG/DL — SIGNIFICANT CHANGE UP (ref 8.4–10.5)
CHLORIDE SERPL-SCNC: 102 MMOL/L — SIGNIFICANT CHANGE UP (ref 96–108)
CO2 SERPL-SCNC: 27 MMOL/L — SIGNIFICANT CHANGE UP (ref 22–31)
CREAT SERPL-MCNC: 0.96 MG/DL — SIGNIFICANT CHANGE UP (ref 0.5–1.3)
GLUCOSE BLDC GLUCOMTR-MCNC: 104 MG/DL — HIGH (ref 70–99)
GLUCOSE BLDC GLUCOMTR-MCNC: 121 MG/DL — HIGH (ref 70–99)
GLUCOSE BLDC GLUCOMTR-MCNC: 183 MG/DL — HIGH (ref 70–99)
GLUCOSE SERPL-MCNC: 70 MG/DL — SIGNIFICANT CHANGE UP (ref 70–99)
HCT VFR BLD CALC: 41.5 % — SIGNIFICANT CHANGE UP (ref 34.5–45)
HGB BLD-MCNC: 13 G/DL — SIGNIFICANT CHANGE UP (ref 11.5–15.5)
MAGNESIUM SERPL-MCNC: 2.7 MG/DL — HIGH (ref 1.6–2.6)
MCHC RBC-ENTMCNC: 25.7 PG — LOW (ref 27–34)
MCHC RBC-ENTMCNC: 31.3 GM/DL — LOW (ref 32–36)
MCV RBC AUTO: 82.2 FL — SIGNIFICANT CHANGE UP (ref 80–100)
NRBC # BLD: 0 /100 WBCS — SIGNIFICANT CHANGE UP (ref 0–0)
PHOSPHATE SERPL-MCNC: 3.3 MG/DL — SIGNIFICANT CHANGE UP (ref 2.5–4.5)
PLATELET # BLD AUTO: 340 K/UL — SIGNIFICANT CHANGE UP (ref 150–400)
POTASSIUM SERPL-MCNC: 4.1 MMOL/L — SIGNIFICANT CHANGE UP (ref 3.5–5.3)
POTASSIUM SERPL-SCNC: 4.1 MMOL/L — SIGNIFICANT CHANGE UP (ref 3.5–5.3)
RBC # BLD: 5.05 M/UL — SIGNIFICANT CHANGE UP (ref 3.8–5.2)
RBC # FLD: 13.8 % — SIGNIFICANT CHANGE UP (ref 10.3–14.5)
SODIUM SERPL-SCNC: 138 MMOL/L — SIGNIFICANT CHANGE UP (ref 135–145)
WBC # BLD: 11.53 K/UL — HIGH (ref 3.8–10.5)
WBC # FLD AUTO: 11.53 K/UL — HIGH (ref 3.8–10.5)

## 2021-01-04 RX ORDER — ENOXAPARIN SODIUM 100 MG/ML
40 INJECTION SUBCUTANEOUS
Refills: 0 | Status: DISCONTINUED | OUTPATIENT
Start: 2021-01-04 | End: 2021-01-04

## 2021-01-04 RX ORDER — INSULIN ASPART 100 [IU]/ML
0 INJECTION, SUSPENSION SUBCUTANEOUS
Qty: 0 | Refills: 0 | DISCHARGE

## 2021-01-04 RX ORDER — INSULIN GLARGINE 100 [IU]/ML
24 INJECTION, SOLUTION SUBCUTANEOUS AT BEDTIME
Refills: 0 | Status: DISCONTINUED | OUTPATIENT
Start: 2021-01-04 | End: 2021-01-04

## 2021-01-04 RX ADMIN — Medication 100 MILLIGRAM(S): at 05:38

## 2021-01-04 RX ADMIN — Medication 1 TABLET(S): at 05:38

## 2021-01-04 RX ADMIN — LORATADINE 10 MILLIGRAM(S): 10 TABLET ORAL at 11:35

## 2021-01-04 RX ADMIN — Medication 1000 UNIT(S): at 11:35

## 2021-01-04 RX ADMIN — Medication 1 TABLET(S): at 13:01

## 2021-01-04 RX ADMIN — GABAPENTIN 300 MILLIGRAM(S): 400 CAPSULE ORAL at 05:38

## 2021-01-04 RX ADMIN — Medication 40 MILLIGRAM(S): at 05:38

## 2021-01-04 RX ADMIN — Medication 325 MILLIGRAM(S): at 11:34

## 2021-01-04 RX ADMIN — BUDESONIDE AND FORMOTEROL FUMARATE DIHYDRATE 2 PUFF(S): 160; 4.5 AEROSOL RESPIRATORY (INHALATION) at 11:33

## 2021-01-04 RX ADMIN — Medication 12 UNIT(S): at 17:20

## 2021-01-04 RX ADMIN — Medication 1 TABLET(S): at 11:34

## 2021-01-04 RX ADMIN — GABAPENTIN 300 MILLIGRAM(S): 400 CAPSULE ORAL at 17:21

## 2021-01-04 RX ADMIN — Medication 100 MILLIGRAM(S): at 13:01

## 2021-01-04 RX ADMIN — ZINC SULFATE TAB 220 MG (50 MG ZINC EQUIVALENT) 220 MILLIGRAM(S): 220 (50 ZN) TAB at 11:34

## 2021-01-04 RX ADMIN — ENOXAPARIN SODIUM 40 MILLIGRAM(S): 100 INJECTION SUBCUTANEOUS at 05:38

## 2021-01-04 RX ADMIN — ENOXAPARIN SODIUM 40 MILLIGRAM(S): 100 INJECTION SUBCUTANEOUS at 17:21

## 2021-01-04 RX ADMIN — Medication 500 MILLIGRAM(S): at 11:34

## 2021-01-04 RX ADMIN — MONTELUKAST 10 MILLIGRAM(S): 4 TABLET, CHEWABLE ORAL at 11:34

## 2021-01-04 RX ADMIN — Medication 12 UNIT(S): at 08:22

## 2021-01-04 RX ADMIN — Medication 12 UNIT(S): at 11:35

## 2021-01-04 RX ADMIN — Medication 2: at 17:20

## 2021-01-04 RX ADMIN — PANTOPRAZOLE SODIUM 40 MILLIGRAM(S): 20 TABLET, DELAYED RELEASE ORAL at 05:39

## 2021-01-04 NOTE — PROGRESS NOTE ADULT - PROBLEM SELECTOR PROBLEM 5
Anemia
DM (diabetes mellitus)
Anemia
DM (diabetes mellitus)
HTN (hypertension)
Anemia
DM (diabetes mellitus)
DM (diabetes mellitus)
Anemia
DM (diabetes mellitus)
Anemia
Anemia

## 2021-01-04 NOTE — PROGRESS NOTE ADULT - SUBJECTIVE AND OBJECTIVE BOX
HPI:  89 YO [place] Speaking female, with PMHx of asthma, HTN, DM presents to the ED c/o shortness of breath and diarrhea, fever, chills x 1 days.  Pt completed course of decadron.  Family has agreed for patient to come home when she is medically stable for discharge.  Pt is still COVID+ and requiring 2L via n/c.  Patient examined at bedside, resting comfortably, denies pain or NV.    OVERNIGHT EVENTS:  No new overnight events.     REVIEW OF SYSTEMS:      CONSTITUTIONAL: No fever,   EYES: no acute visual disturbances  NECK: No pain or stiffness  RESPIRATORY: No cough; No shortness of breath  CARDIOVASCULAR: No chest pain, no palpitations  GASTROINTESTINAL: No pain. No nausea, vomiting or diarrhea   NEUROLOGICAL: No headache or numbness, no tremors  MUSCULOSKELETAL: No joint pain, no muscle pain  GENITOURINARY: no dysuria, no frequency, no hesitancy  PSYCHIATRY: no depression , no anxiety  ALL OTHER  ROS negative      T(C): 36.4 (01-04-21 @ 19:07), Max: 36.4 (01-04-21 @ 19:07)  HR: 91 (01-04-21 @ 19:07) (80 - 91)  BP: 146/65 (01-04-21 @ 19:07) (134/65 - 146/65)  RR: 18 (01-04-21 @ 19:07) (18 - 18)  SpO2: 95% (01-04-21 @ 19:07) (94% - 95%)    MEDICATIONS  (STANDING):  ascorbic acid 500 milliGRAM(s) Oral daily  benzonatate 100 milliGRAM(s) Oral every 8 hours  budesonide  80 MICROgram(s)/formoterol 4.5 MICROgram(s) Inhaler 2 Puff(s) Inhalation two times a day  cholecalciferol 1000 Unit(s) Oral daily  dextrose 40% Gel 15 Gram(s) Oral once  dextrose 5%. 1000 milliLiter(s) (100 mL/Hr) IV Continuous <Continuous>  dextrose 50% Injectable 25 Gram(s) IV Push once  dextrose 50% Injectable 12.5 Gram(s) IV Push once  dextrose 50% Injectable 25 Gram(s) IV Push once  enoxaparin Injectable 40 milliGRAM(s) SubCutaneous two times a day  ferrous    sulfate 325 milliGRAM(s) Oral daily  gabapentin 300 milliGRAM(s) Oral every 12 hours  glucagon  Injectable 1 milliGRAM(s) IntraMuscular once  insulin glargine Injectable (LANTUS) 24 Unit(s) SubCutaneous at bedtime  insulin lispro (ADMELOG) corrective regimen sliding scale   SubCutaneous three times a day before meals  insulin lispro (ADMELOG) corrective regimen sliding scale   SubCutaneous at bedtime  insulin lispro Injectable (ADMELOG) 12 Unit(s) SubCutaneous three times a day before meals  loratadine 10 milliGRAM(s) Oral daily  montelukast 10 milliGRAM(s) Oral daily  multivitamin 1 Tablet(s) Oral daily  pantoprazole    Tablet 40 milliGRAM(s) Oral before breakfast  potassium phosphate / sodium phosphate Tablet (K-PHOS No. 2) 1 Tablet(s) Oral three times a day  predniSONE   Tablet 40 milliGRAM(s) Oral daily  zinc sulfate 220 milliGRAM(s) Oral daily    MEDICATIONS  (PRN):  acetaminophen   Tablet .. 650 milliGRAM(s) Oral every 6 hours PRN Temp greater or equal to 38C (100.4F), Mild Pain (1 - 3)  ALBUTerol    90 MICROgram(s) HFA Inhaler 2 Puff(s) Inhalation every 6 hours PRN Shortness of Breath and/or Wheezing      ________________________________________________  PHYSICAL EXAM:    GENERAL: NAD  HEENT: Normocephalic; conjunctivae and sclerae clear; moist mucous membranes;   NECK : supple, no JVD  CHEST/LUNG: Clear to auscultation bilaterally   HEART: S1 S2  regular  ABDOMEN: Soft, Nontender, Nondistended; Bowel sounds present  EXTREMITIES: no cyanosis; no LE edema; no calf tenderness  SKIN: warm and dry; no rash  NERVOUS SYSTEM:  Alert & Oriented x3; no new deficits    _________________________________________________  CURRENT MEDICATIONS:    MEDICATIONS  (STANDING):  ascorbic acid 500 milliGRAM(s) Oral daily  benzonatate 100 milliGRAM(s) Oral every 8 hours  budesonide  80 MICROgram(s)/formoterol 4.5 MICROgram(s) Inhaler 2 Puff(s) Inhalation two times a day  cholecalciferol 1000 Unit(s) Oral daily  dextrose 40% Gel 15 Gram(s) Oral once  dextrose 5%. 1000 milliLiter(s) (100 mL/Hr) IV Continuous <Continuous>  dextrose 50% Injectable 25 Gram(s) IV Push once  dextrose 50% Injectable 12.5 Gram(s) IV Push once  dextrose 50% Injectable 25 Gram(s) IV Push once  enoxaparin Injectable 40 milliGRAM(s) SubCutaneous two times a day  ferrous    sulfate 325 milliGRAM(s) Oral daily  gabapentin 300 milliGRAM(s) Oral every 12 hours  glucagon  Injectable 1 milliGRAM(s) IntraMuscular once  insulin glargine Injectable (LANTUS) 30 Unit(s) SubCutaneous at bedtime  insulin lispro (ADMELOG) corrective regimen sliding scale   SubCutaneous at bedtime  insulin lispro Injectable (ADMELOG) 12 Unit(s) SubCutaneous three times a day before meals  loratadine 10 milliGRAM(s) Oral daily  montelukast 10 milliGRAM(s) Oral daily  multivitamin 1 Tablet(s) Oral daily  pantoprazole    Tablet 40 milliGRAM(s) Oral before breakfast  potassium phosphate / sodium phosphate Tablet (K-PHOS No. 2) 1 Tablet(s) Oral three times a day  predniSONE   Tablet 40 milliGRAM(s) Oral daily  zinc sulfate 220 milliGRAM(s) Oral daily    MEDICATIONS  (PRN):  acetaminophen   Tablet .. 650 milliGRAM(s) Oral every 6 hours PRN Temp greater or equal to 38C (100.4F), Mild Pain (1 - 3)  ALBUTerol    90 MICROgram(s) HFA Inhaler 2 Puff(s) Inhalation every 6 hours PRN Shortness of Breath and/or Wheezing      __________________________________________________                        13.0   11.53 )-----------( 340      ( 04 Jan 2021 06:32 )             41.5               138|102|20<70  4.1|27|0.96  9.5,2.7,3.3  01-04 @ 06:32    __________________________________________________  RADIOLOGY & ADDITIONAL TESTS:    Imaging Personally Reviewed:  YES    < from: Xray Chest 1 View-PORTABLE IMMEDIATE (Xray Chest 1 View-PORTABLE IMMEDIATE .) (12.28.20 @ 11:25) >  No pleural effusion.    Heart size cannot be accurately assessed in this projection.    < end of copied text >    Consultant(s) Notes Reviewed:   YES     Plan of care was discussed with patient and /or primary care giver; all questions and concerns were addressed and care was aligned with patient's wishes.    Plan discussed with attending and consulting physicians.

## 2021-01-04 NOTE — PROGRESS NOTE ADULT - PROBLEM SELECTOR PROBLEM 6
Asthma
Prophylactic measure
Prophylactic measure
Asthma
DM (diabetes mellitus)
DM (diabetes mellitus)
Anemia
Asthma
Prophylactic measure
Asthma

## 2021-01-04 NOTE — PROGRESS NOTE ADULT - PROBLEM SELECTOR PROBLEM 4
HTN (hypertension)
Acute on chronic renal insufficiency

## 2021-01-04 NOTE — PROGRESS NOTE ADULT - SUBJECTIVE AND OBJECTIVE BOX
Interval Events:      Allergies    No Known Allergies    Intolerances      Endocrine/Metabolic Medications:  dextrose 40% Gel 15 Gram(s) Oral once  dextrose 50% Injectable 25 Gram(s) IV Push once  dextrose 50% Injectable 12.5 Gram(s) IV Push once  dextrose 50% Injectable 25 Gram(s) IV Push once  glucagon  Injectable 1 milliGRAM(s) IntraMuscular once  insulin glargine Injectable (LANTUS) 30 Unit(s) SubCutaneous at bedtime  insulin lispro (ADMELOG) corrective regimen sliding scale   SubCutaneous three times a day before meals  insulin lispro (ADMELOG) corrective regimen sliding scale   SubCutaneous at bedtime  insulin lispro Injectable (ADMELOG) 12 Unit(s) SubCutaneous three times a day before meals  predniSONE   Tablet 40 milliGRAM(s) Oral daily      Vital Signs Last 24 Hrs  T(C): 36.6 (04 Jan 2021 08:25), Max: 36.6 (04 Jan 2021 08:25)  T(F): 97.8 (04 Jan 2021 08:25), Max: 97.8 (04 Jan 2021 08:25)  HR: 89 (04 Jan 2021 08:25) (79 - 89)  BP: 106/62 (04 Jan 2021 08:25) (106/62 - 129/52)  BP(mean): --  RR: 17 (04 Jan 2021 08:25) (17 - 19)  SpO2: 97% (04 Jan 2021 08:25) (96% - 98%)      PHYSICAL EXAM  All physical exam findings normal, except those marked:  General:	Alert, active, cooperative, NAD, well hydrated  .		[] Abnormal:  Neck		Normal: supple, no cervical adenopathy, no palpable thyroid  .		[] Abnormal:  Cardiovascular	Normal: regular rate, normal S1, S2, no murmurs  .		[] Abnormal:  Respiratory	Normal: no chest wall deformity, normal respiratory pattern, CTA B/L  .		[] Abnormal:  Abdominal	Normal: soft, ND, NT, bowel sounds present, no masses, no organomegaly  .		[] Abnormal:  		Normal normal genitalia, testes descended, circumcised/uncircumcised  .		Uma stage:			Breast uma:  .		Menstrual history:  .		[] Abnormal:  Extremities	Normal: FROM x4  .		[] Abnormal:  Skin		Normal: intact and not indurated, no rash, no acanthosis nigricans  .		[] Abnormal:  Neurologic	Normal: grossly intact  .		[] Abnormal:    LABS                        13.0   11.53 )-----------( 340      ( 04 Jan 2021 06:32 )             41.5                               138    |  102    |  20                  Calcium: 9.5   / iCa: x      (01-04 @ 06:32)    ----------------------------<  70        Magnesium: 2.7                              4.1     |  27     |  0.96             Phosphorous: 3.3        CAPILLARY BLOOD GLUCOSE      POCT Blood Glucose.: 104 mg/dL (04 Jan 2021 07:43)  POCT Blood Glucose.: 181 mg/dL (03 Jan 2021 20:58)  POCT Blood Glucose.: 237 mg/dL (03 Jan 2021 16:50)  POCT Blood Glucose.: 243 mg/dL (03 Jan 2021 12:01)        Assesment/plan       Interval Events:  pt in nad    Allergies    No Known Allergies    Intolerances      Endocrine/Metabolic Medications:  dextrose 40% Gel 15 Gram(s) Oral once  dextrose 50% Injectable 25 Gram(s) IV Push once  dextrose 50% Injectable 12.5 Gram(s) IV Push once  dextrose 50% Injectable 25 Gram(s) IV Push once  glucagon  Injectable 1 milliGRAM(s) IntraMuscular once  insulin glargine Injectable (LANTUS) 30 Unit(s) SubCutaneous at bedtime  insulin lispro (ADMELOG) corrective regimen sliding scale   SubCutaneous three times a day before meals  insulin lispro (ADMELOG) corrective regimen sliding scale   SubCutaneous at bedtime  insulin lispro Injectable (ADMELOG) 12 Unit(s) SubCutaneous three times a day before meals  predniSONE   Tablet 40 milliGRAM(s) Oral daily      Vital Signs Last 24 Hrs  T(C): 36.6 (04 Jan 2021 08:25), Max: 36.6 (04 Jan 2021 08:25)  T(F): 97.8 (04 Jan 2021 08:25), Max: 97.8 (04 Jan 2021 08:25)  HR: 89 (04 Jan 2021 08:25) (79 - 89)  BP: 106/62 (04 Jan 2021 08:25) (106/62 - 129/52)  BP(mean): --  RR: 17 (04 Jan 2021 08:25) (17 - 19)  SpO2: 97% (04 Jan 2021 08:25) (96% - 98%)      PHYSICAL EXAM  All physical exam findings normal, except those marked:  General:	Alert, active, cooperative, NAD, well hydrated  .		[] Abnormal:  Neck		Normal: supple, no cervical adenopathy, no palpable thyroid  .		[] Abnormal:  Cardiovascular	Normal: regular rate, normal S1, S2, no murmurs  .		[] Abnormal:  Respiratory	Normal: no chest wall deformity, normal respiratory pattern, CTA B/L  .		[] Abnormal:  Abdominal	Normal: soft, ND, NT, bowel sounds present, no masses, no organomegaly  .		[] Abnormal:  		Normal normal genitalia, testes descended, circumcised/uncircumcised  .		Uma stage:			Breast uma:  .		Menstrual history:  .		[] Abnormal:  Extremities	Normal: FROM x4  .		[] Abnormal:  Skin		Normal: intact and not indurated, no rash, no acanthosis nigricans  .		[] Abnormal:  Neurologic	Normal: grossly intact  .		[] Abnormal:    LABS                        13.0   11.53 )-----------( 340      ( 04 Jan 2021 06:32 )             41.5                               138    |  102    |  20                  Calcium: 9.5   / iCa: x      (01-04 @ 06:32)    ----------------------------<  70        Magnesium: 2.7                              4.1     |  27     |  0.96             Phosphorous: 3.3        CAPILLARY BLOOD GLUCOSE      POCT Blood Glucose.: 104 mg/dL (04 Jan 2021 07:43)  POCT Blood Glucose.: 181 mg/dL (03 Jan 2021 20:58)  POCT Blood Glucose.: 237 mg/dL (03 Jan 2021 16:50)  POCT Blood Glucose.: 243 mg/dL (03 Jan 2021 12:01)        Assesment/plan    89 y/o female [place] Speaking, with PMHx of asthma, HTN, DM presents to the ED c/o shortness of breath and diarrhea, fever, chills x 1 days. Admitted for covid-19 related pneumonia.  Endocrinology was consulted for uncontrolled DM.     Problem/Plan - 1:  ·  Problem: Uncontrolled diabetes mellitus.  Plan: Hyperglycemia likely due to uncontrolled DM in addition to steroid   HbA1C;  7.2  change Lantus to 24 uHS  and pre meal Humalog to 12 u tid before meals  change correction doses to moderate scale for pre meals   Upon discharge Continue Novolog 70/30 40 U at am and 30 U at bedtime before meal. Discontinue oral hypoglycemic agents.   Monitor blood glucose.  fine tuning as out pt.  d/w hs/ np

## 2021-01-04 NOTE — PROGRESS NOTE ADULT - PROBLEM SELECTOR PLAN 1
12/28 CXR noted above  Satting well on 2L via n/c   COVID+ 12/26 and 12/29  D/C planning- Patient took 2 steps with PT with out Oxygen with Oxygen Sat around 93 %, patient does not qualify for home Oxygen.

## 2021-01-04 NOTE — CHART NOTE - NSCHARTNOTEFT_GEN_A_CORE
As discussed with Dr. Carrizales, pt does not need additional COVID swab. Patient is to go home with or without O2. Please have patient ambulate and documents oxygenation on room air.
As per discussion with attending who spoke to pt.'s son, pt. is now willing to take pt. home "when she is ready", as pt. cries as she doesn't want to go to NH.  Pt. will likely require home O2 upon discharge.
Patient with generalized muscle weakness due to her dementia and Diabetes, now is unable to safely ambulate with a walker, cane or crutches.  She will require a wheelchair to access the bathroom  for toileting and the dining facilities with in her residence.  She is agreeable to the wheelchair and has 24hrs assist with the wheel chair.
Reassessment:   89yFemalePatient is a 89y old  Female who presents with a chief complaint of Fever, chills, Covid positive (29 Dec 2020 13:24)      Factors impacting intake: [ ] none [ ] nausea  [ ] vomiting [ ] diarrhea [ ] constipation  [ ]chewing problems [ ] swallowing issues  [ X] other: COVID +     Diet Presciption: Diet, DASH/TLC:   Sodium & Cholesterol Restricted  Consistent Carbohydrate {No Snacks}  Lacto-Ovo Veg (Accepts Milk Prod., Eggs) (12-15-20 @ 14:42)    Intake: Patient on airborne precaution, in isolation, d/w PCA, pt with goo po intake, consumed >50% of lunch meal, received meals from home, no reports of GI/distress, per flow sheet intake 75% noted, seen by Speech/Swallow team on 12/23/20 & recommendation noted, Endo/Nephro consulted, rec. c/w diet as ordered & add MVI/minerals daily as medically feasible. RD available.       Daily weight - 180.9 Lbs on 12/21/20    Pertinent Medications: MEDICATIONS  (STANDING):  ascorbic acid 500 milliGRAM(s) Oral daily  benzonatate 100 milliGRAM(s) Oral every 8 hours  budesonide  80 MICROgram(s)/formoterol 4.5 MICROgram(s) Inhaler 2 Puff(s) Inhalation two times a day  cholecalciferol 1000 Unit(s) Oral daily  dextrose 5%. 1000 milliLiter(s) (100 mL/Hr) IV Continuous <Continuous>  enoxaparin Injectable 40 milliGRAM(s) SubCutaneous two times a day  ferrous    sulfate 325 milliGRAM(s) Oral daily  gabapentin 300 milliGRAM(s) Oral every 12 hours  glucagon  Injectable 1 milliGRAM(s) IntraMuscular once  insulin glargine Injectable (LANTUS) 30 Unit(s) SubCutaneous at bedtime  insulin lispro (ADMELOG) corrective regimen sliding scale   SubCutaneous at bedtime  insulin lispro (ADMELOG) corrective regimen sliding scale   SubCutaneous three times a day before meals  insulin lispro Injectable (ADMELOG) 12 Unit(s) SubCutaneous three times a day before meals  loratadine 10 milliGRAM(s) Oral daily  montelukast 10 milliGRAM(s) Oral daily  multivitamin 1 Tablet(s) Oral daily  pantoprazole    Tablet 40 milliGRAM(s) Oral before breakfast  potassium phosphate / sodium phosphate Tablet (K-PHOS No. 2) 1 Tablet(s) Oral three times a day  predniSONE   Tablet 40 milliGRAM(s) Oral daily  zinc sulfate 220 milliGRAM(s) Oral daily    MEDICATIONS  (PRN):  acetaminophen   Tablet .. 650 milliGRAM(s) Oral every 6 hours PRN Temp greater or equal to 38C (100.4F), Mild Pain (1 - 3)  ALBUTerol    90 MICROgram(s) HFA Inhaler 2 Puff(s) Inhalation every 6 hours PRN Shortness of Breath and/or Wheezing    Pertinent Labs: 12-29 Na134 mmol/L<L> Glu 219 mg/dL<H> K+ 4.7 mmol/L Cr  1.09 mg/dL BUN 20 mg/dL<H> 12-29 Alb 2.6 g/dL<L> 12-15 Chol 139 mg/dL LDL --    HDL 46 mg/dL<L> Trig 128 mg/dL     CAPILLARY BLOOD GLUCOSE      POCT Blood Glucose.: 358 mg/dL (29 Dec 2020 12:18)  POCT Blood Glucose.: 416 mg/dL (29 Dec 2020 08:40)  POCT Blood Glucose.: 395 mg/dL (29 Dec 2020 08:36)  POCT Blood Glucose.: 236 mg/dL (28 Dec 2020 21:26)  POCT Blood Glucose.: 90 mg/dL (28 Dec 2020 16:55)    Skin: intact     Estimated Needs:   [X ] no change since previous assessment  [ ] recalculated:     Previous Nutrition Diagnosis:   [ ] Inadequate Energy Intake [ ]Inadequate Oral Intake [ ] Excessive Energy Intake   [ ] Underweight [ ] Increased Nutrient Needs [ ] Overweight/Obesity   [X ] Altered nutrition lab values [ ] Unintended Weight Loss [ ] Food & Nutrition Related Knowledge Deficit [ ] Malnutrition     Nutrition Diagnosis is [ X] ongoing  [ ] resolved [ ] not applicable     New Nutrition Diagnosis: [ ] not applicable     Interventions: To meet nutrition needs   Recommend  [ ] Change Diet To:  [ ] Nutrition Supplement  [ ] Nutrition Support  [X ] Other: Nursing to continue feeding assistance and encouragement, aspiration precaution     Monitoring and Evaluation:   [ X] PO intake [ x ] Tolerance to diet prescription [ x ] weights [ x ] labs[ x ] follow up per protocol  [ ] other:

## 2021-01-04 NOTE — PROGRESS NOTE ADULT - SUBJECTIVE AND OBJECTIVE BOX
[facility] [place] [facility]   MD FAUSTO PALM MD RUORU WONG, PA    TEL:  OFFICE: [phone]  DR LEE CELL: [phone]  CHARLEY WILLIAM CELL: [phone]  DR. GARCIA CELL: [phone]  DR. MARTIN CELL: [phone]    FROM 5 PM - 7 AM PLEASE CALL ANSWERING SERVICE: [phone]    RENAL FOLLOW UP NOTE--Date of Service 01-04-21 @ 10:40  --------------------------------------------------------------------------------  HPI:      Pt covid 19+    PAST HISTORY  --------------------------------------------------------------------------------  No significant changes to PMH, PSH, FHx, SHx, unless otherwise noted    ALLERGIES & MEDICATIONS  --------------------------------------------------------------------------------  Allergies    No Known Allergies    Intolerances      Standing Inpatient Medications  ascorbic acid 500 milliGRAM(s) Oral daily  benzonatate 100 milliGRAM(s) Oral every 8 hours  budesonide  80 MICROgram(s)/formoterol 4.5 MICROgram(s) Inhaler 2 Puff(s) Inhalation two times a day  cholecalciferol 1000 Unit(s) Oral daily  dextrose 40% Gel 15 Gram(s) Oral once  dextrose 5%. 1000 milliLiter(s) IV Continuous <Continuous>  dextrose 50% Injectable 25 Gram(s) IV Push once  dextrose 50% Injectable 12.5 Gram(s) IV Push once  dextrose 50% Injectable 25 Gram(s) IV Push once  enoxaparin Injectable 40 milliGRAM(s) SubCutaneous two times a day  ferrous    sulfate 325 milliGRAM(s) Oral daily  gabapentin 300 milliGRAM(s) Oral every 12 hours  glucagon  Injectable 1 milliGRAM(s) IntraMuscular once  insulin glargine Injectable (LANTUS) 24 Unit(s) SubCutaneous at bedtime  insulin lispro (ADMELOG) corrective regimen sliding scale   SubCutaneous three times a day before meals  insulin lispro (ADMELOG) corrective regimen sliding scale   SubCutaneous at bedtime  insulin lispro Injectable (ADMELOG) 12 Unit(s) SubCutaneous three times a day before meals  loratadine 10 milliGRAM(s) Oral daily  montelukast 10 milliGRAM(s) Oral daily  multivitamin 1 Tablet(s) Oral daily  pantoprazole    Tablet 40 milliGRAM(s) Oral before breakfast  potassium phosphate / sodium phosphate Tablet (K-PHOS No. 2) 1 Tablet(s) Oral three times a day  predniSONE   Tablet 40 milliGRAM(s) Oral daily  zinc sulfate 220 milliGRAM(s) Oral daily    PRN Inpatient Medications  acetaminophen   Tablet .. 650 milliGRAM(s) Oral every 6 hours PRN  ALBUTerol    90 MICROgram(s) HFA Inhaler 2 Puff(s) Inhalation every 6 hours PRN      REVIEW OF SYSTEMS  --------------------------------------------------------------------------------  General: no fever  MSK: no edema     VITALS/PHYSICAL EXAM  --------------------------------------------------------------------------------  T(C): 36.6 (01-04-21 @ 08:25), Max: 36.6 (01-04-21 @ 08:25)  HR: 89 (01-04-21 @ 08:25) (79 - 89)  BP: 106/62 (01-04-21 @ 08:25) (106/62 - 129/52)  RR: 17 (01-04-21 @ 08:25) (17 - 19)  SpO2: 97% (01-04-21 @ 08:25) (96% - 98%)  Wt(kg): --      LABS/STUDIES  --------------------------------------------------------------------------------              13.0   11.53 >-----------<  340      [01-04-21 @ 06:32]              41.5     138  |  102  |  20  ----------------------------<  70      [01-04-21 @ 06:32]  4.1   |  27  |  0.96        Ca     9.5     [01-04-21 @ 06:32]      Mg     2.7     [01-04-21 @ 06:32]      Phos  3.3     [01-04-21 @ 06:32]            Creatinine Trend:  SCr 0.96 [01-04 @ 06:32]  SCr 0.89 [01-03 @ 06:43]  SCr 0.96 [01-02 @ 07:27]  SCr 1.01 [01-01 @ 06:17]  SCr 1.09 [12-29 @ 06:26]        Iron 24, TIBC 264, %sat 9      [12-15-20 @ 07:46]  Ferritin 395      [12-23-20 @ 00:51]  Vitamin D (25OH) 36.3      [07-18-20 @ 11:35]  TSH 0.38      [12-15-20 @ 07:46]  Lipid: chol 139, , HDL 46, LDL --      [12-15-20 @ 07:46]      Immunofixation Serum:   No Monoclonal Band Identified    Reference Range: None Detected      [12-15-20 @ 11:01]  SPEP Interpretation: Normal Electrophoresis Pattern      [12-15-20 @ 11:01]

## 2021-01-04 NOTE — PROGRESS NOTE ADULT - PROBLEM SELECTOR PROBLEM 1
Pneumonia due to COVID-19 virus
Uncontrolled diabetes mellitus
Pneumonia due to COVID-19 virus
Pneumonia due to COVID-19 virus
Uncontrolled diabetes mellitus
Pneumonia due to COVID-19 virus
Uncontrolled diabetes mellitus
Pneumonia due to COVID-19 virus

## 2021-01-04 NOTE — PROGRESS NOTE ADULT - ASSESSMENT
89 y/o female Rivera Speaking, with PMHx of asthma, HTN, DM is admitted to the hospital for COVID-19 infection       
89 y/o female Rivera Speaking, with PMHx of asthma, HTN, DM presents to the ED c/o shortness of breath and diarrhea, fever, chills x 1 days    TAMANNA  Scr elevated to 1.6 on admission   Renal function improving  Check UA  MOnitor BMP   AVoid further nephrotoxics, NSAIDS RCA    HTN  BP fluctuating  Monitor BP    COVID PNA  Monitor temp/ O2  Follow up ID/ Pulm      HYponatremia  sec to hyperglycemia  Optimize glucose control  MOnitor serum Na  
89 y/o female Rivera Speaking, with PMHx of asthma, HTN, DM presents to the ED c/o shortness of breath and diarrhea, fever, chills x 1 days    TAMANNA  Scr elevated to 1.6 on admission   Renal function stable  Check UA  MOnitor BMP   AVoid further nephrotoxics, NSAIDS RCA    HTN  BP stable  Monitor BP    COVID PNA  Monitor temp/ O2  Follow up ID/ Pulm      HYponatremia  Improved.  MOnitor serum Na  
89 y/o female Rivera Speaking, with PMHx of asthma, HTN, DM presents to the ED c/o shortness of breath and diarrhea, fever, chills x 1 days    TAMANNA  Scr elevated to 1.6 on admission   Renal function stable  Check UA  MOnitor BMP   AVoid further nephrotoxics, NSAIDS RCA    HTN  BP stable  Monitor BP    COVID PNA  Monitor temp/ O2  Follow up ID/ Pulm      HYponatremia  sec to hyperglycemia  Optimize glucose control  MOnitor serum Na  
89 y/o female Rivera Speaking, with PMHx of asthma, HTN, DM presents to the ED c/o shortness of breath and diarrhea, fever, chills x 1 days    TAMANNA  Scr elevated to 1.6 on admission   Renal function stable  Check UA  MOnitor BMP   AVoid further nephrotoxics, NSAIDS RCA    HTN  BP stable  Monitor BP    COVID PNA  Monitor temp/ O2  Follow up ID/ Pulm      HYponatremia  sec to hyperglycemia  Optimize glucose control  MOnitor serum Na  
89 y/o female Rivera Speaking, with PMHx of asthma, HTN, DM presents to the ED c/o shortness of breath and diarrhea, fever, chills x 1 days    TAMANNA  Scr elevated to 1.6 on admission   Renal function stable  Check UA  MOnitor BMP   AVoid further nephrotoxics, NSAIDS RCA    HTN  BP stable  Monitor BP    COVID PNA  Monitor temp/ O2  Follow up ID/ Pulm      HYponatremia  sec to hyperglycemia  Optimize glucose control  MOnitor serum Na    Hyperkalemia  sec to hyperglycemia  Optimize glucose control  improved  MOnitor serum K  Low K diet    
89 y/o female Rivera Speaking, with PMHx of asthma, HTN, DM presents to the ED c/o shortness of breath and diarrhea, fever, chills x 1 days    TAMANNA  Scr elevated to 1.6 on admission   Renal function stable-pending BMP today  Check UA  MOnitor BMP   AVoid further nephrotoxics, NSAIDS RCA    HTN  BP stable  Monitor BP    COVID PNA  Monitor temp/ O2  Follow up ID/ Pulm      HYponatremia  sec to hyperglycemia  Optimize glucose control  MOnitor serum Na  
89 y/o female Rivera Speaking, with PMHx of asthma, HTN, DM presents to the ED c/o shortness of breath and diarrhea, fever, chills x 1 days    TAMANNA  Scr elevated to 1.6 on admission   Renal function improving  Check UA  MOnitor BMP   AVoid further nephrotoxics, NSAIDS RCA    HTN  BP fluctuating  Monitor BP    COVID PNA  Monitor temp/ O2  Follow up ID/ Pulm      HYponatremia  sec to hyperglycemia  Optimize glucose control  MOnitor serum Na  
89 y/o female Rivera Speaking, with PMHx of asthma, HTN, DM presents to the ED c/o shortness of breath and diarrhea, fever, chills x 1 days    TAMANNA  Scr elevated to 1.6 on admission   Renal function stable  Check UA  MOnitor BMP   AVoid further nephrotoxics, NSAIDS RCA    HTN  BP stable  Monitor BP    COVID PNA  Monitor temp/ O2  Follow up ID/ Pulm      HYponatremia  Improved.  MOnitor serum Na  
89 y/o female Rivera Speaking, with PMHx of asthma, HTN, DM presents to the ED c/o shortness of breath and diarrhea, fever, chills x 1 days    TAMANNA  Scr elevated to 1.6 on admission   Renal function stable  Check UA  MOnitor BMP   AVoid further nephrotoxics, NSAIDS RCA    HTN  BP stable  Monitor BP    COVID PNA  Monitor temp/ O2  Follow up ID/ Pulm      HYponatremia  sec to hyperglycemia  Optimize glucose control  MOnitor serum Na    Hyperkalemia  sec to hyperglycemia  Optimize glucose control  improved  MOnitor serum K  Low K diet    
89 y/o female Rivera Speaking, with PMHx of asthma, HTN, DM presents to the ED c/o shortness of breath and diarrhea, fever, chills x 1 days    TAMANNA  Scr 1.6 on admission   Renal function improving  Check UA  MOnitor BMP   AVoid further nephrotoxics, NSAIDS RCA    HTN  BP stable  Monitor BP    COVID PNA  Monitor temp/ O2  Follow up ID/ Pulm      
89 y/o female Rivera Speaking, with PMHx of asthma, HTN, DM presents to the ED c/o shortness of breath and diarrhea, fever, chills x 1 days    TAMANNA  Scr elevated to 1.6 on admission   Renal function improving  Check UA  MOnitor BMP   AVoid further nephrotoxics, NSAIDS RCA    HTN  BP mildly elevated  Start amlodipine 5mg QD if remains elevated  Monitor BP    COVID PNA  Monitor temp/ O2  Follow up ID/ Pulm      HYponatremia  sec to hyperglycemia  Optimize glucose control  MOnitor serum Na    
89 y/o female Rivera Speaking, with PMHx of asthma, HTN, DM presents to the ED c/o shortness of breath and diarrhea, fever, chills x 1 days    TAMANNA  Scr elevated to 1.6 on admission   Renal function improving  Check UA  MOnitor BMP   AVoid further nephrotoxics, NSAIDS RCA    HTN  BP stable  Monitor BP    COVID PNA  Monitor temp/ O2  Follow up ID/ Pulm      
89 y/o female Rivera Speaking, with PMHx of asthma, HTN, DM is admitted to the hospital for covid-19 infection       
89 y/o female Rivera Speaking, with PMHx of asthma, HTN, DM is admitted to the hospital for covid-19 infection       
89 y/o female Rivera Speaking, with PMHx of asthma, HTN, DM presents to the ED c/o shortness of breath and diarrhea, fever, chills x 1 days    TAMANNA  Scr elevated to 1.6 on admission   Renal function improving  Check UA  MOnitor BMP   AVoid further nephrotoxics, NSAIDS RCA    HTN  BP fluctuating  Monitor BP    COVID PNA  Monitor temp/ O2  Follow up ID/ Pulm      HYponatremia  sec to hyperglycemia  Optimize glucose control  MOnitor serum Na  
89 y/o female Rivera Speaking, with PMHx of asthma, HTN, DM presents to the ED c/o shortness of breath and diarrhea, fever, chills x 1 days. Admitted for covid-19 related pneumonia.  Endocrinology was consulted for uncontrolled DM.
89 y/o female Rivera Speaking, with PMHx of asthma, HTN, DM presents to the ED c/o shortness of breath and diarrhea, fever, chills x 1 days    TAMANNA  Scr 1.6 on admission   Renal function improving  Check UA  MOnitor BMP   AVoid further nephrotoxics, NSAIDS RCA    HTN  BP stable  Monitor BP    COVID PNA  Monitor temp/ O2  Follow up ID/ Pulm      
89 y/o female Rivera Speaking, with PMHx of asthma, HTN, DM presents to the ED c/o shortness of breath and diarrhea, fever, chills x 1 days    TAMANNA  Scr elevated to 1.6 on admission   Renal function improved and remains stable   Check UA  MOnitor BMP   AVoid further nephrotoxics, NSAIDS RCA    HTN  BP stable  Monitor BP    COVID PNA  Monitor temp/ O2  Follow up ID/ Pulm      
89 y/o female Rivera Speaking, with PMHx of asthma, HTN, DM presents to the ED c/o shortness of breath and diarrhea, fever, chills x 1 days    TAMANNA  Scr elevated to 1.6 on admission   Renal function improving  Check UA  MOnitor BMP   AVoid further nephrotoxics, NSAIDS RCA    HTN  BP fluctuating  Monitor BP    COVID PNA  Monitor temp/ O2  Follow up ID/ Pulm      HYponatremia  sec to hyperglycemia  Optimize glucose control  MOnitor serum Na  
89 y/o female Rivera Speaking, with PMHx of asthma, HTN, DM presents to the ED c/o shortness of breath and diarrhea, fever, chills x 1 days. Admitted for covid-19 related pneumonia.  Endocrinology was consulted for uncontrolled DM.
89 y/o female Rivera Speaking, with PMHx of asthma, HTN, DM is admitted to the hospital for covid-19 infection       
89 y/o female Rivera Speaking, with PMHx of asthma, HTN, DM presents to the ED c/o shortness of breath and diarrhea, fever, chills x 1 days    TAMANNA  Scr elevated to 1.6 on admission   Renal function improving  Check UA  MOnitor BMP   AVoid further nephrotoxics, NSAIDS RCA    HTN  BP stable  Monitor BP    COVID PNA  Monitor temp/ O2  Follow up ID/ Pulm      
89 y/o female Rivera Speaking, with PMHx of asthma, HTN, DM presents to the ED c/o shortness of breath and diarrhea, fever, chills x 1 days. Admitted for covid-19 related pneumonia.  Endocrinology was consulted for uncontrolled DM.
89 y/o female Rivera Speaking, with PMHx of asthma, HTN, DM presents to the ED c/o shortness of breath and diarrhea, fever, chills x 1 days    TAMANNA  Scr 1.6 on admission   Renal function stable  Check UA  MOnitor BMP   AVoid further nephrotoxics, NSAIDS RCA    HTN  BP stable  Monitor BP    COVID PNA  Monitor temp/ O2  Follow up ID/ Pulm      
89 y/o female Rivera Speaking, with PMHx of asthma, HTN, DM is admitted to the hospital for COVID-19 infection       
89 y/o female Rivera Speaking, with PMHx of asthma, HTN, DM is admitted to the hospital for COVID-19 infection       
89 y/o female Rivera Speaking, with PMHx of asthma, HTN, DM is admitted to the hospital for COVID-19 infection       
89 y/o female Rivera Speaking, with PMHx of asthma, HTN, DM is admitted to the hospital for COVID-19 infection       
89 y/o female Rivera Speaking, with PMHx of asthma, HTN, DM is admitted to the hospital for COVID-19 infection       
89 y/o female Rivera Speaking, with PMHx of asthma, HTN, DM is admitted to the hospital for COVID-19 infection       
89 y/o female Rivera Speaking, with PMHx of asthma, HTN, DM presents to the ED c/o shortness of breath and diarrhea, fever, chills x 1 days. Patient was very irritable at the time of encounter, AAo x3, but non-cooperative so main history was obtained from ED chart.  Pt reports worsening shortness of breath with associated subjective fever, chills, chest pain and cough. Pt reports that her grandson is positive for COVID-19.. Pt also has chronic leg pain.  12/28-Pt. seen and examined, speaks Rivera, repeats stating she has PNA.  Pt. noted slightly dyspneic, on N/C 2L with 100% O2 sat, left sided mild wheezing, intermittent productive cough associated with pleuritic discomfort.  Last COVID19 12/26-positive.  CXR 12/18 shows Increasing bilateral lung parenchymal airspace opacities.  Will repeat CXR and continue conservative mgnt for now.

## 2021-01-04 NOTE — PROGRESS NOTE ADULT - NSREFPHYEXINPTDOCREFER_GEN_ALL_CORE
IM
endo
endocrinology
internal  medicine
Hospitalist
Hospitalist
internal medicine
internal medicine
Endo
IM
internal medicine

## 2021-01-04 NOTE — PROGRESS NOTE ADULT - PROBLEM SELECTOR PROBLEM 2
Asthma exacerbation
Asthma exacerbation
Pneumonia due to COVID-19 virus
ACS (acute coronary syndrome)
Asthma exacerbation
Pneumonia due to COVID-19 virus
ACS (acute coronary syndrome)
Asthma exacerbation
ACS (acute coronary syndrome)
Asthma exacerbation
Pneumonia due to COVID-19 virus
Asthma exacerbation
ACS (acute coronary syndrome)
Asthma exacerbation
Asthma exacerbation
ACS (acute coronary syndrome)

## 2021-01-04 NOTE — PROGRESS NOTE ADULT - PROBLEM SELECTOR PROBLEM 3
Acute on chronic renal insufficiency
ACS (acute coronary syndrome)
Acute on chronic renal insufficiency
Acute on chronic renal insufficiency
ACS (acute coronary syndrome)
ACS (acute coronary syndrome)
Acute on chronic renal insufficiency
ACS (acute coronary syndrome)
Acute on chronic renal insufficiency
Acute on chronic renal insufficiency
ACS (acute coronary syndrome)
Acute on chronic renal insufficiency
ACS (acute coronary syndrome)
Acute on chronic renal insufficiency
ACS (acute coronary syndrome)
Acute on chronic renal insufficiency
Acute on chronic renal insufficiency
ACS (acute coronary syndrome)
Acute on chronic renal insufficiency
Acute on chronic renal insufficiency

## 2021-01-04 NOTE — DISCHARGE NOTE NURSING/CASE MANAGEMENT/SOCIAL WORK - PATIENT PORTAL LINK FT
You can access the FollowMyHealth Patient Portal offered by [facility] by registering at the following website: [email or web address]. By joining [facility]’s FollowMyHealth portal, you will also be able to view your health information using other applications (apps) compatible with our system.

## 2021-01-04 NOTE — PROGRESS NOTE ADULT - REASON FOR ADMISSION
Fever, chills, Covid positive

## 2021-01-24 PROCEDURE — 99285 EMERGENCY DEPT VISIT HI MDM: CPT

## 2021-01-24 PROCEDURE — 86769 SARS-COV-2 COVID-19 ANTIBODY: CPT

## 2021-01-24 PROCEDURE — 83880 ASSAY OF NATRIURETIC PEPTIDE: CPT

## 2021-01-24 PROCEDURE — 82570 ASSAY OF URINE CREATININE: CPT

## 2021-01-24 PROCEDURE — 84165 PROTEIN E-PHORESIS SERUM: CPT

## 2021-01-24 PROCEDURE — 83935 ASSAY OF URINE OSMOLALITY: CPT

## 2021-01-24 PROCEDURE — 71045 X-RAY EXAM CHEST 1 VIEW: CPT

## 2021-01-24 PROCEDURE — 36415 COLL VENOUS BLD VENIPUNCTURE: CPT

## 2021-01-24 PROCEDURE — 85379 FIBRIN DEGRADATION QUANT: CPT

## 2021-01-24 PROCEDURE — 93005 ELECTROCARDIOGRAM TRACING: CPT

## 2021-01-24 PROCEDURE — 0225U NFCT DS DNA&RNA 21 SARSCOV2: CPT

## 2021-01-24 PROCEDURE — 83735 ASSAY OF MAGNESIUM: CPT

## 2021-01-24 PROCEDURE — 85730 THROMBOPLASTIN TIME PARTIAL: CPT

## 2021-01-24 PROCEDURE — U0003: CPT

## 2021-01-24 PROCEDURE — 93970 EXTREMITY STUDY: CPT

## 2021-01-24 PROCEDURE — 85610 PROTHROMBIN TIME: CPT

## 2021-01-24 PROCEDURE — 87635 SARS-COV-2 COVID-19 AMP PRB: CPT

## 2021-01-24 PROCEDURE — 84155 ASSAY OF PROTEIN SERUM: CPT

## 2021-01-24 PROCEDURE — 80061 LIPID PANEL: CPT

## 2021-01-24 PROCEDURE — 84100 ASSAY OF PHOSPHORUS: CPT

## 2021-01-24 PROCEDURE — 83605 ASSAY OF LACTIC ACID: CPT

## 2021-01-24 PROCEDURE — 84484 ASSAY OF TROPONIN QUANT: CPT

## 2021-01-24 PROCEDURE — 97530 THERAPEUTIC ACTIVITIES: CPT

## 2021-01-24 PROCEDURE — 86140 C-REACTIVE PROTEIN: CPT

## 2021-01-24 PROCEDURE — 84300 ASSAY OF URINE SODIUM: CPT

## 2021-01-24 PROCEDURE — 82728 ASSAY OF FERRITIN: CPT

## 2021-01-24 PROCEDURE — 82607 VITAMIN B-12: CPT

## 2021-01-24 PROCEDURE — 80053 COMPREHEN METABOLIC PANEL: CPT

## 2021-01-24 PROCEDURE — 92610 EVALUATE SWALLOWING FUNCTION: CPT

## 2021-01-24 PROCEDURE — 87040 BLOOD CULTURE FOR BACTERIA: CPT

## 2021-01-24 PROCEDURE — 94640 AIRWAY INHALATION TREATMENT: CPT

## 2021-01-24 PROCEDURE — 84443 ASSAY THYROID STIM HORMONE: CPT

## 2021-01-24 PROCEDURE — 85025 COMPLETE CBC W/AUTO DIFF WBC: CPT

## 2021-01-24 PROCEDURE — 86334 IMMUNOFIX E-PHORESIS SERUM: CPT

## 2021-01-24 PROCEDURE — 80048 BASIC METABOLIC PNL TOTAL CA: CPT

## 2021-01-24 PROCEDURE — 82746 ASSAY OF FOLIC ACID SERUM: CPT

## 2021-01-24 PROCEDURE — 85027 COMPLETE CBC AUTOMATED: CPT

## 2021-01-24 PROCEDURE — 83540 ASSAY OF IRON: CPT

## 2021-01-24 PROCEDURE — 97162 PT EVAL MOD COMPLEX 30 MIN: CPT

## 2021-01-24 PROCEDURE — 87493 C DIFF AMPLIFIED PROBE: CPT

## 2021-01-24 PROCEDURE — 97110 THERAPEUTIC EXERCISES: CPT

## 2021-01-24 PROCEDURE — 83550 IRON BINDING TEST: CPT

## 2021-01-24 PROCEDURE — 83036 HEMOGLOBIN GLYCOSYLATED A1C: CPT

## 2021-01-24 PROCEDURE — 82962 GLUCOSE BLOOD TEST: CPT

## 2021-01-29 ENCOUNTER — INPATIENT (INPATIENT)
Facility: HOSPITAL | Age: 86
LOS: 6 days | Discharge: NURSING, REHAB OR LONG-TERM CARE | DRG: 871 | End: 2021-02-05
Attending: INTERNAL MEDICINE | Admitting: INTERNAL MEDICINE
Payer: MEDICARE

## 2021-01-29 VITALS
WEIGHT: 220.02 LBS | SYSTOLIC BLOOD PRESSURE: 157 MMHG | OXYGEN SATURATION: 97 % | HEIGHT: 64 IN | TEMPERATURE: 97 F | DIASTOLIC BLOOD PRESSURE: 62 MMHG | HEART RATE: 103 BPM | RESPIRATION RATE: 16 BRPM

## 2021-01-29 DIAGNOSIS — N39.0 URINARY TRACT INFECTION, SITE NOT SPECIFIED: ICD-10-CM

## 2021-01-29 DIAGNOSIS — R41.82 ALTERED MENTAL STATUS, UNSPECIFIED: ICD-10-CM

## 2021-01-29 DIAGNOSIS — Z29.9 ENCOUNTER FOR PROPHYLACTIC MEASURES, UNSPECIFIED: ICD-10-CM

## 2021-01-29 DIAGNOSIS — E16.2 HYPOGLYCEMIA, UNSPECIFIED: ICD-10-CM

## 2021-01-29 DIAGNOSIS — J45.909 UNSPECIFIED ASTHMA, UNCOMPLICATED: ICD-10-CM

## 2021-01-29 DIAGNOSIS — E11.9 TYPE 2 DIABETES MELLITUS WITHOUT COMPLICATIONS: ICD-10-CM

## 2021-01-29 DIAGNOSIS — R19.5 OTHER FECAL ABNORMALITIES: ICD-10-CM

## 2021-01-29 DIAGNOSIS — I10 ESSENTIAL (PRIMARY) HYPERTENSION: ICD-10-CM

## 2021-01-29 DIAGNOSIS — G93.40 ENCEPHALOPATHY, UNSPECIFIED: ICD-10-CM

## 2021-01-29 LAB
ALBUMIN SERPL ELPH-MCNC: 2.7 G/DL — LOW (ref 3.5–5)
ALP SERPL-CCNC: 100 U/L — SIGNIFICANT CHANGE UP (ref 40–120)
ALT FLD-CCNC: 40 U/L DA — SIGNIFICANT CHANGE UP (ref 10–60)
ANION GAP SERPL CALC-SCNC: 10 MMOL/L — SIGNIFICANT CHANGE UP (ref 5–17)
ANION GAP SERPL CALC-SCNC: 11 MMOL/L — SIGNIFICANT CHANGE UP (ref 5–17)
APPEARANCE UR: CLEAR — SIGNIFICANT CHANGE UP
AST SERPL-CCNC: 64 U/L — HIGH (ref 10–40)
BACTERIA # UR AUTO: ABNORMAL /HPF
BASOPHILS # BLD AUTO: 0.09 K/UL — SIGNIFICANT CHANGE UP (ref 0–0.2)
BASOPHILS NFR BLD AUTO: 0.7 % — SIGNIFICANT CHANGE UP (ref 0–2)
BILIRUB SERPL-MCNC: 0.8 MG/DL — SIGNIFICANT CHANGE UP (ref 0.2–1.2)
BILIRUB UR-MCNC: NEGATIVE — SIGNIFICANT CHANGE UP
BUN SERPL-MCNC: 15 MG/DL — SIGNIFICANT CHANGE UP (ref 7–18)
BUN SERPL-MCNC: 17 MG/DL — SIGNIFICANT CHANGE UP (ref 7–18)
CALCIUM SERPL-MCNC: 8.9 MG/DL — SIGNIFICANT CHANGE UP (ref 8.4–10.5)
CALCIUM SERPL-MCNC: 9.3 MG/DL — SIGNIFICANT CHANGE UP (ref 8.4–10.5)
CHLORIDE SERPL-SCNC: 103 MMOL/L — SIGNIFICANT CHANGE UP (ref 96–108)
CHLORIDE SERPL-SCNC: 104 MMOL/L — SIGNIFICANT CHANGE UP (ref 96–108)
CO2 SERPL-SCNC: 21 MMOL/L — LOW (ref 22–31)
CO2 SERPL-SCNC: 24 MMOL/L — SIGNIFICANT CHANGE UP (ref 22–31)
COLOR SPEC: YELLOW — SIGNIFICANT CHANGE UP
COMMENT - URINE: SIGNIFICANT CHANGE UP
CREAT SERPL-MCNC: 0.97 MG/DL — SIGNIFICANT CHANGE UP (ref 0.5–1.3)
CREAT SERPL-MCNC: 1.03 MG/DL — SIGNIFICANT CHANGE UP (ref 0.5–1.3)
DIFF PNL FLD: ABNORMAL
EOSINOPHIL # BLD AUTO: 0.03 K/UL — SIGNIFICANT CHANGE UP (ref 0–0.5)
EOSINOPHIL NFR BLD AUTO: 0.2 % — SIGNIFICANT CHANGE UP (ref 0–6)
EPI CELLS # UR: ABNORMAL /HPF
GLUCOSE BLDC GLUCOMTR-MCNC: 135 MG/DL — HIGH (ref 70–99)
GLUCOSE BLDC GLUCOMTR-MCNC: 142 MG/DL — HIGH (ref 70–99)
GLUCOSE BLDC GLUCOMTR-MCNC: 222 MG/DL — HIGH (ref 70–99)
GLUCOSE SERPL-MCNC: 14 MG/DL — CRITICAL LOW (ref 70–99)
GLUCOSE SERPL-MCNC: 64 MG/DL — LOW (ref 70–99)
GLUCOSE UR QL: 250
HCT VFR BLD CALC: 47.3 % — HIGH (ref 34.5–45)
HCT VFR BLD CALC: 47.7 % — HIGH (ref 34.5–45)
HGB BLD-MCNC: 14 G/DL — SIGNIFICANT CHANGE UP (ref 11.5–15.5)
HGB BLD-MCNC: 14.4 G/DL — SIGNIFICANT CHANGE UP (ref 11.5–15.5)
IMM GRANULOCYTES NFR BLD AUTO: 1.1 % — SIGNIFICANT CHANGE UP (ref 0–1.5)
KETONES UR-MCNC: ABNORMAL
LACTATE SERPL-SCNC: 1.2 MMOL/L — SIGNIFICANT CHANGE UP (ref 0.7–2)
LEUKOCYTE ESTERASE UR-ACNC: ABNORMAL
LIDOCAIN IGE QN: 199 U/L — SIGNIFICANT CHANGE UP (ref 73–393)
LYMPHOCYTES # BLD AUTO: 16.8 % — SIGNIFICANT CHANGE UP (ref 13–44)
LYMPHOCYTES # BLD AUTO: 2.11 K/UL — SIGNIFICANT CHANGE UP (ref 1–3.3)
MCHC RBC-ENTMCNC: 25.4 PG — LOW (ref 27–34)
MCHC RBC-ENTMCNC: 26.3 PG — LOW (ref 27–34)
MCHC RBC-ENTMCNC: 29.6 GM/DL — LOW (ref 32–36)
MCHC RBC-ENTMCNC: 30.2 GM/DL — LOW (ref 32–36)
MCV RBC AUTO: 85.8 FL — SIGNIFICANT CHANGE UP (ref 80–100)
MCV RBC AUTO: 87.2 FL — SIGNIFICANT CHANGE UP (ref 80–100)
MONOCYTES # BLD AUTO: 1.19 K/UL — HIGH (ref 0–0.9)
MONOCYTES NFR BLD AUTO: 9.5 % — SIGNIFICANT CHANGE UP (ref 2–14)
NEUTROPHILS # BLD AUTO: 9.01 K/UL — HIGH (ref 1.8–7.4)
NEUTROPHILS NFR BLD AUTO: 71.7 % — SIGNIFICANT CHANGE UP (ref 43–77)
NITRITE UR-MCNC: NEGATIVE — SIGNIFICANT CHANGE UP
NRBC # BLD: 0 /100 WBCS — SIGNIFICANT CHANGE UP (ref 0–0)
NRBC # BLD: 0 /100 WBCS — SIGNIFICANT CHANGE UP (ref 0–0)
OB PNL STL: POSITIVE
PH UR: 6.5 — SIGNIFICANT CHANGE UP (ref 5–8)
PLATELET # BLD AUTO: 307 K/UL — SIGNIFICANT CHANGE UP (ref 150–400)
PLATELET # BLD AUTO: 344 K/UL — SIGNIFICANT CHANGE UP (ref 150–400)
POTASSIUM SERPL-MCNC: 4.8 MMOL/L — SIGNIFICANT CHANGE UP (ref 3.5–5.3)
POTASSIUM SERPL-MCNC: 5 MMOL/L — SIGNIFICANT CHANGE UP (ref 3.5–5.3)
POTASSIUM SERPL-SCNC: 4.8 MMOL/L — SIGNIFICANT CHANGE UP (ref 3.5–5.3)
POTASSIUM SERPL-SCNC: 5 MMOL/L — SIGNIFICANT CHANGE UP (ref 3.5–5.3)
PROT SERPL-MCNC: 7.1 G/DL — SIGNIFICANT CHANGE UP (ref 6–8.3)
PROT UR-MCNC: 30 MG/DL
RBC # BLD: 5.47 M/UL — HIGH (ref 3.8–5.2)
RBC # BLD: 5.51 M/UL — HIGH (ref 3.8–5.2)
RBC # FLD: 15.4 % — HIGH (ref 10.3–14.5)
RBC # FLD: 15.4 % — HIGH (ref 10.3–14.5)
RBC CASTS # UR COMP ASSIST: SIGNIFICANT CHANGE UP /HPF (ref 0–2)
SARS-COV-2 RNA SPEC QL NAA+PROBE: DETECTED
SODIUM SERPL-SCNC: 135 MMOL/L — SIGNIFICANT CHANGE UP (ref 135–145)
SODIUM SERPL-SCNC: 138 MMOL/L — SIGNIFICANT CHANGE UP (ref 135–145)
SP GR SPEC: 1.01 — SIGNIFICANT CHANGE UP (ref 1.01–1.02)
TROPONIN I SERPL-MCNC: <0.015 NG/ML — SIGNIFICANT CHANGE UP (ref 0–0.04)
UROBILINOGEN FLD QL: NEGATIVE — SIGNIFICANT CHANGE UP
WBC # BLD: 11.05 K/UL — HIGH (ref 3.8–10.5)
WBC # BLD: 12.57 K/UL — HIGH (ref 3.8–10.5)
WBC # FLD AUTO: 11.05 K/UL — HIGH (ref 3.8–10.5)
WBC # FLD AUTO: 12.57 K/UL — HIGH (ref 3.8–10.5)
WBC UR QL: ABNORMAL /HPF (ref 0–5)

## 2021-01-29 PROCEDURE — 70450 CT HEAD/BRAIN W/O DYE: CPT | Mod: 26

## 2021-01-29 PROCEDURE — 99222 1ST HOSP IP/OBS MODERATE 55: CPT | Mod: CS

## 2021-01-29 PROCEDURE — 71045 X-RAY EXAM CHEST 1 VIEW: CPT | Mod: 26

## 2021-01-29 PROCEDURE — 99291 CRITICAL CARE FIRST HOUR: CPT | Mod: CS

## 2021-01-29 RX ORDER — CEFTRIAXONE 500 MG/1
1000 INJECTION, POWDER, FOR SOLUTION INTRAMUSCULAR; INTRAVENOUS ONCE
Refills: 0 | Status: COMPLETED | OUTPATIENT
Start: 2021-01-29 | End: 2021-01-29

## 2021-01-29 RX ORDER — PANTOPRAZOLE SODIUM 20 MG/1
40 TABLET, DELAYED RELEASE ORAL
Refills: 0 | Status: DISCONTINUED | OUTPATIENT
Start: 2021-01-29 | End: 2021-01-29

## 2021-01-29 RX ORDER — GABAPENTIN 400 MG/1
300 CAPSULE ORAL EVERY 12 HOURS
Refills: 0 | Status: DISCONTINUED | OUTPATIENT
Start: 2021-01-29 | End: 2021-02-05

## 2021-01-29 RX ORDER — PANTOPRAZOLE SODIUM 20 MG/1
40 TABLET, DELAYED RELEASE ORAL EVERY 12 HOURS
Refills: 0 | Status: DISCONTINUED | OUTPATIENT
Start: 2021-01-29 | End: 2021-02-01

## 2021-01-29 RX ORDER — ALBUTEROL 90 UG/1
2 AEROSOL, METERED ORAL
Qty: 0 | Refills: 0 | DISCHARGE

## 2021-01-29 RX ORDER — FUROSEMIDE 40 MG
1 TABLET ORAL
Qty: 0 | Refills: 0 | DISCHARGE

## 2021-01-29 RX ORDER — FERROUS SULFATE 325(65) MG
1 TABLET ORAL
Qty: 0 | Refills: 0 | DISCHARGE

## 2021-01-29 RX ORDER — LISINOPRIL/HYDROCHLOROTHIAZIDE 10-12.5 MG
1 TABLET ORAL
Qty: 0 | Refills: 0 | DISCHARGE

## 2021-01-29 RX ORDER — CEFTRIAXONE 500 MG/1
1000 INJECTION, POWDER, FOR SOLUTION INTRAMUSCULAR; INTRAVENOUS EVERY 24 HOURS
Refills: 0 | Status: COMPLETED | OUTPATIENT
Start: 2021-01-29 | End: 2021-02-04

## 2021-01-29 RX ORDER — POLYETHYLENE GLYCOL 3350 17 G/17G
17 POWDER, FOR SOLUTION ORAL DAILY
Refills: 0 | Status: DISCONTINUED | OUTPATIENT
Start: 2021-01-29 | End: 2021-02-05

## 2021-01-29 RX ORDER — CELECOXIB 200 MG/1
0 CAPSULE ORAL
Qty: 0 | Refills: 0 | DISCHARGE

## 2021-01-29 RX ORDER — POLYETHYLENE GLYCOL 3350 17 G/17G
17 POWDER, FOR SOLUTION ORAL
Qty: 0 | Refills: 0 | DISCHARGE

## 2021-01-29 RX ORDER — ASPIRIN/CALCIUM CARB/MAGNESIUM 324 MG
81 TABLET ORAL DAILY
Refills: 0 | Status: DISCONTINUED | OUTPATIENT
Start: 2021-01-29 | End: 2021-02-05

## 2021-01-29 RX ORDER — MONTELUKAST 4 MG/1
10 TABLET, CHEWABLE ORAL DAILY
Refills: 0 | Status: DISCONTINUED | OUTPATIENT
Start: 2021-01-29 | End: 2021-02-05

## 2021-01-29 RX ORDER — FLUTICASONE PROPIONATE AND SALMETEROL 50; 250 UG/1; UG/1
1 POWDER ORAL; RESPIRATORY (INHALATION)
Qty: 0 | Refills: 0 | DISCHARGE

## 2021-01-29 RX ORDER — BUDESONIDE AND FORMOTEROL FUMARATE DIHYDRATE 160; 4.5 UG/1; UG/1
2 AEROSOL RESPIRATORY (INHALATION)
Refills: 0 | Status: DISCONTINUED | OUTPATIENT
Start: 2021-01-29 | End: 2021-02-05

## 2021-01-29 RX ORDER — SODIUM CHLORIDE 9 MG/ML
1700 INJECTION INTRAMUSCULAR; INTRAVENOUS; SUBCUTANEOUS ONCE
Refills: 0 | Status: COMPLETED | OUTPATIENT
Start: 2021-01-29 | End: 2021-01-29

## 2021-01-29 RX ORDER — DEXTROSE 50 % IN WATER 50 %
50 SYRINGE (ML) INTRAVENOUS ONCE
Refills: 0 | Status: COMPLETED | OUTPATIENT
Start: 2021-01-29 | End: 2021-01-29

## 2021-01-29 RX ORDER — SENNA PLUS 8.6 MG/1
2 TABLET ORAL AT BEDTIME
Refills: 0 | Status: DISCONTINUED | OUTPATIENT
Start: 2021-01-29 | End: 2021-02-05

## 2021-01-29 RX ORDER — FUROSEMIDE 40 MG
20 TABLET ORAL DAILY
Refills: 0 | Status: DISCONTINUED | OUTPATIENT
Start: 2021-01-29 | End: 2021-01-29

## 2021-01-29 RX ORDER — ALBUTEROL 90 UG/1
2 AEROSOL, METERED ORAL EVERY 6 HOURS
Refills: 0 | Status: DISCONTINUED | OUTPATIENT
Start: 2021-01-29 | End: 2021-02-05

## 2021-01-29 RX ORDER — ASPIRIN/CALCIUM CARB/MAGNESIUM 324 MG
1 TABLET ORAL
Qty: 0 | Refills: 0 | DISCHARGE

## 2021-01-29 RX ORDER — SODIUM CHLORIDE 9 MG/ML
1000 INJECTION, SOLUTION INTRAVENOUS
Refills: 0 | Status: DISCONTINUED | OUTPATIENT
Start: 2021-01-29 | End: 2021-01-29

## 2021-01-29 RX ORDER — SODIUM CHLORIDE 9 MG/ML
1000 INJECTION, SOLUTION INTRAVENOUS
Refills: 0 | Status: DISCONTINUED | OUTPATIENT
Start: 2021-01-29 | End: 2021-02-05

## 2021-01-29 RX ORDER — ACETAMINOPHEN 500 MG
650 TABLET ORAL EVERY 6 HOURS
Refills: 0 | Status: DISCONTINUED | OUTPATIENT
Start: 2021-01-29 | End: 2021-02-05

## 2021-01-29 RX ORDER — LISINOPRIL 2.5 MG/1
1 TABLET ORAL
Qty: 0 | Refills: 0 | DISCHARGE

## 2021-01-29 RX ADMIN — BUDESONIDE AND FORMOTEROL FUMARATE DIHYDRATE 2 PUFF(S): 160; 4.5 AEROSOL RESPIRATORY (INHALATION) at 21:34

## 2021-01-29 RX ADMIN — CEFTRIAXONE 100 MILLIGRAM(S): 500 INJECTION, POWDER, FOR SOLUTION INTRAMUSCULAR; INTRAVENOUS at 21:33

## 2021-01-29 RX ADMIN — SODIUM CHLORIDE 1700 MILLILITER(S): 9 INJECTION INTRAMUSCULAR; INTRAVENOUS; SUBCUTANEOUS at 12:20

## 2021-01-29 RX ADMIN — SODIUM CHLORIDE 70 MILLILITER(S): 9 INJECTION, SOLUTION INTRAVENOUS at 21:34

## 2021-01-29 RX ADMIN — SODIUM CHLORIDE 70 MILLILITER(S): 9 INJECTION, SOLUTION INTRAVENOUS at 20:09

## 2021-01-29 RX ADMIN — Medication 50 MILLILITER(S): at 15:15

## 2021-01-29 RX ADMIN — CEFTRIAXONE 100 MILLIGRAM(S): 500 INJECTION, POWDER, FOR SOLUTION INTRAMUSCULAR; INTRAVENOUS at 15:30

## 2021-01-29 RX ADMIN — SODIUM CHLORIDE 1700 MILLILITER(S): 9 INJECTION INTRAMUSCULAR; INTRAVENOUS; SUBCUTANEOUS at 11:20

## 2021-01-29 NOTE — PATIENT PROFILE ADULT - RELATIONSHIP TO PATIENT
Pt sleeping, awakens easily to name, denies being suicidal at this time   Remains on constant observation     Nena Benjamin RN  01/24/20 [id number] Pt confused/not receptive

## 2021-01-29 NOTE — ED PROVIDER NOTE - CLINICAL SUMMARY MEDICAL DECISION MAKING FREE TEXT BOX
sent in from home for hypoglycemia around 30 with ems giving dextrose. pt prior record shows aox3, pt is awake but unable to provide hx recently admitted for covid. will obtain labs, ct head, ua, rpt fs, cxr, ekg, possibly due to sepsis from uti vs covid encephalopathy vs ich vs gib -

## 2021-01-29 NOTE — ED ADULT NURSE NOTE - OBJECTIVE STATEMENT
Patient moaning a lot during physical care but otherwise not communicating.  Cough noted.  Generalized weakness and incontinent of dark tarry stools on arrival.

## 2021-01-29 NOTE — H&P ADULT - PROBLEM SELECTOR PLAN 6
patient is on lisinopril and lasix at home  lisinopril held, c/w lasix   monitor bp patient is on lisinopril and lasix at home  lisinopril and lasix held for now  monitor bp

## 2021-01-29 NOTE — H&P ADULT - ASSESSMENT
Patient, a 89 y/o female Rivera Speaking, with PMHx of asthma, HTN, DM presents to the ED with hypoglycemia of FS 30 in field, 64 in ED. s/p D50.   COVID +. Occult +. WBC count 12.5k, UA +. CXR shows interstitial markings. CTH was negative.    Patient admitted for hypoglycemia.

## 2021-01-29 NOTE — H&P ADULT - PROBLEM SELECTOR PLAN 4
occult + in ED  Hb stable  start on protonix bid  follow repeat CBC occult + in ED  Hb stable  start on protonix bid  follow repeat CBC  GI consult Dr Willard occult + in ED  Hb stable  start on protonix bid  follow repeat CBC  GI consult Dr Ayoub

## 2021-01-29 NOTE — H&P ADULT - PROBLEM SELECTOR PLAN 2
pt reported to have FS 30 in field, 64 in ED. s/p D50> improved to 117  pt was discharged on 70/30 insulin 40u bid while on steroids  patient finished her course of steroids  patient is also on glimeperide 2mg bid and Qtern as per pharmacy  hypoglycemia likely due to over medication for DM  hold all pt reported to have FS 30 in field, 64 in ED. s/p D50> improved to 117  pt was discharged on 70/30 insulin 40u bid while on steroids  patient finished her course of steroids  patient is also on glimeperide 2mg bid and Qtern as per pharmacy  hypoglycemia likely due to over medication for DM  hold all hypoglycemics for now  monitor fingerstick q4h for now

## 2021-01-29 NOTE — ED PROVIDER NOTE - OBJECTIVE STATEMENT
89 yr old female with hx of HTN, DM on novolog 70/30 40 am and 30 units pm with lantus, asthma and covid presents to ed for hypoglycemia. pt not verbal and unable to reach son. esm states fs 30 and given d10? now in ed 104.

## 2021-01-29 NOTE — PATIENT PROFILE ADULT - VISION (WITH CORRECTIVE LENSES IF THE PATIENT USUALLY WEARS THEM):
Pt confused/not receptive/Normal vision: sees adequately in most situations; can see medication labels, newsprint

## 2021-01-29 NOTE — PATIENT PROFILE ADULT - DO YOU FEEL THREATENED BY OTHERS?
Pt presents with clinical signs of an oropharyngeal dysphagia. Pt presents with clinical signs of an oropharyngeal dysphagia. Oral stage is marked by delayed oral transit of purees. Pharyngeal stage is marked by a delay in swallow initiation and reduced laryngeal elevation upon palpation. Delayed wet/gurgly throat clears post puree thin/thick which may be suggestive of penetration/aspiration. Would suggest optimizing patient and re-evaluation at bedside vs. instrumental examination only when deemed appropriate. no

## 2021-01-29 NOTE — H&P ADULT - HISTORY OF PRESENT ILLNESS
Patient, a 89 y/o female Rivera Speaking, with PMHx of asthma, HTN, DM presents to the ED with hypoglycemia. Patient's fingerstick reported to be 30 in field and given dextrose. Fingerstick in ED was 104, later dropped to 63 and she received push of D50. Patient not answering questions even in Rivera. Patient was recently admitted to [facility] for COVID PNA and completed course of steroids. At that time, patient was recommended JAYOSN by PT,  but patient was discharged home without home oxygen. Off note, patient was discharged on 70/30 insulin 40 units two times a day while on prednisone but patient finished 5 day course.     Tried to call son Henrique Cosby at [phone] but was unable to reach him. GOC not known so patient remains full code for now.    In ED,  Vital Signs Last 24 Hrs  T(C): 36.8 (29 Jan 2021 15:57), Max: 37.1 (29 Jan 2021 10:59)  T(F): 98.2 (29 Jan 2021 15:57), Max: 98.8 (29 Jan 2021 10:59)  HR: 108 (29 Jan 2021 17:08) (99 - 108)  BP: 126/71 (29 Jan 2021 15:57) (117/68 - 157/62)  BP(mean): --  RR: 22 (29 Jan 2021 17:08) (16 - 22)  SpO2: 96% (29 Jan 2021 17:08) (96% - 98%)  EKG showed sinus tachy. Occult and COVID +. FS 64, pt received D50 and 1.5L bolus.

## 2021-01-29 NOTE — H&P ADULT - ATTENDING COMMENTS
Patient seen and examined at bedside.  (Rivera  ID: [id number])  Agree with above medical resident's assessment and plan.  Dr. Carrizales to continue patient's care in the AM.

## 2021-01-29 NOTE — ED PROVIDER NOTE - CARE PLAN
Principal Discharge DX:	AMS (altered mental status)  Secondary Diagnosis:	Hypoglycemia  Secondary Diagnosis:	UTI (urinary tract infection)

## 2021-01-29 NOTE — ED PROVIDER NOTE - PROGRESS NOTE DETAILS
Aragon: unable to reach son. multiple attempt made. pt awake confused. ua uti. known covid + and was treated in ed. fs 63 d50 given. bmp low CO2 ~ 21. cth no acute findings. occult + with stable h/h and normal BUN  admit to med for ams possibly due to uti vs covid delirium- will rpt fs at 4p

## 2021-01-29 NOTE — H&P ADULT - PROBLEM SELECTOR PLAN 1
patient presented with confusion  AAO X 3 at baseline according to chart but on my exam, patient not answering questions or obeying commands  CTH was negative  metabolic encephalopathy due to hypoglycemia worsened by infection (UTI) likely  correct underlying cause  electrolytes WNL   CXR shows interstitial infiltrates  UA +  s/p 1.5L bolus and rocephin in ED  will start on rocephin   follow urine and blood cultures

## 2021-01-29 NOTE — H&P ADULT - NSHPPHYSICALEXAM_GEN_ALL_CORE
PHYSICAL EXAM:  GENERAL: NAD, lying in bed comfortably  HEAD:  Atraumatic, Normocephalic  EYES: EOMI, PERRLA, conjunctiva and sclera clear  ENT: Moist mucous membranes  NECK: Supple, No JVD  CHEST/LUNG: scattered rales. Unlabored respirations  HEART: Regular rate and rhythm; No murmurs, rubs, or gallops  ABDOMEN: Bowel sounds present; Soft, Nontender, Nondistended.  EXTREMITIES: +1 pitting edema of LE  NERVOUS SYSTEM:  awake. not following commands, says yes in Rivera   SKIN: No rashes or lesions

## 2021-01-30 LAB
A1C WITH ESTIMATED AVERAGE GLUCOSE RESULT: 7.8 % — HIGH (ref 4–5.6)
ALBUMIN SERPL ELPH-MCNC: 2.7 G/DL — LOW (ref 3.5–5)
ALP SERPL-CCNC: 90 U/L — SIGNIFICANT CHANGE UP (ref 40–120)
ALT FLD-CCNC: 32 U/L DA — SIGNIFICANT CHANGE UP (ref 10–60)
ANION GAP SERPL CALC-SCNC: 11 MMOL/L — SIGNIFICANT CHANGE UP (ref 5–17)
AST SERPL-CCNC: 36 U/L — SIGNIFICANT CHANGE UP (ref 10–40)
BASOPHILS # BLD AUTO: 0.08 K/UL — SIGNIFICANT CHANGE UP (ref 0–0.2)
BASOPHILS NFR BLD AUTO: 0.9 % — SIGNIFICANT CHANGE UP (ref 0–2)
BILIRUB SERPL-MCNC: 0.8 MG/DL — SIGNIFICANT CHANGE UP (ref 0.2–1.2)
BUN SERPL-MCNC: 17 MG/DL — SIGNIFICANT CHANGE UP (ref 7–18)
CALCIUM SERPL-MCNC: 8.8 MG/DL — SIGNIFICANT CHANGE UP (ref 8.4–10.5)
CHLORIDE SERPL-SCNC: 108 MMOL/L — SIGNIFICANT CHANGE UP (ref 96–108)
CHOLEST SERPL-MCNC: 155 MG/DL — SIGNIFICANT CHANGE UP
CO2 SERPL-SCNC: 21 MMOL/L — LOW (ref 22–31)
CREAT SERPL-MCNC: 1.16 MG/DL — SIGNIFICANT CHANGE UP (ref 0.5–1.3)
CULTURE RESULTS: SIGNIFICANT CHANGE UP
EOSINOPHIL # BLD AUTO: 0.02 K/UL — SIGNIFICANT CHANGE UP (ref 0–0.5)
EOSINOPHIL NFR BLD AUTO: 0.2 % — SIGNIFICANT CHANGE UP (ref 0–6)
ERYTHROCYTE [SEDIMENTATION RATE] IN BLOOD: 29 MM/HR — HIGH (ref 0–20)
ESTIMATED AVERAGE GLUCOSE: 177 MG/DL — HIGH (ref 68–114)
FOLATE SERPL-MCNC: >20 NG/ML — SIGNIFICANT CHANGE UP
GLUCOSE BLDC GLUCOMTR-MCNC: 127 MG/DL — HIGH (ref 70–99)
GLUCOSE BLDC GLUCOMTR-MCNC: 163 MG/DL — HIGH (ref 70–99)
GLUCOSE BLDC GLUCOMTR-MCNC: 167 MG/DL — HIGH (ref 70–99)
GLUCOSE BLDC GLUCOMTR-MCNC: 176 MG/DL — HIGH (ref 70–99)
GLUCOSE BLDC GLUCOMTR-MCNC: 184 MG/DL — HIGH (ref 70–99)
GLUCOSE BLDC GLUCOMTR-MCNC: 205 MG/DL — HIGH (ref 70–99)
GLUCOSE BLDC GLUCOMTR-MCNC: 212 MG/DL — HIGH (ref 70–99)
GLUCOSE SERPL-MCNC: 220 MG/DL — HIGH (ref 70–99)
HCT VFR BLD CALC: 41.2 % — SIGNIFICANT CHANGE UP (ref 34.5–45)
HDLC SERPL-MCNC: 66 MG/DL — SIGNIFICANT CHANGE UP
HGB BLD-MCNC: 12.6 G/DL — SIGNIFICANT CHANGE UP (ref 11.5–15.5)
IMM GRANULOCYTES NFR BLD AUTO: 1.7 % — HIGH (ref 0–1.5)
LIPID PNL WITH DIRECT LDL SERPL: 67 MG/DL — SIGNIFICANT CHANGE UP
LYMPHOCYTES # BLD AUTO: 1.74 K/UL — SIGNIFICANT CHANGE UP (ref 1–3.3)
LYMPHOCYTES # BLD AUTO: 19.2 % — SIGNIFICANT CHANGE UP (ref 13–44)
MAGNESIUM SERPL-MCNC: 2.2 MG/DL — SIGNIFICANT CHANGE UP (ref 1.6–2.6)
MCHC RBC-ENTMCNC: 26 PG — LOW (ref 27–34)
MCHC RBC-ENTMCNC: 30.6 GM/DL — LOW (ref 32–36)
MCV RBC AUTO: 84.9 FL — SIGNIFICANT CHANGE UP (ref 80–100)
MONOCYTES # BLD AUTO: 0.97 K/UL — HIGH (ref 0–0.9)
MONOCYTES NFR BLD AUTO: 10.7 % — SIGNIFICANT CHANGE UP (ref 2–14)
NEUTROPHILS # BLD AUTO: 6.09 K/UL — SIGNIFICANT CHANGE UP (ref 1.8–7.4)
NEUTROPHILS NFR BLD AUTO: 67.3 % — SIGNIFICANT CHANGE UP (ref 43–77)
NON HDL CHOLESTEROL: 89 MG/DL — SIGNIFICANT CHANGE UP
NRBC # BLD: 0 /100 WBCS — SIGNIFICANT CHANGE UP (ref 0–0)
PHOSPHATE SERPL-MCNC: 3.3 MG/DL — SIGNIFICANT CHANGE UP (ref 2.5–4.5)
PLATELET # BLD AUTO: 288 K/UL — SIGNIFICANT CHANGE UP (ref 150–400)
POTASSIUM SERPL-MCNC: 4.1 MMOL/L — SIGNIFICANT CHANGE UP (ref 3.5–5.3)
POTASSIUM SERPL-SCNC: 4.1 MMOL/L — SIGNIFICANT CHANGE UP (ref 3.5–5.3)
PROT SERPL-MCNC: 6.5 G/DL — SIGNIFICANT CHANGE UP (ref 6–8.3)
RBC # BLD: 4.85 M/UL — SIGNIFICANT CHANGE UP (ref 3.8–5.2)
RBC # FLD: 15.2 % — HIGH (ref 10.3–14.5)
SARS-COV-2 IGG SERPL QL IA: POSITIVE
SARS-COV-2 IGM SERPL IA-ACNC: 140 INDEX — HIGH
SODIUM SERPL-SCNC: 140 MMOL/L — SIGNIFICANT CHANGE UP (ref 135–145)
SPECIMEN SOURCE: SIGNIFICANT CHANGE UP
TRIGL SERPL-MCNC: 108 MG/DL — SIGNIFICANT CHANGE UP
TROPONIN I SERPL-MCNC: 0.03 NG/ML — SIGNIFICANT CHANGE UP (ref 0–0.04)
TSH SERPL-MCNC: 1 UU/ML — SIGNIFICANT CHANGE UP (ref 0.34–4.82)
VIT B12 SERPL-MCNC: 290 PG/ML — SIGNIFICANT CHANGE UP (ref 232–1245)
WBC # BLD: 9.05 K/UL — SIGNIFICANT CHANGE UP (ref 3.8–10.5)
WBC # FLD AUTO: 9.05 K/UL — SIGNIFICANT CHANGE UP (ref 3.8–10.5)

## 2021-01-30 RX ADMIN — GABAPENTIN 300 MILLIGRAM(S): 400 CAPSULE ORAL at 17:15

## 2021-01-30 RX ADMIN — MONTELUKAST 10 MILLIGRAM(S): 4 TABLET, CHEWABLE ORAL at 10:44

## 2021-01-30 RX ADMIN — PANTOPRAZOLE SODIUM 40 MILLIGRAM(S): 20 TABLET, DELAYED RELEASE ORAL at 17:15

## 2021-01-30 RX ADMIN — BUDESONIDE AND FORMOTEROL FUMARATE DIHYDRATE 2 PUFF(S): 160; 4.5 AEROSOL RESPIRATORY (INHALATION) at 10:44

## 2021-01-30 RX ADMIN — Medication 81 MILLIGRAM(S): at 10:44

## 2021-01-30 RX ADMIN — BUDESONIDE AND FORMOTEROL FUMARATE DIHYDRATE 2 PUFF(S): 160; 4.5 AEROSOL RESPIRATORY (INHALATION) at 21:37

## 2021-01-30 RX ADMIN — CEFTRIAXONE 100 MILLIGRAM(S): 500 INJECTION, POWDER, FOR SOLUTION INTRAMUSCULAR; INTRAVENOUS at 21:37

## 2021-01-30 RX ADMIN — Medication 1 TABLET(S): at 12:46

## 2021-01-30 NOTE — PROGRESS NOTE ADULT - ASSESSMENT
Patient, a 89 y/o female Rivera Speaking, with PMHx of asthma, HTN, DM presents to the ED with hypoglycemia. Patient's fingerstick reported to be 30 in field and given dextrose. Fingerstick in ED was 104, later dropped to 63 and she received push of D50. Patient not answering questions even in Rivera. Patient was recently admitted to [facility] for COVID PNA and completed course of steroids. At that time, patient was recommended JAYSON by PT,  but patient was discharged home without home oxygen. Off note, patient was discharged on 70/30 insulin 40 units two times a day while on prednisone but patient finished 5 day course.   covering for Dr lane  seen and examined  brought in second to hypoglycemia  and has covid pos   has htn, dm, asthma  confused  lithargic not in any distress.   moving extremities  lungs clear heart abd wnl  labs noted  ct head neg   watch blood sugars   covid pneumonia   poor prognosis

## 2021-01-31 LAB
ALBUMIN SERPL ELPH-MCNC: 2.5 G/DL — LOW (ref 3.5–5)
ALP SERPL-CCNC: 82 U/L — SIGNIFICANT CHANGE UP (ref 40–120)
ALT FLD-CCNC: 28 U/L DA — SIGNIFICANT CHANGE UP (ref 10–60)
ANION GAP SERPL CALC-SCNC: 10 MMOL/L — SIGNIFICANT CHANGE UP (ref 5–17)
AST SERPL-CCNC: 26 U/L — SIGNIFICANT CHANGE UP (ref 10–40)
BASOPHILS # BLD AUTO: 0.08 K/UL — SIGNIFICANT CHANGE UP (ref 0–0.2)
BASOPHILS NFR BLD AUTO: 1 % — SIGNIFICANT CHANGE UP (ref 0–2)
BILIRUB SERPL-MCNC: 0.6 MG/DL — SIGNIFICANT CHANGE UP (ref 0.2–1.2)
BUN SERPL-MCNC: 15 MG/DL — SIGNIFICANT CHANGE UP (ref 7–18)
CALCIUM SERPL-MCNC: 8.7 MG/DL — SIGNIFICANT CHANGE UP (ref 8.4–10.5)
CHLORIDE SERPL-SCNC: 107 MMOL/L — SIGNIFICANT CHANGE UP (ref 96–108)
CO2 SERPL-SCNC: 23 MMOL/L — SIGNIFICANT CHANGE UP (ref 22–31)
CREAT SERPL-MCNC: 1.02 MG/DL — SIGNIFICANT CHANGE UP (ref 0.5–1.3)
EOSINOPHIL # BLD AUTO: 0.11 K/UL — SIGNIFICANT CHANGE UP (ref 0–0.5)
EOSINOPHIL NFR BLD AUTO: 1.4 % — SIGNIFICANT CHANGE UP (ref 0–6)
GLUCOSE BLDC GLUCOMTR-MCNC: 119 MG/DL — HIGH (ref 70–99)
GLUCOSE BLDC GLUCOMTR-MCNC: 131 MG/DL — HIGH (ref 70–99)
GLUCOSE BLDC GLUCOMTR-MCNC: 132 MG/DL — HIGH (ref 70–99)
GLUCOSE BLDC GLUCOMTR-MCNC: 141 MG/DL — HIGH (ref 70–99)
GLUCOSE BLDC GLUCOMTR-MCNC: 171 MG/DL — HIGH (ref 70–99)
GLUCOSE SERPL-MCNC: 151 MG/DL — HIGH (ref 70–99)
HCT VFR BLD CALC: 39.2 % — SIGNIFICANT CHANGE UP (ref 34.5–45)
HGB BLD-MCNC: 12.1 G/DL — SIGNIFICANT CHANGE UP (ref 11.5–15.5)
IMM GRANULOCYTES NFR BLD AUTO: 1.6 % — HIGH (ref 0–1.5)
LYMPHOCYTES # BLD AUTO: 1.93 K/UL — SIGNIFICANT CHANGE UP (ref 1–3.3)
LYMPHOCYTES # BLD AUTO: 24.3 % — SIGNIFICANT CHANGE UP (ref 13–44)
MAGNESIUM SERPL-MCNC: 1.9 MG/DL — SIGNIFICANT CHANGE UP (ref 1.6–2.6)
MCHC RBC-ENTMCNC: 26.1 PG — LOW (ref 27–34)
MCHC RBC-ENTMCNC: 30.9 GM/DL — LOW (ref 32–36)
MCV RBC AUTO: 84.5 FL — SIGNIFICANT CHANGE UP (ref 80–100)
MONOCYTES # BLD AUTO: 0.72 K/UL — SIGNIFICANT CHANGE UP (ref 0–0.9)
MONOCYTES NFR BLD AUTO: 9.1 % — SIGNIFICANT CHANGE UP (ref 2–14)
NEUTROPHILS # BLD AUTO: 4.97 K/UL — SIGNIFICANT CHANGE UP (ref 1.8–7.4)
NEUTROPHILS NFR BLD AUTO: 62.6 % — SIGNIFICANT CHANGE UP (ref 43–77)
NRBC # BLD: 0 /100 WBCS — SIGNIFICANT CHANGE UP (ref 0–0)
PHOSPHATE SERPL-MCNC: 3 MG/DL — SIGNIFICANT CHANGE UP (ref 2.5–4.5)
PLATELET # BLD AUTO: 265 K/UL — SIGNIFICANT CHANGE UP (ref 150–400)
POTASSIUM SERPL-MCNC: 3.9 MMOL/L — SIGNIFICANT CHANGE UP (ref 3.5–5.3)
POTASSIUM SERPL-SCNC: 3.9 MMOL/L — SIGNIFICANT CHANGE UP (ref 3.5–5.3)
PROT SERPL-MCNC: 5.9 G/DL — LOW (ref 6–8.3)
RBC # BLD: 4.64 M/UL — SIGNIFICANT CHANGE UP (ref 3.8–5.2)
RBC # FLD: 14.9 % — HIGH (ref 10.3–14.5)
SODIUM SERPL-SCNC: 140 MMOL/L — SIGNIFICANT CHANGE UP (ref 135–145)
WBC # BLD: 7.94 K/UL — SIGNIFICANT CHANGE UP (ref 3.8–10.5)
WBC # FLD AUTO: 7.94 K/UL — SIGNIFICANT CHANGE UP (ref 3.8–10.5)

## 2021-01-31 RX ADMIN — BUDESONIDE AND FORMOTEROL FUMARATE DIHYDRATE 2 PUFF(S): 160; 4.5 AEROSOL RESPIRATORY (INHALATION) at 21:23

## 2021-01-31 RX ADMIN — PANTOPRAZOLE SODIUM 40 MILLIGRAM(S): 20 TABLET, DELAYED RELEASE ORAL at 17:53

## 2021-01-31 RX ADMIN — Medication 81 MILLIGRAM(S): at 13:18

## 2021-01-31 RX ADMIN — PANTOPRAZOLE SODIUM 40 MILLIGRAM(S): 20 TABLET, DELAYED RELEASE ORAL at 05:45

## 2021-01-31 RX ADMIN — BUDESONIDE AND FORMOTEROL FUMARATE DIHYDRATE 2 PUFF(S): 160; 4.5 AEROSOL RESPIRATORY (INHALATION) at 13:18

## 2021-01-31 RX ADMIN — MONTELUKAST 10 MILLIGRAM(S): 4 TABLET, CHEWABLE ORAL at 13:18

## 2021-01-31 RX ADMIN — GABAPENTIN 300 MILLIGRAM(S): 400 CAPSULE ORAL at 05:45

## 2021-01-31 RX ADMIN — GABAPENTIN 300 MILLIGRAM(S): 400 CAPSULE ORAL at 17:53

## 2021-01-31 RX ADMIN — CEFTRIAXONE 100 MILLIGRAM(S): 500 INJECTION, POWDER, FOR SOLUTION INTRAMUSCULAR; INTRAVENOUS at 21:22

## 2021-01-31 RX ADMIN — Medication 1 TABLET(S): at 13:18

## 2021-01-31 NOTE — PROGRESS NOTE ADULT - ASSESSMENT
_________________________________________________________________________________________  ========>>  M E D I C A L   A T T E N D I N G    F O L L O W  U P  N O T E  <<=========  -----------------------------------------------------------------------------------------------------    - Patient seen and examined by me earlier today.  (covering today)   - In summary,  PAULA MARIO is a 89y year old woman admitted with AMS, hypoglycemia   - Patient today overall doing ok, comfortable    ==================>> REVIEW OF SYSTEM <<=================    limited ROS, pt poor historian    ==================>> PHYSICAL EXAM <<=================    GEN: responsive, awake, NAD , comfortable  HEENT: NCAT, PERRL, MMM  Neck: supple , no JVD appreciated  CVS: S1S2 , regular , No M/R/G appreciated  PULM: limited exam as not taking deep breaths   ABD.: soft. non tender, non distended  Extrem: intact pulses , no edema       ==================>> MEDICATIONS <<====================    MEDICATIONS  (STANDING):  aspirin enteric coated 81 milliGRAM(s) Oral daily  budesonide  80 MICROgram(s)/formoterol 4.5 MICROgram(s) Inhaler 2 Puff(s) Inhalation two times a day  cefTRIAXone   IVPB 1000 milliGRAM(s) IV Intermittent every 24 hours  dextrose 5% + sodium chloride 0.45%. 1000 milliLiter(s) (70 mL/Hr) IV Continuous <Continuous>  gabapentin 300 milliGRAM(s) Oral every 12 hours  montelukast 10 milliGRAM(s) Oral daily  multivitamin 1 Tablet(s) Oral daily  pantoprazole  Injectable 40 milliGRAM(s) IV Push every 12 hours  senna 2 Tablet(s) Oral at bedtime    MEDICATIONS  (PRN):  acetaminophen   Tablet .. 650 milliGRAM(s) Oral every 6 hours PRN Temp greater or equal to 38C (100.4F), Moderate Pain (4 - 6)  ALBUTerol    90 MICROgram(s) HFA Inhaler 2 Puff(s) Inhalation every 6 hours PRN Shortness of Breath and/or Wheezing  polyethylene glycol 3350 17 Gram(s) Oral daily PRN Constipation    ___________  Active diet:  Diet, Clear Liquid  ___________________    ==================>> VITAL SIGNS <<==================    T(C): 36.7 (21 @ 08:33), Max: 36.7 (21 @ 08:33)  HR: 75 (21 @ 08:33) (75 - 96)  BP: 134/47 (21 @ 08:33) (130/70 - 134/47)  BP(mean): 69 (21 @ 08:33)  RR: 18 (21 @ 08:33) (17 - 18)  SpO2: 100% (21 @ 08:33) (96% - 100%)     POCT Blood Glucose.: 131 mg/dL (2021 12:01)  POCT Blood Glucose.: 132 mg/dL (2021 06:15)  POCT Blood Glucose.: 171 mg/dL (2021 02:37)  POCT Blood Glucose.: 167 mg/dL (2021 21:18)  POCT Blood Glucose.: 176 mg/dL (2021 18:35)  POCT Blood Glucose.: 212 mg/dL (2021 14:05)       ==================>> LAB AND IMAGING <<==================                        12.1   7.94  )-----------( 265      ( 2021 07:10 )             39.2            140  |  107  |  15  ----------------------------<  151<H>  3.9   |  23  |  1.02    Ca    8.7      2021 07:10  Phos  3.0       Mg     1.9         TPro  5.9<L>  /  Alb  2.5<L>  /  TBili  0.6  /  DBili  x   /  AST  26  /  ALT  28  /  AlkPhos  82                  CARDIAC MARKERS ( 2021 06:48 )  0.031 ng/mL / x     / x     / x     / x           Urinalysis Basic - ( 2021 14:52 )  Color: Yellow / Appearance: Clear / S.010 / pH: x  Gluc: x / Ketone: Small  / Bili: Negative / Urobili: Negative   Blood: x / Protein: 30 mg/dL / Nitrite: Negative   Leuk Esterase: Moderate / RBC: 0-2 /HPF / WBC 26-50 /HPF   Sq Epi: x / Non Sq Epi: Occasional /HPF / Bacteria: Few /HPF    TSH:      1.00   (21)       ,     0.38   (12-15-20)           Lipid profile:  (21)     Total: 155     LDL  : (p)     HDL  :66     TG   :108     HgA1C:   (21)          (21)      7.8,   (12-15-20)          (12-15-20)      7.2    _______________________  C U L T U R E S :    Culture - Urine (collected 2021 21:43)  Source: .Urine Catheterized  Final Report (2021 17:25):    <10,000 CFU/mL Normal Urogenital Jesenia    Culture - Blood (collected 2021 18:34)  Source: .Blood Blood-Peripheral  Preliminary Report (2021 19:02):    No growth to date.    Culture - Blood (collected 2021 18:34)  Source: .Blood Blood-Peripheral  Preliminary Report (2021 19:02):    No growth to date.      COVID-19 IgG Antibody Index: 140.00 (21 @ 11:27)  COVID-19 IgG Antibody Index: 0.06 (12-15-20 @ 00:52)  COVID-19 PCR: Detected (21 @ 12:01)    ___________________________________________________________________________________  ===============>>  A S S E S S M E N T   A N D   P L A N <<===============  ------------------------------------------------------------------------------------------      · Assessment      Patient, a 89 y/o female Rivera Speaking, with PMHx of asthma, HTN, DM presents to the ED with hypoglycemia of FS 30 in field, 64 in ED. s/p D50.   COVID +. Occult +. WBC count 12.5k, UA +. CXR shows interstitial markings. CTH was negative.    Patient admitted for hypoglycemia.     Problem/Plan - 1:  ·  Problem: Acute encephalopathy.  Plan: patient presented with confusion  CTH was negative  metabolic encephalopathy due to hypoglycemia worsened by infection (UTI and COVID-19)   treating underlying cause  monitor vitals, labs  supportive care  nutrition / hydration as able  aspiration precautions   follow cultures    Problem/Plan - 2:  ·  Problem: Hypoglycemia.  Plan: pt reported to have FS 30 in field, 64 in ED. s/p D50> improved overall   hold all hypoglycemics for now  monitor fingerstick q4h for now.   RISS  endo if needed     Problem/Plan - 3:  ·  Problem: ID: UTI  and COVID  rocephin for UTI   follow urine cultures  COVID -19 positive since a month and a half ago and has antibodies  no need for treatment at this time   monitor    Problem/Plan - 4:  ·  Problem: Occult blood in stools.  Plan: occult + in ED  Hb stable  on protonix   follow repeat CBC  GI consult Dr Ayoub.     Problem/Plan - 5:  ·  Problem: Asthma.  Plan: c/w albuterol  c/w singulair    Problem/Plan - 6:  Problem: HTN (hypertension). Plan: patient is on lisinopril and lasix at home  lisinopril and lasix held for now  monitor bp.  Problem/Plan - 7:  ·  Problem: DM (diabetes mellitus).  Plan: management as above.     Problem/Plan - 8:  ·  Problem: Need for prophylactic measure.  Plan: held due to + occult  SCDs  protonix.     ___________________________  H. NAYELI Sim  (covering today)   Pager: [phone]  21       _________________________________________________________________________________________  ========>>  M E D I C A L   A T T E N D I N G    F O L L O W  U P  N O T E  <<=========  -----------------------------------------------------------------------------------------------------    - Patient seen and examined by me earlier today.  (covering today)   - In summary,  PAULA MARIO is a 89y year old woman admitted with AMS, hypoglycemia   - Patient today overall doing ok, comfortable    ==================>> REVIEW OF SYSTEM <<=================    limited ROS, pt poor historian    ==================>> PHYSICAL EXAM <<=================    GEN: responsive, awake, NAD , comfortable  HEENT: NCAT, PERRL, MMM  Neck: supple , no JVD appreciated  CVS: S1S2 , regular , No M/R/G appreciated  PULM: limited exam as not taking deep breaths   ABD.: soft. non tender, non distended  Extrem: intact pulses , no edema       ==================>> MEDICATIONS <<====================    MEDICATIONS  (STANDING):  aspirin enteric coated 81 milliGRAM(s) Oral daily  budesonide  80 MICROgram(s)/formoterol 4.5 MICROgram(s) Inhaler 2 Puff(s) Inhalation two times a day  cefTRIAXone   IVPB 1000 milliGRAM(s) IV Intermittent every 24 hours  dextrose 5% + sodium chloride 0.45%. 1000 milliLiter(s) (70 mL/Hr) IV Continuous <Continuous>  gabapentin 300 milliGRAM(s) Oral every 12 hours  montelukast 10 milliGRAM(s) Oral daily  multivitamin 1 Tablet(s) Oral daily  pantoprazole  Injectable 40 milliGRAM(s) IV Push every 12 hours  senna 2 Tablet(s) Oral at bedtime    MEDICATIONS  (PRN):  acetaminophen   Tablet .. 650 milliGRAM(s) Oral every 6 hours PRN Temp greater or equal to 38C (100.4F), Moderate Pain (4 - 6)  ALBUTerol    90 MICROgram(s) HFA Inhaler 2 Puff(s) Inhalation every 6 hours PRN Shortness of Breath and/or Wheezing  polyethylene glycol 3350 17 Gram(s) Oral daily PRN Constipation    ___________  Active diet:  Diet, Clear Liquid  ___________________    ==================>> VITAL SIGNS <<==================    T(C): 36.7 (21 @ 08:33), Max: 36.7 (21 @ 08:33)  HR: 75 (21 @ 08:33) (75 - 96)  BP: 134/47 (21 @ 08:33) (130/70 - 134/47)  BP(mean): 69 (21 @ 08:33)  RR: 18 (21 @ 08:33) (17 - 18)  SpO2: 100% (21 @ 08:33) (96% - 100%)     POCT Blood Glucose.: 131 mg/dL (2021 12:01)  POCT Blood Glucose.: 132 mg/dL (2021 06:15)  POCT Blood Glucose.: 171 mg/dL (2021 02:37)  POCT Blood Glucose.: 167 mg/dL (2021 21:18)  POCT Blood Glucose.: 176 mg/dL (2021 18:35)  POCT Blood Glucose.: 212 mg/dL (2021 14:05)       ==================>> LAB AND IMAGING <<==================                        12.1   7.94  )-----------( 265      ( 2021 07:10 )             39.2            140  |  107  |  15  ----------------------------<  151<H>  3.9   |  23  |  1.02    Ca    8.7      2021 07:10  Phos  3.0       Mg     1.9         TPro  5.9<L>  /  Alb  2.5<L>  /  TBili  0.6  /  DBili  x   /  AST  26  /  ALT  28  /  AlkPhos  82                  CARDIAC MARKERS ( 2021 06:48 )  0.031 ng/mL / x     / x     / x     / x           Urinalysis Basic - ( 2021 14:52 )  Color: Yellow / Appearance: Clear / S.010 / pH: x  Gluc: x / Ketone: Small  / Bili: Negative / Urobili: Negative   Blood: x / Protein: 30 mg/dL / Nitrite: Negative   Leuk Esterase: Moderate / RBC: 0-2 /HPF / WBC 26-50 /HPF   Sq Epi: x / Non Sq Epi: Occasional /HPF / Bacteria: Few /HPF    TSH:      1.00   (21)       ,     0.38   (12-15-20)           Lipid profile:  (21)     Total: 155     LDL  : (p)     HDL  :66     TG   :108     HgA1C:   (21)          (21)      7.8,   (12-15-20)          (12-15-20)      7.2    _______________________  C U L T U R E S :    Culture - Urine (collected 2021 21:43)  Source: .Urine Catheterized  Final Report (2021 17:25):    <10,000 CFU/mL Normal Urogenital Jesenia    Culture - Blood (collected 2021 18:34)  Source: .Blood Blood-Peripheral  Preliminary Report (2021 19:02):    No growth to date.    Culture - Blood (collected 2021 18:34)  Source: .Blood Blood-Peripheral  Preliminary Report (2021 19:02):    No growth to date.      COVID-19 IgG Antibody Index: 140.00 (21 @ 11:27)  COVID-19 IgG Antibody Index: 0.06 (12-15-20 @ 00:52)  COVID-19 PCR: Detected (21 @ 12:01)    ___________________________________________________________________________________  ===============>>  A S S E S S M E N T   A N D   P L A N <<===============  ------------------------------------------------------------------------------------------      · Assessment	  Patient, a 89 y/o female Rivera Speaking, with PMHx of asthma, HTN, DM presents to the ED with hypoglycemia of FS 30 in field, 64 in ED. s/p D50.   COVID +. Occult +. WBC count 12.5k, UA +. CXR shows interstitial markings. CTH was negative.    Patient admitted for hypoglycemia.     Problem/Plan - 1:  ·  Problem: Acute encephalopathy.  Plan: patient presented with confusion  CTH was negative  metabolic encephalopathy due to hypoglycemia worsened by infection (UTI and COVID-19)   treating underlying cause  monitor vitals, labs  supportive care  nutrition / hydration as able  aspiration precautions   follow cultures    Problem/Plan - 2:  ·  Problem: Hypoglycemia.  Plan: pt reported to have FS 30 in field, 64 in ED. s/p D50> improved overall   hold all hypoglycemics for now  monitor fingerstick q4h for now.   RISS  endo if needed     Problem/Plan - 3:  ·  Problem: ID: UTI  and COVID  pt met sepsis criteria on admission, treated, resolved  rocephin for UTI   follow urine cultures  COVID -19 positive since a month and a half ago and has antibodies  no need for treatment at this time   monitor    Problem/Plan - 4:  ·  Problem: Occult blood in stools.  Plan: occult + in ED  Hb stable  on protonix   follow repeat CBC  GI consult Dr Ayoub.     Problem/Plan - 5:  ·  Problem: Asthma.  Plan: c/w albuterol  c/w singulair    Problem/Plan - 6:  Problem: HTN (hypertension). Plan: patient is on lisinopril and lasix at home  lisinopril and lasix held for now  monitor bp.  Problem/Plan - 7:  ·  Problem: DM (diabetes mellitus).  Plan: management as above.     Problem/Plan - 8:  ·  Problem: Need for prophylactic measure.  Plan: held due to + occult  SCDs  protonix.     ___________________________  H. NAYELI Sim  (covering today)   Pager: [phone]  21

## 2021-02-01 DIAGNOSIS — L89.601 PRESSURE ULCER OF UNSPECIFIED HEEL, STAGE 1: ICD-10-CM

## 2021-02-01 DIAGNOSIS — L25.8 UNSPECIFIED CONTACT DERMATITIS DUE TO OTHER AGENTS: ICD-10-CM

## 2021-02-01 DIAGNOSIS — G93.41 METABOLIC ENCEPHALOPATHY: ICD-10-CM

## 2021-02-01 LAB
ALBUMIN SERPL ELPH-MCNC: 2.6 G/DL — LOW (ref 3.5–5)
ALP SERPL-CCNC: 86 U/L — SIGNIFICANT CHANGE UP (ref 40–120)
ALT FLD-CCNC: 28 U/L DA — SIGNIFICANT CHANGE UP (ref 10–60)
ANION GAP SERPL CALC-SCNC: 13 MMOL/L — SIGNIFICANT CHANGE UP (ref 5–17)
AST SERPL-CCNC: 24 U/L — SIGNIFICANT CHANGE UP (ref 10–40)
BASOPHILS # BLD AUTO: 0.07 K/UL — SIGNIFICANT CHANGE UP (ref 0–0.2)
BASOPHILS NFR BLD AUTO: 0.9 % — SIGNIFICANT CHANGE UP (ref 0–2)
BILIRUB SERPL-MCNC: 0.6 MG/DL — SIGNIFICANT CHANGE UP (ref 0.2–1.2)
BUN SERPL-MCNC: 15 MG/DL — SIGNIFICANT CHANGE UP (ref 7–18)
CALCIUM SERPL-MCNC: 9.2 MG/DL — SIGNIFICANT CHANGE UP (ref 8.4–10.5)
CHLORIDE SERPL-SCNC: 106 MMOL/L — SIGNIFICANT CHANGE UP (ref 96–108)
CO2 SERPL-SCNC: 22 MMOL/L — SIGNIFICANT CHANGE UP (ref 22–31)
CREAT SERPL-MCNC: 0.99 MG/DL — SIGNIFICANT CHANGE UP (ref 0.5–1.3)
EOSINOPHIL # BLD AUTO: 0.21 K/UL — SIGNIFICANT CHANGE UP (ref 0–0.5)
EOSINOPHIL NFR BLD AUTO: 2.6 % — SIGNIFICANT CHANGE UP (ref 0–6)
GLUCOSE BLDC GLUCOMTR-MCNC: 115 MG/DL — HIGH (ref 70–99)
GLUCOSE BLDC GLUCOMTR-MCNC: 118 MG/DL — HIGH (ref 70–99)
GLUCOSE BLDC GLUCOMTR-MCNC: 123 MG/DL — HIGH (ref 70–99)
GLUCOSE BLDC GLUCOMTR-MCNC: 146 MG/DL — HIGH (ref 70–99)
GLUCOSE SERPL-MCNC: 142 MG/DL — HIGH (ref 70–99)
HCT VFR BLD CALC: 41.7 % — SIGNIFICANT CHANGE UP (ref 34.5–45)
HGB BLD-MCNC: 12.8 G/DL — SIGNIFICANT CHANGE UP (ref 11.5–15.5)
IMM GRANULOCYTES NFR BLD AUTO: 2 % — HIGH (ref 0–1.5)
LYMPHOCYTES # BLD AUTO: 1.49 K/UL — SIGNIFICANT CHANGE UP (ref 1–3.3)
LYMPHOCYTES # BLD AUTO: 18.6 % — SIGNIFICANT CHANGE UP (ref 13–44)
MAGNESIUM SERPL-MCNC: 1.9 MG/DL — SIGNIFICANT CHANGE UP (ref 1.6–2.6)
MCHC RBC-ENTMCNC: 25.9 PG — LOW (ref 27–34)
MCHC RBC-ENTMCNC: 30.7 GM/DL — LOW (ref 32–36)
MCV RBC AUTO: 84.4 FL — SIGNIFICANT CHANGE UP (ref 80–100)
MONOCYTES # BLD AUTO: 0.65 K/UL — SIGNIFICANT CHANGE UP (ref 0–0.9)
MONOCYTES NFR BLD AUTO: 8.1 % — SIGNIFICANT CHANGE UP (ref 2–14)
NEUTROPHILS # BLD AUTO: 5.43 K/UL — SIGNIFICANT CHANGE UP (ref 1.8–7.4)
NEUTROPHILS NFR BLD AUTO: 67.8 % — SIGNIFICANT CHANGE UP (ref 43–77)
NRBC # BLD: 0 /100 WBCS — SIGNIFICANT CHANGE UP (ref 0–0)
PHOSPHATE SERPL-MCNC: 3.5 MG/DL — SIGNIFICANT CHANGE UP (ref 2.5–4.5)
PLATELET # BLD AUTO: 252 K/UL — SIGNIFICANT CHANGE UP (ref 150–400)
POTASSIUM SERPL-MCNC: 3.9 MMOL/L — SIGNIFICANT CHANGE UP (ref 3.5–5.3)
POTASSIUM SERPL-SCNC: 3.9 MMOL/L — SIGNIFICANT CHANGE UP (ref 3.5–5.3)
PROT SERPL-MCNC: 6.2 G/DL — SIGNIFICANT CHANGE UP (ref 6–8.3)
RBC # BLD: 4.94 M/UL — SIGNIFICANT CHANGE UP (ref 3.8–5.2)
RBC # FLD: 14.6 % — HIGH (ref 10.3–14.5)
SODIUM SERPL-SCNC: 141 MMOL/L — SIGNIFICANT CHANGE UP (ref 135–145)
WBC # BLD: 8.01 K/UL — SIGNIFICANT CHANGE UP (ref 3.8–10.5)
WBC # FLD AUTO: 8.01 K/UL — SIGNIFICANT CHANGE UP (ref 3.8–10.5)

## 2021-02-01 PROCEDURE — 99232 SBSQ HOSP IP/OBS MODERATE 35: CPT | Mod: CS

## 2021-02-01 RX ORDER — PANTOPRAZOLE SODIUM 20 MG/1
40 TABLET, DELAYED RELEASE ORAL EVERY 12 HOURS
Refills: 0 | Status: DISCONTINUED | OUTPATIENT
Start: 2021-02-01 | End: 2021-02-04

## 2021-02-01 RX ADMIN — BUDESONIDE AND FORMOTEROL FUMARATE DIHYDRATE 2 PUFF(S): 160; 4.5 AEROSOL RESPIRATORY (INHALATION) at 21:30

## 2021-02-01 RX ADMIN — MONTELUKAST 10 MILLIGRAM(S): 4 TABLET, CHEWABLE ORAL at 12:38

## 2021-02-01 RX ADMIN — GABAPENTIN 300 MILLIGRAM(S): 400 CAPSULE ORAL at 18:20

## 2021-02-01 RX ADMIN — GABAPENTIN 300 MILLIGRAM(S): 400 CAPSULE ORAL at 06:23

## 2021-02-01 RX ADMIN — Medication 81 MILLIGRAM(S): at 12:38

## 2021-02-01 RX ADMIN — PANTOPRAZOLE SODIUM 40 MILLIGRAM(S): 20 TABLET, DELAYED RELEASE ORAL at 06:23

## 2021-02-01 RX ADMIN — Medication 1 TABLET(S): at 12:38

## 2021-02-01 RX ADMIN — PANTOPRAZOLE SODIUM 40 MILLIGRAM(S): 20 TABLET, DELAYED RELEASE ORAL at 18:20

## 2021-02-01 RX ADMIN — CEFTRIAXONE 100 MILLIGRAM(S): 500 INJECTION, POWDER, FOR SOLUTION INTRAMUSCULAR; INTRAVENOUS at 21:15

## 2021-02-01 NOTE — DIETITIAN INITIAL EVALUATION ADULT. - PERTINENT MEDS FT
MEDICATIONS  (STANDING):  aspirin enteric coated 81 milliGRAM(s) Oral daily  budesonide  80 MICROgram(s)/formoterol 4.5 MICROgram(s) Inhaler 2 Puff(s) Inhalation two times a day  cefTRIAXone   IVPB 1000 milliGRAM(s) IV Intermittent every 24 hours  dextrose 5% + sodium chloride 0.45%. 1000 milliLiter(s) (70 mL/Hr) IV Continuous <Continuous>  gabapentin 300 milliGRAM(s) Oral every 12 hours  montelukast 10 milliGRAM(s) Oral daily  multivitamin 1 Tablet(s) Oral daily  pantoprazole  Injectable 40 milliGRAM(s) IV Push every 12 hours  senna 2 Tablet(s) Oral at bedtime    MEDICATIONS  (PRN):  acetaminophen   Tablet .. 650 milliGRAM(s) Oral every 6 hours PRN Temp greater or equal to 38C (100.4F), Moderate Pain (4 - 6)  ALBUTerol    90 MICROgram(s) HFA Inhaler 2 Puff(s) Inhalation every 6 hours PRN Shortness of Breath and/or Wheezing  polyethylene glycol 3350 17 Gram(s) Oral daily PRN Constipation

## 2021-02-01 NOTE — DIETITIAN INITIAL EVALUATION ADULT. - OTHER INFO
Patient from home & was recently admitted to [facility] for COVID 19. Visited pt. alert but confused with weakness, poor historian, d/w PCA pt. with "fair" po intake, consumed 55% of clear liquid tray at breakfast & lunch, no reports of GI distress, GI/team consulted, pressure ulcer (stage I) with wound care noted, Endo/team following, no edema.

## 2021-02-01 NOTE — PROGRESS NOTE ADULT - ATTENDING COMMENTS
Patient seen and examined at bedside.  (Rivera  ID: [id number])  Agree with above medical resident's assessment and plan.  Dr. Carrizales to continue patient's care in the AM.

## 2021-02-01 NOTE — PROGRESS NOTE ADULT - ASSESSMENT
_________________________________________________________________________________________  ========>>  M E D I C A L   A T T E N D I N G    F O L L O W  U P  N O T E  <<=========  -----------------------------------------------------------------------------------------------------    - Patient seen and examined by me earlier today.  (covering today)   - In summary,  PAULA MARIO is a 89y year old woman admitted with AMS, hypoglycemia   - Patient today overall doing ok, comfortable    ==================>> REVIEW OF SYSTEM <<=================    limited ROS, pt poor historian    ==================>> PHYSICAL EXAM <<=================    GEN: awake and responsive but not taking, NAD , comfortable, disoriented   HEENT: NCAT, PERRL, MMM  Neck: supple , no JVD appreciated  CVS: S1S2 , regular , No M/R/G appreciated  PULM: limited exam as not taking deep breaths   ABD.: soft. non tender, non distended  Extrem: intact pulses , no edema      scatterer ecchymotic areas throughout extremities                                            ( Note written 02-01-21 )    ==================>> MEDICATIONS <<====================    aspirin enteric coated 81 milliGRAM(s) Oral daily  budesonide  80 MICROgram(s)/formoterol 4.5 MICROgram(s) Inhaler 2 Puff(s) Inhalation two times a day  cefTRIAXone   IVPB 1000 milliGRAM(s) IV Intermittent every 24 hours  dextrose 5% + sodium chloride 0.45%. 1000 milliLiter(s) IV Continuous <Continuous>  gabapentin 300 milliGRAM(s) Oral every 12 hours  montelukast 10 milliGRAM(s) Oral daily  multivitamin 1 Tablet(s) Oral daily  pantoprazole  Injectable 40 milliGRAM(s) IV Push every 12 hours  senna 2 Tablet(s) Oral at bedtime    MEDICATIONS  (PRN):  acetaminophen   Tablet .. 650 milliGRAM(s) Oral every 6 hours PRN Temp greater or equal to 38C (100.4F), Moderate Pain (4 - 6)  ALBUTerol    90 MICROgram(s) HFA Inhaler 2 Puff(s) Inhalation every 6 hours PRN Shortness of Breath and/or Wheezing  polyethylene glycol 3350 17 Gram(s) Oral daily PRN Constipation    ___________  Active diet:  Diet, Clear Liquid  ___________________    ==================>> VITAL SIGNS <<==================    Vital Signs Last 24 HrsT(C): 36.1 (02-01-21 @ 09:15)  T(F): 97 (02-01-21 @ 09:15), Max: 98.1 (02-01-21 @ 00:08)  HR: 69 (02-01-21 @ 09:15) (69 - 84)  BP: 114/46 (02-01-21 @ 09:15)  RR: 18 (02-01-21 @ 09:15) (18 - 18)  SpO2: 100% (02-01-21 @ 09:15) (96% - 100%)      POCT Blood Glucose.: 115 mg/dL (01 Feb 2021 12:20)  POCT Blood Glucose.: 118 mg/dL (01 Feb 2021 05:45)  POCT Blood Glucose.: 146 mg/dL (01 Feb 2021 01:20)  POCT Blood Glucose.: 141 mg/dL (31 Jan 2021 21:18)  POCT Blood Glucose.: 119 mg/dL (31 Jan 2021 17:18)     ==================>> LAB AND IMAGING <<==================                        12.8   8.01  )-----------( 252      ( 01 Feb 2021 07:19 )             41.7        02-01    141  |  106  |  15  ----------------------------<  142<H>  3.9   |  22  |  0.99    Ca    9.2      01 Feb 2021 07:19  Phos  3.5     02-01  Mg     1.9     02-01    TPro  6.2  /  Alb  2.6<L>  /  TBili  0.6  /  DBili  x   /  AST  24  /  ALT  28  /  AlkPhos  86  02-01    WBC count:   8.01 <<== ,  7.94 <<== ,  9.05 <<== ,  11.05 <<== ,  12.57 <<==   Hemoglobin:   12.8 <<==,  12.1 <<==,  12.6 <<==,  14.0 <<==,  14.4 <<==  platelets:  252 <==, 265 <==, 288 <==, 344 <==, 307 <==    Creatinine:  0.99  <<==, 1.02  <<==, 1.16  <<==, 1.03  <<==, 0.97  <<==  Sodium:   141  <==, 140  <==, 140  <==, 138  <==, 135  <==       AST:          24 <== , 26 <== , 36 <== , 64 <==      ALT:        28  <== , 28  <== , 32  <== , 40  <==      AP:        86  <=, 82  <=, 90  <=, 100  <=     Bili:        0.6  <=, 0.6  <=, 0.8  <=, 0.8  <=     _______________________  C U L T U R E S :    Culture - Urine (collected 29 Jan 2021 21:43)  Source: .Urine Catheterized  Final Report (30 Jan 2021 17:25):    <10,000 CFU/mL Normal Urogenital Jesenia    Culture - Blood (collected 29 Jan 2021 18:34)  Source: .Blood Blood-Peripheral  Preliminary Report (30 Jan 2021 19:02):    No growth to date.    Culture - Blood (collected 29 Jan 2021 18:34)  Source: .Blood Blood-Peripheral  Preliminary Report (30 Jan 2021 19:02):    No growth to date.      COVID-19 IgG Antibody Index: 140.00 (01-30-21 @ 11:27)  COVID-19 IgG Antibody Index: 0.06 (12-15-20 @ 00:52)    COVID-19 PCR: Detected (01-29-21 @ 12:01)    ___________________________________________________________________________________  ===============>>  A S S E S S M E N T   A N D   P L A N <<===============  ------------------------------------------------------------------------------------------      · Assessment	  Patient, a 89 y/o female Rivera Speaking, with PMHx of asthma, HTN, DM presents to the ED with hypoglycemia of FS 30 in field, 64 in ED. s/p D50.   COVID +. Occult +. WBC count 12.5k, UA +. CXR shows interstitial markings. CTH was negative.    Patient admitted for hypoglycemia.     Problem/Plan - 1:  ·  Problem: Acute encephalopathy.  Plan: patient presented with confusion  CTH was negative  metabolic encephalopathy due to hypoglycemia worsened by infection (UTI and COVID-19)      ? baseline MS  treating underlying cause  monitor vitals, labs  supportive care  nutrition / hydration as able  aspiration precautions   follow cultures    Problem/Plan - 2:  ·  Problem: Hypoglycemia.  Plan: pt reported to have FS 30 in field, 64 in ED. s/p D50> improved overall   hold all hypoglycemics for now  monitor fingerstick q4h for now.   RISS  endo if needed     Problem/Plan - 3:  ·  Problem: ID: UTI  and COVID  rocephin for UTI   follow urine cultures  COVID -19 positive since a month and a half ago and has antibodies  no need for treatment at this time   check inflammatory markers   supportive care     Problem/Plan - 4:  ·  Problem: Occult blood in stools.  Plan: occult + in ED  Hb stable  on protonix   follow repeat CBC  GI f/u    Problem/Plan - 5:  ·  Problem: Asthma.  Plan: c/w albuterol  c/w singulair    Problem/Plan - 6:  Problem: HTN (hypertension). Plan: patient is on lisinopril and lasix at home  lisinopril and lasix held for now  monitor bp.    Problem/Plan - 7:  ·  Problem: DM (diabetes mellitus).  Plan: management as above.     Problem/Plan - 8:  ·  Problem: Need for prophylactic measure.  Plan: held due to + occult  SCDs  protonix.     ___________________________  H. NAYELI Sim  (covering today)   Pager: [phone]

## 2021-02-01 NOTE — PROGRESS NOTE ADULT - PROBLEM SELECTOR PLAN 8
held due to + occult  SCDs  protonix patient is on lisinopril and lasix at home  lisinopril and lasix held for now  monitor bp

## 2021-02-01 NOTE — PROGRESS NOTE ADULT - PROBLEM SELECTOR PLAN 5
c/w albuterol  c/w singulair  c/w singulair Not in exacerbation  -Cont Albuterol, Singular and Symbicort

## 2021-02-01 NOTE — PROGRESS NOTE ADULT - PROBLEM SELECTOR PLAN 1
p/w AMS likely due to UTI superimposed by hypoglycemia  -CTH negative  -UA positive, blood and urine cultures NTD  -COVID19 PCR 1/29 positive  -Cont Ceftriaxone  -Cont IVF hydration  -CXR shows interstitial infiltrates p/w AMS likely due to hypoglycemia superimposed by UTI   -Hypoglycemia BG 30 on field  -CTH negative  -UA positive, blood and urine cultures NTD  -COVID19 PCR 1/29 positive  -Cont Ceftriaxone  -Cont IVF hydration  -CXR shows interstitial infiltrates

## 2021-02-01 NOTE — PROGRESS NOTE ADULT - ASSESSMENT
Patient, a 89 y/o female Rivera Speaking, with PMHx of asthma, HTN, DM presents to the ED with hypoglycemia. Patient's fingerstick reported to be 30 in field and given dextrose. Fingerstick in ED was 104, later dropped to 63 and she received push of D50. Patient not answering questions even in Rivera. Patient was recently admitted to [facility] for COVID PNA and completed course of steroids. At that time, patient was recommended JAYSON by PT,  but patient was discharged home without home oxygen. Of note, patient was discharged on 70/30 insulin 40 units two times a day while on prednisone but patient finished 5 day course.     Tried to call son Henrique Cosby at [phone] but was unable to reach him. GOC not known so patient remains full code for now. Patient, a 89 y/o female Rivera Speaking, with PMHx of asthma, HTN, DM presents to the ED with hypoglycemia. Patient's fingerstick reported to be 30 in field and given dextrose. Fingerstick in ED was 104, later dropped to 63 and she received push of D50. Patient not answering questions even in Rivera. Patient was recently admitted to [facility] for COVID PNA and completed course of steroids. At that time, patient was recommended JAYSON by PT,  but patient was discharged home without home oxygen. Of note, patient was discharged on 70/30 insulin 40 units two times a day while on prednisone but patient finished 5 day course.     Tried to call son Henrique Cosby at [phone] but was unable to reach him. GOC not known so patient remains full code for now.

## 2021-02-01 NOTE — PROGRESS NOTE ADULT - PROBLEM SELECTOR PLAN 7
management as above Advanced practice note apprecoated  -Stage 1 Pressure Injury to the Bilateral Heels  -Elevate/float the patients heels using heel protectors and reposition the patient Q 2hrs using wedges or pillows

## 2021-02-01 NOTE — PROGRESS NOTE ADULT - PROBLEM SELECTOR PLAN 4
occult + in ED  Hb stable  start on protonix bid  follow repeat CBC  GI consult Dr Ayoub occult + in ED  -Hb stable  -Cont Protonix bid  -follow repeat CBC  -GI consult Dr Ayoub

## 2021-02-01 NOTE — PROGRESS NOTE ADULT - PROBLEM SELECTOR PLAN 3
pt reported to have FS 30 in field, 64 in ED. s/p D50> improved to 117  pt was discharged on 70/30 insulin 40u bid while on steroids  patient finished her course of steroids  patient is also on glimeperide 2mg bid and Qtern as per pharmacy  hypoglycemia likely due to over medication for DM  hold all hypoglycemics for now  monitor fingerstick q4h for now Hypoglycemia likely due to over medication for DM  -FS 30 in field, 64 in ED. s/p D50> improved to 117  -pt was discharged on 70/30 insulin 40U am and 30U pm while on steroids  patient finished her course of steroids  -Pt. was followed by Dr. Britt on prior admission  -Will consult Dr. Britt again

## 2021-02-01 NOTE — ADVANCED PRACTICE NURSE CONSULT - ASSESSMENT
This is a 89yr old female patient admitted for Altered Mental Status, presenting with the following:  -There is a Stage 1 Pressure Injury to the Bilateral Heels, as evident by non-blanchable erythema  -There is evidence of Incontinence Associated Dermatitis to the Perianal, Gluteal Fold, and Bilateral Gluteal areas

## 2021-02-01 NOTE — ADVANCED PRACTICE NURSE CONSULT - RECOMMEDATIONS
-Clean all wounds with normal saline and apply skin prep to the surrounding skin  -Apply TRIAD Moisture Barrier Cream to the Bilateral Gluteus, Gluteal Fold, and Perianal areas b.i.d PRN  -Frequent toileting  -Elevate/float the patients heels using heel protectors and reposition the patient Q 2hrs using wedges or pillows

## 2021-02-01 NOTE — DIETITIAN INITIAL EVALUATION ADULT. - PROBLEM SELECTOR PLAN 2
pt reported to have FS 30 in field, 64 in ED. s/p D50> improved to 117  pt was discharged on 70/30 insulin 40u bid while on steroids  patient finished her course of steroids  patient is also on glimeperide 2mg bid and Qtern as per pharmacy  hypoglycemia likely due to over medication for DM  hold all hypoglycemics for now  monitor fingerstick q4h for now

## 2021-02-01 NOTE — PROGRESS NOTE ADULT - PROBLEM SELECTOR PLAN 6
patient is on lisinopril and lasix at home  lisinopril and lasix held for now  monitor bp -There is evidence of Incontinence Associated Dermatitis to the Perianal, Gluteal Fold, and Bilateral Gluteal areas  -Clean all wounds with normal saline and apply skin prep to the surrounding skin  -Apply TRIAD Moisture Barrier Cream to the Bilateral Gluteus, Gluteal Fold, and Perianal areas b.i.d PRN  -Frequent toileting  2hrs using wedges or pillows

## 2021-02-01 NOTE — DIETITIAN INITIAL EVALUATION ADULT. - PERTINENT LABORATORY DATA
02-01 Na141 mmol/L Glu 142 mg/dL<H> K+ 3.9 mmol/L Cr  0.99 mg/dL BUN 15 mg/dL 02-01 Phos 3.5 mg/dL 02-01 Alb 2.6 g/dL<L> 01-30 Chol 155 mg/dL LDL --    HDL 66 mg/dL Trig 108 mg/dL

## 2021-02-01 NOTE — DIETITIAN INITIAL EVALUATION ADULT. - ORAL NUTRITION SUPPLEMENTS
once diet advance to solids rec. Add Gluceredgardo Galanke 1can bid as medically feasible (440kcal, 20g protein)

## 2021-02-02 ENCOUNTER — TRANSCRIPTION ENCOUNTER (OUTPATIENT)
Age: 86
End: 2021-02-02

## 2021-02-02 LAB
ANION GAP SERPL CALC-SCNC: 14 MMOL/L — SIGNIFICANT CHANGE UP (ref 5–17)
BUN SERPL-MCNC: 16 MG/DL — SIGNIFICANT CHANGE UP (ref 7–18)
CALCIUM SERPL-MCNC: 9.4 MG/DL — SIGNIFICANT CHANGE UP (ref 8.4–10.5)
CHLORIDE SERPL-SCNC: 104 MMOL/L — SIGNIFICANT CHANGE UP (ref 96–108)
CO2 SERPL-SCNC: 20 MMOL/L — LOW (ref 22–31)
CREAT SERPL-MCNC: 1.04 MG/DL — SIGNIFICANT CHANGE UP (ref 0.5–1.3)
CRP SERPL-MCNC: 1.55 MG/DL — HIGH (ref 0–0.4)
D DIMER BLD IA.RAPID-MCNC: 8161 NG/ML DDU — HIGH
FERRITIN SERPL-MCNC: 269 NG/ML — HIGH (ref 15–150)
GLUCOSE BLDC GLUCOMTR-MCNC: 129 MG/DL — HIGH (ref 70–99)
GLUCOSE BLDC GLUCOMTR-MCNC: 140 MG/DL — HIGH (ref 70–99)
GLUCOSE BLDC GLUCOMTR-MCNC: 146 MG/DL — HIGH (ref 70–99)
GLUCOSE BLDC GLUCOMTR-MCNC: 151 MG/DL — HIGH (ref 70–99)
GLUCOSE BLDC GLUCOMTR-MCNC: 157 MG/DL — HIGH (ref 70–99)
GLUCOSE BLDC GLUCOMTR-MCNC: 189 MG/DL — HIGH (ref 70–99)
GLUCOSE SERPL-MCNC: 173 MG/DL — HIGH (ref 70–99)
POTASSIUM SERPL-MCNC: 3.9 MMOL/L — SIGNIFICANT CHANGE UP (ref 3.5–5.3)
POTASSIUM SERPL-SCNC: 3.9 MMOL/L — SIGNIFICANT CHANGE UP (ref 3.5–5.3)
PROCALCITONIN SERPL-MCNC: 0.08 NG/ML — SIGNIFICANT CHANGE UP (ref 0.02–0.1)
SARS-COV-2 RNA SPEC QL NAA+PROBE: DETECTED
SODIUM SERPL-SCNC: 138 MMOL/L — SIGNIFICANT CHANGE UP (ref 135–145)

## 2021-02-02 RX ADMIN — MONTELUKAST 10 MILLIGRAM(S): 4 TABLET, CHEWABLE ORAL at 13:03

## 2021-02-02 RX ADMIN — GABAPENTIN 300 MILLIGRAM(S): 400 CAPSULE ORAL at 04:50

## 2021-02-02 RX ADMIN — Medication 1 TABLET(S): at 13:03

## 2021-02-02 RX ADMIN — SENNA PLUS 2 TABLET(S): 8.6 TABLET ORAL at 20:56

## 2021-02-02 RX ADMIN — PANTOPRAZOLE SODIUM 40 MILLIGRAM(S): 20 TABLET, DELAYED RELEASE ORAL at 04:50

## 2021-02-02 RX ADMIN — BUDESONIDE AND FORMOTEROL FUMARATE DIHYDRATE 2 PUFF(S): 160; 4.5 AEROSOL RESPIRATORY (INHALATION) at 09:31

## 2021-02-02 RX ADMIN — CEFTRIAXONE 100 MILLIGRAM(S): 500 INJECTION, POWDER, FOR SOLUTION INTRAMUSCULAR; INTRAVENOUS at 20:56

## 2021-02-02 RX ADMIN — GABAPENTIN 300 MILLIGRAM(S): 400 CAPSULE ORAL at 18:10

## 2021-02-02 RX ADMIN — Medication 81 MILLIGRAM(S): at 14:16

## 2021-02-02 RX ADMIN — BUDESONIDE AND FORMOTEROL FUMARATE DIHYDRATE 2 PUFF(S): 160; 4.5 AEROSOL RESPIRATORY (INHALATION) at 20:56

## 2021-02-02 RX ADMIN — PANTOPRAZOLE SODIUM 40 MILLIGRAM(S): 20 TABLET, DELAYED RELEASE ORAL at 18:09

## 2021-02-02 NOTE — PROGRESS NOTE ADULT - PROBLEM SELECTOR PLAN 3
Hypoglycemia likely due to over medication for DM  -FS 30 in field, 64 in ED. s/p D50> improved to 117  -pt was discharged on 70/30 insulin 40U am and 30U pm while on steroids  patient finished her course of steroids  -Pt. was followed by Dr. Britt on prior admission  -Endo Dr. Britt consulted

## 2021-02-02 NOTE — DISCHARGE NOTE PROVIDER - NSDCCPCAREPLAN_GEN_ALL_CORE_FT
PRINCIPAL DISCHARGE DIAGNOSIS  Diagnosis: AMS (altered mental status)  Assessment and Plan of Treatment: You were admitted for acute change in mental status due to low blood sugar and urinary tract infection.  You were treated for both conditions and your mental status is now back to baseline.  Follow up with your PCP within one week.      SECONDARY DISCHARGE DIAGNOSES  Diagnosis: Pressure injury of heel, stage 1  Assessment and Plan of Treatment: You are noted with stage I pressure injury to both heels  You need to avoid friction of heels with mattress by elevating heels on pillows    Diagnosis: Dermatitis associated with incontinence  Assessment and Plan of Treatment: Dermatitis associated with incontinence    Diagnosis: UTI (urinary tract infection)  Assessment and Plan of Treatment: You were found to have urinary tract infection which was treated with intravenous antibiotics.  Follow up with your PCP within one week  Maintain adequate hydration at all times    Diagnosis: Hypoglycemia  Assessment and Plan of Treatment: Your blood sugar was too low likely due to over medications.  -PLEASE TAKE MEDICATIONS AS ORDERED       PRINCIPAL DISCHARGE DIAGNOSIS  Diagnosis: AMS (altered mental status)  Assessment and Plan of Treatment: You were admitted for acute change in mental status due to low blood sugar and urinary tract infection.  You were treated for both conditions and your mental status is now back to baseline.  Please do not resume the diabetes medications you were sent home with from your prior admission. Please only take 8 units of Lantus each night before bed.  Please follow up with your PCP one week after discharge.      SECONDARY DISCHARGE DIAGNOSES  Diagnosis: Pressure injury of heel, stage 1  Assessment and Plan of Treatment: You are noted with stage I pressure injury to both heels  In order to avoid friction and shear wtih mattress, please elevate your heels with pillows when in bed.    Diagnosis: UTI (urinary tract infection)  Assessment and Plan of Treatment: You were found to have urinary tract infection which was treated with intravenous antibiotics.  Follow up with your PCP within one week  Maintain adequate hydration at all times, keep the perineal clean - especially after episodes of urinary incontinence. Wipe from to back. Your skin is excoriated from the incontinence, please apply a barrier ointment to the affected areas.    Diagnosis: Hypoglycemia  Assessment and Plan of Treatment: You came in with low blood sugars, you were seen by an endocrinologist while you were admitted and we adjusted your insulin dose. Please only take 8 units of Lantus each night before bed.

## 2021-02-02 NOTE — DISCHARGE NOTE PROVIDER - HOSPITAL COURSE
Patient, a 89 y/o female Rivera Speaking, with PMHx of asthma, HTN, DM presents to the ED with hypoglycemia. Patient's fingerstick reported to be 30 in field and given dextrose. Fingerstick in ED was 104, later dropped to 63 and she received push of D50. Patient not answering questions even in Rivera. Patient was recently admitted to [facility] for COVID PNA and completed course of steroids. At that time, patient was recommended [facility] by PT,  but patient was discharged home without home oxygen. Of note, patient was discharged on 70/30 insulin 40 units in am and 30 units in pm times while on prednisone but patient finished 5 day course.     Tried to call son Henrique Cosby at [phone] but was unable to reach him. GOC not known so patient remains full code for now. Patient, a 89 y/o female Rivera Speaking, with PMHx of asthma, HTN, DM presents to the ED with hypoglycemia. Patient's fingerstick reported to be 30 in field and given dextrose. Fingerstick in ED was 104, later dropped to 63 and she received push of D50. Patient not answering questions even in Rivera. Patient was recently admitted to [facility] for COVID PNA and completed course of steroids. At that time, patient was recommended [facility] by PT,  but patient was discharged home without home oxygen. Of note, patient was discharged on 70/30 insulin 40 units in am and 30 units in pm times while on prednisone but patient finished 5 day course.     Tried to call son Henrique Cosby at [phone] but was unable to reach him. GOC not known so patient remains full code for now.  CTH 1/29/21->No evidence of acute infarct, intracranial hemorrhage or mass effect.  CXR 1/29/21->Cardiomegaly. Prominent interstitial markings.  COVID-19 PCR . (01.29.21 @ 12:01) Detected:    Pt. admitted to medicine for metabolic encephalopathy due to hypoglycemia superimposed by UTI.  Pt. treated with IV Ceftriaxone for UTI.  Hypoglycemia likely due to over medication, maintained off anti hyperglycemics with FS monitoring, Endo consulted.           Patient, a 89 y/o female Rivera Speaking, with PMHx of asthma, HTN, DM presents to the ED with hypoglycemia. Patient's fingerstick reported to be 30 in field and given dextrose. Fingerstick in ED was 104, later dropped to 63 and she received push of D50. Patient not answering questions even in Rivera. Patient was recently admitted to [facility] for COVID PNA and completed course of steroids-  patient was recommended [facility] by PT during previous admission - family declined at the time. Notably, patient was discharged on 70/30 insulin 40 units two times a day while on prednisone but patient completed 5 day course. Patient found to have a UTI which likely contributed to her altered mental status, on ceftriaxone. Son Henrique is CDPAP, who is now agreeable to discharge to [facility]. Patient evaluated by physical therapy 2/4 - recommending [facility] placement. Patient accepted to [facility] on 2/5, endocrinology recommending patient to start Lantus 8 units qhs for home regimen.    this is a brief hospital course, please refer to daily notes for more in depth account

## 2021-02-02 NOTE — PROGRESS NOTE ADULT - PROBLEM SELECTOR PLAN 7
Advanced practice note apprecoated  -Stage 1 Pressure Injury to the Bilateral Heels  -Elevate/float the patients heels using heel protectors and reposition the patient Q 2hrs using wedges or pillows

## 2021-02-02 NOTE — PROGRESS NOTE ADULT - PROBLEM SELECTOR PLAN 6
-There is evidence of Incontinence Associated Dermatitis to the Perianal, Gluteal Fold, and Bilateral Gluteal areas  -Clean all wounds with normal saline and apply skin prep to the surrounding skin  -Apply TRIAD Moisture Barrier Cream to the Bilateral Gluteus, Gluteal Fold, and Perianal areas b.i.d PRN  -Frequent toileting  2hrs using wedges or pillows

## 2021-02-02 NOTE — PROGRESS NOTE ADULT - ASSESSMENT
Patient, a 89 y/o female Rivera Speaking, with PMHx of asthma, HTN, DM presents to the ED with hypoglycemia. Patient's fingerstick reported to be 30 in field and given dextrose. Fingerstick in ED was 104, later dropped to 63 and she received push of D50. Patient not answering questions even in Rivera. Patient was recently admitted to [facility] for COVID PNA and completed course of steroids. At that time, patient was recommended [facility] by PT,  but patient was discharged home without home oxygen. Of note, patient was discharged on 70/30 insulin 40 units two times a day while on prednisone but patient finished 5 day course.     Tried to call son Henrique Cosby at [phone] but was unable to reach him. GOC not known so patient remains full code for now.    2/2-Pt. is medically stable for discharge to home with care of her son Henrique who is her CDPAP.  Several attempts  by NP and CM to contact son unsuccessful.  Several VM messages left for him to call back.

## 2021-02-02 NOTE — DISCHARGE NOTE PROVIDER - NSDCMRMEDTOKEN_GEN_ALL_CORE_FT
acetaminophen 325 mg oral tablet: 2 tab(s) orally every 6 hours, As needed, Temp greater or equal to 38C (100.4F), Mild Pain (1 - 3)  Advair Diskus 250 mcg-50 mcg inhalation powder: 1 puff(s) inhaled 2 times a day  Aspir 81 oral delayed release tablet: 1 tab(s) orally once a day  budesonide-formoterol 80 mcg-4.5 mcg/inh inhalation aerosol: 2 puff(s) inhaled 2 times a day   celecoxib 100 mg oral capsule:   ClearLax oral powder for reconstitution: Mix one cup (17 gram(s)) with 8oz of liquid and drink once orally , As Needed for constipation  gabapentin 300 mg oral capsule: 1 cap(s) orally every 12 hours  glimepiride 2 mg oral tablet: 1 tab(s) orally 2 times a day  Lasix 20 mg oral tablet: 1 tab(s) orally once a day  lisinopril 20 mg oral tablet: 1 tab(s) orally once a day  loratadine 10 mg oral tablet: 1 tab(s) orally once a day  magnesium hydroxide 8% oral suspension: 30 milliliter(s) orally once a day, As needed, Constipation  montelukast 10 mg oral tablet: 1 tab(s) orally once a day  NovoLOG Mix 70/30 subcutaneous suspension:  40 unit(s)2 times a day in morning and evening before breakfast and dinner   pantoprazole 40 mg oral delayed release tablet: 1 tab(s) orally once a day (before a meal)  ProAir HFA 90 mcg/inh inhalation aerosol: 2 puff(s) inhaled every 6 hours  Qtern 10 mg-5 mg oral tablet: 1 tab(s) orally once a day (in the morning)  senna oral tablet: 2 tab(s) orally once a day (at bedtime)  Slow Fe (as elemental iron) 45 mg oral tablet, extended release: 1 tab(s) orally once a day  Tab-A-Rachael oral tablet: 1 tab(s) orally once a day  zinc sulfate 220 mg oral capsule: 1 cap(s) orally once a day   acetaminophen 325 mg oral tablet: 2 tab(s) orally every 6 hours, As needed, Temp greater or equal to 38C (100.4F), Mild Pain (1 - 3)  Advair Diskus 250 mcg-50 mcg inhalation powder: 1 puff(s) inhaled 2 times a day  Aspir 81 oral delayed release tablet: 1 tab(s) orally once a day  budesonide-formoterol 80 mcg-4.5 mcg/inh inhalation aerosol: 2 puff(s) inhaled 2 times a day   celecoxib 100 mg oral capsule:   ClearLax oral powder for reconstitution: Mix one cup (17 gram(s)) with 8oz of liquid and drink once orally , As Needed for constipation  gabapentin 300 mg oral capsule: 1 cap(s) orally every 12 hours  insulin glargine: 8 unit(s) subcutaneous once a day (at bedtime)  Lasix 20 mg oral tablet: 1 tab(s) orally once a day  lisinopril 20 mg oral tablet: 1 tab(s) orally once a day  magnesium hydroxide 8% oral suspension: 30 milliliter(s) orally once a day, As needed, Constipation  montelukast 10 mg oral tablet: 1 tab(s) orally once a day  pantoprazole 40 mg oral delayed release tablet: 1 tab(s) orally once a day (before a meal)  ProAir HFA 90 mcg/inh inhalation aerosol: 2 puff(s) inhaled every 6 hours  senna oral tablet: 2 tab(s) orally once a day (at bedtime)  Slow Fe (as elemental iron) 45 mg oral tablet, extended release: 1 tab(s) orally once a day  Tab-A-Rachael oral tablet: 1 tab(s) orally once a day  zinc sulfate 220 mg oral capsule: 1 cap(s) orally once a day

## 2021-02-02 NOTE — PROGRESS NOTE ADULT - PROBLEM SELECTOR PLAN 1
p/w AMS likely due to hypoglycemia superimposed by UTI   -Hypoglycemia BG 30 on field  -CTH negative  -UA positive, blood and urine cultures NTD  -COVID19 PCR 1/29 positive  -Cont Ceftriaxone  -Cont IVF hydration  -CXR shows interstitial infiltrates

## 2021-02-02 NOTE — DISCHARGE NOTE PROVIDER - NSFOLLOWUPCLINICS_GEN_ALL_ED_FT
[facility]  Internal Medicine  [address] [place] [address]  Phone: [phone]  Fax: [phone]  Follow Up Time:

## 2021-02-02 NOTE — CONSULT NOTE ADULT - ASSESSMENT
Patient, a 89 y/o female Rivera Speaking, with PMHx of asthma, HTN, DM presents to the ED with hypoglycemia. Pt was on 70/30 insulin and multiple oral dm meds including glipizide before last admission.

## 2021-02-02 NOTE — PROGRESS NOTE ADULT - ASSESSMENT
_________________________________________________________________________________________  ========>>  M E D I C A L   A T T E N D I N G    F O L L O W  U P  N O T E  <<=========  -----------------------------------------------------------------------------------------------------    - Patient seen and examined by me earlier today.  (covering today)   - In summary,  PAULA MARIO is a 89y year old woman admitted with AMS, hypoglycemia   - Patient today overall doing ok, comfortable    ==================>> REVIEW OF SYSTEM <<=================    limited ROS, pt poor historian    though pt today more alert and interactive, saying little ( mostly "no" )     ==================>> PHYSICAL EXAM <<=================    GEN: awake and responsive but not taking, NAD , comfortable, disoriented   HEENT: NCAT, PERRL, MMM  Neck: supple , no JVD appreciated  CVS: S1S2 , regular , No M/R/G appreciated  PULM: limited exam as not taking deep breaths   ABD.: soft. non tender, non distended  Extrem: intact pulses , no edema                                            ( Note written 02-02-21 )    ==================>> MEDICATIONS <<====================    aspirin enteric coated 81 milliGRAM(s) Oral daily  budesonide  80 MICROgram(s)/formoterol 4.5 MICROgram(s) Inhaler 2 Puff(s) Inhalation two times a day  cefTRIAXone   IVPB 1000 milliGRAM(s) IV Intermittent every 24 hours  dextrose 5% + sodium chloride 0.45%. 1000 milliLiter(s) IV Continuous <Continuous>  gabapentin 300 milliGRAM(s) Oral every 12 hours  montelukast 10 milliGRAM(s) Oral daily  multivitamin 1 Tablet(s) Oral daily  pantoprazole  Injectable 40 milliGRAM(s) IV Push every 12 hours  senna 2 Tablet(s) Oral at bedtime    MEDICATIONS  (PRN):  acetaminophen   Tablet .. 650 milliGRAM(s) Oral every 6 hours PRN Temp greater or equal to 38C (100.4F), Moderate Pain (4 - 6)  ALBUTerol    90 MICROgram(s) HFA Inhaler 2 Puff(s) Inhalation every 6 hours PRN Shortness of Breath and/or Wheezing  polyethylene glycol 3350 17 Gram(s) Oral daily PRN Constipation    ___________  Active diet:  Diet, Full Liquid  ___________________    ==================>> VITAL SIGNS <<==================    Vital Signs Last 24 HrsT(C): 36.8 (02-02-21 @ 16:57)  T(F): 98.2 (02-02-21 @ 16:57), Max: 98.2 (02-02-21 @ 16:57)  HR: 90 (02-02-21 @ 16:57) (87 - 90)  BP: 140/67 (02-02-21 @ 16:57)  RR: 18 (02-02-21 @ 16:57) (18 - 18)  SpO2: 98% (02-02-21 @ 16:57) (95% - 98%)      POCT Blood Glucose.: 146 mg/dL (02 Feb 2021 17:14)  POCT Blood Glucose.: 129 mg/dL (02 Feb 2021 11:54)  POCT Blood Glucose.: 151 mg/dL (02 Feb 2021 08:53)  POCT Blood Glucose.: 189 mg/dL (02 Feb 2021 06:32)  POCT Blood Glucose.: 140 mg/dL (02 Feb 2021 00:58)     ==================>> LAB AND IMAGING <<==================                        12.8   8.01  )-----------( 252      ( 01 Feb 2021 07:19 )             41.7        02-02    138  |  104  |  16  ----------------------------<  173<H>  3.9   |  20<L>  |  1.04    Ca    9.4      02 Feb 2021 05:58  Phos  3.5     02-01  Mg     1.9     02-01    TPro  6.2  /  Alb  2.6<L>  /  TBili  0.6  /  DBili  x   /  AST  24  /  ALT  28  /  AlkPhos  86  02-01    WBC count:   8.01 <<== ,  7.94 <<== ,  9.05 <<== ,  11.05 <<== ,  12.57 <<==   Hemoglobin:   12.8 <<==,  12.1 <<==,  12.6 <<==,  14.0 <<==,  14.4 <<==  platelets:  252 <==, 265 <==, 288 <==, 344 <==, 307 <==    Creatinine:  1.04  <<==, 0.99  <<==, 1.02  <<==, 1.16  <<==, 1.03  <<==, 0.97  <<==  Sodium:   138  <==, 141  <==, 140  <==, 140  <==, 138  <==, 135  <==       AST:          24 <== , 26 <== , 36 <== , 64 <==      ALT:        28  <== , 28  <== , 32  <== , 40  <==      AP:        86  <=, 82  <=, 90  <=, 100  <=     Bili:        0.6  <=, 0.6  <=, 0.8  <=, 0.8  <=    ^^^ Inflammatory markers :  ^^^  C R P :          1.55 (02-02-21)  <<--  P C T :         0.08 (02-02-21) <<--  E S R :        29 (01-30-21)   Ferritin :         269 (02-02-21) <<--    D-Dimer :          8161 (02-02-21) <<--    _______________________  C U L T U R E S :    Culture - Urine (collected 29 Jan 2021 21:43)  Source: .Urine Catheterized  Final Report (30 Jan 2021 17:25):    <10,000 CFU/mL Normal Urogenital Jesenia    Culture - Blood (collected 29 Jan 2021 18:34)  Source: .Blood Blood-Peripheral  Preliminary Report (30 Jan 2021 19:02):    No growth to date.    Culture - Blood (collected 29 Jan 2021 18:34)  Source: .Blood Blood-Peripheral  Preliminary Report (30 Jan 2021 19:02):    No growth to date.      COVID-19 IgG Antibody Index: 140.00 (01-30-21 @ 11:27)  COVID-19 IgG Antibody Index: 0.06 (12-15-20 @ 00:52)    COVID-19 PCR: Detected (02-02-21 @ 10:50)  COVID-19 PCR: Detected (01-29-21 @ 12:01)    ___________________________________________________________________________________  ===============>>  A S S E S S M E N T   A N D   P L A N <<===============  ------------------------------------------------------------------------------------------  · Assessment	  Patient, a 89 y/o female Rivera Speaking, with PMHx of asthma, HTN, DM presents to the ED with hypoglycemia of FS 30 in field, 64 in ED. s/p D50.   COVID +. Occult +. WBC count 12.5k, UA +. CXR shows interstitial markings. CTH was negative.    Patient admitted for hypoglycemia. >> resolved    Problem/Plan - 1:  ·  Problem: Acute encephalopathy.  Plan: patient presented with confusion  CTH was negative  metabolic encephalopathy due to hypoglycemia worsened by infection (UTI and COVID-19)      ? baseline MS  treating underlying cause  monitor vitals, labs  supportive care  nutrition / hydration as able  aspiration precautions   follow cultures    Problem/Plan - 2:  ·  Problem: Hypoglycemia.  Plan: pt reported to have FS 30 in field, 64 in ED. s/p D50> improved overall   hold all hypoglycemics for now  monitor fingerstick q4h for now.   RISS  endo if needed     Problem/Plan - 3:  ·  Problem: ID: UTI  and COVID  rocephin for UTI   follow urine cultures  COVID -19 positive since a month and a half ago and has antibodies  no need for treatment at this time   check inflammatory markers   supportive care     Problem/Plan - 4:  ·  Problem: Occult blood in stools.  Plan: occult + in ED  Hb stable  on protonix   follow repeat CBC  GI f/u    Problem/Plan - 5:  ·  Problem: Asthma.  Plan: c/w albuterol  c/w singulair    Problem/Plan - 6:  Problem: HTN (hypertension). Plan: patient is on lisinopril and lasix at home  lisinopril and lasix held for now  monitor bp.    Problem/Plan - 7:  ·  Problem: DM (diabetes mellitus).  Plan: management as above.     Problem/Plan - 8:  ·  Problem: Need for prophylactic measure.  Plan: held due to + occult  SCDs  protonix.     PT / OOB  Dispo planing if stable     ___________________________  H. Delshadfar, D.O.  (covering today)   Pager: [phone]

## 2021-02-02 NOTE — CONSULT NOTE ADULT - PROBLEM SELECTOR RECOMMENDATION 9
due to mix insulin and poor po intake and ? oral dm meds including glipizide which pt was on prior to last admission  hold all insulin   pt cont to have poor p[o intake currently  fsg ac and hs  d/w nursing

## 2021-02-02 NOTE — CONSULT NOTE ADULT - SUBJECTIVE AND OBJECTIVE BOX
Patient is a 89y old  Female who presents with a chief complaint of hypoglycemia (02 Feb 2021 11:00)      HPI:  Patient, a 89 y/o female Rivera Speaking, with PMHx of asthma, HTN, DM presents to the ED with hypoglycemia. Patient's fingerstick reported to be 30 in field and given dextrose. Fingerstick in ED was 104, later dropped to 63 and she received push of D50. Patient not answering questions even in Rivera. [facility] and completed course of steroids. At that time, patient was recommended JAYSON by PT,  but patient was discharged home without home oxygen. Off note, patient was discharged on 70/30 insulin 40 units two times a day while on prednisone but patient finished 5 day course.   Pt was on 70/30 insulin and multiple oral dm meds including glipizide before last admission.      Tried to call son Henrique Cosby at [phone] but was unable to reach him. GOC not known so patient remains full code for now.    In ED,  Vital Signs Last 24 Hrs  T(C): 36.8 (29 Jan 2021 15:57), Max: 37.1 (29 Jan 2021 10:59)  T(F): 98.2 (29 Jan 2021 15:57), Max: 98.8 (29 Jan 2021 10:59)  HR: 108 (29 Jan 2021 17:08) (99 - 108)  BP: 126/71 (29 Jan 2021 15:57) (117/68 - 157/62)  BP(mean): --  RR: 22 (29 Jan 2021 17:08) (16 - 22)  SpO2: 96% (29 Jan 2021 17:08) (96% - 98%)  EKG showed sinus tachy. Occult and COVID +. FS 64, pt received D50 and 1.5L bolus.  (29 Jan 2021 19:00)      PAST MEDICAL & SURGICAL HISTORY:  Asthma    HTN (hypertension)    DM (diabetes mellitus)    No significant past surgical history           MEDICATIONS  (STANDING):  aspirin enteric coated 81 milliGRAM(s) Oral daily  budesonide  80 MICROgram(s)/formoterol 4.5 MICROgram(s) Inhaler 2 Puff(s) Inhalation two times a day  cefTRIAXone   IVPB 1000 milliGRAM(s) IV Intermittent every 24 hours  dextrose 5% + sodium chloride 0.45%. 1000 milliLiter(s) (70 mL/Hr) IV Continuous <Continuous>  gabapentin 300 milliGRAM(s) Oral every 12 hours  montelukast 10 milliGRAM(s) Oral daily  multivitamin 1 Tablet(s) Oral daily  pantoprazole  Injectable 40 milliGRAM(s) IV Push every 12 hours  senna 2 Tablet(s) Oral at bedtime    MEDICATIONS  (PRN):  acetaminophen   Tablet .. 650 milliGRAM(s) Oral every 6 hours PRN Temp greater or equal to 38C (100.4F), Moderate Pain (4 - 6)  ALBUTerol    90 MICROgram(s) HFA Inhaler 2 Puff(s) Inhalation every 6 hours PRN Shortness of Breath and/or Wheezing  polyethylene glycol 3350 17 Gram(s) Oral daily PRN Constipation      FAMILY HISTORY:      SOCIAL HISTORY:      REVIEW OF SYSTEMS:  CONSTITUTIONAL: No fever, weight loss, or fatigue  EYES: No eye pain, visual disturbances, or discharge  ENT:  No difficulty hearing, tinnitus, vertigo; No sinus or throat pain  NECK: No pain or stiffness  RESPIRATORY: No cough, wheezing, chills or hemoptysis; No Shortness of Breath  CARDIOVASCULAR: No chest pain, palpitations, passing out, dizziness, or leg swelling  GASTROINTESTINAL: No abdominal or epigastric pain. No nausea, vomiting, or hematemesis; No diarrhea or constipation. No melena or hematochezia.  GENITOURINARY: No dysuria, frequency, hematuria, or incontinence  NEUROLOGICAL: No headaches, memory loss, loss of strength, numbness, or tremors  SKIN: No itching, burning, rashes, or lesions   LYMPH Nodes: No enlarged glands  ENDOCRINE: No heat or cold intolerance; No hair loss  MUSCULOSKELETAL: No joint pain or swelling; No muscle, back, or extremity pain  PSYCHIATRIC: No depression, anxiety, mood swings, or difficulty sleeping  HEME/LYMPH: No easy bruising, or bleeding gums  ALLERGY AND IMMUNOLOGIC: No hives or eczema	        Vital Signs Last 24 Hrs  T(C): 36.6 (02 Feb 2021 09:11), Max: 36.8 (01 Feb 2021 15:35)  T(F): 97.8 (02 Feb 2021 09:11), Max: 98.2 (01 Feb 2021 15:35)  HR: 89 (02 Feb 2021 09:11) (87 - 89)  BP: 139/62 (02 Feb 2021 09:11) (135/44 - 147/73)  BP(mean): --  RR: 18 (02 Feb 2021 09:11) (18 - 20)  SpO2: 96% (02 Feb 2021 09:11) (95% - 96%)      Constitutional:    NC/AT:    HEENT:    Neck:  No JVD, bruits or thyromegaly    Respiratory:  Clear without rales or rhonchi    Cardiovascular:  RR without murmur, rub or gallop.    Gastrointestinal: Soft without hepatosplenomegaly.    Extremities: without cyanosis, clubbing or edema.    Neurological:  Oriented   x    3  . No gross sensory or motor defects.        LABS:                        12.8   8.01  )-----------( 252      ( 01 Feb 2021 07:19 )             41.7     02-02    138  |  104  |  16  ----------------------------<  173<H>  3.9   |  20<L>  |  1.04    Ca    9.4      02 Feb 2021 05:58  Phos  3.5     02-01  Mg     1.9     02-01    TPro  6.2  /  Alb  2.6<L>  /  TBili  0.6  /  DBili  x   /  AST  24  /  ALT  28  /  AlkPhos  86  02-01            CAPILLARY BLOOD GLUCOSE      POCT Blood Glucose.: 129 mg/dL (02 Feb 2021 11:54)  POCT Blood Glucose.: 151 mg/dL (02 Feb 2021 08:53)  POCT Blood Glucose.: 189 mg/dL (02 Feb 2021 06:32)  POCT Blood Glucose.: 140 mg/dL (02 Feb 2021 00:58)  POCT Blood Glucose.: 123 mg/dL (01 Feb 2021 17:36)  POCT Blood Glucose.: 115 mg/dL (01 Feb 2021 12:20)      RADIOLOGY & ADDITIONAL STUDIES:

## 2021-02-03 LAB
ANION GAP SERPL CALC-SCNC: 13 MMOL/L — SIGNIFICANT CHANGE UP (ref 5–17)
BUN SERPL-MCNC: 16 MG/DL — SIGNIFICANT CHANGE UP (ref 7–18)
CALCIUM SERPL-MCNC: 9.3 MG/DL — SIGNIFICANT CHANGE UP (ref 8.4–10.5)
CHLORIDE SERPL-SCNC: 102 MMOL/L — SIGNIFICANT CHANGE UP (ref 96–108)
CO2 SERPL-SCNC: 22 MMOL/L — SIGNIFICANT CHANGE UP (ref 22–31)
CREAT SERPL-MCNC: 1.01 MG/DL — SIGNIFICANT CHANGE UP (ref 0.5–1.3)
CULTURE RESULTS: SIGNIFICANT CHANGE UP
CULTURE RESULTS: SIGNIFICANT CHANGE UP
GLUCOSE BLDC GLUCOMTR-MCNC: 178 MG/DL — HIGH (ref 70–99)
GLUCOSE BLDC GLUCOMTR-MCNC: 182 MG/DL — HIGH (ref 70–99)
GLUCOSE BLDC GLUCOMTR-MCNC: 187 MG/DL — HIGH (ref 70–99)
GLUCOSE BLDC GLUCOMTR-MCNC: 188 MG/DL — HIGH (ref 70–99)
GLUCOSE BLDC GLUCOMTR-MCNC: 192 MG/DL — HIGH (ref 70–99)
GLUCOSE SERPL-MCNC: 201 MG/DL — HIGH (ref 70–99)
HCT VFR BLD CALC: 43.6 % — SIGNIFICANT CHANGE UP (ref 34.5–45)
HGB BLD-MCNC: 13.5 G/DL — SIGNIFICANT CHANGE UP (ref 11.5–15.5)
MCHC RBC-ENTMCNC: 25.7 PG — LOW (ref 27–34)
MCHC RBC-ENTMCNC: 31 GM/DL — LOW (ref 32–36)
MCV RBC AUTO: 83 FL — SIGNIFICANT CHANGE UP (ref 80–100)
NRBC # BLD: 0 /100 WBCS — SIGNIFICANT CHANGE UP (ref 0–0)
PLATELET # BLD AUTO: 249 K/UL — SIGNIFICANT CHANGE UP (ref 150–400)
POTASSIUM SERPL-MCNC: 3.7 MMOL/L — SIGNIFICANT CHANGE UP (ref 3.5–5.3)
POTASSIUM SERPL-SCNC: 3.7 MMOL/L — SIGNIFICANT CHANGE UP (ref 3.5–5.3)
RBC # BLD: 5.25 M/UL — HIGH (ref 3.8–5.2)
RBC # FLD: 14.7 % — HIGH (ref 10.3–14.5)
SARS-COV-2 RNA SPEC QL NAA+PROBE: DETECTED
SODIUM SERPL-SCNC: 137 MMOL/L — SIGNIFICANT CHANGE UP (ref 135–145)
SPECIMEN SOURCE: SIGNIFICANT CHANGE UP
SPECIMEN SOURCE: SIGNIFICANT CHANGE UP
WBC # BLD: 9.76 K/UL — SIGNIFICANT CHANGE UP (ref 3.8–10.5)
WBC # FLD AUTO: 9.76 K/UL — SIGNIFICANT CHANGE UP (ref 3.8–10.5)

## 2021-02-03 RX ADMIN — GABAPENTIN 300 MILLIGRAM(S): 400 CAPSULE ORAL at 16:42

## 2021-02-03 RX ADMIN — PANTOPRAZOLE SODIUM 40 MILLIGRAM(S): 20 TABLET, DELAYED RELEASE ORAL at 16:29

## 2021-02-03 RX ADMIN — BUDESONIDE AND FORMOTEROL FUMARATE DIHYDRATE 2 PUFF(S): 160; 4.5 AEROSOL RESPIRATORY (INHALATION) at 23:50

## 2021-02-03 RX ADMIN — Medication 1 TABLET(S): at 10:38

## 2021-02-03 RX ADMIN — BUDESONIDE AND FORMOTEROL FUMARATE DIHYDRATE 2 PUFF(S): 160; 4.5 AEROSOL RESPIRATORY (INHALATION) at 10:38

## 2021-02-03 RX ADMIN — CEFTRIAXONE 100 MILLIGRAM(S): 500 INJECTION, POWDER, FOR SOLUTION INTRAMUSCULAR; INTRAVENOUS at 20:19

## 2021-02-03 RX ADMIN — Medication 81 MILLIGRAM(S): at 10:38

## 2021-02-03 RX ADMIN — SENNA PLUS 2 TABLET(S): 8.6 TABLET ORAL at 20:19

## 2021-02-03 RX ADMIN — PANTOPRAZOLE SODIUM 40 MILLIGRAM(S): 20 TABLET, DELAYED RELEASE ORAL at 06:23

## 2021-02-03 RX ADMIN — GABAPENTIN 300 MILLIGRAM(S): 400 CAPSULE ORAL at 06:24

## 2021-02-03 RX ADMIN — MONTELUKAST 10 MILLIGRAM(S): 4 TABLET, CHEWABLE ORAL at 10:38

## 2021-02-03 NOTE — SWALLOW BEDSIDE ASSESSMENT ADULT - ORAL PREPARATORY PHASE
Anterior loss of bolus/Lateral loss of bolus Bolus falls into left lateral sulci/Bolus falls into right lateral sulci Decreased mastication ability Lateral loss of bolus/Bolus falls into left lateral sulci/Bolus falls into right lateral sulci

## 2021-02-03 NOTE — PROGRESS NOTE ADULT - ASSESSMENT
Patient, a 89 y/o female Rivera Speaking, with PMHx of asthma, HTN, DM presents to the ED with hypoglycemia. Patient's fingerstick reported to be 30 in field and given dextrose. Fingerstick in ED was 104, later dropped to 63 and she received push of D50. Patient not answering questions even in Rivera. Patient was recently admitted to [facility] for COVID PNA and completed course of steroids-  patient was recommended AJYSON by PT during previous admission - family declined at the time. Notably, patient was discharged on 70/30 insulin 40 units two times a day while on prednisone but patient completed 5 day course. Patient found to have a UTI which likely contributed to her altered mental status, on ceftriaxone. Son Henrique is CDPAP, who is now agreeable to discharge to [facility]. Speech and swallow and PT consults placed.

## 2021-02-03 NOTE — CHART NOTE - NSCHARTNOTEFT_GEN_A_CORE
Assessment:   89yFemalePatient is a 89y old  Female who presents with a chief complaint of hypoglycemia (03 Feb 2021 12:26). Pt visited. RN C/O pt with poor Po intake. Pt speaks KEV. Pt did not say much. Pt is on full liquid diet.  Endo consult Noted Pt with Hypoglycemia.  Insulin is on Hold.       Factors impacting intake: [ ] none [ ] nausea  [ ] vomiting [ ] diarrhea [ ] constipation  [ ]chewing problems [ ] swallowing issues  [ ] other:     Diet Prescription: Diet, Full Liquid (02-02-21 @ 10:15)    Intake:   ~25 %    Current Weight: Weight (kg): 99.8 (01-29 @ 10:01)  % Weight Change    Pertinent Medications: MEDICATIONS  (STANDING):  aspirin enteric coated 81 milliGRAM(s) Oral daily  budesonide  80 MICROgram(s)/formoterol 4.5 MICROgram(s) Inhaler 2 Puff(s) Inhalation two times a day  cefTRIAXone   IVPB 1000 milliGRAM(s) IV Intermittent every 24 hours  dextrose 5% + sodium chloride 0.45%. 1000 milliLiter(s) (70 mL/Hr) IV Continuous <Continuous>  gabapentin 300 milliGRAM(s) Oral every 12 hours  montelukast 10 milliGRAM(s) Oral daily  multivitamin 1 Tablet(s) Oral daily  pantoprazole  Injectable 40 milliGRAM(s) IV Push every 12 hours  senna 2 Tablet(s) Oral at bedtime    MEDICATIONS  (PRN):  acetaminophen   Tablet .. 650 milliGRAM(s) Oral every 6 hours PRN Temp greater or equal to 38C (100.4F), Moderate Pain (4 - 6)  ALBUTerol    90 MICROgram(s) HFA Inhaler 2 Puff(s) Inhalation every 6 hours PRN Shortness of Breath and/or Wheezing  polyethylene glycol 3350 17 Gram(s) Oral daily PRN Constipation    Pertinent Labs: 02-03 Na137 mmol/L Glu 201 mg/dL<H> K+ 3.7 mmol/L Cr  1.01 mg/dL BUN 16 mg/dL 02-01 Phos 3.5 mg/dL 02-01 Alb 2.6 g/dL<L> 01-30 Chol 155 mg/dL LDL --    HDL 66 mg/dL Trig 108 mg/dL     CAPILLARY BLOOD GLUCOSE      POCT Blood Glucose.: 187 mg/dL (03 Feb 2021 11:20)  POCT Blood Glucose.: 188 mg/dL (03 Feb 2021 06:07)  POCT Blood Glucose.: 192 mg/dL (03 Feb 2021 00:39)  POCT Blood Glucose.: 157 mg/dL (02 Feb 2021 21:17)  POCT Blood Glucose.: 146 mg/dL (02 Feb 2021 17:14)    Skin:  Bilateral Stage 1     Estimated Needs:   [ ] no change since previous assessment  [ ] recalculated:     Previous Nutrition Diagnosis:   [ ] Inadequate Energy Intake [x ]Inadequate Oral Intake [ ] Excessive Energy Intake   [ ] Underweight [ ] Increased Nutrient Needs [ ] Overweight/Obesity   [ ] Altered GI Function [ ] Unintended Weight Loss [ ] Food & Nutrition Related Knowledge Deficit [ ] Malnutrition     Nutrition Diagnosis is [ x] ongoing  [ ] resolved [ ] not applicable     New Nutrition Diagnosis: [ ] not applicable       Interventions:   Recommend  [ ] Change Diet To:  [x ] Nutrition Supplement Glucerna shakes TID.  (x) May consider advancing diet to ? Pureed Lacto Vegetarian.  [ ] Nutrition Support  [x ] Other: Encourage po Intake     Monitoring and Evaluation:   [ ] PO intake [ x ] Tolerance to diet prescription [ x ] weights [ x ] labs[ x ] follow up per protocol  [ ] other:

## 2021-02-03 NOTE — PROGRESS NOTE ADULT - PROBLEM SELECTOR PLAN 1
p/w AMS likely due to metabolic derangements and UTI  hypoglycemia resolved   BGL's well controlled   CTH without acute findings   -UA positive, blood and urine cultures NGTD  COVID PCR and antibody positive   -Cont Ceftriaxone  -CXR shows interstitial infiltrates - completed course of steroids, not requiring supplemental O2

## 2021-02-03 NOTE — PROGRESS NOTE ADULT - ASSESSMENT
_________________________________________________________________________________________  ========>>  M E D I C A L   A T T E N D I N G    F O L L O W  U P  N O T E  <<=========  -----------------------------------------------------------------------------------------------------    - Patient seen and examined by me earlier today.  (covering today)   - In summary,  PAULA MARIO is a 89y year old woman admitted with AMS, hypoglycemia   - Patient today overall doing ok, comfortable    ==================>> REVIEW OF SYSTEM <<=================    limited ROS, pt poor historian    though pt today more alert and interactive, saying little     ==================>> PHYSICAL EXAM <<=================    GEN: awake and responsive but not taking, NAD , comfortable, disoriented   HEENT: NCAT, PERRL, MMM  Neck: supple , no JVD appreciated  CVS: S1S2 , regular , No M/R/G appreciated  PULM: limited exam as not taking deep breaths   ABD.: soft. non tender, non distended  Extrem: intact pulses , no edema                                            ( Note written 02-03-21 )    ==================>> MEDICATIONS <<====================    aspirin enteric coated 81 milliGRAM(s) Oral daily  budesonide  80 MICROgram(s)/formoterol 4.5 MICROgram(s) Inhaler 2 Puff(s) Inhalation two times a day  cefTRIAXone   IVPB 1000 milliGRAM(s) IV Intermittent every 24 hours  dextrose 5% + sodium chloride 0.45%. 1000 milliLiter(s) IV Continuous <Continuous>  gabapentin 300 milliGRAM(s) Oral every 12 hours  montelukast 10 milliGRAM(s) Oral daily  multivitamin 1 Tablet(s) Oral daily  pantoprazole  Injectable 40 milliGRAM(s) IV Push every 12 hours  senna 2 Tablet(s) Oral at bedtime    MEDICATIONS  (PRN):  acetaminophen   Tablet .. 650 milliGRAM(s) Oral every 6 hours PRN Temp greater or equal to 38C (100.4F), Moderate Pain (4 - 6)  ALBUTerol    90 MICROgram(s) HFA Inhaler 2 Puff(s) Inhalation every 6 hours PRN Shortness of Breath and/or Wheezing  polyethylene glycol 3350 17 Gram(s) Oral daily PRN Constipation    ___________  Active diet:  Diet, Full Liquid  ___________________    ==================>> VITAL SIGNS <<==================    Vital Signs Last 24 HrsT(C): 36.6 (02-03-21 @ 07:57)  T(F): 97.8 (02-03-21 @ 07:57), Max: 98.3 (02-03-21 @ 00:25)  HR: 95 (02-03-21 @ 07:57) (90 - 97)  BP: 119/55 (02-03-21 @ 07:57)  RR: 20 (02-03-21 @ 07:57) (18 - 20)  SpO2: 98% (02-03-21 @ 07:57) (96% - 98%)      POCT Blood Glucose.: 187 mg/dL (03 Feb 2021 11:20)  POCT Blood Glucose.: 188 mg/dL (03 Feb 2021 06:07)  POCT Blood Glucose.: 192 mg/dL (03 Feb 2021 00:39)  POCT Blood Glucose.: 157 mg/dL (02 Feb 2021 21:17)  POCT Blood Glucose.: 146 mg/dL (02 Feb 2021 17:14)     ==================>> LAB AND IMAGING <<==================                        13.5   9.76  )-----------( 249      ( 03 Feb 2021 08:38 )             43.6        02-03    137  |  102  |  16  ----------------------------<  201<H>  3.7   |  22  |  1.01    Ca    9.3      03 Feb 2021 08:38      WBC count:   9.76 <<== ,  8.01 <<== ,  7.94 <<== ,  9.05 <<== ,  11.05 <<==   Hemoglobin:   13.5 <<==,  12.8 <<==,  12.1 <<==,  12.6 <<==,  14.0 <<==  platelets:  249 <==, 252 <==, 265 <==, 288 <==, 344 <==, 307 <==    Creatinine:  1.01  <<==, 1.04  <<==, 0.99  <<==, 1.02  <<==, 1.16  <<==, 1.03  <<==  Sodium:   137  <==, 138  <==, 141  <==, 140  <==, 140  <==, 138  <==, 135  <==       AST:          24 <== , 26 <== , 36 <==      ALT:        28  <== , 28  <== , 32  <==      AP:        86  <=, 82  <=, 90  <=     Bili:        0.6  <=, 0.6  <=, 0.8  <=    ___________________________________________________________________________________  ===============>>  A S S E S S M E N T   A N D   P L A N <<===============  ------------------------------------------------------------------------------------------  · Assessment	  Patient, a 89 y/o female Rivera Speaking, with PMHx of asthma, HTN, DM presents to the ED with hypoglycemia of FS 30 in field, 64 in ED. s/p D50.   COVID +. Occult +. WBC count 12.5k, UA +. CXR shows interstitial markings. CTH was negative.    Patient admitted for hypoglycemia. >> resolved    Problem/Plan - 1:  ·  Problem: Acute encephalopathy.  Plan: patient presented with confusion  CTH was negative  metabolic encephalopathy due to hypoglycemia worsened by infection (UTI and COVID-19)      ? baseline MS  treating underlying cause  monitor vitals, labs  supportive care  nutrition / hydration as able  aspiration precautions   follow cultures    Problem/Plan - 2:  ·  Problem: Hypoglycemia.  Plan: pt reported to have FS 30 in field, 64 in ED. s/p D50> improved overall   hold all hypoglycemics for now  monitor fingerstick q4h for now.   RISS  endo if needed     Problem/Plan - 3:  ·  Problem: ID: UTI  and COVID  post rocephin for UTI   COVID -19 positive since a month and a half ago and has antibodies  no need for treatment at this time   supportive care     Problem/Plan - 4:  ·  Problem: Occult blood in stools.  Plan: occult + in ED  Hb stable  on protonix   follow repeat CBC  GI f/u    Problem/Plan - 5:  ·  Problem: Asthma.  Plan: c/w albuterol  c/w singulair    Problem/Plan - 6:  Problem: HTN (hypertension). Plan: patient is on lisinopril and lasix at home  lisinopril and lasix held for now  monitor bp.    Problem/Plan - 7:  ·  Problem: DM (diabetes mellitus).  Plan: management as above.     Problem/Plan - 8:  ·  Problem: Need for prophylactic measure.  Plan: held due to + occult  SCDs  protonix.     PT / OOB  Dispo planing >> rehab planing . placement     ___________________________  H. NAYELI Sim  (covering today)   Pager: [phone]

## 2021-02-03 NOTE — SWALLOW BEDSIDE ASSESSMENT ADULT - ORAL PHASE
Decreased anterior-posterior movement of the bolus/Delayed oral transit time Decreased anterior-posterior movement of the bolus/Delayed oral transit time/Stasis in lateral sulci

## 2021-02-03 NOTE — SWALLOW BEDSIDE ASSESSMENT ADULT - SLP PERTINENT HISTORY OF CURRENT PROBLEM
89 y/o F Rivera Speaking, with PMHx of asthma, HTN, DM presents to the ED with hypoglycemia. Reportedly, pt's fingerstick reported to be 30 in field and given dextrose. Fingerstick in ED was 104, later dropped to 63 and she received push of D50. Pt recently admitted to [facility] for COVID PNA and completed course of steroids. At that time, pt was reportedly recommended JAYSON by PT, but pt was discharged home without home oxygen.

## 2021-02-03 NOTE — SWALLOW BEDSIDE ASSESSMENT ADULT - SWALLOW EVAL: DIAGNOSIS
Pt p/w s&s moderate oropharyngeal dysphagia with neurogenic component c/b weak labial seal, impaired bolus formation and mastication, slow A-P transport, oral stasis, suspected, premature spillage of bolus to the pharynx, delayed swallow reflex, reduced hyoid excursion, poor laryngeal elevation, and overt s&s of penetration/aspiration seen at this exam e/b cough.

## 2021-02-04 DIAGNOSIS — Z02.9 ENCOUNTER FOR ADMINISTRATIVE EXAMINATIONS, UNSPECIFIED: ICD-10-CM

## 2021-02-04 LAB
ANION GAP SERPL CALC-SCNC: 15 MMOL/L — SIGNIFICANT CHANGE UP (ref 5–17)
BUN SERPL-MCNC: 22 MG/DL — HIGH (ref 7–18)
CALCIUM SERPL-MCNC: 10.2 MG/DL — SIGNIFICANT CHANGE UP (ref 8.4–10.5)
CHLORIDE SERPL-SCNC: 104 MMOL/L — SIGNIFICANT CHANGE UP (ref 96–108)
CO2 SERPL-SCNC: 20 MMOL/L — LOW (ref 22–31)
CREAT SERPL-MCNC: 1.25 MG/DL — SIGNIFICANT CHANGE UP (ref 0.5–1.3)
GLUCOSE BLDC GLUCOMTR-MCNC: 207 MG/DL — HIGH (ref 70–99)
GLUCOSE BLDC GLUCOMTR-MCNC: 221 MG/DL — HIGH (ref 70–99)
GLUCOSE BLDC GLUCOMTR-MCNC: 265 MG/DL — HIGH (ref 70–99)
GLUCOSE SERPL-MCNC: 215 MG/DL — HIGH (ref 70–99)
HCT VFR BLD CALC: 44.5 % — SIGNIFICANT CHANGE UP (ref 34.5–45)
HGB BLD-MCNC: 13.8 G/DL — SIGNIFICANT CHANGE UP (ref 11.5–15.5)
MAGNESIUM SERPL-MCNC: 2.1 MG/DL — SIGNIFICANT CHANGE UP (ref 1.6–2.6)
MCHC RBC-ENTMCNC: 25.8 PG — LOW (ref 27–34)
MCHC RBC-ENTMCNC: 31 GM/DL — LOW (ref 32–36)
MCV RBC AUTO: 83.3 FL — SIGNIFICANT CHANGE UP (ref 80–100)
NRBC # BLD: 0 /100 WBCS — SIGNIFICANT CHANGE UP (ref 0–0)
PLATELET # BLD AUTO: 258 K/UL — SIGNIFICANT CHANGE UP (ref 150–400)
POTASSIUM SERPL-MCNC: 3.9 MMOL/L — SIGNIFICANT CHANGE UP (ref 3.5–5.3)
POTASSIUM SERPL-SCNC: 3.9 MMOL/L — SIGNIFICANT CHANGE UP (ref 3.5–5.3)
RBC # BLD: 5.34 M/UL — HIGH (ref 3.8–5.2)
RBC # FLD: 14.8 % — HIGH (ref 10.3–14.5)
SODIUM SERPL-SCNC: 139 MMOL/L — SIGNIFICANT CHANGE UP (ref 135–145)
WBC # BLD: 10.26 K/UL — SIGNIFICANT CHANGE UP (ref 3.8–10.5)
WBC # FLD AUTO: 10.26 K/UL — SIGNIFICANT CHANGE UP (ref 3.8–10.5)

## 2021-02-04 RX ORDER — INSULIN LISPRO 100/ML
VIAL (ML) SUBCUTANEOUS
Refills: 0 | Status: DISCONTINUED | OUTPATIENT
Start: 2021-02-04 | End: 2021-02-04

## 2021-02-04 RX ORDER — DEXTROSE 50 % IN WATER 50 %
25 SYRINGE (ML) INTRAVENOUS ONCE
Refills: 0 | Status: DISCONTINUED | OUTPATIENT
Start: 2021-02-04 | End: 2021-02-05

## 2021-02-04 RX ORDER — PANTOPRAZOLE SODIUM 20 MG/1
40 TABLET, DELAYED RELEASE ORAL
Refills: 0 | Status: DISCONTINUED | OUTPATIENT
Start: 2021-02-04 | End: 2021-02-05

## 2021-02-04 RX ORDER — DEXTROSE 50 % IN WATER 50 %
12.5 SYRINGE (ML) INTRAVENOUS ONCE
Refills: 0 | Status: DISCONTINUED | OUTPATIENT
Start: 2021-02-04 | End: 2021-02-04

## 2021-02-04 RX ORDER — DEXTROSE 50 % IN WATER 50 %
15 SYRINGE (ML) INTRAVENOUS ONCE
Refills: 0 | Status: DISCONTINUED | OUTPATIENT
Start: 2021-02-04 | End: 2021-02-04

## 2021-02-04 RX ORDER — GLUCAGON INJECTION, SOLUTION 0.5 MG/.1ML
1 INJECTION, SOLUTION SUBCUTANEOUS ONCE
Refills: 0 | Status: DISCONTINUED | OUTPATIENT
Start: 2021-02-04 | End: 2021-02-04

## 2021-02-04 RX ORDER — DEXTROSE 50 % IN WATER 50 %
25 SYRINGE (ML) INTRAVENOUS ONCE
Refills: 0 | Status: DISCONTINUED | OUTPATIENT
Start: 2021-02-04 | End: 2021-02-04

## 2021-02-04 RX ORDER — SODIUM CHLORIDE 9 MG/ML
1000 INJECTION, SOLUTION INTRAVENOUS
Refills: 0 | Status: DISCONTINUED | OUTPATIENT
Start: 2021-02-04 | End: 2021-02-04

## 2021-02-04 RX ADMIN — MONTELUKAST 10 MILLIGRAM(S): 4 TABLET, CHEWABLE ORAL at 11:09

## 2021-02-04 RX ADMIN — Medication 81 MILLIGRAM(S): at 11:09

## 2021-02-04 RX ADMIN — GABAPENTIN 300 MILLIGRAM(S): 400 CAPSULE ORAL at 04:20

## 2021-02-04 RX ADMIN — BUDESONIDE AND FORMOTEROL FUMARATE DIHYDRATE 2 PUFF(S): 160; 4.5 AEROSOL RESPIRATORY (INHALATION) at 21:38

## 2021-02-04 RX ADMIN — GABAPENTIN 300 MILLIGRAM(S): 400 CAPSULE ORAL at 16:40

## 2021-02-04 RX ADMIN — BUDESONIDE AND FORMOTEROL FUMARATE DIHYDRATE 2 PUFF(S): 160; 4.5 AEROSOL RESPIRATORY (INHALATION) at 09:27

## 2021-02-04 RX ADMIN — PANTOPRAZOLE SODIUM 40 MILLIGRAM(S): 20 TABLET, DELAYED RELEASE ORAL at 04:20

## 2021-02-04 RX ADMIN — Medication 1 TABLET(S): at 11:09

## 2021-02-04 RX ADMIN — SENNA PLUS 2 TABLET(S): 8.6 TABLET ORAL at 21:38

## 2021-02-04 RX ADMIN — CEFTRIAXONE 100 MILLIGRAM(S): 500 INJECTION, POWDER, FOR SOLUTION INTRAMUSCULAR; INTRAVENOUS at 21:39

## 2021-02-04 NOTE — PHYSICAL THERAPY INITIAL EVALUATION ADULT - PHYSICAL ASSIST/NONPHYSICAL ASSIST: SIT/STAND, REHAB EVAL
Patient:   NARA GUARDADO            MRN: [id number]            FIN: [id number]               Age:   2 years     Sex:  MALE     :  [birth year]   Associated Diagnoses:   None   Author:   DEYVI CUMMINGS      Discharge Summary    Admission Date:  17    Discharge Date:  17    Admitting Diagnoses:   Breakthrough seizure    Final Diagnoses:   Breakthrough seizure, history of unprovoked generalized tonic-clonic seizures      Pertinent Labs/Imaging Findings:   DATE OF EE2017    PHYSICIAN(S):  Esther Ronquillo M.D.    AGE/:  2-year-old/2014.    ACTIVATING TECHNIQUES:    OTHER:    CLINICAL HISTORY:  A 9-fgcd-31-month-old patient with episode of seizure-like  activity, for evaluation.    DESCRIPTION:  A 17-channel digital EEG with concurrent EKG monitoring were  performed for more than one hour, both awake and sleep recording.    During awake tracing, well-developed posterior background alpha frequency, 8  hertz.  No lateralization, focal slowing, or epileptiform activity.    The patient became drowsy, went to sleep.  Sleep transients were present, V-  wave, sleep spindles.  No lateralization, focal slowing, or epileptiform  activity.    Photic stimulation did not elicit any response.    INTERPRETATION:  Normal awake and sleep extended electroencephalogram.    Reason For Exam  sinus arrhythmia noted on transport    RADRPT  552 Ventricular Rate-93  553 Atrial Rate-93  554 P-R Interval-148  555 QRS Duration-66  556 Q-T Interval-330  557 QTC Calculation(Bezet)-410  558 P Axis-42  559 R Axis-69  560 T Axis-63  208.0 Diagnosis-Normal sinus rhythm with sinus arrhythmia  Normal ECG    CBC: 11>11.6/34.9<284 N40 L50 M5 E2 B2  CMP: 135/4.5/104/24/13/.3<118 AST 43  ALT 24  alk phos 287  Calcium 8.9      Hospital Course:  Nara is a 35mo boy with known seizure disorder, transferred from OSH for seizures. Mom is a poor historian, who does not have custody of the patient and usually sees him only  once a month. Mother's aunt has custody of the patient. During a family party, patient was playing when he fell to the ground with shaking of the RLE at 2300. This shaking continued for 10-15 minutes, but the patient never lost consciousness. Mom thought that the patient was still playing, but realized something was wrong when he was unable to stand due to his shaking leg. Patient then proceeded to have full body shaking and lost consciousness. He appeared to have trouble breathing with minimal emesis and eyes rolled back in head. Patient did not experience any loss of bowel or bladder control. No foaming at the mouth and no tongue biting. Because the family could not find his Diastat, EMS was called and gave intranasal Versed 1 gm in route to OSH.    At OSH, patient received NS bolus x 1. Initial labwork was done at OSH as documented above. Patient reportedly continued to have intermittent twitching of the legs post-Versed; therefore, he was transported to [facility] for further management. Upon arrival, a rapid response was called within 10 minutes for bradycardia of 27 and difficulty to arouse. Patient was evaluated by the PICU team and received a 20 mL/kg NS bolus. Dr. Zhang was consulted and recommended a 300mg loading dose of Keppra and stat EEG. Patient's primary Neurologist was contacted by Dr. Zhang. An EKG was also obtained, which showed a norml rate sinus arryhythmia. Patient was placed on Telemetry and Cardiology was consulted. Cardiology reviewed both the EKG and Telemetry monitor. HR of 27 was determined to most likely be due to a misplacement of the pulse oximetry. Transient bradycardia was expected as well per Cardiology given patient's recent seizure. Normal rate sinus rhythym was determined to be the result of respiratory variation and no further Cardiology work-up was recommended or required.    EEG was found to be normal and patient remained seizure-free for over 24 hours. Patient's neurologist,  Dr. Renée Carmichael at [facility], confirmed that patient has been seen at her clinic by various doctors since 2016 for unprovoked generalized tonic-clonic seizures. He had a normal EEG in November 2016 and a normal MRI in December 2016. His home medications include Keppra 150 mg BID and Clonidine 0.05 mg qhs. His most recent seizure is similar in nature to his previous seizures. Next appointment with Neurology is on November 2nd.    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 Patient's Great Aunt, Sylwia Cullen, is the legal guardian of the patient (confrimed with guardianship paperwork). She confirmed appropriate management of patient's medical needs via doctor appointments and weekly speech therapy for delayed development. Casey's Great Aunt is attentive to his needs overall. She did not accompany the patient during transfer due to the fact that she was hospitalized herself for numbness in the arm, but she arrived at bedside immediately after she was discharged. Social Work contacted [facility], who identified no restrictions for the legal gaurdian. There were no concerns conveyed by [facility], but a home visit was scheduled for next week to re-assess the home environment. Social work spoke with the family prior to discharge and provided guidance and information as needed. Medications were delivered to bedside. Flu shot was given. Patient was discharged home in stable condition with the legal gaurdian and close PMD follow-up.    Condition on Discharge:   Stable    Physical Exam on Discharge:  General: alert, no acute distress, sitting in Great Aunt's lap  Eye: normal conjunctiva  HENT: normocephalic, atraumatic, MMM  Neck: supple, non-tender  Respiratory: CTAB, non-labored respirations, breath sounds equal, good air movement  Cardiovascular: RRR, good pulses, normal peripheral perfusion, no edema  Gastrointestinal: soft, non-tender,  non-distended, normal bowel sounds  Musculoskeletal: normal ROM, no tenderness, no deformity  Skin: warm, dry, intact, no pallor, no rashes  Neurologic: no focal deficits  Psychiatric: cooperative    Discharge Instructions:   PMD and Follow Up Appointment:  Dr. Eugene Shields - 10/3/2017 at 11 AM    Consultants and Consultant Discharge Recs/Follow Up Appoinments:  Neurology, please follow-up with your primary Neurologist at [facility].  Dr. Renée Carmichael - 11/2/2017    Labs/Imaging to be done or followed up as outpatient:  None    Medications:  Home Medications (3) Active  cloNIDine oral 0.1 mg tablet 0.05 mg = 0.5 tab, PRN, Oral, Q Bedtime  diazePAM 5 mg rectal kit, Rectal, Once (scheduled)  levETIRAcetam oral 100 mg/mL solution 150 mg = 1.5 mL, Oral, Q12H    Diet:  Resume previous    Activity:  No restrictions, resume previous as tolerated    Special Instructions:  Please give Keppra and Clonidine as directed.   Use Diastat rectal kit for seizures > 5 minutes.  Please follow-up with PMD and neurologist as scheduled.      Discharge Summary faxed to PMD. Thank you for allowing us to participate in the care of this patient.    Shi Parra MD            Electronically Signed On 09/29/2017 14:29  __________________________________________________   SHI CUMMINGS                Pediatric Hospitalist Addendum    Patient seen and examined at bedside on 09/29/17 during family centered rounds. Guardian present at bedside. I agree with resident documentation and plan with the following additions.     On my exam, patient was alert and cooperative. Sitting in aunt's lap. PERRL, EOMI. No eye or nasal discharge. Neck supple with full ROM. CV: RRR, normal s1, s2, no murmurs appreciated, 2+ femoral pulses. Resp: CTA bilaterally Abdomen: soft, no organomegaly appreciated. Neuro: CN 2-12 intact, moves all extremities equally, 5/5 upper and lower extremity strength.     Plan to discharge home today with PMD and neuro  follow up.    Primary Diagnosis: Increased seizure activity  Secondary Diagnosis: Epilepsy    Plan of care discussed with resident team, bedside RN, , family.    Lissett Nuñez MD  Pediatric Hospitalist              Electronically Signed On 09/29/2017 14:41  __________________________________________________   LISSETT STREETER     verbal cues/2 person assist

## 2021-02-04 NOTE — PROGRESS NOTE ADULT - PROBLEM SELECTOR PLAN 6
-There is evidence of Incontinence Associated Dermatitis to the Perianal, Gluteal Fold, and Bilateral Gluteal areas  -Clean all wounds with normal saline and apply skin prep to the surrounding skin  -Apply TRIAD Moisture Barrier Cream to the Bilateral Gluteus, Gluteal Fold, and Perianal areas b.i.d PRN  -Frequent toileting  2hrs using wedges or pillows Advanced practice note appreciated  -Stage 1 Pressure Injury to the Bilateral Heels  -Elevate/float the patients heels using heel protectors and reposition the patient Q 2hrs using wedges or pillows

## 2021-02-04 NOTE — PROGRESS NOTE ADULT - PROBLEM SELECTOR PLAN 3
Hypoglycemia likely due to over medication for DM  -pt was discharged on 70/30 insulin 40U am and 30U pm while on steroids  patient finished her course of steroids  -Endo Dr. Britt consulted

## 2021-02-04 NOTE — PROGRESS NOTE ADULT - PROBLEM SELECTOR PLAN 9
HgnA1C 12/15 was 7.2  -Endo Dr. valente consulted for further mgnt as pt. is now off steroids held due to + occult  SCD's  protonix

## 2021-02-04 NOTE — PROGRESS NOTE ADULT - PROBLEM SELECTOR PLAN 1
p/w AMS likely due to metabolic derangements and UTI  hypoglycemia resolved   BGL's well controlled   CTH without acute findings   -UA positive, blood and urine cultures NGTD  COVID PCR and antibody positive   -Cont Ceftriaxone  -CXR shows interstitial infiltrates - completed course of steroids, not requiring supplemental O2 p/w AMS likely due to metabolic derangements and UTI  hypoglycemia resolved   BGL's well controlled   CTH without acute findings   -UA positive, blood and urine cultures NGTD  COVID PCR and antibody positive   -Cont Ceftriaxone  -CXR shows interstitial infiltrates - completed course of steroids, not requiring supplemental O2  - spoke with infection control: patient > 21 days from initial infection so does not require isolation

## 2021-02-04 NOTE — PROGRESS NOTE ADULT - PROBLEM SELECTOR PLAN 7
Advanced practice note appreciated  -Stage 1 Pressure Injury to the Bilateral Heels  -Elevate/float the patients heels using heel protectors and reposition the patient Q 2hrs using wedges or pillows patient is on lisinopril and lasix at home  lisinopril and lasix held for now  monitor bp

## 2021-02-04 NOTE — PROGRESS NOTE ADULT - ASSESSMENT
Patient, a 89 y/o female Rivera Speaking, with PMHx of asthma, HTN, DM presents to the ED with hypoglycemia. Patient's fingerstick reported to be 30 in field and given dextrose. Fingerstick in ED was 104, later dropped to 63 and she received push of D50. Patient not answering questions even in Rivera. Patient was recently admitted to [facility] for COVID PNA and completed course of steroids-  patient was recommended [facility] by PT during previous admission - family declined at the time. Notably, patient was discharged on 70/30 insulin 40 units two times a day while on prednisone but patient completed 5 day course. Patient found to have a UTI which likely contributed to her altered mental status, on ceftriaxone. Son Henrique is CDPAP, who is now agreeable to discharge to [facility]. Patient evaluated by physical therapy 2/4 - recommending [facility] placement, choices given to family.

## 2021-02-04 NOTE — PROGRESS NOTE ADULT - PROBLEM SELECTOR PLAN 8
patient is on lisinopril and lasix at home  lisinopril and lasix held for now  monitor bp HgnA1C 12/15 was 7.2  -Endo Dr. valente consulted for further mgnt as pt. is now off steroids

## 2021-02-04 NOTE — PHYSICAL THERAPY INITIAL EVALUATION ADULT - GENERAL OBSERVATIONS, REHAB EVAL
Patient received in supine; awake and alert; AxOx1; Rivera speaking; presents w/ inc. edema to (L) lower leg; scab and redness around (R) lower arm

## 2021-02-04 NOTE — PROGRESS NOTE ADULT - ASSESSMENT
_________________________________________________________________________________________  ========>>  M E D I C A L   A T T E N D I N G    F O L L O W  U P  N O T E  <<=========  -----------------------------------------------------------------------------------------------------    - Patient seen and examined by me earlier today.  (covering today)   - In summary,  PAULA MARIO is a 89y year old woman admitted with AMS, hypoglycemia   - Patient today overall doing ok, comfortable, eating fairly     ==================>> REVIEW OF SYSTEM <<=================    limited ROS, pt poor historian    though pt today more alert and interactive, saying little     ==================>> PHYSICAL EXAM <<=================    GEN: awake and responsive but not taking, NAD , comfortable, disoriented   HEENT: NCAT, PERRL, MMM  Neck: supple , no JVD appreciated  CVS: S1S2 , regular , No M/R/G appreciated  PULM: limited exam as not taking deep breaths   ABD.: soft. non tender, non distended  Extrem: intact pulses , no edema                                             ( Note written 02-04-21 )    ==================>> MEDICATIONS <<====================    aspirin enteric coated 81 milliGRAM(s) Oral daily  budesonide  80 MICROgram(s)/formoterol 4.5 MICROgram(s) Inhaler 2 Puff(s) Inhalation two times a day  cefTRIAXone   IVPB 1000 milliGRAM(s) IV Intermittent every 24 hours  dextrose 5% + sodium chloride 0.45%. 1000 milliLiter(s) IV Continuous <Continuous>  dextrose 50% Injectable 25 Gram(s) IV Push once  gabapentin 300 milliGRAM(s) Oral every 12 hours  montelukast 10 milliGRAM(s) Oral daily  multivitamin 1 Tablet(s) Oral daily  pantoprazole    Tablet 40 milliGRAM(s) Oral before breakfast  senna 2 Tablet(s) Oral at bedtime    MEDICATIONS  (PRN):  acetaminophen   Tablet .. 650 milliGRAM(s) Oral every 6 hours PRN Temp greater or equal to 38C (100.4F), Moderate Pain (4 - 6)  ALBUTerol    90 MICROgram(s) HFA Inhaler 2 Puff(s) Inhalation every 6 hours PRN Shortness of Breath and/or Wheezing  polyethylene glycol 3350 17 Gram(s) Oral daily PRN Constipation    ___________  Active diet:  Diet, Dysphagia 1 Pureed-Nectar Consistency Fluid:   Lacto Veg (Accepts Milk & Milk Products)  No Beef  No Fish  No Pork  No Poultry  No Shellfish  Supplement Feeding Modality:  Oral  Ensure Pudding Cans or Servings Per Day:  2       Frequency:  Two Times a day  ___________________    ==================>> VITAL SIGNS <<==================    Vital Signs Last 24 HrsT(C): 36.8 (02-04-21 @ 07:11)  T(F): 98.3 (02-04-21 @ 07:11), Max: 98.3 (02-04-21 @ 07:11)  HR: 93 (02-04-21 @ 09:35) (85 - 93)  BP: 117/63 (02-04-21 @ 09:35)  RR: 16 (02-04-21 @ 07:11) (15 - 16)  SpO2: 98% (02-04-21 @ 09:35) (97% - 99%)      POCT Blood Glucose.: 265 mg/dL (04 Feb 2021 11:29)  POCT Blood Glucose.: 221 mg/dL (04 Feb 2021 07:34)  POCT Blood Glucose.: 178 mg/dL (03 Feb 2021 23:37)  POCT Blood Glucose.: 182 mg/dL (03 Feb 2021 16:52)     ==================>> LAB AND IMAGING <<==================                        13.8   10.26 )-----------( 258      ( 04 Feb 2021 07:47 )             44.5        02-04    139  |  104  |  22<H>  ----------------------------<  215<H>  3.9   |  20<L>  |  1.25    Ca    10.2      04 Feb 2021 07:47  Mg     2.1     02-04      WBC count:   10.26 <<== ,  9.76 <<== ,  8.01 <<== ,  7.94 <<==   Hemoglobin:   13.8 <<==,  13.5 <<==,  12.8 <<==,  12.1 <<==  platelets:  258 <==, 249 <==, 252 <==, 265 <==, 288 <==, 344 <==    Creatinine:  1.25  <<==, 1.01  <<==, 1.04  <<==, 0.99  <<==, 1.02  <<==, 1.16  <<==  Sodium:   139  <==, 137  <==, 138  <==, 141  <==, 140  <==, 140  <==, 138  <==       AST:          24 <== , 26 <==      ALT:        28  <== , 28  <==      AP:        86  <=, 82  <=     Bili:        0.6  <=, 0.6  <=    _______________________  C U L T U R E S :    Culture - Urine (collected 29 Jan 2021 21:43)  Source: .Urine Catheterized  Final Report (30 Jan 2021 17:25):    <10,000 CFU/mL Normal Urogenital Jesenia    Culture - Blood (collected 29 Jan 2021 18:34)  Source: .Blood Blood-Peripheral  Final Report (03 Feb 2021 19:00):    No Growth Final    Culture - Blood (collected 29 Jan 2021 18:34)  Source: .Blood Blood-Peripheral  Final Report (03 Feb 2021 19:00):    No Growth Final      COVID-19 IgG Antibody Index: 140.00 (01-30-21 @ 11:27)  COVID-19 IgG Antibody Index: 0.06 (12-15-20 @ 00:52)    COVID-19 PCR: Detected (02-03-21 @ 15:37)  COVID-19 PCR: Detected (02-02-21 @ 10:50)  COVID-19 PCR: Detected (01-29-21 @ 12:01)    ___________________________________________________________________________________  ===============>>  A S S E S S M E N T   A N D   P L A N <<===============  ------------------------------------------------------------------------------------------  · Assessment	  Patient, a 89 y/o female Rivera Speaking, with PMHx of asthma, HTN, DM presents to the ED with hypoglycemia of FS 30 in field, 64 in ED. s/p D50.   COVID +. Occult +. WBC count 12.5k, UA +. CXR shows interstitial markings. CTH was negative.    Patient admitted for hypoglycemia. >> resolved    Problem/Plan - 1:  ·  Problem: Acute encephalopathy.  Plan: patient presented with confusion  CTH was negative  metabolic encephalopathy due to hypoglycemia worsened by infection (UTI and COVID-19) >> overall improved   treating underlying cause  monitor vitals, labs  supportive care  nutrition / hydration as able  aspiration precautions     Problem/Plan - 2:  ·  Problem: Hypoglycemia.  Plan: pt reported to have FS 30 in field, 64 in ED. s/p D50> improved overall   hold all hypoglycemics for now  monitor fingerstick q4h for now.   RISS  endo if needed     Problem/Plan - 3:  ·  Problem: ID: UTI  and COVID  post rocephin for UTI   COVID -19 positive since a month and a half ago and has antibodies  no need for treatment at this time   supportive care     Problem/Plan - 4:  ·  Problem: Occult blood in stools.  Plan: occult + in ED  Hb stable  on protonix   follow repeat CBC  GI f/u    Problem/Plan - 5:  ·  Problem: Asthma.  Plan: c/w albuterol  c/w singulair    Problem/Plan - 6:  Problem: HTN (hypertension). Plan: patient is on lisinopril and lasix at home  lisinopril and lasix held for now  monitor bp.    Problem/Plan - 7:  ·  Problem: DM (diabetes mellitus).  Plan: management as above.     Problem/Plan - 8:  ·  Problem: Need for prophylactic measure.  Plan: held due to + occult  SCDs  protonix.     PT / OOB  Dispo planing >> rehab planing . placement     ___________________________  HPatience Sim D.O.  (covering today)   Pager: [phone]

## 2021-02-04 NOTE — PHYSICAL THERAPY INITIAL EVALUATION ADULT - RANGE OF MOTION EXAMINATION, REHAB EVAL
limited to B/L shoulder flexion; hip and knee flexion limited to 50% due to swelling./deficits as listed below

## 2021-02-05 ENCOUNTER — TRANSCRIPTION ENCOUNTER (OUTPATIENT)
Age: 86
End: 2021-02-05

## 2021-02-05 VITALS
RESPIRATION RATE: 18 BRPM | HEART RATE: 68 BPM | TEMPERATURE: 97 F | SYSTOLIC BLOOD PRESSURE: 133 MMHG | OXYGEN SATURATION: 97 % | DIASTOLIC BLOOD PRESSURE: 60 MMHG

## 2021-02-05 LAB
ALBUMIN SERPL ELPH-MCNC: 2.7 G/DL — LOW (ref 3.5–5)
ALP SERPL-CCNC: 107 U/L — SIGNIFICANT CHANGE UP (ref 40–120)
ALT FLD-CCNC: 24 U/L DA — SIGNIFICANT CHANGE UP (ref 10–60)
ANION GAP SERPL CALC-SCNC: 12 MMOL/L — SIGNIFICANT CHANGE UP (ref 5–17)
AST SERPL-CCNC: 18 U/L — SIGNIFICANT CHANGE UP (ref 10–40)
BILIRUB SERPL-MCNC: 0.5 MG/DL — SIGNIFICANT CHANGE UP (ref 0.2–1.2)
BUN SERPL-MCNC: 23 MG/DL — HIGH (ref 7–18)
CALCIUM SERPL-MCNC: 10 MG/DL — SIGNIFICANT CHANGE UP (ref 8.4–10.5)
CHLORIDE SERPL-SCNC: 105 MMOL/L — SIGNIFICANT CHANGE UP (ref 96–108)
CO2 SERPL-SCNC: 23 MMOL/L — SIGNIFICANT CHANGE UP (ref 22–31)
CREAT SERPL-MCNC: 1.07 MG/DL — SIGNIFICANT CHANGE UP (ref 0.5–1.3)
GLUCOSE BLDC GLUCOMTR-MCNC: 218 MG/DL — HIGH (ref 70–99)
GLUCOSE BLDC GLUCOMTR-MCNC: 219 MG/DL — HIGH (ref 70–99)
GLUCOSE BLDC GLUCOMTR-MCNC: 224 MG/DL — HIGH (ref 70–99)
GLUCOSE SERPL-MCNC: 241 MG/DL — HIGH (ref 70–99)
HCT VFR BLD CALC: 42 % — SIGNIFICANT CHANGE UP (ref 34.5–45)
HGB BLD-MCNC: 13.1 G/DL — SIGNIFICANT CHANGE UP (ref 11.5–15.5)
MAGNESIUM SERPL-MCNC: 2.1 MG/DL — SIGNIFICANT CHANGE UP (ref 1.6–2.6)
MCHC RBC-ENTMCNC: 25.8 PG — LOW (ref 27–34)
MCHC RBC-ENTMCNC: 31.2 GM/DL — LOW (ref 32–36)
MCV RBC AUTO: 82.7 FL — SIGNIFICANT CHANGE UP (ref 80–100)
NRBC # BLD: 0 /100 WBCS — SIGNIFICANT CHANGE UP (ref 0–0)
PLATELET # BLD AUTO: 217 K/UL — SIGNIFICANT CHANGE UP (ref 150–400)
POTASSIUM SERPL-MCNC: 3.9 MMOL/L — SIGNIFICANT CHANGE UP (ref 3.5–5.3)
POTASSIUM SERPL-SCNC: 3.9 MMOL/L — SIGNIFICANT CHANGE UP (ref 3.5–5.3)
PROT SERPL-MCNC: 6.8 G/DL — SIGNIFICANT CHANGE UP (ref 6–8.3)
RBC # BLD: 5.08 M/UL — SIGNIFICANT CHANGE UP (ref 3.8–5.2)
RBC # FLD: 14.8 % — HIGH (ref 10.3–14.5)
SODIUM SERPL-SCNC: 140 MMOL/L — SIGNIFICANT CHANGE UP (ref 135–145)
WBC # BLD: 8.45 K/UL — SIGNIFICANT CHANGE UP (ref 3.8–10.5)
WBC # FLD AUTO: 8.45 K/UL — SIGNIFICANT CHANGE UP (ref 3.8–10.5)

## 2021-02-05 PROCEDURE — 82746 ASSAY OF FOLIC ACID SERUM: CPT

## 2021-02-05 PROCEDURE — 82607 VITAMIN B-12: CPT

## 2021-02-05 PROCEDURE — 97163 PT EVAL HIGH COMPLEX 45 MIN: CPT

## 2021-02-05 PROCEDURE — 87040 BLOOD CULTURE FOR BACTERIA: CPT

## 2021-02-05 PROCEDURE — 92610 EVALUATE SWALLOWING FUNCTION: CPT

## 2021-02-05 PROCEDURE — 80061 LIPID PANEL: CPT

## 2021-02-05 PROCEDURE — 85379 FIBRIN DEGRADATION QUANT: CPT

## 2021-02-05 PROCEDURE — 81001 URINALYSIS AUTO W/SCOPE: CPT

## 2021-02-05 PROCEDURE — 85027 COMPLETE CBC AUTOMATED: CPT

## 2021-02-05 PROCEDURE — 82272 OCCULT BLD FECES 1-3 TESTS: CPT

## 2021-02-05 PROCEDURE — 87635 SARS-COV-2 COVID-19 AMP PRB: CPT

## 2021-02-05 PROCEDURE — 84484 ASSAY OF TROPONIN QUANT: CPT

## 2021-02-05 PROCEDURE — 83036 HEMOGLOBIN GLYCOSYLATED A1C: CPT

## 2021-02-05 PROCEDURE — 84145 PROCALCITONIN (PCT): CPT

## 2021-02-05 PROCEDURE — 70450 CT HEAD/BRAIN W/O DYE: CPT

## 2021-02-05 PROCEDURE — 84443 ASSAY THYROID STIM HORMONE: CPT

## 2021-02-05 PROCEDURE — U0005: CPT

## 2021-02-05 PROCEDURE — 80048 BASIC METABOLIC PNL TOTAL CA: CPT

## 2021-02-05 PROCEDURE — 85025 COMPLETE CBC W/AUTO DIFF WBC: CPT

## 2021-02-05 PROCEDURE — 82728 ASSAY OF FERRITIN: CPT

## 2021-02-05 PROCEDURE — 83605 ASSAY OF LACTIC ACID: CPT

## 2021-02-05 PROCEDURE — 80053 COMPREHEN METABOLIC PANEL: CPT

## 2021-02-05 PROCEDURE — 94640 AIRWAY INHALATION TREATMENT: CPT

## 2021-02-05 PROCEDURE — 82962 GLUCOSE BLOOD TEST: CPT

## 2021-02-05 PROCEDURE — 86769 SARS-COV-2 COVID-19 ANTIBODY: CPT

## 2021-02-05 PROCEDURE — 85652 RBC SED RATE AUTOMATED: CPT

## 2021-02-05 PROCEDURE — 87086 URINE CULTURE/COLONY COUNT: CPT

## 2021-02-05 PROCEDURE — 83690 ASSAY OF LIPASE: CPT

## 2021-02-05 PROCEDURE — 86140 C-REACTIVE PROTEIN: CPT

## 2021-02-05 PROCEDURE — 36415 COLL VENOUS BLD VENIPUNCTURE: CPT

## 2021-02-05 PROCEDURE — 93005 ELECTROCARDIOGRAM TRACING: CPT

## 2021-02-05 PROCEDURE — 83735 ASSAY OF MAGNESIUM: CPT

## 2021-02-05 PROCEDURE — U0003: CPT

## 2021-02-05 PROCEDURE — 99285 EMERGENCY DEPT VISIT HI MDM: CPT

## 2021-02-05 PROCEDURE — 84100 ASSAY OF PHOSPHORUS: CPT

## 2021-02-05 PROCEDURE — 71045 X-RAY EXAM CHEST 1 VIEW: CPT

## 2021-02-05 RX ORDER — INSULIN GLARGINE 100 [IU]/ML
8 INJECTION, SOLUTION SUBCUTANEOUS
Qty: 0 | Refills: 0 | DISCHARGE
Start: 2021-02-05

## 2021-02-05 RX ORDER — INSULIN ASPART 100 [IU]/ML
0 INJECTION, SUSPENSION SUBCUTANEOUS
Qty: 0 | Refills: 0 | DISCHARGE

## 2021-02-05 RX ORDER — GLIMEPIRIDE 1 MG
1 TABLET ORAL
Qty: 0 | Refills: 0 | DISCHARGE

## 2021-02-05 RX ORDER — DAPAGLIFLOZIN AND SAXAGLIPTIN 5; 5 MG/1; MG/1
1 TABLET, FILM COATED ORAL
Qty: 0 | Refills: 0 | DISCHARGE

## 2021-02-05 RX ORDER — LORATADINE 10 MG/1
1 TABLET ORAL
Qty: 0 | Refills: 0 | DISCHARGE

## 2021-02-05 RX ORDER — INSULIN GLARGINE 100 [IU]/ML
8 INJECTION, SOLUTION SUBCUTANEOUS AT BEDTIME
Refills: 0 | Status: DISCONTINUED | OUTPATIENT
Start: 2021-02-05 | End: 2021-02-05

## 2021-02-05 RX ADMIN — Medication 1 TABLET(S): at 11:03

## 2021-02-05 RX ADMIN — PANTOPRAZOLE SODIUM 40 MILLIGRAM(S): 20 TABLET, DELAYED RELEASE ORAL at 05:16

## 2021-02-05 RX ADMIN — GABAPENTIN 300 MILLIGRAM(S): 400 CAPSULE ORAL at 05:16

## 2021-02-05 RX ADMIN — BUDESONIDE AND FORMOTEROL FUMARATE DIHYDRATE 2 PUFF(S): 160; 4.5 AEROSOL RESPIRATORY (INHALATION) at 11:03

## 2021-02-05 RX ADMIN — Medication 81 MILLIGRAM(S): at 11:03

## 2021-02-05 RX ADMIN — MONTELUKAST 10 MILLIGRAM(S): 4 TABLET, CHEWABLE ORAL at 11:03

## 2021-02-05 NOTE — PROGRESS NOTE ADULT - PROBLEM SELECTOR PROBLEM 4
Occult blood in stools

## 2021-02-05 NOTE — PROGRESS NOTE ADULT - PROVIDER SPECIALTY LIST ADULT
Endocrinology
Endocrinology
Internal Medicine
Endocrinology
Internal Medicine

## 2021-02-05 NOTE — PROGRESS NOTE ADULT - PROBLEM SELECTOR PLAN 1
p/w AMS likely due to metabolic derangements and UTI  hypoglycemia resolved   BGL's well controlled   CTH without acute findings   -UA positive, blood and urine cultures NGTD  COVID PCR and antibody positive   -Cont Ceftriaxone  -CXR shows interstitial infiltrates - completed course of steroids, not requiring supplemental O2  - spoke with infection control: patient > 21 days from initial infection so does not require isolation  D/C planning today.

## 2021-02-05 NOTE — PROGRESS NOTE ADULT - SUBJECTIVE AND OBJECTIVE BOX
HPI:  Patient, a 89 y/o female [place] Speaking, with PMHx of asthma, HTN, DM presents to the ED with hypoglycemia. Patient's fingerstick reported to be 30 in field and given dextrose. Fingerstick in ED was 104, later dropped to 63 and she received push of D50. Patient not answering questions even in Rivera. Patient was recently admitted to [facility] for COVID PNA and completed course of steroids. At that time, patient was recommended JAYSON by PT,  but patient was discharged home without home oxygen. Off note, patient was discharged on 70/30 insulin 40 units two times a day while on prednisone but patient finished 5 day course.     Tried to call son Henrique Csoby at [phone] but was unable to reach him. GOC not known so patient remains full code for now.    In ED,  Vital Signs Last 24 Hrs  T(C): 36.8 (2021 15:57), Max: 37.1 (2021 10:59)  T(F): 98.2 (2021 15:57), Max: 98.8 (2021 10:59)  HR: 108 (2021 17:08) (99 - 108)  BP: 126/71 (2021 15:57) (117/68 - 157/62)  BP(mean): --  RR: 22 (2021 17:08) (16 - 22)  SpO2: 96% (2021 17:08) (96% - 98%)  EKG showed sinus tachy. Occult and COVID +. FS 64, pt received D50 and 1.5L bolus.  (2021 19:00)      Patient is a 89y old  Female who presents with a chief complaint of hypoglycemia (2021 19:00)      INTERVAL HPI/OVERNIGHT EVENTS:  T(C): 36.2 (21 @ 16:24), Max: 37.7 (21 @ 01:34)  HR: 96 (21 @ 16:24) (96 - 112)  BP: 131/50 (21 @ 16:24) (112/75 - 131/50)  RR: 18 (21 @ 16:24) (17 - 20)  SpO2: 96% (21 @ 16:24) (96% - 100%)  Wt(kg): --  I&O's Summary      REVIEW OF SYSTEMS: denies fever, chills, SOB, palpitations, chest pain, abdominal pain, nausea, vomitting, diarrhea, constipation, dizziness    MEDICATIONS  (STANDING):  aspirin enteric coated 81 milliGRAM(s) Oral daily  budesonide  80 MICROgram(s)/formoterol 4.5 MICROgram(s) Inhaler 2 Puff(s) Inhalation two times a day  cefTRIAXone   IVPB 1000 milliGRAM(s) IV Intermittent every 24 hours  dextrose 5% + sodium chloride 0.45%. 1000 milliLiter(s) (70 mL/Hr) IV Continuous <Continuous>  gabapentin 300 milliGRAM(s) Oral every 12 hours  montelukast 10 milliGRAM(s) Oral daily  multivitamin 1 Tablet(s) Oral daily  pantoprazole  Injectable 40 milliGRAM(s) IV Push every 12 hours  senna 2 Tablet(s) Oral at bedtime    MEDICATIONS  (PRN):  acetaminophen   Tablet .. 650 milliGRAM(s) Oral every 6 hours PRN Temp greater or equal to 38C (100.4F), Moderate Pain (4 - 6)  ALBUTerol    90 MICROgram(s) HFA Inhaler 2 Puff(s) Inhalation every 6 hours PRN Shortness of Breath and/or Wheezing  polyethylene glycol 3350 17 Gram(s) Oral daily PRN Constipation      PHYSICAL EXAM:  GENERAL: NAD, well-groomed, well-developed  HEAD:  Atraumatic, Normocephalic  EYES: EOMI, PERRLA, conjunctiva and sclera clear  ENMT: No tonsillar erythema, exudates, or enlargement; Moist mucous membranes, Good dentition, No lesions  NECK: Supple, No JVD, Normal thyroid  NERVOUS SYSTEM:  Alert & Oriented X3, Good concentration; Motor Strength 5/5 B/L upper and lower extremities; DTRs 2+ intact and symmetric  CHEST/LUNG: Clear to percussion bilaterally; No rales, rhonchi, wheezing, or rubs  HEART: Regular rate and rhythm; No murmurs, rubs, or gallops  ABDOMEN: Soft, Nontender, Nondistended; Bowel sounds present  EXTREMITIES:  2+ Peripheral Pulses, No clubbing, cyanosis, or edema  LYMPH: No lymphadenopathy noted  SKIN: No rashes or lesions  LABS:                        12.6   9.05  )-----------( 288      ( 2021 06:48 )             41.2         140  |  108  |  17  ----------------------------<  220<H>  4.1   |  21<L>  |  1.16    Ca    8.8      2021 06:48  Phos  3.3       Mg     2.2         TPro  6.5  /  Alb  2.7<L>  /  TBili  0.8  /  DBili  x   /  AST  36  /  ALT  32  /  AlkPhos  90        Urinalysis Basic - ( 2021 14:52 )    Color: Yellow / Appearance: Clear / S.010 / pH: x  Gluc: x / Ketone: Small  / Bili: Negative / Urobili: Negative   Blood: x / Protein: 30 mg/dL / Nitrite: Negative   Leuk Esterase: Moderate / RBC: 0-2 /HPF / WBC 26-50 /HPF   Sq Epi: x / Non Sq Epi: Occasional /HPF / Bacteria: Few /HPF      CAPILLARY BLOOD GLUCOSE      POCT Blood Glucose.: 176 mg/dL (2021 18:35)  POCT Blood Glucose.: 212 mg/dL (2021 14:05)  POCT Blood Glucose.: 205 mg/dL (2021 10:38)  POCT Blood Glucose.: 184 mg/dL (2021 05:28)  POCT Blood Glucose.: 163 mg/dL (2021 01:12)  POCT Blood Glucose.: 222 mg/dL (2021 23:10)  POCT Blood Glucose.: 142 mg/dL (2021 20:46)  POCT Blood Glucose.: 135 mg/dL (2021 19:32)        Urinalysis Basic - ( 2021 14:52 )    Color: Yellow / Appearance: Clear / S.010 / pH: x  Gluc: x / Ketone: Small  / Bili: Negative / Urobili: Negative   Blood: x / Protein: 30 mg/dL / Nitrite: Negative   Leuk Esterase: Moderate / RBC: 0-2 /HPF / WBC 26-50 /HPF   Sq Epi: x / Non Sq Epi: Occasional /HPF / Bacteria: Few /HPF    
Interval Events:  pt in nad    Allergies    No Known Allergies    Intolerances      Endocrine/Metabolic Medications:      Vital Signs Last 24 Hrs  T(C): 36.8 (04 Feb 2021 07:11), Max: 36.8 (04 Feb 2021 07:11)  T(F): 98.3 (04 Feb 2021 07:11), Max: 98.3 (04 Feb 2021 07:11)  HR: 93 (04 Feb 2021 09:35) (85 - 100)  BP: 117/63 (04 Feb 2021 09:35) (116/51 - 131/60)  BP(mean): --  RR: 16 (04 Feb 2021 07:11) (15 - 18)  SpO2: 98% (04 Feb 2021 09:35) (97% - 99%)      PHYSICAL EXAM  All physical exam findings normal, except those marked:  General:	Alert, active, cooperative, NAD, well hydrated  .		[] Abnormal:  Neck		Normal: supple, no cervical adenopathy, no palpable thyroid  .		[] Abnormal:  Cardiovascular	Normal: regular rate, normal S1, S2, no murmurs  .		[] Abnormal:  Respiratory	Normal: no chest wall deformity, normal respiratory pattern, CTA B/L  .		[] Abnormal:  Abdominal	Normal: soft, ND, NT, bowel sounds present, no masses, no organomegaly  .		[] Abnormal:  		Normal normal genitalia, testes descended, circumcised/uncircumcised  .		Uma stage:			Breast uma:  .		Menstrual history:  .		[] Abnormal:  Extremities	Normal: FROM x4  .		[] Abnormal:  Skin		Normal: intact and not indurated, no rash, no acanthosis nigricans  .		[] Abnormal:  Neurologic	Normal: grossly intact  .		[] Abnormal:    LABS                        13.8   10.26 )-----------( 258      ( 04 Feb 2021 07:47 )             44.5                               139    |  104    |  22                  Calcium: 10.2  / iCa: x      (02-04 @ 07:47)    ----------------------------<  215       Magnesium: 2.1                              3.9     |  20     |  1.25             Phosphorous: x          CAPILLARY BLOOD GLUCOSE      POCT Blood Glucose.: 221 mg/dL (04 Feb 2021 07:34)  POCT Blood Glucose.: 178 mg/dL (03 Feb 2021 23:37)  POCT Blood Glucose.: 182 mg/dL (03 Feb 2021 16:52)  POCT Blood Glucose.: 187 mg/dL (03 Feb 2021 11:20)        Assesment/plan      Patient, a 89 y/o female [place] Speaking, with PMHx of asthma, HTN, DM presents to the ED with hypoglycemia. Pt was on 70/30 insulin and multiple oral dm meds including glipizide before last admission.           Problem/Recommendation - 1:  Problem: Hypoglycemia. Recommendation: due to mix insulin and poor po intake and ? oral dm meds including glipizide which pt was on prior to last admission  hold all insulin   pt eating better today  consider low dose lantus vs januvia   correction doses for now   fsg ac and hs  d/w nursing.
Interval Events:  pt in nad    Allergies    No Known Allergies    Intolerances      Endocrine/Metabolic Medications:      Vital Signs Last 24 Hrs  T(C): 36.6 (03 Feb 2021 07:57), Max: 36.8 (02 Feb 2021 16:57)  T(F): 97.8 (03 Feb 2021 07:57), Max: 98.3 (03 Feb 2021 00:25)  HR: 95 (03 Feb 2021 07:57) (90 - 97)  BP: 119/55 (03 Feb 2021 07:57) (119/55 - 140/67)  BP(mean): --  RR: 20 (03 Feb 2021 07:57) (18 - 20)  SpO2: 98% (03 Feb 2021 07:57) (96% - 98%)      PHYSICAL EXAM  All physical exam findings normal, except those marked:  General:	Alert, active, cooperative, NAD, well hydrated  .		[] Abnormal:  Neck		Normal: supple, no cervical adenopathy, no palpable thyroid  .		[] Abnormal:  Cardiovascular	Normal: regular rate, normal S1, S2, no murmurs  .		[] Abnormal:  Respiratory	Normal: no chest wall deformity, normal respiratory pattern, CTA B/L  .		[] Abnormal:  Abdominal	Normal: soft, ND, NT, bowel sounds present, no masses, no organomegaly  .		[] Abnormal:  		Normal normal genitalia, testes descended, circumcised/uncircumcised  .		Uma stage:			Breast uma:  .		Menstrual history:  .		[] Abnormal:  Extremities	Normal: FROM x4  .		[] Abnormal:  Skin		Normal: intact and not indurated, no rash, no acanthosis nigricans  .		[] Abnormal:  Neurologic	Normal: grossly intact  .		[] Abnormal:    LABS                        13.5   9.76  )-----------( 249      ( 03 Feb 2021 08:38 )             43.6                               137    |  102    |  16                  Calcium: 9.3   / iCa: x      (02-03 @ 08:38)    ----------------------------<  201       Magnesium: x                                3.7     |  22     |  1.01             Phosphorous: x          CAPILLARY BLOOD GLUCOSE      POCT Blood Glucose.: 188 mg/dL (03 Feb 2021 06:07)  POCT Blood Glucose.: 192 mg/dL (03 Feb 2021 00:39)  POCT Blood Glucose.: 157 mg/dL (02 Feb 2021 21:17)  POCT Blood Glucose.: 146 mg/dL (02 Feb 2021 17:14)  POCT Blood Glucose.: 129 mg/dL (02 Feb 2021 11:54)        Assesment/plan    Patient, a 89 y/o female [place] Speaking, with PMHx of asthma, HTN, DM presents to the ED with hypoglycemia. Pt was on 70/30 insulin and multiple oral dm meds including glipizide before last admission.           Problem/Recommendation - 1:  Problem: Hypoglycemia. Recommendation: due to mix insulin and poor po intake and ? oral dm meds including glipizide which pt was on prior to last admission  hold all insulin   pt cont to have poor p[o intake currently- on liquid diet   fsg ac and hs  d/w nursing.  
Interval Events:  pt in nad    Allergies    No Known Allergies    Intolerances      Endocrine/Metabolic Medications:  dextrose 50% Injectable 25 Gram(s) IV Push once      Vital Signs Last 24 Hrs  T(C): 36.1 (05 Feb 2021 08:15), Max: 36.7 (05 Feb 2021 00:25)  T(F): 97 (05 Feb 2021 08:15), Max: 98 (05 Feb 2021 00:25)  HR: 68 (05 Feb 2021 08:15) (68 - 104)  BP: 133/60 (05 Feb 2021 08:15) (119/67 - 133/60)  BP(mean): --  RR: 18 (05 Feb 2021 08:15) (16 - 18)  SpO2: 97% (05 Feb 2021 08:15) (97% - 98%)      PHYSICAL EXAM  All physical exam findings normal, except those marked:  General:	Alert, active, cooperative, NAD, well hydrated  .		[] Abnormal:  Neck		Normal: supple, no cervical adenopathy, no palpable thyroid  .		[] Abnormal:  Cardiovascular	Normal: regular rate, normal S1, S2, no murmurs  .		[] Abnormal:  Respiratory	Normal: no chest wall deformity, normal respiratory pattern, CTA B/L  .		[] Abnormal:  Abdominal	Normal: soft, ND, NT, bowel sounds present, no masses, no organomegaly  .		[] Abnormal:  		Normal normal genitalia, testes descended, circumcised/uncircumcised  .		Uma stage:			Breast uma:  .		Menstrual history:  .		[] Abnormal:  Extremities	Normal: FROM x4  .		[] Abnormal:  Skin		Normal: intact and not indurated, no rash, no acanthosis nigricans  .		[] Abnormal:  Neurologic	Normal: grossly intact  .		[] Abnormal:    LABS                        13.1   8.45  )-----------( 217      ( 05 Feb 2021 07:36 )             42.0                               140    |  105    |  23                  Calcium: 10.0  / iCa: x      (02-05 @ 07:36)    ----------------------------<  241       Magnesium: 2.1                              3.9     |  23     |  1.07             Phosphorous: x        TPro  6.8    /  Alb  2.7    /  TBili  0.5    /  DBili  x      /  AST  18     /  ALT  24     /  AlkPhos  107    05 Feb 2021 07:36    CAPILLARY BLOOD GLUCOSE      POCT Blood Glucose.: 224 mg/dL (05 Feb 2021 11:27)  POCT Blood Glucose.: 218 mg/dL (05 Feb 2021 08:23)  POCT Blood Glucose.: 219 mg/dL (05 Feb 2021 00:08)  POCT Blood Glucose.: 207 mg/dL (04 Feb 2021 16:39)        Assesment/plan    Patient, a 89 y/o female [place] Speaking, with PMHx of asthma, HTN, DM presents to the ED with hypoglycemia. Pt was on 70/30 insulin and multiple oral dm meds including glipizide before last admission.           Problem/Recommendation - 1:  Problem: Hypoglycemia. Recommendation: due to mix insulin and poor po intake and ? oral dm meds including glipizide which pt was on prior to last admission  hold all insulin   pt eating better today  start lantus 8 units qhs  correction doses   fsg ac and hs  aim fsg 140-<200   
  Patient is a 89y old  Female who presents with a chief complaint of hypoglycemia (03 Feb 2021 12:26)      INTERVAL HPI/OVERNIGHT EVENTS: no new complaints    MEDICATIONS  (STANDING):  aspirin enteric coated 81 milliGRAM(s) Oral daily  budesonide  80 MICROgram(s)/formoterol 4.5 MICROgram(s) Inhaler 2 Puff(s) Inhalation two times a day  dextrose 5% + sodium chloride 0.45%. 1000 milliLiter(s) (70 mL/Hr) IV Continuous <Continuous>  dextrose 50% Injectable 25 Gram(s) IV Push once  gabapentin 300 milliGRAM(s) Oral every 12 hours  insulin glargine Injectable (LANTUS) 8 Unit(s) SubCutaneous at bedtime  montelukast 10 milliGRAM(s) Oral daily  multivitamin 1 Tablet(s) Oral daily  pantoprazole    Tablet 40 milliGRAM(s) Oral before breakfast  senna 2 Tablet(s) Oral at bedtime    MEDICATIONS  (PRN):  acetaminophen   Tablet .. 650 milliGRAM(s) Oral every 6 hours PRN Temp greater or equal to 38C (100.4F), Moderate Pain (4 - 6)  ALBUTerol    90 MICROgram(s) HFA Inhaler 2 Puff(s) Inhalation every 6 hours PRN Shortness of Breath and/or Wheezing  polyethylene glycol 3350 17 Gram(s) Oral daily PRN Constipation      __________________________________________________  REVIEW OF SYSTEMS: limited 2/2 patient's mental status     CONSTITUTIONAL: No fever,   EYES: no acute visual disturbances  NECK: No pain or stiffness  RESPIRATORY: No cough; No shortness of breath  CARDIOVASCULAR: No chest pain, no palpitations  GASTROINTESTINAL: No pain. No nausea or vomiting; No diarrhea   NEUROLOGICAL: No headache or numbness, no tremors  MUSCULOSKELETAL: No joint pain, no muscle pain  GENITOURINARY: no dysuria, no frequency, no hesitancy  PSYCHIATRY: no depression , no anxiety  ALL OTHER  ROS negative      PHYSICAL EXAM:  GENERAL: NAD  HEENT: Normocephalic;  conjunctivae and sclerae clear; moist mucous membranes;   NECK : supple  CHEST/LUNG: Clear to auscultation bilaterally with good air entry   HEART: S1 S2  regular; no murmurs, gallops or rubs  ABDOMEN: Soft, Nontender, Nondistended; Bowel sounds present  EXTREMITIES: no cyanosis; no edema; no calf tenderness  SKIN: warm and dry; no rash  NERVOUS SYSTEM:  A + O x 1-2    _________________________________________________  LABS:                        13.5   9.76  )-----------( 249      ( 03 Feb 2021 08:38 )             43.6     02-03    137  |  102  |  16  ----------------------------<  201<H>  3.7   |  22  |  1.01    Ca    9.3      03 Feb 2021 08:38          CAPILLARY BLOOD GLUCOSE      POCT Blood Glucose.: 187 mg/dL (03 Feb 2021 11:20)  POCT Blood Glucose.: 188 mg/dL (03 Feb 2021 06:07)  POCT Blood Glucose.: 192 mg/dL (03 Feb 2021 00:39)  POCT Blood Glucose.: 157 mg/dL (02 Feb 2021 21:17)  POCT Blood Glucose.: 146 mg/dL (02 Feb 2021 17:14)        RADIOLOGY & ADDITIONAL TESTS:    Imaging  Reviewed:  YES  < from: CT Head No Cont (01.29.21 @ 14:34) >    FINDINGS:    There is prominence of the ventricles, sulci and cisterns of the brain which may be age related.  There are scattered white matter hypodensities which are nonspecific but most commonly represent chronic microvascular ischemic changes. 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 There is no acute loss of gray-white matter differentiation.    There is no intracranial hemorrhage, mass lesion, mass effector herniation. There is no abnormal extra-axial fluid collection or hydrocephalus.    There has been bilateral cataract surgery. There is mucosal thickening of the maxillary and ethmoid complexes. There is a tiny benign exostosis versus osteoma in the outer plate of the left frontal bone.      IMPRESSION:    No evidence of acute infarct, intracranial hemorrhage or mass effect.    < end of copied text >      Consultant(s) Notes Reviewed:   YES      Plan of care was discussed with patient and /or primary care giver; all questions and concerns were addressed 
NP Note discussed with  Primary Attending    Patient is a 89y old  Female who presents with a chief complaint of hypoglycemia (02 Feb 2021 12:13)      INTERVAL HPI/OVERNIGHT EVENTS: no new complaints    MEDICATIONS  (STANDING):  aspirin enteric coated 81 milliGRAM(s) Oral daily  budesonide  80 MICROgram(s)/formoterol 4.5 MICROgram(s) Inhaler 2 Puff(s) Inhalation two times a day  cefTRIAXone   IVPB 1000 milliGRAM(s) IV Intermittent every 24 hours  dextrose 5% + sodium chloride 0.45%. 1000 milliLiter(s) (70 mL/Hr) IV Continuous <Continuous>  gabapentin 300 milliGRAM(s) Oral every 12 hours  montelukast 10 milliGRAM(s) Oral daily  multivitamin 1 Tablet(s) Oral daily  pantoprazole  Injectable 40 milliGRAM(s) IV Push every 12 hours  senna 2 Tablet(s) Oral at bedtime    MEDICATIONS  (PRN):  acetaminophen   Tablet .. 650 milliGRAM(s) Oral every 6 hours PRN Temp greater or equal to 38C (100.4F), Moderate Pain (4 - 6)  ALBUTerol    90 MICROgram(s) HFA Inhaler 2 Puff(s) Inhalation every 6 hours PRN Shortness of Breath and/or Wheezing  polyethylene glycol 3350 17 Gram(s) Oral daily PRN Constipation      __________________________________________________  REVIEW OF SYSTEMS:    CONSTITUTIONAL: No fever,   EYES: no acute visual disturbances  NECK: No pain or stiffness  RESPIRATORY: No cough; No shortness of breath  CARDIOVASCULAR: No chest pain, no palpitations  GASTROINTESTINAL: No pain. No nausea or vomiting; No diarrhea   NEUROLOGICAL: No headache or numbness, no tremors  MUSCULOSKELETAL: No joint pain, no muscle pain  GENITOURINARY: no dysuria, no frequency, no hesitancy  PSYCHIATRY: no depression , no anxiety  ALL OTHER  ROS negative        Vital Signs Last 24 Hrs  T(C): 36.6 (02 Feb 2021 09:11), Max: 36.8 (01 Feb 2021 15:35)  T(F): 97.8 (02 Feb 2021 09:11), Max: 98.2 (01 Feb 2021 15:35)  HR: 89 (02 Feb 2021 09:11) (87 - 89)  BP: 139/62 (02 Feb 2021 09:11) (135/44 - 147/73)  BP(mean): --  RR: 18 (02 Feb 2021 09:11) (18 - 20)  SpO2: 96% (02 Feb 2021 09:11) (95% - 96%)    ________________________________________________  PHYSICAL EXAM: comfortable  GENERAL: NAD  HEENT: Normocephalic;  conjunctivae and sclerae clear; moist mucous membranes;   NECK : supple  CHEST/LUNG: Clear to auscultation bilaterally with good air entry   HEART: S1 S2  regular; no murmurs, gallops or rubs  ABDOMEN: Soft, Nontender, Nondistended; Bowel sounds present  EXTREMITIES: no cyanosis; no edema; no calf tenderness  SKIN: warm and dry; no rash  NERVOUS SYSTEM:  Awake and alert; Oriented  to place, person and time ; no new deficits    _________________________________________________  LABS:                        12.8   8.01  )-----------( 252      ( 01 Feb 2021 07:19 )             41.7     02-02    138  |  104  |  16  ----------------------------<  173<H>  3.9   |  20<L>  |  1.04    Ca    9.4      02 Feb 2021 05:58  Phos  3.5     02-01  Mg     1.9     02-01    TPro  6.2  /  Alb  2.6<L>  /  TBili  0.6  /  DBili  x   /  AST  24  /  ALT  28  /  AlkPhos  86  02-01        CAPILLARY BLOOD GLUCOSE      POCT Blood Glucose.: 129 mg/dL (02 Feb 2021 11:54)  POCT Blood Glucose.: 151 mg/dL (02 Feb 2021 08:53)  POCT Blood Glucose.: 189 mg/dL (02 Feb 2021 06:32)  POCT Blood Glucose.: 140 mg/dL (02 Feb 2021 00:58)  POCT Blood Glucose.: 123 mg/dL (01 Feb 2021 17:36)        RADIOLOGY & ADDITIONAL TESTS:    Imaging Personally Reviewed:  YES/NO    Consultant(s) Notes Reviewed:   YES/ No    Care Discussed with Consultants :     Plan of care was discussed with patient and /or primary care giver; all questions and concerns were addressed and care was aligned with patient's wishes.    
NP Note discussed with  Primary Attending    Patient is a 89y old  Female who presents with a chief complaint of hypoglycemia (31 Jan 2021 12:40)      INTERVAL HPI/OVERNIGHT EVENTS: no new complaints    MEDICATIONS  (STANDING):  aspirin enteric coated 81 milliGRAM(s) Oral daily  budesonide  80 MICROgram(s)/formoterol 4.5 MICROgram(s) Inhaler 2 Puff(s) Inhalation two times a day  cefTRIAXone   IVPB 1000 milliGRAM(s) IV Intermittent every 24 hours  dextrose 5% + sodium chloride 0.45%. 1000 milliLiter(s) (70 mL/Hr) IV Continuous <Continuous>  gabapentin 300 milliGRAM(s) Oral every 12 hours  montelukast 10 milliGRAM(s) Oral daily  multivitamin 1 Tablet(s) Oral daily  pantoprazole  Injectable 40 milliGRAM(s) IV Push every 12 hours  senna 2 Tablet(s) Oral at bedtime    MEDICATIONS  (PRN):  acetaminophen   Tablet .. 650 milliGRAM(s) Oral every 6 hours PRN Temp greater or equal to 38C (100.4F), Moderate Pain (4 - 6)  ALBUTerol    90 MICROgram(s) HFA Inhaler 2 Puff(s) Inhalation every 6 hours PRN Shortness of Breath and/or Wheezing  polyethylene glycol 3350 17 Gram(s) Oral daily PRN Constipation      __________________________________________________  REVIEW OF SYSTEMS:    CONSTITUTIONAL: No fever,   EYES: no acute visual disturbances  NECK: No pain or stiffness  RESPIRATORY: No cough; No shortness of breath  CARDIOVASCULAR: No chest pain, no palpitations  GASTROINTESTINAL: No pain. No nausea or vomiting; No diarrhea   NEUROLOGICAL: No headache or numbness, no tremors  MUSCULOSKELETAL: No joint pain, no muscle pain  GENITOURINARY: no dysuria, no frequency, no hesitancy  PSYCHIATRY: no depression , no anxiety  ALL OTHER  ROS negative        Vital Signs Last 24 Hrs  T(C): 36.1 (01 Feb 2021 09:15), Max: 36.7 (01 Feb 2021 00:08)  T(F): 97 (01 Feb 2021 09:15), Max: 98.1 (01 Feb 2021 00:08)  HR: 69 (01 Feb 2021 09:15) (69 - 84)  BP: 114/46 (01 Feb 2021 09:15) (114/46 - 123/55)  BP(mean): --  RR: 18 (01 Feb 2021 09:15) (18 - 18)  SpO2: 100% (01 Feb 2021 09:15) (96% - 100%)    ________________________________________________  PHYSICAL EXAM:  GENERAL: NAD  HEENT: Normocephalic;  conjunctivae and sclerae clear; moist mucous membranes;   NECK : supple  CHEST/LUNG: Clear to auscultation bilaterally with good air entry   HEART: S1 S2  regular; no murmurs, gallops or rubs  ABDOMEN: Soft, Nontender, Nondistended; Bowel sounds present  EXTREMITIES: no cyanosis; no edema; no calf tenderness  SKIN: warm and dry; no rash  NERVOUS SYSTEM:  Awake and alert; Oriented  to place, person and time ; no new deficits    _________________________________________________  LABS:                        12.8   8.01  )-----------( 252      ( 01 Feb 2021 07:19 )             41.7     02-01    141  |  106  |  15  ----------------------------<  142<H>  3.9   |  22  |  0.99    Ca    9.2      01 Feb 2021 07:19  Phos  3.5     02-01  Mg     1.9     02-01    TPro  6.2  /  Alb  2.6<L>  /  TBili  0.6  /  DBili  x   /  AST  24  /  ALT  28  /  AlkPhos  86  02-01        CAPILLARY BLOOD GLUCOSE      POCT Blood Glucose.: 118 mg/dL (01 Feb 2021 05:45)  POCT Blood Glucose.: 146 mg/dL (01 Feb 2021 01:20)  POCT Blood Glucose.: 141 mg/dL (31 Jan 2021 21:18)  POCT Blood Glucose.: 119 mg/dL (31 Jan 2021 17:18)  POCT Blood Glucose.: 131 mg/dL (31 Jan 2021 12:01)    RADIOLOGY & ADDITIONAL TESTS:    Xray Chest 1 View- PORTABLE-Urgent (Xray Chest 1 View- PORTABLE-Urgent .) (01.29.21 @ 10:50) >  EXAM:  XR CHEST PORTABLE URGENT 1V                            PROCEDURE DATE:  01/29/2021          INTERPRETATION:  cough    A frontal chest film demonstrates cardiac enlargement. Vascular markings are mildly prominent    Interstitial markings are prominent in both lungs without consolidation.    Interstitial lung changes are noted on 12/28/2020 .      IMPRESSION:  Cardiomegaly. Prominent interstitial markings.    < end of copied text >    < from: CT Head No Cont (01.29.21 @ 14:34) >    EXAM:  CT BRAIN                            IMPRESSION:    No evidence of acute infarct, intracranial hemorrhage or mass effect.    < end of copied text >    CT Head No Cont (01.29.21 @ 14:34) >  EXAM:  CT BRAIN                            PROCEDURE DATE:  01/29/2021          INTERPRETATION:  CT OF THE HEAD WITHOUT CONTRAST    CLINICAL INDICATION: Altered mental status.    TECHNIQUE: Volumetric CT acquisition was performed through the brain and reviewed using brain and bone window technique. Sagittal and coronal reconstructed images were obtained. Dose optimization techniques were utilized including kVp/mA modulation along with iterative reconstructions.    Radiation dose estimate:  The DLP was 733 mGy-cm    COMPARISON: CT brain, 7/17/2020.    FINDINGS:    There is prominence of the ventricles, sulci and cisterns of the brain which may be age related.  There are scattered white matter hypodensities which are nonspecific but most commonly represent chronic microvascular ischemic changes. 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 There is no acute loss of gray-white matter differentiation.    There is no intracranial hemorrhage, mass lesion, mass effector herniation. There is no abnormal extra-axial fluid collection or hydrocephalus.    There has been bilateral cataract surgery. There is mucosal thickening of the maxillary and ethmoid complexes. There is a tiny benign exostosis versus osteoma in the outer plate of the left frontal bone.      IMPRESSION:    No evidence of acute infarct, intracranial hemorrhage or mass effect.    < end of copied text >      COVID-19 PCR . (01.29.21 @ 12:01)    COVID-19 PCR: Detected:    COVID-19 IgG Antibody Interpretation: Positive:     Imaging Personally Reviewed:  YES  Consultant(s) Notes Reviewed:   YES  Care Discussed with Consultants :     Plan of care was discussed with patient and /or primary care giver; all questions and concerns were addressed and care was aligned with patient's wishes.    
NP Note discussed with  Primary Attending    Patient is a 89y old  Female who presents with a chief complaint of hypoglycemia (03 Feb 2021 12:26)      INTERVAL HPI/OVERNIGHT EVENTS: no new complaints    MEDICATIONS  (STANDING):  aspirin enteric coated 81 milliGRAM(s) Oral daily  budesonide  80 MICROgram(s)/formoterol 4.5 MICROgram(s) Inhaler 2 Puff(s) Inhalation two times a day  cefTRIAXone   IVPB 1000 milliGRAM(s) IV Intermittent every 24 hours  dextrose 5% + sodium chloride 0.45%. 1000 milliLiter(s) (70 mL/Hr) IV Continuous <Continuous>  gabapentin 300 milliGRAM(s) Oral every 12 hours  montelukast 10 milliGRAM(s) Oral daily  multivitamin 1 Tablet(s) Oral daily  pantoprazole  Injectable 40 milliGRAM(s) IV Push every 12 hours  senna 2 Tablet(s) Oral at bedtime    MEDICATIONS  (PRN):  acetaminophen   Tablet .. 650 milliGRAM(s) Oral every 6 hours PRN Temp greater or equal to 38C (100.4F), Moderate Pain (4 - 6)  ALBUTerol    90 MICROgram(s) HFA Inhaler 2 Puff(s) Inhalation every 6 hours PRN Shortness of Breath and/or Wheezing  polyethylene glycol 3350 17 Gram(s) Oral daily PRN Constipation      __________________________________________________  REVIEW OF SYSTEMS:    CONSTITUTIONAL: No fever,   EYES: no acute visual disturbances  NECK: No pain or stiffness  RESPIRATORY: No cough; No shortness of breath  CARDIOVASCULAR: No chest pain, no palpitations  GASTROINTESTINAL: No pain. No nausea or vomiting; No diarrhea   NEUROLOGICAL: No headache or numbness, no tremors  MUSCULOSKELETAL: No joint pain, no muscle pain  GENITOURINARY: no dysuria, no frequency, no hesitancy  PSYCHIATRY: no depression , no anxiety  ALL OTHER  ROS negative        Vital Signs Last 24 Hrs  T(C): 36.6 (03 Feb 2021 07:57), Max: 36.8 (02 Feb 2021 16:57)  T(F): 97.8 (03 Feb 2021 07:57), Max: 98.3 (03 Feb 2021 00:25)  HR: 95 (03 Feb 2021 07:57) (90 - 97)  BP: 119/55 (03 Feb 2021 07:57) (119/55 - 140/67)  BP(mean): --  RR: 20 (03 Feb 2021 07:57) (18 - 20)  SpO2: 98% (03 Feb 2021 07:57) (96% - 98%)    ________________________________________________  PHYSICAL EXAM:  GENERAL: NAD  HEENT: Normocephalic;  conjunctivae and sclerae clear; moist mucous membranes;   NECK : supple  CHEST/LUNG: Clear to auscultation bilaterally with good air entry   HEART: S1 S2  regular; no murmurs, gallops or rubs  ABDOMEN: Soft, Nontender, Nondistended; Bowel sounds present  EXTREMITIES: no cyanosis; no edema; no calf tenderness  SKIN: warm and dry; no rash  NERVOUS SYSTEM:  Awake and alert; Oriented  to place, person and time ; no new deficits    _________________________________________________  LABS:                        13.5   9.76  )-----------( 249      ( 03 Feb 2021 08:38 )             43.6     02-03    137  |  102  |  16  ----------------------------<  201<H>  3.7   |  22  |  1.01    Ca    9.3      03 Feb 2021 08:38          CAPILLARY BLOOD GLUCOSE      POCT Blood Glucose.: 187 mg/dL (03 Feb 2021 11:20)  POCT Blood Glucose.: 188 mg/dL (03 Feb 2021 06:07)  POCT Blood Glucose.: 192 mg/dL (03 Feb 2021 00:39)  POCT Blood Glucose.: 157 mg/dL (02 Feb 2021 21:17)  POCT Blood Glucose.: 146 mg/dL (02 Feb 2021 17:14)        RADIOLOGY & ADDITIONAL TESTS:    Imaging  Reviewed:  YES/NO    Consultant(s) Notes Reviewed:   YES/ No      Plan of care was discussed with patient and /or primary care giver; all questions and concerns were addressed 
NP Note discussed with  Primary Attending    Patient is a 89y old  Female who presents with a chief complaint of hypoglycemia (03 Feb 2021 12:26)      INTERVAL HPI/OVERNIGHT EVENTS: no new complaints    MEDICATIONS  (STANDING):  aspirin enteric coated 81 milliGRAM(s) Oral daily  budesonide  80 MICROgram(s)/formoterol 4.5 MICROgram(s) Inhaler 2 Puff(s) Inhalation two times a day  cefTRIAXone   IVPB 1000 milliGRAM(s) IV Intermittent every 24 hours  dextrose 5% + sodium chloride 0.45%. 1000 milliLiter(s) (70 mL/Hr) IV Continuous <Continuous>  gabapentin 300 milliGRAM(s) Oral every 12 hours  montelukast 10 milliGRAM(s) Oral daily  multivitamin 1 Tablet(s) Oral daily  pantoprazole  Injectable 40 milliGRAM(s) IV Push every 12 hours  senna 2 Tablet(s) Oral at bedtime    MEDICATIONS  (PRN):  acetaminophen   Tablet .. 650 milliGRAM(s) Oral every 6 hours PRN Temp greater or equal to 38C (100.4F), Moderate Pain (4 - 6)  ALBUTerol    90 MICROgram(s) HFA Inhaler 2 Puff(s) Inhalation every 6 hours PRN Shortness of Breath and/or Wheezing  polyethylene glycol 3350 17 Gram(s) Oral daily PRN Constipation      __________________________________________________  REVIEW OF SYSTEMS: limited 2/2 patient's mental status     CONSTITUTIONAL: No fever,   EYES: no acute visual disturbances  NECK: No pain or stiffness  RESPIRATORY: No cough; No shortness of breath  CARDIOVASCULAR: No chest pain, no palpitations  GASTROINTESTINAL: No pain. No nausea or vomiting; No diarrhea   NEUROLOGICAL: No headache or numbness, no tremors  MUSCULOSKELETAL: No joint pain, no muscle pain  GENITOURINARY: no dysuria, no frequency, no hesitancy  PSYCHIATRY: no depression , no anxiety  ALL OTHER  ROS negative        Vital Signs Last 24 Hrs  T(C): 36.6 (03 Feb 2021 07:57), Max: 36.8 (02 Feb 2021 16:57)  T(F): 97.8 (03 Feb 2021 07:57), Max: 98.3 (03 Feb 2021 00:25)  HR: 95 (03 Feb 2021 07:57) (90 - 97)  BP: 119/55 (03 Feb 2021 07:57) (119/55 - 140/67)  BP(mean): --  RR: 20 (03 Feb 2021 07:57) (18 - 20)  SpO2: 98% (03 Feb 2021 07:57) (96% - 98%)    ________________________________________________  PHYSICAL EXAM:  GENERAL: NAD  HEENT: Normocephalic;  conjunctivae and sclerae clear; moist mucous membranes;   NECK : supple  CHEST/LUNG: Clear to auscultation bilaterally with good air entry   HEART: S1 S2  regular; no murmurs, gallops or rubs  ABDOMEN: Soft, Nontender, Nondistended; Bowel sounds present  EXTREMITIES: no cyanosis; no edema; no calf tenderness  SKIN: warm and dry; no rash  NERVOUS SYSTEM:  A + O x 1-2    _________________________________________________  LABS:                        13.5   9.76  )-----------( 249      ( 03 Feb 2021 08:38 )             43.6     02-03    137  |  102  |  16  ----------------------------<  201<H>  3.7   |  22  |  1.01    Ca    9.3      03 Feb 2021 08:38          CAPILLARY BLOOD GLUCOSE      POCT Blood Glucose.: 187 mg/dL (03 Feb 2021 11:20)  POCT Blood Glucose.: 188 mg/dL (03 Feb 2021 06:07)  POCT Blood Glucose.: 192 mg/dL (03 Feb 2021 00:39)  POCT Blood Glucose.: 157 mg/dL (02 Feb 2021 21:17)  POCT Blood Glucose.: 146 mg/dL (02 Feb 2021 17:14)        RADIOLOGY & ADDITIONAL TESTS:    Imaging  Reviewed:  YES  < from: CT Head No Cont (01.29.21 @ 14:34) >    FINDINGS:    There is prominence of the ventricles, sulci and cisterns of the brain which may be age related.  There are scattered white matter hypodensities which are nonspecific but most commonly represent chronic microvascular ischemic changes. 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 There is no acute loss of gray-white matter differentiation.    There is no intracranial hemorrhage, mass lesion, mass effector herniation. There is no abnormal extra-axial fluid collection or hydrocephalus.    There has been bilateral cataract surgery. There is mucosal thickening of the maxillary and ethmoid complexes. There is a tiny benign exostosis versus osteoma in the outer plate of the left frontal bone.      IMPRESSION:    No evidence of acute infarct, intracranial hemorrhage or mass effect.    < end of copied text >      Consultant(s) Notes Reviewed:   YES      Plan of care was discussed with patient and /or primary care giver; all questions and concerns were addressed

## 2021-02-05 NOTE — PROGRESS NOTE ADULT - PROBLEM SELECTOR PLAN 2
UA positive, urine Cx NTD  -Cont Ceftriaxone  -Cont IVF
UA positive, urine Cx NGTD  -Cont Ceftriaxone
UA positive, urine Cx NTD  -Cont Ceftriaxone  -Cont IVF
UA positive, urine Cx NGTD  -Cont Ceftriaxone
UA positive, urine Cx NGTD  -Cont Ceftriaxone

## 2021-02-05 NOTE — PROGRESS NOTE ADULT - REASON FOR ADMISSION
hypoglycemia

## 2021-02-05 NOTE — PROGRESS NOTE ADULT - PROBLEM SELECTOR PLAN 6
Reason for Call:  Other call back    Detailed comments: Please call Elyssa to update on status of form she faxed to clinic on 3/27/17 to be returned to patient's new school in [place].  It was for a health assessment form. Patient cannot begin school on 4/3/17 without this form.    Phone Number Patient can be reached at: Home number on file [phone] (home)    Best Time: Any    Can we leave a detailed message on this number? YES    Call taken on 3/31/2017 at 12:42 PM by Terrell Layton       Advanced practice note appreciated  -Stage 1 Pressure Injury to the Bilateral Heels  -Elevate/float the patients heels using heel protectors and reposition the patient Q 2hrs using wedges or pillows

## 2021-02-05 NOTE — PROGRESS NOTE ADULT - ASSESSMENT
Patient, a 89 y/o female [place] Speaking, with PMHx of asthma, HTN, DM presents to the ED with hypoglycemia. Patient's fingerstick reported to be 30 in field and given dextrose. Fingerstick in ED was 104, later dropped to 63 and she received push of D50. Patient not answering questions even in Rivera. Patient was recently admitted to [facility] for COVID PNA and completed course of steroids-  patient was recommended [facility] by PT during previous admission - family declined at the time. Notably, patient was discharged on 70/30 insulin 40 units two times a day while on prednisone but patient completed 5 day course. Patient found to have a UTI which likely contributed to her altered mental status, on ceftriaxone. Son Henrique is CDPAP, who is now agreeable to discharge to [facility]. Patient evaluated by physical therapy 2/4 - recommending [facility] placement, choices given to family.

## 2021-02-05 NOTE — DISCHARGE NOTE NURSING/CASE MANAGEMENT/SOCIAL WORK - PATIENT PORTAL LINK FT
You can access the FollowMyHealth Patient Portal offered by [facility] by registering at the following website: [email or web address]. By joining [facility]’s FollowMyHealth portal, you will also be able to view your health information using other applications (apps) compatible with our system.

## 2021-02-16 NOTE — PATIENT PROFILE ADULT - NSPROGENSOURCEINFO_GEN_A_NUR
Patient got angry shortly in to the process of . Tried to ascertain what she needed and she said that she did not get any medications. Informed her that I would follow with her Primary Nurse to see what she is to get and she became angry and told writer to go.  Son was called but he only speaks Rivera./patient/health record
Unknown

## 2021-02-19 ENCOUNTER — OUTPATIENT (OUTPATIENT)
Dept: OUTPATIENT SERVICES | Facility: HOSPITAL | Age: 86
LOS: 1 days | Discharge: HOME | End: 2021-02-19
Payer: MEDICARE

## 2021-02-19 ENCOUNTER — APPOINTMENT (OUTPATIENT)
Dept: RADIOLOGY | Facility: HOSPITAL | Age: 86
End: 2021-02-19

## 2021-02-19 ENCOUNTER — EMERGENCY (EMERGENCY)
Facility: HOSPITAL | Age: 86
LOS: 0 days | Discharge: HOME | End: 2021-02-20
Attending: EMERGENCY MEDICINE
Payer: MEDICARE

## 2021-02-19 VITALS
HEART RATE: 82 BPM | RESPIRATION RATE: 15 BRPM | SYSTOLIC BLOOD PRESSURE: 122 MMHG | WEIGHT: 182.32 LBS | OXYGEN SATURATION: 100 % | HEIGHT: 65 IN | TEMPERATURE: 97 F | DIASTOLIC BLOOD PRESSURE: 40 MMHG

## 2021-02-19 VITALS
SYSTOLIC BLOOD PRESSURE: 123 MMHG | OXYGEN SATURATION: 99 % | TEMPERATURE: 98 F | HEART RATE: 60 BPM | RESPIRATION RATE: 15 BRPM | DIASTOLIC BLOOD PRESSURE: 52 MMHG

## 2021-02-19 DIAGNOSIS — S32.512A FRACTURE OF SUPERIOR RIM OF LEFT PUBIS, INITIAL ENCOUNTER FOR CLOSED FRACTURE: ICD-10-CM

## 2021-02-19 DIAGNOSIS — R13.10 DYSPHAGIA, UNSPECIFIED: ICD-10-CM
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
## 2021-02-19 DIAGNOSIS — Z04.3 ENCOUNTER FOR EXAMINATION AND OBSERVATION FOLLOWING OTHER ACCIDENT: ICD-10-CM

## 2021-02-19 DIAGNOSIS — S09.90XA UNSPECIFIED INJURY OF HEAD, INITIAL ENCOUNTER: ICD-10-CM

## 2021-02-19 LAB
GLUCOSE BLDC GLUCOMTR-MCNC: 133 MG/DL — HIGH (ref 70–99)
GLUCOSE BLDC GLUCOMTR-MCNC: 150 MG/DL — HIGH (ref 70–99)

## 2021-02-19 PROCEDURE — 72190 X-RAY EXAM OF PELVIS: CPT | Mod: 26,59

## 2021-02-19 PROCEDURE — 99284 EMERGENCY DEPT VISIT MOD MDM: CPT

## 2021-02-19 PROCEDURE — 73560 X-RAY EXAM OF KNEE 1 OR 2: CPT | Mod: 26,LT

## 2021-02-19 PROCEDURE — 72190 X-RAY EXAM OF PELVIS: CPT | Mod: 26,59,77

## 2021-02-19 PROCEDURE — 70371 SPEECH EVALUATION COMPLEX: CPT | Mod: 26

## 2021-02-19 PROCEDURE — 71045 X-RAY EXAM CHEST 1 VIEW: CPT | Mod: 26

## 2021-02-19 PROCEDURE — 72125 CT NECK SPINE W/O DYE: CPT | Mod: 26,MH

## 2021-02-19 PROCEDURE — 73521 X-RAY EXAM HIPS BI 2 VIEWS: CPT | Mod: 26

## 2021-02-19 PROCEDURE — 70450 CT HEAD/BRAIN W/O DYE: CPT | Mod: 26,MH

## 2021-02-19 PROCEDURE — 73590 X-RAY EXAM OF LOWER LEG: CPT | Mod: 26,LT

## 2021-02-19 NOTE — ED ADULT NURSE NOTE - CHIEF COMPLAINT QUOTE
89 y/o female BI from [facility] to rule out head bleed due to a fall that took place at 1515 today.

## 2021-02-19 NOTE — ED ADULT NURSE NOTE - OBJECTIVE STATEMENT
Pt received sitting on stretcher in NAD. Pt refuse to speak , unable to complete assessment , [facility] RR even unlabored.safety and comfort maintained

## 2021-02-19 NOTE — CONSULT NOTE ADULT - ASSESSMENT
A/P: 89F w/ L superior/inferior rami fractures.     Cased reviewed and discussed w/ Dr Elias  Pt images showing interval callus formation and no new fractures compared to pelvis imaging at [facility] when original fractures occurred.   Recommend WBAT on the RLE  Analgesia prn  DVT ppx if continues to have decreased ambulation  PT/OT as tolerated   Ice may be useful for any soft tissue trauma associated with the fall   Continued medical management for this patient and workup for possible head trauma.   No acute orthopedic surgical intervention indicated at this time  Orthopedically stable for discharge to rehab if all other work up is negative.   Discussed with attending who is in agreement with above plan

## 2021-02-19 NOTE — ED ADULT NURSE NOTE - NSIMPLEMENTINTERV_GEN_ALL_ED
Implemented All Fall with Harm Risk Interventions:  [place] to call system. Call bell, personal items and telephone within reach. Instruct patient to call for assistance. Room bathroom lighting operational. Non-slip footwear when patient is off stretcher. Physically safe environment: no spills, clutter or unnecessary equipment. Stretcher in lowest position, wheels locked, appropriate side rails in place. Provide visual cue, wrist band, yellow gown, etc. Monitor gait and stability. Monitor for mental status changes and reorient to person, place, and time. Review medications for side effects contributing to fall risk. Reinforce activity limits and safety measures with patient and family. Provide visual clues: red socks.

## 2021-02-19 NOTE — ED PROVIDER NOTE - CLINICAL SUMMARY MEDICAL DECISION MAKING FREE TEXT BOX
Pt at baseline mental status. CT scan demonstrates no acute pathology, chronic compression fx of cervical spine. Pt noted with left inf and sup pubic rami fx, that look chronic. Pt does not appear in pain and nontender. XRAY performed given mild external rotation of rt hip and also to assess prior stability of fx. Per chart, only noted left superior rami fx, no mention of the Lt inferior pubic rami, though likely was present in prior fall. D/w hospitalist dr meredith, request ortho consult and dc if ortho agrees no further intervention needed. Ortho aware for consult. Patient signed out to incoming physician.  All decisions regarding the progression of care will be made at their discretion.

## 2021-02-19 NOTE — ED PROVIDER NOTE - PHYSICAL EXAMINATION
Gen: Alert, Well appearing. NAD    Head: NC, AT, PERRL, normal lids/conjunctiva   ENT: Bilateral TM WNL, patent oropharynx without erythema/exudate, uvula midline  Neck: supple, no tenderness/meningismus  Pulm: Bilateral clear BS, normal resp effort  CV: RRR, no M/R/G, +dist pulses   Abd: soft, NT/ND, +BS, no guarding/rebound tenderness  Mskel: extremities x4 with normal ROM. no ctl spine ttp. no edema/erythema/cyanosis, very mild external rotation of rt leg, but able to fully passively range without pain on axial loading.   Skin: no rash, no bruising  Neuro: AAO, no sensory/motor deficits, CN 2-12 intact Gen: Alert, Well appearing. NAD    Head: NC, AT, PERRL, normal lids/conjunctiva   ENT: Bilateral TM WNL, patent oropharynx without erythema/exudate, uvula midline  Neck: supple, no tenderness/meningismus  Pulm: Bilateral clear BS, normal resp effort  CV: RRR, no M/R/G, +dist pulses   Abd: soft, NT/ND, +BS, no guarding/rebound tenderness  Mskel: extremities x4 with normal ROM. no ctl spine ttp. no edema/erythema/cyanosis, very mild external rotation of rt leg, but able to fully passively range without pain on axial loading.   Skin: no rash, no bruising  Neuro: opens eyes to loud voice, withdraws evenly and with relative strenght in b/l arms, legs, no obv droop. nonverbal and attempted to use .

## 2021-02-19 NOTE — ED ADULT NURSE REASSESSMENT NOTE - NS ED NURSE REASSESS COMMENT FT1
Safety checks compld + maintd. T+P Q encouraged. Skin care and complete care rendered. Fall +skin precs in place. Safety measures in place. Awaiting results and disposition at this time.

## 2021-02-19 NOTE — CONSULT NOTE ADULT - SUBJECTIVE AND OBJECTIVE BOX
Patient is a 89yFemale with recent admission to [facility] rehab facility for metabolic encephalopathy/UTI who was found down on the floor by nurse today transferred to [facility] ED for suspected head injury. Patient had a fall in 7/21 and was seen at [facility] and found to have L superior/Inf rami fractures. Multiple attempts were made to reach family of patient. History was partly received from nurse at [facility]. The patient has been non ambulatory and non verbal.          No Known Allergies      PHYSICAL EXAM:  T(C): 36.2 (02-19-21 @ 15:58), Max: 36.2 (02-19-21 @ 15:58)  HR: 82 (02-19-21 @ 15:58) (82 - 82)  BP: 122/40 (02-19-21 @ 15:58) (122/40 - 122/40)  RR: 15 (02-19-21 @ 15:58) (15 - 15)  SpO2: 100% (02-19-21 @ 15:58) (100% - 100%)    Gen: NAD, Resting comfortably    LLE:  Skin intact with some ecchymosis on the posterior calve on the Left side. Pt non tender over this area.   No gross deformity of the lower limb, no gross swelling.   Pain moaning in response to motion of the LLE.   Tenderness over the bony pelvis on the left side.   Grossly moving the toes and ankle.   Unable to assess sensation as patient is non verbal   + DP  Compartments soft and compressible  No calf tenderness    Secondary Survey:   No TTP over bony prominences, SILT, palpable pulses, full/painless A/PROM, compartments soft. No TTP over spinous processes or paraspinal muscles at C/T/L spine. No palpable step off. No other injuries or complaints.  Neg log roll on right       XR of the pelvis demonstrating L sup/inferior rami fractures with some callus formation consistent w/ chronic fracture.   Images compared to pelvis xrays at [facility], which the patient had L sup/inferior rami fractures. No new fractures seen on imaging today.    Patient is a 89yFemale with recent admission to [facility] rehab facility for metabolic encephalopathy/UTI who was found down on the floor by nurse today transferred to [facility] ED for suspected head injury. Patient had a fall in 7/21 and was seen at [facility] and found to have L superior/Inf rami fractures. Multiple attempts were made to reach family of patient. History was partly received from nurse at [facility]. The patient has been non ambulatory and non verbal.          No Known Allergies      PHYSICAL EXAM:  T(C): 36.2 (02-19-21 @ 15:58), Max: 36.2 (02-19-21 @ 15:58)  HR: 82 (02-19-21 @ 15:58) (82 - 82)  BP: 122/40 (02-19-21 @ 15:58) (122/40 - 122/40)  RR: 15 (02-19-21 @ 15:58) (15 - 15)  SpO2: 100% (02-19-21 @ 15:58) (100% - 100%)    Gen: NAD, Resting comfortably    LLE:  Skin intact with some ecchymosis on the posterior calve on the Left side. Pt non tender over this area.   No gross deformity of the lower limb, no gross swelling.   Pain moaning in response to motion of the LLE.   Tenderness over the bony pelvis on the left side.   Grossly moving the toes and ankle.   Unable to assess sensation as patient is non verbal   + DP  Compartments soft and compressible  No calf tenderness    Secondary Survey:   No TTP over bony prominences, SILT, palpable pulses, full/painless A/PROM, compartments soft. No TTP over spinous processes or paraspinal muscles at C/T/L spine. No palpable step off. No other injuries or complaints.  Neg log roll on right       XR of the pelvis demonstrating L sup/inferior rami fractures with some callus formation consistent w/ chronic fracture.   Images compared to pelvis xrays at [facility], which the patient had L sup/inferior rami fractures. No new fractures seen on imaging today.   XR Knee/TIb fib done due to ecchymosis on L gastroc area: No acute findings.

## 2021-02-19 NOTE — ED PROVIDER NOTE - OBJECTIVE STATEMENT
89yo f from [facility] with pmh asthma, cva, htn, dm, CKD, recent fall with Lt superior pubic ramix fx, rt sacral ala, old left hip fx, prior hospitalization with covid/uti/hypoglycemia, able to ambulate with rolling walker, presents after RN found pt on floor next to bed. Pt is following with eyes, but not answering questions, does not appear to be in pain. 89yo f from [facility] with pmh asthma, cva, htn, dm, CKD, recent fall with Lt superior pubic ramix fx, rt sacral ala, old left hip fx, prior hospitalization with covid/uti/hypoglycemia, able to ambulate with rolling walker, presents after RN found pt on floor next to bed. Pt is following with eyes, but not answering questions, does not appear to be in pain. Pt opens eyes to loud voice, but nonverbal. PEr chart, simlar, but also reports AOx1-2 89yo f from [facility] with pmh asthma, cva, htn, dm, CKD, recent fall with Lt superior pubic ramix fx, rt sacral ala, old left hip fx, prior hospitalization with covid/uti/hypoglycemia, able to ambulate with rolling walker, presents after RN found pt on floor next to bed. Pt is following with eyes, but not answering questions, does not appear to be in pain. Pt opens eyes to loud voice, but nonverbal.  d/w RN, who has had pt since her admission to [facility] on 2/12, and reports pt will open eyes but has been nonverbal and does not ambulate and that this is pts baseline mental status.

## 2021-02-19 NOTE — ED PROVIDER NOTE - PATIENT PORTAL LINK FT
You can access the FollowMyHealth Patient Portal offered by [facility] by registering at the following website: [email or web address]. By joining [facility]’s FollowMyHealth portal, you will also be able to view your health information using other applications (apps) compatible with our system.

## 2021-02-19 NOTE — ED ADULT TRIAGE NOTE - CHIEF COMPLAINT QUOTE
89 y/o female BI from [facility] to rule out head bleed due to a fall that took place at 1515 today.

## 2021-02-19 NOTE — ED PROVIDER NOTE - PROGRESS NOTE DETAILS
pt signed out to me from dr mendoza, pt was found on the floor, xrays shows old L pelvic fracture, ct negative, ortho consult pending pt was seen and evaluated by orthopedics, case discussed w their attending, no orthopedic intervention at this time, pt is cleared for rehab, pt discharged back to [facility]

## 2021-02-24 ENCOUNTER — INPATIENT (INPATIENT)
Facility: HOSPITAL | Age: 86
LOS: 2 days | Discharge: NURSING, REHAB OR LONG-TERM CARE | End: 2021-02-27
Attending: INTERNAL MEDICINE | Admitting: INTERNAL MEDICINE
Payer: MEDICARE

## 2021-02-24 VITALS
RESPIRATION RATE: 18 BRPM | HEIGHT: 65 IN | TEMPERATURE: 98 F | OXYGEN SATURATION: 98 % | SYSTOLIC BLOOD PRESSURE: 96 MMHG | HEART RATE: 59 BPM | WEIGHT: 182.32 LBS | DIASTOLIC BLOOD PRESSURE: 60 MMHG

## 2021-02-24 LAB
ALBUMIN SERPL ELPH-MCNC: 2.6 G/DL — LOW (ref 3.3–5)
ALP SERPL-CCNC: 99 U/L — SIGNIFICANT CHANGE UP (ref 40–120)
ALT FLD-CCNC: 24 U/L — SIGNIFICANT CHANGE UP (ref 12–78)
ANION GAP SERPL CALC-SCNC: 10 MMOL/L — SIGNIFICANT CHANGE UP (ref 5–17)
APPEARANCE UR: ABNORMAL
APTT BLD: 28.7 SEC — SIGNIFICANT CHANGE UP (ref 27.5–35.5)
AST SERPL-CCNC: 38 U/L — HIGH (ref 15–37)
BACTERIA # UR AUTO: ABNORMAL
BASOPHILS # BLD AUTO: 0 K/UL — SIGNIFICANT CHANGE UP (ref 0–0.2)
BASOPHILS NFR BLD AUTO: 0 % — SIGNIFICANT CHANGE UP (ref 0–2)
BILIRUB SERPL-MCNC: 2.6 MG/DL — HIGH (ref 0.2–1.2)
BILIRUB UR-MCNC: NEGATIVE — SIGNIFICANT CHANGE UP
BUN SERPL-MCNC: 18 MG/DL — SIGNIFICANT CHANGE UP (ref 7–23)
CALCIUM SERPL-MCNC: 8.5 MG/DL — SIGNIFICANT CHANGE UP (ref 8.5–10.1)
CHLORIDE SERPL-SCNC: 96 MMOL/L — SIGNIFICANT CHANGE UP (ref 96–108)
CO2 SERPL-SCNC: 26 MMOL/L — SIGNIFICANT CHANGE UP (ref 22–31)
COLOR SPEC: YELLOW — SIGNIFICANT CHANGE UP
CREAT SERPL-MCNC: 1.3 MG/DL — SIGNIFICANT CHANGE UP (ref 0.5–1.3)
DIFF PNL FLD: ABNORMAL
EOSINOPHIL # BLD AUTO: 0 K/UL — SIGNIFICANT CHANGE UP (ref 0–0.5)
EOSINOPHIL NFR BLD AUTO: 0 % — SIGNIFICANT CHANGE UP (ref 0–6)
EPI CELLS # UR: SIGNIFICANT CHANGE UP
FLUAV AG NPH QL: SIGNIFICANT CHANGE UP
FLUBV AG NPH QL: SIGNIFICANT CHANGE UP
GLUCOSE BLDC GLUCOMTR-MCNC: 118 MG/DL — HIGH (ref 70–99)
GLUCOSE SERPL-MCNC: 111 MG/DL — HIGH (ref 70–99)
GLUCOSE UR QL: 50 MG/DL
HCT VFR BLD CALC: 42.7 % — SIGNIFICANT CHANGE UP (ref 34.5–45)
HGB BLD-MCNC: 13.4 G/DL — SIGNIFICANT CHANGE UP (ref 11.5–15.5)
INR BLD: 1.1 RATIO — SIGNIFICANT CHANGE UP (ref 0.88–1.16)
KETONES UR-MCNC: ABNORMAL
LACTATE SERPL-SCNC: 1.4 MMOL/L — SIGNIFICANT CHANGE UP (ref 0.7–2)
LACTATE SERPL-SCNC: 2.3 MMOL/L — HIGH (ref 0.7–2)
LEUKOCYTE ESTERASE UR-ACNC: ABNORMAL
LYMPHOCYTES # BLD AUTO: 2.66 K/UL — SIGNIFICANT CHANGE UP (ref 1–3.3)
LYMPHOCYTES # BLD AUTO: 30 % — SIGNIFICANT CHANGE UP (ref 13–44)
MAGNESIUM SERPL-MCNC: 1.3 MG/DL — LOW (ref 1.6–2.6)
MANUAL SMEAR VERIFICATION: SIGNIFICANT CHANGE UP
MCHC RBC-ENTMCNC: 26.3 PG — LOW (ref 27–34)
MCHC RBC-ENTMCNC: 31.4 GM/DL — LOW (ref 32–36)
MCV RBC AUTO: 83.7 FL — SIGNIFICANT CHANGE UP (ref 80–100)
MONOCYTES # BLD AUTO: 0.89 K/UL — SIGNIFICANT CHANGE UP (ref 0–0.9)
MONOCYTES NFR BLD AUTO: 10 % — SIGNIFICANT CHANGE UP (ref 2–14)
NEUTROPHILS # BLD AUTO: 5.22 K/UL — SIGNIFICANT CHANGE UP (ref 1.8–7.4)
NEUTROPHILS NFR BLD AUTO: 59 % — SIGNIFICANT CHANGE UP (ref 43–77)
NITRITE UR-MCNC: NEGATIVE — SIGNIFICANT CHANGE UP
NRBC # BLD: 0 /100 — SIGNIFICANT CHANGE UP (ref 0–0)
NRBC # BLD: SIGNIFICANT CHANGE UP /100 WBCS (ref 0–0)
PH UR: 5 — SIGNIFICANT CHANGE UP (ref 5–8)
PHOSPHATE SERPL-MCNC: 2.2 MG/DL — LOW (ref 2.5–4.5)
PLAT MORPH BLD: NORMAL — SIGNIFICANT CHANGE UP
PLATELET # BLD AUTO: 174 K/UL — SIGNIFICANT CHANGE UP (ref 150–400)
POTASSIUM SERPL-MCNC: 2.9 MMOL/L — CRITICAL LOW (ref 3.5–5.3)
POTASSIUM SERPL-SCNC: 2.9 MMOL/L — CRITICAL LOW (ref 3.5–5.3)
PROT SERPL-MCNC: 5.6 GM/DL — LOW (ref 6–8.3)
PROT UR-MCNC: 30 MG/DL
PROTHROM AB SERPL-ACNC: 12.7 SEC — SIGNIFICANT CHANGE UP (ref 10.6–13.6)
RBC # BLD: 5.1 M/UL — SIGNIFICANT CHANGE UP (ref 3.8–5.2)
RBC # FLD: 15.9 % — HIGH (ref 10.3–14.5)
RBC BLD AUTO: NORMAL — SIGNIFICANT CHANGE UP
RBC CASTS # UR COMP ASSIST: ABNORMAL /HPF (ref 0–4)
SARS-COV-2 RNA SPEC QL NAA+PROBE: DETECTED
SODIUM SERPL-SCNC: 132 MMOL/L — LOW (ref 135–145)
SP GR SPEC: 1.01 — SIGNIFICANT CHANGE UP (ref 1.01–1.02)
UROBILINOGEN FLD QL: NEGATIVE MG/DL — SIGNIFICANT CHANGE UP
VARIANT LYMPHS # BLD: 1 % — SIGNIFICANT CHANGE UP (ref 0–6)
WBC # BLD: 8.85 K/UL — SIGNIFICANT CHANGE UP (ref 3.8–10.5)
WBC # FLD AUTO: 8.85 K/UL — SIGNIFICANT CHANGE UP (ref 3.8–10.5)
WBC UR QL: ABNORMAL

## 2021-02-24 PROCEDURE — 71045 X-RAY EXAM CHEST 1 VIEW: CPT | Mod: 26

## 2021-02-24 PROCEDURE — 99223 1ST HOSP IP/OBS HIGH 75: CPT | Mod: AI

## 2021-02-24 PROCEDURE — 93010 ELECTROCARDIOGRAM REPORT: CPT

## 2021-02-24 PROCEDURE — 99285 EMERGENCY DEPT VISIT HI MDM: CPT

## 2021-02-24 RX ORDER — DEXTROSE MONOHYDRATE, SODIUM CHLORIDE, AND POTASSIUM CHLORIDE 50; .745; 4.5 G/1000ML; G/1000ML; G/1000ML
1000 INJECTION, SOLUTION INTRAVENOUS
Refills: 0 | Status: DISCONTINUED | OUTPATIENT
Start: 2021-02-24 | End: 2021-02-27

## 2021-02-24 RX ORDER — SODIUM CHLORIDE 9 MG/ML
2600 INJECTION INTRAMUSCULAR; INTRAVENOUS; SUBCUTANEOUS ONCE
Refills: 0 | Status: COMPLETED | OUTPATIENT
Start: 2021-02-24 | End: 2021-02-24

## 2021-02-24 RX ORDER — CEFTRIAXONE 500 MG/1
1000 INJECTION, POWDER, FOR SOLUTION INTRAMUSCULAR; INTRAVENOUS ONCE
Refills: 0 | Status: COMPLETED | OUTPATIENT
Start: 2021-02-24 | End: 2021-02-24

## 2021-02-24 RX ORDER — POTASSIUM PHOSPHATE, MONOBASIC POTASSIUM PHOSPHATE, DIBASIC 236; 224 MG/ML; MG/ML
15 INJECTION, SOLUTION INTRAVENOUS ONCE
Refills: 0 | Status: COMPLETED | OUTPATIENT
Start: 2021-02-24 | End: 2021-02-24

## 2021-02-24 RX ORDER — HEPARIN SODIUM 5000 [USP'U]/ML
5000 INJECTION INTRAVENOUS; SUBCUTANEOUS EVERY 12 HOURS
Refills: 0 | Status: DISCONTINUED | OUTPATIENT
Start: 2021-02-24 | End: 2021-02-27

## 2021-02-24 RX ORDER — POTASSIUM CHLORIDE 20 MEQ
10 PACKET (EA) ORAL
Refills: 0 | Status: DISCONTINUED | OUTPATIENT
Start: 2021-02-24 | End: 2021-02-27

## 2021-02-24 RX ORDER — POTASSIUM CHLORIDE 20 MEQ
10 PACKET (EA) ORAL
Refills: 0 | Status: COMPLETED | OUTPATIENT
Start: 2021-02-24 | End: 2021-02-24

## 2021-02-24 RX ORDER — CEFTRIAXONE 500 MG/1
1000 INJECTION, POWDER, FOR SOLUTION INTRAMUSCULAR; INTRAVENOUS EVERY 24 HOURS
Refills: 0 | Status: DISCONTINUED | OUTPATIENT
Start: 2021-02-24 | End: 2021-02-26

## 2021-02-24 RX ORDER — POTASSIUM CHLORIDE 20 MEQ
40 PACKET (EA) ORAL EVERY 4 HOURS
Refills: 0 | Status: DISCONTINUED | OUTPATIENT
Start: 2021-02-24 | End: 2021-02-24

## 2021-02-24 RX ORDER — MAGNESIUM SULFATE 500 MG/ML
2 VIAL (ML) INJECTION ONCE
Refills: 0 | Status: COMPLETED | OUTPATIENT
Start: 2021-02-24 | End: 2021-02-24

## 2021-02-24 RX ADMIN — DEXTROSE MONOHYDRATE, SODIUM CHLORIDE, AND POTASSIUM CHLORIDE 100 MILLILITER(S): 50; .745; 4.5 INJECTION, SOLUTION INTRAVENOUS at 22:05

## 2021-02-24 RX ADMIN — Medication 100 MILLIEQUIVALENT(S): at 14:10

## 2021-02-24 RX ADMIN — Medication 50 GRAM(S): at 22:59

## 2021-02-24 RX ADMIN — CEFTRIAXONE 100 MILLIGRAM(S): 500 INJECTION, POWDER, FOR SOLUTION INTRAMUSCULAR; INTRAVENOUS at 17:10

## 2021-02-24 RX ADMIN — Medication 100 MILLIEQUIVALENT(S): at 13:08

## 2021-02-24 RX ADMIN — CEFTRIAXONE 1000 MILLIGRAM(S): 500 INJECTION, POWDER, FOR SOLUTION INTRAMUSCULAR; INTRAVENOUS at 17:50

## 2021-02-24 RX ADMIN — Medication 100 MILLIEQUIVALENT(S): at 22:07

## 2021-02-24 RX ADMIN — Medication 100 MILLIEQUIVALENT(S): at 15:42

## 2021-02-24 RX ADMIN — SODIUM CHLORIDE 2600 MILLILITER(S): 9 INJECTION INTRAMUSCULAR; INTRAVENOUS; SUBCUTANEOUS at 12:48

## 2021-02-24 NOTE — H&P ADULT - HISTORY OF PRESENT ILLNESS
89 yo female PMH asthma, HTN, DM sent to ED from [facility] for hypokalemia, hypotension noted this AM. Pt at baseline is non-verbal, [facility] and [facility]. Pt COVID negative this admission. K is 2.9 on arrival, not taking PO and [place] unable to give IV replacement. Also noted low BP, ED triage found BP to be 78/49. Admitted for IVF and K replacement.

## 2021-02-24 NOTE — ED ADULT NURSE REASSESSMENT NOTE - NS ED NURSE REASSESS COMMENT FT1
Pt received in bed awake and eyes open. Pt has IV access 20 ga L AC and 22 ga R metacarpal. Pt does not display any non-verbal indicators of pain or discomfort. Pt has been admitted to [facility] and report has been given to Romelia on 1D. Bedside report received from off going RN Boo Patel at 1915.

## 2021-02-24 NOTE — ED PROVIDER NOTE - CLINICAL SUMMARY MEDICAL DECISION MAKING FREE TEXT BOX
89yo F PMH asthma, HTN, DM sent to ED from [facility] d/t hypotension, hypokalemia. Pt well appearing, in NAD, VS stable and WNL other than borderline hypotensive BP. K+ 2.9, given IV replacement. W/u significant for UTI. Give IVF and IV abx. BP remains hypotensive. Will admit to medicine (d/w Dr Tinsley).

## 2021-02-24 NOTE — ED ADULT NURSE NOTE - NSIMPLEMENTINTERV_GEN_ALL_ED
Implemented All Universal Safety Interventions:  [place] to call system. Call bell, personal items and telephone within reach. Instruct patient to call for assistance. Room bathroom lighting operational. Non-slip footwear when patient is off stretcher. Physically safe environment: no spills, clutter or unnecessary equipment. Stretcher in lowest position, wheels locked, appropriate side rails in place.

## 2021-02-24 NOTE — ED ADULT NURSE NOTE - CHIEF COMPLAINT QUOTE
hypokalemia hypotension sent by [facility] pt has coverted to Negative Covid Pt hx nonverbal RN from [facility] states pt lethargic

## 2021-02-24 NOTE — H&P ADULT - ASSESSMENT
89 yo female PMH asthma, HTN, DM sent to ED from [facility] for hypokalemia, hypotension noted this AM. Pt at baseline is non-verbal, [facility]. Pt COVID negative this admission. K is 2.9 on arrival, not taking PO and [facility] unable to give IV replacement. Also noted low BP, ED triage found BP to be 78/49. Admitted for IVF and K replacement.  89 yo female PMH asthma, HTN, DM sent to ED from [facility] for hypokalemia, hypotension noted this AM. Pt at baseline is non-verbal, [facility]. Pt COVID negative this admission. K is 2.9 on arrival, not taking PO and [facility] unable to give IV replacement. Also noted low BP, ED triage found BP to be 78/49. Admitted for IVF and K replacement.     CV: Hypotension and hypokalemia from likely poor PO intake. Will attempt KCL oral x 2 if tolerates. SMA 7 and Magnesium in AM.   Will add IVF Normal saline with 20 KCL. If BP remains low, can add oral Midodrine if willing to take. No meds listed in [place].     DM: Monitor finger sticks AC and HS for now.       Speech and Swallow eval requested. Aspiration precautions.    89 yo female PMH asthma, HTN, DM sent to ED from [facility] for hypokalemia, hypotension noted this AM. Pt at baseline is non-verbal, [facility] and [facility]. Pt COVID negative this admission. K is 2.9 on arrival, not taking PO and [facility] unable to give IV replacement. Also noted low BP, ED triage found BP to be 78/49. Admitted for IVF and K replacement.     CV: Hypotension and hypokalemia from likely poor PO intake. Will attempt KCL oral x 2 if tolerates. SMA 7 and Magnesium in AM.   Will add IVF Normal saline with 20 KCL. If BP remains low, can add oral Midodrine if willing to take. No meds listed in [place].     DM: Monitor finger sticks AC and HS for now.       UA appears infected with 12-20 WBC. r/o UTI. Urine and blood cultures pending. Add Ceftriaxone for now.     Speech and Swallow eval requested. Aspiration precautions.

## 2021-02-24 NOTE — ED PROVIDER NOTE - ADMIT DISPOSITION PRESENT ON ADMISSION SEPSIS
CM met w/ PT today  CM reviewed PT's D/C today, transportation, and aftercare services  PT expresses readiness in D/C and reports being in support of D/C plan at this time  Yes

## 2021-02-24 NOTE — ED PROVIDER NOTE - PROGRESS NOTE DETAILS
Pt awake and alert, BP persistently hypotensive, SBP 90-70, despite IVF. UA w/ + infection. IV abx initiated.

## 2021-02-24 NOTE — H&P ADULT - NSHPPHYSICALEXAM_GEN_ALL_CORE
PHYSICAL EXAMINATION:  Vital Signs Last 24 Hrs  T(C): 36.7 (24 Feb 2021 15:18), Max: 36.7 (24 Feb 2021 11:39)  T(F): 98 (24 Feb 2021 15:18), Max: 98.1 (24 Feb 2021 11:39)  HR: 85 (24 Feb 2021 15:18) (59 - 85)  BP: 87/51 (24 Feb 2021 15:18) (87/51 - 99/47)  BP(mean): --  RR: 16 (24 Feb 2021 15:18) (16 - 18)  SpO2: 100% (24 Feb 2021 15:18) (98% - 100%)  CAPILLARY BLOOD GLUCOSE          GENERAL: NAD, well-groomed, well-developed  HEAD:  atraumatic, normocephalic  EYES: sclera anicteric  ENMT: mucous membranes moist  NECK: supple, No JVD  CHEST/LUNG: clear to auscultation bilaterally; no rales, rhonchi, or wheezing b/l  HEART: normal S1, S2  ABDOMEN: BS+, soft, ND, NT   EXTREMITIES:  pulses palpable; no clubbing, cyanosis, or edema b/l LEs  NEURO: awake, alert, interactive; moves all extremities  SKIN: no rashes or lesions PHYSICAL EXAMINATION:  Vital Signs Last 24 Hrs  T(C): 36.7 (24 Feb 2021 15:18), Max: 36.7 (24 Feb 2021 11:39)  T(F): 98 (24 Feb 2021 15:18), Max: 98.1 (24 Feb 2021 11:39)  HR: 85 (24 Feb 2021 15:18) (59 - 85)  BP: 87/51 (24 Feb 2021 15:18) (87/51 - 99/47)  BP(mean): --  RR: 16 (24 Feb 2021 15:18) (16 - 18)  SpO2: 100% (24 Feb 2021 15:18) (98% - 100%)  CAPILLARY BLOOD GLUCOSE          GENERAL: NAD, well-developed, seen in ER, comfortable, no SOB or fevers  HEAD:  atraumatic, normocephalic  EYES: sclera anicteric  ENMT: mucous membranes moist  NECK: supple, No JVD  CHEST/LUNG: clear to auscultation bilaterally; no rales, rhonchi, or wheezing b/l  HEART: normal S1, S2  ABDOMEN: BS+, soft, ND, NT   EXTREMITIES:  pulses palpable; no clubbing, cyanosis, or edema b/l LEs  NEURO: awake, alert, interactive; moves all extremities  SKIN: no rashes or lesions

## 2021-02-24 NOTE — ED ADULT TRIAGE NOTE - CHIEF COMPLAINT QUOTE
hypokalemia hypotension sent by [facility] pt has coverted to Negative Covid Pt hx nonverbal RN from [facility] states pt lethargic

## 2021-02-24 NOTE — ED ADULT NURSE NOTE - OBJECTIVE STATEMENT
89 y/o F came to the ed with hypotension and hypokalemia from [facility]. Pt is within her normal base like. Non verbal.  Responds to painful stimuli.

## 2021-02-24 NOTE — ED PROVIDER NOTE - CARE PLAN
Principal Discharge DX:	Acute cystitis with hematuria  Secondary Diagnosis:	Hypotension, unspecified hypotension type

## 2021-02-24 NOTE — H&P ADULT - NSHPLABSRESULTS_GEN_ALL_CORE
LABS:                        13.4   8.85  )-----------( 174      ( 2021 12:10 )             42.7     -    132<L>  |  96  |  18  ----------------------------<  111<H>  2.9<LL>   |  26  |  1.30    Ca    8.5      2021 12:10    TPro  5.6<L>  /  Alb  2.6<L>  /  TBili  2.6<H>  /  DBili  x   /  AST  38<H>  /  ALT  24  /  AlkPhos  99  -    PT/INR - ( 2021 12:10 )   PT: 12.7 sec;   INR: 1.10 ratio         PTT - ( 2021 12:10 )  PTT:28.7 sec  Urinalysis Basic - ( 2021 15:23 )    Color: Yellow / Appearance: Slightly Turbid / S.015 / pH: x  Gluc: x / Ketone: Trace  / Bili: Negative / Urobili: Negative mg/dL   Blood: x / Protein: 30 mg/dL / Nitrite: Negative   Leuk Esterase: Moderate / RBC: 6-10 /HPF / WBC 11-25   Sq Epi: x / Non Sq Epi: Few / Bacteria: Moderate          RADIOLOGY & ADDITIONAL TESTS:

## 2021-02-24 NOTE — ED PROVIDER NOTE - OBJECTIVE STATEMENT
89yo F [place] w/ PMH asthma, HTN, DM sent to ED from [facility] d/t hypoK+, hypotension. 89yo F w/ PMH asthma, HTN, DM sent to ED from [facility] d/t hypoK+, hypotension noted this AM. Pt non verbal, spoke w/ [facility] RN for HPI / ROS. Pt COVID negative. AM labs w/ K+ 3.0, pt not taking PO and [facility] unable to give IV replacement. Also noted low BP, ED triage 78/49 -> 94/43, pt typically w/ HTN. RN w/o mention or concern for other ROS.

## 2021-02-25 LAB
ANION GAP SERPL CALC-SCNC: 9 MMOL/L — SIGNIFICANT CHANGE UP (ref 5–17)
BUN SERPL-MCNC: 15 MG/DL — SIGNIFICANT CHANGE UP (ref 7–23)
CALCIUM SERPL-MCNC: 8.2 MG/DL — LOW (ref 8.5–10.1)
CHLORIDE SERPL-SCNC: 108 MMOL/L — SIGNIFICANT CHANGE UP (ref 96–108)
CO2 SERPL-SCNC: 21 MMOL/L — LOW (ref 22–31)
CREAT SERPL-MCNC: 1.03 MG/DL — SIGNIFICANT CHANGE UP (ref 0.5–1.3)
CULTURE RESULTS: SIGNIFICANT CHANGE UP
GLUCOSE SERPL-MCNC: 149 MG/DL — HIGH (ref 70–99)
HCT VFR BLD CALC: 45 % — SIGNIFICANT CHANGE UP (ref 34.5–45)
HGB BLD-MCNC: 13.9 G/DL — SIGNIFICANT CHANGE UP (ref 11.5–15.5)
MAGNESIUM SERPL-MCNC: 2.5 MG/DL — SIGNIFICANT CHANGE UP (ref 1.6–2.6)
MCHC RBC-ENTMCNC: 25.9 PG — LOW (ref 27–34)
MCHC RBC-ENTMCNC: 30.9 GM/DL — LOW (ref 32–36)
MCV RBC AUTO: 84 FL — SIGNIFICANT CHANGE UP (ref 80–100)
NRBC # BLD: 0 /100 WBCS — SIGNIFICANT CHANGE UP (ref 0–0)
PHOSPHATE SERPL-MCNC: 3.7 MG/DL — SIGNIFICANT CHANGE UP (ref 2.5–4.5)
PLATELET # BLD AUTO: 170 K/UL — SIGNIFICANT CHANGE UP (ref 150–400)
POTASSIUM SERPL-MCNC: 4.5 MMOL/L — SIGNIFICANT CHANGE UP (ref 3.5–5.3)
POTASSIUM SERPL-SCNC: 4.5 MMOL/L — SIGNIFICANT CHANGE UP (ref 3.5–5.3)
RBC # BLD: 5.36 M/UL — HIGH (ref 3.8–5.2)
RBC # FLD: 16.2 % — HIGH (ref 10.3–14.5)
SODIUM SERPL-SCNC: 138 MMOL/L — SIGNIFICANT CHANGE UP (ref 135–145)
SPECIMEN SOURCE: SIGNIFICANT CHANGE UP
WBC # BLD: 9.48 K/UL — SIGNIFICANT CHANGE UP (ref 3.8–10.5)
WBC # FLD AUTO: 9.48 K/UL — SIGNIFICANT CHANGE UP (ref 3.8–10.5)

## 2021-02-25 PROCEDURE — 99233 SBSQ HOSP IP/OBS HIGH 50: CPT

## 2021-02-25 RX ADMIN — DEXTROSE MONOHYDRATE, SODIUM CHLORIDE, AND POTASSIUM CHLORIDE 100 MILLILITER(S): 50; .745; 4.5 INJECTION, SOLUTION INTRAVENOUS at 05:02

## 2021-02-25 RX ADMIN — DEXTROSE MONOHYDRATE, SODIUM CHLORIDE, AND POTASSIUM CHLORIDE 100 MILLILITER(S): 50; .745; 4.5 INJECTION, SOLUTION INTRAVENOUS at 20:53

## 2021-02-25 RX ADMIN — DEXTROSE MONOHYDRATE, SODIUM CHLORIDE, AND POTASSIUM CHLORIDE 100 MILLILITER(S): 50; .745; 4.5 INJECTION, SOLUTION INTRAVENOUS at 12:21

## 2021-02-25 RX ADMIN — CEFTRIAXONE 100 MILLIGRAM(S): 500 INJECTION, POWDER, FOR SOLUTION INTRAMUSCULAR; INTRAVENOUS at 05:01

## 2021-02-25 RX ADMIN — HEPARIN SODIUM 5000 UNIT(S): 5000 INJECTION INTRAVENOUS; SUBCUTANEOUS at 17:20

## 2021-02-25 RX ADMIN — HEPARIN SODIUM 5000 UNIT(S): 5000 INJECTION INTRAVENOUS; SUBCUTANEOUS at 05:01

## 2021-02-25 RX ADMIN — POTASSIUM PHOSPHATE, MONOBASIC POTASSIUM PHOSPHATE, DIBASIC 62.5 MILLIMOLE(S): 236; 224 INJECTION, SOLUTION INTRAVENOUS at 00:04

## 2021-02-25 NOTE — SWALLOW BEDSIDE ASSESSMENT ADULT - SWALLOW EVAL: DIAGNOSIS
pt presented with refusal behaviors/confusion which may have impacted performance however oropharyngeal phases of swallow marked by decreased oral grading/anterior loss- suspect behavioral, slightly increased mastication time with trace oral residue for soft solid may be 2/2 reduced attention to task. no overt signs of aspiration with consistencies trialed

## 2021-02-25 NOTE — SWALLOW BEDSIDE ASSESSMENT ADULT - SWALLOW EVAL: ORAL MUSCULATURE
informal observation/generally intact/anomalies present/unable to assess due to poor participation/comprehension Statement Selected

## 2021-02-25 NOTE — SWALLOW BEDSIDE ASSESSMENT ADULT - COMMENTS
CXR 2/24/2021 INTERPRETATION:  AP chest on February 24, 2021 at 11:49 AM. Patient has sepsis. Heart magnified by technique.  Question is raised of a slight perihilar infiltrate new since February 19.  IMPRESSION: Question slight perihilar infiltrate on the left.      in chart review SLP noted previous MBS attempted and radiologist report 2/19/2021 IMPRESSION:Attempted modified barium swallow. Patient declined to swallow contrast material. CXR 2/24/2021 INTERPRETATION:  AP chest on February 24, 2021 at 11:49 AM. Patient has sepsis. Heart magnified by technique.  Question is raised of a slight perihilar infiltrate new since February 19.  IMPRESSION: Question slight perihilar infiltrate on the left.      in chart review SLP [facility] radiologist report PHARYN & SPEECH W CINE VIDEO2/19/2021 IMPRESSION:Attempted modified barium swallow. Patient declined to swallow contrast material.

## 2021-02-25 NOTE — SWALLOW BEDSIDE ASSESSMENT ADULT - H & P REVIEW
89 yo female PMH asthma, HTN, DM sent to ED from [facility] for hypokalemia, hypotension noted this AM. Pt at baseline is non-verbal, [facility] and [facility]. Pt COVID negative this admission. K is 2.9 on arrival, not taking PO and [facility] unable to give IV replacement. Also noted low BP, ED triage found BP to be 78/49. Admitted for IVF and K replacement./yes

## 2021-02-25 NOTE — SWALLOW BEDSIDE ASSESSMENT ADULT - SLP GENERAL OBSERVATIONS
pt seen bedside, alert and oriented to self- pt stated [place]. pt verbalizing, pleasantly confused and she was able to follow directions for feeding. pt verbally refused po trials

## 2021-02-26 ENCOUNTER — TRANSCRIPTION ENCOUNTER (OUTPATIENT)
Age: 86
End: 2021-02-26

## 2021-02-26 LAB
ANION GAP SERPL CALC-SCNC: 13 MMOL/L — SIGNIFICANT CHANGE UP (ref 5–17)
BUN SERPL-MCNC: 13 MG/DL — SIGNIFICANT CHANGE UP (ref 7–23)
CALCIUM SERPL-MCNC: 8.6 MG/DL — SIGNIFICANT CHANGE UP (ref 8.5–10.1)
CHLORIDE SERPL-SCNC: 114 MMOL/L — HIGH (ref 96–108)
CO2 SERPL-SCNC: 15 MMOL/L — LOW (ref 22–31)
CREAT SERPL-MCNC: 0.96 MG/DL — SIGNIFICANT CHANGE UP (ref 0.5–1.3)
GLUCOSE SERPL-MCNC: 168 MG/DL — HIGH (ref 70–99)
HCT VFR BLD CALC: 41.1 % — SIGNIFICANT CHANGE UP (ref 34.5–45)
HGB BLD-MCNC: 12.9 G/DL — SIGNIFICANT CHANGE UP (ref 11.5–15.5)
MCHC RBC-ENTMCNC: 25.9 PG — LOW (ref 27–34)
MCHC RBC-ENTMCNC: 31.4 GM/DL — LOW (ref 32–36)
MCV RBC AUTO: 82.5 FL — SIGNIFICANT CHANGE UP (ref 80–100)
NRBC # BLD: 0 /100 WBCS — SIGNIFICANT CHANGE UP (ref 0–0)
PLATELET # BLD AUTO: 184 K/UL — SIGNIFICANT CHANGE UP (ref 150–400)
POTASSIUM SERPL-MCNC: 4.6 MMOL/L — SIGNIFICANT CHANGE UP (ref 3.5–5.3)
POTASSIUM SERPL-SCNC: 4.6 MMOL/L — SIGNIFICANT CHANGE UP (ref 3.5–5.3)
RBC # BLD: 4.98 M/UL — SIGNIFICANT CHANGE UP (ref 3.8–5.2)
RBC # FLD: 16.7 % — HIGH (ref 10.3–14.5)
SODIUM SERPL-SCNC: 142 MMOL/L — SIGNIFICANT CHANGE UP (ref 135–145)
WBC # BLD: 7.91 K/UL — SIGNIFICANT CHANGE UP (ref 3.8–10.5)
WBC # FLD AUTO: 7.91 K/UL — SIGNIFICANT CHANGE UP (ref 3.8–10.5)

## 2021-02-26 PROCEDURE — 99239 HOSP IP/OBS DSCHRG MGMT >30: CPT

## 2021-02-26 RX ORDER — LISINOPRIL 2.5 MG/1
1 TABLET ORAL
Qty: 30 | Refills: 0
Start: 2021-02-26 | End: 2021-03-27

## 2021-02-26 RX ADMIN — CEFTRIAXONE 100 MILLIGRAM(S): 500 INJECTION, POWDER, FOR SOLUTION INTRAMUSCULAR; INTRAVENOUS at 05:37

## 2021-02-26 RX ADMIN — DEXTROSE MONOHYDRATE, SODIUM CHLORIDE, AND POTASSIUM CHLORIDE 100 MILLILITER(S): 50; .745; 4.5 INJECTION, SOLUTION INTRAVENOUS at 05:36

## 2021-02-26 RX ADMIN — DEXTROSE MONOHYDRATE, SODIUM CHLORIDE, AND POTASSIUM CHLORIDE 100 MILLILITER(S): 50; .745; 4.5 INJECTION, SOLUTION INTRAVENOUS at 18:45

## 2021-02-26 RX ADMIN — HEPARIN SODIUM 5000 UNIT(S): 5000 INJECTION INTRAVENOUS; SUBCUTANEOUS at 05:37

## 2021-02-26 RX ADMIN — HEPARIN SODIUM 5000 UNIT(S): 5000 INJECTION INTRAVENOUS; SUBCUTANEOUS at 17:13

## 2021-02-26 RX ADMIN — DEXTROSE MONOHYDRATE, SODIUM CHLORIDE, AND POTASSIUM CHLORIDE 100 MILLILITER(S): 50; .745; 4.5 INJECTION, SOLUTION INTRAVENOUS at 21:17

## 2021-02-26 NOTE — PHYSICAL THERAPY INITIAL EVALUATION ADULT - PERTINENT HX OF CURRENT PROBLEM, REHAB EVAL
Pt is a 89 yo female with hx pelvic fx and spur on left greater troch. PT admitted for hematuria, hypotension and acutecystitis

## 2021-02-26 NOTE — DISCHARGE NOTE PROVIDER - NSDCCPCAREPLAN_GEN_ALL_CORE_FT
PRINCIPAL DISCHARGE DIAGNOSIS  Diagnosis: Acute hypotension  Assessment and Plan of Treatment: -resolved      SECONDARY DISCHARGE DIAGNOSES  Diagnosis: 2019 novel coronavirus disease (COVID-19)  Assessment and Plan of Treatment:     Diagnosis: Hypophosphatemia  Assessment and Plan of Treatment: resolved    Diagnosis: Hypomagnesemia  Assessment and Plan of Treatment: resolved    Diagnosis: Hypokalemia  Assessment and Plan of Treatment: resolved

## 2021-02-26 NOTE — DISCHARGE NOTE PROVIDER - CARE PROVIDER_API CALL
PCP upon discharge from [facility],   Phone: (   )    -  Fax: (   )    -  Follow Up Time:

## 2021-02-26 NOTE — DISCHARGE NOTE PROVIDER - HOSPITAL COURSE
89 yo female PMH asthma, HTN, DM sent to ED from [facility] for hypokalemia, hypotension noted this AM. Pt at baseline is non-verbal, spoke w/ [facility] RN for HPI and ROS. Pt COVID negative this admission. K is 2.9 on arrival, not taking PO and [facility] unable to give IV replacement. Also noted low BP, ED triage found BP to be 78/49. Admitted for IVF and K replacement.     She was admitted and treated for:  Hypotension - resolved with IVF hydration.    Hypokalemia - resolved after K suppl given    Hypomagnesemia-  resolved after Mg supp given    DM:  - Monitor finger sticks AC and HS for now.       UTI  -  urine cult: no growth.  - discontinue Ceftriaxone.    Patient's grandson was contacted and updated regarding discharging patient back to [facility].    It took 35 minutes to discharge the patient.

## 2021-02-26 NOTE — DISCHARGE NOTE PROVIDER - PROVIDER TOKENS
FREE:[LAST:[PCP upon discharge from [facility]],PHONE:[(   )    -],FAX:[(   )    -]]

## 2021-02-26 NOTE — DISCHARGE NOTE PROVIDER - NSDCMRMEDTOKEN_GEN_ALL_CORE_FT
* Patient is from [facility]: [phone]  Admelog 100 units/mL injectable solution: inject as per sliding scale before meals and at bedtime  aspirin 81 mg oral tablet: 1 tab(s) orally once a day  Basaglar KwikPen 100 units/mL subcutaneous solution: 8 unit(s) subcutaneous once a day (at bedtime)  CeleBREX 100 mg oral capsule: 1 cap(s) orally once a day  gabapentin 300 mg oral capsule: 1 cap(s) orally 2 times a day  lisinopril 5 mg oral tablet: 1 tab(s) orally once a day   MiraLax oral powder for reconstitution: 17 gram(s) orally once a day  ProAir HFA 90 mcg/inh inhalation aerosol: 2 puff(s) inhaled every 6 hours  Protonix 40 mg oral delayed release tablet: 1 tab(s) orally once a day  senna oral tablet: 2 tab(s) orally once a day  Slow Fe 160 mg (50 mg elemental iron) oral tablet, extended release: 1 tab(s) orally once a day  Symbicort 80 mcg-4.5 mcg/inh inhalation aerosol: 2 puff(s) inhaled 2 times a day  Tylenol 325 mg oral tablet: 2 tab(s) orally every 6 hours

## 2021-02-27 ENCOUNTER — TRANSCRIPTION ENCOUNTER (OUTPATIENT)
Age: 86
End: 2021-02-27

## 2021-02-27 VITALS
TEMPERATURE: 98 F | RESPIRATION RATE: 18 BRPM | HEART RATE: 74 BPM | DIASTOLIC BLOOD PRESSURE: 83 MMHG | SYSTOLIC BLOOD PRESSURE: 149 MMHG | OXYGEN SATURATION: 98 %

## 2021-02-27 LAB
GLUCOSE BLDC GLUCOMTR-MCNC: 114 MG/DL — HIGH (ref 70–99)
GLUCOSE BLDC GLUCOMTR-MCNC: 128 MG/DL — HIGH (ref 70–99)

## 2021-02-27 PROCEDURE — 99232 SBSQ HOSP IP/OBS MODERATE 35: CPT

## 2021-02-27 RX ADMIN — HEPARIN SODIUM 5000 UNIT(S): 5000 INJECTION INTRAVENOUS; SUBCUTANEOUS at 05:26

## 2021-02-27 NOTE — PROGRESS NOTE ADULT - ASSESSMENT
89 yo female PMH asthma, HTN, DM sent to ED from [facility] for hypokalemia, hypotension noted this AM. Pt at baseline is non-verbal, spoke w/ [facility] RN for HPI and ROS. Pt COVID negative this admission. K is 2.9 on arrival, not taking PO and [facility] unable to give IV replacement. Also noted low BP, ED triage found BP to be 78/49. Admitted for IVF and K replacement.     A/P  Hypotension   - resolved with IVF hydration.  - BP is now normal    Hypokalemia - resolved after K suppl given    Hypomagnesemia-  resolved after Mg supp given.    Hypophosphatemia - resolved with phos suppl.    DM:  - Monitor finger sticks AC and HS for now.       UTI  - urine cult - no growth  - discontinue Ceftriaxone       Follow up patient.    Discharge patient to [facility].         
89 yo female PMH asthma, HTN, DM sent to ED from [facility] for hypokalemia, hypotension noted this AM. Pt at baseline is non-verbal, spoke w/ [facility] RN for HPI and ROS. Pt COVID negative this admission. K is 2.9 on arrival, not taking PO and [facility] unable to give IV replacement. Also noted low BP, ED triage found BP to be 78/49. Admitted for IVF and K replacement.     A/P  Hypotension   - resolved with IVF hydration.    Hypokalemia resolved after K suppl given    Hypomagnesemia-  resolved after Mg supp given    DM:  - Monitor finger sticks AC and HS for now.       UTI  - follow up urine cult.  - continue Ceftriaxone for now.     Follow up patient.    Discharge planning.    PT huber    
89 yo female PMH asthma, HTN, DM sent to ED from [facility] for hypokalemia, hypotension noted this AM. Pt at baseline is non-verbal, spoke w/ [facility] RN for HPI and ROS. Pt COVID negative this admission. K is 2.9 on arrival, not taking PO and [facility] unable to give IV replacement. Also noted low BP, ED triage found BP to be 78/49. Admitted for IVF and K replacement.     A/P  Hypotension   - resolved with IVF hydration.  - BP is now normal    Hypokalemia - resolved after K suppl given    Hypomagnesemia-  resolved after Mg supp given.    Hypophosphatemia - resolved with phos suppl.    DM:  - Monitor finger sticks AC and HS for now.       UTI  - urine cult - no growth  - discontinue Ceftriaxone       Discharge patient to [facility].    Patient is stable for transfer.

## 2021-02-27 NOTE — DISCHARGE NOTE NURSING/CASE MANAGEMENT/SOCIAL WORK - PATIENT PORTAL LINK FT
You can access the FollowMyHealth Patient Portal offered by [facility] by registering at the following website: [email or web address]. By joining [employer] [facility]’s FollowMyHealth portal, you will also be able to view your health information using other applications (apps) compatible with our system.

## 2021-02-27 NOTE — DISCHARGE NOTE NURSING/CASE MANAGEMENT/SOCIAL WORK - NSDCPNINST_GEN_ALL_CORE
Pt discharged to [facility] Room 212D.  Discharge packet prepared by AC.  Discharge instructions, report given to sonia Porras RN.

## 2021-02-27 NOTE — PROGRESS NOTE ADULT - SUBJECTIVE AND OBJECTIVE BOX
Patient is a 89y old  Female who presents with a chief complaint of Hypotension and hypokalemia (26 Feb 2021 11:17), she is in NAD.       OVERNIGHT EVENTS:    MEDICATIONS  (STANDING):  cefTRIAXone   IVPB 1000 milliGRAM(s) IV Intermittent every 24 hours  heparin   Injectable 5000 Unit(s) SubCutaneous every 12 hours  potassium chloride  10 mEq/100 mL IVPB 10 milliEquivalent(s) IV Intermittent every 1 hour  sodium chloride 0.9% with potassium chloride 20 mEq/L 1000 milliLiter(s) (100 mL/Hr) IV Continuous <Continuous>    MEDICATIONS  (PRN):        REVIEW OF SYSTEMS:  Unable to obtain, patient is confused, and not following commands.       Vital Signs Last 24 Hrs  T(C): 36.4 (26 Feb 2021 17:07), Max: 37 (26 Feb 2021 11:23)  T(F): 97.5 (26 Feb 2021 17:07), Max: 98.6 (26 Feb 2021 11:23)  HR: 86 (26 Feb 2021 17:07) (75 - 99)  BP: 116/68 (26 Feb 2021 17:07) (116/68 - 147/82)  BP(mean): --  RR: 18 (26 Feb 2021 17:07) (18 - 18)  SpO2: 97% (26 Feb 2021 17:07) (97% - 99%)    PHYSICAL EXAM:  GENERAL: NAD, well-developed  HEAD:  Atraumatic, Normocephalic  EYES:  conjunctiva and sclera clear  ENMT:  Moist mucous membranes   NECK: Supple, No JVD   NERVOUS SYSTEM:  Alert & confused,   CHEST/LUNG: Good air entry bilaterally; No rales, wheezing,    HEART: s1,s2; No murmurs, rubs, or gallops  ABDOMEN: Soft, Nontender, Nondistended; Bowel sounds present  EXTREMITIES:  2+ Peripheral Pulses, mild lower ext edema, no clubbing, cyanosis,    LYMPH: No lymphadenopathy noted  SKIN: ecchymosis noted to arms and legs    LABS:                        12.9   7.91  )-----------( 184      ( 26 Feb 2021 08:48 )             41.1     02-26    142  |  114<H>  |  13  ----------------------------<  168<H>  4.6   |  15<L>  |  0.96    Ca    8.6      26 Feb 2021 08:48  Phos  3.7     02-25  Mg     2.5     02-25         cardiac markers     CAPILLARY BLOOD GLUCOSE        Cultures    RADIOLOGY & ADDITIONAL TESTS:    Imaging Personally Reviewed:  [x ] YES  [ ] NO    Consultant(s) Notes Reviewed:  [ ] YES  [ ] NO    Care Discussed with Consultants/Other Providers [ ] YES  [ ] NO
Patient is a 89y old  Female who presents with a chief complaint of Hypotension and hypokalemia (26 Feb 2021 21:16), she is in NAD.       OVERNIGHT EVENTS: none    MEDICATIONS  (STANDING):  heparin   Injectable 5000 Unit(s) SubCutaneous every 12 hours  potassium chloride  10 mEq/100 mL IVPB 10 milliEquivalent(s) IV Intermittent every 1 hour  sodium chloride 0.9% with potassium chloride 20 mEq/L 1000 milliLiter(s) (100 mL/Hr) IV Continuous <Continuous>    MEDICATIONS  (PRN):        REVIEW OF SYSTEMS:  unable to obtain, not following commands.      Vital Signs Last 24 Hrs  T(C): 36.6 (27 Feb 2021 05:42), Max: 37.3 (26 Feb 2021 23:23)  T(F): 97.9 (27 Feb 2021 05:42), Max: 99.2 (26 Feb 2021 23:23)  HR: 69 (27 Feb 2021 05:42) (69 - 86)  BP: 123/63 (27 Feb 2021 05:42) (116/68 - 147/82)  BP(mean): --  RR: 18 (27 Feb 2021 05:42) (18 - 18)  SpO2: 100% (27 Feb 2021 05:42) (97% - 100%)    PHYSICAL EXAM:  GENERAL: NAD, well-developed  HEAD:  Atraumatic, Normocephalic  EYES:  conjunctiva and sclera clear  ENMT:  Moist mucous membranes   NECK: Supple, No JVD   NERVOUS SYSTEM:  Alert & confused,   CHEST/LUNG: Good air entry bilaterally; No rales, wheezing,    HEART: s1,s2; No murmurs, rubs, or gallops  ABDOMEN: Soft, Nontender, Nondistended; Bowel sounds present  EXTREMITIES:  2+ Peripheral Pulses, mild lower ext edema, no clubbing, cyanosis,    LYMPH: No lymphadenopathy noted  SKIN: old ecchymosis to arms and legs      LABS:                        12.9   7.91  )-----------( 184      ( 26 Feb 2021 08:48 )             41.1     02-26    142  |  114<H>  |  13  ----------------------------<  168<H>  4.6   |  15<L>  |  0.96    Ca    8.6      26 Feb 2021 08:48         cardiac markers     CAPILLARY BLOOD GLUCOSE        Cultures    RADIOLOGY & ADDITIONAL TESTS:    Imaging Personally Reviewed:  [x ] YES  [ ] NO    Consultant(s) Notes Reviewed:  [ ] YES  [ ] NO    Care Discussed with Consultants/Other Providers [ ] YES  [ ] NO
Patient is a 89y old  Female who presents with a chief complaint of Hypotension and hypokalemia (2021 18:12), sh is in NAD.       MEDICATIONS  (STANDING):  cefTRIAXone   IVPB 1000 milliGRAM(s) IV Intermittent every 24 hours  heparin   Injectable 5000 Unit(s) SubCutaneous every 12 hours  potassium chloride  10 mEq/100 mL IVPB 10 milliEquivalent(s) IV Intermittent every 1 hour  sodium chloride 0.9% with potassium chloride 20 mEq/L 1000 milliLiter(s) (100 mL/Hr) IV Continuous <Continuous>    MEDICATIONS  (PRN):        REVIEW OF SYSTEMS:  Unable to obtain, patient is confused.     Vital Signs Last 24 Hrs  T(C): 36.8 (2021 11:19), Max: 37.1 (2021 00:33)  T(F): 98.3 (2021 11:19), Max: 98.7 (2021 00:33)  HR: 92 (2021 11:19) (74 - 96)  BP: 116/71 (2021 11:19) (96/51 - 138/88)  BP(mean): --  RR: 18 (2021 11:19) (17 - 18)  SpO2: 100% (2021 11:19) (96% - 100%)    PHYSICAL EXAM:  GENERAL: NAD, well-developed  HEAD:  Atraumatic, Normocephalic  EYES:  conjunctiva and sclera clear  ENMT:  Moist mucous membranes   NECK: Supple, No JVD   NERVOUS SYSTEM:  Alert & confused,   CHEST/LUNG: Good air entry bilaterally; No rales, wheezing,    HEART: s1,s2; No murmurs, rubs, or gallops  ABDOMEN: Soft, Nontender, Nondistended; Bowel sounds present  EXTREMITIES:  2+ Peripheral Pulses, mild lower ext edema, no clubbing, cyanosis,    LYMPH: No lymphadenopathy noted  SKIN: ecchymosis noted to arms and legs    LABS:                        13.9   9.48  )-----------( 170      ( 2021 08:44 )             45.0     02    138  |  108  |  15  ----------------------------<  149<H>  4.5   |  21<L>  |  1.03    Ca    8.2<L>      2021 08:44  Phos  3.7     02-25  Mg     2.5     02-25    TPro  5.6<L>  /  Alb  2.6<L>  /  TBili  2.6<H>  /  DBili  x   /  AST  38<H>  /  ALT  24  /  AlkPhos  99  02-24    PT/INR - ( 2021 12:10 )   PT: 12.7 sec;   INR: 1.10 ratio         PTT - ( 2021 12:10 )  PTT:28.7 sec   cardiac markers   Urinalysis Basic - ( 2021 15:23 )    Color: Yellow / Appearance: Slightly Turbid / S.015 / pH: x  Gluc: x / Ketone: Trace  / Bili: Negative / Urobili: Negative mg/dL   Blood: x / Protein: 30 mg/dL / Nitrite: Negative   Leuk Esterase: Moderate / RBC: 6-10 /HPF / WBC 11-25   Sq Epi: x / Non Sq Epi: Few / Bacteria: Moderate      CAPILLARY BLOOD GLUCOSE      POCT Blood Glucose.: 118 mg/dL (2021 19:47)    Cultures    RADIOLOGY & ADDITIONAL TESTS:    Imaging Personally Reviewed:  [x ] YES  [ ] NO    Consultant(s) Notes Reviewed:  [ ] YES  [ ] NO    Care Discussed with Consultants/Other Providers [ ] YES  [ ] NO

## 2021-02-28 LAB
SARS-COV-2 IGG SERPL QL IA: POSITIVE
SARS-COV-2 IGM SERPL IA-ACNC: 6.11 INDEX — HIGH

## 2021-03-01 LAB
CULTURE RESULTS: SIGNIFICANT CHANGE UP
CULTURE RESULTS: SIGNIFICANT CHANGE UP
SPECIMEN SOURCE: SIGNIFICANT CHANGE UP
SPECIMEN SOURCE: SIGNIFICANT CHANGE UP

## 2021-03-08 ENCOUNTER — EMERGENCY (EMERGENCY)
Facility: HOSPITAL | Age: 86
LOS: 0 days | Discharge: HOME | End: 2021-03-09
Attending: EMERGENCY MEDICINE
Payer: MEDICAID

## 2021-03-08 VITALS
WEIGHT: 149.91 LBS | OXYGEN SATURATION: 100 % | DIASTOLIC BLOOD PRESSURE: 75 MMHG | RESPIRATION RATE: 17 BRPM | SYSTOLIC BLOOD PRESSURE: 157 MMHG | TEMPERATURE: 98 F | HEART RATE: 90 BPM | HEIGHT: 65 IN

## 2021-03-08 DIAGNOSIS — I10 ESSENTIAL (PRIMARY) HYPERTENSION: ICD-10-CM

## 2021-03-08 DIAGNOSIS — E11.9 TYPE 2 DIABETES MELLITUS WITHOUT COMPLICATIONS: ICD-10-CM

## 2021-03-08 DIAGNOSIS — I95.89 OTHER HYPOTENSION: ICD-10-CM

## 2021-03-08 DIAGNOSIS — E87.6 HYPOKALEMIA: ICD-10-CM

## 2021-03-08 DIAGNOSIS — I95.9 HYPOTENSION, UNSPECIFIED: ICD-10-CM

## 2021-03-08 DIAGNOSIS — J45.909 UNSPECIFIED ASTHMA, UNCOMPLICATED: ICD-10-CM

## 2021-03-08 DIAGNOSIS — E83.42 HYPOMAGNESEMIA: ICD-10-CM

## 2021-03-08 DIAGNOSIS — E83.39 OTHER DISORDERS OF PHOSPHORUS METABOLISM: ICD-10-CM

## 2021-03-08 LAB
ALBUMIN SERPL ELPH-MCNC: 1.8 G/DL — LOW (ref 3.3–5)
ALP SERPL-CCNC: 91 U/L — SIGNIFICANT CHANGE UP (ref 40–120)
ALT FLD-CCNC: 22 U/L — SIGNIFICANT CHANGE UP (ref 12–78)
ANION GAP SERPL CALC-SCNC: 8 MMOL/L — SIGNIFICANT CHANGE UP (ref 5–17)
APTT BLD: 25.2 SEC — LOW (ref 27.5–35.5)
AST SERPL-CCNC: 25 U/L — SIGNIFICANT CHANGE UP (ref 15–37)
BASOPHILS # BLD AUTO: 0.03 K/UL — SIGNIFICANT CHANGE UP (ref 0–0.2)
BASOPHILS NFR BLD AUTO: 0.4 % — SIGNIFICANT CHANGE UP (ref 0–2)
BILIRUB SERPL-MCNC: 0.8 MG/DL — SIGNIFICANT CHANGE UP (ref 0.2–1.2)
BUN SERPL-MCNC: 3 MG/DL — LOW (ref 7–23)
CALCIUM SERPL-MCNC: 7.7 MG/DL — LOW (ref 8.5–10.1)
CHLORIDE SERPL-SCNC: 111 MMOL/L — HIGH (ref 96–108)
CO2 SERPL-SCNC: 26 MMOL/L — SIGNIFICANT CHANGE UP (ref 22–31)
CREAT SERPL-MCNC: 0.66 MG/DL — SIGNIFICANT CHANGE UP (ref 0.5–1.3)
EOSINOPHIL # BLD AUTO: 0.03 K/UL — SIGNIFICANT CHANGE UP (ref 0–0.5)
EOSINOPHIL NFR BLD AUTO: 0.4 % — SIGNIFICANT CHANGE UP (ref 0–6)
GLUCOSE BLDC GLUCOMTR-MCNC: 220 MG/DL — HIGH (ref 70–99)
GLUCOSE SERPL-MCNC: 238 MG/DL — HIGH (ref 70–99)
HCT VFR BLD CALC: 38.7 % — SIGNIFICANT CHANGE UP (ref 34.5–45)
HGB BLD-MCNC: 12.2 G/DL — SIGNIFICANT CHANGE UP (ref 11.5–15.5)
IMM GRANULOCYTES NFR BLD AUTO: 0.3 % — SIGNIFICANT CHANGE UP (ref 0–1.5)
INR BLD: 1.33 RATIO — HIGH (ref 0.88–1.16)
LYMPHOCYTES # BLD AUTO: 1.63 K/UL — SIGNIFICANT CHANGE UP (ref 1–3.3)
LYMPHOCYTES # BLD AUTO: 24 % — SIGNIFICANT CHANGE UP (ref 13–44)
MAGNESIUM SERPL-MCNC: 1.2 MG/DL — LOW (ref 1.6–2.6)
MCHC RBC-ENTMCNC: 26.5 PG — LOW (ref 27–34)
MCHC RBC-ENTMCNC: 31.5 GM/DL — LOW (ref 32–36)
MCV RBC AUTO: 84.1 FL — SIGNIFICANT CHANGE UP (ref 80–100)
MONOCYTES # BLD AUTO: 0.55 K/UL — SIGNIFICANT CHANGE UP (ref 0–0.9)
MONOCYTES NFR BLD AUTO: 8.1 % — SIGNIFICANT CHANGE UP (ref 2–14)
NEUTROPHILS # BLD AUTO: 4.53 K/UL — SIGNIFICANT CHANGE UP (ref 1.8–7.4)
NEUTROPHILS NFR BLD AUTO: 66.8 % — SIGNIFICANT CHANGE UP (ref 43–77)
NRBC # BLD: 0 /100 WBCS — SIGNIFICANT CHANGE UP (ref 0–0)
PHOSPHATE SERPL-MCNC: 1.6 MG/DL — LOW (ref 2.5–4.5)
PLATELET # BLD AUTO: 198 K/UL — SIGNIFICANT CHANGE UP (ref 150–400)
POTASSIUM SERPL-MCNC: 2.3 MMOL/L — CRITICAL LOW (ref 3.5–5.3)
POTASSIUM SERPL-SCNC: 2.3 MMOL/L — CRITICAL LOW (ref 3.5–5.3)
PROT SERPL-MCNC: 5.2 GM/DL — LOW (ref 6–8.3)
PROTHROM AB SERPL-ACNC: 15.2 SEC — HIGH (ref 10.6–13.6)
RAPID RVP RESULT: DETECTED
RBC # BLD: 4.6 M/UL — SIGNIFICANT CHANGE UP (ref 3.8–5.2)
RBC # FLD: 16 % — HIGH (ref 10.3–14.5)
SARS-COV-2 RNA SPEC QL NAA+PROBE: DETECTED
SODIUM SERPL-SCNC: 145 MMOL/L — SIGNIFICANT CHANGE UP (ref 135–145)
WBC # BLD: 6.79 K/UL — SIGNIFICANT CHANGE UP (ref 3.8–10.5)
WBC # FLD AUTO: 6.79 K/UL — SIGNIFICANT CHANGE UP (ref 3.8–10.5)

## 2021-03-08 PROCEDURE — 93010 ELECTROCARDIOGRAM REPORT: CPT

## 2021-03-08 PROCEDURE — 99284 EMERGENCY DEPT VISIT MOD MDM: CPT | Mod: CS

## 2021-03-08 RX ORDER — SODIUM CHLORIDE 9 MG/ML
1000 INJECTION, SOLUTION INTRAVENOUS ONCE
Refills: 0 | Status: COMPLETED | OUTPATIENT
Start: 2021-03-08 | End: 2021-03-08

## 2021-03-08 RX ORDER — POTASSIUM CHLORIDE 20 MEQ
40 PACKET (EA) ORAL ONCE
Refills: 0 | Status: COMPLETED | OUTPATIENT
Start: 2021-03-08 | End: 2021-03-08

## 2021-03-08 RX ORDER — MAGNESIUM SULFATE 500 MG/ML
2 VIAL (ML) INJECTION ONCE
Refills: 0 | Status: COMPLETED | OUTPATIENT
Start: 2021-03-08 | End: 2021-03-08

## 2021-03-08 RX ORDER — POTASSIUM CHLORIDE 20 MEQ
10 PACKET (EA) ORAL ONCE
Refills: 0 | Status: COMPLETED | OUTPATIENT
Start: 2021-03-08 | End: 2021-03-08

## 2021-03-08 RX ORDER — CALCIUM GLUCONATE 100 MG/ML
1 VIAL (ML) INTRAVENOUS ONCE
Refills: 0 | Status: COMPLETED | OUTPATIENT
Start: 2021-03-08 | End: 2021-03-08

## 2021-03-08 RX ADMIN — Medication 50 MILLIEQUIVALENT(S): at 20:47

## 2021-03-08 RX ADMIN — Medication 40 MILLIEQUIVALENT(S): at 20:47

## 2021-03-08 RX ADMIN — Medication 100 GRAM(S): at 21:14

## 2021-03-08 RX ADMIN — SODIUM CHLORIDE 1000 MILLILITER(S): 9 INJECTION, SOLUTION INTRAVENOUS at 19:41

## 2021-03-08 RX ADMIN — Medication 50 GRAM(S): at 20:47

## 2021-03-08 RX ADMIN — Medication 62.5 MILLIMOLE(S): at 22:40

## 2021-03-08 NOTE — ED PROVIDER NOTE - PROGRESS NOTE DETAILS
Received patient signout from Dr. Melendrez.  Patient from rehab with hypokalemia given IV and PO replacements pending BMP. Nurse reporting patient with wheezing.  Mild audible wheezing heard wheezing on exam.  Paitent with hx of asthma will give duoneb.

## 2021-03-08 NOTE — ED PROVIDER NOTE - OBJECTIVE STATEMENT
89 yo F with hypokalemia, sent from [facility].  Pt. cannot provide history, as she is non-verbal at baseline.  History from chart and nursing report.  Chart review shows 2/28/21 hospital discharge after being admitted for hypoK, HypoMag, Hypotension, which resolved after treatment.   ROS: unobtainable from patient  PMH: asthma, HTN, DM, non-verbal; Meds: See EMR for list; SH: Denies smoking/drinking/drug use

## 2021-03-08 NOTE — ED ADULT NURSE NOTE - OBJECTIVE STATEMENT
Patient received, Sent by [facility] for Low Potassium level. Pt reportedly +Covid. Received with D5NS in place and draining. Pt A&Ox0 per RN assessment. Respiration even and unlabored. Labs sent. Patient made comfortable.

## 2021-03-08 NOTE — ED PROVIDER NOTE - CLINICAL SUMMARY MEDICAL DECISION MAKING FREE TEXT BOX
89 yo F with reported hypokalemia, will check lytes, mag, phos, coags, reportedly covid +, but will recheck with swab, monitor, ekg, iv, LR hydration to start  -f/u results, reeval

## 2021-03-08 NOTE — ED ADULT NURSE NOTE - PMH
Asthma    Diverticulitis    DM (diabetes mellitus)    Dysphagia    HTN (hypertension)    Lumbar spondylolysis  with degenerative changes  Pneumonia    Stroke  right frontal stroke

## 2021-03-08 NOTE — ED PROVIDER NOTE - PHYSICAL EXAMINATION
Vitals: HTN at 157/75, otherwise WNL  Gen: awake, alert, interactive, but non-verbal, mumbles at times when trying to respond to questions, NAD, sitting up comfortably in stretcher  Head: ncat, perrla, eomi b/l  Neck: supple, no lymphadenopathy, no midline deviation  Heart: rrr, no m/r/g  Lungs: CTA b/l, no rales/ronchi/wheezes  Abd: soft, nontender, non-distended, no rebound or guarding  Ext: no clubbing/cyanosis/edema  Neuro: sensation and muscle strength intact b/l, CN2-12 intact b/l

## 2021-03-08 NOTE — ED PROVIDER NOTE - PATIENT PORTAL LINK FT
You can access the FollowMyHealth Patient Portal offered by [facility] by registering at the following website: [email or web address]. By joining [facility]’s FollowMyHealth portal, you will also be able to view your health information using other applications (apps) compatible with our system.

## 2021-03-09 VITALS
RESPIRATION RATE: 23 BRPM | SYSTOLIC BLOOD PRESSURE: 143 MMHG | TEMPERATURE: 98 F | DIASTOLIC BLOOD PRESSURE: 82 MMHG | OXYGEN SATURATION: 95 % | HEART RATE: 118 BPM

## 2021-03-09 DIAGNOSIS — Z96.651 PRESENCE OF RIGHT ARTIFICIAL KNEE JOINT: Chronic | ICD-10-CM

## 2021-03-09 DIAGNOSIS — S72.009A FRACTURE OF UNSPECIFIED PART OF NECK OF UNSPECIFIED FEMUR, INITIAL ENCOUNTER FOR CLOSED FRACTURE: Chronic | ICD-10-CM

## 2021-03-09 LAB
ANION GAP SERPL CALC-SCNC: 10 MMOL/L — SIGNIFICANT CHANGE UP (ref 5–17)
BUN SERPL-MCNC: 4 MG/DL — LOW (ref 7–23)
CALCIUM SERPL-MCNC: 8 MG/DL — LOW (ref 8.5–10.1)
CHLORIDE SERPL-SCNC: 105 MMOL/L — SIGNIFICANT CHANGE UP (ref 96–108)
CO2 SERPL-SCNC: 27 MMOL/L — SIGNIFICANT CHANGE UP (ref 22–31)
CREAT SERPL-MCNC: 0.75 MG/DL — SIGNIFICANT CHANGE UP (ref 0.5–1.3)
GLUCOSE BLDC GLUCOMTR-MCNC: 167 MG/DL — HIGH (ref 70–99)
GLUCOSE SERPL-MCNC: 166 MG/DL — HIGH (ref 70–99)
POTASSIUM SERPL-MCNC: 3.1 MMOL/L — LOW (ref 3.5–5.3)
POTASSIUM SERPL-SCNC: 3.1 MMOL/L — LOW (ref 3.5–5.3)
SODIUM SERPL-SCNC: 142 MMOL/L — SIGNIFICANT CHANGE UP (ref 135–145)

## 2021-03-09 RX ORDER — POTASSIUM CHLORIDE 20 MEQ
40 PACKET (EA) ORAL ONCE
Refills: 0 | Status: COMPLETED | OUTPATIENT
Start: 2021-03-09 | End: 2021-03-09

## 2021-03-09 RX ORDER — POTASSIUM CHLORIDE 20 MEQ
40 PACKET (EA) ORAL ONCE
Refills: 0 | Status: DISCONTINUED | OUTPATIENT
Start: 2021-03-09 | End: 2021-03-09

## 2021-03-09 RX ORDER — IPRATROPIUM/ALBUTEROL SULFATE 18-103MCG
3 AEROSOL WITH ADAPTER (GRAM) INHALATION ONCE
Refills: 0 | Status: COMPLETED | OUTPATIENT
Start: 2021-03-09 | End: 2021-03-09

## 2021-03-09 RX ORDER — ACETAMINOPHEN 500 MG
650 TABLET ORAL ONCE
Refills: 0 | Status: COMPLETED | OUTPATIENT
Start: 2021-03-09 | End: 2021-03-09

## 2021-03-09 RX ADMIN — Medication 40 MILLIEQUIVALENT(S): at 12:55

## 2021-03-09 RX ADMIN — Medication 650 MILLIGRAM(S): at 12:06

## 2021-03-09 RX ADMIN — Medication 3 MILLILITER(S): at 08:33

## 2021-03-09 RX ADMIN — Medication 650 MILLIGRAM(S): at 08:33

## 2021-03-09 NOTE — ED ADULT NURSE REASSESSMENT NOTE - NS ED NURSE REASSESS COMMENT FT1
Patient responded well to nebulizer treatment. No signs of wheezing or respiratory distress with an oxygen saturation of 100%. Patient is awake and sitting in bed.

## 2021-03-09 NOTE — ED PEDIATRIC NURSE REASSESSMENT NOTE - NS ED NURSE REASSESS COMMENT FT2
Patient was cleaned due to soft, yellow stool and diaper changed. Patient has anasarca with poor venous access. Patient's prior IV access (24g, left wrist) was leaking and a new IV access (22g, right hand) was placed and patient tolerated well. IV access flushed without difficulty and blood return present. Patient has petechiae on left leg and ecchymosis on right ankle. Patient has audible wheezing with an oxygen saturation of 99% with no signs of respiratory distress. Patient is awake in bed and spontaneously responds to verbal and painful stimulation. Patient yells out in pain upon assessment of legs and when asked if she has pain, patient nods 'yes'. Patient is currently awake in bed and talking in another language loudly. MD Anderson notified about pain and wheezing.

## 2021-03-09 NOTE — ED ADULT NURSE REASSESSMENT NOTE - NS ED NURSE REASSESS COMMENT FT1
Patient remains hemodynamically stable. Skin care provided. Repeat BMP to be obtained after electrolyte replacement. Will endorse to incoming RN

## 2021-03-10 DIAGNOSIS — E87.6 HYPOKALEMIA: ICD-10-CM

## 2021-03-10 DIAGNOSIS — Z79.82 LONG TERM (CURRENT) USE OF ASPIRIN: ICD-10-CM

## 2021-03-10 DIAGNOSIS — J45.909 UNSPECIFIED ASTHMA, UNCOMPLICATED: ICD-10-CM

## 2021-03-10 DIAGNOSIS — E11.9 TYPE 2 DIABETES MELLITUS WITHOUT COMPLICATIONS: ICD-10-CM

## 2021-03-10 DIAGNOSIS — R68.89 OTHER GENERAL SYMPTOMS AND SIGNS: ICD-10-CM

## 2021-03-10 DIAGNOSIS — U07.1 COVID-19: ICD-10-CM

## 2021-03-14 ENCOUNTER — INPATIENT (INPATIENT)
Facility: HOSPITAL | Age: 86
LOS: 44 days | Discharge: NURSING, REHAB OR LONG-TERM CARE | End: 2021-04-28
Attending: INTERNAL MEDICINE | Admitting: INTERNAL MEDICINE
Payer: MEDICARE

## 2021-03-14 VITALS
DIASTOLIC BLOOD PRESSURE: 94 MMHG | HEIGHT: 65 IN | RESPIRATION RATE: 18 BRPM | TEMPERATURE: 97 F | HEART RATE: 100 BPM | OXYGEN SATURATION: 99 % | SYSTOLIC BLOOD PRESSURE: 136 MMHG | WEIGHT: 160.5 LBS

## 2021-03-14 DIAGNOSIS — R62.7 ADULT FAILURE TO THRIVE: ICD-10-CM

## 2021-03-14 DIAGNOSIS — I82.409 ACUTE EMBOLISM AND THROMBOSIS OF UNSPECIFIED DEEP VEINS OF UNSPECIFIED LOWER EXTREMITY: ICD-10-CM

## 2021-03-14 DIAGNOSIS — U07.1 COVID-19: ICD-10-CM

## 2021-03-14 DIAGNOSIS — S72.009A FRACTURE OF UNSPECIFIED PART OF NECK OF UNSPECIFIED FEMUR, INITIAL ENCOUNTER FOR CLOSED FRACTURE: Chronic | ICD-10-CM

## 2021-03-14 DIAGNOSIS — E87.8 OTHER DISORDERS OF ELECTROLYTE AND FLUID BALANCE, NOT ELSEWHERE CLASSIFIED: ICD-10-CM

## 2021-03-14 DIAGNOSIS — I26.99 OTHER PULMONARY EMBOLISM WITHOUT ACUTE COR PULMONALE: ICD-10-CM

## 2021-03-14 DIAGNOSIS — N39.0 URINARY TRACT INFECTION, SITE NOT SPECIFIED: ICD-10-CM

## 2021-03-14 DIAGNOSIS — E11.9 TYPE 2 DIABETES MELLITUS WITHOUT COMPLICATIONS: ICD-10-CM

## 2021-03-14 DIAGNOSIS — Z96.651 PRESENCE OF RIGHT ARTIFICIAL KNEE JOINT: Chronic | ICD-10-CM

## 2021-03-14 DIAGNOSIS — J45.909 UNSPECIFIED ASTHMA, UNCOMPLICATED: ICD-10-CM

## 2021-03-14 LAB
ALBUMIN SERPL ELPH-MCNC: 1.8 G/DL — LOW (ref 3.3–5)
ALP SERPL-CCNC: 116 U/L — SIGNIFICANT CHANGE UP (ref 40–120)
ALT FLD-CCNC: 19 U/L — SIGNIFICANT CHANGE UP (ref 12–78)
ANION GAP SERPL CALC-SCNC: 6 MMOL/L — SIGNIFICANT CHANGE UP (ref 5–17)
APPEARANCE UR: ABNORMAL
APTT BLD: 24.4 SEC — LOW (ref 27.5–35.5)
AST SERPL-CCNC: 26 U/L — SIGNIFICANT CHANGE UP (ref 15–37)
BACTERIA # UR AUTO: ABNORMAL
BASOPHILS # BLD AUTO: 0.06 K/UL — SIGNIFICANT CHANGE UP (ref 0–0.2)
BASOPHILS NFR BLD AUTO: 0.8 % — SIGNIFICANT CHANGE UP (ref 0–2)
BILIRUB SERPL-MCNC: 0.7 MG/DL — SIGNIFICANT CHANGE UP (ref 0.2–1.2)
BILIRUB UR-MCNC: NEGATIVE — SIGNIFICANT CHANGE UP
BLD GP AB SCN SERPL QL: SIGNIFICANT CHANGE UP
BUN SERPL-MCNC: 4 MG/DL — LOW (ref 7–23)
CALCIUM SERPL-MCNC: 7.7 MG/DL — LOW (ref 8.5–10.1)
CHLORIDE SERPL-SCNC: 107 MMOL/L — SIGNIFICANT CHANGE UP (ref 96–108)
CO2 SERPL-SCNC: 28 MMOL/L — SIGNIFICANT CHANGE UP (ref 22–31)
COLOR SPEC: YELLOW — SIGNIFICANT CHANGE UP
CREAT SERPL-MCNC: 0.63 MG/DL — SIGNIFICANT CHANGE UP (ref 0.5–1.3)
D DIMER BLD IA.RAPID-MCNC: 2378 NG/ML DDU — HIGH
DIFF PNL FLD: ABNORMAL
EOSINOPHIL # BLD AUTO: 0.05 K/UL — SIGNIFICANT CHANGE UP (ref 0–0.5)
EOSINOPHIL NFR BLD AUTO: 0.6 % — SIGNIFICANT CHANGE UP (ref 0–6)
EPI CELLS # UR: SIGNIFICANT CHANGE UP
FLUAV AG NPH QL: SIGNIFICANT CHANGE UP
FLUBV AG NPH QL: SIGNIFICANT CHANGE UP
GLUCOSE BLDC GLUCOMTR-MCNC: 180 MG/DL — HIGH (ref 70–99)
GLUCOSE SERPL-MCNC: 164 MG/DL — HIGH (ref 70–99)
GLUCOSE UR QL: 100 MG/DL
HCT VFR BLD CALC: 37.3 % — SIGNIFICANT CHANGE UP (ref 34.5–45)
HGB BLD-MCNC: 11.9 G/DL — SIGNIFICANT CHANGE UP (ref 11.5–15.5)
IMM GRANULOCYTES NFR BLD AUTO: 1.3 % — SIGNIFICANT CHANGE UP (ref 0–1.5)
INR BLD: 1.21 RATIO — HIGH (ref 0.88–1.16)
KETONES UR-MCNC: ABNORMAL
LEUKOCYTE ESTERASE UR-ACNC: ABNORMAL
LYMPHOCYTES # BLD AUTO: 2.06 K/UL — SIGNIFICANT CHANGE UP (ref 1–3.3)
LYMPHOCYTES # BLD AUTO: 26.7 % — SIGNIFICANT CHANGE UP (ref 13–44)
MAGNESIUM SERPL-MCNC: 1.2 MG/DL — LOW (ref 1.6–2.6)
MCHC RBC-ENTMCNC: 26.2 PG — LOW (ref 27–34)
MCHC RBC-ENTMCNC: 31.9 GM/DL — LOW (ref 32–36)
MCV RBC AUTO: 82 FL — SIGNIFICANT CHANGE UP (ref 80–100)
MONOCYTES # BLD AUTO: 0.79 K/UL — SIGNIFICANT CHANGE UP (ref 0–0.9)
MONOCYTES NFR BLD AUTO: 10.2 % — SIGNIFICANT CHANGE UP (ref 2–14)
NEUTROPHILS # BLD AUTO: 4.66 K/UL — SIGNIFICANT CHANGE UP (ref 1.8–7.4)
NEUTROPHILS NFR BLD AUTO: 60.4 % — SIGNIFICANT CHANGE UP (ref 43–77)
NITRITE UR-MCNC: NEGATIVE — SIGNIFICANT CHANGE UP
NRBC # BLD: 0 /100 WBCS — SIGNIFICANT CHANGE UP (ref 0–0)
PH UR: 6 — SIGNIFICANT CHANGE UP (ref 5–8)
PHOSPHATE SERPL-MCNC: 1.8 MG/DL — LOW (ref 2.5–4.5)
PLATELET # BLD AUTO: 288 K/UL — SIGNIFICANT CHANGE UP (ref 150–400)
POTASSIUM SERPL-MCNC: 3 MMOL/L — LOW (ref 3.5–5.3)
POTASSIUM SERPL-SCNC: 3 MMOL/L — LOW (ref 3.5–5.3)
PROT SERPL-MCNC: 5.3 GM/DL — LOW (ref 6–8.3)
PROT UR-MCNC: 100 MG/DL
PROTHROM AB SERPL-ACNC: 13.9 SEC — HIGH (ref 10.6–13.6)
RBC # BLD: 4.55 M/UL — SIGNIFICANT CHANGE UP (ref 3.8–5.2)
RBC # FLD: 16.1 % — HIGH (ref 10.3–14.5)
RBC CASTS # UR COMP ASSIST: ABNORMAL /HPF (ref 0–4)
SARS-COV-2 RNA SPEC QL NAA+PROBE: SIGNIFICANT CHANGE UP
SODIUM SERPL-SCNC: 141 MMOL/L — SIGNIFICANT CHANGE UP (ref 135–145)
SP GR SPEC: 1.01 — SIGNIFICANT CHANGE UP (ref 1.01–1.02)
UROBILINOGEN FLD QL: NEGATIVE MG/DL — SIGNIFICANT CHANGE UP
WBC # BLD: 7.72 K/UL — SIGNIFICANT CHANGE UP (ref 3.8–10.5)
WBC # FLD AUTO: 7.72 K/UL — SIGNIFICANT CHANGE UP (ref 3.8–10.5)
WBC UR QL: >50

## 2021-03-14 PROCEDURE — 93010 ELECTROCARDIOGRAM REPORT: CPT

## 2021-03-14 PROCEDURE — 93970 EXTREMITY STUDY: CPT | Mod: 26

## 2021-03-14 PROCEDURE — 71045 X-RAY EXAM CHEST 1 VIEW: CPT | Mod: 26

## 2021-03-14 PROCEDURE — 99285 EMERGENCY DEPT VISIT HI MDM: CPT | Mod: CS

## 2021-03-14 PROCEDURE — 71275 CT ANGIOGRAPHY CHEST: CPT | Mod: 26

## 2021-03-14 RX ORDER — PANTOPRAZOLE SODIUM 20 MG/1
40 TABLET, DELAYED RELEASE ORAL
Refills: 0 | Status: DISCONTINUED | OUTPATIENT
Start: 2021-03-14 | End: 2021-03-16

## 2021-03-14 RX ORDER — CEFTRIAXONE 500 MG/1
1000 INJECTION, POWDER, FOR SOLUTION INTRAMUSCULAR; INTRAVENOUS ONCE
Refills: 0 | Status: COMPLETED | OUTPATIENT
Start: 2021-03-14 | End: 2021-03-14

## 2021-03-14 RX ORDER — POLYETHYLENE GLYCOL 3350 17 G/17G
17 POWDER, FOR SOLUTION ORAL DAILY
Refills: 0 | Status: DISCONTINUED | OUTPATIENT
Start: 2021-03-14 | End: 2021-04-01

## 2021-03-14 RX ORDER — BUDESONIDE AND FORMOTEROL FUMARATE DIHYDRATE 160; 4.5 UG/1; UG/1
2 AEROSOL RESPIRATORY (INHALATION)
Refills: 0 | Status: DISCONTINUED | OUTPATIENT
Start: 2021-03-14 | End: 2021-03-17

## 2021-03-14 RX ORDER — FERROUS SULFATE 325(65) MG
325 TABLET ORAL DAILY
Refills: 0 | Status: DISCONTINUED | OUTPATIENT
Start: 2021-03-14 | End: 2021-04-28

## 2021-03-14 RX ORDER — MAGNESIUM SULFATE 500 MG/ML
2 VIAL (ML) INJECTION ONCE
Refills: 0 | Status: COMPLETED | OUTPATIENT
Start: 2021-03-14 | End: 2021-03-14

## 2021-03-14 RX ORDER — SODIUM CHLORIDE 9 MG/ML
1000 INJECTION, SOLUTION INTRAVENOUS
Refills: 0 | Status: DISCONTINUED | OUTPATIENT
Start: 2021-03-14 | End: 2021-04-28

## 2021-03-14 RX ORDER — METOPROLOL TARTRATE 50 MG
2.5 TABLET ORAL EVERY 6 HOURS
Refills: 0 | Status: DISCONTINUED | OUTPATIENT
Start: 2021-03-14 | End: 2021-03-22

## 2021-03-14 RX ORDER — DEXTROSE 50 % IN WATER 50 %
15 SYRINGE (ML) INTRAVENOUS ONCE
Refills: 0 | Status: DISCONTINUED | OUTPATIENT
Start: 2021-03-14 | End: 2021-04-28

## 2021-03-14 RX ORDER — HEPARIN SODIUM 5000 [USP'U]/ML
6000 INJECTION INTRAVENOUS; SUBCUTANEOUS ONCE
Refills: 0 | Status: COMPLETED | OUTPATIENT
Start: 2021-03-14 | End: 2021-03-14

## 2021-03-14 RX ORDER — POTASSIUM CHLORIDE 20 MEQ
10 PACKET (EA) ORAL
Refills: 0 | Status: COMPLETED | OUTPATIENT
Start: 2021-03-14 | End: 2021-03-14

## 2021-03-14 RX ORDER — ACETAMINOPHEN 500 MG
650 TABLET ORAL EVERY 6 HOURS
Refills: 0 | Status: DISCONTINUED | OUTPATIENT
Start: 2021-03-14 | End: 2021-04-07

## 2021-03-14 RX ORDER — INSULIN LISPRO 100/ML
VIAL (ML) SUBCUTANEOUS
Refills: 0 | Status: DISCONTINUED | OUTPATIENT
Start: 2021-03-14 | End: 2021-04-26

## 2021-03-14 RX ORDER — SODIUM,POTASSIUM PHOSPHATES 278-250MG
1 POWDER IN PACKET (EA) ORAL ONCE
Refills: 0 | Status: COMPLETED | OUTPATIENT
Start: 2021-03-14 | End: 2021-03-14

## 2021-03-14 RX ORDER — ALBUTEROL 90 UG/1
4 AEROSOL, METERED ORAL EVERY 6 HOURS
Refills: 0 | Status: DISCONTINUED | OUTPATIENT
Start: 2021-03-14 | End: 2021-03-21

## 2021-03-14 RX ORDER — CEFTRIAXONE 500 MG/1
INJECTION, POWDER, FOR SOLUTION INTRAMUSCULAR; INTRAVENOUS
Refills: 0 | Status: DISCONTINUED | OUTPATIENT
Start: 2021-03-14 | End: 2021-03-14

## 2021-03-14 RX ORDER — GABAPENTIN 400 MG/1
300 CAPSULE ORAL
Refills: 0 | Status: DISCONTINUED | OUTPATIENT
Start: 2021-03-14 | End: 2021-04-28

## 2021-03-14 RX ORDER — HEPARIN SODIUM 5000 [USP'U]/ML
6000 INJECTION INTRAVENOUS; SUBCUTANEOUS EVERY 6 HOURS
Refills: 0 | Status: DISCONTINUED | OUTPATIENT
Start: 2021-03-14 | End: 2021-03-15

## 2021-03-14 RX ORDER — DEXTROSE 50 % IN WATER 50 %
12.5 SYRINGE (ML) INTRAVENOUS ONCE
Refills: 0 | Status: DISCONTINUED | OUTPATIENT
Start: 2021-03-14 | End: 2021-04-28

## 2021-03-14 RX ORDER — HEPARIN SODIUM 5000 [USP'U]/ML
INJECTION INTRAVENOUS; SUBCUTANEOUS
Qty: 25000 | Refills: 0 | Status: DISCONTINUED | OUTPATIENT
Start: 2021-03-14 | End: 2021-03-15

## 2021-03-14 RX ORDER — CEFTRIAXONE 500 MG/1
1000 INJECTION, POWDER, FOR SOLUTION INTRAMUSCULAR; INTRAVENOUS EVERY 24 HOURS
Refills: 0 | Status: DISCONTINUED | OUTPATIENT
Start: 2021-03-15 | End: 2021-03-18

## 2021-03-14 RX ORDER — DEXTROSE 50 % IN WATER 50 %
25 SYRINGE (ML) INTRAVENOUS ONCE
Refills: 0 | Status: DISCONTINUED | OUTPATIENT
Start: 2021-03-14 | End: 2021-04-28

## 2021-03-14 RX ORDER — HEPARIN SODIUM 5000 [USP'U]/ML
3000 INJECTION INTRAVENOUS; SUBCUTANEOUS EVERY 6 HOURS
Refills: 0 | Status: DISCONTINUED | OUTPATIENT
Start: 2021-03-14 | End: 2021-03-15

## 2021-03-14 RX ORDER — SENNA PLUS 8.6 MG/1
2 TABLET ORAL AT BEDTIME
Refills: 0 | Status: DISCONTINUED | OUTPATIENT
Start: 2021-03-14 | End: 2021-04-01

## 2021-03-14 RX ORDER — GLUCAGON INJECTION, SOLUTION 0.5 MG/.1ML
1 INJECTION, SOLUTION SUBCUTANEOUS ONCE
Refills: 0 | Status: DISCONTINUED | OUTPATIENT
Start: 2021-03-14 | End: 2021-04-28

## 2021-03-14 RX ORDER — LISINOPRIL 2.5 MG/1
5 TABLET ORAL DAILY
Refills: 0 | Status: DISCONTINUED | OUTPATIENT
Start: 2021-03-14 | End: 2021-03-14

## 2021-03-14 RX ADMIN — CEFTRIAXONE 100 MILLIGRAM(S): 500 INJECTION, POWDER, FOR SOLUTION INTRAMUSCULAR; INTRAVENOUS at 16:21

## 2021-03-14 RX ADMIN — Medication 2.5 MILLIGRAM(S): at 21:12

## 2021-03-14 RX ADMIN — HEPARIN SODIUM 6000 UNIT(S): 5000 INJECTION INTRAVENOUS; SUBCUTANEOUS at 18:49

## 2021-03-14 RX ADMIN — HEPARIN SODIUM 1300 UNIT(S)/HR: 5000 INJECTION INTRAVENOUS; SUBCUTANEOUS at 18:49

## 2021-03-14 RX ADMIN — Medication 50 GRAM(S): at 16:56

## 2021-03-14 NOTE — ED PROVIDER NOTE - OBJECTIVE STATEMENT
89yo F hx HTN, DM, hypokalemia, non-verbal at baseline, asthma, sent to ED from [facility] for failure to thrive. History obtained from chart review. Per charts, patient continues to have electrolyte disturbances with potassium repletion. Has been refusing food in the past few days,putting hands over her mouth when they attempt to feed her. Overall weight loss of 10 kg since admission. COVID PCR positive as of 03/08, also antibody positive    Pt to be admitted for PEG Placement.

## 2021-03-14 NOTE — H&P ADULT - HISTORY OF PRESENT ILLNESS
89yo F hx HTN, DM, hypokalemia, non-verbal at baseline, asthma, sent to ED from [facility] for failure to thrive. History obtained from chart review. Per charts, patient continues to have electrolyte disturbances with potassium repletion. Has been refusing food in the past few days,putting hands over her mouth when they attempt to feed her. Overall weight loss of 10 kg since admission. PT  WITH  HX   COVID  19  ALREADY  RX  IN  HOSPITAL  CONTINUES  +  IN  [facility] SINCE  3/8/21  EVALUATED  IN  ER  FOUND  TO  LAVE  EXTENSIVE DVT  BILATERAL PE  AFIB STARTED  ON  IV  HEPARIN  +  SOB  LEG  PAIN  POOR  APETITE

## 2021-03-14 NOTE — ED PROVIDER NOTE - NS ED MD EM SELECTION
[id number] Detailed Post-Care Instructions: I reviewed with the patient in detail post-care instructions. Patient is to wear sunprotection, and avoid picking at any of the treated lesions. Pt may apply Vaseline to crusted or scabbing areas. Number Of Freeze-Thaw Cycles: 2 freeze-thaw cycles Consent: The patient's consent was obtained including but not limited to risks of crusting, scabbing, blistering, scarring, darker or lighter pigmentary change, recurrence, incomplete removal and infection. RTC in 2 months if lesion(s) persistent. Duration Of Freeze Thaw-Cycle (Seconds): 10 Render Post-Care Instructions In Note?: yes Detail Level: Detailed

## 2021-03-14 NOTE — ED PROVIDER NOTE - CARE PLAN
Principal Discharge DX:	Failure to thrive in adult   Principal Discharge DX:	Failure to thrive in adult  Secondary Diagnosis:	Electrolyte abnormality  Secondary Diagnosis:	UTI (urinary tract infection)  Secondary Diagnosis:	COVID-19

## 2021-03-14 NOTE — H&P ADULT - NSHPPHYSICALEXAM_GEN_ALL_CORE
PHYSICAL EXAM:    GENERAL: NAD, well-groomed, well-developed  HEAD:  Atraumatic, Normocephalic  EYES: EOMI, PERRLA, conjunctiva and sclera clear  ENMT: No tonsillar erythema, exudates, or enlargement; Moist mucous membranes, , No lesions  NECK: Supple, No JVD, Normal thyroid  NERVOUS SYSTEM:  Alert & Oriented x1,  moves  all  extr  CHEST/LUNG: Clear  bilaterally; No rales, rhonchi, wheezing, or rubs  HEART: Regular rate and rhythm; No murmurs, rubs, or gallops  ABDOMEN: Soft, Nontender, Nondistended; no  masses Bowel sounds present  EXTREMITIES:   diffuse  calf   tenderness    LYMPH: No lymphadenopathy noted   RECTAL: deferred

## 2021-03-14 NOTE — ED PROVIDER NOTE - CCCP TRG CHIEF CMPLNT
FTT, anorexia, admission for peg tube placement/abnormal lab result
Gen: + malaise. Negative for fevers or chills  Eyes: no blurred vision or lacrimation  ENT: no tinnitus, vertigo, or decreased hearing  Resp: no wheezing, dyspnea, pleuritic chest pain, or hemoptysis  CV: no chest pain, dyspnea on exertion, or palpitations  GI: no nausea, vomiting, abdominal pain, diarrhea, constipation, melena, or hematochezia  : no dysuria or hematuria  MSK: + arthralgias, myalgias  Neuro: no focal deficits, confusion, weakness, dizziness, tremors, or seizures  Skin: + lesions, edema

## 2021-03-14 NOTE — H&P ADULT - ASSESSMENT
IMPROVE VTE Individual Risk Assessment    RISK                                                                Points    [  ] Previous VTE                                                  3    [  ] Thrombophilia                                               2    [  ] Lower limb paralysis                                      2        (unable to hold up >15 seconds)      [  ] Current Cancer                                              2         (within 6 months)    [ x ] Immobilization > 24 hrs                                1    [  ] ICU/CCU stay > 24 hours                              1    [  x] Age > 60                                                      1    IMPROVE VTE Score ______current  PE  DVT___    IMPROVE Score 0-1: Low Risk, No VTE prophylaxis required for most patients, encourage ambulation.   IMPROVE Score 2-3: At risk, pharmacologic VTE prophylaxis is indicated for most patients (in the absence of a contraindication)  IMPROVE Score > or = 4: High Risk, pharmacologic VTE prophylaxis is indicated for most patients (in the absence of a contraindication)

## 2021-03-14 NOTE — ED PROVIDER NOTE - PHYSICAL EXAMINATION
VITALS: reviewed  GEN: NAD, A & O x 4  HEAD/EYES: NCAT, EOMI, anicteric sclerae  ENT: mucus membranes moist, oropharynx WNL, trachea midline  RESP: lungs CTA with equal breath sounds bilaterally,   CV: heart with reg rhythm S1, S2, distal pulses intact and symmetric bilaterally  ABDOMEN: normoactive bowel sounds, soft, nondistended, nontender, no palpable masses  : no CVAT  MSK: extremities atraumatic and nontender, b/l LE edema, L>R.  SKIN: warm, dry, no rash, no bruising, no cyanosis. color appropriate for ethnicity  NEURO: alert, no facial asymmetry.   PSYCH: Affect appropriate

## 2021-03-14 NOTE — H&P ADULT - NSHPLABSRESULTS_GEN_ALL_CORE
LABS:                        11.9   7.72  )-----------( 288      ( 14 Mar 2021 13:53 )             37.3         141  |  107  |  4<L>  ----------------------------<  164<H>  3.0<L>   |  28  |  0.63    Ca    7.7<L>      14 Mar 2021 15:33  Phos  1.8       Mg     1.2         TPro  5.3<L>  /  Alb  1.8<L>  /  TBili  0.7  /  DBili  x   /  AST  26  /  ALT  19  /  AlkPhos  116  -    PT/INR - ( 14 Mar 2021 14:32 )   PT: 13.9 sec;   INR: 1.21 ratio         PTT - ( 14 Mar 2021 14:32 )  PTT:24.4 sec  Urinalysis Basic - ( 14 Mar 2021 15:33 )    Color: Yellow / Appearance: very cloudy / S.015 / pH: x  Gluc: x / Ketone: Trace  / Bili: Negative / Urobili: Negative mg/dL   Blood: x / Protein: 100 mg/dL / Nitrite: Negative   Leuk Esterase: Moderate / RBC: 6-10 /HPF / WBC >50   Sq Epi: x / Non Sq Epi: Occasional / Bacteria: Few    < from: CT Angio Chest w/ IV Cont (21 @ 18:32) >      EXAM:  CT ANGIO CHEST (W)AW IC                            PROCEDURE DATE:  2021          INTERPRETATION:  CLINICAL INFORMATION: Failure to thrive. Electrolyte imbalance. History of COVID. Evaluate for pulmonary embolism.    COMPARISON: Chest radiograph 3/14/2021.    CONTRAST/COMPLICATIONS:  IV Contrast: Omnipaque 350 / 85 cc administered / .  Oral Contrast: NONE  Complications: None reported at time of study completion    PROCEDURE:  CT Angiography of the Chest.  Sagittal and coronal reformats were performed as well as 3D (MIP) reconstructions.    FINDINGS:    LUNGS AND AIRWAYS PLEURA:  Small bilateral pleural effusions and underlying compressive atelectasis, larger on the right.  Patchy bilateral groundglass opacities and mosaic attenuation.  Left apical pleural parenchymal consolidation with focal cystic bronchiectasis.  Paraseptal emphysematous changes right middle lobe.    The central airways remain patent.    MEDIASTINUM AND HERNAN: No lymphadenopathy.    VESSELS: There are pulmonary emboli throughout the right lower lobe and segmental right lower lobe pulmonary arteries.  There are pulmonary emboli segmental left lingula lobe and left lower lobe and segmental left lower lobe pulmonary arterial vasculature.  There is prominence of the main pulmonary arterial trunk measuring 3.3 cm, finding which may be associated with pulmonary arterial hypertension.    Atherosclerotic calcification of the thoracic aorta with coronary artery calcifications.    HEART: Mildly enlarged.  Trace pericardial effusion.    CHEST WALL AND LOWER NECK: Within normal limits.    VISUALIZED UPPER ABDOMEN:  Streak artifact degrades image quality limiting evaluation.  Nodular contour of liver.  Nodular thickening bilateral adrenal glands.    BONES:Degenerative changes spine.    IMPRESSION:    Acute bilateral pulmonary emboli as discussed.  Prominence of the main pulmonary arterial trunk which may reflect pulmonary arterial hypertension.  This critical value was discussed with Dr. Mtz   by telephone at the time of interpretation on 3/14/2021.

## 2021-03-14 NOTE — ED ADULT NURSE NOTE - ED STAT RN HANDOFF DETAILS 2
Report endorsed to oncoming RN 2c rn sofi. Safety checks compld this shift/Safety rounds completed hourly.  IV sites checked Q2+remains WDL. Meds given as ord with no s/s of adverse RXNs. Fall +skin precs in place. Any issues endorsed to oncoming RN for follow up . Dr Patience alvarez made aware of  and hold insulin, aware pt is NPO and failed dysphagia screening and will put in potassium IV, dr. meade made aware of only one IV intact. 2C MALLORY farah made aware.

## 2021-03-14 NOTE — ED PROVIDER NOTE - PROGRESS NOTE DETAILS
Smith: started on heparin- discussed with Dr. Barbosa. discussed with patient's grandson, estefani, who states he makes her medical decisions and is okay with heparin for dvt.

## 2021-03-14 NOTE — ED ADULT NURSE NOTE - ED STAT RN HANDOFF DETAILS
Report endorsed to oncoming RN . Safety checks compld this shift/Safety rounds completed hourly.  IV sites checked Q2+remains WDL. Meds given as ord with no s/s of adverse RXNs. Fall +skin precs in place. Any issues endorsed to oncoming RN for follow up. Report endorsed to oncoming RN . Safety checks compld this shift/Safety rounds completed hourly.  IV sites checked Q2+remains WDL. Meds given as ord with no s/s of adverse RXNs. Fall +skin precs in place. Any issues endorsed to oncoming RN for follow up. Dr Patience alvarez made aware of  and hold insulin, aware pt is NPO and failed dysphagia screening and will put in potassium IV

## 2021-03-14 NOTE — ED ADULT NURSE NOTE - OBJECTIVE STATEMENT
sent from [facility] for FTT, electrolyte imbalance, not eating x past few days peg tube placement in or tomorrow pt alert, disoriented at baseline mentally

## 2021-03-14 NOTE — ED ADULT TRIAGE NOTE - CHIEF COMPLAINT QUOTE
sent from [facility] for FTT, electrolyte imbalance, not eating x past few days peg tube placement in or tomorrow pt alert, disoriented at baseline mentally

## 2021-03-14 NOTE — ED PROVIDER NOTE - CLINICAL SUMMARY MEDICAL DECISION MAKING FREE TEXT BOX
89 yo F presenting for failure to thrive tba for peg. pt tachy to 100 with b/l le swelling L>R will obtain DVT study. tba

## 2021-03-14 NOTE — ED ADULT NURSE NOTE - CHIEF COMPLAINT QUOTE
sent from [facility] for FTT, electrolyte imbalance, not eating x past few days peg tube placement in or tomorrow pt alert, disoriented at baseline mentally

## 2021-03-14 NOTE — ED ADULT NURSE NOTE - FINAL NURSING ELECTRONIC SIGNATURE
Otolaryngology - Head and Neck Surgery  Progress Note    Subjective: NAEON, no further epistaxis, pain controlled, balloon deflate yesterday     Objective:  Temp:  [97.4 °F (36.3 °C)-98.9 °F (37.2 °C)]   Pulse:  [59-84]   Resp:  [17-18]   BP: (110-139)/(65-77)   SpO2:  [94 %-97 %]    ]    NAD, A&O  AP rhinorocket in place in left nare, no active bleeding   OP clear     CBC    Recent Labs  Lab 06/07/17  0341   WBC 6.06   HGB 9.9*   HCT 29.5*   MCV 96   *     BMP    Recent Labs  Lab 06/07/17  0341   *      K 4.1      CO2 27   BUN 16   CREATININE 0.9   CALCIUM 9.4   MG 1.6       Assessment: 77 yo M with epistaxis     Plan:   Packing removed at bedside   D/c abx   Recommend nasal saline q2 hrs while awake   Recommend Afrin nasal spray 2 sprays each nostril TID x 3 days   Recommend continuing CPAP, starting tomorrow, use saline prior to use   Recommend observation, then okay to discharge from ENT standpoint later this evening vs tomorrow if no further bleeding   Please call ENT team with any further questions        14-Mar-2021 22:09

## 2021-03-14 NOTE — H&P ADULT - NSICDXPASTMEDICALHX_GEN_ALL_CORE_FT
PAST MEDICAL HISTORY:  Asthma     Diverticulitis     DM (diabetes mellitus)     Dysphagia     HTN (hypertension)     Lumbar spondylolysis with degenerative changes    Pneumonia     Stroke right frontal stroke

## 2021-03-15 LAB
A1C WITH ESTIMATED AVERAGE GLUCOSE RESULT: 7.2 % — HIGH (ref 4–5.6)
ALBUMIN SERPL ELPH-MCNC: 1.9 G/DL — LOW (ref 3.3–5)
ALP SERPL-CCNC: 119 U/L — SIGNIFICANT CHANGE UP (ref 40–120)
ALT FLD-CCNC: 20 U/L — SIGNIFICANT CHANGE UP (ref 12–78)
ANION GAP SERPL CALC-SCNC: 8 MMOL/L — SIGNIFICANT CHANGE UP (ref 5–17)
APTT BLD: >200 SEC — CRITICAL HIGH (ref 27.5–35.5)
AST SERPL-CCNC: 21 U/L — SIGNIFICANT CHANGE UP (ref 15–37)
BILIRUB SERPL-MCNC: 0.5 MG/DL — SIGNIFICANT CHANGE UP (ref 0.2–1.2)
BUN SERPL-MCNC: 10 MG/DL — SIGNIFICANT CHANGE UP (ref 7–23)
CALCIUM SERPL-MCNC: 8.3 MG/DL — LOW (ref 8.5–10.1)
CHLORIDE SERPL-SCNC: 107 MMOL/L — SIGNIFICANT CHANGE UP (ref 96–108)
CO2 SERPL-SCNC: 27 MMOL/L — SIGNIFICANT CHANGE UP (ref 22–31)
CREAT SERPL-MCNC: 0.76 MG/DL — SIGNIFICANT CHANGE UP (ref 0.5–1.3)
D DIMER BLD IA.RAPID-MCNC: 910 NG/ML DDU — HIGH
ESTIMATED AVERAGE GLUCOSE: 160 MG/DL — HIGH (ref 68–114)
FERRITIN SERPL-MCNC: 637 NG/ML — HIGH (ref 15–150)
GLUCOSE BLDC GLUCOMTR-MCNC: 139 MG/DL — HIGH (ref 70–99)
GLUCOSE BLDC GLUCOMTR-MCNC: 148 MG/DL — HIGH (ref 70–99)
GLUCOSE BLDC GLUCOMTR-MCNC: 158 MG/DL — HIGH (ref 70–99)
GLUCOSE BLDC GLUCOMTR-MCNC: 187 MG/DL — HIGH (ref 70–99)
GLUCOSE BLDC GLUCOMTR-MCNC: 193 MG/DL — HIGH (ref 70–99)
GLUCOSE SERPL-MCNC: 171 MG/DL — HIGH (ref 70–99)
HCT VFR BLD CALC: 36.7 % — SIGNIFICANT CHANGE UP (ref 34.5–45)
HCT VFR BLD CALC: 37.9 % — SIGNIFICANT CHANGE UP (ref 34.5–45)
HGB BLD-MCNC: 11.7 G/DL — SIGNIFICANT CHANGE UP (ref 11.5–15.5)
HGB BLD-MCNC: 12.1 G/DL — SIGNIFICANT CHANGE UP (ref 11.5–15.5)
LDH SERPL L TO P-CCNC: 574 U/L — HIGH (ref 50–242)
MCHC RBC-ENTMCNC: 26 PG — LOW (ref 27–34)
MCHC RBC-ENTMCNC: 26.2 PG — LOW (ref 27–34)
MCHC RBC-ENTMCNC: 31.9 GM/DL — LOW (ref 32–36)
MCHC RBC-ENTMCNC: 31.9 GM/DL — LOW (ref 32–36)
MCV RBC AUTO: 81.5 FL — SIGNIFICANT CHANGE UP (ref 80–100)
MCV RBC AUTO: 82.1 FL — SIGNIFICANT CHANGE UP (ref 80–100)
NRBC # BLD: 0 /100 WBCS — SIGNIFICANT CHANGE UP (ref 0–0)
NRBC # BLD: 0 /100 WBCS — SIGNIFICANT CHANGE UP (ref 0–0)
PLATELET # BLD AUTO: 256 K/UL — SIGNIFICANT CHANGE UP (ref 150–400)
PLATELET # BLD AUTO: 286 K/UL — SIGNIFICANT CHANGE UP (ref 150–400)
POTASSIUM SERPL-MCNC: 3.6 MMOL/L — SIGNIFICANT CHANGE UP (ref 3.5–5.3)
POTASSIUM SERPL-SCNC: 3.6 MMOL/L — SIGNIFICANT CHANGE UP (ref 3.5–5.3)
PROT SERPL-MCNC: 5.6 GM/DL — LOW (ref 6–8.3)
RBC # BLD: 4.47 M/UL — SIGNIFICANT CHANGE UP (ref 3.8–5.2)
RBC # BLD: 4.65 M/UL — SIGNIFICANT CHANGE UP (ref 3.8–5.2)
RBC # FLD: 16.1 % — HIGH (ref 10.3–14.5)
RBC # FLD: 16.2 % — HIGH (ref 10.3–14.5)
SARS-COV-2 IGG SERPL QL IA: POSITIVE
SARS-COV-2 IGM SERPL IA-ACNC: 214 INDEX — HIGH
SODIUM SERPL-SCNC: 142 MMOL/L — SIGNIFICANT CHANGE UP (ref 135–145)
WBC # BLD: 10.46 K/UL — SIGNIFICANT CHANGE UP (ref 3.8–10.5)
WBC # BLD: 8.6 K/UL — SIGNIFICANT CHANGE UP (ref 3.8–10.5)
WBC # FLD AUTO: 10.46 K/UL — SIGNIFICANT CHANGE UP (ref 3.8–10.5)
WBC # FLD AUTO: 8.6 K/UL — SIGNIFICANT CHANGE UP (ref 3.8–10.5)

## 2021-03-15 PROCEDURE — 99223 1ST HOSP IP/OBS HIGH 75: CPT | Mod: CS

## 2021-03-15 PROCEDURE — 99291 CRITICAL CARE FIRST HOUR: CPT | Mod: CS

## 2021-03-15 RX ORDER — MIRTAZAPINE 45 MG/1
7.5 TABLET, ORALLY DISINTEGRATING ORAL AT BEDTIME
Refills: 0 | Status: DISCONTINUED | OUTPATIENT
Start: 2021-03-15 | End: 2021-03-15

## 2021-03-15 RX ORDER — DILTIAZEM HCL 120 MG
2.5 CAPSULE, EXT RELEASE 24 HR ORAL
Qty: 125 | Refills: 0 | Status: DISCONTINUED | OUTPATIENT
Start: 2021-03-15 | End: 2021-03-15

## 2021-03-15 RX ORDER — HEPARIN SODIUM 5000 [USP'U]/ML
1000 INJECTION INTRAVENOUS; SUBCUTANEOUS
Qty: 25000 | Refills: 0 | Status: DISCONTINUED | OUTPATIENT
Start: 2021-03-15 | End: 2021-03-15

## 2021-03-15 RX ORDER — ONDANSETRON 8 MG/1
4 TABLET, FILM COATED ORAL EVERY 6 HOURS
Refills: 0 | Status: DISCONTINUED | OUTPATIENT
Start: 2021-03-15 | End: 2021-03-22

## 2021-03-15 RX ORDER — ONDANSETRON 8 MG/1
4 TABLET, FILM COATED ORAL EVERY 6 HOURS
Refills: 0 | Status: DISCONTINUED | OUTPATIENT
Start: 2021-03-15 | End: 2021-03-15

## 2021-03-15 RX ORDER — ENOXAPARIN SODIUM 100 MG/ML
80 INJECTION SUBCUTANEOUS EVERY 12 HOURS
Refills: 0 | Status: DISCONTINUED | OUTPATIENT
Start: 2021-03-15 | End: 2021-03-24

## 2021-03-15 RX ORDER — CHLORHEXIDINE GLUCONATE 213 G/1000ML
1 SOLUTION TOPICAL
Refills: 0 | Status: DISCONTINUED | OUTPATIENT
Start: 2021-03-15 | End: 2021-04-28

## 2021-03-15 RX ADMIN — ALBUTEROL 4 PUFF(S): 90 AEROSOL, METERED ORAL at 17:45

## 2021-03-15 RX ADMIN — HEPARIN SODIUM 0 UNIT(S)/HR: 5000 INJECTION INTRAVENOUS; SUBCUTANEOUS at 05:16

## 2021-03-15 RX ADMIN — PANTOPRAZOLE SODIUM 40 MILLIGRAM(S): 20 TABLET, DELAYED RELEASE ORAL at 08:13

## 2021-03-15 RX ADMIN — GABAPENTIN 300 MILLIGRAM(S): 400 CAPSULE ORAL at 17:46

## 2021-03-15 RX ADMIN — ENOXAPARIN SODIUM 80 MILLIGRAM(S): 100 INJECTION SUBCUTANEOUS at 17:46

## 2021-03-15 RX ADMIN — ALBUTEROL 4 PUFF(S): 90 AEROSOL, METERED ORAL at 00:23

## 2021-03-15 RX ADMIN — POLYETHYLENE GLYCOL 3350 17 GRAM(S): 17 POWDER, FOR SOLUTION ORAL at 11:57

## 2021-03-15 RX ADMIN — BUDESONIDE AND FORMOTEROL FUMARATE DIHYDRATE 2 PUFF(S): 160; 4.5 AEROSOL RESPIRATORY (INHALATION) at 17:45

## 2021-03-15 RX ADMIN — Medication 100 MILLIEQUIVALENT(S): at 00:22

## 2021-03-15 RX ADMIN — Medication 2.5 MILLIGRAM(S): at 17:47

## 2021-03-15 RX ADMIN — HEPARIN SODIUM 1000 UNIT(S)/HR: 5000 INJECTION INTRAVENOUS; SUBCUTANEOUS at 05:48

## 2021-03-15 RX ADMIN — Medication 2.5 MILLIGRAM(S): at 11:57

## 2021-03-15 RX ADMIN — Medication 2: at 11:57

## 2021-03-15 RX ADMIN — Medication 2: at 08:14

## 2021-03-15 RX ADMIN — Medication 325 MILLIGRAM(S): at 11:57

## 2021-03-15 RX ADMIN — Medication 2.5 MILLIGRAM(S): at 00:26

## 2021-03-15 RX ADMIN — ALBUTEROL 4 PUFF(S): 90 AEROSOL, METERED ORAL at 05:50

## 2021-03-15 RX ADMIN — Medication 2.5 MILLIGRAM(S): at 05:13

## 2021-03-15 RX ADMIN — Medication 100 MILLIEQUIVALENT(S): at 00:25

## 2021-03-15 RX ADMIN — CEFTRIAXONE 100 MILLIGRAM(S): 500 INJECTION, POWDER, FOR SOLUTION INTRAMUSCULAR; INTRAVENOUS at 00:24

## 2021-03-15 RX ADMIN — BUDESONIDE AND FORMOTEROL FUMARATE DIHYDRATE 2 PUFF(S): 160; 4.5 AEROSOL RESPIRATORY (INHALATION) at 05:50

## 2021-03-15 NOTE — CONSULT NOTE ADULT - SUBJECTIVE AND OBJECTIVE BOX
CARDIOLOGY CONSULTATION NOTE                                                                               PAULA MARIO is a 89y Female with h/o HTN, DM, hypokalemia, non-verbal at baseline, asthma, sent to ED from [facility] for failure to thrive. History obtained from chart review. Per charts, patient continues to have electrolyte disturbances with potassium repletion. Has been refusing food in the past few days,putting hands over her mouth when they attempt to feed her. Overall weight loss of 10 kg since admission. PT  WITH  HX   COVID  19  ALREADY  RX  IN  HOSPITAL  CONTINUES  +  IN  [facility] SINCE  3/8/21  EVALUATED  IN  ER  FOUND  TO  LAVE  EXTENSIVE DVT  BILATERAL PE  AFIB STARTED  ON  IV  HEPARIN  +  SOB  LEG  PAIN  POOR  APETITE     (14 Mar 2021 20:05)      REVIEW OF SYSTEMS:  -----------------------------    CONSTITUTIONAL: No fever, weight loss, or fatigue  EYES: No eye pain, visual disturbances, or discharge  ENMT:  No difficulty hearing, tinnitus, vertigo; No sinus or throat pain  NECK: No pain or stiffness  BREASTS: No pain, masses, or nipple discharge  RESPIRATORY: No cough, wheezing, chills or hemoptysis; No shortness of breath  CARDIOVASCULAR: See HPI  GASTROINTESTINAL: No abdominal or epigastric pain. No nausea, vomiting, or hematemesis; No diarrhea or constipation. No melena or hematochezia.  GENITOURINARY: No dysuria, frequency, hematuria, or incontinence  NEUROLOGICAL: No headaches, memory loss, loss of strength, numbness, or tremors  SKIN: No itching, burning, rashes, or lesions   LYMPH NODES: No enlarged glands  ENDOCRINE: No heat or cold intolerance; No hair loss  MUSCULOSKELETAL: No joint pain or swelling; No muscle, back, or extremity pain  PSYCHIATRIC: No depression, anxiety, mood swings, or difficulty sleeping  HEME/LYMPH: No easy bruising, or bleeding gums  ALLERGY AND IMMUNOLOGIC: No hives or eczema    Home Medications:  * Patient is from [facility]: [phone] (24 Feb 2021 18:36)  Admelog 100 units/mL injectable solution: inject as per sliding scale before meals and at bedtime (24 Feb 2021 18:36)  aspirin 81 mg oral tablet: 1 tab(s) orally once a day (24 Feb 2021 18:36)  Basaglar KwikPen 100 units/mL subcutaneous solution: 8 unit(s) subcutaneous once a day (at bedtime) (24 Feb 2021 18:36)  CeleBREX 100 mg oral capsule: 1 cap(s) orally once a day (24 Feb 2021 18:36)  gabapentin 300 mg oral capsule: 1 cap(s) orally 2 times a day (24 Feb 2021 18:36)  MiraLax oral powder for reconstitution: 17 gram(s) orally once a day (24 Feb 2021 18:36)  ProAir HFA 90 mcg/inh inhalation aerosol: 2 puff(s) inhaled every 6 hours (24 Feb 2021 18:36)  Protonix 40 mg oral delayed release tablet: 1 tab(s) orally once a day (24 Feb 2021 18:36)  senna oral tablet: 2 tab(s) orally once a day (24 Feb 2021 18:36)  Slow Fe 160 mg (50 mg elemental iron) oral tablet, extended release: 1 tab(s) orally once a day (24 Feb 2021 18:36)  Symbicort 80 mcg-4.5 mcg/inh inhalation aerosol: 2 puff(s) inhaled 2 times a day (24 Feb 2021 18:36)  Tylenol 325 mg oral tablet: 2 tab(s) orally every 6 hours (24 Feb 2021 18:36)      MEDICATIONS  (STANDING):  ALBUTerol  90 MICROgram(s) HFA Inhaler - Peds 4 Puff(s) Inhalation every 6 hours  budesonide  80 MICROgram(s)/formoterol 4.5 MICROgram(s) Inhaler 2 Puff(s) Inhalation two times a day  cefTRIAXone   IVPB 1000 milliGRAM(s) IV Intermittent every 24 hours  chlorhexidine 2% Cloths 1 Application(s) Topical <User Schedule>  dextrose 40% Gel 15 Gram(s) Oral once  dextrose 5%. 1000 milliLiter(s) (50 mL/Hr) IV Continuous <Continuous>  dextrose 5%. 1000 milliLiter(s) (100 mL/Hr) IV Continuous <Continuous>  dextrose 50% Injectable 25 Gram(s) IV Push once  dextrose 50% Injectable 12.5 Gram(s) IV Push once  dextrose 50% Injectable 25 Gram(s) IV Push once  ferrous    sulfate 325 milliGRAM(s) Oral daily  gabapentin 300 milliGRAM(s) Oral two times a day  glucagon  Injectable 1 milliGRAM(s) IntraMuscular once  heparin  Infusion. 1000 Unit(s)/Hr (10 mL/Hr) IV Continuous <Continuous>  insulin lispro (ADMELOG) corrective regimen sliding scale   SubCutaneous three times a day before meals  metoprolol tartrate Injectable 2.5 milliGRAM(s) IV Push every 6 hours  pantoprazole    Tablet 40 milliGRAM(s) Oral before breakfast  polyethylene glycol 3350 17 Gram(s) Oral daily  senna 2 Tablet(s) Oral at bedtime      ALLERGIES: No Known Allergies      FAMILY HISTORY:      PHYSICAL EXAMINATION:  -----------------------------  T(C): 36.1 (03-15-21 @ 11:18), Max: 37.3 (03-14-21 @ 15:28)  HR: 95 (03-15-21 @ 11:18) (90 - 124)  BP: 130/80 (03-15-21 @ 11:18) (128/88 - 141/65)  RR: 18 (03-15-21 @ 11:18) (17 - 22)  SpO2: 100% (03-15-21 @ 11:18) (97% - 100%)  Wt(kg): --    Height (cm): 165.1 (03-14 @ 22:52)  Weight (kg): 76.5 (03-14 @ 22:52)  BMI (kg/m2): 28.1 (03-14 @ 22:52)  BSA (m2): 1.84 (03-14 @ 22:52)    Constitutional: well developed, normal appearance, well groomed, well nourished, no deformities and no acute distress.   Eyes: the conjunctiva exhibited no abnormalities and the eyelids demonstrated no xanthelasmas.   HEENT: normal oral mucosa, no oral pallor and no oral cyanosis.   Neck: normal jugular venous A waves present, normal jugular venous V waves present and no jugular venous velazquez A waves.   Pulmonary: no respiratory distress, normal respiratory rhythm and effort, no accessory muscle use and lungs were clear to auscultation bilaterally.   Cardiovascular: heart rate and rhythm were normal, normal S1 and S2 and no murmur, gallop, rub, heave or thrill are present.   Abdomen: soft, non-tender, no hepato-splenomegaly and no abdominal mass palpated.   Musculoskeletal: the gait could not be assessed..   Extremities: no clubbing of the fingernails, no localized cyanosis, no petechial hemorrhages and no ischemic changes.   Skin: normal skin color and pigmentation, no rash, no venous stasis, no skin lesions, no skin ulcer and no xanthoma was observed.   Psychiatric: oriented to person, place, and time, the affect was normal, the mood was normal and not feeling anxious.     ECG:  -------        LABS:   --------  03-14    141  |  107  |  4<L>  ----------------------------<  164<H>  3.0<L>   |  28  |  0.63    Ca    7.7<L>      14 Mar 2021 15:33  Phos  1.8     03-14  Mg     1.2     03-14    TPro  5.3<L>  /  Alb  1.8<L>  /  TBili  0.7  /  DBili  x   /  AST  26  /  ALT  19  /  AlkPhos  116  03-14                         11.7   8.60  )-----------( 256      ( 15 Mar 2021 01:28 )             36.7     PT/INR - ( 14 Mar 2021 14:32 )   PT: 13.9 sec;   INR: 1.21 ratio         PTT - ( 15 Mar 2021 03:08 )  PTT:>200.0 sec            RADIOLOGY REPORTS:  -----------------------------  < from: CT Angio Chest w/ IV Cont (03.14.21 @ 18:32) >    EXAM:  CT ANGIO CHEST (W)AW IC                            PROCEDURE DATE:  03/14/2021          INTERPRETATION:  CLINICAL INFORMATION: Failure to thrive. Electrolyte imbalance. History of COVID. Evaluate for pulmonary embolism.    COMPARISON: Chest radiograph 3/14/2021.    CONTRAST/COMPLICATIONS:  IV Contrast: Omnipaque 350 / 85 cc administered / .  Oral Contrast: NONE  Complications: None reported at time of study completion    PROCEDURE:  CT Angiography of the Chest.  Sagittal and coronal reformats were performed as well as 3D (MIP) reconstructions.    FINDINGS:    LUNGS AND AIRWAYS PLEURA:  Small bilateral pleural effusions and underlying compressive atelectasis, larger on the right.  Patchy bilateral groundglass opacities and mosaic attenuation.  Left apical pleural parenchymal consolidation with focal cystic bronchiectasis.  Paraseptal emphysematous changes right middle lobe.    The central airways remain patent.    MEDIASTINUM AND HERNAN: No lymphadenopathy.    VESSELS: There are pulmonary emboli throughout the right lower lobe and segmental right lower lobe pulmonary arteries.  There are pulmonary emboli segmental left lingula lobe and left lower lobe and segmental left lower lobe pulmonary arterial vasculature.  There is prominence of the main pulmonary arterial trunk measuring 3.3 cm, finding which may be associated with pulmonary arterial hypertension.    Atherosclerotic calcification of the thoracic aorta with coronary artery calcifications.    HEART: Mildly enlarged.  Trace pericardial effusion.    CHEST WALL AND LOWER NECK: Within normal limits.    VISUALIZED UPPER ABDOMEN:  Streak artifact degrades image quality limiting evaluation.  Nodular contour of liver.  Nodular thickening bilateral adrenal glands.    BONES:Degenerative changes spine.    IMPRESSION:    Acute bilateral pulmonary emboli as discussed.  Prominence of the main pulmonary arterial trunk which may reflect pulmonary arterial hypertension.  This critical value was discussed with Dr. Mtz   by telephone at the time of interpretation on 3/14/2021.    Other findings as discussed above.              PETRONA MCCONNELL MD; Attending Radiologist  This document has been electronically signed. Mar 14 2021  7:22PM    < end of copied text >    < from: US Duplex Venous Lower Ext Complete, Bilateral (03.14.21 @ 18:04) >    EXAM:  US DPLX LWR EXT VEINS COMPL BI                            PROCEDURE DATE:  03/14/2021          INTERPRETATION:  CLINICAL INFORMATION: Bilateral leg bruising.    COMPARISON: None available.    TECHNIQUE: Duplex sonography of the BILATERAL LOWER extremity veins with color and spectral Doppler, with and without compression.    FINDINGS:    RIGHT:  Normal compressibility of the RIGHT common femoral, femoral and popliteal veins.  Doppler examination shows normal spontaneous and phasic flow.  Deep venous thrombosis in the right peroneal vein.    LEFT:  There is deep venous thrombosis in the distal left external iliac vein, common femoral vein, femoral vein, popliteal vein, and peroneal and posterior tibial veins.    IMPRESSION:    Extensive deep venous thrombosis in the left lower extremity including the distal left external iliac vein extending from the calf.    Deep venous thrombosis in the right peroneal vein.    The findings were discussed with Dr. Mtz on 3/14/2021 6:12 PM            JENNIFER RODRIGUEZ MD; Attending Radiologist  This document has been electronically signed. Mar 14 2021  6:13PM    < end of copied text >        ECHOCARDIOGRAM:  ---------------------------        CARDIOLOGY CONSULTATION NOTE                                                                               PAULA MARIO is a 89y Female with h/o HTN, DM, hypokalemia, non-verbal at baseline, asthma, sent to ED from [facility] for failure to thrive. History obtained from chart review. Per charts, patient continues to have electrolyte disturbances with potassium repletion. Has been refusing food in the past few days,putting hands over her mouth when they attempt to feed her. Overall weight loss of 10 kg since admission. Referred to ED for PEG placement.  Prior COVID history as of 3/8. Because of LE swelling, found to have DVT and avute bilateral PE's as well. Noted to be in Afib, started on IV heparin.    REVIEW OF SYSTEMS: Patient non-verbal  ------------------------------------------------      Home Medications:  * Patient is from [facility]: [phone] (24 Feb 2021 18:36)  Admelog 100 units/mL injectable solution: inject as per sliding scale before meals and at bedtime (24 Feb 2021 18:36)  aspirin 81 mg oral tablet: 1 tab(s) orally once a day (24 Feb 2021 18:36)  Basaglar KwikPen 100 units/mL subcutaneous solution: 8 unit(s) subcutaneous once a day (at bedtime) (24 Feb 2021 18:36)  CeleBREX 100 mg oral capsule: 1 cap(s) orally once a day (24 Feb 2021 18:36)  gabapentin 300 mg oral capsule: 1 cap(s) orally 2 times a day (24 Feb 2021 18:36)  MiraLax oral powder for reconstitution: 17 gram(s) orally once a day (24 Feb 2021 18:36)  ProAir HFA 90 mcg/inh inhalation aerosol: 2 puff(s) inhaled every 6 hours (24 Feb 2021 18:36)  Protonix 40 mg oral delayed release tablet: 1 tab(s) orally once a day (24 Feb 2021 18:36)  senna oral tablet: 2 tab(s) orally once a day (24 Feb 2021 18:36)  Slow Fe 160 mg (50 mg elemental iron) oral tablet, extended release: 1 tab(s) orally once a day (24 Feb 2021 18:36)  Symbicort 80 mcg-4.5 mcg/inh inhalation aerosol: 2 puff(s) inhaled 2 times a day (24 Feb 2021 18:36)  Tylenol 325 mg oral tablet: 2 tab(s) orally every 6 hours (24 Feb 2021 18:36)      MEDICATIONS  (STANDING):  ALBUTerol  90 MICROgram(s) HFA Inhaler - Peds 4 Puff(s) Inhalation every 6 hours  budesonide  80 MICROgram(s)/formoterol 4.5 MICROgram(s) Inhaler 2 Puff(s) Inhalation two times a day  cefTRIAXone   IVPB 1000 milliGRAM(s) IV Intermittent every 24 hours  chlorhexidine 2% Cloths 1 Application(s) Topical <User Schedule>  ferrous    sulfate 325 milliGRAM(s) Oral daily  gabapentin 300 milliGRAM(s) Oral two times a day  glucagon  Injectable 1 milliGRAM(s) IntraMuscular once  heparin  Infusion. 1000 Unit(s)/Hr (10 mL/Hr) IV Continuous <Continuous>  insulin lispro (ADMELOG) corrective regimen sliding scale   SubCutaneous three times a day before meals  metoprolol tartrate Injectable 2.5 milliGRAM(s) IV Push every 6 hours  pantoprazole    Tablet 40 milliGRAM(s) Oral before breakfast  polyethylene glycol 3350 17 Gram(s) Oral daily  senna 2 Tablet(s) Oral at bedtime      ALLERGIES: No Known Allergies      FAMILY HISTORY:      PHYSICAL EXAMINATION:  -----------------------------  T(C): 36.1 (03-15-21 @ 11:18), Max: 37.3 (03-14-21 @ 15:28)  HR: 95 (03-15-21 @ 11:18) (90 - 124)  BP: 130/80 (03-15-21 @ 11:18) (128/88 - 141/65)  RR: 18 (03-15-21 @ 11:18) (17 - 22)  SpO2: 100% (03-15-21 @ 11:18) (97% - 100%)  Wt(kg): --    Height (cm): 165.1 (03-14 @ 22:52)  Weight (kg): 76.5 (03-14 @ 22:52)  BMI (kg/m2): 28.1 (03-14 @ 22:52)  BSA (m2): 1.84 (03-14 @ 22:52)    Constitutional: well developed, normal appearance, well groomed, well nourished, no deformities and no acute distress.   HEENT: normal oral mucosa, no oral pallor and no oral cyanosis.   Neck: normal jugular venous A waves present, normal jugular venous V waves present and no jugular venous velazquez A waves.   Pulmonary: no respiratory distress, normal respiratory rhythm and effort, no accessory muscle use and lungs were clear to auscultation bilaterally.   Cardiovascular: heart rate and rhythm were irregular at times; decreased S1 and normal S2 and no murmur, gallop, rub, heave or thrill are present.   Abdomen: soft, non-tender, no hepato-splenomegaly and no abdominal mass palpated.   Musculoskeletal: the gait could not be assessed..   Extremities: no clubbing of the fingernails, no localized cyanosis, no petechial hemorrhages and no ischemic changes.   Skin: normal skin color and pigmentation, no rash, no venous stasis, no skin lesions, no skin ulcer and no xanthoma was observed.   Psychiatric: oriented to person, place, and time, the affect was normal, the mood was normal and not feeling anxious.     ECG:  -------  < from: 12 Lead ECG (03.14.21 @ 13:00) >    Ventricular Rate 95 BPM    Atrial Rate 95 BPM    P-R Interval 158 ms    QRS Duration 72 ms    Q-T Interval 400 ms    QTC Calculation(Bazett) 502 ms    P Axis 94 degrees    R Axis -58 degrees    T Axis 80 degrees    Diagnosis Line Sinus rhythm with premature atrial complexes  Left axis deviation  Anteroseptal infarct (cited on or before 08-MAR-2021)  Abnormal ECG  When compared with ECG of 14-MAR-2021 13:00,  T wave inversion no longer evident in Inferior leads  QT has shortened  Confirmed by Nicolás Nuñez MD ([id number]) on 3/15/2021 5:39:22 PM    < end of copied text >      LABS:   --------  03-14    141  |  107  |  4<L>  ----------------------------<  164<H>  3.0<L>   |  28  |  0.63    Ca    7.7<L>      14 Mar 2021 15:33  Phos  1.8     03-14  Mg     1.2     03-14    TPro  5.3<L>  /  Alb  1.8<L>  /  TBili  0.7  /  DBili  x   /  AST  26  /  ALT  19  /  AlkPhos  116  03-14                         11.7   8.60  )-----------( 256      ( 15 Mar 2021 01:28 )             36.7     PT/INR - ( 14 Mar 2021 14:32 )   PT: 13.9 sec;   INR: 1.21 ratio         PTT - ( 15 Mar 2021 03:08 )  PTT:>200.0 sec            RADIOLOGY REPORTS:  -----------------------------  < from: CT Angio Chest w/ IV Cont (03.14.21 @ 18:32) >    EXAM:  CT ANGIO CHEST (W)AW IC                            PROCEDURE DATE:  03/14/2021          INTERPRETATION:  CLINICAL INFORMATION: Failure to thrive. Electrolyte imbalance. History of COVID. Evaluate for pulmonary embolism.    COMPARISON: Chest radiograph 3/14/2021.    CONTRAST/COMPLICATIONS:  IV Contrast: Omnipaque 350 / 85 cc administered / .  Oral Contrast: NONE  Complications: None reported at time of study completion    PROCEDURE:  CT Angiography of the Chest.  Sagittal and coronal reformats were performed as well as 3D (MIP) reconstructions.    FINDINGS:    LUNGS AND AIRWAYS PLEURA:  Small bilateral pleural effusions and underlying compressive atelectasis, larger on the right.  Patchy bilateral groundglass opacities and mosaic attenuation.  Left apical pleural parenchymal consolidation with focal cystic bronchiectasis.  Paraseptal emphysematous changes right middle lobe.    The central airways remain patent.    MEDIASTINUM AND HERNAN: No lymphadenopathy.    VESSELS: There are pulmonary emboli throughout the right lower lobe and segmental right lower lobe pulmonary arteries.  There are pulmonary emboli segmental left lingula lobe and left lower lobe and segmental left lower lobe pulmonary arterial vasculature.  There is prominence of the main pulmonary arterial trunk measuring 3.3 cm, finding which may be associated with pulmonary arterial hypertension.    Atherosclerotic calcification of the thoracic aorta with coronary artery calcifications.    HEART: Mildly enlarged.  Trace pericardial effusion.    CHEST WALL AND LOWER NECK: Within normal limits.    VISUALIZED UPPER ABDOMEN:  Streak artifact degrades image quality limiting evaluation.  Nodular contour of liver.  Nodular thickening bilateral adrenal glands.    BONES:Degenerative changes spine.    IMPRESSION:    Acute bilateral pulmonary emboli as discussed.  Prominence of the main pulmonary arterial trunk which may reflect pulmonary arterial hypertension.  This critical value was discussed with Dr. Mtz   by telephone at the time of interpretation on 3/14/2021.    Other findings as discussed above.              PETRONA MCCONNELL MD; Attending Radiologist  This document has been electronically signed. Mar 14 2021  7:22PM    < end of copied text >    < from: US Duplex Venous Lower Ext Complete, Bilateral (03.14.21 @ 18:04) >    EXAM:  US DPLX LWR EXT VEINS COMPL BI                            PROCEDURE DATE:  03/14/2021          INTERPRETATION:  CLINICAL INFORMATION: Bilateral leg bruising.    COMPARISON: None available.    TECHNIQUE: Duplex sonography of the BILATERAL LOWER extremity veins with color and spectral Doppler, with and without compression.    FINDINGS:    RIGHT:  Normal compressibility of the RIGHT common femoral, femoral and popliteal veins.  Doppler examination shows normal spontaneous and phasic flow.  Deep venous thrombosis in the right peroneal vein.    LEFT:  There is deep venous thrombosis in the distal left external iliac vein, common femoral vein, femoral vein, popliteal vein, and peroneal and posterior tibial veins.    IMPRESSION:    Extensive deep venous thrombosis in the left lower extremity including the distal left external iliac vein extending from the calf.    Deep venous thrombosis in the right peroneal vein.    The findings were discussed with Dr. Mtz on 3/14/2021 6:12 PM            JENNIFER RODRIGUEZ MD; Attending Radiologist  This document has been electronically signed. Mar 14 2021  6:13PM    < end of copied text >        ECHOCARDIOGRAM:  ---------------------------  `pending

## 2021-03-15 NOTE — RAPID RESPONSE TEAM SUMMARY - NSSITUATIONBACKGROUNDRRT_GEN_ALL_CORE
HPI:   89yo F hx HTN, DM, hypokalemia, non-verbal at baseline, asthma, sent to ED from [facility] for failure to thrive. History obtained from chart review. Per charts, patient continues to have electrolyte disturbances with potassium repletion. Has been refusing food in the past few days,putting hands over her mouth when they attempt to feed her. Overall weight loss of 10 kg since admission. PT  WITH  HX   COVID  19  ALREADY  RX  IN  HOSPITAL  CONTINUES  +  IN  [facility] SINCE  3/8/21  EVALUATED  IN  ER  FOUND  TO  LAVE  EXTENSIVE DVT  BILATERAL PE  AFIB STARTED  ON  IV  HEPARIN  +  SOB  LEG  PAIN  POOR  APETITE     (14 Mar 2021 20:05)    RRT was called for pt by RN for HR>200 on the tele monitor.  Pt is alert and oriented, in NAD.  Does not speak English.  Placed on cardiac monitor which showed pt's HR at 101 with fluctuation to 140s.  Dr. Rao present throughout RRT. HPI:   89yo F hx HTN, DM, hypokalemia, non-verbal at baseline, asthma, sent to ED from [facility] for failure to thrive. History obtained from chart review. Per charts, patient continues to have electrolyte disturbances with potassium repletion. Has been refusing food in the past few days,putting hands over her mouth when they attempt to feed her. Overall weight loss of 10 kg since admission. PT  WITH  HX   COVID  19  ALREADY  RX  IN  HOSPITAL  CONTINUES  +  IN  [facility] SINCE  3/8/21  EVALUATED  IN  ER  FOUND  TO  LAVE  EXTENSIVE DVT  BILATERAL PE  AFIB STARTED  ON  IV  HEPARIN  +  SOB  LEG  PAIN  POOR  APETITE     (14 Mar 2021 20:05)    RRT was called for pt by RN for HR>200 on the tele monitor.  Pt is alert and oriented, in NAD.  Does not speak English.  Placed on cardiac monitor which showed pt's HR at 101 with fluctuation to 140s, in atrial fibrillation.  Dr. Rao present throughout RRT. HPI:   89yo F hx HTN, DM, hypokalemia, non-verbal at baseline, asthma, sent to ED from [facility] for failure to thrive. History obtained from chart review. Per charts, patient continues to have electrolyte disturbances with potassium repletion. Has been refusing food in the past few days,putting hands over her mouth when they attempt to feed her. Overall weight loss of 10 kg since admission. PT  WITH  HX   COVID  19  ALREADY  RX  IN  HOSPITAL  CONTINUES  +  IN  [facility] SINCE  3/8/21  EVALUATED  IN  ER  FOUND  TO  LAVE  EXTENSIVE DVT  BILATERAL PE  AFIB STARTED  ON  IV  HEPARIN  +  SOB  LEG  PAIN  POOR  APETITE     (14 Mar 2021 20:05)    RRT was called for pt by RN for HR>200 on the tele monitor.  Pt is alert and oriented, in NAD.  Does not speak English.  Placed on cardiac monitor which showed pt's HR at 101 with fluctuation to 140s, in atrial fibrillation.  Dr. Askew present throughout RRT.

## 2021-03-15 NOTE — PROGRESS NOTE ADULT - SUBJECTIVE AND OBJECTIVE BOX
INTERVAL HPI/OVERNIGHT EVENTS:        REVIEW OF SYSTEMS:  CONSTITUTIONAL:    alert  confused  poor  appetite      MEDICATION:  acetaminophen   Tablet .. 650 milliGRAM(s) Oral every 6 hours PRN  ALBUTerol  90 MICROgram(s) HFA Inhaler - Peds 4 Puff(s) Inhalation every 6 hours  budesonide  80 MICROgram(s)/formoterol 4.5 MICROgram(s) Inhaler 2 Puff(s) Inhalation two times a day  cefTRIAXone   IVPB 1000 milliGRAM(s) IV Intermittent every 24 hours  chlorhexidine 2% Cloths 1 Application(s) Topical <User Schedule>  dextrose 40% Gel 15 Gram(s) Oral once  dextrose 5%. 1000 milliLiter(s) IV Continuous <Continuous>  dextrose 5%. 1000 milliLiter(s) IV Continuous <Continuous>  dextrose 50% Injectable 25 Gram(s) IV Push once  dextrose 50% Injectable 12.5 Gram(s) IV Push once  dextrose 50% Injectable 25 Gram(s) IV Push once  ferrous    sulfate 325 milliGRAM(s) Oral daily  gabapentin 300 milliGRAM(s) Oral two times a day  glucagon  Injectable 1 milliGRAM(s) IntraMuscular once  heparin   Injectable 6000 Unit(s) IV Push every 6 hours PRN  heparin   Injectable 3000 Unit(s) IV Push every 6 hours PRN  heparin  Infusion. 1000 Unit(s)/Hr IV Continuous <Continuous>  insulin lispro (ADMELOG) corrective regimen sliding scale   SubCutaneous three times a day before meals  metoprolol tartrate Injectable 2.5 milliGRAM(s) IV Push every 6 hours  mirtazapine 7.5 milliGRAM(s) Oral at bedtime  pantoprazole    Tablet 40 milliGRAM(s) Oral before breakfast  polyethylene glycol 3350 17 Gram(s) Oral daily  senna 2 Tablet(s) Oral at bedtime    Vital Signs Last 24 Hrs  T(C): 36.4 (15 Mar 2021 05:55), Max: 37.3 (14 Mar 2021 15:28)  T(F): 97.5 (15 Mar 2021 05:55), Max: 99.2 (14 Mar 2021 15:28)  HR: 98 (15 Mar 2021 05:55) (90 - 124)  BP: 128/88 (15 Mar 2021 05:55) (128/88 - 141/65)  BP(mean): --  RR: 18 (15 Mar 2021 05:55) (17 - 22)  SpO2: 97% (15 Mar 2021 05:55) (97% - 100%)    PHYSICAL EXAM:  GENERAL: NAD, well-groomed, well-developed  HEENT : Conjuntivae  clear sclerae anicteric  NECK: Supple, No JVD, Normal thyroid  NERVOUS SYSTEM:  Alert  moves  all  extr  CHEST/LUNG: Clear    HEART: Regular rate and rhythm; No murmurs, rubs, or gallops  ABDOMEN: Soft, Nontender, Nondistended; Bowel sounds present  EXTREMITIES:  mild  diffuse  tenderness  LEs   SKIN: No rashes   LABS:                        11.7   8.60  )-----------( 256      ( 15 Mar 2021 01:28 )             36.7     03-14    141  |  107  |  4<L>  ----------------------------<  164<H>  3.0<L>   |  28  |  0.63    Ca    7.7<L>      14 Mar 2021 15:33  Phos  1.8     03-14  Mg     1.2     03-14    TPro  5.3<L>  /  Alb  1.8<L>  /  TBili  0.7  /  DBili  x   /  AST  26  /  ALT  19  /  AlkPhos  116  03-14    PT/INR - ( 14 Mar 2021 14:32 )   PT: 13.9 sec;   INR: 1.21 ratio         PTT - ( 15 Mar 2021 03:08 )  PTT:>200.0 sec  Urinalysis Basic - ( 14 Mar 2021 15:33 )    Color: Yellow / Appearance: very cloudy / S.015 / pH: x  Gluc: x / Ketone: Trace  / Bili: Negative / Urobili: Negative mg/dL   Blood: x / Protein: 100 mg/dL / Nitrite: Negative   Leuk Esterase: Moderate / RBC: 6-10 /HPF / WBC >50   Sq Epi: x / Non Sq Epi: Occasional / Bacteria: Few      CAPILLARY BLOOD GLUCOSE      POCT Blood Glucose.: 187 mg/dL (15 Mar 2021 08:02)  POCT Blood Glucose.: 180 mg/dL (14 Mar 2021 20:30)      RADIOLOGY & ADDITIONAL TESTS:    Imaging reports  Personally Reviewed:  [ ] YES  [ ] NO    Consultant(s) Notes Reviewed:  [ ] YES  [ ] NO    Care Discussed with Consultants/Other Providers [x ] YES  [ ] NO  Problem/Plan - 1:  ·  Problem: Pulmonary emboli.  Plan: iv  heparin.     Problem/Plan - 2:  ·  Problem: DVT (deep venous thrombosis).  Plan: iv  heparin.     Problem/Plan - 3:  ·  Problem: UTI (urinary tract infection).  Plan: rocephin.     Problem/Plan - 4:  ·  Problem: Asthma.  Plan: symbicort.     Problem/Plan - 5:  ·  Problem: DM (diabetes mellitus).  Plan: insulin coverage.     Problem/Plan - 6:  Problem: Failure to thrive in adult. Plan: encourage  oral  intake  rx  underlying  causes. ADD  REMERON    Problem/Plan - 7:  ·  Problem: Electrolyte abnormality.  Plan: supplement K.     Problem/Plan - 8:  COVID 19  PCR NEGATIVE    +  igG Ab      Breanna

## 2021-03-15 NOTE — CONSULT NOTE ADULT - SUBJECTIVE AND OBJECTIVE BOX
Patient is a 89y old  Female who presents with a chief complaint of PE  DVT  HX  COVID 19 (15 Mar 2021 11:50)    HPI:   89yo F hx HTN, DM, Hypokalemia, non-verbal at baseline, asthma, sent to ED from [facility] for failure to thrive. Per charts, patient continues to have electrolyte disturbances with potassium repletion. Has been refusing food in the past few days,putting hands over her mouth when they attempt to feed her. Overall weight loss of 10 kg since admission. PT  WITH  HX   COVID  19  ALREADY  RX  IN  HOSPITAL  CONTINUES  +  IN  [facility] SINCE  3/8/21  EVALUATED  IN  ER  FOUND  TO  LAVE  EXTENSIVE DVT  BILATERAL PE  AFIB STARTED  ON  IV  HEPARIN  +  SOB  LEG  PAIN  POOR  APETITE    PAST MEDICAL & SURGICAL HISTORY:  Lumbar spondylolysis  with degenerative changes    Stroke  right frontal stroke    Pneumonia    Diverticulitis    Dysphagia    Asthma    DM (diabetes mellitus)    HTN (hypertension)    Status post right knee replacement    Hip fracture  Left hip fracture    FAMILY HISTORY: not available    SOCIAL HISTORY: BMI (kg/m2): 28.1 (03-14 @ 22:52). not known    Allergies  No Known Allergies    MEDICATIONS  (STANDING):  ALBUTerol  90 MICROgram(s) HFA Inhaler - Peds 4 Puff(s) Inhalation every 6 hours  budesonide  80 MICROgram(s)/formoterol 4.5 MICROgram(s) Inhaler 2 Puff(s) Inhalation two times a day  cefTRIAXone   IVPB 1000 milliGRAM(s) IV Intermittent every 24 hours  chlorhexidine 2% Cloths 1 Application(s) Topical <User Schedule>  dextrose 40% Gel 15 Gram(s) Oral once  dextrose 5%. 1000 milliLiter(s) (50 mL/Hr) IV Continuous <Continuous>  dextrose 5%. 1000 milliLiter(s) (100 mL/Hr) IV Continuous <Continuous>  dextrose 50% Injectable 25 Gram(s) IV Push once  dextrose 50% Injectable 12.5 Gram(s) IV Push once  dextrose 50% Injectable 25 Gram(s) IV Push once  enoxaparin Injectable 80 milliGRAM(s) SubCutaneous every 12 hours  ferrous    sulfate 325 milliGRAM(s) Oral daily  gabapentin 300 milliGRAM(s) Oral two times a day  glucagon  Injectable 1 milliGRAM(s) IntraMuscular once  insulin lispro (ADMELOG) corrective regimen sliding scale   SubCutaneous three times a day before meals  metoprolol tartrate Injectable 2.5 milliGRAM(s) IV Push every 6 hours  pantoprazole    Tablet 40 milliGRAM(s) Oral before breakfast  polyethylene glycol 3350 17 Gram(s) Oral daily  senna 2 Tablet(s) Oral at bedtime    MEDICATIONS  (PRN):  acetaminophen   Tablet .. 650 milliGRAM(s) Oral every 6 hours PRN Mild Pain (1 - 3)  ondansetron    Tablet 4 milliGRAM(s) Oral every 6 hours PRN Nausea and/or Vomiting    REVIEW OF SYSTEMS:  not able to provide    Vital Signs Last 24 Hrs  T(C): 36.9 (15 Mar 2021 15:23), Max: 36.9 (15 Mar 2021 15:23)  T(F): 98.4 (15 Mar 2021 15:23), Max: 98.4 (15 Mar 2021 15:23)  HR: 91 (15 Mar 2021 15:23) (90 - 113)  BP: 122/76 (15 Mar 2021 15:23) (122/76 - 136/75)  BP(mean): --  RR: 18 (15 Mar 2021 15:23) (17 - 18)  SpO2: 97% (15 Mar 2021 15:23) (97% - 100%)    PHYSICAL EXAM:  GEN:        Non communicativecomfortable.  HEENT:    Normal.    RESP:        no distress  CVS:             Regular rate and rhythm.   ABD:         Soft, non-tender, non-distended;   SKIN:           Warm and dry.  EXTR:            No clubbing, cyanosis or edema  CNS:            non verbal  PSYCH:        non verbal    LABS:                        12.1   10.46 )-----------( 286      ( 15 Mar 2021 11:58 )             37.9     03-15    142  |  107  |  10  ----------------------------<  171<H>  3.6   |  27  |  0.76    Ca    8.3<L>      15 Mar 2021 11:58  Phos  1.8     03-14  Mg     1.2     03-14    TPro  5.6<L>  /  Alb  1.9<L>  /  TBili  0.5  /  DBili  x   /  AST  21  /  ALT  20  /  AlkPhos  119  03-15    PT/INR - ( 14 Mar 2021 14:32 )   PT: 13.9 sec;   INR: 1.21 ratio       PTT - ( 15 Mar 2021 11:58 )  PTT:>200.0 sec    Urinalysis Basic - ( 14 Mar 2021 15:33 )    Color: Yellow / Appearance: very cloudy / S.015 / pH: x  Gluc: x / Ketone: Trace  / Bili: Negative / Urobili: Negative mg/dL   Blood: x / Protein: 100 mg/dL / Nitrite: Negative   Leuk Esterase: Moderate / RBC: 6-10 /HPF / WBC >50   Sq Epi: x / Non Sq Epi: Occasional / Bacteria: Few    Culture - Urine (collected 03-15-21 @ 00:49)  Source: .Urine Clean Catch (Midstream)  Preliminary Report (03-15-21 @ 21:01):    >100,000 CFU/ml Enterococcus species    EKG: sinus    RADIOLOGY & ADDITIONAL STUDIES:  < from: CT Angio Chest w/ IV Cont (21 @ 18:32) >  EXAM:  CT ANGIO CHEST (W)AW IC                        PROCEDURE DATE:  2021      INTERPRETATION:  CLINICAL INFORMATION: Failure to thrive. Electrolyte imbalance. History of COVID. Evaluate for pulmonary embolism.    COMPARISON: Chest radiograph 3/14/2021.    CONTRAST/COMPLICATIONS:  IV Contrast: Omnipaque 350 / 85 cc administered / .  Oral Contrast: NONE  Complications: None reported at time of study completion    PROCEDURE:  CT Angiography of the Chest.  Sagittal and coronal reformats were performed as well as 3D (MIP) reconstructions.    FINDINGS:    LUNGS AND AIRWAYS PLEURA:  Small bilateral pleural effusions and underlying compressive atelectasis, larger on the right.  Patchy bilateral groundglass opacities and mosaic attenuation.  Left apical pleural parenchymal consolidation with focal cystic bronchiectasis.  Paraseptal emphysematous changes right middle lobe.    The central airways remain patent.    MEDIASTINUM AND HERNAN: No lymphadenopathy.    VESSELS: There are pulmonary emboli throughout the right lower lobe and segmental right lower lobe pulmonary arteries.  There are pulmonary emboli segmental left lingula lobe and left lower lobe and segmental left lower lobe pulmonary arterial vasculature.  There is prominence of the main pulmonary arterial trunk measuring 3.3 cm, finding which may be associated with pulmonary arterial hypertension.    Atherosclerotic calcification of the thoracic aorta with coronary artery calcifications.    HEART: Mildly enlarged.  Trace pericardial effusion.    CHEST WALL AND LOWER NECK: Within normal limits.    VISUALIZED UPPER ABDOMEN:  Streak artifact degrades image quality limiting evaluation.  Nodular contour of liver.  Nodular thickening bilateral adrenal glands.    BONES:Degenerative changes spine.    IMPRESSION:    Acute bilateral pulmonary emboli as discussed.  Prominence of the main pulmonary arterial trunk which may reflect pulmonary arterial hypertension.  This critical value was discussed with Dr. Mtz   by telephone at the time of interpretation on 3/14/2021.    Other findings as discussed above.    PETRONA MCCONNELL MD; Attending Radiologist  This document has been electronically signed. Mar 14 2021  7:22PM    r< from: US Duplex Venous Lower Ext Complete, Bilateral (21 @ 18:04) >  EXAM:  US DPLX LWR EXT VEINS COMPL BI                          PROCEDURE DATE:  2021      INTERPRETATION:  CLINICAL INFORMATION: Bilateral leg bruising.    COMPARISON: None available.    TECHNIQUE: Duplex sonography of the BILATERAL LOWER extremity veins with color and spectral Doppler, with and without compression.    FINDINGS:    RIGHT:  Normal compressibility of the RIGHT common femoral, femoral and popliteal veins.  Doppler examination shows normal spontaneous and phasic flow.  Deep venous thrombosis in the right peroneal vein.    LEFT:  There is deep venous thrombosis in the distal left external iliac vein, common femoral vein, femoral vein, popliteal vein, and peroneal and posterior tibial veins.    IMPRESSION:    Extensive deep venous thrombosis in the left lower extremity including the distal left external iliac vein extending from the calf.    Deep venous thrombosis in the right peroneal vein.    The findings were discussed with Dr. Mtz on 3/14/2021 6:12 PM    JENNIFER RODRIGUEZ MD; Attending Radiologist  This document has been electronically signed. Mar 14 2021  6:13PM    ASSESSMENT AND PLAN:  ·	Bilateral PE.  ·	Left Iliac DVT.  ·	CVA history.  ·	HTN.  ·	DM  ·	Dysphagia.    SPO2 98% on room air.  Continue full dose Lovenox.  Aspiration precaution.  On empiric antibiotics.

## 2021-03-15 NOTE — CONSULT NOTE ADULT - ASSESSMENT
The chart has been reviewed but the patient has not yet been examined.  Full note to follow. 89y Female with h/o HTN, DM, hypokalemia, non-verbal at baseline, asthma, sent to ED from [facility] for failure to thrive. History obtained from chart review. Per charts, patient has significant electrolyte disturbances and has been refusing food in the past few days with a weight loss of 10 kg since admission. Referred to ED for PEG placement.    Prior COVID history as of 3/8.     Because of LE swelling, found to have DVT and acute bilateral PE's as well. Noted to be in "Afib", but ECG strips imply that she is actually in SR with frequent ectopy and not in AFib. Earlier today had RRT for RVR's up to 200's.    Patient comfortable and in NAD.  Treated for acute pulm embolism sec to DVT.  Conservative management given FTT. Agree with IV lopressor until parenteral route can be established.  SQ LMWH can be changed to DOAC once PEG placed; no cardiac contraindication to PEG placement.     Consider palliative care consultation.

## 2021-03-16 DIAGNOSIS — R10.84 GENERALIZED ABDOMINAL PAIN: ICD-10-CM

## 2021-03-16 LAB
-  AMPICILLIN: SIGNIFICANT CHANGE UP
-  CIPROFLOXACIN: SIGNIFICANT CHANGE UP
-  LEVOFLOXACIN: SIGNIFICANT CHANGE UP
-  NITROFURANTOIN: SIGNIFICANT CHANGE UP
-  TETRACYCLINE: SIGNIFICANT CHANGE UP
-  VANCOMYCIN: SIGNIFICANT CHANGE UP
ALBUMIN SERPL ELPH-MCNC: 1.8 G/DL — LOW (ref 3.3–5)
ALP SERPL-CCNC: 113 U/L — SIGNIFICANT CHANGE UP (ref 40–120)
ALT FLD-CCNC: 20 U/L — SIGNIFICANT CHANGE UP (ref 12–78)
ANION GAP SERPL CALC-SCNC: 4 MMOL/L — LOW (ref 5–17)
AST SERPL-CCNC: 19 U/L — SIGNIFICANT CHANGE UP (ref 15–37)
BILIRUB SERPL-MCNC: 0.5 MG/DL — SIGNIFICANT CHANGE UP (ref 0.2–1.2)
BUN SERPL-MCNC: 18 MG/DL — SIGNIFICANT CHANGE UP (ref 7–23)
CALCIUM SERPL-MCNC: 8.4 MG/DL — LOW (ref 8.5–10.1)
CHLORIDE SERPL-SCNC: 108 MMOL/L — SIGNIFICANT CHANGE UP (ref 96–108)
CO2 SERPL-SCNC: 30 MMOL/L — SIGNIFICANT CHANGE UP (ref 22–31)
CREAT SERPL-MCNC: 0.64 MG/DL — SIGNIFICANT CHANGE UP (ref 0.5–1.3)
CULTURE RESULTS: SIGNIFICANT CHANGE UP
GLUCOSE BLDC GLUCOMTR-MCNC: 134 MG/DL — HIGH (ref 70–99)
GLUCOSE BLDC GLUCOMTR-MCNC: 174 MG/DL — HIGH (ref 70–99)
GLUCOSE BLDC GLUCOMTR-MCNC: 198 MG/DL — HIGH (ref 70–99)
GLUCOSE BLDC GLUCOMTR-MCNC: 225 MG/DL — HIGH (ref 70–99)
GLUCOSE SERPL-MCNC: 160 MG/DL — HIGH (ref 70–99)
HCT VFR BLD CALC: 39 % — SIGNIFICANT CHANGE UP (ref 34.5–45)
HGB BLD-MCNC: 11.9 G/DL — SIGNIFICANT CHANGE UP (ref 11.5–15.5)
MCHC RBC-ENTMCNC: 25.9 PG — LOW (ref 27–34)
MCHC RBC-ENTMCNC: 30.5 GM/DL — LOW (ref 32–36)
MCV RBC AUTO: 85 FL — SIGNIFICANT CHANGE UP (ref 80–100)
METHOD TYPE: SIGNIFICANT CHANGE UP
NRBC # BLD: 0 /100 WBCS — SIGNIFICANT CHANGE UP (ref 0–0)
ORGANISM # SPEC MICROSCOPIC CNT: SIGNIFICANT CHANGE UP
ORGANISM # SPEC MICROSCOPIC CNT: SIGNIFICANT CHANGE UP
PLATELET # BLD AUTO: 327 K/UL — SIGNIFICANT CHANGE UP (ref 150–400)
POTASSIUM SERPL-MCNC: 4.1 MMOL/L — SIGNIFICANT CHANGE UP (ref 3.5–5.3)
POTASSIUM SERPL-SCNC: 4.1 MMOL/L — SIGNIFICANT CHANGE UP (ref 3.5–5.3)
PROT SERPL-MCNC: 5.4 GM/DL — LOW (ref 6–8.3)
RBC # BLD: 4.59 M/UL — SIGNIFICANT CHANGE UP (ref 3.8–5.2)
RBC # FLD: 16.4 % — HIGH (ref 10.3–14.5)
SODIUM SERPL-SCNC: 142 MMOL/L — SIGNIFICANT CHANGE UP (ref 135–145)
SPECIMEN SOURCE: SIGNIFICANT CHANGE UP
WBC # BLD: 10.53 K/UL — HIGH (ref 3.8–10.5)
WBC # FLD AUTO: 10.53 K/UL — HIGH (ref 3.8–10.5)

## 2021-03-16 PROCEDURE — 99233 SBSQ HOSP IP/OBS HIGH 50: CPT

## 2021-03-16 RX ORDER — SODIUM CHLORIDE 9 MG/ML
1000 INJECTION INTRAMUSCULAR; INTRAVENOUS; SUBCUTANEOUS
Refills: 0 | Status: DISCONTINUED | OUTPATIENT
Start: 2021-03-16 | End: 2021-03-21

## 2021-03-16 RX ORDER — PANTOPRAZOLE SODIUM 20 MG/1
40 TABLET, DELAYED RELEASE ORAL
Refills: 0 | Status: DISCONTINUED | OUTPATIENT
Start: 2021-03-16 | End: 2021-03-17

## 2021-03-16 RX ADMIN — SENNA PLUS 2 TABLET(S): 8.6 TABLET ORAL at 22:36

## 2021-03-16 RX ADMIN — SODIUM CHLORIDE 100 MILLILITER(S): 9 INJECTION INTRAMUSCULAR; INTRAVENOUS; SUBCUTANEOUS at 17:27

## 2021-03-16 RX ADMIN — Medication 2.5 MILLIGRAM(S): at 11:30

## 2021-03-16 RX ADMIN — ALBUTEROL 4 PUFF(S): 90 AEROSOL, METERED ORAL at 17:28

## 2021-03-16 RX ADMIN — CEFTRIAXONE 100 MILLIGRAM(S): 500 INJECTION, POWDER, FOR SOLUTION INTRAMUSCULAR; INTRAVENOUS at 00:55

## 2021-03-16 RX ADMIN — ALBUTEROL 4 PUFF(S): 90 AEROSOL, METERED ORAL at 00:39

## 2021-03-16 RX ADMIN — POLYETHYLENE GLYCOL 3350 17 GRAM(S): 17 POWDER, FOR SOLUTION ORAL at 11:31

## 2021-03-16 RX ADMIN — ENOXAPARIN SODIUM 80 MILLIGRAM(S): 100 INJECTION SUBCUTANEOUS at 17:28

## 2021-03-16 RX ADMIN — Medication 2.5 MILLIGRAM(S): at 17:29

## 2021-03-16 RX ADMIN — ENOXAPARIN SODIUM 80 MILLIGRAM(S): 100 INJECTION SUBCUTANEOUS at 06:12

## 2021-03-16 RX ADMIN — ALBUTEROL 4 PUFF(S): 90 AEROSOL, METERED ORAL at 11:31

## 2021-03-16 RX ADMIN — Medication 650 MILLIGRAM(S): at 22:35

## 2021-03-16 RX ADMIN — Medication 2: at 11:31

## 2021-03-16 RX ADMIN — Medication 2: at 08:00

## 2021-03-16 RX ADMIN — ONDANSETRON 4 MILLIGRAM(S): 8 TABLET, FILM COATED ORAL at 00:39

## 2021-03-16 RX ADMIN — BUDESONIDE AND FORMOTEROL FUMARATE DIHYDRATE 2 PUFF(S): 160; 4.5 AEROSOL RESPIRATORY (INHALATION) at 17:28

## 2021-03-16 RX ADMIN — Medication 2.5 MILLIGRAM(S): at 00:39

## 2021-03-16 RX ADMIN — Medication 2.5 MILLIGRAM(S): at 06:12

## 2021-03-16 RX ADMIN — Medication 650 MILLIGRAM(S): at 23:35

## 2021-03-16 NOTE — CONSULT NOTE ADULT - ASSESSMENT
89yo F hx HTN, DM, hypokalemia, non-verbal at baseline, asthma, recent covid pna, sent to ED from [facility] for failure to thrive and found to have bilateral PEs, ?pulm htn    FTT likely multifactorial recent covid pna, advanced age, ?dementia, active PE.     Given acute PE he is not a candidate for endoscopic evaluation. Start PPI twice daily empiric. Decrease to once daily when clinical improvement.  *Recommend palliative consultation given poor prongnosis

## 2021-03-16 NOTE — PROGRESS NOTE ADULT - SUBJECTIVE AND OBJECTIVE BOX
Patient is a 89y old  Female who presents with a chief complaint of PE  DVT  HX  COVID 19 (16 Mar 2021 15:45)      PAST MEDICAL & SURGICAL HISTORY:  Lumbar spondylolysis  with degenerative changes    Stroke  right frontal stroke    Pneumonia    Diverticulitis    Dysphagia    Asthma    DM (diabetes mellitus)    HTN (hypertension)    Status post right knee replacement    Hip fracture  Left hip fracture      INTERVAL HISTORY: Patient in bed, covid precautions in place. Patient non-verbal.  	  MEDICATIONS:  MEDICATIONS  (STANDING):  ALBUTerol  90 MICROgram(s) HFA Inhaler - Peds 4 Puff(s) Inhalation every 6 hours  budesonide  80 MICROgram(s)/formoterol 4.5 MICROgram(s) Inhaler 2 Puff(s) Inhalation two times a day  cefTRIAXone   IVPB 1000 milliGRAM(s) IV Intermittent every 24 hours  chlorhexidine 2% Cloths 1 Application(s) Topical <User Schedule>  dextrose 40% Gel 15 Gram(s) Oral once  dextrose 5%. 1000 milliLiter(s) (50 mL/Hr) IV Continuous <Continuous>  dextrose 5%. 1000 milliLiter(s) (100 mL/Hr) IV Continuous <Continuous>  dextrose 50% Injectable 25 Gram(s) IV Push once  dextrose 50% Injectable 12.5 Gram(s) IV Push once  dextrose 50% Injectable 25 Gram(s) IV Push once  enoxaparin Injectable 80 milliGRAM(s) SubCutaneous every 12 hours  ferrous    sulfate 325 milliGRAM(s) Oral daily  gabapentin 300 milliGRAM(s) Oral two times a day  glucagon  Injectable 1 milliGRAM(s) IntraMuscular once  insulin lispro (ADMELOG) corrective regimen sliding scale   SubCutaneous three times a day before meals  metoprolol tartrate Injectable 2.5 milliGRAM(s) IV Push every 6 hours  pantoprazole    Tablet 40 milliGRAM(s) Oral two times a day  polyethylene glycol 3350 17 Gram(s) Oral daily  senna 2 Tablet(s) Oral at bedtime  sodium chloride 0.9%. 1000 milliLiter(s) (100 mL/Hr) IV Continuous <Continuous>    MEDICATIONS  (PRN):  acetaminophen   Tablet .. 650 milliGRAM(s) Oral every 6 hours PRN Mild Pain (1 - 3)  ondansetron Injectable 4 milliGRAM(s) IV Push every 6 hours PRN Nausea and/or Vomiting      Vitals:  T(F): 97.5 (03-16-21 @ 16:56), Max: 98.2 (03-16-21 @ 05:09)  HR: 102 (03-16-21 @ 16:56) (88 - 109)  BP: 134/77 (03-16-21 @ 16:56) (112/74 - 134/77)  RR: 18 (03-16-21 @ 16:56) (18 - 18)  SpO2: 100% (03-16-21 @ 16:56) (99% - 100%)  Wt(kg): --78.3 kg    03-15 @ 07:01  -  03-16 @ 07:00  --------------------------------------------------------  IN:  Total IN: 0 mL    OUT:    Oral Fluid: 0 mL  Total OUT: 0 mL    Total NET: 0 mL      03-16 @ 07:01  -  03-16 @ 17:05  --------------------------------------------------------  IN:  Total IN: 0 mL    OUT:    Voided (mL): 300 mL  Total OUT: 300 mL    Total NET: -300 mL    Weight (kg): 76.5 (03-14 @ 22:52)  BMI (kg/m2): 28.1 (03-14 @ 22:52)    PHYSICAL EXAM:  Neuro: Awake, responsive  CV: S1 S2 Irregular  Lungs: CTABL  GI: Soft, BS +, ND, NT  Extremities: No edema    TELEMETRY: SR  	    ECG:  	< from: 12 Lead ECG (03.14.21 @ 13:00) >  Ventricular Rate 95 BPM    Atrial Rate 95 BPM    P-R Interval 158 ms    QRS Duration 72 ms    Q-T Interval 400 ms    QTC Calculation(Bazett) 502 ms    P Axis 94 degrees    R Axis -58 degrees    T Axis 80 degrees    Diagnosis Line Sinus rhythm with premature atrial complexes  Left axis deviation  Anteroseptal infarct (cited on or before 08-MAR-2021)  Abnormal ECG  When compared with ECG of 14-MAR-2021 13:00,  T wave inversion no longer evident in Inferior leads  QT has shortened    < end of copied text >      RADIOLOGY: < from: CT Angio Chest w/ IV Cont (03.14.21 @ 18:32) >  FINDINGS:    LUNGS AND AIRWAYS PLEURA:  Small bilateral pleural effusions and underlying compressive atelectasis, larger on the right.  Patchy bilateral groundglass opacities and mosaic attenuation.  Left apical pleural parenchymal consolidation with focal cystic bronchiectasis.  Paraseptal emphysematous changes right middle lobe.    The central airways remain patent.    MEDIASTINUM AND HERNAN: No lymphadenopathy.    VESSELS: There are pulmonary emboli throughout the right lower lobe and segmental right lower lobe pulmonary arteries.  There are pulmonary emboli segmental left lingula lobe and left lower lobe and segmental left lower lobe pulmonary arterial vasculature.  There is prominence of the main pulmonary arterial trunk measuring 3.3 cm, finding which may be associated with pulmonary arterial hypertension.    Atherosclerotic calcification of the thoracic aorta with coronary artery calcifications.    HEART: Mildly enlarged.  Trace pericardial effusion.    CHEST WALL AND LOWER NECK: Within normal limits.    VISUALIZED UPPER ABDOMEN:  Streak artifact degrades image quality limiting evaluation.  Nodular contour of liver.  Nodular thickening bilateral adrenal glands.    BONES:Degenerative changes spine.    IMPRESSION:    Acute bilateral pulmonary emboli as discussed.  Prominence of the main pulmonary arterial trunk which may reflect pulmonary arterial hypertension.  This critical value was discussed with Dr. Mtz   by telephone at the time of interpretation on 3/14/2021.      < end of copied text >      DIAGNOSTIC TESTING:     LABS:	 	    16 Mar 2021 11:39    142    |  108    |  18     ----------------------------<  160    4.1     |  30     |  0.64   15 Mar 2021 11:58    142    |  107    |  10     ----------------------------<  171    3.6     |  27     |  0.76   14 Mar 2021 15:33    141    |  107    |  4      ----------------------------<  164    3.0     |  28     |  0.63     Ca    8.4        16 Mar 2021 11:39    TPro  5.4    /  Alb  1.8    /  TBili  0.5    /  DBili  x      /  AST  19     /  ALT  20     /  AlkPhos  113    16 Mar 2021 11:39                          11.9   10.53 )-----------( 327      ( 16 Mar 2021 11:39 )             39.0 ,                       12.1   10.46 )-----------( 286      ( 15 Mar 2021 11:58 )             37.9 ,                       11.7   8.60  )-----------( 256      ( 15 Mar 2021 01:28 )             36.7 ,                       11.9   7.72  )-----------( 288      ( 14 Mar 2021 13:53 )             37.3     INR: 1.21 ratio (03-14 @ 14:32)

## 2021-03-16 NOTE — PROGRESS NOTE ADULT - SUBJECTIVE AND OBJECTIVE BOX
INTERVAL HPI:   89yo F hx HTN, DM, Hypokalemia, non-verbal at baseline, asthma, sent to ED from [facility] for failure to thrive. Per charts, patient continues to have electrolyte disturbances with potassium repletion. Has been refusing food in the past few days,putting hands over her mouth when they attempt to feed her. Overall weight loss of 10 kg since admission. PT  WITH  HX   COVID  19  ALREADY  RX  IN  HOSPITAL  CONTINUES  +  IN  [facility] SINCE  3/8/21  EVALUATED  IN  ER  FOUND  TO  LAVE  EXTENSIVE DVT  BILATERAL PE  AFIB STARTED  ON  IV  HEPARIN  +  SOB  LEG  PAIN  POOR  APETITE    OVERNIGHT EVENTS:  Now with abdominal pain, nausea and vomiting.    Vital Signs Last 24 Hrs  T(C): 36.4 (16 Mar 2021 16:56), Max: 36.8 (16 Mar 2021 05:09)  T(F): 97.5 (16 Mar 2021 16:56), Max: 98.2 (16 Mar 2021 05:09)  HR: 102 (16 Mar 2021 16:56) (88 - 109)  BP: 134/77 (16 Mar 2021 16:56) (112/74 - 134/77)  BP(mean): --  RR: 18 (16 Mar 2021 16:56) (18 - 18)  SpO2: 100% (16 Mar 2021 16:56) (99% - 100%)    PHYSICAL EXAM:  GEN:        comfortable.  HEENT:    Normal.    RESP:        no distress  CVS:          Regular rate and rhythm.   ABD:         Soft, non-tender, non-distended;     MEDICATIONS  (STANDING):  ALBUTerol  90 MICROgram(s) HFA Inhaler - Peds 4 Puff(s) Inhalation every 6 hours  budesonide  80 MICROgram(s)/formoterol 4.5 MICROgram(s) Inhaler 2 Puff(s) Inhalation two times a day  cefTRIAXone   IVPB 1000 milliGRAM(s) IV Intermittent every 24 hours  chlorhexidine 2% Cloths 1 Application(s) Topical <User Schedule>  dextrose 40% Gel 15 Gram(s) Oral once  dextrose 5%. 1000 milliLiter(s) (50 mL/Hr) IV Continuous <Continuous>  dextrose 5%. 1000 milliLiter(s) (100 mL/Hr) IV Continuous <Continuous>  dextrose 50% Injectable 25 Gram(s) IV Push once  dextrose 50% Injectable 12.5 Gram(s) IV Push once  dextrose 50% Injectable 25 Gram(s) IV Push once  enoxaparin Injectable 80 milliGRAM(s) SubCutaneous every 12 hours  ferrous    sulfate 325 milliGRAM(s) Oral daily  gabapentin 300 milliGRAM(s) Oral two times a day  glucagon  Injectable 1 milliGRAM(s) IntraMuscular once  insulin lispro (ADMELOG) corrective regimen sliding scale   SubCutaneous three times a day before meals  metoprolol tartrate Injectable 2.5 milliGRAM(s) IV Push every 6 hours  pantoprazole    Tablet 40 milliGRAM(s) Oral two times a day  polyethylene glycol 3350 17 Gram(s) Oral daily  senna 2 Tablet(s) Oral at bedtime  sodium chloride 0.9%. 1000 milliLiter(s) (100 mL/Hr) IV Continuous <Continuous>    MEDICATIONS  (PRN):  acetaminophen   Tablet .. 650 milliGRAM(s) Oral every 6 hours PRN Mild Pain (1 - 3)  ondansetron Injectable 4 milliGRAM(s) IV Push every 6 hours PRN Nausea and/or Vomiting    LABS:                        11.9   10.53 )-----------( 327      ( 16 Mar 2021 11:39 )             39.0     03-16    142  |  108  |  18  ----------------------------<  160<H>  4.1   |  30  |  0.64    Ca    8.4<L>      16 Mar 2021 11:39    TPro  5.4<L>  /  Alb  1.8<L>  /  TBili  0.5  /  DBili  x   /  AST  19  /  ALT  20  /  AlkPhos  113  03-16    PTT - ( 15 Mar 2021 11:58 )  PTT:>200.0 sec    ASSESSMENT AND PLAN:  ·	Bilateral PE.  ·	Left Iliac DVT.  ·	CVA history.  ·	HTN.  ·	DM  ·	Dysphagia.    SPO2 stable on room air  On full dose Lovenox.  Seen by GI.

## 2021-03-16 NOTE — PROGRESS NOTE ADULT - SUBJECTIVE AND OBJECTIVE BOX
INTERVAL HPI/OVERNIGHT EVENTS:  RR  YESTERDAY  FOR  AFIB WITH RVR      REVIEW OF SYSTEMS:  CONSTITUTIONAL:  CONTINUES  TO  REFUSE FOOD EPISODES  OF  EMESIS      MEDICATION:  acetaminophen   Tablet .. 650 milliGRAM(s) Oral every 6 hours PRN  ALBUTerol  90 MICROgram(s) HFA Inhaler - Peds 4 Puff(s) Inhalation every 6 hours  budesonide  80 MICROgram(s)/formoterol 4.5 MICROgram(s) Inhaler 2 Puff(s) Inhalation two times a day  cefTRIAXone   IVPB 1000 milliGRAM(s) IV Intermittent every 24 hours  chlorhexidine 2% Cloths 1 Application(s) Topical <User Schedule>  dextrose 40% Gel 15 Gram(s) Oral once  dextrose 5%. 1000 milliLiter(s) IV Continuous <Continuous>  dextrose 5%. 1000 milliLiter(s) IV Continuous <Continuous>  dextrose 50% Injectable 25 Gram(s) IV Push once  dextrose 50% Injectable 12.5 Gram(s) IV Push once  dextrose 50% Injectable 25 Gram(s) IV Push once  enoxaparin Injectable 80 milliGRAM(s) SubCutaneous every 12 hours  ferrous    sulfate 325 milliGRAM(s) Oral daily  gabapentin 300 milliGRAM(s) Oral two times a day  glucagon  Injectable 1 milliGRAM(s) IntraMuscular once  insulin lispro (ADMELOG) corrective regimen sliding scale   SubCutaneous three times a day before meals  metoprolol tartrate Injectable 2.5 milliGRAM(s) IV Push every 6 hours  ondansetron Injectable 4 milliGRAM(s) IV Push every 6 hours PRN  pantoprazole    Tablet 40 milliGRAM(s) Oral before breakfast  polyethylene glycol 3350 17 Gram(s) Oral daily  senna 2 Tablet(s) Oral at bedtime    Vital Signs Last 24 Hrs  T(C): 36.8 (16 Mar 2021 05:09), Max: 36.9 (15 Mar 2021 15:23)  T(F): 98.2 (16 Mar 2021 05:09), Max: 98.4 (15 Mar 2021 15:23)  HR: 106 (16 Mar 2021 05:09) (91 - 109)  BP: 119/69 (16 Mar 2021 05:09) (119/69 - 130/80)  BP(mean): --  RR: 18 (16 Mar 2021 05:09) (18 - 18)  SpO2: 100% (16 Mar 2021 05:09) (97% - 100%)    PHYSICAL EXAM:  GENERAL: NAD, well-groomed, well-developed  HEENT : Conjuntivae  clear sclerae anicteric  NECK: Supple, No JVD, Normal thyroid  NERVOUS SYSTEM:  Alert oriented   no  focal  deficits;   CHEST/LUNG: Clear    HEART: Regular rate and rhythm; No murmurs, rubs, or gallops  ABDOMEN: Soft, MILD  EPIGASTRIC TENDERNESS  Nondistended; Bowel sounds present  EXTREMITIES:  no  edema no  tenderness  SKIN: No rashes   LABS:                        12.1   10.46 )-----------( 286      ( 15 Mar 2021 11:58 )             37.9     03-15    142  |  107  |  10  ----------------------------<  171<H>  3.6   |  27  |  0.76    Ca    8.3<L>      15 Mar 2021 11:58  Phos  1.8     03-14  Mg     1.2     03-14    TPro  5.6<L>  /  Alb  1.9<L>  /  TBili  0.5  /  DBili  x   /  AST  21  /  ALT  20  /  AlkPhos  119  03-15    PT/INR - ( 14 Mar 2021 14:32 )   PT: 13.9 sec;   INR: 1.21 ratio         PTT - ( 15 Mar 2021 11:58 )  PTT:>200.0 sec  Urinalysis Basic - ( 14 Mar 2021 15:33 )    Color: Yellow / Appearance: very cloudy / S.015 / pH: x  Gluc: x / Ketone: Trace  / Bili: Negative / Urobili: Negative mg/dL   Blood: x / Protein: 100 mg/dL / Nitrite: Negative   Leuk Esterase: Moderate / RBC: 6-10 /HPF / WBC >50   Sq Epi: x / Non Sq Epi: Occasional / Bacteria: Few      CAPILLARY BLOOD GLUCOSE      POCT Blood Glucose.: 198 mg/dL (16 Mar 2021 07:42)  POCT Blood Glucose.: 193 mg/dL (15 Mar 2021 22:42)  POCT Blood Glucose.: 139 mg/dL (15 Mar 2021 17:49)  POCT Blood Glucose.: 158 mg/dL (15 Mar 2021 11:36)  POCT Blood Glucose.: 148 mg/dL (15 Mar 2021 10:08)      RADIOLOGY & ADDITIONAL TESTS:    Imaging reports  Personally Reviewed:  [ ] YES  [ ] NO    Consultant(s) Notes Reviewed:  [X ] YES  [ ] NO    Care Discussed with Consultants/Other Providers [X ] YES  [ ] NO  Problem/Plan - 1:  ·  Problem: Pulmonary emboli.  Plan: iv  heparin.     Problem/Plan - 2:  ·  Problem: DVT (deep venous thrombosis).  Plan: iv  heparin.     Problem/Plan - 3:  ·  Problem: UTI (urinary tract infection).  Plan: rocephin.     Problem/Plan - 4:  ·  Problem: Asthma.  Plan: symbicort.     Problem/Plan - 5:  ·  Problem: DM (diabetes mellitus).  Plan: insulin coverage.     Problem/Plan - 6:  Problem: Failure to thrive in adult. Plan: encourage  oral  intake  rx  underlying  causes. ADD  REMERON    Problem/Plan - 7:  ·  Problem: Electrolyte abnormality.  Plan: supplement K.     Problem/Plan - 8:  COVID 19  PCR NEGATIVE    +  igG Ab  emesis zofran   GI eval

## 2021-03-16 NOTE — PROGRESS NOTE ADULT - ASSESSMENT
89y Female with h/o HTN, DM, hypokalemia, non-verbal at baseline, asthma, sent to ED from [facility] for failure to thrive. History obtained from chart review. Per charts, patient has significant electrolyte disturbances and has been refusing food in the past few days with a weight loss of 10 kg since admission. Referred to ED for PEG placement.  Prior COVID history as of 3/8.   Because of LE swelling, found to have DVT and acute bilateral PE's as well. Noted to be in "Afib", but ECG strips imply that she is actually in SR with frequent ectopy and not in AFib. Earlier today had RRT for RVR's up to 200's.    Plan  Continue Full dose AC for PE and DVT  Patient comfortable and in NAD.  Conservative management given FTT.  IV Fluid hydration   IV lopressor for HR control until parenteral route can be established.  SQ LMWH can be changed to DOAC once PEG placed;   no cardiac contraindication to PEG placement.  Consider palliative care consultation, multiple comorbidities, poor prognosis. 89y Female with h/o HTN, DM, hypokalemia, non-verbal at baseline, asthma, sent to ED from [facility] for failure to thrive. History obtained from chart review. Per charts, patient has significant electrolyte disturbances and has been refusing food in the past few days with a weight loss of 10 kg since admission. Referred to ED for PEG placement.  Prior COVID history as of 3/8.   Because of LE swelling, found to have DVT and acute bilateral PE's as well. Noted to be in "Afib", but ECG strips imply that she is actually in SR with frequent ectopy and not in AFib. Earlier today had RRT for RVR's up to 200's.    Plan  Continue Full dose AC for PE and DVT  Patient comfortable and in NAD.  Conservative management given FTT.  IV Fluid hydration   IV lopressor for HR control until parenteral route can be established.  SQ LMWH can be changed to DOAC once PEG placed;   no cardiac contraindication to PEG placement.  Consider palliative care consultation, multiple comorbidities, poor prognosis.  Will sign off for now, please call if have questions

## 2021-03-16 NOTE — CONSULT NOTE ADULT - SUBJECTIVE AND OBJECTIVE BOX
Chief Complaint:  Patient is a 89y old  Female who presents with a chief complaint of PE  DVT  HX  COVID 19 (16 Mar 2021 09:25)      HPI:   89yo F hx HTN, DM, hypokalemia, non-verbal at baseline, asthma, sent to ED from [facility] for failure to thrive. History obtained from chart review. Per charts, patient continues to have electrolyte disturbances with potassium repletion. Has been refusing food in the past few days,putting hands over her mouth when they attempt to feed her. Overall weight loss of 10 kg since admission. PT  WITH  HX   COVID  19  ALREADY  RX  IN  HOSPITAL  CONTINUES  +  IN  [facility] SINCE  3/8/21 EVALUATED  IN  ER  FOUND  TO  LAVE  EXTENSIVE DVT  BILATERAL PE  AFIB STARTED  ON  IV  HEPARIN  +  SOB  LEG  PAIN  POOR  ROBERT ETITE(14 Mar 2021 20:05). GI consulted for further evaluation.       PMH/PSH:PAST MEDICAL & SURGICAL HISTORY:  Lumbar spondylolysis  with degenerative changes    Stroke  right frontal stroke    Pneumonia    Diverticulitis    Dysphagia    Asthma    DM (diabetes mellitus)    HTN (hypertension)    Status post right knee replacement    Hip fracture  Left hip fracture        Allergies:  No Known Allergies      Medications:  acetaminophen   Tablet .. 650 milliGRAM(s) Oral every 6 hours PRN  ALBUTerol  90 MICROgram(s) HFA Inhaler - Peds 4 Puff(s) Inhalation every 6 hours  budesonide  80 MICROgram(s)/formoterol 4.5 MICROgram(s) Inhaler 2 Puff(s) Inhalation two times a day  cefTRIAXone   IVPB 1000 milliGRAM(s) IV Intermittent every 24 hours  chlorhexidine 2% Cloths 1 Application(s) Topical <User Schedule>  dextrose 40% Gel 15 Gram(s) Oral once  dextrose 5%. 1000 milliLiter(s) IV Continuous <Continuous>  dextrose 5%. 1000 milliLiter(s) IV Continuous <Continuous>  dextrose 50% Injectable 25 Gram(s) IV Push once  dextrose 50% Injectable 12.5 Gram(s) IV Push once  dextrose 50% Injectable 25 Gram(s) IV Push once  enoxaparin Injectable 80 milliGRAM(s) SubCutaneous every 12 hours  ferrous    sulfate 325 milliGRAM(s) Oral daily  gabapentin 300 milliGRAM(s) Oral two times a day  glucagon  Injectable 1 milliGRAM(s) IntraMuscular once  insulin lispro (ADMELOG) corrective regimen sliding scale   SubCutaneous three times a day before meals  metoprolol tartrate Injectable 2.5 milliGRAM(s) IV Push every 6 hours  ondansetron Injectable 4 milliGRAM(s) IV Push every 6 hours PRN  pantoprazole    Tablet 40 milliGRAM(s) Oral two times a day  polyethylene glycol 3350 17 Gram(s) Oral daily  senna 2 Tablet(s) Oral at bedtime      Review of Systems:  Unable to provide history     Relevant Family History:   FAMILY HISTORY:      Relevant Social History: Alcohol ( -) , Tobacco ( -) , Illicit drugs (- )     Physical Exam:    Vital Signs:  Vital Signs Last 24 Hrs  T(C): 36.4 (16 Mar 2021 10:32), Max: 36.8 (16 Mar 2021 05:09)  T(F): 97.6 (16 Mar 2021 10:32), Max: 98.2 (16 Mar 2021 05:09)  HR: 88 (16 Mar 2021 10:32) (88 - 109)  BP: 112/74 (16 Mar 2021 10:32) (112/74 - 129/84)  BP(mean): --  RR: 18 (16 Mar 2021 10:32) (18 - 18)  SpO2: 100% (16 Mar 2021 10:32) (99% - 100%)  Daily     Daily Weight in k.3 (16 Mar 2021 05:09)    General: Chronically ill appearing   HEENT:  NC/AT,  conjunctivae clear and pink, no thyromegaly, nodules  Chest:  Full & symmetric excursion, no increased effort, breath sounds clear  Cardiovascular:  Regular rhythm, S1, S2, no murmur/rub/S3/S4, no abdominal bruit, no edema  Abdomen:  Soft, non tender, non distended, normoactive bowel sounds,  Extremities:  no cyanosis, clubbing or edema  Skin:  No rash/erythema/ecchymoses/petechiae/wounds/abscess/warm/dry  Neuro/Psych:  no focal deficits     Laboratory:                          11.9   10.53 )-----------( 327      ( 16 Mar 2021 11:39 )             39.0     03-16    142  |  108  |  18  ----------------------------<  160<H>  4.1   |  30  |  0.64    Ca    8.4<L>      16 Mar 2021 11:39    TPro  5.4<L>  /  Alb  1.8<L>  /  TBili  0.5  /  DBili  x   /  AST  19  /  ALT  20  /  AlkPhos  113      LIVER FUNCTIONS - ( 16 Mar 2021 11:39 )  Alb: 1.8 g/dL / Pro: 5.4 gm/dL / ALK PHOS: 113 U/L / ALT: 20 U/L / AST: 19 U/L / GGT: x           PTT - ( 15 Mar 2021 11:58 )  PTT:>200.0 sec        Intake and Output    03-15-21 @ 07:01  -  21 @ 07:00  --------------------------------------------------------  IN: 0 mL / OUT: 0 mL / NET: 0 mL    21 @ 07:01  -  21 @ 15:46  --------------------------------------------------------  IN: 0 mL / OUT: 300 mL / NET: -300 mL        Imaging:  < from: CT Angio Chest w/ IV Cont (21 @ 18:32) >    EXAM:  CT ANGIO CHEST (W)AW IC                            PROCEDURE DATE:  2021          INTERPRETATION:  CLINICAL INFORMATION: Failure to thrive. Electrolyte imbalance. History of COVID. Evaluate for pulmonary embolism.    COMPARISON: Chest radiograph 3/14/2021.    CONTRAST/COMPLICATIONS:  IV Contrast: Omnipaque 350 / 85 cc administered / .  Oral Contrast: NONE  Complications: None reported at time of study completion    PROCEDURE:  CT Angiography of the Chest.  Sagittal and coronal reformats were performed as well as 3D (MIP) reconstructions.    FINDINGS:    LUNGS AND AIRWAYS PLEURA:  Small bilateral pleural effusions and underlying compressive atelectasis, larger on the right.  Patchy bilateral groundglass opacities and mosaic attenuation.  Left apical pleural parenchymal consolidation with focal cystic bronchiectasis.  Paraseptal emphysematous changes right middle lobe.    The central airways remain patent.    MEDIASTINUM AND HERNAN: No lymphadenopathy.    VESSELS: There are pulmonary emboli throughout the right lower lobe and segmental right lower lobe pulmonary arteries.  There are pulmonary emboli segmental left lingula lobe and left lower lobe and segmental left lower lobe pulmonary arterial vasculature.  There is prominence of the main pulmonary arterial trunk measuring 3.3 cm, finding which may be associated with pulmonary arterial hypertension.    Atherosclerotic calcification of the thoracic aorta with coronary artery calcifications.    HEART: Mildly enlarged.  Trace pericardial effusion.    CHEST WALL AND LOWER NECK: Within normal limits.    VISUALIZED UPPER ABDOMEN:  Streak artifact degrades image quality limiting evaluation.  Nodular contour of liver.  Nodular thickening bilateral adrenal glands.    BONES:Degenerative changes spine.    IMPRESSION:    Acute bilateral pulmonary emboli as discussed.  Prominence of the main pulmonary arterial trunk which may reflect pulmonary arterial hypertension.  This critical value was discussed with Dr. Mtz   by telephone at the time of interpretation on 3/14/2021.    Other findings as discussed above.      PETRONA MCCONNELL MD; Attending Radiologist  This document has been electronically signed. Mar 14 2021  7:22PM    < end of copied text >

## 2021-03-17 LAB
ALBUMIN SERPL ELPH-MCNC: 1.8 G/DL — LOW (ref 3.3–5)
ALP SERPL-CCNC: 84 U/L — SIGNIFICANT CHANGE UP (ref 40–120)
ALT FLD-CCNC: 14 U/L — SIGNIFICANT CHANGE UP (ref 12–78)
AMYLASE P1 CFR SERPL: 25 U/L — SIGNIFICANT CHANGE UP (ref 25–115)
ANION GAP SERPL CALC-SCNC: 11 MMOL/L — SIGNIFICANT CHANGE UP (ref 5–17)
AST SERPL-CCNC: 11 U/L — LOW (ref 15–37)
BILIRUB SERPL-MCNC: 0.4 MG/DL — SIGNIFICANT CHANGE UP (ref 0.2–1.2)
BUN SERPL-MCNC: 20 MG/DL — SIGNIFICANT CHANGE UP (ref 7–23)
CALCIUM SERPL-MCNC: 8.5 MG/DL — SIGNIFICANT CHANGE UP (ref 8.5–10.1)
CHLORIDE SERPL-SCNC: 105 MMOL/L — SIGNIFICANT CHANGE UP (ref 96–108)
CO2 SERPL-SCNC: 25 MMOL/L — SIGNIFICANT CHANGE UP (ref 22–31)
CREAT SERPL-MCNC: 0.8 MG/DL — SIGNIFICANT CHANGE UP (ref 0.5–1.3)
GLUCOSE BLDC GLUCOMTR-MCNC: 121 MG/DL — HIGH (ref 70–99)
GLUCOSE BLDC GLUCOMTR-MCNC: 154 MG/DL — HIGH (ref 70–99)
GLUCOSE BLDC GLUCOMTR-MCNC: 175 MG/DL — HIGH (ref 70–99)
GLUCOSE BLDC GLUCOMTR-MCNC: 207 MG/DL — HIGH (ref 70–99)
GLUCOSE SERPL-MCNC: 203 MG/DL — HIGH (ref 70–99)
HCT VFR BLD CALC: 35.3 % — SIGNIFICANT CHANGE UP (ref 34.5–45)
HGB BLD-MCNC: 10.4 G/DL — LOW (ref 11.5–15.5)
LIDOCAIN IGE QN: 116 U/L — SIGNIFICANT CHANGE UP (ref 73–393)
MCHC RBC-ENTMCNC: 25.7 PG — LOW (ref 27–34)
MCHC RBC-ENTMCNC: 29.5 GM/DL — LOW (ref 32–36)
MCV RBC AUTO: 87.4 FL — SIGNIFICANT CHANGE UP (ref 80–100)
NRBC # BLD: 0 /100 WBCS — SIGNIFICANT CHANGE UP (ref 0–0)
PLATELET # BLD AUTO: 370 K/UL — SIGNIFICANT CHANGE UP (ref 150–400)
POTASSIUM SERPL-MCNC: 3.8 MMOL/L — SIGNIFICANT CHANGE UP (ref 3.5–5.3)
POTASSIUM SERPL-SCNC: 3.8 MMOL/L — SIGNIFICANT CHANGE UP (ref 3.5–5.3)
PROT SERPL-MCNC: 5.1 GM/DL — LOW (ref 6–8.3)
RBC # BLD: 4.04 M/UL — SIGNIFICANT CHANGE UP (ref 3.8–5.2)
RBC # FLD: 16.6 % — HIGH (ref 10.3–14.5)
SODIUM SERPL-SCNC: 141 MMOL/L — SIGNIFICANT CHANGE UP (ref 135–145)
WBC # BLD: 10.91 K/UL — HIGH (ref 3.8–10.5)
WBC # FLD AUTO: 10.91 K/UL — HIGH (ref 3.8–10.5)

## 2021-03-17 PROCEDURE — 99497 ADVNCD CARE PLAN 30 MIN: CPT | Mod: CS

## 2021-03-17 RX ORDER — PANTOPRAZOLE SODIUM 20 MG/1
40 TABLET, DELAYED RELEASE ORAL
Refills: 0 | Status: DISCONTINUED | OUTPATIENT
Start: 2021-03-17 | End: 2021-03-22

## 2021-03-17 RX ORDER — BUDESONIDE, MICRONIZED 100 %
0.5 POWDER (GRAM) MISCELLANEOUS EVERY 12 HOURS
Refills: 0 | Status: DISCONTINUED | OUTPATIENT
Start: 2021-03-17 | End: 2021-03-20

## 2021-03-17 RX ORDER — MIRTAZAPINE 45 MG/1
7.5 TABLET, ORALLY DISINTEGRATING ORAL AT BEDTIME
Refills: 0 | Status: DISCONTINUED | OUTPATIENT
Start: 2021-03-17 | End: 2021-04-20

## 2021-03-17 RX ADMIN — CEFTRIAXONE 100 MILLIGRAM(S): 500 INJECTION, POWDER, FOR SOLUTION INTRAMUSCULAR; INTRAVENOUS at 23:36

## 2021-03-17 RX ADMIN — Medication 4: at 08:23

## 2021-03-17 RX ADMIN — PANTOPRAZOLE SODIUM 40 MILLIGRAM(S): 20 TABLET, DELAYED RELEASE ORAL at 21:38

## 2021-03-17 RX ADMIN — Medication 2.5 MILLIGRAM(S): at 23:36

## 2021-03-17 RX ADMIN — ENOXAPARIN SODIUM 80 MILLIGRAM(S): 100 INJECTION SUBCUTANEOUS at 17:15

## 2021-03-17 RX ADMIN — ONDANSETRON 4 MILLIGRAM(S): 8 TABLET, FILM COATED ORAL at 17:15

## 2021-03-17 RX ADMIN — Medication 2.5 MILLIGRAM(S): at 06:03

## 2021-03-17 RX ADMIN — SODIUM CHLORIDE 100 MILLILITER(S): 9 INJECTION INTRAMUSCULAR; INTRAVENOUS; SUBCUTANEOUS at 01:42

## 2021-03-17 RX ADMIN — SODIUM CHLORIDE 100 MILLILITER(S): 9 INJECTION INTRAMUSCULAR; INTRAVENOUS; SUBCUTANEOUS at 21:52

## 2021-03-17 RX ADMIN — Medication 2.5 MILLIGRAM(S): at 00:34

## 2021-03-17 RX ADMIN — CHLORHEXIDINE GLUCONATE 1 APPLICATION(S): 213 SOLUTION TOPICAL at 06:03

## 2021-03-17 RX ADMIN — Medication 2: at 11:32

## 2021-03-17 RX ADMIN — Medication 2.5 MILLIGRAM(S): at 11:37

## 2021-03-17 RX ADMIN — SODIUM CHLORIDE 100 MILLILITER(S): 9 INJECTION INTRAMUSCULAR; INTRAVENOUS; SUBCUTANEOUS at 11:36

## 2021-03-17 RX ADMIN — ENOXAPARIN SODIUM 80 MILLIGRAM(S): 100 INJECTION SUBCUTANEOUS at 06:03

## 2021-03-17 RX ADMIN — ONDANSETRON 4 MILLIGRAM(S): 8 TABLET, FILM COATED ORAL at 11:36

## 2021-03-17 RX ADMIN — CEFTRIAXONE 100 MILLIGRAM(S): 500 INJECTION, POWDER, FOR SOLUTION INTRAMUSCULAR; INTRAVENOUS at 00:34

## 2021-03-17 NOTE — SWALLOW BEDSIDE ASSESSMENT ADULT - SLP GENERAL OBSERVATIONS
lethargic but aroused with weakened/frail state; non-verbal and refusal behaviors using hand to cover face/mouth;  pt with emesis during exam

## 2021-03-17 NOTE — PROGRESS NOTE ADULT - SUBJECTIVE AND OBJECTIVE BOX
Gastroenterology  Non communicative   Vomited during swallow evaluation   Poor po intake     T(F): 97.7 (03-17-21 @ 16:01), Max: 98 (03-17-21 @ 00:44)  HR: 85 (03-17-21 @ 16:01) (65 - 94)  BP: 98/65 (03-17-21 @ 16:01) (98/65 - 130/84)  RR: 18 (03-17-21 @ 16:01) (18 - 18)  SpO2: 100% (03-17-21 @ 16:01) (96% - 100%)  Wt(kg): --  CAPILLARY BLOOD GLUCOSE      POCT Blood Glucose.: 121 mg/dL (17 Mar 2021 16:45)  POCT Blood Glucose.: 154 mg/dL (17 Mar 2021 11:31)  POCT Blood Glucose.: 207 mg/dL (17 Mar 2021 08:10)  POCT Blood Glucose.: 225 mg/dL (16 Mar 2021 22:33)      LABS:                        10.4   10.91 )-----------( 370      ( 17 Mar 2021 08:46 )             35.3     03-17    141  |  105  |  20  ----------------------------<  203<H>  3.8   |  25  |  0.80    Ca    8.5      17 Mar 2021 08:46    TPro  5.1<L>  /  Alb  1.8<L>  /  TBili  0.4  /  DBili  x   /  AST  11<L>  /  ALT  14  /  AlkPhos  84  03-17    LIVER FUNCTIONS - ( 17 Mar 2021 08:46 )  Alb: 1.8 g/dL / Pro: 5.1 gm/dL / ALK PHOS: 84 U/L / ALT: 14 U/L / AST: 11 U/L / GGT: x             I&O's Detail    16 Mar 2021 07:01  -  17 Mar 2021 07:00  --------------------------------------------------------  IN:  Total IN: 0 mL    OUT:    Voided (mL): 600 mL  Total OUT: 600 mL    Total NET: -600 mL        03-17 @ 08:46    141 | 105 | 20  /8.5 | -- | --  _______________________/  3.8 | 25 | 0.80                           \par   Amylase, Serum Total: 25 U/L (03-17 @ 08:46)

## 2021-03-17 NOTE — SWALLOW BEDSIDE ASSESSMENT ADULT - COMMENTS
Pt daughter called and reported to SLP that she has been refusing to eat for over one month; placed phone to pt's ear and pt did not verbalize to daughter but only produced intermittent phonation  PMH CVA, HTN, DM, hypokalemia, non-verbal at baseline, asthma, sent to ED from [facility] [address] for failure to thrive  3/14 CXR small right base opacity; abnormal CT chest  3/16 FTT likely multifactorial recent covid pna, advanced age, ?dementia, active PE; generalized abdominal pain  3/16 MD continues to refuse food and episodes of emesis  3/17 [facility]  pt is on anticoagulation for DVT/PE and not candidate for PEG currently. Since pt has poor PO intake eventually pt health will decline further and son is aware of poor prognosis; full code puree trials were initially refused and then deferred as pt with emesis during exam Pt daughter called and reported to SLP that she has been refusing to eat for over one month; placed phone to pt's ear and pt did not verbalize to daughter but only produced intermittent phonation  PMH CVA, HTN, DM, hypokalemia, non-verbal at baseline, asthma, sent to ED from [facility] for failure to thrive  3/14 CXR small right base opacity; abnormal CT chest  3/16 GI note FTT likely multifactorial recent covid pna, advanced age, ?dementia, active PE; generalized abdominal pain  3/16 MD continues to refuse food and episodes of emesis  3/17 [facility]  pt is on anticoagulation for DVT/PE and not candidate for PEG currently. Since pt has poor PO intake eventually pt health will decline further and son is aware of poor prognosis; full code

## 2021-03-17 NOTE — DIETITIAN NUTRITION RISK NOTIFICATION - TREATMENT: THE FOLLOWING DIET HAS BEEN RECOMMENDED
Diet, Dysphagia 1 Pureed-Thin Liquids:   Supplement Feeding Modality:  Orogastric  Glucerna Shake Cans or Servings Per Day:  1       Frequency:  Three Times a day (03-17-21 @ 13:50) [Active]

## 2021-03-17 NOTE — DIETITIAN INITIAL EVALUATION ADULT. - OTHER INFO
[facility] refused to eat or drink for weeks, c wt. loss of 13 LBS in 1 month.  Pt's family was in the process of making decision of PEG vs Palliative Care.   RD spoke c RN & PA, pt is currently full code as per family's wishes.   Pt c PE and DVT and PEG is not planned at present.  Pt continues to refuse to eat, drink & take meds, swallow evaluation ordered.  Pt was 82.7 kg on 02/08/21.  RD attempted to call pt's son to obtain food preferences, phone went to voice mail, RD contact information left on voice mail. [facility] pt refused to eat or drink for weeks, c wt. loss of 13 LBS in 1 month.  Pt's family was in the process of making decision of PEG vs Palliative Care.   RD spoke c RN & PA, pt is currently full code as per family's wishes.   Pt c PE and DVT and PEG is not planned at present.  Pt continues to refuse to eat, drink & take meds, swallow evaluation ordered.  Pt was 82.7 kg on 02/08/21.  RD attempted to call pt's son to obtain food preferences and to verify food insecurity, however phone went to voice mail, RD contact information left on voice mail.

## 2021-03-17 NOTE — DIETITIAN INITIAL EVALUATION ADULT. - PHYSCIAL ASSESSMENT
Jaspreet AKERS Chadwick is here today for Well Child    Concerns/symptoms: well child exam  Medications: currently is not taking any medications  Refills needed today? No     Tobacco history: verified  Advanced Directives: No not on file, not interested.  BP greater than 140/90? No    Patient would like communication of their results via:      Cell Phone:   Telephone Information:   Mobile [phone]     Okay to leave a message containing results? Yes  Preferred language:  English.    Health Maintenance Due   Topic Date Due   • Hepatitis B Vaccine (1 of 3 - 3-dose primary series) 2009   • IPV Vaccine (1 of 3 - 4-dose series) 2009   • MMR Vaccine (1 of 2 - Standard series) 09/12/2010   • Varicella Vaccine (1 of 2 - 2-dose childhood series) 09/12/2010   • Hepatitis A Vaccine (1 of 2 - 2-dose series) 09/12/2010   • DTaP/Tdap/Td Vaccine (1 - Tdap) 09/12/2016   • Annual Physical (ages 3-18)  09/03/2020   • Meningococcal Vaccine (1 - 2-dose series) 09/12/2020   • HPV Vaccine (1 - Male 2-dose series) 09/12/2020   • Influenza Vaccine (1) 09/01/2020      Patient is due for the topics as listed above and wishes to proceed with them.    Medicare HRA:              PHQ 2:       PHQ 9:        BMI=28(03/14), 03/15-> 03/17(1+, 2+generalized edema, 2+ edema of arms, 3+ edema of legs noted)/other (specify)

## 2021-03-17 NOTE — SWALLOW BEDSIDE ASSESSMENT ADULT - SWALLOW EVAL: PATIENT/FAMILY GOALS STATEMENT
non-verbal; hand and facial gestures for food and liquid refusal; pt self-fed water after vomit episode with multiple swallows but then continued to refuse additional trials non-verbal; hand and facial gestures for food and liquid refusal; pt self-fed water after emesis episode with multiple swallows but then continued to refuse additional trials

## 2021-03-17 NOTE — GOALS OF CARE CONVERSATION - ADVANCED CARE PLANNING - CONVERSATION DETAILS
89yo F hx HTN, DM, hypokalemia, non-verbal at baseline, asthma, recent covid pna, sent to ED from [facility] for failure to thrive and found to have bilateral PEs and DVt on Full dose Lovenox.    Discussed with son Henrique in detail about patients diagnosis and poor prognosis. Explained to son risks vs benefit currently about proceeding with PEG, pt is on anticoagulation for DVT/PE and not candidate for PEG currently. Since pt has poor PO intake eventually pt health will decline further and son is aware of poor prognosis. However son continues to wish for aggressive measures and wants FULL CODE. Son aware PE may travel to the heart and could cause cardiac arrest but still wishes to be aggressive and would like CPR on mom. Also son wished to put mom on ventilator if breathing may stop.

## 2021-03-17 NOTE — PROGRESS NOTE ADULT - SUBJECTIVE AND OBJECTIVE BOX
INTERVAL HPI/OVERNIGHT EVENTS:        REVIEW OF SYSTEMS:  CONSTITUTIONAL:    continues  with   poor  appetite      MEDICATION:  acetaminophen   Tablet .. 650 milliGRAM(s) Oral every 6 hours PRN  ALBUTerol  90 MICROgram(s) HFA Inhaler - Peds 4 Puff(s) Inhalation every 6 hours  buDESOnide    Inhalation Suspension 0.5 milliGRAM(s) Inhalation every 12 hours  cefTRIAXone   IVPB 1000 milliGRAM(s) IV Intermittent every 24 hours  chlorhexidine 2% Cloths 1 Application(s) Topical <User Schedule>  dextrose 40% Gel 15 Gram(s) Oral once  dextrose 5%. 1000 milliLiter(s) IV Continuous <Continuous>  dextrose 5%. 1000 milliLiter(s) IV Continuous <Continuous>  dextrose 50% Injectable 25 Gram(s) IV Push once  dextrose 50% Injectable 12.5 Gram(s) IV Push once  dextrose 50% Injectable 25 Gram(s) IV Push once  enoxaparin Injectable 80 milliGRAM(s) SubCutaneous every 12 hours  ferrous    sulfate 325 milliGRAM(s) Oral daily  gabapentin 300 milliGRAM(s) Oral two times a day  glucagon  Injectable 1 milliGRAM(s) IntraMuscular once  insulin lispro (ADMELOG) corrective regimen sliding scale   SubCutaneous three times a day before meals  metoprolol tartrate Injectable 2.5 milliGRAM(s) IV Push every 6 hours  ondansetron Injectable 4 milliGRAM(s) IV Push every 6 hours PRN  pantoprazole    Tablet 40 milliGRAM(s) Oral two times a day  polyethylene glycol 3350 17 Gram(s) Oral daily  senna 2 Tablet(s) Oral at bedtime  sodium chloride 0.9%. 1000 milliLiter(s) IV Continuous <Continuous>    Vital Signs Last 24 Hrs  T(C): 36.5 (17 Mar 2021 16:01), Max: 36.7 (17 Mar 2021 00:44)  T(F): 97.7 (17 Mar 2021 16:01), Max: 98 (17 Mar 2021 00:44)  HR: 85 (17 Mar 2021 16:01) (65 - 94)  BP: 98/65 (17 Mar 2021 16:01) (98/65 - 130/84)  BP(mean): --  RR: 18 (17 Mar 2021 16:01) (18 - 18)  SpO2: 100% (17 Mar 2021 16:01) (96% - 100%)    PHYSICAL EXAM:  GENERAL: NAD, well-groomed, well-developed  HEENT : Conjuntivae  clear sclerae anicteric  NECK: Supple, No JVD, Normal thyroid  NERVOUS SYSTEM:  Alert    no  focal  deficits;   CHEST/LUNG: Clear    HEART: Regular rate and rhythm; No murmurs, rubs, or gallops  ABDOMEN: Soft, Nontender, Nondistended; Bowel sounds present  EXTREMITIES:  no  edema no  tenderness  SKIN: No rashes   LABS:                        10.4   10.91 )-----------( 370      ( 17 Mar 2021 08:46 )             35.3     03-17    141  |  105  |  20  ----------------------------<  203<H>  3.8   |  25  |  0.80    Ca    8.5      17 Mar 2021 08:46    TPro  5.1<L>  /  Alb  1.8<L>  /  TBili  0.4  /  DBili  x   /  AST  11<L>  /  ALT  14  /  AlkPhos  84  03-17        CAPILLARY BLOOD GLUCOSE      POCT Blood Glucose.: 121 mg/dL (17 Mar 2021 16:45)  POCT Blood Glucose.: 154 mg/dL (17 Mar 2021 11:31)  POCT Blood Glucose.: 207 mg/dL (17 Mar 2021 08:10)  POCT Blood Glucose.: 225 mg/dL (16 Mar 2021 22:33)      RADIOLOGY & ADDITIONAL TESTS:    Imaging reports  Personally Reviewed:  [ ] YES  [ ] NO    Consultant(s) Notes Reviewed:  [x ] YES  [ ] NO    Care Discussed with Consultants/Other Providers [ x] YES  [ ] NO  Problem/Plan - 1:  ·  Problem: Pulmonary emboli.  Plan: iv  heparin.     Problem/Plan - 2:  ·  Problem: DVT (deep venous thrombosis).  Plan: iv  heparin.     Problem/Plan - 3:  ·  Problem: UTI (urinary tract infection).  Plan: rocephin.     Problem/Plan - 4:  ·  Problem: Asthma.  Plan: symbicort.     Problem/Plan - 5:  ·  Problem: DM (diabetes mellitus).  Plan: insulin coverage.     Problem/Plan - 6:  Problem: Failure to thrive in adult. Plan: encourage  oral  intake  rx  underlying  causes. ADD  REMERON    Problem/Plan - 7:  ·  Problem: Electrolyte abnormality.  Plan: supplement K.     Problem/Plan - 8:  COVID 19  PCR NEGATIVE    +  igG Ab  emesis zofran     DISCUSSED  WITH  PT'S  SON     ASKED  TO  CALL  AGAIN  WHEN  [facility]  UNSURE  WHEN

## 2021-03-17 NOTE — SWALLOW BEDSIDE ASSESSMENT ADULT - SPECIFY REASON(S)
Clinical assessment of swallow functionHas been refusing food in the past few days,putting hands over her mouth when they attempt to feed her. Clinical assessment of swallow function; pt has been refusing food in the past few days ,putting hands over her mouth when they attempt to feed her.

## 2021-03-17 NOTE — DIETITIAN INITIAL EVALUATION ADULT. - PERTINENT LABORATORY DATA
03-17 Na141 mmol/L Glu 203 mg/dL<H> K+ 3.8 mmol/L Cr  0.80 mg/dL BUN 20 mg/dL 03-14 Phos 1.8 mg/dL<L> 03-17 Alb 1.8 g/dL<L>03-17 ALT 14 U/L AST 11 U/L<L> Alkaline Phosphatase 84 U/L  03-15-21 @ 14:43 a1c 7.2<H, not applicable for elderly c impaired cognitive deficit, ADL impairment, chronic illness)

## 2021-03-17 NOTE — SWALLOW BEDSIDE ASSESSMENT ADULT - SWALLOW EVAL: DIAGNOSIS
Oropharyngeal dysphagia associated with weakened and deconditioned status; limited assessment with refusal behaviors and emesis of dark brown material( before po trials attempted); limited water swallows via cup were tolerated; risk of aspiration is increased due to dependent feeding status

## 2021-03-17 NOTE — PROGRESS NOTE ADULT - ASSESSMENT
89yo F hx HTN, DM, hypokalemia, non-verbal at baseline, asthma, recent covid pna, sent to ED from [facility] for failure to thrive and found to have bilateral PEs, ?pulm htn    FTT likely multifactorial recent covid pna, advanced age, ?dementia, active PE.     Given acute PE he is not a candidate for endoscopic evaluation. PPI twice daily. Poorly cooperative with speech and swallow evaluation. FTT, decreased po intake is very poor prognostic indicator. Poor prognosis.   *Will discuss with family, left message

## 2021-03-17 NOTE — PROGRESS NOTE ADULT - SUBJECTIVE AND OBJECTIVE BOX
INTERVAL HPI:   89yo F hx HTN, DM, Hypokalemia, non-verbal at baseline, asthma, sent to ED from [facility] for failure to thrive. Per charts, patient continues to have electrolyte disturbances with potassium repletion. Has been refusing food in the past few days,putting hands over her mouth when they attempt to feed her. Overall weight loss of 10 kg since admission. PT  WITH  HX   COVID  19  ALREADY  RX  IN  HOSPITAL  CONTINUES  +  IN  [facility] SINCE  3/8/21  EVALUATED  IN  ER  FOUND  TO  LAVE  EXTENSIVE DVT  BILATERAL PE  AFIB STARTED  ON  IV  HEPARIN  +  SOB  LEG  PAIN  POOR  APETITE    OVERNIGHT EVENTS:  Non communicative    Vital Signs Last 24 Hrs  T(C): 36.5 (17 Mar 2021 16:01), Max: 36.7 (17 Mar 2021 00:44)  T(F): 97.7 (17 Mar 2021 16:01), Max: 98 (17 Mar 2021 00:44)  HR: 85 (17 Mar 2021 16:01) (65 - 94)  BP: 98/65 (17 Mar 2021 16:01) (98/65 - 130/84)  BP(mean): --  RR: 18 (17 Mar 2021 16:01) (18 - 18)  SpO2: 100% (17 Mar 2021 16:01) (96% - 100%)    PHYSICAL EXAM:  GEN:       comfortable.  HEENT:    Normal.    RESP:        no distress  CVS:          Regular rate and rhythm.   ABD:         Soft, non-tender, non-distended;     MEDICATIONS  (STANDING):  ALBUTerol  90 MICROgram(s) HFA Inhaler - Peds 4 Puff(s) Inhalation every 6 hours  budesonide  80 MICROgram(s)/formoterol 4.5 MICROgram(s) Inhaler 2 Puff(s) Inhalation two times a day  cefTRIAXone   IVPB 1000 milliGRAM(s) IV Intermittent every 24 hours  chlorhexidine 2% Cloths 1 Application(s) Topical <User Schedule>  dextrose 40% Gel 15 Gram(s) Oral once  dextrose 5%. 1000 milliLiter(s) (50 mL/Hr) IV Continuous <Continuous>  dextrose 5%. 1000 milliLiter(s) (100 mL/Hr) IV Continuous <Continuous>  dextrose 50% Injectable 25 Gram(s) IV Push once  dextrose 50% Injectable 12.5 Gram(s) IV Push once  dextrose 50% Injectable 25 Gram(s) IV Push once  enoxaparin Injectable 80 milliGRAM(s) SubCutaneous every 12 hours  ferrous    sulfate 325 milliGRAM(s) Oral daily  gabapentin 300 milliGRAM(s) Oral two times a day  glucagon  Injectable 1 milliGRAM(s) IntraMuscular once  insulin lispro (ADMELOG) corrective regimen sliding scale   SubCutaneous three times a day before meals  metoprolol tartrate Injectable 2.5 milliGRAM(s) IV Push every 6 hours  pantoprazole    Tablet 40 milliGRAM(s) Oral two times a day  polyethylene glycol 3350 17 Gram(s) Oral daily  senna 2 Tablet(s) Oral at bedtime  sodium chloride 0.9%. 1000 milliLiter(s) (100 mL/Hr) IV Continuous <Continuous>    MEDICATIONS  (PRN):  acetaminophen   Tablet .. 650 milliGRAM(s) Oral every 6 hours PRN Mild Pain (1 - 3)  ondansetron Injectable 4 milliGRAM(s) IV Push every 6 hours PRN Nausea and/or Vomiting    LABS:                        10.4   10.91 )-----------( 370      ( 17 Mar 2021 08:46 )             35.3     03-17    141  |  105  |  20  ----------------------------<  203<H>  3.8   |  25  |  0.80    Ca    8.5      17 Mar 2021 08:46    TPro  5.1<L>  /  Alb  1.8<L>  /  TBili  0.4  /  DBili  x   /  AST  11<L>  /  ALT  14  /  AlkPhos  84  03-17    ASSESSMENT AND PLAN:  ·	Bilateral PE.  ·	Left Iliac DVT.  ·	CVA history.  ·	HTN.  ·	DM  ·	Dysphagia.    Oxygenation stable. PO intake is poor.  Will change from Symbicort to budesonide nebulizer as not able to cooperate with MDI.   Bronchodilators as needed.

## 2021-03-17 NOTE — SWALLOW BEDSIDE ASSESSMENT ADULT - SWALLOW EVAL: RECOMMENDED DIET
DYSPHAGIA 1 PUREE WITH THIN LIQUIDS AS LEAST RESTRICTIVE DIET CONSISTENCY following safe swallow strategies

## 2021-03-17 NOTE — SWALLOW BEDSIDE ASSESSMENT ADULT - NS SPL SWALLOW CLINIC TRIAL FT
During cup drinking using her own hand, pt with consistent pharyngeal swallow trigger and reduced laryngeal elevation; no overt s/s aspiration noted

## 2021-03-17 NOTE — SWALLOW BEDSIDE ASSESSMENT ADULT - SWALLOW EVAL: RECOMMENDED FEEDING/EATING TECHNIQUES
allow for swallow between intakes/alternate food with liquid/check mouth frequently for oral residue/pocketing/maintain upright posture during/after eating for 30 mins/no straws/oral hygiene/position upright (90 degrees)/small sips/bites

## 2021-03-17 NOTE — SWALLOW BEDSIDE ASSESSMENT ADULT - SWALLOW EVAL: SECRETION MANAGEMENT
cough followed by expectoration of thick mucuous x 3 prior to po trials/expectorate independently cough followed by expectoration of thick mucuous x 3 prior to po trials; episodes of emesis then followed/expectorate independently

## 2021-03-17 NOTE — DIETITIAN INITIAL EVALUATION ADULT. - ORAL INTAKE PTA/DIET HISTORY
Pt is on COVID-19 isolation.  Pt was transferred from [facility].  Pt was on consistent carbohydrate, ground c thin liquids & Glucerna Shake 1 x day PTA.

## 2021-03-18 DIAGNOSIS — U07.1 COVID-19: ICD-10-CM

## 2021-03-18 DIAGNOSIS — Z51.5 ENCOUNTER FOR PALLIATIVE CARE: ICD-10-CM

## 2021-03-18 DIAGNOSIS — F03.90 UNSPECIFIED DEMENTIA, UNSPECIFIED SEVERITY, WITHOUT BEHAVIORAL DISTURBANCE, PSYCHOTIC DISTURBANCE, MOOD DISTURBANCE, AND ANXIETY: ICD-10-CM

## 2021-03-18 DIAGNOSIS — E43 UNSPECIFIED SEVERE PROTEIN-CALORIE MALNUTRITION: ICD-10-CM

## 2021-03-18 DIAGNOSIS — R53.2 FUNCTIONAL QUADRIPLEGIA: ICD-10-CM

## 2021-03-18 LAB
ALBUMIN SERPL ELPH-MCNC: 1.9 G/DL — LOW (ref 3.3–5)
ALP SERPL-CCNC: 79 U/L — SIGNIFICANT CHANGE UP (ref 40–120)
ALT FLD-CCNC: 14 U/L — SIGNIFICANT CHANGE UP (ref 12–78)
ANION GAP SERPL CALC-SCNC: 12 MMOL/L — SIGNIFICANT CHANGE UP (ref 5–17)
AST SERPL-CCNC: 23 U/L — SIGNIFICANT CHANGE UP (ref 15–37)
BILIRUB SERPL-MCNC: 0.4 MG/DL — SIGNIFICANT CHANGE UP (ref 0.2–1.2)
BUN SERPL-MCNC: 22 MG/DL — SIGNIFICANT CHANGE UP (ref 7–23)
CALCIUM SERPL-MCNC: 8.4 MG/DL — LOW (ref 8.5–10.1)
CHLORIDE SERPL-SCNC: 111 MMOL/L — HIGH (ref 96–108)
CO2 SERPL-SCNC: 22 MMOL/L — SIGNIFICANT CHANGE UP (ref 22–31)
CREAT SERPL-MCNC: 0.78 MG/DL — SIGNIFICANT CHANGE UP (ref 0.5–1.3)
GLUCOSE BLDC GLUCOMTR-MCNC: 139 MG/DL — HIGH (ref 70–99)
GLUCOSE BLDC GLUCOMTR-MCNC: 156 MG/DL — HIGH (ref 70–99)
GLUCOSE BLDC GLUCOMTR-MCNC: 168 MG/DL — HIGH (ref 70–99)
GLUCOSE BLDC GLUCOMTR-MCNC: 174 MG/DL — HIGH (ref 70–99)
GLUCOSE SERPL-MCNC: 164 MG/DL — HIGH (ref 70–99)
HCT VFR BLD CALC: 33.8 % — LOW (ref 34.5–45)
HGB BLD-MCNC: 10.3 G/DL — LOW (ref 11.5–15.5)
MCHC RBC-ENTMCNC: 26.3 PG — LOW (ref 27–34)
MCHC RBC-ENTMCNC: 30.5 GM/DL — LOW (ref 32–36)
MCV RBC AUTO: 86.2 FL — SIGNIFICANT CHANGE UP (ref 80–100)
NRBC # BLD: 1 /100 WBCS — HIGH (ref 0–0)
PLATELET # BLD AUTO: 348 K/UL — SIGNIFICANT CHANGE UP (ref 150–400)
POTASSIUM SERPL-MCNC: 4 MMOL/L — SIGNIFICANT CHANGE UP (ref 3.5–5.3)
POTASSIUM SERPL-SCNC: 4 MMOL/L — SIGNIFICANT CHANGE UP (ref 3.5–5.3)
PROT SERPL-MCNC: 5.2 GM/DL — LOW (ref 6–8.3)
RBC # BLD: 3.92 M/UL — SIGNIFICANT CHANGE UP (ref 3.8–5.2)
RBC # FLD: 17.1 % — HIGH (ref 10.3–14.5)
SODIUM SERPL-SCNC: 145 MMOL/L — SIGNIFICANT CHANGE UP (ref 135–145)
WBC # BLD: 16.51 K/UL — HIGH (ref 3.8–10.5)
WBC # FLD AUTO: 16.51 K/UL — HIGH (ref 3.8–10.5)

## 2021-03-18 PROCEDURE — 99223 1ST HOSP IP/OBS HIGH 75: CPT | Mod: CS

## 2021-03-18 RX ADMIN — ENOXAPARIN SODIUM 80 MILLIGRAM(S): 100 INJECTION SUBCUTANEOUS at 17:35

## 2021-03-18 RX ADMIN — SODIUM CHLORIDE 100 MILLILITER(S): 9 INJECTION INTRAMUSCULAR; INTRAVENOUS; SUBCUTANEOUS at 20:22

## 2021-03-18 RX ADMIN — Medication 2.5 MILLIGRAM(S): at 05:52

## 2021-03-18 RX ADMIN — PANTOPRAZOLE SODIUM 40 MILLIGRAM(S): 20 TABLET, DELAYED RELEASE ORAL at 17:34

## 2021-03-18 RX ADMIN — ENOXAPARIN SODIUM 80 MILLIGRAM(S): 100 INJECTION SUBCUTANEOUS at 05:52

## 2021-03-18 RX ADMIN — CHLORHEXIDINE GLUCONATE 1 APPLICATION(S): 213 SOLUTION TOPICAL at 05:52

## 2021-03-18 RX ADMIN — Medication 2: at 08:01

## 2021-03-18 RX ADMIN — SODIUM CHLORIDE 100 MILLILITER(S): 9 INJECTION INTRAMUSCULAR; INTRAVENOUS; SUBCUTANEOUS at 09:49

## 2021-03-18 RX ADMIN — Medication 2.5 MILLIGRAM(S): at 17:34

## 2021-03-18 RX ADMIN — ALBUTEROL 4 PUFF(S): 90 AEROSOL, METERED ORAL at 17:36

## 2021-03-18 RX ADMIN — Medication 2: at 16:30

## 2021-03-18 RX ADMIN — Medication 2.5 MILLIGRAM(S): at 23:12

## 2021-03-18 RX ADMIN — Medication 2.5 MILLIGRAM(S): at 11:58

## 2021-03-18 RX ADMIN — PANTOPRAZOLE SODIUM 40 MILLIGRAM(S): 20 TABLET, DELAYED RELEASE ORAL at 07:34

## 2021-03-18 RX ADMIN — ALBUTEROL 4 PUFF(S): 90 AEROSOL, METERED ORAL at 11:59

## 2021-03-18 NOTE — CONSULT NOTE ADULT - SUBJECTIVE AND OBJECTIVE BOX
HPI:   89yo F hx HTN, DM, hypokalemia, non-verbal at baseline, asthma, sent to ED from [facility] for failure to thrive. History obtained from chart review. Per charts, patient continues to have electrolyte disturbances with potassium repletion. Has been refusing food in the past few days,putting hands over her mouth when they attempt to feed her. Overall weight loss of 10 kg since admission. PT  WITH  HX   COVID  19  ALREADY  RX  IN  HOSPITAL  CONTINUES  +  IN  [facility] SINCE  3/8/21  EVALUATED  IN  ER  FOUND  TO  LAVE  EXTENSIVE DVT  BILATERAL PE  AFIB STARTED  ON  IV  HEPARIN  +  SOB  LEG  PAIN  POOR  APETITE     (14 Mar 2021 20:05)    PERTINENT PM/SXH:   Lumbar spondylolysis    Stroke    Pneumonia    Diverticulitis    Dysphagia    Asthma    DM (diabetes mellitus)    HTN (hypertension)      Status post right knee replacement    Hip fracture      FAMILY HISTORY:      SOCIAL HISTORY:   Significant other/partner: Yes [ ]  No [ ] Children:  Yes [ ]  No [ ] Amish/Spirituality:  Substance hx: Yes[ ]  No [ ]   Tobacco hx:  Yes [ ] No [ ]   Alcohol hx: Yes [ ] No [ ]   Home Opioid hx:  Yes [ ] No [ ]  [ ] I-Stop Reference No:  Living Situation: [ ]Home  [ ]Long term care  [ ]Rehab [ ]Other    ADVANCE DIRECTIVES:    DNR  MOLST  Yes [ ] No [ ]  Living Will  Yes [ ]  No [ ]     [ ] Health Care Proxy(s)  [ ] Surrogate(s)  [ ] Guardian           Name(s): Phone Number(s):    BASELINE (I)ADL(s) (prior to admission):  [place]: [ ]Total  [ ] Moderate [ ]Dependent    Allergies    No Known Allergies    Intolerances    MEDICATIONS  (STANDING):  ALBUTerol  90 MICROgram(s) HFA Inhaler - Peds 4 Puff(s) Inhalation every 6 hours  buDESOnide    Inhalation Suspension 0.5 milliGRAM(s) Inhalation every 12 hours  chlorhexidine 2% Cloths 1 Application(s) Topical <User Schedule>  dextrose 40% Gel 15 Gram(s) Oral once  dextrose 5%. 1000 milliLiter(s) (50 mL/Hr) IV Continuous <Continuous>  dextrose 5%. 1000 milliLiter(s) (100 mL/Hr) IV Continuous <Continuous>  dextrose 50% Injectable 25 Gram(s) IV Push once  dextrose 50% Injectable 12.5 Gram(s) IV Push once  dextrose 50% Injectable 25 Gram(s) IV Push once  enoxaparin Injectable 80 milliGRAM(s) SubCutaneous every 12 hours  ferrous    sulfate 325 milliGRAM(s) Oral daily  gabapentin 300 milliGRAM(s) Oral two times a day  glucagon  Injectable 1 milliGRAM(s) IntraMuscular once  insulin lispro (ADMELOG) corrective regimen sliding scale   SubCutaneous three times a day before meals  metoprolol tartrate Injectable 2.5 milliGRAM(s) IV Push every 6 hours  mirtazapine 7.5 milliGRAM(s) Oral at bedtime  pantoprazole  Injectable 40 milliGRAM(s) IV Push two times a day  polyethylene glycol 3350 17 Gram(s) Oral daily  senna 2 Tablet(s) Oral at bedtime  sodium chloride 0.9%. 1000 milliLiter(s) (100 mL/Hr) IV Continuous <Continuous>    MEDICATIONS  (PRN):  acetaminophen   Tablet .. 650 milliGRAM(s) Oral every 6 hours PRN Mild Pain (1 - 3)  ondansetron Injectable 4 milliGRAM(s) IV Push every 6 hours PRN Nausea and/or Vomiting    PRESENT SYMPTOMS: [ ]Unable to obtain due to poor mentation   Source if other than patient:  [ ]Family   [ ]Team     Pain (Impact on QOL):    Location -   Severity -        Minimal acceptable level (0-10 scale):  Quality:   Onset:   Duration:                 Aggravating factors -  Relieving factors -  Radiation -    PAIN AD Score:     [email or web address] (press ctrl +  left click to view)    Dyspnea:  Yes [ ] No [ ] - [ ]Mild [ ]Moderate [ ]Severe  Anxiety:    Yes [ ] No [ ] - [ ]Mild [ ]Moderate [ ]Severe  Fatigue:    Yes [ ] No [ ] - [ ]Mild [ ]Moderate [ ]Severe  Nausea:    Yes [ ] No [ ] - [ ]Mild [ ]Moderate [ ]Severe                         Loss of appetite: Yes [ ] No [ ] - [ ]Mild [ ]Moderate [ ]Severe             Constipation:  Yes [ ] No [ ] - [ ]Mild [ ]Moderate [ ]Severe  Grief: Yes [ ] No [ ]     Other Symptoms:  [ ]All other review of systems negative     Karnofsky Performance Score/Palliative Performance Status Version 2:         %    http://palliative.info/resource_material/[email or web address].pdf    PHYSICAL EXAM:  Vital Signs Last 24 Hrs  T(C): 36.2 (18 Mar 2021 10:51), Max: 36.5 (17 Mar 2021 16:01)  T(F): 97.2 (18 Mar 2021 10:51), Max: 97.7 (17 Mar 2021 16:01)  HR: 99 (18 Mar 2021 10:51) (85 - 123)  BP: 107/65 (18 Mar 2021 10:51) (98/65 - 123/81)  BP(mean): --  RR: 17 (18 Mar 2021 10:51) (16 - 18)  SpO2: 96% (18 Mar 2021 10:51) (96% - 100%) I&O's Summary    17 Mar 2021 07:01  -  18 Mar 2021 07:00  --------------------------------------------------------  IN: 0 mL / OUT: 0 mL / NET: 0 mL        GENERAL:  [ ]Alert  [ ]Oriented x   [ ]Lethargic  [ ]Cachexia  [ ]Unarousable  [ ]Verbal  [ ]Non-Verbal  Behavioral:   [ ] Anxiety  [ ] Delirium [ ] Agitation [ ] Other  HEENT:  [ ]Normal   [ ]Dry mouth   [ ]ET Tube/Trach  [ ]Oral lesions  PULMONARY:   [ ]Clear  [ ]Tachypnea  [ ]Audible excessive secretions   [ ]Rhonchi        [ ]Right [ ]Left [ ]Bilateral  [ ]Crackles        [ ]Right [ ]Left [ ]Bilateral  [ ]Wheezing     [ ]Right [ ]Left [ ]Bilateral  CARDIOVASCULAR:    [ ]Regular [ ]Irregular [ ]Tachy  [ ]Manuel [ ]Murmur [ ]Other  GASTROINTESTINAL:  [ ]Soft  [ ]Distended   [ ]+BS  [ ]Non tender [ ]Tender  [ ]PEG [ ]OGT/ NGT  Last BM:     GENITOURINARY:  [ ]Normal [ ] Incontinent   [ ]Oliguria/Anuria   [ ]Mcdonald  MUSCULOSKELETAL:   [ ]Normal   [ ]Weakness  [ ]Bed/Wheelchair bound [ ]Edema  NEUROLOGIC:   [ ]No focal deficits  [ ] Cognitive impairment  [ ] Dysphagia [ ]Dysarthria [ ] Paresis [ ]Other   SKIN:   [ ]Normal   [ ]Pressure ulcer(s)  [ ]Rash    LABS:                        10.3   16.51 )-----------( 348      ( 18 Mar 2021 08:49 )             33.8   03-18    145  |  111<H>  |  22  ----------------------------<  164<H>  4.0   |  22  |  0.78    Ca    8.4<L>      18 Mar 2021 08:49    TPro  5.2<L>  /  Alb  1.9<L>  /  TBili  0.4  /  DBili  x   /  AST  23  /  ALT  14  /  AlkPhos  79  03-18        RADIOLOGY & ADDITIONAL STUDIES:    PROTEIN CALORIE MALNUTRITION PRESENT: [ ] Yes [ ] No  [ ] PPSV2 < or = to 30% [ ] significant weight loss  [ ] poor nutritional intake [ ] catabolic state [ ] anasarca     Albumin, Serum: 1.9 g/dL (03-18-21 @ 08:49)      REFERRALS:   [ ]Chaplaincy  [ ] Hospice  [ ]Child Life  [ ]Social Work  [ ]Case management [ ]Holistic Therapy   Goals of Care Discussion Document: SKINNY Tavares (03-17-21 @ 09:15)  Goals of Care Conversation:   Participants:  · Participants  Family  · Child(lennie)  kyra Cosby    Advance Directives:  · Caregiver:  no    Conversation Discussion:  · Conversation  Diagnosis; Prognosis; MOLST Discussed; FULL CODE  · Conversation Details  89yo F hx HTN, DM, hypokalemia, non-verbal at baseline, asthma, recent covid pna, sent to ED from [facility] for failure to thrive and found to have bilateral PEs and DVt on Full dose Lovenox.    Discussed with son Henrique in detail about patients diagnosis and poor prognosis. Explained to son risks vs benefit currently about proceeding with PEG, pt is on anticoagulation for DVT/PE and not candidate for PEG currently. Since pt has poor PO intake eventually pt health will decline further and son is aware of poor prognosis. However son continues to wish for aggressive measures and wants FULL CODE. Son aware PE may travel to the heart and could cause cardiac arrest but still wishes to be aggressive and would like CPR on mom. Also son wished to put mom on ventilator if breathing may stop.    What Matters Most To Patient and Family:  · What matters most to patient and family  Aggressive measures    Personal Advance Directives Treatment Guidelines:   Treatment Guidelines:  · Treatment Guideline Comments  FULL CODE    Location of Discussion:   Duration of Advanced Care Planning Meeting:  · Time spent (in minutes)  30    Location of Discussion:  · Location of discussion  Telephone      Electronic Signatures:  Barbara Tavares)  (Signed 17-Mar-2021 09:22)  	Authored: Goals of Care Conversation, Personal Advance Directives Treatment Guidelines, Location of Discussion      Last Updated: 17-Mar-2021 09:22 by Barbara Tavares)     HPI:   89yo F hx HTN, DM, hypokalemia, non-verbal at baseline, asthma, sent to ED from [facility] for failure to thrive. History obtained from chart review. Per charts, patient continues to have electrolyte disturbances with potassium repletion. Has been refusing food in the past few days,putting hands over her mouth when they attempt to feed her. Overall weight loss of 10 kg since admission. PT  WITH  HX   COVID  19  ALREADY  RX  IN  HOSPITAL  CONTINUES  +  IN  [facility] SINCE  3/8/21  EVALUATED  IN  ER  FOUND  TO  LAVE  EXTENSIVE DVT  BILATERAL PE  AFIB STARTED  ON  IV  HEPARIN  +  SOB  LEG  PAIN  POOR  APETITE     (14 Mar 2021 20:05)    PERTINENT PM/SXH:   Lumbar spondylolysis    Stroke    Pneumonia    Diverticulitis    Dysphagia    Asthma    DM (diabetes mellitus)    HTN (hypertension)      Status post right knee replacement    Hip fracture      FAMILY HISTORY:      SOCIAL HISTORY:   Significant other/partner: Yes [x ]  No [ ] Children:  Yes [ x]  No [ ] Congregational/Spirituality: Moravian   Substance hx: Yes[ ]  No [x ]   Tobacco hx:  Yes [ ] No [x ]   Alcohol hx: Yes [ ] No [x ]   Home Opioid hx:  Yes [ ] No [x ]  [ ] I-Stop Reference No:  Living Situation: [x ]Home  [ ]Long term care  [ ]Rehab [ ]Other    ADVANCE DIRECTIVES:    DNR  MOLST  Yes [ ] No [x ]  Living Will  Yes [ ]  No [x ]     [ ] Health Care Proxy(s)  [ x] Surrogate(s)  [ ] Guardian           Name(s): Phone Number(s): Henrique Joy     BASELINE (I)ADL(s) (prior to admission):  [place]: [ ]Total  [ ] Moderate [x ]Dependent    Allergies    No Known Allergies    Intolerances    MEDICATIONS  (STANDING):  ALBUTerol  90 MICROgram(s) HFA Inhaler - Peds 4 Puff(s) Inhalation every 6 hours  buDESOnide    Inhalation Suspension 0.5 milliGRAM(s) Inhalation every 12 hours  chlorhexidine 2% Cloths 1 Application(s) Topical <User Schedule>  dextrose 40% Gel 15 Gram(s) Oral once  dextrose 5%. 1000 milliLiter(s) (50 mL/Hr) IV Continuous <Continuous>  dextrose 5%. 1000 milliLiter(s) (100 mL/Hr) IV Continuous <Continuous>  dextrose 50% Injectable 25 Gram(s) IV Push once  dextrose 50% Injectable 12.5 Gram(s) IV Push once  dextrose 50% Injectable 25 Gram(s) IV Push once  enoxaparin Injectable 80 milliGRAM(s) SubCutaneous every 12 hours  ferrous    sulfate 325 milliGRAM(s) Oral daily  gabapentin 300 milliGRAM(s) Oral two times a day  glucagon  Injectable 1 milliGRAM(s) IntraMuscular once  insulin lispro (ADMELOG) corrective regimen sliding scale   SubCutaneous three times a day before meals  metoprolol tartrate Injectable 2.5 milliGRAM(s) IV Push every 6 hours  mirtazapine 7.5 milliGRAM(s) Oral at bedtime  pantoprazole  Injectable 40 milliGRAM(s) IV Push two times a day  polyethylene glycol 3350 17 Gram(s) Oral daily  senna 2 Tablet(s) Oral at bedtime  sodium chloride 0.9%. 1000 milliLiter(s) (100 mL/Hr) IV Continuous <Continuous>    MEDICATIONS  (PRN):  acetaminophen   Tablet .. 650 milliGRAM(s) Oral every 6 hours PRN Mild Pain (1 - 3)  ondansetron Injectable 4 milliGRAM(s) IV Push every 6 hours PRN Nausea and/or Vomiting    PRESENT SYMPTOMS: [x ]Unable to obtain due to poor mentation   Source if other than patient:  [ ]Family   [ ]Team     Pain (Impact on QOL):    Location -   Severity -        Minimal acceptable level (0-10 scale):  Quality:   Onset:   Duration:                 Aggravating factors -  Relieving factors -  Radiation -    PAIN AD Score: 4    [email or web address] (press ctrl +  left click to view)    Dyspnea:  Yes [ ] No [ ] - [ ]Mild [ ]Moderate [ ]Severe  Anxiety:    Yes [ ] No [ ] - [ ]Mild [ ]Moderate [ ]Severe  Fatigue:    Yes [ ] No [ ] - [ ]Mild [ ]Moderate [ ]Severe  Nausea:    Yes [ ] No [ ] - [ ]Mild [ ]Moderate [ ]Severe                         Loss of appetite: Yes [ ] No [ ] - [ ]Mild [ ]Moderate [ ]Severe             Constipation:  Yes [ ] No [ ] - [ ]Mild [ ]Moderate [ ]Severe  Grief: Yes [ ] No [ ]     Other Symptoms:  [ ]All other review of systems negative     Karnofsky Performance Score/Palliative Performance Status Version 2:        10-20 %    http://palliative.info/resource_material/[email or web address].pdf    PHYSICAL EXAM:  Vital Signs Last 24 Hrs  T(C): 36.2 (18 Mar 2021 10:51), Max: 36.5 (17 Mar 2021 16:01)  T(F): 97.2 (18 Mar 2021 10:51), Max: 97.7 (17 Mar 2021 16:01)  HR: 99 (18 Mar 2021 10:51) (85 - 123)  BP: 107/65 (18 Mar 2021 10:51) (98/65 - 123/81)  BP(mean): --  RR: 17 (18 Mar 2021 10:51) (16 - 18)  SpO2: 96% (18 Mar 2021 10:51) (96% - 100%) I&O's Summary    17 Mar 2021 07:01  -  18 Mar 2021 07:00  --------------------------------------------------------  IN: 0 mL / OUT: 0 mL / NET: 0 mL        GENERAL:  [ ]Alert  [ ]Oriented x   [ x]Lethargic  [ ]Cachexia  [ ]Unarousable  [ ]Verbal  [x ]Non-Verbal- only moaning and crying out when examined   Behavioral:   [ ] Anxiety  [ ] Delirium [ ] Agitation [ ] Other  HEENT:  [ ]Normal   [ x]Dry mouth   [ ]ET Tube/Trach  [ ]Oral lesions  PULMONARY:   [ ]Clear  [ x]Tachypnea  [x ]Audible excessive secretions   [ ]Rhonchi        [ ]Right [ ]Left [ ]Bilateral  [ ]Crackles        [ ]Right [ ]Left [ ]Bilateral  [ ]Wheezing     [ ]Right [ ]Left [ ]Bilateral  CARDIOVASCULAR:    [ x]Regular [ ]Irregular [ ]Tachy  [ ]Manuel [ ]Murmur [ ]Other  GASTROINTESTINAL:  [x ]Soft  [ ]Distended   [x ]+BS  [ ]Non tender [ ]Tender  [ ]PEG [ ]OGT/ NGT  Last BM: 3/17    GENITOURINARY:  [ ]Normal [x ] Incontinent   [ ]Oliguria/Anuria   [ ]Mcdonald  MUSCULOSKELETAL:   [ ]Normal   [ ]Weakness  [x ]Bed/Wheelchair bound [ x]Edema  NEUROLOGIC:   [ ]No focal deficits  [x ] Cognitive impairment  [x ] Dysphagia [x ]Dysarthria [ ] Paresis [ ]Other   SKIN:   [ ]Normal   [x ]Pressure ulcer(s) DTI r ankle [ ]Rash    LABS:                        10.3   16.51 )-----------( 348      ( 18 Mar 2021 08:49 )             33.8   03-18    145  |  111<H>  |  22  ----------------------------<  164<H>  4.0   |  22  |  0.78    Ca    8.4<L>      18 Mar 2021 08:49    TPro  5.2<L>  /  Alb  1.9<L>  /  TBili  0.4  /  DBili  x   /  AST  23  /  ALT  14  /  AlkPhos  79  03-18        RADIOLOGY & ADDITIONAL STUDIES:   < from: CT Angio Chest w/ IV Cont (03.14.21 @ 18:32) >  EXAM:  CT ANGIO CHEST (W)AW IC                            PROCEDURE DATE:  03/14/2021          INTERPRETATION:  CLINICAL INFORMATION: Failure to thrive. Electrolyte imbalance. History of COVID. Evaluate for pulmonary embolism.    COMPARISON: Chest radiograph 3/14/2021.    CONTRAST/COMPLICATIONS:  IV Contrast: Omnipaque 350 / 85 cc administered / .  Oral Contrast: NONE  Complications: None reported at time of study completion    PROCEDURE:  CT Angiography of the Chest.  Sagittal and coronal reformats were performed as well as 3D (MIP) reconstructions.    FINDINGS:    LUNGS AND AIRWAYS PLEURA:  Small bilateral pleural effusions and underlying compressive atelectasis, larger on the right.  Patchy bilateral groundglass opacities and mosaic attenuation.  Left apical pleural parenchymal consolidation with focal cystic bronchiectasis.  Paraseptal emphysematous changes right middle lobe.    The central airways remain patent.    MEDIASTINUM AND HERNAN: No lymphadenopathy.    VESSELS: There are pulmonary emboli throughout the right lower lobe and segmental right lower lobe pulmonary arteries.  There are pulmonary emboli segmental left lingula lobe and left lower lobe and segmental left lower lobe pulmonary arterial vasculature.  There is prominence of the main pulmonary arterial trunk measuring 3.3 cm, finding which may be associated with pulmonary arterial hypertension.    Atherosclerotic calcification of the thoracic aorta with coronary artery calcifications.    HEART: Mildly enlarged.  Trace pericardial effusion.    CHEST WALL AND LOWER NECK: Within normal limits.    VISUALIZED UPPER ABDOMEN:  Streak artifact degrades image quality limiting evaluation.  Nodular contour of liver.  Nodular thickening bilateral adrenal glands.    BONES:Degenerative changes spine.    IMPRESSION:    Acute bilateral pulmonary emboli as discussed.  Prominence of the main pulmonary arterial trunk which may reflect pulmonary arterial hypertension.  This critical value was discussed with Dr. Mtz   by telephone at the time of interpretation on 3/14/2021.    Other findings as discussed above.              PETRONA MCCONNELL MD; Attending Radiologist  This document has been electronically signed. Mar 14 2021  7:22PM    < end of copied text >    < from: Xray Chest 1 View- PORTABLE-Urgent (Xray Chest 1 View- PORTABLE-Urgent .) (03.14.21 @ 13:08) >    EXAM:  XR CHEST PORTABLE URGENT 1V                            PROCEDURE DATE:  03/14/2021          INTERPRETATION:  Clinical Information: Dyspnea    Technique: AP chest image.    Comparison: 02/24/2021    Findings/  Impression: The heart is unremarkable. Small right base opacity.            JAZMINE FLOREZ MD; Attending Interventional Radiologist  This document has been electronically signed. Mar 14 2021  3:02PM    < end of copied text >    PROTEIN CALORIE MALNUTRITION PRESENT: [x ] Yes [ ] No  [ x] PPSV2 < or = to 30% [ x] significant weight loss  [x ] poor nutritional intake [ x] catabolic state [x ] anasarca     Albumin, Serum: 1.9 g/dL (03-18-21 @ 08:49)      REFERRALS:   [ ]Chaplaincy  [ ] Hospice  [ ]Child Life  [ ]Social Work  [ ]Case management [ ]Holistic Therapy   Goals of Care Discussion Document: SKINNY Tavares (03-17-21 @ 09:15)  Goals of Care Conversation:   Participants:  · Participants  Family  · Child(lennie)  kyra Cosby    Advance Directives:  · Caregiver:  no    Conversation Discussion:  · Conversation  Diagnosis; Prognosis; MOLST Discussed; FULL CODE  · Conversation Details  89yo F hx HTN, DM, hypokalemia, non-verbal at baseline, asthma, recent covid pna, sent to ED from [facility] for failure to thrive and found to have bilateral PEs and DVt on Full dose Lovenox.    Discussed with son Henrique in detail about patients diagnosis and poor prognosis. Explained to son risks vs benefit currently about proceeding with PEG, pt is on anticoagulation for DVT/PE and not candidate for PEG currently. Since pt has poor PO intake eventually pt health will decline further and son is aware of poor prognosis. However son continues to wish for aggressive measures and wants FULL CODE. Son aware PE may travel to the heart and could cause cardiac arrest but still wishes to be aggressive and would like CPR on mom. Also son wished to put mom on ventilator if breathing may stop.    What Matters Most To Patient and Family:  · What matters most to patient and family  Aggressive measures    Personal Advance Directives Treatment Guidelines:   Treatment Guidelines:  · Treatment Guideline Comments  FULL CODE    Location of Discussion:   Duration of Advanced Care Planning Meeting:  · Time spent (in minutes)  30    Location of Discussion:  · Location of discussion  Telephone      Electronic Signatures:  Barbara Tavares)  (Signed 17-Mar-2021 09:22)  	Authored: Goals of Care Conversation, Personal Advance Directives Treatment Guidelines, Location of Discussion      Last Updated: 17-Mar-2021 09:22 by Barbara Tavares)

## 2021-03-18 NOTE — CONSULT NOTE ADULT - ASSESSMENT
89yo F hx HTN, DM, hypokalemia, non-verbal at baseline, asthma, recent hospitalization for COVID remains COVID + admitted with failure to thrive, VTE, dyspnea. Palliative Care consulted for assistance with GOC, nutritional GOC discussion.

## 2021-03-18 NOTE — CONSULT NOTE ADULT - PROBLEM SELECTOR RECOMMENDATION 2
-Appreciate speech and swallow evaluation   -Recommend palliative consultation; poor prognosis
recurrent bouts of hypokalemia requiring frequent replacement

## 2021-03-18 NOTE — CONSULT NOTE ADULT - PROBLEM SELECTOR RECOMMENDATION 9
-Pantoprazole 40 mg twice daily empirically   -Zofran prn   -Slowly advance diet as tolerated; pending swallow evaluation   **Holding endoscopic evaluation given advanced age, clinical status, recent covid, acute PE
pt refusing PO, covering mouth, pooling, not taking much   recurrent hypokalemia   low albumin stores

## 2021-03-18 NOTE — PHARMACOTHERAPY INTERVENTION NOTE - INTERVENTION CATEGORIES
[Mother] : mother [FreeTextEntry1] : discussed visit with patient/legal guardian in preferred language of English ASP

## 2021-03-18 NOTE — PROGRESS NOTE ADULT - SUBJECTIVE AND OBJECTIVE BOX
INTERVAL HPI:  89yo F hx HTN, DM, Hypokalemia, non-verbal at baseline, asthma, sent to ED from [facility] for failure to thrive. Per charts, patient continues to have electrolyte disturbances with potassium repletion. Has been refusing food in the past few days,putting hands over her mouth when they attempt to feed her. Overall weight loss of 10 kg since admission. PT  WITH  HX   COVID  19  ALREADY  RX  IN  HOSPITAL  CONTINUES  +  IN  [facility] SINCE  3/8/21  EVALUATED  IN  ER  FOUND  TO  LAVE  EXTENSIVE DVT  BILATERAL PE  AFIB STARTED  ON  IV  HEPARIN  +  SOB  LEG  PAIN  POOR  APETITE    OVERNIGHT EVENTS:  Comfortable    Vital Signs Last 24 Hrs  T(C): 36.7 (18 Mar 2021 17:15), Max: 36.7 (18 Mar 2021 17:15)  T(F): 98 (18 Mar 2021 17:15), Max: 98 (18 Mar 2021 17:15)  HR: 94 (18 Mar 2021 17:15) (89 - 123)  BP: 142/78 (18 Mar 2021 17:15) (107/65 - 142/78)  BP(mean): --  RR: 18 (18 Mar 2021 17:15) (16 - 18)  SpO2: 96% (18 Mar 2021 17:15) (96% - 100%)    PHYSICAL EXAM:  GEN:         Awake, responsive and comfortable.  HEENT:    Normal.    RESP:       no distress  CVS:             Regular rate and rhythm.   ABD:         Soft, non-tender, non-distended;     MEDICATIONS  (STANDING):  ALBUTerol  90 MICROgram(s) HFA Inhaler - Peds 4 Puff(s) Inhalation every 6 hours  buDESOnide    Inhalation Suspension 0.5 milliGRAM(s) Inhalation every 12 hours  chlorhexidine 2% Cloths 1 Application(s) Topical <User Schedule>  dextrose 40% Gel 15 Gram(s) Oral once  dextrose 5%. 1000 milliLiter(s) (50 mL/Hr) IV Continuous <Continuous>  dextrose 5%. 1000 milliLiter(s) (100 mL/Hr) IV Continuous <Continuous>  dextrose 50% Injectable 25 Gram(s) IV Push once  dextrose 50% Injectable 12.5 Gram(s) IV Push once  dextrose 50% Injectable 25 Gram(s) IV Push once  enoxaparin Injectable 80 milliGRAM(s) SubCutaneous every 12 hours  ferrous    sulfate 325 milliGRAM(s) Oral daily  gabapentin 300 milliGRAM(s) Oral two times a day  glucagon  Injectable 1 milliGRAM(s) IntraMuscular once  insulin lispro (ADMELOG) corrective regimen sliding scale   SubCutaneous three times a day before meals  metoprolol tartrate Injectable 2.5 milliGRAM(s) IV Push every 6 hours  mirtazapine 7.5 milliGRAM(s) Oral at bedtime  pantoprazole  Injectable 40 milliGRAM(s) IV Push two times a day  polyethylene glycol 3350 17 Gram(s) Oral daily  senna 2 Tablet(s) Oral at bedtime  sodium chloride 0.9%. 1000 milliLiter(s) (100 mL/Hr) IV Continuous <Continuous>    MEDICATIONS  (PRN):  acetaminophen   Tablet .. 650 milliGRAM(s) Oral every 6 hours PRN Mild Pain (1 - 3)  ondansetron Injectable 4 milliGRAM(s) IV Push every 6 hours PRN Nausea and/or Vomiting    LABS:                        10.3   16.51 )-----------( 348      ( 18 Mar 2021 08:49 )             33.8     03-18    145  |  111<H>  |  22  ----------------------------<  164<H>  4.0   |  22  |  0.78    Ca    8.4<L>      18 Mar 2021 08:49    TPro  5.2<L>  /  Alb  1.9<L>  /  TBili  0.4  /  DBili  x   /  AST  23  /  ALT  14  /  AlkPhos  79  03-18    ASSESSMENT AND PLAN:  ·	Bilateral PE.  ·	Left Iliac DVT.  ·	CVA history.  ·	HTN.  ·	DM  ·	Dysphagia.    SPO2 stable on room air.  Continue budesonide.

## 2021-03-18 NOTE — CONSULT NOTE ADULT - PROBLEM SELECTOR RECOMMENDATION 8
discussion with son Henrique yesterday by Medicine KARRIE Ewing as above. Family goals remain full code and PEG if needed. Unlikely to change their goals with further discussion but will attempt. Prognosis limited by poor baseline, advanced age and comorbidities

## 2021-03-19 LAB
ALBUMIN SERPL ELPH-MCNC: 1.7 G/DL — LOW (ref 3.3–5)
ALP SERPL-CCNC: 77 U/L — SIGNIFICANT CHANGE UP (ref 40–120)
ALT FLD-CCNC: 13 U/L — SIGNIFICANT CHANGE UP (ref 12–78)
ANION GAP SERPL CALC-SCNC: 10 MMOL/L — SIGNIFICANT CHANGE UP (ref 5–17)
AST SERPL-CCNC: 11 U/L — LOW (ref 15–37)
BILIRUB SERPL-MCNC: 0.4 MG/DL — SIGNIFICANT CHANGE UP (ref 0.2–1.2)
BUN SERPL-MCNC: 25 MG/DL — HIGH (ref 7–23)
CALCIUM SERPL-MCNC: 8.4 MG/DL — LOW (ref 8.5–10.1)
CHLORIDE SERPL-SCNC: 114 MMOL/L — HIGH (ref 96–108)
CO2 SERPL-SCNC: 23 MMOL/L — SIGNIFICANT CHANGE UP (ref 22–31)
CREAT SERPL-MCNC: 0.9 MG/DL — SIGNIFICANT CHANGE UP (ref 0.5–1.3)
GLUCOSE BLDC GLUCOMTR-MCNC: 123 MG/DL — HIGH (ref 70–99)
GLUCOSE BLDC GLUCOMTR-MCNC: 137 MG/DL — HIGH (ref 70–99)
GLUCOSE BLDC GLUCOMTR-MCNC: 141 MG/DL — HIGH (ref 70–99)
GLUCOSE BLDC GLUCOMTR-MCNC: 164 MG/DL — HIGH (ref 70–99)
GLUCOSE SERPL-MCNC: 145 MG/DL — HIGH (ref 70–99)
HCT VFR BLD CALC: 30.9 % — LOW (ref 34.5–45)
HGB BLD-MCNC: 9.2 G/DL — LOW (ref 11.5–15.5)
MCHC RBC-ENTMCNC: 26.1 PG — LOW (ref 27–34)
MCHC RBC-ENTMCNC: 29.8 GM/DL — LOW (ref 32–36)
MCV RBC AUTO: 87.5 FL — SIGNIFICANT CHANGE UP (ref 80–100)
NRBC # BLD: 3 /100 WBCS — HIGH (ref 0–0)
PLATELET # BLD AUTO: 322 K/UL — SIGNIFICANT CHANGE UP (ref 150–400)
POTASSIUM SERPL-MCNC: 3.9 MMOL/L — SIGNIFICANT CHANGE UP (ref 3.5–5.3)
POTASSIUM SERPL-SCNC: 3.9 MMOL/L — SIGNIFICANT CHANGE UP (ref 3.5–5.3)
PROT SERPL-MCNC: 5.2 GM/DL — LOW (ref 6–8.3)
RBC # BLD: 3.53 M/UL — LOW (ref 3.8–5.2)
RBC # FLD: 17.3 % — HIGH (ref 10.3–14.5)
SODIUM SERPL-SCNC: 147 MMOL/L — HIGH (ref 135–145)
WBC # BLD: 10.47 K/UL — SIGNIFICANT CHANGE UP (ref 3.8–10.5)
WBC # FLD AUTO: 10.47 K/UL — SIGNIFICANT CHANGE UP (ref 3.8–10.5)

## 2021-03-19 PROCEDURE — 99233 SBSQ HOSP IP/OBS HIGH 50: CPT | Mod: CS

## 2021-03-19 RX ADMIN — Medication 2.5 MILLIGRAM(S): at 05:53

## 2021-03-19 RX ADMIN — Medication 2: at 12:12

## 2021-03-19 RX ADMIN — ENOXAPARIN SODIUM 80 MILLIGRAM(S): 100 INJECTION SUBCUTANEOUS at 05:53

## 2021-03-19 RX ADMIN — Medication 2.5 MILLIGRAM(S): at 12:12

## 2021-03-19 RX ADMIN — ENOXAPARIN SODIUM 80 MILLIGRAM(S): 100 INJECTION SUBCUTANEOUS at 17:22

## 2021-03-19 RX ADMIN — Medication 2.5 MILLIGRAM(S): at 17:22

## 2021-03-19 RX ADMIN — PANTOPRAZOLE SODIUM 40 MILLIGRAM(S): 20 TABLET, DELAYED RELEASE ORAL at 05:53

## 2021-03-19 RX ADMIN — SODIUM CHLORIDE 100 MILLILITER(S): 9 INJECTION INTRAMUSCULAR; INTRAVENOUS; SUBCUTANEOUS at 06:00

## 2021-03-19 RX ADMIN — CHLORHEXIDINE GLUCONATE 1 APPLICATION(S): 213 SOLUTION TOPICAL at 05:58

## 2021-03-19 RX ADMIN — ALBUTEROL 4 PUFF(S): 90 AEROSOL, METERED ORAL at 12:14

## 2021-03-19 RX ADMIN — SODIUM CHLORIDE 100 MILLILITER(S): 9 INJECTION INTRAMUSCULAR; INTRAVENOUS; SUBCUTANEOUS at 15:53

## 2021-03-19 RX ADMIN — PANTOPRAZOLE SODIUM 40 MILLIGRAM(S): 20 TABLET, DELAYED RELEASE ORAL at 17:22

## 2021-03-19 NOTE — PROGRESS NOTE ADULT - SUBJECTIVE AND OBJECTIVE BOX
INTERVAL HPI:   89yo F hx HTN, DM, Hypokalemia, non-verbal at baseline, asthma, sent to ED from [facility] for failure to thrive. Per charts, patient continues to have electrolyte disturbances with potassium repletion. Has been refusing food in the past few days,putting hands over her mouth when they attempt to feed her. Overall weight loss of 10 kg since admission. PT  WITH  HX   COVID  19  ALREADY  RX  IN  HOSPITAL  CONTINUES  +  IN  [facility] SINCE  3/8/21  EVALUATED  IN  ER  FOUND  TO  LAVE  EXTENSIVE DVT  BILATERAL PE  AFIB STARTED  ON  IV  HEPARIN  +  SOB  LEG  PAIN  POOR  APETITE    OVERNIGHT EVENTS:  Comfortable, non communicative    Vital Signs Last 24 Hrs  T(C): 36.3 (19 Mar 2021 16:33), Max: 36.8 (19 Mar 2021 05:49)  T(F): 97.4 (19 Mar 2021 16:33), Max: 98.3 (19 Mar 2021 12:43)  HR: 100 (19 Mar 2021 16:33) (96 - 118)  BP: 115/59 (19 Mar 2021 16:33) (115/59 - 128/77)  BP(mean): --  RR: 18 (19 Mar 2021 16:33) (18 - 18)  SpO2: 95% (19 Mar 2021 16:33) (95% - 96%)    PHYSICAL EXAM:  GEN:        non communicative, comfortable.  HEENT:    Normal.    RESP:       no distress  CVS:          Regular rate and rhythm.   ABD:         Soft, non-tender, non-distended;     MEDICATIONS  (STANDING):  ALBUTerol  90 MICROgram(s) HFA Inhaler - Peds 4 Puff(s) Inhalation every 6 hours  buDESOnide    Inhalation Suspension 0.5 milliGRAM(s) Inhalation every 12 hours  chlorhexidine 2% Cloths 1 Application(s) Topical <User Schedule>  dextrose 40% Gel 15 Gram(s) Oral once  dextrose 5%. 1000 milliLiter(s) (50 mL/Hr) IV Continuous <Continuous>  dextrose 5%. 1000 milliLiter(s) (100 mL/Hr) IV Continuous <Continuous>  dextrose 50% Injectable 25 Gram(s) IV Push once  dextrose 50% Injectable 12.5 Gram(s) IV Push once  dextrose 50% Injectable 25 Gram(s) IV Push once  enoxaparin Injectable 80 milliGRAM(s) SubCutaneous every 12 hours  ferrous    sulfate 325 milliGRAM(s) Oral daily  gabapentin 300 milliGRAM(s) Oral two times a day  glucagon  Injectable 1 milliGRAM(s) IntraMuscular once  insulin lispro (ADMELOG) corrective regimen sliding scale   SubCutaneous three times a day before meals  metoprolol tartrate Injectable 2.5 milliGRAM(s) IV Push every 6 hours  mirtazapine 7.5 milliGRAM(s) Oral at bedtime  pantoprazole  Injectable 40 milliGRAM(s) IV Push two times a day  polyethylene glycol 3350 17 Gram(s) Oral daily  senna 2 Tablet(s) Oral at bedtime  sodium chloride 0.9%. 1000 milliLiter(s) (100 mL/Hr) IV Continuous <Continuous>    MEDICATIONS  (PRN):  acetaminophen   Tablet .. 650 milliGRAM(s) Oral every 6 hours PRN Mild Pain (1 - 3)  ondansetron Injectable 4 milliGRAM(s) IV Push every 6 hours PRN Nausea and/or Vomiting    LABS:                        9.2    10.47 )-----------( 322      ( 19 Mar 2021 14:30 )             30.9     03-19    147<H>  |  114<H>  |  25<H>  ----------------------------<  145<H>  3.9   |  23  |  0.90    Ca    8.4<L>      19 Mar 2021 14:30    TPro  5.2<L>  /  Alb  1.7<L>  /  TBili  0.4  /  DBili  x   /  AST  11<L>  /  ALT  13  /  AlkPhos  77  03-19    ASSESSMENT AND PLAN:  ·	Bilateral PE.  ·	Left Iliac DVT.  ·	CVA history.  ·	HTN.  ·	DM  ·	Dysphagia.    SPO2 is stable on room air but PO intake remains an issue.  On full dose Lovenox.

## 2021-03-19 NOTE — PROGRESS NOTE ADULT - SUBJECTIVE AND OBJECTIVE BOX
INTERVAL HPI/OVERNIGHT EVENTS:    Code Status: full  Allergies    No Known Allergies    Intolerances    MEDICATIONS  (STANDING):  ALBUTerol  90 MICROgram(s) HFA Inhaler - Peds 4 Puff(s) Inhalation every 6 hours  buDESOnide    Inhalation Suspension 0.5 milliGRAM(s) Inhalation every 12 hours  chlorhexidine 2% Cloths 1 Application(s) Topical <User Schedule>  dextrose 40% Gel 15 Gram(s) Oral once  dextrose 5%. 1000 milliLiter(s) (50 mL/Hr) IV Continuous <Continuous>  dextrose 5%. 1000 milliLiter(s) (100 mL/Hr) IV Continuous <Continuous>  dextrose 50% Injectable 25 Gram(s) IV Push once  dextrose 50% Injectable 12.5 Gram(s) IV Push once  dextrose 50% Injectable 25 Gram(s) IV Push once  enoxaparin Injectable 80 milliGRAM(s) SubCutaneous every 12 hours  ferrous    sulfate 325 milliGRAM(s) Oral daily  gabapentin 300 milliGRAM(s) Oral two times a day  glucagon  Injectable 1 milliGRAM(s) IntraMuscular once  insulin lispro (ADMELOG) corrective regimen sliding scale   SubCutaneous three times a day before meals  metoprolol tartrate Injectable 2.5 milliGRAM(s) IV Push every 6 hours  mirtazapine 7.5 milliGRAM(s) Oral at bedtime  pantoprazole  Injectable 40 milliGRAM(s) IV Push two times a day  polyethylene glycol 3350 17 Gram(s) Oral daily  senna 2 Tablet(s) Oral at bedtime  sodium chloride 0.9%. 1000 milliLiter(s) (100 mL/Hr) IV Continuous <Continuous>    MEDICATIONS  (PRN):  acetaminophen   Tablet .. 650 milliGRAM(s) Oral every 6 hours PRN Mild Pain (1 - 3)  ondansetron Injectable 4 milliGRAM(s) IV Push every 6 hours PRN Nausea and/or Vomiting      PRESENT SYMPTOMS: [ ]Unable to obtain due to poor mentation   Source if other than patient:  [ ]Family   [ ]Team     Pain (Impact on QOL):    Location:  Severity:  Minimal acceptable level (0-10 scale):       Quality:       Onset:  Duration:  Aggravating factors:  Relieving Factors  Radiation:    Dyspnea:  Yes [ ] No [ ] - [ ]Mild [ ]Moderate [ ]Severe  Anxiety:    Yes [ ] No [ ] - [ ]Mild [ ]Moderate [ ]Severe  Fatigue:    Yes [ ] No [ ] - [ ]Mild [ ]Moderate [ ]Severe  Nausea:    Yes [ ] No [ ] - [ ]Mild [ ]Moderate [ ]Severe                         Loss of appetite: Yes [ ] No [ ] - [ ]Mild [ ]Moderate [ ]Severe             Constipation:  Yes [ ] No [ ] - [ ]Mild [ ]Moderate [ ]Severe  Grief: Yes [ ] No [ ]     PAIN AD Score:	  [email or web address] (Ctrl + left click to view)    Other Symptoms:  [ ]All other review of systems negative     Karnofsky Performance Score/Palliative Performance Status Version 2:         %    http://palliative.info/resource_material/[email or web address].pdf    PHYSICAL EXAM:  Vital Signs Last 24 Hrs  T(C): 36.8 (19 Mar 2021 12:43), Max: 36.8 (19 Mar 2021 05:49)  T(F): 98.3 (19 Mar 2021 12:43), Max: 98.3 (19 Mar 2021 12:43)  HR: 96 (19 Mar 2021 12:43) (94 - 118)  BP: 120/64 (19 Mar 2021 12:43) (120/63 - 142/78)  BP(mean): --  RR: 18 (19 Mar 2021 12:43) (18 - 18)  SpO2: 96% (19 Mar 2021 12:43) (95% - 96%) I&O's Summary    18 Mar 2021 07:01  -  19 Mar 2021 07:00  --------------------------------------------------------  IN: 1000 mL / OUT: 300 mL / NET: 700 mL         GENERAL:  [ ]Alert  [ ]Oriented x   [ ]Lethargic  [ ]Cachexia  [ ]Unarousable  [ ]Verbal  [ ]Non-Verbal  Behavioral:   [ ] Anxiety  [ ] Delirium [ ] Agitation [ ] Other  HEENT:  [ ]Normal   [ ]Dry mouth   [ ]ET Tube/Trach  [ ]Oral lesions  PULMONARY:   [ ]Clear [ ]Tachypnea  [ ]Audible excessive secretions   [ ]Rhonchi        [ ]Right [ ]Left [ ]Bilateral  [ ]Crackles        [ ]Right [ ]Left [ ]Bilateral  [ ]Wheezing     [ ]Right [ ]Left [ ]Bilateral  CARDIOVASCULAR:    [ ]Regular [ ]Irregular [ ]Tachy  [ ]Manuel [ ]Murmur [ ]Other  GASTROINTESTINAL:  [ ]Soft  [ ]Distended   [ ]+BS  [ ]Non tender [ ]Tender  [ ]PEG [ ]OGT/ NGT   Last BM:      GENITOURINARY:  [ ]Normal [ ] Incontinent   [ ]Oliguria/Anuria   [ ]Mcdonald  MUSCULOSKELETAL:   [ ]Normal   [ ]Weakness  [ ]Bed/Wheelchair bound [ ]Edema  NEUROLOGIC:   [ ]No focal deficits  [ ] Cognitive impairment  [ ] Dysphagia [ ]Dysarthria [ ] Paresis [ ]Other   SKIN:   [ ]Normal   [ ]Pressure ulcer(s)  [ ]Rash    CRITICAL CARE:  [ ] Shock Present  [ ]Septic [ ]Cardiogenic [ ]Neurologic [ ]Hypovolemic  [ ]  Vasopressors [ ]  Inotropes   [ ] Respiratory failure present  [ ] Acute  [ ] Chronic [ ] Hypoxic  [ ] Hypercarbic [ ] Other  [ ] Other organ failure     LABS:                        10.3   16.51 )-----------( 348      ( 18 Mar 2021 08:49 )             33.8   03-18    145  |  111<H>  |  22  ----------------------------<  164<H>  4.0   |  22  |  0.78    Ca    8.4<L>      18 Mar 2021 08:49    TPro  5.2<L>  /  Alb  1.9<L>  /  TBili  0.4  /  DBili  x   /  AST  23  /  ALT  14  /  AlkPhos  79  03-18        RADIOLOGY & ADDITIONAL STUDIES:    Protein Calorie Malnutrition Present: [ ] yes [ ] no  [ ] PPSV2 < or = 30%  [ ] significant weight loss [ ] poor nutritional intake [ ] anasarca [ ] catabolic state Albumin, Serum: 1.9 g/dL (03-18-21 @ 08:49)      REFERRALS:   [ ]Chaplaincy  [ ] Hospice  [ ]Child Life  [ ]Social Work  [ ]Case management [ ]Holistic Therapy   Goals of Care Document: SKINNY Tavares (03-17-21 @ 09:15)  Goals of Care Conversation:   Participants:  · Participants  Family  · Child(lennie)  kyra Cosby    Advance Directives:  · Caregiver:  no    Conversation Discussion:  · Conversation  Diagnosis; Prognosis; MOLST Discussed; FULL CODE  · Conversation Details  89yo F hx HTN, DM, hypokalemia, non-verbal at baseline, asthma, recent covid pna, sent to ED from [facility] for failure to thrive and found to have bilateral PEs and DVt on Full dose Lovenox.    Discussed with son Henrique in detail about patients diagnosis and poor prognosis. Explained to son risks vs benefit currently about proceeding with PEG, pt is on anticoagulation for DVT/PE and not candidate for PEG currently. Since pt has poor PO intake eventually pt health will decline further and son is aware of poor prognosis. However son continues to wish for aggressive measures and wants FULL CODE. Son aware PE may travel to the heart and could cause cardiac arrest but still wishes to be aggressive and would like CPR on mom. Also son wished to put mom on ventilator if breathing may stop.    What Matters Most To Patient and Family:  · What matters most to patient and family  Aggressive measures    Personal Advance Directives Treatment Guidelines:   Treatment Guidelines:  · Treatment Guideline Comments  FULL CODE    Location of Discussion:   Duration of Advanced Care Planning Meeting:  · Time spent (in minutes)  30    Location of Discussion:  · Location of discussion  Telephone      Electronic Signatures:  Barbara Tavares)  (Signed 17-Mar-2021 09:22)  	Authored: Goals of Care Conversation, Personal Advance Directives Treatment Guidelines, Location of Discussion      Last Updated: 17-Mar-2021 09:22 by Barbara Tavares)     INTERVAL HPI/OVERNIGHT EVENTS: none    Code Status: full  Allergies    No Known Allergies    Intolerances    MEDICATIONS  (STANDING):  ALBUTerol  90 MICROgram(s) HFA Inhaler - Peds 4 Puff(s) Inhalation every 6 hours  buDESOnide    Inhalation Suspension 0.5 milliGRAM(s) Inhalation every 12 hours  chlorhexidine 2% Cloths 1 Application(s) Topical <User Schedule>  dextrose 40% Gel 15 Gram(s) Oral once  dextrose 5%. 1000 milliLiter(s) (50 mL/Hr) IV Continuous <Continuous>  dextrose 5%. 1000 milliLiter(s) (100 mL/Hr) IV Continuous <Continuous>  dextrose 50% Injectable 25 Gram(s) IV Push once  dextrose 50% Injectable 12.5 Gram(s) IV Push once  dextrose 50% Injectable 25 Gram(s) IV Push once  enoxaparin Injectable 80 milliGRAM(s) SubCutaneous every 12 hours  ferrous    sulfate 325 milliGRAM(s) Oral daily  gabapentin 300 milliGRAM(s) Oral two times a day  glucagon  Injectable 1 milliGRAM(s) IntraMuscular once  insulin lispro (ADMELOG) corrective regimen sliding scale   SubCutaneous three times a day before meals  metoprolol tartrate Injectable 2.5 milliGRAM(s) IV Push every 6 hours  mirtazapine 7.5 milliGRAM(s) Oral at bedtime  pantoprazole  Injectable 40 milliGRAM(s) IV Push two times a day  polyethylene glycol 3350 17 Gram(s) Oral daily  senna 2 Tablet(s) Oral at bedtime  sodium chloride 0.9%. 1000 milliLiter(s) (100 mL/Hr) IV Continuous <Continuous>    MEDICATIONS  (PRN):  acetaminophen   Tablet .. 650 milliGRAM(s) Oral every 6 hours PRN Mild Pain (1 - 3)  ondansetron Injectable 4 milliGRAM(s) IV Push every 6 hours PRN Nausea and/or Vomiting      PRESENT SYMPTOMS: [ x]Unable to obtain due to poor mentation   Source if other than patient:  [ ]Family   [ ]Team     Pain (Impact on QOL):    Location:  Severity:  Minimal acceptable level (0-10 scale):       Quality:       Onset:  Duration:  Aggravating factors:  Relieving Factors  Radiation:    Dyspnea:  Yes [ ] No [ ] - [ ]Mild [ ]Moderate [ ]Severe  Anxiety:    Yes [ ] No [ ] - [ ]Mild [ ]Moderate [ ]Severe  Fatigue:    Yes [ ] No [ ] - [ ]Mild [ ]Moderate [ ]Severe  Nausea:    Yes [ ] No [ ] - [ ]Mild [ ]Moderate [ ]Severe                         Loss of appetite: Yes [ ] No [ ] - [ ]Mild [ ]Moderate [ ]Severe             Constipation:  Yes [ ] No [ ] - [ ]Mild [ ]Moderate [ ]Severe  Grief: Yes [ ] No [ ]     PAIN AD Score:	  [email or web address]/[email or web address] (Ctrl + left click to view)    Other Symptoms:  [x ]All other review of systems negative     Karnofsky Performance Score/Palliative Performance Status Version 2:       10-20  %    http://palliative.info/resource_material/[email or web address].pdf    PHYSICAL EXAM:  Vital Signs Last 24 Hrs  T(C): 36.8 (19 Mar 2021 12:43), Max: 36.8 (19 Mar 2021 05:49)  T(F): 98.3 (19 Mar 2021 12:43), Max: 98.3 (19 Mar 2021 12:43)  HR: 96 (19 Mar 2021 12:43) (94 - 118)  BP: 120/64 (19 Mar 2021 12:43) (120/63 - 142/78)  BP(mean): --  RR: 18 (19 Mar 2021 12:43) (18 - 18)  SpO2: 96% (19 Mar 2021 12:43) (95% - 96%) I&O's Summary    18 Mar 2021 07:01  -  19 Mar 2021 07:00  --------------------------------------------------------  IN: 1000 mL / OUT: 300 mL / NET: 700 mL         GENERAL:  [ ]Alert  [ ]Oriented x   [x ]Lethargic  [ ]Cachexia  [ ]Unarousable  [ ]Verbal  [ x]Non-Verbal  Behavioral:   [ ] Anxiety  [ ] Delirium [ ] Agitation [ ] Other  HEENT:  [ ]Normal   [x ]Dry mouth   [ ]ET Tube/Trach  [ ]Oral lesions  PULMONARY:   [x ]Clear [ ]Tachypnea  [ ]Audible excessive secretions   [ ]Rhonchi        [ ]Right [ ]Left [ ]Bilateral  [ ]Crackles        [ ]Right [ ]Left [ ]Bilateral  [ ]Wheezing     [ ]Right [ ]Left [ ]Bilateral  CARDIOVASCULAR:    [x ]Regular [ ]Irregular [ ]Tachy  [ ]Manuel [ ]Murmur [ ]Other  GASTROINTESTINAL:  [ x]Soft  [ ]Distended   [x ]+BS  [x ]Non tender [ ]Tender  [ ]PEG [ ]OGT/ NGT   Last BM: 3/19     GENITOURINARY:  [ ]Normal [x ] Incontinent   [ ]Oliguria/Anuria   [ ]Mcdonald  MUSCULOSKELETAL:   [ ]Normal   [ ]Weakness  [ x]Bed/Wheelchair bound [x ]Edema  NEUROLOGIC:   [ ]No focal deficits  [x ] Cognitive impairment  [x ] Dysphagia [ x]Dysarthria [ ] Paresis [ ]Other   SKIN:   [ ]Normal   [ x]Pressure ulcer(s) DTI r ankle  [ ]Rash    CRITICAL CARE:  [ ] Shock Present  [ ]Septic [ ]Cardiogenic [ ]Neurologic [ ]Hypovolemic  [ ]  Vasopressors [ ]  Inotropes   [ ] Respiratory failure present  [ ] Acute  [ ] Chronic [ ] Hypoxic  [ ] Hypercarbic [ ] Other  [ ] Other organ failure     LABS:                        10.3   16.51 )-----------( 348      ( 18 Mar 2021 08:49 )             33.8   03-18    145  |  111<H>  |  22  ----------------------------<  164<H>  4.0   |  22  |  0.78    Ca    8.4<L>      18 Mar 2021 08:49    TPro  5.2<L>  /  Alb  1.9<L>  /  TBili  0.4  /  DBili  x   /  AST  23  /  ALT  14  /  AlkPhos  79  03-18        RADIOLOGY & ADDITIONAL STUDIES:    Protein Calorie Malnutrition Present: [x] yes [ ] no  [ x] PPSV2 < or = 30%  [x ] significant weight loss [ x] poor nutritional intake [ x] anasarca [x ] catabolic state Albumin, Serum: 1.9 g/dL (03-18-21 @ 08:49)      REFERRALS:   [ ]Chaplaincy  [ ] Hospice  [ ]Child Life  [ ]Social Work  [ ]Case management [ ]Holistic Therapy   Goals of Care Document: SKINNY Tavares (03-17-21 @ 09:15)  Goals of Care Conversation:   Participants:  · Participants  Family  · Child(lennie)  son Henrique Cosby    Advance Directives:  · Caregiver:  no    Conversation Discussion:  · Conversation  Diagnosis; Prognosis; MOLST Discussed; FULL CODE  · Conversation Details  89yo F hx HTN, DM, hypokalemia, non-verbal at baseline, asthma, recent covid pna, sent to ED from [facility] for failure to thrive and found to have bilateral PEs and DVt on Full dose Lovenox.    Discussed with son Henrique in detail about patients diagnosis and poor prognosis. Explained to son risks vs benefit currently about proceeding with PEG, pt is on anticoagulation for DVT/PE and not candidate for PEG currently. Since pt has poor PO intake eventually pt health will decline further and son is aware of poor prognosis. However son continues to wish for aggressive measures and wants FULL CODE. Son aware PE may travel to the heart and could cause cardiac arrest but still wishes to be aggressive and would like CPR on mom. Also son wished to put mom on ventilator if breathing may stop.    What Matters Most To Patient and Family:  · What matters most to patient and family  Aggressive measures    Personal Advance Directives Treatment Guidelines:   Treatment Guidelines:  · Treatment Guideline Comments  FULL CODE    Location of Discussion:   Duration of Advanced Care Planning Meeting:  · Time spent (in minutes)  30    Location of Discussion:  · Location of discussion  Telephone      Electronic Signatures:  Barbara Tavares)  (Signed 17-Mar-2021 09:22)  	Authored: Goals of Care Conversation, Personal Advance Directives Treatment Guidelines, Location of Discussion      Last Updated: 17-Mar-2021 09:22 by Barbara Tavares)

## 2021-03-19 NOTE — PROGRESS NOTE ADULT - PROBLEM SELECTOR PLAN 8
left message for Henrique Cosby at , await callback left message for Henrique Cosby at , await callback to further discuss patient's care.   [facility] discussion held with Henrique as above by KARRIE Tavares.

## 2021-03-19 NOTE — PROGRESS NOTE ADULT - SUBJECTIVE AND OBJECTIVE BOX
INTERVAL HPI/OVERNIGHT EVENTS:        REVIEW OF SYSTEMS:  CONSTITUTIONAL:  continues  with  poor  appetie        MEDICATION:  acetaminophen   Tablet .. 650 milliGRAM(s) Oral every 6 hours PRN  ALBUTerol  90 MICROgram(s) HFA Inhaler - Peds 4 Puff(s) Inhalation every 6 hours  buDESOnide    Inhalation Suspension 0.5 milliGRAM(s) Inhalation every 12 hours  chlorhexidine 2% Cloths 1 Application(s) Topical <User Schedule>  dextrose 40% Gel 15 Gram(s) Oral once  dextrose 5%. 1000 milliLiter(s) IV Continuous <Continuous>  dextrose 5%. 1000 milliLiter(s) IV Continuous <Continuous>  dextrose 50% Injectable 25 Gram(s) IV Push once  dextrose 50% Injectable 12.5 Gram(s) IV Push once  dextrose 50% Injectable 25 Gram(s) IV Push once  enoxaparin Injectable 80 milliGRAM(s) SubCutaneous every 12 hours  ferrous    sulfate 325 milliGRAM(s) Oral daily  gabapentin 300 milliGRAM(s) Oral two times a day  glucagon  Injectable 1 milliGRAM(s) IntraMuscular once  insulin lispro (ADMELOG) corrective regimen sliding scale   SubCutaneous three times a day before meals  metoprolol tartrate Injectable 2.5 milliGRAM(s) IV Push every 6 hours  mirtazapine 7.5 milliGRAM(s) Oral at bedtime  ondansetron Injectable 4 milliGRAM(s) IV Push every 6 hours PRN  pantoprazole  Injectable 40 milliGRAM(s) IV Push two times a day  polyethylene glycol 3350 17 Gram(s) Oral daily  senna 2 Tablet(s) Oral at bedtime  sodium chloride 0.9%. 1000 milliLiter(s) IV Continuous <Continuous>    Vital Signs Last 24 Hrs  T(C): 36.8 (19 Mar 2021 05:49), Max: 36.8 (19 Mar 2021 05:49)  T(F): 98.2 (19 Mar 2021 05:49), Max: 98.2 (19 Mar 2021 05:49)  HR: 118 (19 Mar 2021 05:49) (89 - 118)  BP: 128/77 (19 Mar 2021 05:49) (107/65 - 142/78)  BP(mean): --  RR: 18 (19 Mar 2021 05:49) (17 - 18)  SpO2: 95% (19 Mar 2021 05:49) (95% - 96%)    PHYSICAL EXAM:  GENERAL: NAD,   HEENT : [place]  clear sclerae anicteric  NECK: Supple, No JVD, Normal thyroid  NERVOUS SYSTEM:   somnolent  arouseable  moves  all  extr  CHEST/LUNG:   HEART: Regular rate and rhythm; No murmurs, rubs, or gallops  ABDOMEN: Soft, Nontender, Nondistended; Bowel sounds present  EXTREMITIES:  no  edema no  tenderness  SKIN: No rashes   LABS:                        10.3   16.51 )-----------( 348      ( 18 Mar 2021 08:49 )             33.8     03-18    145  |  111<H>  |  22  ----------------------------<  164<H>  4.0   |  22  |  0.78    Ca    8.4<L>      18 Mar 2021 08:49    TPro  5.2<L>  /  Alb  1.9<L>  /  TBili  0.4  /  DBili  x   /  AST  23  /  ALT  14  /  AlkPhos  79  03-18        CAPILLARY BLOOD GLUCOSE      POCT Blood Glucose.: 141 mg/dL (19 Mar 2021 07:47)  POCT Blood Glucose.: 168 mg/dL (18 Mar 2021 21:07)  POCT Blood Glucose.: 174 mg/dL (18 Mar 2021 16:22)  POCT Blood Glucose.: 139 mg/dL (18 Mar 2021 11:57)      RADIOLOGY & ADDITIONAL TESTS:    Imaging reports  Personally Reviewed:  [ ] YES  [ ] NO    Consultant(s) Notes Reviewed:  [x ] YES  [ ] NO    Care Discussed with Consultants/Other Providers [x ] YES  [ ] NO  Problem/Plan - 1:  ·  Problem: Pulmonary emboli.  Plan: iv  heparin.     Problem/Plan - 2:  ·  Problem: DVT (deep venous thrombosis).  Plan: iv  heparin.     Problem/Plan - 3:  ·  Problem: UTI (urinary tract infection).  Plan: rocephin.     Problem/Plan - 4:  ·  Problem: Asthma.  Plan: symbicort.     Problem/Plan - 5:  ·  Problem: DM (diabetes mellitus).  Plan: insulin coverage.     Problem/Plan - 6:  Problem: Failure to thrive in adult. Plan: encourage  oral  intake  rx  underlying  causes.   REMERON might  need  PEG  as  family  wants  everything  done  will  need  to  stop  AC      Problem/Plan - 7:  ·  Problem: Electrolyte abnormality.  Plan: supplement K.     Problem/Plan - 8:  COVID 19  PCR NEGATIVE    +  igG Ab    prognosis  poor     add patient examined  labs  consults  ordered  on 3/18/21   note  does  not  appear  on  chart

## 2021-03-20 LAB
ALBUMIN SERPL ELPH-MCNC: 1.7 G/DL — LOW (ref 3.3–5)
ALP SERPL-CCNC: 81 U/L — SIGNIFICANT CHANGE UP (ref 40–120)
ALT FLD-CCNC: 13 U/L — SIGNIFICANT CHANGE UP (ref 12–78)
ANION GAP SERPL CALC-SCNC: 12 MMOL/L — SIGNIFICANT CHANGE UP (ref 5–17)
AST SERPL-CCNC: 18 U/L — SIGNIFICANT CHANGE UP (ref 15–37)
BILIRUB SERPL-MCNC: 0.5 MG/DL — SIGNIFICANT CHANGE UP (ref 0.2–1.2)
BUN SERPL-MCNC: 26 MG/DL — HIGH (ref 7–23)
CALCIUM SERPL-MCNC: 8.3 MG/DL — LOW (ref 8.5–10.1)
CHLORIDE SERPL-SCNC: 116 MMOL/L — HIGH (ref 96–108)
CO2 SERPL-SCNC: 17 MMOL/L — LOW (ref 22–31)
CREAT SERPL-MCNC: 0.9 MG/DL — SIGNIFICANT CHANGE UP (ref 0.5–1.3)
GLUCOSE BLDC GLUCOMTR-MCNC: 121 MG/DL — HIGH (ref 70–99)
GLUCOSE BLDC GLUCOMTR-MCNC: 130 MG/DL — HIGH (ref 70–99)
GLUCOSE BLDC GLUCOMTR-MCNC: 149 MG/DL — HIGH (ref 70–99)
GLUCOSE BLDC GLUCOMTR-MCNC: 154 MG/DL — HIGH (ref 70–99)
GLUCOSE BLDC GLUCOMTR-MCNC: 160 MG/DL — HIGH (ref 70–99)
GLUCOSE SERPL-MCNC: 137 MG/DL — HIGH (ref 70–99)
HCT VFR BLD CALC: 31 % — LOW (ref 34.5–45)
HGB BLD-MCNC: 9.1 G/DL — LOW (ref 11.5–15.5)
MCHC RBC-ENTMCNC: 26.1 PG — LOW (ref 27–34)
MCHC RBC-ENTMCNC: 29.4 GM/DL — LOW (ref 32–36)
MCV RBC AUTO: 88.8 FL — SIGNIFICANT CHANGE UP (ref 80–100)
NRBC # BLD: 4 /100 WBCS — HIGH (ref 0–0)
PLATELET # BLD AUTO: 271 K/UL — SIGNIFICANT CHANGE UP (ref 150–400)
POTASSIUM SERPL-MCNC: 4.4 MMOL/L — SIGNIFICANT CHANGE UP (ref 3.5–5.3)
POTASSIUM SERPL-SCNC: 4.4 MMOL/L — SIGNIFICANT CHANGE UP (ref 3.5–5.3)
PROT SERPL-MCNC: 5 GM/DL — LOW (ref 6–8.3)
RBC # BLD: 3.49 M/UL — LOW (ref 3.8–5.2)
RBC # FLD: 17.8 % — HIGH (ref 10.3–14.5)
SODIUM SERPL-SCNC: 145 MMOL/L — SIGNIFICANT CHANGE UP (ref 135–145)
WBC # BLD: 13.06 K/UL — HIGH (ref 3.8–10.5)
WBC # FLD AUTO: 13.06 K/UL — HIGH (ref 3.8–10.5)

## 2021-03-20 RX ADMIN — ENOXAPARIN SODIUM 80 MILLIGRAM(S): 100 INJECTION SUBCUTANEOUS at 06:15

## 2021-03-20 RX ADMIN — CHLORHEXIDINE GLUCONATE 1 APPLICATION(S): 213 SOLUTION TOPICAL at 06:15

## 2021-03-20 RX ADMIN — SODIUM CHLORIDE 100 MILLILITER(S): 9 INJECTION INTRAMUSCULAR; INTRAVENOUS; SUBCUTANEOUS at 11:33

## 2021-03-20 RX ADMIN — ENOXAPARIN SODIUM 80 MILLIGRAM(S): 100 INJECTION SUBCUTANEOUS at 17:15

## 2021-03-20 RX ADMIN — Medication 2: at 11:30

## 2021-03-20 RX ADMIN — Medication 2.5 MILLIGRAM(S): at 00:12

## 2021-03-20 RX ADMIN — PANTOPRAZOLE SODIUM 40 MILLIGRAM(S): 20 TABLET, DELAYED RELEASE ORAL at 17:15

## 2021-03-20 RX ADMIN — Medication 2.5 MILLIGRAM(S): at 11:31

## 2021-03-20 RX ADMIN — PANTOPRAZOLE SODIUM 40 MILLIGRAM(S): 20 TABLET, DELAYED RELEASE ORAL at 06:15

## 2021-03-20 RX ADMIN — SODIUM CHLORIDE 100 MILLILITER(S): 9 INJECTION INTRAMUSCULAR; INTRAVENOUS; SUBCUTANEOUS at 00:13

## 2021-03-20 RX ADMIN — SODIUM CHLORIDE 100 MILLILITER(S): 9 INJECTION INTRAMUSCULAR; INTRAVENOUS; SUBCUTANEOUS at 23:13

## 2021-03-20 NOTE — PROGRESS NOTE ADULT - SUBJECTIVE AND OBJECTIVE BOX
INTERVAL HPI:  89yo F hx HTN, DM, Hypokalemia, non-verbal at baseline, asthma, sent to ED from [facility] for failure to thrive. Per charts, patient continues to have electrolyte disturbances with potassium repletion. Has been refusing food in the past few days,putting hands over her mouth when they attempt to feed her. Overall weight loss of 10 kg since admission. PT  WITH  HX   COVID  19  ALREADY  RX  IN  HOSPITAL  CONTINUES  +  IN  [facility] SINCE  3/8/21  EVALUATED  IN  ER  FOUND  TO  LAVE  EXTENSIVE DVT  BILATERAL PE  AFIB STARTED  ON  IV  HEPARIN  +  SOB  LEG  PAIN  POOR  APETITE    OVERNIGHT EVENTS:  Oxygenation is stable    Vital Signs Last 24 Hrs  T(C): 36.4 (20 Mar 2021 10:35), Max: 37.2 (19 Mar 2021 23:32)  T(F): 97.6 (20 Mar 2021 10:35), Max: 98.9 (19 Mar 2021 23:32)  HR: 112 (20 Mar 2021 10:35) (55 - 112)  BP: 110/59 (20 Mar 2021 10:35) (110/59 - 125/87)  BP(mean): 76 (20 Mar 2021 10:35) (76 - 76)  RR: 18 (20 Mar 2021 10:35) (18 - 18)  SpO2: 99% (20 Mar 2021 10:35) (95% - 99%)    PHYSICAL EXAM:  GEN:       comfortable.  HEENT:    Normal.    RESP:        no distress  CVS:         Regular rate and rhythm.   ABD:         Soft, non-tender, non-distended;     MEDICATIONS  (STANDING):  ALBUTerol  90 MICROgram(s) HFA Inhaler - Peds 4 Puff(s) Inhalation every 6 hours  buDESOnide    Inhalation Suspension 0.5 milliGRAM(s) Inhalation every 12 hours  chlorhexidine 2% Cloths 1 Application(s) Topical <User Schedule>  dextrose 40% Gel 15 Gram(s) Oral once  dextrose 5%. 1000 milliLiter(s) (50 mL/Hr) IV Continuous <Continuous>  dextrose 5%. 1000 milliLiter(s) (100 mL/Hr) IV Continuous <Continuous>  dextrose 50% Injectable 25 Gram(s) IV Push once  dextrose 50% Injectable 12.5 Gram(s) IV Push once  dextrose 50% Injectable 25 Gram(s) IV Push once  enoxaparin Injectable 80 milliGRAM(s) SubCutaneous every 12 hours  ferrous    sulfate 325 milliGRAM(s) Oral daily  gabapentin 300 milliGRAM(s) Oral two times a day  glucagon  Injectable 1 milliGRAM(s) IntraMuscular once  insulin lispro (ADMELOG) corrective regimen sliding scale   SubCutaneous three times a day before meals  metoprolol tartrate Injectable 2.5 milliGRAM(s) IV Push every 6 hours  mirtazapine 7.5 milliGRAM(s) Oral at bedtime  pantoprazole  Injectable 40 milliGRAM(s) IV Push two times a day  polyethylene glycol 3350 17 Gram(s) Oral daily  senna 2 Tablet(s) Oral at bedtime  sodium chloride 0.9%. 1000 milliLiter(s) (100 mL/Hr) IV Continuous <Continuous>    MEDICATIONS  (PRN):  acetaminophen   Tablet .. 650 milliGRAM(s) Oral every 6 hours PRN Mild Pain (1 - 3)  ondansetron Injectable 4 milliGRAM(s) IV Push every 6 hours PRN Nausea and/or Vomiting    LABS:                        9.1    13.06 )-----------( 271      ( 20 Mar 2021 07:52 )             31.0     03-20    145  |  116<H>  |  26<H>  ----------------------------<  137<H>  4.4   |  17<L>  |  0.90    Ca    8.3<L>      20 Mar 2021 07:52    TPro  5.0<L>  /  Alb  1.7<L>  /  TBili  0.5  /  DBili  x   /  AST  18  /  ALT  13  /  AlkPhos  81  03-20    ASSESSMENT AND PLAN:  ·	Bilateral PE.  ·	Left Iliac DVT.  ·	CVA history.  ·	HTN.  ·	DM  ·	Dysphagia.    SPO2 99% on room air  Continue full dose Lovenox.

## 2021-03-20 NOTE — PROGRESS NOTE ADULT - SUBJECTIVE AND OBJECTIVE BOX
INTERVAL HPI/OVERNIGHT EVENTS:        REVIEW OF SYSTEMS:  CONSTITUTIONAL:  no  complaints    NECK: No pain or stiffnes  RESPIRATORY: No SOB   CARDIOVASCULAR: No chest pain, palpitations, dizziness,   GASTROINTESTINAL: No abdominal pain. No nausea, vomiting,   NEUROLOGICAL: No headaches, no  blurry  vision no  dizziness  SKIN: No itching,   MUSCULOSKELETAL: No pain    MEDICATION:  acetaminophen   Tablet .. 650 milliGRAM(s) Oral every 6 hours PRN  ALBUTerol  90 MICROgram(s) HFA Inhaler - Peds 4 Puff(s) Inhalation every 6 hours  buDESOnide    Inhalation Suspension 0.5 milliGRAM(s) Inhalation every 12 hours  chlorhexidine 2% Cloths 1 Application(s) Topical <User Schedule>  dextrose 40% Gel 15 Gram(s) Oral once  dextrose 5%. 1000 milliLiter(s) IV Continuous <Continuous>  dextrose 5%. 1000 milliLiter(s) IV Continuous <Continuous>  dextrose 50% Injectable 25 Gram(s) IV Push once  dextrose 50% Injectable 12.5 Gram(s) IV Push once  dextrose 50% Injectable 25 Gram(s) IV Push once  enoxaparin Injectable 80 milliGRAM(s) SubCutaneous every 12 hours  ferrous    sulfate 325 milliGRAM(s) Oral daily  gabapentin 300 milliGRAM(s) Oral two times a day  glucagon  Injectable 1 milliGRAM(s) IntraMuscular once  insulin lispro (ADMELOG) corrective regimen sliding scale   SubCutaneous three times a day before meals  metoprolol tartrate Injectable 2.5 milliGRAM(s) IV Push every 6 hours  mirtazapine 7.5 milliGRAM(s) Oral at bedtime  ondansetron Injectable 4 milliGRAM(s) IV Push every 6 hours PRN  pantoprazole  Injectable 40 milliGRAM(s) IV Push two times a day  polyethylene glycol 3350 17 Gram(s) Oral daily  senna 2 Tablet(s) Oral at bedtime  sodium chloride 0.9%. 1000 milliLiter(s) IV Continuous <Continuous>    Vital Signs Last 24 Hrs  T(C): 36.7 (20 Mar 2021 06:00), Max: 37.2 (19 Mar 2021 23:32)  T(F): 98.1 (20 Mar 2021 06:00), Max: 98.9 (19 Mar 2021 23:32)  HR: 55 (20 Mar 2021 06:00) (55 - 107)  BP: 125/87 (20 Mar 2021 06:00) (113/71 - 125/87)  BP(mean): --  RR: 18 (20 Mar 2021 06:00) (18 - 18)  SpO2: 98% (20 Mar 2021 06:00) (95% - 98%)    PHYSICAL EXAM:  GENERAL: NAD, well-groomed, well-developed  HEENT : Conjuntivae  clear sclerae anicteric  NECK: Supple, No JVD, Normal thyroid  NERVOUS SYSTEM:  Alert oriented   no  focal  deficits;   CHEST/LUNG: Clear    HEART: Regular rate and rhythm; No murmurs, rubs, or gallops  ABDOMEN: Soft, Nontender, Nondistended; Bowel sounds present  EXTREMITIES:  no  edema no  tenderness  SKIN: No rashes   LABS:                        9.1    13.06 )-----------( 271      ( 20 Mar 2021 07:52 )             31.0     03-19    147<H>  |  114<H>  |  25<H>  ----------------------------<  145<H>  3.9   |  23  |  0.90    Ca    8.4<L>      19 Mar 2021 14:30    TPro  5.2<L>  /  Alb  1.7<L>  /  TBili  0.4  /  DBili  x   /  AST  11<L>  /  ALT  13  /  AlkPhos  77  03-19        CAPILLARY BLOOD GLUCOSE      POCT Blood Glucose.: 149 mg/dL (20 Mar 2021 08:00)  POCT Blood Glucose.: 137 mg/dL (19 Mar 2021 21:44)  POCT Blood Glucose.: 123 mg/dL (19 Mar 2021 16:27)  POCT Blood Glucose.: 164 mg/dL (19 Mar 2021 11:45)      RADIOLOGY & ADDITIONAL TESTS:    Imaging reports  Personally Reviewed:  [ ] YES  [ ] NO    Consultant(s) Notes Reviewed:  [ x] YES  [ ] NO    Care Discussed with Consultants/Other Providers [x ] YES  [ ] NO  Problem/Plan - 1:  ·  Problem: Pulmonary emboli.  Plan: iv  heparin.     Problem/Plan - 2:  ·  Problem: DVT (deep venous thrombosis).  Plan: iv  heparin.     Problem/Plan - 3:  ·  Problem: UTI (urinary tract infection).  Plan: rocephin.     Problem/Plan - 4:  ·  Problem: Asthma.  Plan: symbicort.     Problem/Plan - 5:  ·  Problem: DM (diabetes mellitus).  Plan: insulin coverage.     Problem/Plan - 6:  Problem: Failure to thrive in adult. Plan: encourage  oral  intake  rx  underlying  causes.   REMERON   palliative  care  eval  appreciated

## 2021-03-21 LAB
ANION GAP SERPL CALC-SCNC: 13 MMOL/L — SIGNIFICANT CHANGE UP (ref 5–17)
BUN SERPL-MCNC: 32 MG/DL — HIGH (ref 7–23)
CALCIUM SERPL-MCNC: 8 MG/DL — LOW (ref 8.5–10.1)
CHLORIDE SERPL-SCNC: 120 MMOL/L — HIGH (ref 96–108)
CO2 SERPL-SCNC: 17 MMOL/L — LOW (ref 22–31)
CREAT SERPL-MCNC: 0.83 MG/DL — SIGNIFICANT CHANGE UP (ref 0.5–1.3)
CRP SERPL-MCNC: 73 MG/L — HIGH
GLUCOSE BLDC GLUCOMTR-MCNC: 146 MG/DL — HIGH (ref 70–99)
GLUCOSE BLDC GLUCOMTR-MCNC: 149 MG/DL — HIGH (ref 70–99)
GLUCOSE BLDC GLUCOMTR-MCNC: 170 MG/DL — HIGH (ref 70–99)
GLUCOSE BLDC GLUCOMTR-MCNC: 180 MG/DL — HIGH (ref 70–99)
GLUCOSE SERPL-MCNC: 180 MG/DL — HIGH (ref 70–99)
HCT VFR BLD CALC: 25.8 % — LOW (ref 34.5–45)
HGB BLD-MCNC: 7.9 G/DL — LOW (ref 11.5–15.5)
MCHC RBC-ENTMCNC: 25.9 PG — LOW (ref 27–34)
MCHC RBC-ENTMCNC: 30.6 GM/DL — LOW (ref 32–36)
MCV RBC AUTO: 84.6 FL — SIGNIFICANT CHANGE UP (ref 80–100)
NRBC # BLD: 6 /100 WBCS — HIGH (ref 0–0)
PLATELET # BLD AUTO: 319 K/UL — SIGNIFICANT CHANGE UP (ref 150–400)
POTASSIUM SERPL-MCNC: 3.7 MMOL/L — SIGNIFICANT CHANGE UP (ref 3.5–5.3)
POTASSIUM SERPL-SCNC: 3.7 MMOL/L — SIGNIFICANT CHANGE UP (ref 3.5–5.3)
PROCALCITONIN SERPL-MCNC: 0.25 NG/ML — HIGH (ref 0.02–0.1)
RBC # BLD: 3.05 M/UL — LOW (ref 3.8–5.2)
RBC # FLD: 17.9 % — HIGH (ref 10.3–14.5)
SODIUM SERPL-SCNC: 150 MMOL/L — HIGH (ref 135–145)
WBC # BLD: 11.99 K/UL — HIGH (ref 3.8–10.5)
WBC # FLD AUTO: 11.99 K/UL — HIGH (ref 3.8–10.5)

## 2021-03-21 RX ORDER — IPRATROPIUM/ALBUTEROL SULFATE 18-103MCG
3 AEROSOL WITH ADAPTER (GRAM) INHALATION EVERY 6 HOURS
Refills: 0 | Status: DISCONTINUED | OUTPATIENT
Start: 2021-03-21 | End: 2021-03-24

## 2021-03-21 RX ORDER — SODIUM CHLORIDE 9 MG/ML
1000 INJECTION INTRAMUSCULAR; INTRAVENOUS; SUBCUTANEOUS
Refills: 0 | Status: DISCONTINUED | OUTPATIENT
Start: 2021-03-21 | End: 2021-03-22

## 2021-03-21 RX ADMIN — Medication 2.5 MILLIGRAM(S): at 17:54

## 2021-03-21 RX ADMIN — ALBUTEROL 4 PUFF(S): 90 AEROSOL, METERED ORAL at 06:45

## 2021-03-21 RX ADMIN — ENOXAPARIN SODIUM 80 MILLIGRAM(S): 100 INJECTION SUBCUTANEOUS at 17:54

## 2021-03-21 RX ADMIN — Medication 3 MILLILITER(S): at 17:11

## 2021-03-21 RX ADMIN — Medication 2.5 MILLIGRAM(S): at 06:45

## 2021-03-21 RX ADMIN — ALBUTEROL 4 PUFF(S): 90 AEROSOL, METERED ORAL at 00:09

## 2021-03-21 RX ADMIN — GABAPENTIN 300 MILLIGRAM(S): 400 CAPSULE ORAL at 17:54

## 2021-03-21 RX ADMIN — ALBUTEROL 4 PUFF(S): 90 AEROSOL, METERED ORAL at 11:41

## 2021-03-21 RX ADMIN — Medication 2: at 11:42

## 2021-03-21 RX ADMIN — PANTOPRAZOLE SODIUM 40 MILLIGRAM(S): 20 TABLET, DELAYED RELEASE ORAL at 06:45

## 2021-03-21 RX ADMIN — Medication 2.5 MILLIGRAM(S): at 11:44

## 2021-03-21 RX ADMIN — Medication 2: at 07:43

## 2021-03-21 RX ADMIN — ENOXAPARIN SODIUM 80 MILLIGRAM(S): 100 INJECTION SUBCUTANEOUS at 07:00

## 2021-03-21 RX ADMIN — SODIUM CHLORIDE 100 MILLILITER(S): 9 INJECTION INTRAMUSCULAR; INTRAVENOUS; SUBCUTANEOUS at 11:00

## 2021-03-21 RX ADMIN — Medication 2.5 MILLIGRAM(S): at 00:09

## 2021-03-21 RX ADMIN — PANTOPRAZOLE SODIUM 40 MILLIGRAM(S): 20 TABLET, DELAYED RELEASE ORAL at 17:57

## 2021-03-21 NOTE — PROGRESS NOTE ADULT - SUBJECTIVE AND OBJECTIVE BOX
INTERVAL HPI/OVERNIGHT EVENTS:        REVIEW OF SYSTEMS:  CONSTITUTIONAL:  poor  appetite      MEDICATION:  acetaminophen   Tablet .. 650 milliGRAM(s) Oral every 6 hours PRN  ALBUTerol  90 MICROgram(s) HFA Inhaler - Peds 4 Puff(s) Inhalation every 6 hours  chlorhexidine 2% Cloths 1 Application(s) Topical <User Schedule>  dextrose 40% Gel 15 Gram(s) Oral once  dextrose 5%. 1000 milliLiter(s) IV Continuous <Continuous>  dextrose 5%. 1000 milliLiter(s) IV Continuous <Continuous>  dextrose 50% Injectable 25 Gram(s) IV Push once  dextrose 50% Injectable 12.5 Gram(s) IV Push once  dextrose 50% Injectable 25 Gram(s) IV Push once  enoxaparin Injectable 80 milliGRAM(s) SubCutaneous every 12 hours  ferrous    sulfate 325 milliGRAM(s) Oral daily  gabapentin 300 milliGRAM(s) Oral two times a day  glucagon  Injectable 1 milliGRAM(s) IntraMuscular once  insulin lispro (ADMELOG) corrective regimen sliding scale   SubCutaneous three times a day before meals  metoprolol tartrate Injectable 2.5 milliGRAM(s) IV Push every 6 hours  mirtazapine 7.5 milliGRAM(s) Oral at bedtime  ondansetron Injectable 4 milliGRAM(s) IV Push every 6 hours PRN  pantoprazole  Injectable 40 milliGRAM(s) IV Push two times a day  polyethylene glycol 3350 17 Gram(s) Oral daily  senna 2 Tablet(s) Oral at bedtime  sodium chloride 0.9%. 1000 milliLiter(s) IV Continuous <Continuous>    Vital Signs Last 24 Hrs  T(C): 36.9 (21 Mar 2021 06:37), Max: 37.2 (20 Mar 2021 23:30)  T(F): 98.4 (21 Mar 2021 06:37), Max: 99 (20 Mar 2021 23:30)  HR: 110 (21 Mar 2021 06:37) (103 - 110)  BP: 117/63 (21 Mar 2021 06:37) (103/59 - 124/74)  BP(mean): --  RR: 20 (21 Mar 2021 06:37) (17 - 20)  SpO2: 95% (21 Mar 2021 06:37) (95% - 97%)    PHYSICAL EXAM:  GENERAL: NAD, well-groomed, well-developed  HEENT : Conjuntivae  clear sclerae anicteric  NECK: Supple, No JVD, Normal thyroid  NERVOUS SYSTEM:  Alert   no  focal  deficits;   CHEST/LUNG: Clear    HEART: Regular rate and rhythm; No murmurs, rubs, or gallops  ABDOMEN: Soft, Nontender, Nondistended; Bowel sounds present  EXTREMITIES:  no  edema no  tenderness  SKIN: No rashes   LABS:                        9.1    13.06 )-----------( 271      ( 20 Mar 2021 07:52 )             31.0     03-20    145  |  116<H>  |  26<H>  ----------------------------<  137<H>  4.4   |  17<L>  |  0.90    Ca    8.3<L>      20 Mar 2021 07:52    TPro  5.0<L>  /  Alb  1.7<L>  /  TBili  0.5  /  DBili  x   /  AST  18  /  ALT  13  /  AlkPhos  81  03-20        CAPILLARY BLOOD GLUCOSE      POCT Blood Glucose.: 170 mg/dL (21 Mar 2021 07:33)  POCT Blood Glucose.: 154 mg/dL (20 Mar 2021 21:12)  POCT Blood Glucose.: 130 mg/dL (20 Mar 2021 16:35)  POCT Blood Glucose.: 121 mg/dL (20 Mar 2021 16:07)  POCT Blood Glucose.: 160 mg/dL (20 Mar 2021 11:28)      RADIOLOGY & ADDITIONAL TESTS:    Imaging reports  Personally Reviewed:  [ ] YES  [ ] NO    Consultant(s) Notes Reviewed:  [x ] YES  [ ] NO    Care Discussed with Consultants/Other Providers [ x] YES  [ ] NO  Problem/Plan - 1:  ·  Problem: Pulmonary emboli.  Plan: iv  heparin.     Problem/Plan - 2:  ·  Problem: DVT (deep venous thrombosis).  Plan: iv  heparin.     Problem/Plan - 3:  ·  Problem: UTI (urinary tract infection).  Plan: rocephin.     Problem/Plan - 4:  ·  Problem: Asthma.  Plan: symbicort.     Problem/Plan - 5:  ·  Problem: DM (diabetes mellitus).  Plan: insulin coverage.     Problem/Plan - 6:  Problem: Failure to thrive in adult. Plan: encourage  oral  intake  rx  underlying  causes.   REMERON     for  repeat  labs  before  discharge  to  rehab

## 2021-03-21 NOTE — PROGRESS NOTE ADULT - SUBJECTIVE AND OBJECTIVE BOX
INTERVAL HPI:   89yo F hx HTN, DM, Hypokalemia, non-verbal at baseline, asthma, sent to ED from [facility] for failure to thrive. Per charts, patient continues to have electrolyte disturbances with potassium repletion. Has been refusing food in the past few days,putting hands over her mouth when they attempt to feed her. Overall weight loss of 10 kg since admission. PT  WITH  HX   COVID  19  ALREADY  RX  IN  HOSPITAL  CONTINUES  +  IN  [facility] SINCE  3/8/21  EVALUATED  IN  ER  FOUND  TO  LAVE  EXTENSIVE DVT  BILATERAL PE  AFIB STARTED  ON  IV  HEPARIN  +  SOB  LEG  PAIN  POOR  APETITE    OVERNIGHT EVENTS:  Reported wheezing    Vital Signs Last 24 Hrs  T(C): 36.3 (21 Mar 2021 11:30), Max: 37.2 (20 Mar 2021 23:30)  T(F): 97.4 (21 Mar 2021 11:30), Max: 99 (20 Mar 2021 23:30)  HR: 104 (21 Mar 2021 11:30) (103 - 110)  BP: 122/78 (21 Mar 2021 11:30) (103/59 - 124/74)  BP(mean): --  RR: 19 (21 Mar 2021 11:30) (17 - 20)  SpO2: 100% (21 Mar 2021 11:30) (95% - 100%)    PHYSICAL EXAM:  GEN:        comfortable.  HEENT:    Normal.    RESP:       no distress  CVS:          Regular rate and rhythm.   ABD:         Soft, non-tender, non-distended;     MEDICATIONS  (STANDING):  ALBUTerol  90 MICROgram(s) HFA Inhaler - Peds 4 Puff(s) Inhalation every 6 hours  chlorhexidine 2% Cloths 1 Application(s) Topical <User Schedule>  dextrose 40% Gel 15 Gram(s) Oral once  dextrose 5%. 1000 milliLiter(s) (50 mL/Hr) IV Continuous <Continuous>  dextrose 5%. 1000 milliLiter(s) (100 mL/Hr) IV Continuous <Continuous>  dextrose 50% Injectable 25 Gram(s) IV Push once  dextrose 50% Injectable 12.5 Gram(s) IV Push once  dextrose 50% Injectable 25 Gram(s) IV Push once  enoxaparin Injectable 80 milliGRAM(s) SubCutaneous every 12 hours  ferrous    sulfate 325 milliGRAM(s) Oral daily  gabapentin 300 milliGRAM(s) Oral two times a day  glucagon  Injectable 1 milliGRAM(s) IntraMuscular once  insulin lispro (ADMELOG) corrective regimen sliding scale   SubCutaneous three times a day before meals  metoprolol tartrate Injectable 2.5 milliGRAM(s) IV Push every 6 hours  mirtazapine 7.5 milliGRAM(s) Oral at bedtime  pantoprazole  Injectable 40 milliGRAM(s) IV Push two times a day  polyethylene glycol 3350 17 Gram(s) Oral daily  senna 2 Tablet(s) Oral at bedtime  sodium chloride 0.9%. 1000 milliLiter(s) (100 mL/Hr) IV Continuous <Continuous>    MEDICATIONS  (PRN):  acetaminophen   Tablet .. 650 milliGRAM(s) Oral every 6 hours PRN Mild Pain (1 - 3)  ondansetron Injectable 4 milliGRAM(s) IV Push every 6 hours PRN Nausea and/or Vomiting    LABS:                        7.9    11.99 )-----------( 319      ( 21 Mar 2021 11:54 )             25.8     03-21    150<H>  |  120<H>  |  32<H>  ----------------------------<  180<H>  3.7   |  17<L>  |  0.83    Ca    8.0<L>      21 Mar 2021 11:54    TPro  5.0<L>  /  Alb  1.7<L>  /  TBili  0.5  /  DBili  x   /  AST  18  /  ALT  13  /  AlkPhos  81  03-20    ASSESSMENT AND PLAN:  ·	Bilateral PE.  ·	Left Iliac DVT.  ·	CVA history.  ·	HTN.  ·	DM  ·	Dysphagia.    SPO2 stable on room air  COVID not detected on 03/24/21.  Will add nebulizer for wheezing.  Continue Lovenox,

## 2021-03-22 LAB
ALBUMIN SERPL ELPH-MCNC: 1.8 G/DL — LOW (ref 3.3–5)
ALP SERPL-CCNC: 82 U/L — SIGNIFICANT CHANGE UP (ref 40–120)
ALT FLD-CCNC: 9 U/L — LOW (ref 12–78)
ANION GAP SERPL CALC-SCNC: 10 MMOL/L — SIGNIFICANT CHANGE UP (ref 5–17)
AST SERPL-CCNC: 15 U/L — SIGNIFICANT CHANGE UP (ref 15–37)
BILIRUB SERPL-MCNC: 0.5 MG/DL — SIGNIFICANT CHANGE UP (ref 0.2–1.2)
BUN SERPL-MCNC: 30 MG/DL — HIGH (ref 7–23)
CALCIUM SERPL-MCNC: 8.1 MG/DL — LOW (ref 8.5–10.1)
CHLORIDE SERPL-SCNC: 124 MMOL/L — HIGH (ref 96–108)
CO2 SERPL-SCNC: 20 MMOL/L — LOW (ref 22–31)
CREAT SERPL-MCNC: 0.93 MG/DL — SIGNIFICANT CHANGE UP (ref 0.5–1.3)
FLUAV AG NPH QL: SIGNIFICANT CHANGE UP
FLUBV AG NPH QL: SIGNIFICANT CHANGE UP
GLUCOSE BLDC GLUCOMTR-MCNC: 151 MG/DL — HIGH (ref 70–99)
GLUCOSE BLDC GLUCOMTR-MCNC: 166 MG/DL — HIGH (ref 70–99)
GLUCOSE BLDC GLUCOMTR-MCNC: 200 MG/DL — HIGH (ref 70–99)
GLUCOSE SERPL-MCNC: 142 MG/DL — HIGH (ref 70–99)
HCT VFR BLD CALC: 23.9 % — LOW (ref 34.5–45)
HGB BLD-MCNC: 7.4 G/DL — LOW (ref 11.5–15.5)
MCHC RBC-ENTMCNC: 25.7 PG — LOW (ref 27–34)
MCHC RBC-ENTMCNC: 31 GM/DL — LOW (ref 32–36)
MCV RBC AUTO: 83 FL — SIGNIFICANT CHANGE UP (ref 80–100)
NRBC # BLD: 10 /100 WBCS — HIGH (ref 0–0)
PLATELET # BLD AUTO: 316 K/UL — SIGNIFICANT CHANGE UP (ref 150–400)
POTASSIUM SERPL-MCNC: 3.4 MMOL/L — LOW (ref 3.5–5.3)
POTASSIUM SERPL-SCNC: 3.4 MMOL/L — LOW (ref 3.5–5.3)
PREALB SERPL-MCNC: 8 MG/DL — LOW (ref 20–40)
PROT SERPL-MCNC: 4.7 GM/DL — LOW (ref 6–8.3)
RBC # BLD: 2.88 M/UL — LOW (ref 3.8–5.2)
RBC # FLD: 18 % — HIGH (ref 10.3–14.5)
SARS-COV-2 RNA SPEC QL NAA+PROBE: DETECTED
SODIUM SERPL-SCNC: 154 MMOL/L — HIGH (ref 135–145)
WBC # BLD: 9.93 K/UL — SIGNIFICANT CHANGE UP (ref 3.8–10.5)
WBC # FLD AUTO: 9.93 K/UL — SIGNIFICANT CHANGE UP (ref 3.8–10.5)

## 2021-03-22 PROCEDURE — 71045 X-RAY EXAM CHEST 1 VIEW: CPT | Mod: 26

## 2021-03-22 PROCEDURE — 99233 SBSQ HOSP IP/OBS HIGH 50: CPT | Mod: CS

## 2021-03-22 RX ORDER — ALBUTEROL 90 UG/1
1 AEROSOL, METERED ORAL ONCE
Refills: 0 | Status: COMPLETED | OUTPATIENT
Start: 2021-03-22 | End: 2022-02-18

## 2021-03-22 RX ORDER — SODIUM CHLORIDE 9 MG/ML
1000 INJECTION, SOLUTION INTRAVENOUS
Refills: 0 | Status: DISCONTINUED | OUTPATIENT
Start: 2021-03-22 | End: 2021-03-23

## 2021-03-22 RX ORDER — PANTOPRAZOLE SODIUM 20 MG/1
40 TABLET, DELAYED RELEASE ORAL DAILY
Refills: 0 | Status: DISCONTINUED | OUTPATIENT
Start: 2021-03-22 | End: 2021-04-28

## 2021-03-22 RX ORDER — ALBUTEROL 90 UG/1
1 AEROSOL, METERED ORAL ONCE
Refills: 0 | Status: COMPLETED | OUTPATIENT
Start: 2021-03-22 | End: 2021-03-22

## 2021-03-22 RX ORDER — LISINOPRIL 2.5 MG/1
5 TABLET ORAL DAILY
Refills: 0 | Status: DISCONTINUED | OUTPATIENT
Start: 2021-03-22 | End: 2021-04-28

## 2021-03-22 RX ADMIN — GABAPENTIN 300 MILLIGRAM(S): 400 CAPSULE ORAL at 17:02

## 2021-03-22 RX ADMIN — ENOXAPARIN SODIUM 80 MILLIGRAM(S): 100 INJECTION SUBCUTANEOUS at 06:25

## 2021-03-22 RX ADMIN — ENOXAPARIN SODIUM 80 MILLIGRAM(S): 100 INJECTION SUBCUTANEOUS at 17:02

## 2021-03-22 RX ADMIN — Medication 2.5 MILLIGRAM(S): at 11:36

## 2021-03-22 RX ADMIN — SENNA PLUS 2 TABLET(S): 8.6 TABLET ORAL at 21:35

## 2021-03-22 RX ADMIN — Medication 3 MILLILITER(S): at 11:42

## 2021-03-22 RX ADMIN — Medication 2: at 08:34

## 2021-03-22 RX ADMIN — Medication 2: at 17:02

## 2021-03-22 RX ADMIN — Medication 2: at 11:50

## 2021-03-22 RX ADMIN — MIRTAZAPINE 7.5 MILLIGRAM(S): 45 TABLET, ORALLY DISINTEGRATING ORAL at 21:35

## 2021-03-22 RX ADMIN — SODIUM CHLORIDE 60 MILLILITER(S): 9 INJECTION, SOLUTION INTRAVENOUS at 11:34

## 2021-03-22 NOTE — PROGRESS NOTE ADULT - SUBJECTIVE AND OBJECTIVE BOX
INTERVAL HPI/OVERNIGHT EVENTS: NGT placed     Code Status: full  Allergies    No Known Allergies    Intolerances    MEDICATIONS  (STANDING):  albuterol/ipratropium for Nebulization 3 milliLiter(s) Nebulizer every 6 hours  chlorhexidine 2% Cloths 1 Application(s) Topical <User Schedule>  dextrose 40% Gel 15 Gram(s) Oral once  dextrose 5% + sodium chloride 0.45%. 1000 milliLiter(s) (60 mL/Hr) IV Continuous <Continuous>  dextrose 5%. 1000 milliLiter(s) (50 mL/Hr) IV Continuous <Continuous>  dextrose 5%. 1000 milliLiter(s) (100 mL/Hr) IV Continuous <Continuous>  dextrose 50% Injectable 25 Gram(s) IV Push once  dextrose 50% Injectable 12.5 Gram(s) IV Push once  dextrose 50% Injectable 25 Gram(s) IV Push once  enoxaparin Injectable 80 milliGRAM(s) SubCutaneous every 12 hours  ferrous    sulfate 325 milliGRAM(s) Oral daily  gabapentin 300 milliGRAM(s) Oral two times a day  glucagon  Injectable 1 milliGRAM(s) IntraMuscular once  insulin lispro (ADMELOG) corrective regimen sliding scale   SubCutaneous three times a day before meals  lisinopril 5 milliGRAM(s) Oral daily  mirtazapine 7.5 milliGRAM(s) Oral at bedtime  pantoprazole   Suspension 40 milliGRAM(s) Oral daily  polyethylene glycol 3350 17 Gram(s) Oral daily  senna 2 Tablet(s) Oral at bedtime    MEDICATIONS  (PRN):  acetaminophen   Tablet .. 650 milliGRAM(s) Oral every 6 hours PRN Mild Pain (1 - 3)      PRESENT SYMPTOMS: [x ]Unable to obtain due to poor mentation   Source if other than patient:  [ ]Family   [ ]Team     Pain (Impact on QOL):    Location:  Severity:  Minimal acceptable level (0-10 scale):       Quality:       Onset:  Duration:  Aggravating factors:  Relieving Factors  Radiation:    Dyspnea:  Yes [ ] No [ ] - [ ]Mild [ ]Moderate [ ]Severe  Anxiety:    Yes [ ] No [ ] - [ ]Mild [ ]Moderate [ ]Severe  Fatigue:    Yes [ ] No [ ] - [ ]Mild [ ]Moderate [ ]Severe  Nausea:    Yes [ ] No [ ] - [ ]Mild [ ]Moderate [ ]Severe                         Loss of appetite: Yes [ ] No [ ] - [ ]Mild [ ]Moderate [ ]Severe             Constipation:  Yes [ ] No [ ] - [ ]Mild [ ]Moderate [ ]Severe  Grief: Yes [ ] No [ ]     PAIN AD Score:	6  [email or web address] (Ctrl + left click to view)    Other Symptoms:  [ ]All other review of systems negative     Karnofsky Performance Score/Palliative Performance Status Version 2:    10     %    http://palliative.info/resource_material/[email or web address].pdf    PHYSICAL EXAM:  Vital Signs Last 24 Hrs  T(C): 36.2 (22 Mar 2021 11:20), Max: 36.9 (21 Mar 2021 17:05)  T(F): 97.2 (22 Mar 2021 11:20), Max: 98.4 (21 Mar 2021 17:05)  HR: 105 (22 Mar 2021 11:45) (69 - 117)  BP: 126/81 (22 Mar 2021 11:45) (95/65 - 126/81)  BP(mean): --  RR: 20 (22 Mar 2021 11:45) (17 - 20)  SpO2: 99% (22 Mar 2021 11:45) (95% - 100%) I&O's Summary    21 Mar 2021 07:01  -  22 Mar 2021 07:00  --------------------------------------------------------  IN: 0 mL / OUT: 100 mL / NET: -100 mL    22 Mar 2021 07:01  -  22 Mar 2021 15:17  --------------------------------------------------------  IN: 0 mL / OUT: 200 mL / NET: -200 mL         GENERAL:  [ ]Alert  [ ]Oriented x   [ x]Lethargic  [ ]Cachexia  [ ]Unarousable  [ ]Verbal  [x ]Non-Verbal- moaning/crying  Behavioral:   [ ] Anxiety  [ ] Delirium [ ] Agitation [ ] Other  HEENT:  [ ]Normal   [ x]Dry mouth   [ ]ET Tube/Trach  [ ]Oral lesions  PULMONARY:   [ ]Clear [ x]Tachypnea  [x ]Audible excessive secretions   [ ]Rhonchi        [ ]Right [ ]Left [ ]Bilateral  [ ]Crackles        [ ]Right [ ]Left [ ]Bilateral  [ ]Wheezing     [ ]Right [ ]Left [ ]Bilateral  CARDIOVASCULAR:    [ ]Regular [ ]Irregular [x ]Tachy  [ ]Manuel [ ]Murmur [ ]Other  GASTROINTESTINAL:  [x ]Soft  [ ]Distended   [x ]+BS  [ x]Non tender [ ]Tender  [ ]PEG [ x]OGT/ NGT   Last BM: 3/22     GENITOURINARY:  [ ]Normal [x ] Incontinent   [ ]Oliguria/Anuria   [ ]Mcdonald  MUSCULOSKELETAL:   [ ]Normal   [ ]Weakness  [ x]Bed/Wheelchair bound [x ]Edema  NEUROLOGIC:   [ ]No focal deficits  [x ] Cognitive impairment  [x ] Dysphagia [x ]Dysarthria [ ] Paresis [ ]Other   SKIN:   [ ]Normal   [ x]Pressure ulcer(s) perianal skin tear  [ ]Rash    CRITICAL CARE:  [ ] Shock Present  [ ]Septic [ ]Cardiogenic [ ]Neurologic [ ]Hypovolemic  [ ]  Vasopressors [ ]  Inotropes   [ ] Respiratory failure present  [ ] Acute  [ ] Chronic [ ] Hypoxic  [ ] Hypercarbic [ ] Other  [ ] Other organ failure     LABS:                        7.4    9.93  )-----------( 316      ( 22 Mar 2021 11:46 )             23.9   03-22    154<H>  |  124<H>  |  30<H>  ----------------------------<  142<H>  3.4<L>   |  20<L>  |  0.93    Ca    8.1<L>      22 Mar 2021 11:46    TPro  4.7<L>  /  Alb  1.8<L>  /  TBili  0.5  /  DBili  x   /  AST  15  /  ALT  9<L>  /  AlkPhos  82  03-22        RADIOLOGY & ADDITIONAL STUDIES:    Protein Calorie Malnutrition Present: [x ] yes [ ] no  [ x] PPSV2 < or = 30%  [x ] significant weight loss [x] poor nutritional intake [x ] anasarca [x ] catabolic state Albumin, Serum: 1.8 g/dL (03-22-21 @ 11:46)      REFERRALS:   [ ]Chaplaincy  [ ] Hospice  [ ]Child Life  [ ]Social Work  [ ]Case management [ ]Holistic Therapy   Goals of Care Document: SKINNY Tavares (03-17-21 @ 09:15)  Goals of Care Conversation:   Participants:  · Participants  Family  · Child(lennie)  son Henrique Cosby    Advance Directives:  · Caregiver:  no    Conversation Discussion:  · Conversation  Diagnosis; Prognosis; MOLST Discussed; FULL CODE  · Conversation Details  89yo F hx HTN, DM, hypokalemia, non-verbal at baseline, asthma, recent covid pna, sent to ED from [facility] for failure to thrive and found to have bilateral PEs and DVt on Full dose Lovenox.    Discussed with son Henrique in detail about patients diagnosis and poor prognosis. Explained to son risks vs benefit currently about proceeding with PEG, pt is on anticoagulation for DVT/PE and not candidate for PEG currently. Since pt has poor PO intake eventually pt health will decline further and son is aware of poor prognosis. However son continues to wish for aggressive measures and wants FULL CODE. Son aware PE may travel to the heart and could cause cardiac arrest but still wishes to be aggressive and would like CPR on mom. Also son wished to put mom on ventilator if breathing may stop.    What Matters Most To Patient and Family:  · What matters most to patient and family  Aggressive measures    Personal Advance Directives Treatment Guidelines:   Treatment Guidelines:  · Treatment Guideline Comments  FULL CODE    Location of Discussion:   Duration of Advanced Care Planning Meeting:  · Time spent (in minutes)  30    Location of Discussion:  · Location of discussion  Telephone      Electronic Signatures:  Barbara Tavares)  (Signed 17-Mar-2021 09:22)  	Authored: Goals of Care Conversation, Personal Advance Directives Treatment Guidelines, Location of Discussion      Last Updated: 17-Mar-2021 09:22 by Barbara Tavares)

## 2021-03-22 NOTE — CHART NOTE - NSCHARTNOTEFT_GEN_A_CORE
Pt admitted from [facility] for FTT (multi-factorial; COVID PNA, advanced age, abdominal pain, active PE, AMS); found c b/l PE, DVTs.  Pt is confused, disoriented, lethargic.  Per palliative care note pt not a candidate of PEG, poor prognosis but family wishes pt remain full code. PMHx includes CVA, HTN, DM, non-verbal at baseline, asthma, COVID hx    Factors impacting intake: [ ] none [ ] nausea  [ ] vomiting [ ] diarrhea [ ] constipation  [ ]chewing problems [ ] swallowing issues  [ ] other:     Diet Prescription: Diet, Dysphagia 1 Pureed-Thin Liquids:   Supplement Feeding Modality:  Orogastric  Glucerna Shake Cans or Servings Per Day:  1       Frequency:  Three Times a day (03-17-21 @ 13:50)    Intake:   Pt remains refusing to eat; not opening mouth of eat or take meds; requires total assistance    Current Weight:   78.2 kg (3/19); admission wt 78.2 kg (3/17)  % Weight Change: stable x 2 days    Unable to perform nutrition-focused physical exam at this time due to edematous status    1+ generalized, 2+ b/l legs, 3+ b/l arms edema noted    Pertinent Medications: MEDICATIONS  (STANDING):  ALBUTerol    90 MICROgram(s) HFA Inhaler 1 Puff(s) Inhalation once  albuterol/ipratropium for Nebulization 3 milliLiter(s) Nebulizer every 6 hours  chlorhexidine 2% Cloths 1 Application(s) Topical <User Schedule>  dextrose 40% Gel 15 Gram(s) Oral once  dextrose 5% + sodium chloride 0.45%. 1000 milliLiter(s) (60 mL/Hr) IV Continuous <Continuous>  dextrose 5%. 1000 milliLiter(s) (50 mL/Hr) IV Continuous <Continuous>  dextrose 5%. 1000 milliLiter(s) (100 mL/Hr) IV Continuous <Continuous>  dextrose 50% Injectable 25 Gram(s) IV Push once  dextrose 50% Injectable 12.5 Gram(s) IV Push once  dextrose 50% Injectable 25 Gram(s) IV Push once  enoxaparin Injectable 80 milliGRAM(s) SubCutaneous every 12 hours  ferrous    sulfate 325 milliGRAM(s) Oral daily  gabapentin 300 milliGRAM(s) Oral two times a day  glucagon  Injectable 1 milliGRAM(s) IntraMuscular once  insulin lispro (ADMELOG) corrective regimen sliding scale   SubCutaneous three times a day before meals  metoprolol tartrate Injectable 2.5 milliGRAM(s) IV Push every 6 hours  mirtazapine 7.5 milliGRAM(s) Oral at bedtime  pantoprazole  Injectable 40 milliGRAM(s) IV Push two times a day  polyethylene glycol 3350 17 Gram(s) Oral daily  senna 2 Tablet(s) Oral at bedtime    MEDICATIONS  (PRN):  acetaminophen   Tablet .. 650 milliGRAM(s) Oral every 6 hours PRN Mild Pain (1 - 3)  ondansetron Injectable 4 milliGRAM(s) IV Push every 6 hours PRN Nausea and/or Vomiting    Pertinent Labs: 03-21 Na150 mmol/L<H> Glu 180 mg/dL<H> K+ 3.7 mmol/L Cr  0.83 mg/dL BUN 32 mg/dL<H> 03-20 Alb 1.7 g/dL<L>, POCT:  170. 180, 149, 146  03-15-21  A1C 7.2%, average glu 160    Skin:  no pressure ulcers; skin tears noted on buttock    Estimated Needs:   [ X] no change since previous assessment  (3/17)  [ ] recalculated:     Previous Nutrition Diagnosis:   [X ]  Severe Malnutrition in context of acute illness    Previous Etiology:  Inadequate energy/protein intake in the setting of COVID-19 illness, AMS, altered swallow  NEW Etiology:  Inadequate energy/protein intake related to COVID-19 illness, AMS, FTT dx    Signs & Symptoms: <50% nutrition needs >5 days; >5% wt loss x 1 month; 2+ edema b/l legs & 3+ edema b/l arms    Nutrition Diagnosis is [X ] ongoing  [ ] resolved [ ] not applicable     New Nutrition Diagnosis: [X ] not applicable      Interventions:   continue current diet rx as noted  Recommend  [ ] Change Diet To:  [ ] Nutrition Supplement  [ ] Nutrition Support  [ ] Other:     Monitoring and Evaluation:   [X ] PO intake [ x ] Tolerance to diet prescription [ x ] weights [ x ] labs[ x ] follow up per protocol  [ ] other: Pt admitted from [facility] for FTT (multi-factorial; COVID PNA, advanced age, abdominal pain, active PE, AMS); found c b/l PE, DVTs.  Pt is confused, disoriented, lethargic.  Per palliative care note pt not a candidate of PEG, poor prognosis but family wishes pt remain full code. PMHx includes CVA, HTN, DM, non-verbal at baseline, asthma, COVID hx    Factors impacting intake: [ ] none [ ] nausea  [ ] vomiting [ ] diarrhea [ ] constipation  [ ]chewing problems [ ] swallowing issues  [ ] other:     Diet Prescription: Diet, Dysphagia 1 Pureed-Thin Liquids:   Supplement Feeding Modality:  Orogastric  Glucerna Shake Cans or Servings Per Day:  1       Frequency:  Three Times a day (03-17-21 @ 13:50)    Intake:   Pt remains refusing to eat; not opening mouth of eat or take meds; requires total assistance    Current Weight:   78.2 kg (3/19); admission wt 78.2 kg (3/17)  % Weight Change: stable x 2 days    Unable to perform nutrition-focused physical exam at this time due to edematous status    1+ generalized, 2+ b/l legs, 3+ b/l arms edema noted    Pertinent Medications: MEDICATIONS  (STANDING):  ALBUTerol    90 MICROgram(s) HFA Inhaler 1 Puff(s) Inhalation once  albuterol/ipratropium for Nebulization 3 milliLiter(s) Nebulizer every 6 hours  chlorhexidine 2% Cloths 1 Application(s) Topical <User Schedule>  dextrose 40% Gel 15 Gram(s) Oral once  dextrose 5% + sodium chloride 0.45%. 1000 milliLiter(s) (60 mL/Hr) IV Continuous <Continuous>  dextrose 5%. 1000 milliLiter(s) (50 mL/Hr) IV Continuous <Continuous>  dextrose 5%. 1000 milliLiter(s) (100 mL/Hr) IV Continuous <Continuous>  dextrose 50% Injectable 25 Gram(s) IV Push once  dextrose 50% Injectable 12.5 Gram(s) IV Push once  dextrose 50% Injectable 25 Gram(s) IV Push once  enoxaparin Injectable 80 milliGRAM(s) SubCutaneous every 12 hours  ferrous    sulfate 325 milliGRAM(s) Oral daily  gabapentin 300 milliGRAM(s) Oral two times a day  glucagon  Injectable 1 milliGRAM(s) IntraMuscular once  insulin lispro (ADMELOG) corrective regimen sliding scale   SubCutaneous three times a day before meals  metoprolol tartrate Injectable 2.5 milliGRAM(s) IV Push every 6 hours  mirtazapine 7.5 milliGRAM(s) Oral at bedtime  pantoprazole  Injectable 40 milliGRAM(s) IV Push two times a day  polyethylene glycol 3350 17 Gram(s) Oral daily  senna 2 Tablet(s) Oral at bedtime    MEDICATIONS  (PRN):  acetaminophen   Tablet .. 650 milliGRAM(s) Oral every 6 hours PRN Mild Pain (1 - 3)  ondansetron Injectable 4 milliGRAM(s) IV Push every 6 hours PRN Nausea and/or Vomiting    Pertinent Labs: 03-21 Na150 mmol/L<H> Glu 180 mg/dL<H> K+ 3.7 mmol/L Cr  0.83 mg/dL BUN 32 mg/dL<H> 03-20 Alb 1.7 g/dL<L>, POCT:  170. 180, 149, 146  03-15-21  A1C 7.2%, average glu 160    Skin:  no pressure ulcers; skin tears noted on buttock    Estimated Needs:   [ X] no change since previous assessment  (3/17)  [ ] recalculated:     Previous Nutrition Diagnosis:   [X ]  Severe Malnutrition in context of acute illness    Previous Etiology:  Inadequate energy/protein intake in the setting of COVID-19 illness, AMS, altered swallow  NEW Etiology:  Inadequate energy/protein intake related to COVID-19 illness, AMS, FTT dx    Signs & Symptoms: <50% nutrition needs >5 days; >5% wt loss x 1 month; 2+ edema b/l legs & 3+ edema b/l arms    Previous GOAL:  Pt to consume >50-75% nutrition needs via tolerated route  NEW GOAL:  Provide nutrition/hydration as medically appropriate & within family's wishes for tx    Nutrition Diagnosis is [X ] ongoing  [ ] resolved [ ] not applicable     New Nutrition Diagnosis: [X ] not applicable      Interventions:   continue current diet rx as noted  Recommend  [ ] Change Diet To:  [ ] Nutrition Supplement  [ ] Nutrition Support  [ ] Other:     Monitoring and Evaluation:   [X ] PO intake [ x ] Tolerance to diet prescription [ x ] weights [ x ] labs[ x ] follow up per protocol  [ ] other:

## 2021-03-22 NOTE — PROGRESS NOTE ADULT - SUBJECTIVE AND OBJECTIVE BOX
INTERVAL HPI/OVERNIGHT EVENTS:        REVIEW OF SYSTEMS:  CONSTITUTIONAL:  no  complaints  continues  with poor  appetite        MEDICATION:  acetaminophen   Tablet .. 650 milliGRAM(s) Oral every 6 hours PRN  albuterol/ipratropium for Nebulization 3 milliLiter(s) Nebulizer every 6 hours  chlorhexidine 2% Cloths 1 Application(s) Topical <User Schedule>  dextrose 40% Gel 15 Gram(s) Oral once  dextrose 5%. 1000 milliLiter(s) IV Continuous <Continuous>  dextrose 5%. 1000 milliLiter(s) IV Continuous <Continuous>  dextrose 50% Injectable 25 Gram(s) IV Push once  dextrose 50% Injectable 12.5 Gram(s) IV Push once  dextrose 50% Injectable 25 Gram(s) IV Push once  enoxaparin Injectable 80 milliGRAM(s) SubCutaneous every 12 hours  ferrous    sulfate 325 milliGRAM(s) Oral daily  gabapentin 300 milliGRAM(s) Oral two times a day  glucagon  Injectable 1 milliGRAM(s) IntraMuscular once  insulin lispro (ADMELOG) corrective regimen sliding scale   SubCutaneous three times a day before meals  metoprolol tartrate Injectable 2.5 milliGRAM(s) IV Push every 6 hours  mirtazapine 7.5 milliGRAM(s) Oral at bedtime  ondansetron Injectable 4 milliGRAM(s) IV Push every 6 hours PRN  pantoprazole  Injectable 40 milliGRAM(s) IV Push two times a day  polyethylene glycol 3350 17 Gram(s) Oral daily  senna 2 Tablet(s) Oral at bedtime  sodium chloride 0.9%. 1000 milliLiter(s) IV Continuous <Continuous>    Vital Signs Last 24 Hrs  T(C): 36.7 (22 Mar 2021 06:04), Max: 36.9 (21 Mar 2021 17:05)  T(F): 98.1 (22 Mar 2021 06:04), Max: 98.4 (21 Mar 2021 17:05)  HR: 102 (22 Mar 2021 06:04) (84 - 115)  BP: 111/62 (22 Mar 2021 06:04) (111/62 - 122/78)  BP(mean): --  RR: 18 (22 Mar 2021 06:04) (17 - 19)  SpO2: 99% (22 Mar 2021 06:15) (95% - 100%)    PHYSICAL EXAM:  GENERAL: NAD, well-groomed, well-developed  HEENT : Conjuntivae  clear sclerae anicteric  NECK: Supple, No JVD, Normal thyroid  NERVOUS SYSTEM:  Alert   no  focal  deficits;   CHEST/LUNG: Clear    HEART: Regular rate and rhythm; No murmurs, rubs, or gallops  ABDOMEN: Soft, Nontender, Nondistended; Bowel sounds present  EXTREMITIES:  no  edema no  tenderness  SKIN: No rashes   LABS:                        7.9    11.99 )-----------( 319      ( 21 Mar 2021 11:54 )             25.8     03-21    150<H>  |  120<H>  |  32<H>  ----------------------------<  180<H>  3.7   |  17<L>  |  0.83    Ca    8.0<L>      21 Mar 2021 11:54          CAPILLARY BLOOD GLUCOSE      POCT Blood Glucose.: 200 mg/dL (22 Mar 2021 07:51)  POCT Blood Glucose.: 146 mg/dL (21 Mar 2021 21:08)  POCT Blood Glucose.: 149 mg/dL (21 Mar 2021 16:10)  POCT Blood Glucose.: 180 mg/dL (21 Mar 2021 11:09)      RADIOLOGY & ADDITIONAL TESTS:    Imaging reports  Personally Reviewed:  [ ] YES  [ ] NO    Consultant(s) Notes Reviewed:  [ x] YES  [ ] NO    Care Discussed with Consultants/Other Providers [x] YES  [ ] Jeramie  Problem/Plan - 1:  ·  Problem: Pulmonary emboli.  Plan: iv  heparin.     Problem/Plan - 2:  ·  Problem: DVT (deep venous thrombosis).  Plan: iv  heparin.     HYPERNATREMIAN  CHANGE  IVF  TO  D5W 1/2 NS  Problem/Plan - 4:  ·  Problem: Asthma.  Plan: symbicort.     Problem/Plan - 5:  ·  Problem: DM (diabetes mellitus).  Plan: insulin coverage.     Problem/Plan - 6:  Problem: Failure to thrive in adult. Plan: encourage  oral  intake  rx  underlying  causes.   REMERON     for  repeat  labs  before  discharge  to  rehab

## 2021-03-22 NOTE — PROGRESS NOTE ADULT - PROBLEM SELECTOR PLAN 7
nonverbal, bedbound   FAST 7E   would likely qualify for hospice if in line with patient and family goals
nonverbal, bedbound   FAST 7E   would likely qualify for hospice if in line with patient and family goals

## 2021-03-22 NOTE — PROGRESS NOTE ADULT - PROBLEM SELECTOR PLAN 8
left messages for Henrique Cosby at , numerous times -await callback to further discuss patient's care.   [facility] discussion held with Henrique as above by KARRIE Tavares.

## 2021-03-22 NOTE — PROGRESS NOTE ADULT - ASSESSMENT
89yo F hx HTN, DM, hypokalemia, non-verbal at baseline, asthma, recent hospitalization for COVID remains COVID + admitted with failure to thrive, VTE, dyspnea. Palliative Care consulted for assistance with GOC, nutritional GOC discussion.

## 2021-03-22 NOTE — PROGRESS NOTE ADULT - PROBLEM SELECTOR PLAN 3
recent illness, catabolic state and poor intake.   if family wants to have all measures taken, pt should get NGT w feeds although unlikely to alter outcome or nutritional status overall
recent illness, catabolic state and poor intake.   if family wants to have all measures taken, pt should get NGT w feeds although unlikely to alter outcome or nutritional status overall

## 2021-03-22 NOTE — PROCEDURE NOTE - NSINFORMCONSENT_GEN_A_CORE
obtained from son, Henrique/Benefits, risks, and possible complications of procedure explained to patient/caregiver who verbalized understanding and gave verbal consent.

## 2021-03-22 NOTE — PROGRESS NOTE ADULT - PROBLEM SELECTOR PLAN 6
continues to test positive   on room air currently.
continues to test positive   on room air currently.

## 2021-03-23 LAB
ANION GAP SERPL CALC-SCNC: 10 MMOL/L — SIGNIFICANT CHANGE UP (ref 5–17)
BUN SERPL-MCNC: 27 MG/DL — HIGH (ref 7–23)
CALCIUM SERPL-MCNC: 8.4 MG/DL — LOW (ref 8.5–10.1)
CHLORIDE SERPL-SCNC: 120 MMOL/L — HIGH (ref 96–108)
CO2 SERPL-SCNC: 20 MMOL/L — LOW (ref 22–31)
CREAT SERPL-MCNC: 0.86 MG/DL — SIGNIFICANT CHANGE UP (ref 0.5–1.3)
GLUCOSE BLDC GLUCOMTR-MCNC: 163 MG/DL — HIGH (ref 70–99)
GLUCOSE BLDC GLUCOMTR-MCNC: 206 MG/DL — HIGH (ref 70–99)
GLUCOSE BLDC GLUCOMTR-MCNC: 229 MG/DL — HIGH (ref 70–99)
GLUCOSE BLDC GLUCOMTR-MCNC: 238 MG/DL — HIGH (ref 70–99)
GLUCOSE BLDC GLUCOMTR-MCNC: 262 MG/DL — HIGH (ref 70–99)
GLUCOSE SERPL-MCNC: 205 MG/DL — HIGH (ref 70–99)
HCT VFR BLD CALC: 19.3 % — CRITICAL LOW (ref 34.5–45)
HCT VFR BLD CALC: 25.9 % — LOW (ref 34.5–45)
HCT VFR BLD CALC: 27 % — LOW (ref 34.5–45)
HGB BLD-MCNC: 6.1 G/DL — CRITICAL LOW (ref 11.5–15.5)
HGB BLD-MCNC: 8 G/DL — LOW (ref 11.5–15.5)
HGB BLD-MCNC: 8.4 G/DL — LOW (ref 11.5–15.5)
MCHC RBC-ENTMCNC: 26.3 PG — LOW (ref 27–34)
MCHC RBC-ENTMCNC: 26.5 PG — LOW (ref 27–34)
MCHC RBC-ENTMCNC: 26.8 PG — LOW (ref 27–34)
MCHC RBC-ENTMCNC: 30.9 GM/DL — LOW (ref 32–36)
MCHC RBC-ENTMCNC: 31.1 GM/DL — LOW (ref 32–36)
MCHC RBC-ENTMCNC: 31.6 GM/DL — LOW (ref 32–36)
MCV RBC AUTO: 84.6 FL — SIGNIFICANT CHANGE UP (ref 80–100)
MCV RBC AUTO: 85.2 FL — SIGNIFICANT CHANGE UP (ref 80–100)
MCV RBC AUTO: 85.2 FL — SIGNIFICANT CHANGE UP (ref 80–100)
NRBC # BLD: 13 /100 WBCS — HIGH (ref 0–0)
NRBC # BLD: 16 /100 WBCS — HIGH (ref 0–0)
NRBC # BLD: 16 /100 WBCS — HIGH (ref 0–0)
PLATELET # BLD AUTO: 203 K/UL — SIGNIFICANT CHANGE UP (ref 150–400)
PLATELET # BLD AUTO: 277 K/UL — SIGNIFICANT CHANGE UP (ref 150–400)
PLATELET # BLD AUTO: 302 K/UL — SIGNIFICANT CHANGE UP (ref 150–400)
POTASSIUM SERPL-MCNC: 3.9 MMOL/L — SIGNIFICANT CHANGE UP (ref 3.5–5.3)
POTASSIUM SERPL-SCNC: 3.9 MMOL/L — SIGNIFICANT CHANGE UP (ref 3.5–5.3)
RBC # BLD: 2.28 M/UL — LOW (ref 3.8–5.2)
RBC # BLD: 3.04 M/UL — LOW (ref 3.8–5.2)
RBC # BLD: 3.17 M/UL — LOW (ref 3.8–5.2)
RBC # FLD: 18 % — HIGH (ref 10.3–14.5)
RBC # FLD: 18.3 % — HIGH (ref 10.3–14.5)
RBC # FLD: 18.3 % — HIGH (ref 10.3–14.5)
SODIUM SERPL-SCNC: 150 MMOL/L — HIGH (ref 135–145)
WBC # BLD: 10.83 K/UL — HIGH (ref 3.8–10.5)
WBC # BLD: 11.69 K/UL — HIGH (ref 3.8–10.5)
WBC # BLD: 9.13 K/UL — SIGNIFICANT CHANGE UP (ref 3.8–10.5)
WBC # FLD AUTO: 10.83 K/UL — HIGH (ref 3.8–10.5)
WBC # FLD AUTO: 11.69 K/UL — HIGH (ref 3.8–10.5)
WBC # FLD AUTO: 9.13 K/UL — SIGNIFICANT CHANGE UP (ref 3.8–10.5)

## 2021-03-23 PROCEDURE — 71045 X-RAY EXAM CHEST 1 VIEW: CPT | Mod: 26

## 2021-03-23 RX ORDER — ALBUTEROL 90 UG/1
2 AEROSOL, METERED ORAL EVERY 4 HOURS
Refills: 0 | Status: DISCONTINUED | OUTPATIENT
Start: 2021-03-23 | End: 2021-04-28

## 2021-03-23 RX ORDER — FUROSEMIDE 40 MG
60 TABLET ORAL ONCE
Refills: 0 | Status: COMPLETED | OUTPATIENT
Start: 2021-03-23 | End: 2021-03-23

## 2021-03-23 RX ADMIN — GABAPENTIN 300 MILLIGRAM(S): 400 CAPSULE ORAL at 17:02

## 2021-03-23 RX ADMIN — PANTOPRAZOLE SODIUM 40 MILLIGRAM(S): 20 TABLET, DELAYED RELEASE ORAL at 11:02

## 2021-03-23 RX ADMIN — CHLORHEXIDINE GLUCONATE 1 APPLICATION(S): 213 SOLUTION TOPICAL at 06:56

## 2021-03-23 RX ADMIN — Medication 6: at 16:39

## 2021-03-23 RX ADMIN — GABAPENTIN 300 MILLIGRAM(S): 400 CAPSULE ORAL at 05:37

## 2021-03-23 RX ADMIN — LISINOPRIL 5 MILLIGRAM(S): 2.5 TABLET ORAL at 05:37

## 2021-03-23 RX ADMIN — POLYETHYLENE GLYCOL 3350 17 GRAM(S): 17 POWDER, FOR SOLUTION ORAL at 11:02

## 2021-03-23 RX ADMIN — Medication 60 MILLIGRAM(S): at 12:57

## 2021-03-23 RX ADMIN — Medication 325 MILLIGRAM(S): at 11:02

## 2021-03-23 RX ADMIN — ENOXAPARIN SODIUM 80 MILLIGRAM(S): 100 INJECTION SUBCUTANEOUS at 05:37

## 2021-03-23 RX ADMIN — ENOXAPARIN SODIUM 80 MILLIGRAM(S): 100 INJECTION SUBCUTANEOUS at 17:02

## 2021-03-23 RX ADMIN — Medication 4: at 08:09

## 2021-03-23 RX ADMIN — Medication 4: at 11:01

## 2021-03-23 NOTE — PROGRESS NOTE ADULT - SUBJECTIVE AND OBJECTIVE BOX
INTERVAL HPI/OVERNIGHT EVENTS:    now  on  NGT  feedings    REVIEW OF SYSTEMS:  CONSTITUTIONAL:         MEDICATION:  acetaminophen   Tablet .. 650 milliGRAM(s) Oral every 6 hours PRN  albuterol/ipratropium for Nebulization 3 milliLiter(s) Nebulizer every 6 hours  chlorhexidine 2% Cloths 1 Application(s) Topical <User Schedule>  dextrose 40% Gel 15 Gram(s) Oral once  dextrose 5% + sodium chloride 0.45%. 1000 milliLiter(s) IV Continuous <Continuous>  dextrose 5%. 1000 milliLiter(s) IV Continuous <Continuous>  dextrose 5%. 1000 milliLiter(s) IV Continuous <Continuous>  dextrose 50% Injectable 25 Gram(s) IV Push once  dextrose 50% Injectable 12.5 Gram(s) IV Push once  dextrose 50% Injectable 25 Gram(s) IV Push once  enoxaparin Injectable 80 milliGRAM(s) SubCutaneous every 12 hours  ferrous    sulfate 325 milliGRAM(s) Oral daily  gabapentin 300 milliGRAM(s) Oral two times a day  glucagon  Injectable 1 milliGRAM(s) IntraMuscular once  insulin lispro (ADMELOG) corrective regimen sliding scale   SubCutaneous three times a day before meals  lisinopril 5 milliGRAM(s) Oral daily  mirtazapine 7.5 milliGRAM(s) Oral at bedtime  pantoprazole   Suspension 40 milliGRAM(s) Oral daily  polyethylene glycol 3350 17 Gram(s) Oral daily  senna 2 Tablet(s) Oral at bedtime    Vital Signs Last 24 Hrs  T(C): 36.7 (23 Mar 2021 05:47), Max: 36.7 (23 Mar 2021 05:47)  T(F): 98 (23 Mar 2021 05:47), Max: 98 (23 Mar 2021 05:47)  HR: 113 (23 Mar 2021 08:04) (69 - 113)  BP: 121/70 (23 Mar 2021 05:47) (95/65 - 126/81)  BP(mean): --  RR: 24 (23 Mar 2021 08:04) (18 - 24)  SpO2: 95% (23 Mar 2021 08:04) (95% - 100%)    PHYSICAL EXAM:  GENERAL: NAD,   HEENT : Conjuntivae  clear sclerae anicteric  NECK: Supple, No JVD, Normal thyroid  NERVOUS SYSTEM:  Alert   no  focal  deficits;   CHEST/LUNG: Clear    HEART: Regular rate and rhythm; No murmurs, rubs, or gallops  ABDOMEN: Soft, Nontender, Nondistended; Bowel sounds present  EXTREMITIES:  no  edema no  tenderness  SKIN: No rashes   LABS:                        7.4    9.93  )-----------( 316      ( 22 Mar 2021 11:46 )             23.9     03-22    154<H>  |  124<H>  |  30<H>  ----------------------------<  142<H>  3.4<L>   |  20<L>  |  0.93    Ca    8.1<L>      22 Mar 2021 11:46    TPro  4.7<L>  /  Alb  1.8<L>  /  TBili  0.5  /  DBili  x   /  AST  15  /  ALT  9<L>  /  AlkPhos  82  03-22        CAPILLARY BLOOD GLUCOSE      POCT Blood Glucose.: 206 mg/dL (23 Mar 2021 07:55)  POCT Blood Glucose.: 229 mg/dL (23 Mar 2021 05:47)  POCT Blood Glucose.: 166 mg/dL (22 Mar 2021 16:51)  POCT Blood Glucose.: 151 mg/dL (22 Mar 2021 11:32)      RADIOLOGY & ADDITIONAL TESTS:    Imaging reports  Personally Reviewed:  [ ] YES  [ ] NO    Consultant(s) Notes Reviewed:  [x ] YES  [ ] NO    Care Discussed with Consultants/Other Providers [x ] YES  [ ] NO  Problem/Plan - 1:  ·  Problem: Pulmonary emboli.  Plan: iv  heparin.     Problem/Plan - 2:  ·  Problem: DVT (deep venous thrombosis).  Plan: iv  heparin.     HYPERNATREMIAN  CHANGE  IVF  TO  D5W 1/2 NS  Problem/Plan - 4:  ·  Problem: Asthma.  Plan: symbicort.     Problem/Plan - 5:  ·  Problem: DM (diabetes mellitus).  Plan: insulin coverage.     Problem/Plan - 6:  Problem: Failure to thrive in adult. Plan: encourage  oral  intake  rx  underlying  causes.   REMERON     worsening  anemia  on  AC  for  dvt  and  PE    recheck  labs  transfuse  as needed

## 2021-03-23 NOTE — CHART NOTE - NSCHARTNOTEFT_GEN_A_CORE
I have been following this 89 y/o female with failure to thrive, dementia, bedbound, PE, PNA, COVID+ I have attempted on numerous occasions to speak to family to no avail, I have left several messages for Henrique Cosby  who has not returned my calls but has spoken to other providers regarding patient and GOC documented as full code with all measure taken for life preservation. Will sign off for now, please recall if condition warrants or goals change.

## 2021-03-23 NOTE — CHART NOTE - NSCHARTNOTEFT_GEN_A_CORE
Medicine PA Note    Patient is a 89y old  Female who presents with a chief complaint of PE  DVT  HX  COVID 19 (23 Mar 2021 08:49)                          6.1    9.13  )-----------( 203      ( 23 Mar 2021 21:21 )             19.3     Discussed with patient     [and family]    regarding the need for blood transfusion. Risk and benefits discussed. Risks including fever, chills/rigors, high or low blood pressure, respiratory distress (wheezing/hypoxia), urticaria/rash/edema, nausea, pain, bleeding, darkened urine, lower back pain, severe allergic reaction and death was discussed. Verbalizes the understanding and consent obtained. Witnessed by staff. Medicine PA Note    Blood Consent obtain via phone     Patient is a 89y old  Female who presents with a chief complaint of PE  DVT  HX  COVID 19 (23 Mar 2021 08:49)                          6.1    9.13  )-----------( 203      ( 23 Mar 2021 21:21 )             19.3     Discussed with andres Cosby [phone] regarding the need for blood transfusion. Risk and benefits discussed. Risks including fever, chills/rigors, high or low blood pressure, respiratory distress (wheezing/hypoxia), urticaria/rash/edema, nausea, pain, bleeding, darkened urine, lower back pain, severe allergic reaction and death was discussed. Verbalizes the understanding and consent obtained. Witnessed by RN Staff Holly.    [facility]

## 2021-03-24 LAB
ANION GAP SERPL CALC-SCNC: 6 MMOL/L — SIGNIFICANT CHANGE UP (ref 5–17)
BUN SERPL-MCNC: 26 MG/DL — HIGH (ref 7–23)
CALCIUM SERPL-MCNC: 8 MG/DL — LOW (ref 8.5–10.1)
CHLORIDE SERPL-SCNC: 118 MMOL/L — HIGH (ref 96–108)
CO2 SERPL-SCNC: 29 MMOL/L — SIGNIFICANT CHANGE UP (ref 22–31)
CREAT SERPL-MCNC: 0.85 MG/DL — SIGNIFICANT CHANGE UP (ref 0.5–1.3)
GLUCOSE BLDC GLUCOMTR-MCNC: 167 MG/DL — HIGH (ref 70–99)
GLUCOSE BLDC GLUCOMTR-MCNC: 224 MG/DL — HIGH (ref 70–99)
GLUCOSE BLDC GLUCOMTR-MCNC: 249 MG/DL — HIGH (ref 70–99)
GLUCOSE BLDC GLUCOMTR-MCNC: 254 MG/DL — HIGH (ref 70–99)
GLUCOSE SERPL-MCNC: 234 MG/DL — HIGH (ref 70–99)
HCT VFR BLD CALC: 24.2 % — LOW (ref 34.5–45)
HGB BLD-MCNC: 7.7 G/DL — LOW (ref 11.5–15.5)
MCHC RBC-ENTMCNC: 26.7 PG — LOW (ref 27–34)
MCHC RBC-ENTMCNC: 31.8 GM/DL — LOW (ref 32–36)
MCV RBC AUTO: 84 FL — SIGNIFICANT CHANGE UP (ref 80–100)
NRBC # BLD: 18 /100 WBCS — HIGH (ref 0–0)
OB PNL STL: POSITIVE
PLATELET # BLD AUTO: 295 K/UL — SIGNIFICANT CHANGE UP (ref 150–400)
POTASSIUM SERPL-MCNC: 3.5 MMOL/L — SIGNIFICANT CHANGE UP (ref 3.5–5.3)
POTASSIUM SERPL-SCNC: 3.5 MMOL/L — SIGNIFICANT CHANGE UP (ref 3.5–5.3)
RBC # BLD: 2.88 M/UL — LOW (ref 3.8–5.2)
RBC # FLD: 17.8 % — HIGH (ref 10.3–14.5)
SODIUM SERPL-SCNC: 153 MMOL/L — HIGH (ref 135–145)
WBC # BLD: 11.28 K/UL — HIGH (ref 3.8–10.5)
WBC # FLD AUTO: 11.28 K/UL — HIGH (ref 3.8–10.5)

## 2021-03-24 PROCEDURE — 36569 INSJ PICC 5 YR+ W/O IMAGING: CPT

## 2021-03-24 PROCEDURE — 76937 US GUIDE VASCULAR ACCESS: CPT | Mod: 26,59

## 2021-03-24 RX ORDER — SODIUM CHLORIDE 9 MG/ML
1000 INJECTION INTRAMUSCULAR; INTRAVENOUS; SUBCUTANEOUS ONCE
Refills: 0 | Status: COMPLETED | OUTPATIENT
Start: 2021-03-24 | End: 2021-03-24

## 2021-03-24 RX ORDER — ALBUTEROL 90 UG/1
2 AEROSOL, METERED ORAL EVERY 6 HOURS
Refills: 0 | Status: DISCONTINUED | OUTPATIENT
Start: 2021-03-24 | End: 2021-04-28

## 2021-03-24 RX ORDER — METOPROLOL TARTRATE 50 MG
5 TABLET ORAL ONCE
Refills: 0 | Status: COMPLETED | OUTPATIENT
Start: 2021-03-24 | End: 2021-03-24

## 2021-03-24 RX ADMIN — GABAPENTIN 300 MILLIGRAM(S): 400 CAPSULE ORAL at 17:44

## 2021-03-24 RX ADMIN — Medication 4: at 12:06

## 2021-03-24 RX ADMIN — Medication 325 MILLIGRAM(S): at 11:07

## 2021-03-24 RX ADMIN — POLYETHYLENE GLYCOL 3350 17 GRAM(S): 17 POWDER, FOR SOLUTION ORAL at 11:09

## 2021-03-24 RX ADMIN — Medication 6: at 08:32

## 2021-03-24 RX ADMIN — PANTOPRAZOLE SODIUM 40 MILLIGRAM(S): 20 TABLET, DELAYED RELEASE ORAL at 11:07

## 2021-03-24 RX ADMIN — GABAPENTIN 300 MILLIGRAM(S): 400 CAPSULE ORAL at 06:26

## 2021-03-24 RX ADMIN — LISINOPRIL 5 MILLIGRAM(S): 2.5 TABLET ORAL at 06:27

## 2021-03-24 RX ADMIN — MIRTAZAPINE 7.5 MILLIGRAM(S): 45 TABLET, ORALLY DISINTEGRATING ORAL at 21:51

## 2021-03-24 RX ADMIN — CHLORHEXIDINE GLUCONATE 1 APPLICATION(S): 213 SOLUTION TOPICAL at 06:27

## 2021-03-24 RX ADMIN — Medication 4: at 17:46

## 2021-03-24 NOTE — PROGRESS NOTE ADULT - SUBJECTIVE AND OBJECTIVE BOX
INTERVAL HPI/OVERNIGHT EVENTS:    partial  transfusion this  am  2/2 poor  IV  access  to  have  midline placement  no  further  black  stools  continues  with  GT  feedings    REVIEW OF SYSTEMS:  CONSTITUTIONAL:  no  complaints       MEDICATION:  acetaminophen   Tablet .. 650 milliGRAM(s) Oral every 6 hours PRN  ALBUTerol    90 MICROgram(s) HFA Inhaler 2 Puff(s) Inhalation every 4 hours  albuterol/ipratropium for Nebulization 3 milliLiter(s) Nebulizer every 6 hours  chlorhexidine 2% Cloths 1 Application(s) Topical <User Schedule>  dextrose 40% Gel 15 Gram(s) Oral once  dextrose 5%. 1000 milliLiter(s) IV Continuous <Continuous>  dextrose 5%. 1000 milliLiter(s) IV Continuous <Continuous>  dextrose 50% Injectable 25 Gram(s) IV Push once  dextrose 50% Injectable 12.5 Gram(s) IV Push once  dextrose 50% Injectable 25 Gram(s) IV Push once  enoxaparin Injectable 80 milliGRAM(s) SubCutaneous every 12 hours  ferrous    sulfate 325 milliGRAM(s) Oral daily  gabapentin 300 milliGRAM(s) Oral two times a day  glucagon  Injectable 1 milliGRAM(s) IntraMuscular once  insulin lispro (ADMELOG) corrective regimen sliding scale   SubCutaneous three times a day before meals  lisinopril 5 milliGRAM(s) Oral daily  mirtazapine 7.5 milliGRAM(s) Oral at bedtime  pantoprazole   Suspension 40 milliGRAM(s) Oral daily  polyethylene glycol 3350 17 Gram(s) Oral daily  senna 2 Tablet(s) Oral at bedtime    Vital Signs Last 24 Hrs  T(C): 36.6 (24 Mar 2021 04:26), Max: 37.2 (23 Mar 2021 12:55)  T(F): 97.9 (24 Mar 2021 04:26), Max: 99 (23 Mar 2021 12:55)  HR: 109 (24 Mar 2021 04:26) (99 - 113)  BP: 116/59 (24 Mar 2021 04:26) (98/56 - 136/80)  BP(mean): --  RR: 20 (24 Mar 2021 04:26) (18 - 22)  SpO2: 99% (24 Mar 2021 04:26) (95% - 99%)    PHYSICAL EXAM:  GENERAL: NAD,   HEENT : Conjuntivae  pale sclerae anicteric  NECK: Supple, No JVD, Normal thyroid  NERVOUS SYSTEM:  Alert oriented   no  focal  deficits;   CHEST/LUNG: Clear    HEART: Regular rate and rhythm; No murmurs, rubs, or gallops  ABDOMEN: Soft, Nontender, Nondistended; Bowel sounds present  EXTREMITIES:  no  edema no  tenderness  SKIN: No rashes   LABS:                        6.1    9.13  )-----------( 203      ( 23 Mar 2021 21:21 )             19.3     03-23    150<H>  |  120<H>  |  27<H>  ----------------------------<  205<H>  3.9   |  20<L>  |  0.86    Ca    8.4<L>      23 Mar 2021 08:16    TPro  4.7<L>  /  Alb  1.8<L>  /  TBili  0.5  /  DBili  x   /  AST  15  /  ALT  9<L>  /  AlkPhos  82  03-22        CAPILLARY BLOOD GLUCOSE      POCT Blood Glucose.: 254 mg/dL (24 Mar 2021 08:18)  POCT Blood Glucose.: 163 mg/dL (23 Mar 2021 21:23)  POCT Blood Glucose.: 262 mg/dL (23 Mar 2021 16:36)  POCT Blood Glucose.: 238 mg/dL (23 Mar 2021 10:49)      RADIOLOGY & ADDITIONAL TESTS:    Imaging reports  Personally Reviewed:  [ ] YES  [ ] NO    Consultant(s) Notes Reviewed:  [x ] YES  [ ] NO    Care Discussed with Consultants/Other Providers [x ] YES  [ ] NO  Problem/Plan - 1:  ·  Problem: Pulmonary emboli.  Plan: iv  heparin.     Problem/Plan - 2:  ·  Problem: DVT (deep venous thrombosis).  Plan: iv  heparin.     HYPERNATREMIAN  CHANGE  IVF  TO  D5W 1/2 NS  Problem/Plan - 4:  ·  Problem: Asthma.  Plan: symbicort.     Problem/Plan - 5:  ·  Problem: DM (diabetes mellitus).  Plan: insulin coverage.     Problem/Plan - 6:  Problem: Failure to thrive in adult. Plan: encourage  oral  intake  rx  underlying  causes.   REMERON     worsening  anemia  on  AC  for  dvt  and  PE    recheck  labs  transfuse  as needed    will  discuss  with  vascular  if  benefit  of  IVF and  stopping  AC       INTERVAL HPI/OVERNIGHT EVENTS:    partial  transfusion this  am  2/2 poor  IV  access  to  have  midline placement  no  further  black  stools  continues  with  GT  feedings    REVIEW OF SYSTEMS:  CONSTITUTIONAL:  no  complaints       MEDICATION:  acetaminophen   Tablet .. 650 milliGRAM(s) Oral every 6 hours PRN  ALBUTerol    90 MICROgram(s) HFA Inhaler 2 Puff(s) Inhalation every 4 hours  albuterol/ipratropium for Nebulization 3 milliLiter(s) Nebulizer every 6 hours  chlorhexidine 2% Cloths 1 Application(s) Topical <User Schedule>  dextrose 40% Gel 15 Gram(s) Oral once  dextrose 5%. 1000 milliLiter(s) IV Continuous <Continuous>  dextrose 5%. 1000 milliLiter(s) IV Continuous <Continuous>  dextrose 50% Injectable 25 Gram(s) IV Push once  dextrose 50% Injectable 12.5 Gram(s) IV Push once  dextrose 50% Injectable 25 Gram(s) IV Push once  enoxaparin Injectable 80 milliGRAM(s) SubCutaneous every 12 hours  ferrous    sulfate 325 milliGRAM(s) Oral daily  gabapentin 300 milliGRAM(s) Oral two times a day  glucagon  Injectable 1 milliGRAM(s) IntraMuscular once  insulin lispro (ADMELOG) corrective regimen sliding scale   SubCutaneous three times a day before meals  lisinopril 5 milliGRAM(s) Oral daily  mirtazapine 7.5 milliGRAM(s) Oral at bedtime  pantoprazole   Suspension 40 milliGRAM(s) Oral daily  polyethylene glycol 3350 17 Gram(s) Oral daily  senna 2 Tablet(s) Oral at bedtime    Vital Signs Last 24 Hrs  T(C): 36.6 (24 Mar 2021 04:26), Max: 37.2 (23 Mar 2021 12:55)  T(F): 97.9 (24 Mar 2021 04:26), Max: 99 (23 Mar 2021 12:55)  HR: 109 (24 Mar 2021 04:26) (99 - 113)  BP: 116/59 (24 Mar 2021 04:26) (98/56 - 136/80)  BP(mean): --  RR: 20 (24 Mar 2021 04:26) (18 - 22)  SpO2: 99% (24 Mar 2021 04:26) (95% - 99%)    PHYSICAL EXAM:  GENERAL: NAD,   HEENT : Conjuntivae  pale sclerae anicteric  NECK: Supple, No JVD, Normal thyroid  NERVOUS SYSTEM:  Alert oriented   no  focal  deficits;   CHEST/LUNG: Clear    HEART: Regular rate and rhythm; No murmurs, rubs, or gallops  ABDOMEN: Soft, Nontender, Nondistended; Bowel sounds present  EXTREMITIES:  no  edema no  tenderness  SKIN: No rashes   LABS:                        6.1    9.13  )-----------( 203      ( 23 Mar 2021 21:21 )             19.3     03-23    150<H>  |  120<H>  |  27<H>  ----------------------------<  205<H>  3.9   |  20<L>  |  0.86    Ca    8.4<L>      23 Mar 2021 08:16    TPro  4.7<L>  /  Alb  1.8<L>  /  TBili  0.5  /  DBili  x   /  AST  15  /  ALT  9<L>  /  AlkPhos  82  03-22        CAPILLARY BLOOD GLUCOSE      POCT Blood Glucose.: 254 mg/dL (24 Mar 2021 08:18)  POCT Blood Glucose.: 163 mg/dL (23 Mar 2021 21:23)  POCT Blood Glucose.: 262 mg/dL (23 Mar 2021 16:36)  POCT Blood Glucose.: 238 mg/dL (23 Mar 2021 10:49)      RADIOLOGY & ADDITIONAL TESTS:    Imaging reports  Personally Reviewed:  [ ] YES  [ ] NO    Consultant(s) Notes Reviewed:  [x ] YES  [ ] NO    Care Discussed with Consultants/Other Providers [x ] YES  [ ] NO  Problem/Plan - 1:  ·  Problem: Pulmonary emboli.  Plan: iv  heparin.     Problem/Plan - 2:  ·  Problem: DVT (deep venous thrombosis).  Plan: iv  heparin.     HYPERNATREMIAN  CHANGE  IVF  TO  D5W 1/2 NS  Problem/Plan - 4:  ·  Problem: Asthma.  Plan: symbicort.     Problem/Plan - 5:  ·  Problem: DM (diabetes mellitus).  Plan: insulin coverage.     Problem/Plan - 6:  Problem: Failure to thrive in adult. Plan: encourage  oral  intake  rx  underlying  causes.   REMERON     worsening  anemia  on  AC  for  dvt  and  PE    recheck  labs  transfuse  as needed    will  discuss  with  vascular  if  benefit  of  IVF and  stopping  AC  discussed  with  pt's  grandson

## 2021-03-24 NOTE — CONSULT NOTE ADULT - SUBJECTIVE AND OBJECTIVE BOX
Vascular Attending:      3-24-21 The pt is seen and examiend and the history is noted. Pt with COVID, PNA, Bilt LE DVT, PE, advance age and on AC for the PE and DVT, with low Hb and HCT, required PRBCS, and consult called for IVC filter placement. I spoke with the pts son .  HPI:   89yo F hx HTN, DM, hypokalemia, non-verbal at baseline, asthma, sent to ED from [facility] for failure to thrive. History obtained from chart review. Per charts, patient continues to have electrolyte disturbances with potassium repletion. Has been refusing food in the past few days,putting hands over her mouth when they attempt to feed her. Overall weight loss of 10 kg since admission. PT  WITH  HX   COVID  19  ALREADY  RX  IN  HOSPITAL  CONTINUES  +  IN  [facility] SINCE  3/8/21  EVALUATED  IN  ER  FOUND  TO  LAVE  EXTENSIVE DVT  BILATERAL PE  AFIB STARTED  ON  IV  HEPARIN  +  SOB  LEG  PAIN  POOR  APETITE     (14 Mar 2021 20:05)      PAST MEDICAL & SURGICAL HISTORY:  Lumbar spondylolysis  with degenerative changes    Stroke  right frontal stroke    Pneumonia    Diverticulitis    Dysphagia    Asthma    DM (diabetes mellitus)    HTN (hypertension)    Status post right knee replacement    Hip fracture  Left hip fracture          MEDICATIONS  (STANDING):  ALBUTerol    90 MICROgram(s) HFA Inhaler 2 Puff(s) Inhalation every 4 hours  chlorhexidine 2% Cloths 1 Application(s) Topical <User Schedule>  dextrose 40% Gel 15 Gram(s) Oral once  dextrose 5%. 1000 milliLiter(s) (50 mL/Hr) IV Continuous <Continuous>  dextrose 5%. 1000 milliLiter(s) (100 mL/Hr) IV Continuous <Continuous>  dextrose 50% Injectable 25 Gram(s) IV Push once  dextrose 50% Injectable 12.5 Gram(s) IV Push once  dextrose 50% Injectable 25 Gram(s) IV Push once  ferrous    sulfate 325 milliGRAM(s) Oral daily  gabapentin 300 milliGRAM(s) Oral two times a day  glucagon  Injectable 1 milliGRAM(s) IntraMuscular once  insulin lispro (ADMELOG) corrective regimen sliding scale   SubCutaneous three times a day before meals  lisinopril 5 milliGRAM(s) Oral daily  mirtazapine 7.5 milliGRAM(s) Oral at bedtime  pantoprazole   Suspension 40 milliGRAM(s) Oral daily  polyethylene glycol 3350 17 Gram(s) Oral daily  senna 2 Tablet(s) Oral at bedtime    MEDICATIONS  (PRN):  acetaminophen   Tablet .. 650 milliGRAM(s) Oral every 6 hours PRN Mild Pain (1 - 3)  ALBUTerol    90 MICROgram(s) HFA Inhaler 2 Puff(s) Inhalation every 6 hours PRN Wheezing      Allergies    No Known Allergies    Intolerances        SOCIAL HISTORY:      Vital Signs Last 24 Hrs  T(C): 36.4 (24 Mar 2021 16:25), Max: 37.8 (24 Mar 2021 11:47)  T(F): 97.6 (24 Mar 2021 16:25), Max: 100 (24 Mar 2021 11:47)  HR: 110 (24 Mar 2021 16:25) (99 - 116)  BP: 117/76 (24 Mar 2021 16:25) (98/56 - 137/48)  BP(mean): --  RR: 20 (24 Mar 2021 16:25) (19 - 20)  SpO2: 98% (24 Mar 2021 16:25) (96% - 100%)    P/E:-  not verbaly responsive, ?dementia.  CAROTIDS:- Bilateral carotids with no Bruits. No scars of previous catheterisation.  UPPER EXTREMITIES:- Bilateral radial artery pulses are normal and no ischemia of the Hands. No edema of the arms.  ABDOMEN:- No pulsatile mass in the abdomen and no ascites.  LOWER EXTREMITIES:- Bilateral LE with  Edema and no CVI, No varicose veins, no ulcers.The arterial pulse are examined with palpation and Dopplers and the findings are as follows, warm feet . Edema of the abd wall and Both LE,,     Pulses:   Right:                                                                          Left:  FEM [ ]2+ [y ]1+ [ ]doppler                                             FEM [ ]2+ [y ]1+ [ ]doppler    POP [ ]2+ [ ]1+ [y ]doppler                                             POP [ ]2+ [y ]1+ [ ]doppler    DP [ ]2+ [ ]1+ [y ]doppler                                                DP [ ]2+ [ ]1+ [y ]doppler  PT[ ]2+ [ ]1+ [ y]doppler                                                  PT [ ]2+ [ ]1+ [ y]doppler      LABS:                        7.7    11.28 )-----------( 295      ( 24 Mar 2021 15:28 )             24.2     03-24    153<H>  |  118<H>  |  26<H>  ----------------------------<  234<H>  3.5   |  29  |  0.85    Ca    8.0<L>      24 Mar 2021 15:28            RADIOLOGY & ADDITIONAL STUDIES    EXAM:  US DPLX LWR EXT VEINS COMPL BI                            PROCEDURE DATE:  03/14/2021          INTERPRETATION:  CLINICAL INFORMATION: Bilateral leg bruising.    COMPARISON: None available.    TECHNIQUE: Duplex sonography of the BILATERAL LOWER extremity veins with color and spectral Doppler, with and without compression.    FINDINGS:    RIGHT:  Normal compressibility of the RIGHT common femoral, femoral and popliteal veins.  Doppler examination shows normal spontaneous and phasic flow.  Deep venous thrombosis in the right peroneal vein.    LEFT:  There is deep venous thrombosis in the distal left external iliac vein, common femoral vein, femoral vein, popliteal vein, and peroneal and posterior tibial veins.    IMPRESSION:    Extensive deep venous thrombosis in the left lower extremity including the distal left external iliac vein extending from the calf.    Deep venous thrombosis in the right peroneal vein.    The findings were discussed with Dr. Mtz on 3/14/2021 6:12 PM            JENNIFER RODRIGUEZ MD; Attending Radiologist  This document has been electronically signed. Mar 14 2021  6:13PM      EXAM:  CT ANGIO CHEST (W)AW IC                            PROCEDURE DATE:  03/14/2021          INTERPRETATION:  CLINICAL INFORMATION: Failure to thrive. Electrolyte imbalance. History of COVID. Evaluate for pulmonary embolism.    COMPARISON: Chest radiograph 3/14/2021.    CONTRAST/COMPLICATIONS:  IV Contrast: Omnipaque 350 / 85 cc administered / .  Oral Contrast: NONE  Complications: None reported at time of study completion    PROCEDURE:  CT Angiography of the Chest.  Sagittal and coronal reformats were performed as well as 3D (MIP) reconstructions.    FINDINGS:    LUNGS AND AIRWAYS PLEURA:  Small bilateral pleural effusions and underlying compressive atelectasis, larger on the right.  Patchy bilateral groundglass opacities and mosaic attenuation.  Left apical pleural parenchymal consolidation with focal cystic bronchiectasis.  Paraseptal emphysematous changes right middle lobe.    The central airways remain patent.    MEDIASTINUM AND HERNAN: No lymphadenopathy.    VESSELS: There are pulmonary emboli throughout the right lower lobe and segmental right lower lobe pulmonary arteries.  There are pulmonary emboli segmental left lingula lobe and left lower lobe and segmental left lower lobe pulmonary arterial vasculature.  There is prominence of the main pulmonary arterial trunk measuring 3.3 cm, finding which may be associated with pulmonary arterial hypertension.    Atherosclerotic calcification of the thoracic aorta with coronary artery calcifications.    HEART: Mildly enlarged.  Trace pericardial effusion.    CHEST WALL AND LOWER NECK: Within normal limits.    VISUALIZED UPPER ABDOMEN:  Streak artifact degrades image quality limiting evaluation.  Nodular contour of liver.  Nodular thickening bilateral adrenal glands.    BONES:Degenerative changes spine.    IMPRESSION:    Acute bilateral pulmonary emboli as discussed.  Prominence of the main pulmonary arterial trunk which may reflect pulmonary arterial hypertension.  This critical value was discussed with Dr. Mtz   by telephone at the time of interpretation on 3/14/2021.      Impression and Plan: Patient with ?COVID PNA, bilat le DVT and PE, COVID pos from 3/22,   pt is most likely hypercoagulable from COVID, and has extensive DVT. No arterial clots. There is high risk of IVC filter clots and Throbosis of IVC if filter is placed. Also i spoke with the son, and the family is undecided , i tried to reach the daughter and could not reach her.  I would recomend to transfuse, will again discuss with the family and they dont seem to understand  How sick the Mother is and course to take, i have explained the paliiative and comfort care and they will decide.

## 2021-03-24 NOTE — CHART NOTE - NSCHARTNOTEFT_GEN_A_CORE
Called to insert an IVL in this pt for blood transfusion . Multiple unsuccessful attempts by the RNs . # 22 guage IVL placed in left wrist . Pt with weeping edema noted . left hand with abangle that is tight . Obtained consent from grandson to cut the bangle for possible trauma to the are from cutting into the skin . Grandson who is the HCP , However unable to cut the bangle because the instrument was not sharp and the bangle was too thick . Supervisor and nurse notified .

## 2021-03-25 LAB
GLUCOSE BLDC GLUCOMTR-MCNC: 110 MG/DL — HIGH (ref 70–99)
GLUCOSE BLDC GLUCOMTR-MCNC: 156 MG/DL — HIGH (ref 70–99)
GLUCOSE BLDC GLUCOMTR-MCNC: 219 MG/DL — HIGH (ref 70–99)
GLUCOSE BLDC GLUCOMTR-MCNC: 272 MG/DL — HIGH (ref 70–99)
HCT VFR BLD CALC: 23 % — LOW (ref 34.5–45)
HGB BLD-MCNC: 7 G/DL — CRITICAL LOW (ref 11.5–15.5)
MCHC RBC-ENTMCNC: 26 PG — LOW (ref 27–34)
MCHC RBC-ENTMCNC: 30.4 GM/DL — LOW (ref 32–36)
MCV RBC AUTO: 85.5 FL — SIGNIFICANT CHANGE UP (ref 80–100)
NRBC # BLD: 18 /100 WBCS — HIGH (ref 0–0)
PLATELET # BLD AUTO: 253 K/UL — SIGNIFICANT CHANGE UP (ref 150–400)
RBC # BLD: 2.69 M/UL — LOW (ref 3.8–5.2)
RBC # FLD: 17.8 % — HIGH (ref 10.3–14.5)
WBC # BLD: 10.72 K/UL — HIGH (ref 3.8–10.5)
WBC # FLD AUTO: 10.72 K/UL — HIGH (ref 3.8–10.5)

## 2021-03-25 RX ADMIN — Medication 4: at 12:40

## 2021-03-25 RX ADMIN — Medication 6: at 08:05

## 2021-03-25 RX ADMIN — POLYETHYLENE GLYCOL 3350 17 GRAM(S): 17 POWDER, FOR SOLUTION ORAL at 12:40

## 2021-03-25 RX ADMIN — Medication 325 MILLIGRAM(S): at 12:41

## 2021-03-25 RX ADMIN — GABAPENTIN 300 MILLIGRAM(S): 400 CAPSULE ORAL at 17:16

## 2021-03-25 RX ADMIN — MIRTAZAPINE 7.5 MILLIGRAM(S): 45 TABLET, ORALLY DISINTEGRATING ORAL at 22:16

## 2021-03-25 RX ADMIN — PANTOPRAZOLE SODIUM 40 MILLIGRAM(S): 20 TABLET, DELAYED RELEASE ORAL at 12:41

## 2021-03-25 RX ADMIN — SENNA PLUS 2 TABLET(S): 8.6 TABLET ORAL at 22:16

## 2021-03-25 RX ADMIN — CHLORHEXIDINE GLUCONATE 1 APPLICATION(S): 213 SOLUTION TOPICAL at 05:39

## 2021-03-25 RX ADMIN — GABAPENTIN 300 MILLIGRAM(S): 400 CAPSULE ORAL at 05:39

## 2021-03-25 RX ADMIN — ALBUTEROL 2 PUFF(S): 90 AEROSOL, METERED ORAL at 17:46

## 2021-03-25 NOTE — PROCEDURE NOTE - ADDITIONAL PROCEDURE DETAILS
s/p midline insertion under ultrasound guidance. Prior to establishing midline patient's upper extremities were edematous, ecchymotic, had weeping wounds with blood and fluids, and skin tears bilaterally. 4fr x20cm single lumen. Catheter placed in right basilic vein. Hemostasis achieved. DSD applied. No complications.    Midline can be accessed.

## 2021-03-25 NOTE — PROCEDURE NOTE - NSPROCDETAILS_GEN_ALL_CORE
nasogastric
location identified, draped/prepped, sterile technique used/sterile dressing applied/sterile technique, catheter placed/ultrasound guidance

## 2021-03-25 NOTE — PROGRESS NOTE ADULT - SUBJECTIVE AND OBJECTIVE BOX
INTERVAL HPI/OVERNIGHT EVENTS:        REVIEW OF SYSTEMS:  CONSTITUTIONAL:  comfortable        MEDICATION:  acetaminophen   Tablet .. 650 milliGRAM(s) Oral every 6 hours PRN  ALBUTerol    90 MICROgram(s) HFA Inhaler 2 Puff(s) Inhalation every 4 hours  ALBUTerol    90 MICROgram(s) HFA Inhaler 2 Puff(s) Inhalation every 6 hours PRN  chlorhexidine 2% Cloths 1 Application(s) Topical <User Schedule>  dextrose 40% Gel 15 Gram(s) Oral once  dextrose 5%. 1000 milliLiter(s) IV Continuous <Continuous>  dextrose 5%. 1000 milliLiter(s) IV Continuous <Continuous>  dextrose 50% Injectable 25 Gram(s) IV Push once  dextrose 50% Injectable 12.5 Gram(s) IV Push once  dextrose 50% Injectable 25 Gram(s) IV Push once  ferrous    sulfate 325 milliGRAM(s) Oral daily  gabapentin 300 milliGRAM(s) Oral two times a day  glucagon  Injectable 1 milliGRAM(s) IntraMuscular once  insulin lispro (ADMELOG) corrective regimen sliding scale   SubCutaneous three times a day before meals  lisinopril 5 milliGRAM(s) Oral daily  mirtazapine 7.5 milliGRAM(s) Oral at bedtime  pantoprazole   Suspension 40 milliGRAM(s) Oral daily  polyethylene glycol 3350 17 Gram(s) Oral daily  senna 2 Tablet(s) Oral at bedtime    Vital Signs Last 24 Hrs  T(C): 36.7 (25 Mar 2021 05:00), Max: 37.8 (24 Mar 2021 11:47)  T(F): 98.1 (25 Mar 2021 05:00), Max: 100 (24 Mar 2021 11:47)  HR: 105 (25 Mar 2021 05:00) (99 - 116)  BP: 116/77 (25 Mar 2021 05:00) (110/71 - 137/48)  BP(mean): --  RR: 20 (25 Mar 2021 05:00) (19 - 20)  SpO2: 95% (25 Mar 2021 05:00) (95% - 100%)    PHYSICAL EXAM:  GENERAL: NAD,   HEENT : Conjuntivae  clear sclerae anicteric  NECK: Supple, No JVD, Normal thyroid  NERVOUS SYSTEM:  Alert   no  focal  deficits;   CHEST/LUNG: Clear    HEART: Regular rate and rhythm; No murmurs, rubs, or gallops  ABDOMEN: Soft, Nontender, Nondistended; Bowel sounds present  EXTREMITIES:    swollen  bilateral  UEs  with  diffuse  ecchymsis    SKIN: No rashes   LABS:                        7.7    11.28 )-----------( 295      ( 24 Mar 2021 15:28 )             24.2     03-24    153<H>  |  118<H>  |  26<H>  ----------------------------<  234<H>  3.5   |  29  |  0.85    Ca    8.0<L>      24 Mar 2021 15:28          CAPILLARY BLOOD GLUCOSE      POCT Blood Glucose.: 167 mg/dL (24 Mar 2021 21:57)  POCT Blood Glucose.: 249 mg/dL (24 Mar 2021 17:11)  POCT Blood Glucose.: 224 mg/dL (24 Mar 2021 12:00)  POCT Blood Glucose.: 254 mg/dL (24 Mar 2021 08:18)      RADIOLOGY & ADDITIONAL TESTS:    Imaging reports  Personally Reviewed:  [ ] YES  [ ] NO    Consultant(s) Notes Reviewed:  [x ] YES  [ ] NO    Care Discussed with Consultants/Other Providers [ x] YES  [ ] NO  Problem/Plan - 1:  ·  Problem: Pulmonary emboli.  Plan: iv  heparin.     Problem/Plan - 2:  ·  Problem: DVT (deep venous thrombosis).  Plan: iv  heparin.     HYPERNATREMIAN  CHANGE  IVF  TO  D5W 1/2 NS  Problem/Plan - 4:  ·  Problem: Asthma.  Plan: symbicort.     Problem/Plan - 5:  ·  Problem: DM (diabetes mellitus).  Plan: insulin coverage.     Problem/Plan - 6:  Problem: Failure to thrive in adult. Plan: encourage  oral  intake  rx  underlying  causes.   REMERON       anemia  on  AC  for  dvt  and  PE    monitor  labs  transfuse  as needed    discussed  with  vascular  patient  at  risk  of  clotting  at  site  of  filter  insertion  discussed  with  pt's  grandson continue  full code  status

## 2021-03-26 ENCOUNTER — TRANSCRIPTION ENCOUNTER (OUTPATIENT)
Age: 86
End: 2021-03-26

## 2021-03-26 LAB
ANION GAP SERPL CALC-SCNC: 4 MMOL/L — LOW (ref 5–17)
BLD GP AB SCN SERPL QL: SIGNIFICANT CHANGE UP
BUN SERPL-MCNC: 24 MG/DL — HIGH (ref 7–23)
CALCIUM SERPL-MCNC: 8.4 MG/DL — LOW (ref 8.5–10.1)
CHLORIDE SERPL-SCNC: 119 MMOL/L — HIGH (ref 96–108)
CO2 SERPL-SCNC: 31 MMOL/L — SIGNIFICANT CHANGE UP (ref 22–31)
CREAT SERPL-MCNC: 0.68 MG/DL — SIGNIFICANT CHANGE UP (ref 0.5–1.3)
GLUCOSE BLDC GLUCOMTR-MCNC: 197 MG/DL — HIGH (ref 70–99)
GLUCOSE BLDC GLUCOMTR-MCNC: 209 MG/DL — HIGH (ref 70–99)
GLUCOSE BLDC GLUCOMTR-MCNC: 211 MG/DL — HIGH (ref 70–99)
GLUCOSE BLDC GLUCOMTR-MCNC: 231 MG/DL — HIGH (ref 70–99)
GLUCOSE SERPL-MCNC: 226 MG/DL — HIGH (ref 70–99)
HCT VFR BLD CALC: 27.6 % — LOW (ref 34.5–45)
HGB BLD-MCNC: 8.6 G/DL — LOW (ref 11.5–15.5)
MCHC RBC-ENTMCNC: 27.2 PG — SIGNIFICANT CHANGE UP (ref 27–34)
MCHC RBC-ENTMCNC: 31.2 GM/DL — LOW (ref 32–36)
MCV RBC AUTO: 87.3 FL — SIGNIFICANT CHANGE UP (ref 80–100)
NRBC # BLD: 22 /100 WBCS — HIGH (ref 0–0)
PLATELET # BLD AUTO: 228 K/UL — SIGNIFICANT CHANGE UP (ref 150–400)
POTASSIUM SERPL-MCNC: 3.5 MMOL/L — SIGNIFICANT CHANGE UP (ref 3.5–5.3)
POTASSIUM SERPL-SCNC: 3.5 MMOL/L — SIGNIFICANT CHANGE UP (ref 3.5–5.3)
RBC # BLD: 3.16 M/UL — LOW (ref 3.8–5.2)
RBC # FLD: 16.6 % — HIGH (ref 10.3–14.5)
SODIUM SERPL-SCNC: 154 MMOL/L — HIGH (ref 135–145)
WBC # BLD: 11.77 K/UL — HIGH (ref 3.8–10.5)
WBC # FLD AUTO: 11.77 K/UL — HIGH (ref 3.8–10.5)

## 2021-03-26 RX ORDER — ENOXAPARIN SODIUM 100 MG/ML
80 INJECTION SUBCUTANEOUS EVERY 12 HOURS
Refills: 0 | Status: DISCONTINUED | OUTPATIENT
Start: 2021-03-26 | End: 2021-04-15

## 2021-03-26 RX ADMIN — POLYETHYLENE GLYCOL 3350 17 GRAM(S): 17 POWDER, FOR SOLUTION ORAL at 11:37

## 2021-03-26 RX ADMIN — ENOXAPARIN SODIUM 80 MILLIGRAM(S): 100 INJECTION SUBCUTANEOUS at 17:36

## 2021-03-26 RX ADMIN — PANTOPRAZOLE SODIUM 40 MILLIGRAM(S): 20 TABLET, DELAYED RELEASE ORAL at 11:35

## 2021-03-26 RX ADMIN — GABAPENTIN 300 MILLIGRAM(S): 400 CAPSULE ORAL at 05:18

## 2021-03-26 RX ADMIN — LISINOPRIL 5 MILLIGRAM(S): 2.5 TABLET ORAL at 05:20

## 2021-03-26 RX ADMIN — Medication 4: at 16:29

## 2021-03-26 RX ADMIN — MIRTAZAPINE 7.5 MILLIGRAM(S): 45 TABLET, ORALLY DISINTEGRATING ORAL at 21:18

## 2021-03-26 RX ADMIN — Medication 325 MILLIGRAM(S): at 11:35

## 2021-03-26 RX ADMIN — SENNA PLUS 2 TABLET(S): 8.6 TABLET ORAL at 21:18

## 2021-03-26 RX ADMIN — Medication 4: at 11:36

## 2021-03-26 RX ADMIN — CHLORHEXIDINE GLUCONATE 1 APPLICATION(S): 213 SOLUTION TOPICAL at 05:18

## 2021-03-26 RX ADMIN — GABAPENTIN 300 MILLIGRAM(S): 400 CAPSULE ORAL at 17:36

## 2021-03-26 RX ADMIN — Medication 4: at 07:59

## 2021-03-26 NOTE — DISCHARGE NOTE PROVIDER - NSDCMRMEDTOKEN_GEN_ALL_CORE_FT
* Patient is from [facility]: [phone]  Admelog 100 units/mL injectable solution: inject as per sliding scale before meals and at bedtime  aspirin 81 mg oral tablet: 1 tab(s) orally once a day  Basaglar KwikPen 100 units/mL subcutaneous solution: 8 unit(s) subcutaneous once a day (at bedtime)  CeleBREX 100 mg oral capsule: 1 cap(s) orally once a day  gabapentin 300 mg oral capsule: 1 cap(s) orally 2 times a day  lisinopril 5 mg oral tablet: 1 tab(s) orally once a day   MiraLax oral powder for reconstitution: 17 gram(s) orally once a day  ProAir HFA 90 mcg/inh inhalation aerosol: 2 puff(s) inhaled every 6 hours  Protonix 40 mg oral delayed release tablet: 1 tab(s) orally once a day  senna oral tablet: 2 tab(s) orally once a day  Slow Fe 160 mg (50 mg elemental iron) oral tablet, extended release: 1 tab(s) orally once a day  Symbicort 80 mcg-4.5 mcg/inh inhalation aerosol: 2 puff(s) inhaled 2 times a day  Tylenol 325 mg oral tablet: 2 tab(s) orally every 6 hours   Basaglar KwikPen 100 units/mL subcutaneous solution: 8 unit(s) subcutaneous once a day (at bedtime)  gabapentin 300 mg oral capsule: 1 cap(s) orally 2 times a day  linezolid 600 mg oral tablet: 1 tab(s) orally every 12 hours x 2 days  lisinopril 5 mg oral tablet: 1 tab(s) orally once a day   MiraLax oral powder for reconstitution: 17 gram(s) orally once a day  nystatin 100,000 units/g topical powder: 1 application topically 2 times a day  ProAir HFA 90 mcg/inh inhalation aerosol: 2 puff(s) inhaled every 6 hours  Protonix 40 mg oral delayed release tablet: 1 tab(s) orally once a day  rivaroxaban 20 mg oral tablet: 1 tab(s) orally once a day  senna oral tablet: 2 tab(s) orally once a day  Slow Fe 160 mg (50 mg elemental iron) oral tablet, extended release: 1 tab(s) orally once a day  Tylenol 325 mg oral tablet: 2 tab(s) orally every 6 hours

## 2021-03-26 NOTE — DISCHARGE NOTE PROVIDER - NSDCCPCAREPLAN_GEN_ALL_CORE_FT
PRINCIPAL DISCHARGE DIAGNOSIS  Diagnosis: Failure to thrive in adult  Assessment and Plan of Treatment:       SECONDARY DISCHARGE DIAGNOSES  Diagnosis: DVT, lower extremity  Assessment and Plan of Treatment:     Diagnosis: Pulmonary embolus  Assessment and Plan of Treatment:     Diagnosis: COVID-19  Assessment and Plan of Treatment:     Diagnosis: UTI (urinary tract infection)  Assessment and Plan of Treatment:     Diagnosis: Electrolyte abnormality  Assessment and Plan of Treatment:

## 2021-03-26 NOTE — PROGRESS NOTE ADULT - SUBJECTIVE AND OBJECTIVE BOX
INTERVAL HPI/OVERNIGHT EVENTS:        REVIEW OF SYSTEMS:  CONSTITUTIONAL:    continues with  NGT  feedings      MEDICATION:  acetaminophen   Tablet .. 650 milliGRAM(s) Oral every 6 hours PRN  ALBUTerol    90 MICROgram(s) HFA Inhaler 2 Puff(s) Inhalation every 6 hours PRN  ALBUTerol    90 MICROgram(s) HFA Inhaler 2 Puff(s) Inhalation every 4 hours  chlorhexidine 2% Cloths 1 Application(s) Topical <User Schedule>  dextrose 40% Gel 15 Gram(s) Oral once  dextrose 5%. 1000 milliLiter(s) IV Continuous <Continuous>  dextrose 5%. 1000 milliLiter(s) IV Continuous <Continuous>  dextrose 50% Injectable 25 Gram(s) IV Push once  dextrose 50% Injectable 12.5 Gram(s) IV Push once  dextrose 50% Injectable 25 Gram(s) IV Push once  ferrous    sulfate 325 milliGRAM(s) Oral daily  gabapentin 300 milliGRAM(s) Oral two times a day  glucagon  Injectable 1 milliGRAM(s) IntraMuscular once  insulin lispro (ADMELOG) corrective regimen sliding scale   SubCutaneous three times a day before meals  lisinopril 5 milliGRAM(s) Oral daily  mirtazapine 7.5 milliGRAM(s) Oral at bedtime  pantoprazole   Suspension 40 milliGRAM(s) Oral daily  polyethylene glycol 3350 17 Gram(s) Oral daily  senna 2 Tablet(s) Oral at bedtime    Vital Signs Last 24 Hrs  T(C): 36.4 (26 Mar 2021 11:39), Max: 37.1 (26 Mar 2021 05:02)  T(F): 97.5 (26 Mar 2021 11:39), Max: 98.7 (26 Mar 2021 05:02)  HR: 97 (26 Mar 2021 11:39) (82 - 107)  BP: 119/70 (26 Mar 2021 11:39) (110/74 - 119/70)  BP(mean): --  RR: 18 (26 Mar 2021 11:39) (17 - 18)  SpO2: 94% (26 Mar 2021 11:39) (94% - 100%)    PHYSICAL EXAM:  GENERAL: NAD, well-groomed, well-developed  HEENT : Conjuntivae  clearsclerae  anicteric  NECK: Supple, No JVD, Normal thyroid  NERVOUS SYSTEM:  Alert  moves all  extr  CHEST/LUNG: Clear    HEART: Regular rate and rhythm; No murmurs, rubs, or gallops  ABDOMEN: Soft, Nontender, Nondistended; Bowel sounds present  EXTREMITIES:  no  edema no  tenderness  SKIN: No rashes   LABS:                        8.6    11.77 )-----------( 228      ( 26 Mar 2021 08:43 )             27.6     03-26    154<H>  |  119<H>  |  24<H>  ----------------------------<  226<H>  3.5   |  31  |  0.68    Ca    8.4<L>      26 Mar 2021 08:43          CAPILLARY BLOOD GLUCOSE      POCT Blood Glucose.: 209 mg/dL (26 Mar 2021 11:22)  POCT Blood Glucose.: 231 mg/dL (26 Mar 2021 07:49)  POCT Blood Glucose.: 156 mg/dL (25 Mar 2021 21:45)  POCT Blood Glucose.: 110 mg/dL (25 Mar 2021 16:35)      RADIOLOGY & ADDITIONAL TESTS:    Imaging reports  Personally Reviewed:  [ ] YES  [ ] NO    Consultant(s) Notes Reviewed:  [x ] YES  [ ] NO    Care Discussed with Consultants/Other Providers [x ] YES  [ ] NO  Problem/Plan - 1:  ·  Problem: Pulmonary emboli.  Plan: iv  heparin.     Problem/Plan - 2:  ·  Problem: DVT (deep venous thrombosis).  Plan: iv  heparin.     HYPERNATREMIAN  CHANGE  IVF  TO  D5W 1/2 NS  Problem/Plan - 4:  ·  Problem: Asthma.  Plan: symbicort.     Problem/Plan - 5:  ·  Problem: DM (diabetes mellitus).  Plan: insulin coverage.     Problem/Plan - 6:  Problem: Failure to thrive in adult. Plan: encourage  oral  intake  rx  underlying  causes.   REMERON       anemia  on  AC  for  dvt  and  PE    monitor  labs  transfuse  as needed     condition  stable  prognosis  poor

## 2021-03-26 NOTE — DISCHARGE NOTE PROVIDER - HOSPITAL COURSE
89yo F hx HTN, DM, hypokalemia, non-verbal at baseline, asthma, s/p covid 19 infection/treatment  with positive antibodies sent to ED from [facility] for failure to thrive . Pt noted with tachycardia 200 and received lopressor during RRT  . Full cardiology consult done . b/l lower extremity doppler positive for extensive lle dvt's and tight peroneal dvt . CTA 3/15 bilateral pulmonary emboli pt started on full dose anticoagulation . h/h dropped and pt required blood transfusion 3/23 , 3/25 , speech and swallow consulted and pt refusing to participate in evaluation  .GI consulted for FTT possible dysphagia- egd =contraindicated in pt with acute pe.s dvtl's , palliative care consulted  and and GOC  and pt poor  medical condition discussed with son decision to keep pt full code .

## 2021-03-26 NOTE — DISCHARGE NOTE PROVIDER - DETAILS OF MALNUTRITION DIAGNOSIS/DIAGNOSES
This patient has been assessed with a concern for Malnutrition and was treated during this hospitalization for the following Nutrition diagnosis/diagnoses:     -  03/17/2021: Severe protein-calorie malnutrition

## 2021-03-26 NOTE — CHART NOTE - NSCHARTNOTEFT_GEN_A_CORE
Pt admitted form [facility] for FTT (multi-factorial; COVID PNA, advanced age, abdominal pain, active PE, AMS); found c b/l PE, DVTs. Pt is confused, disoriented, lethargic.  Per palliative care note pt not a candidate for PEG; poor prognosis but family wishes pt remain full code.  PMHx includes CVA, HTM, DM, asthma, COVID hx, non-verbal at baseline.       Factors impacting intake: [ ] none [ ] nausea  [ ] vomiting [ ] diarrhea [ ] constipation  [x ]chewing problems [X ] swallowing issues  [ x] other: AMS, FTT, persistent lack of appetite, lethargy    Diet Prescription: Diet, NPO with Tube Feed:   Tube Feeding Modality: Nasogastric  Glucerna 1.2 Renzo  Total Volume for 24 Hours (mL): 1320  Continuous  Starting Tube Feed Rate {mL per Hour}: 30  Increase Tube Feed Rate by (mL): 10     Every 8 hours  Until Goal Tube Feed Rate (mL per Hour): 55  Tube Feed Duration (in Hours): 24  Tube Feed Start Time: 16:00  Free Water Flush   Total Volume per Flush (mL): 150   Frequency: Every 6 Hours   Total Daily Volume of Flush (mL): 600    Start Time: 16:00 (03-23-21 @ 15:29)    Intake:   As of today, TF running at goal rate (provides 1320 ml, 1584 kcal, 79 g protein, 1069 ml H2O); prior to today pt c poor PO intake & TF not at goal rate.   Pt tolerating TF; minimal residuals noted    Current Weight:   87.6 kg (3/20); admission wt 76.5 kg (3/14)  % Weight Change:  12.7% wt gain x 6 days - most likely due to increased edema    Presently pt c 3+ generalized edema & 4+ b/l arms; at admission 2+ generalized edema noted    Unable to conduct nutrition-focused physical exam at this time due to edematous status    Pertinent Medications: MEDICATIONS  (STANDING):  ALBUTerol    90 MICROgram(s) HFA Inhaler 2 Puff(s) Inhalation every 4 hours  chlorhexidine 2% Cloths 1 Application(s) Topical <User Schedule>  dextrose 40% Gel 15 Gram(s) Oral once  dextrose 5%. 1000 milliLiter(s) (100 mL/Hr) IV Continuous <Continuous>  dextrose 5%. 1000 milliLiter(s) (50 mL/Hr) IV Continuous <Continuous>  dextrose 50% Injectable 25 Gram(s) IV Push once  dextrose 50% Injectable 12.5 Gram(s) IV Push once  dextrose 50% Injectable 25 Gram(s) IV Push once  ferrous    sulfate 325 milliGRAM(s) Oral daily  gabapentin 300 milliGRAM(s) Oral two times a day  glucagon  Injectable 1 milliGRAM(s) IntraMuscular once  insulin lispro (ADMELOG) corrective regimen sliding scale   SubCutaneous three times a day before meals  lisinopril 5 milliGRAM(s) Oral daily  mirtazapine 7.5 milliGRAM(s) Oral at bedtime  pantoprazole   Suspension 40 milliGRAM(s) Oral daily  polyethylene glycol 3350 17 Gram(s) Oral daily  senna 2 Tablet(s) Oral at bedtime    MEDICATIONS  (PRN):  acetaminophen   Tablet .. 650 milliGRAM(s) Oral every 6 hours PRN Mild Pain (1 - 3)  ALBUTerol    90 MICROgram(s) HFA Inhaler 2 Puff(s) Inhalation every 6 hours PRN Wheezing    Pertinent Labs: 03-26 Na154 mmol/L<H> Glu 226 mg/dL<H> K+ 3.5 mmol/L Cr  0.68 mg/dL BUN 24 mg/dL<H> 03-22 Alb 1.8 g/dL<L> 03-22 PAB 8 mg/dL<L>  03-15-21  A1C 7.2%; 03-25 POCT: 272, 219, 110, 156    Skin:   no pressure ulcers; skin tear noted on buttock    Estimated Needs:   [ X] no change since previous assessment  (3/17)  [ ] recalculated:     Previous Nutrition Diagnosis:   [X ] Severe Malnutrition in context of acute illness  Etiology:  Inadequate energy/protein intake related to COVID+ illness, AMS, FTT dx  Signs & Symptoms:  <50% nutrition needs >5 days; 5% wt loss x 1 month; 2+ edema noted b/l arms & 3+ generalized edema    GOAL:  Provide nutrition/hydration as medically appropriate, as tolerated, & within family's wishes for tx    Nutrition Diagnosis is [ ] ongoing  [ ] resolved [X ] not applicable     New Nutrition Diagnosis: [X ] not applicable    Interventions:   continue current tube feeding as rx  Recommend  [ ] Change Diet To:  [ ] Nutrition Supplement  [ ] Nutrition Support  [X ] Other:  recommendation for H2O flushes is 150 ml q 6 hrs; being given 300 ml q 6 as pt Na lab continues to be high. Brought edema status to PA attn: will lower flush rx    Monitoring and Evaluation:   [ ] PO intake [ x ] Tolerance to diet prescription [ x ] weights [ x ] labs[ x ] follow up per protocol  [ ] other:

## 2021-03-27 LAB
GLUCOSE BLDC GLUCOMTR-MCNC: 220 MG/DL — HIGH (ref 70–99)
GLUCOSE BLDC GLUCOMTR-MCNC: 223 MG/DL — HIGH (ref 70–99)
GLUCOSE BLDC GLUCOMTR-MCNC: 238 MG/DL — HIGH (ref 70–99)
GLUCOSE BLDC GLUCOMTR-MCNC: 277 MG/DL — HIGH (ref 70–99)
GLUCOSE BLDC GLUCOMTR-MCNC: 351 MG/DL — HIGH (ref 70–99)

## 2021-03-27 RX ADMIN — GABAPENTIN 300 MILLIGRAM(S): 400 CAPSULE ORAL at 05:37

## 2021-03-27 RX ADMIN — PANTOPRAZOLE SODIUM 40 MILLIGRAM(S): 20 TABLET, DELAYED RELEASE ORAL at 11:48

## 2021-03-27 RX ADMIN — POLYETHYLENE GLYCOL 3350 17 GRAM(S): 17 POWDER, FOR SOLUTION ORAL at 11:48

## 2021-03-27 RX ADMIN — Medication 4: at 17:10

## 2021-03-27 RX ADMIN — ENOXAPARIN SODIUM 80 MILLIGRAM(S): 100 INJECTION SUBCUTANEOUS at 17:10

## 2021-03-27 RX ADMIN — MIRTAZAPINE 7.5 MILLIGRAM(S): 45 TABLET, ORALLY DISINTEGRATING ORAL at 21:54

## 2021-03-27 RX ADMIN — GABAPENTIN 300 MILLIGRAM(S): 400 CAPSULE ORAL at 17:10

## 2021-03-27 RX ADMIN — Medication 4: at 11:49

## 2021-03-27 RX ADMIN — SENNA PLUS 2 TABLET(S): 8.6 TABLET ORAL at 21:53

## 2021-03-27 RX ADMIN — CHLORHEXIDINE GLUCONATE 1 APPLICATION(S): 213 SOLUTION TOPICAL at 05:39

## 2021-03-27 RX ADMIN — Medication 10: at 07:54

## 2021-03-27 RX ADMIN — Medication 325 MILLIGRAM(S): at 11:48

## 2021-03-27 RX ADMIN — ENOXAPARIN SODIUM 80 MILLIGRAM(S): 100 INJECTION SUBCUTANEOUS at 06:03

## 2021-03-27 NOTE — PROGRESS NOTE ADULT - SUBJECTIVE AND OBJECTIVE BOX
MD Do Raymond     INTERVAL HPI/OVERNIGHT EVENTS:        REVIEW OF SYSTEMS:  CONSTITUTIONAL:  continues  with  NGT  feedings  on Lovenox  for  DVT  PE  covid 19      MEDICATION:  acetaminophen   Tablet .. 650 milliGRAM(s) Oral every 6 hours PRN  ALBUTerol    90 MICROgram(s) HFA Inhaler 2 Puff(s) Inhalation every 4 hours  ALBUTerol    90 MICROgram(s) HFA Inhaler 2 Puff(s) Inhalation every 6 hours PRN  chlorhexidine 2% Cloths 1 Application(s) Topical <User Schedule>  dextrose 40% Gel 15 Gram(s) Oral once  dextrose 5%. 1000 milliLiter(s) IV Continuous <Continuous>  dextrose 5%. 1000 milliLiter(s) IV Continuous <Continuous>  dextrose 50% Injectable 25 Gram(s) IV Push once  dextrose 50% Injectable 12.5 Gram(s) IV Push once  dextrose 50% Injectable 25 Gram(s) IV Push once  enoxaparin Injectable 80 milliGRAM(s) SubCutaneous every 12 hours  ferrous    sulfate 325 milliGRAM(s) Oral daily  gabapentin 300 milliGRAM(s) Oral two times a day  glucagon  Injectable 1 milliGRAM(s) IntraMuscular once  insulin lispro (ADMELOG) corrective regimen sliding scale   SubCutaneous three times a day before meals  lisinopril 5 milliGRAM(s) Oral daily  mirtazapine 7.5 milliGRAM(s) Oral at bedtime  pantoprazole   Suspension 40 milliGRAM(s) Oral daily  polyethylene glycol 3350 17 Gram(s) Oral daily  senna 2 Tablet(s) Oral at bedtime    Vital Signs Last 24 Hrs  T(C): 37.2 (27 Mar 2021 05:30), Max: 37.2 (27 Mar 2021 05:30)  T(F): 99 (27 Mar 2021 05:30), Max: 99 (27 Mar 2021 05:30)  HR: 114 (27 Mar 2021 05:30) (96 - 114)  BP: 97/73 (27 Mar 2021 05:30) (97/73 - 142/77)  BP(mean): --  RR: 18 (27 Mar 2021 05:30) (18 - 18)  SpO2: 97% (27 Mar 2021 05:30) (94% - 97%)    PHYSICAL EXAM:  GENERAL: NAD, well-groomed, well-developed  HEENT : Conjuntivae  clear sclerae anicteric  NECK: Supple, No JVD, Normal thyroid  NERVOUS SYSTEM:  Alert   no  focal  deficits  CHEST/LUNG: Clear    HEART: Regular rate and rhythm; No murmurs, rubs, or gallops  ABDOMEN: Soft, Nontender, Nondistended; Bowel sounds present  EXTREMITIES:   edemas  upper  and  lower  extr  SKIN: No rashes   LABS:                        8.6    11.77 )-----------( 228      ( 26 Mar 2021 08:43 )             27.6     03-26    154<H>  |  119<H>  |  24<H>  ----------------------------<  226<H>  3.5   |  31  |  0.68    Ca    8.4<L>      26 Mar 2021 08:43          CAPILLARY BLOOD GLUCOSE      POCT Blood Glucose.: 351 mg/dL (27 Mar 2021 07:51)  POCT Blood Glucose.: 197 mg/dL (26 Mar 2021 21:29)  POCT Blood Glucose.: 211 mg/dL (26 Mar 2021 16:15)  POCT Blood Glucose.: 209 mg/dL (26 Mar 2021 11:22)      RADIOLOGY & ADDITIONAL TESTS:    Imaging reports  Personally Reviewed:  [ ] YES  [ ] NO    Consultant(s) Notes Reviewed:  [ x] YES  [ ] NO    Care Discussed with Consultants/Other Providers [x ] YES  [ ] NO  Problem/Plan - 1:  ·  Problem: Pulmonary emboli.  Plan: iv  heparin.     Problem/Plan - 2:  ·  Problem: DVT (deep venous thrombosis).  Plan: iv  heparin.     HYPERNATREMIAN  CHANGE  IVF  TO  D5W 1/2 NS  Problem/Plan - 4:  ·  Problem: Asthma.  Plan: symbicort.     Problem/Plan - 5:  ·  Problem: DM (diabetes mellitus).  Plan: insulin coverage.     Problem/Plan - 6:  Problem: Failure to thrive in adult. Plan: encourage  oral  intake  rx  underlying  causes.   REMERON       anemia  on  AC  for  dvt  and  PE    monitor  labs  transfuse  as needed     condition  stable  prognosis  poor

## 2021-03-28 LAB
ANION GAP SERPL CALC-SCNC: 2 MMOL/L — LOW (ref 5–17)
APPEARANCE UR: CLEAR — SIGNIFICANT CHANGE UP
BACTERIA # UR AUTO: ABNORMAL
BILIRUB UR-MCNC: NEGATIVE — SIGNIFICANT CHANGE UP
BUN SERPL-MCNC: 30 MG/DL — HIGH (ref 7–23)
CALCIUM SERPL-MCNC: 8.1 MG/DL — LOW (ref 8.5–10.1)
CHLORIDE SERPL-SCNC: 118 MMOL/L — HIGH (ref 96–108)
CO2 SERPL-SCNC: 31 MMOL/L — SIGNIFICANT CHANGE UP (ref 22–31)
COLOR SPEC: YELLOW — SIGNIFICANT CHANGE UP
COMMENT - URINE: SIGNIFICANT CHANGE UP
CREAT SERPL-MCNC: 0.68 MG/DL — SIGNIFICANT CHANGE UP (ref 0.5–1.3)
DIFF PNL FLD: NEGATIVE — SIGNIFICANT CHANGE UP
EPI CELLS # UR: SIGNIFICANT CHANGE UP
GLUCOSE BLDC GLUCOMTR-MCNC: 246 MG/DL — HIGH (ref 70–99)
GLUCOSE BLDC GLUCOMTR-MCNC: 249 MG/DL — HIGH (ref 70–99)
GLUCOSE BLDC GLUCOMTR-MCNC: 268 MG/DL — HIGH (ref 70–99)
GLUCOSE BLDC GLUCOMTR-MCNC: 324 MG/DL — HIGH (ref 70–99)
GLUCOSE SERPL-MCNC: 225 MG/DL — HIGH (ref 70–99)
GLUCOSE UR QL: 250 MG/DL
HCT VFR BLD CALC: 27.9 % — LOW (ref 34.5–45)
HGB BLD-MCNC: 8.3 G/DL — LOW (ref 11.5–15.5)
KETONES UR-MCNC: NEGATIVE — SIGNIFICANT CHANGE UP
LACTATE SERPL-SCNC: 1.1 MMOL/L — SIGNIFICANT CHANGE UP (ref 0.7–2)
LEUKOCYTE ESTERASE UR-ACNC: NEGATIVE — SIGNIFICANT CHANGE UP
MCHC RBC-ENTMCNC: 27.7 PG — SIGNIFICANT CHANGE UP (ref 27–34)
MCHC RBC-ENTMCNC: 29.7 GM/DL — LOW (ref 32–36)
MCV RBC AUTO: 93 FL — SIGNIFICANT CHANGE UP (ref 80–100)
NITRITE UR-MCNC: NEGATIVE — SIGNIFICANT CHANGE UP
NRBC # BLD: 2 /100 WBCS — HIGH (ref 0–0)
PH UR: 6 — SIGNIFICANT CHANGE UP (ref 5–8)
PLATELET # BLD AUTO: 142 K/UL — LOW (ref 150–400)
POTASSIUM SERPL-MCNC: 5.4 MMOL/L — HIGH (ref 3.5–5.3)
POTASSIUM SERPL-SCNC: 5.4 MMOL/L — HIGH (ref 3.5–5.3)
PROT UR-MCNC: 30 MG/DL
RBC # BLD: 3 M/UL — LOW (ref 3.8–5.2)
RBC # FLD: 21.7 % — HIGH (ref 10.3–14.5)
RBC CASTS # UR COMP ASSIST: NEGATIVE /HPF — SIGNIFICANT CHANGE UP (ref 0–4)
SODIUM SERPL-SCNC: 151 MMOL/L — HIGH (ref 135–145)
SP GR SPEC: 1.01 — SIGNIFICANT CHANGE UP (ref 1.01–1.02)
UROBILINOGEN FLD QL: NEGATIVE MG/DL — SIGNIFICANT CHANGE UP
WBC # BLD: 10.67 K/UL — HIGH (ref 3.8–10.5)
WBC # FLD AUTO: 10.67 K/UL — HIGH (ref 3.8–10.5)
WBC UR QL: SIGNIFICANT CHANGE UP

## 2021-03-28 PROCEDURE — 71045 X-RAY EXAM CHEST 1 VIEW: CPT | Mod: 26

## 2021-03-28 RX ADMIN — ENOXAPARIN SODIUM 80 MILLIGRAM(S): 100 INJECTION SUBCUTANEOUS at 06:36

## 2021-03-28 RX ADMIN — Medication 650 MILLIGRAM(S): at 21:52

## 2021-03-28 RX ADMIN — Medication 8: at 08:04

## 2021-03-28 RX ADMIN — GABAPENTIN 300 MILLIGRAM(S): 400 CAPSULE ORAL at 05:31

## 2021-03-28 RX ADMIN — MIRTAZAPINE 7.5 MILLIGRAM(S): 45 TABLET, ORALLY DISINTEGRATING ORAL at 21:53

## 2021-03-28 RX ADMIN — PANTOPRAZOLE SODIUM 40 MILLIGRAM(S): 20 TABLET, DELAYED RELEASE ORAL at 11:52

## 2021-03-28 RX ADMIN — ENOXAPARIN SODIUM 80 MILLIGRAM(S): 100 INJECTION SUBCUTANEOUS at 17:29

## 2021-03-28 RX ADMIN — Medication 6: at 11:52

## 2021-03-28 RX ADMIN — GABAPENTIN 300 MILLIGRAM(S): 400 CAPSULE ORAL at 17:29

## 2021-03-28 RX ADMIN — POLYETHYLENE GLYCOL 3350 17 GRAM(S): 17 POWDER, FOR SOLUTION ORAL at 11:52

## 2021-03-28 RX ADMIN — Medication 650 MILLIGRAM(S): at 21:53

## 2021-03-28 RX ADMIN — SENNA PLUS 2 TABLET(S): 8.6 TABLET ORAL at 21:52

## 2021-03-28 RX ADMIN — CHLORHEXIDINE GLUCONATE 1 APPLICATION(S): 213 SOLUTION TOPICAL at 05:32

## 2021-03-28 RX ADMIN — Medication 325 MILLIGRAM(S): at 11:52

## 2021-03-28 RX ADMIN — Medication 4: at 16:25

## 2021-03-28 NOTE — CHART NOTE - NSCHARTNOTEFT_GEN_A_CORE
Medicine PA Note    Notified by Rn that patient had rectal temp of 100.5. Patient noted to have had UTI with urine culture + enterofaecilis on Rocephin 3/15-18. In addition, patient noted to have midline placed on  rt arm 3/25 .  As per RN, patient's arms are erythematous and swollen which patient was noted to have since yesterday. Also, some weeping serosangious fluid distal to midline and left arm antecubital area. Patient is on peg feedings as well.      VS   T(C): 38.1 (28 Mar 2021 21:19), Max: 38.1 (28 Mar 2021 21:19)  T(F): 100.5 (28 Mar 2021 21:19), Max: 100.5 (28 Mar 2021 21:19)  HR: 93 (28 Mar 2021 21:19) (93 - 104)  BP: 145/68 (28 Mar 2021 21:19) (117/48 - 145/68)  BP(mean): --  RR: 18 (28 Mar 2021 17:01) (17 - 18)  SpO2: 98% (28 Mar 2021 21:19) (97% - 98%)    Gen patient lying in bed , warm to touch  Head AT/NC  Eyes clear conjunctiva and sclera,   ENMT no epistaxis, fair dentition ngt tube in place  Neck supple no JVD , normal thyroid  Lungs rhonchi   CV s1 s2   no murmur appreciated.  Abd soft nontender nondistended BS + in all 4 quadrants  Ext edema + extremities with weeping serosangious fluid UES  Neuro arousable , moves extremities.    A/P 89yo F hx HTN, DM, hypokalemia, non-verbal at baseline, asthma, recent hospitalization for COVID remains COVID + admitted with failure to thrive, VTE, dyspnea. Patient now with fever  Will obtain cbc, cmp, lactate , blood cx x 2 , ua and urine cx  Will obtain cxr   will continue to monitor the patient.  [facility] Medicine PA Note    Notified by Rn that patient had rectal temp of 100.5. Patient noted to have had UTI with urine culture + enterofaecilis on Rocephin 3/15-18. In addition, patient noted to have midline placed on  rt arm 3/25 .  As per RN, patient's arms are erythematous and swollen which patient was noted to have since yesterday. Also, some weeping serosangious fluid distal to midline and left arm antecubital area. Patient is on peg feedings as well.      VS   T(C): 38.1 (28 Mar 2021 21:19), Max: 38.1 (28 Mar 2021 21:19)  T(F): 100.5 (28 Mar 2021 21:19), Max: 100.5 (28 Mar 2021 21:19)  HR: 93 (28 Mar 2021 21:19) (93 - 104)  BP: 145/68 (28 Mar 2021 21:19) (117/48 - 145/68)  BP(mean): --  RR: 18 (28 Mar 2021 17:01) (17 - 18)  SpO2: 98% (28 Mar 2021 21:19) (97% - 98%)    Gen patient lying in bed , warm to touch  Head AT/NC  Eyes clear conjunctiva and sclera,   ENMT no epistaxis, fair dentition ngt tube in place  Neck supple no JVD , normal thyroid  Lungs rhonchi   CV s1 s2   no murmur appreciated.  Abd soft nontender nondistended BS + in all 4 quadrants  Ext edema + extremities with weeping serosangious fluid UES  Neuro arousable , moves extremities.    A/P 89yo F hx HTN, DM, hypokalemia, non-verbal at baseline, asthma, recent hospitalization for COVID remains COVID + admitted with failure to thrive, VTE, dyspnea. Patient now with fever  Will obtain cbc, cmp, lactate , blood cx x 2 , ua and urine cx  Will obtain cxr   will continue to monitor the patient.  [facility]    Addendum   Discussed case with Dr Mcneill   will follow up blood cx and urine cx   lactate 1.1   cbc            8.3    10.67 )-----------( 142      ( 28 Mar 2021 21:51 )             27.9   UA negative for nitrites or leukocytes.  will continue to monitor the patient    [facility]

## 2021-03-28 NOTE — PROGRESS NOTE ADULT - SUBJECTIVE AND OBJECTIVE BOX
INTERVAL HPI/OVERNIGHT EVENTS:        REVIEW OF SYSTEMS:  CONSTITUTIONAL:    continues  with  ngt feeds      MEDICATION:  acetaminophen   Tablet .. 650 milliGRAM(s) Oral every 6 hours PRN  ALBUTerol    90 MICROgram(s) HFA Inhaler 2 Puff(s) Inhalation every 4 hours  ALBUTerol    90 MICROgram(s) HFA Inhaler 2 Puff(s) Inhalation every 6 hours PRN  chlorhexidine 2% Cloths 1 Application(s) Topical <User Schedule>  dextrose 40% Gel 15 Gram(s) Oral once  dextrose 5%. 1000 milliLiter(s) IV Continuous <Continuous>  dextrose 5%. 1000 milliLiter(s) IV Continuous <Continuous>  dextrose 50% Injectable 25 Gram(s) IV Push once  dextrose 50% Injectable 12.5 Gram(s) IV Push once  dextrose 50% Injectable 25 Gram(s) IV Push once  enoxaparin Injectable 80 milliGRAM(s) SubCutaneous every 12 hours  ferrous    sulfate 325 milliGRAM(s) Oral daily  gabapentin 300 milliGRAM(s) Oral two times a day  glucagon  Injectable 1 milliGRAM(s) IntraMuscular once  insulin lispro (ADMELOG) corrective regimen sliding scale   SubCutaneous three times a day before meals  lisinopril 5 milliGRAM(s) Oral daily  mirtazapine 7.5 milliGRAM(s) Oral at bedtime  pantoprazole   Suspension 40 milliGRAM(s) Oral daily  polyethylene glycol 3350 17 Gram(s) Oral daily  senna 2 Tablet(s) Oral at bedtime    Vital Signs Last 24 Hrs  T(C): 36.4 (28 Mar 2021 11:40), Max: 36.4 (27 Mar 2021 17:26)  T(F): 97.5 (28 Mar 2021 11:40), Max: 97.6 (28 Mar 2021 00:32)  HR: 100 (28 Mar 2021 11:40) (93 - 111)  BP: 145/59 (28 Mar 2021 11:40) (102/74 - 145/59)  BP(mean): --  RR: 17 (28 Mar 2021 11:40) (17 - 18)  SpO2: 97% (28 Mar 2021 11:40) (97% - 99%)    PHYSICAL EXAM:  GENERAL: NAD,   HEENT : Conjuntivae  clear sclerae anicteric  NECK: Supple, No JVD, Normal thyroid  NERVOUS SYSTEM:    somnolent  easily  arouseable  moves  all extr  CHEST/LUNG: Clear    HEART: Regular rate and rhythm; No murmurs, rubs, or gallops  ABDOMEN: Soft, Nontender, Nondistended; Bowel sounds present  EXTREMITIES:  no  edema no  tenderness  SKIN: No rashes   LABS:              CAPILLARY BLOOD GLUCOSE      POCT Blood Glucose.: 268 mg/dL (28 Mar 2021 11:44)  POCT Blood Glucose.: 324 mg/dL (28 Mar 2021 07:50)  POCT Blood Glucose.: 277 mg/dL (27 Mar 2021 21:51)  POCT Blood Glucose.: 223 mg/dL (27 Mar 2021 21:01)  POCT Blood Glucose.: 220 mg/dL (27 Mar 2021 16:30)      RADIOLOGY & ADDITIONAL TESTS:    Imaging reports  Personally Reviewed:  [ ] YES  [ ] NO    Consultant(s) Notes Reviewed:  [ x] YES  [ ] NO    Care Discussed with Consultants/Other Providers [x ] YES  [ ] NO  Problem/Plan - 1:  ·  Problem: Pulmonary emboli.  Plan: lovenox    Problem/Plan - 2:  ·  Problem: DVT (deep venous thrombosis).  Plan: lovenox    HYPERNATREMIAN  CHANGE  IVF  TO  D5W 1/2 NS  Problem/Plan - 4:  ·  Problem: Asthma.  Plan: symbicort.     Problem/Plan - 5:  ·  Problem: DM (diabetes mellitus).  Plan: insulin coverage.     Problem/Plan - 6:  Problem: Failure to thrive in adult. Plan: encourage  oral  intake  rx  underlying  causes.   REMERON       anemia  on  AC  for  dvt  and  PE    monitor  labs  transfuse  as needed     condition  stable  prognosis  poor

## 2021-03-29 LAB
ANION GAP SERPL CALC-SCNC: 4 MMOL/L — LOW (ref 5–17)
BLD GP AB SCN SERPL QL: SIGNIFICANT CHANGE UP
BUN SERPL-MCNC: 30 MG/DL — HIGH (ref 7–23)
CALCIUM SERPL-MCNC: 8 MG/DL — LOW (ref 8.5–10.1)
CHLORIDE SERPL-SCNC: 119 MMOL/L — HIGH (ref 96–108)
CO2 SERPL-SCNC: 35 MMOL/L — HIGH (ref 22–31)
CREAT SERPL-MCNC: 0.75 MG/DL — SIGNIFICANT CHANGE UP (ref 0.5–1.3)
GLUCOSE BLDC GLUCOMTR-MCNC: 115 MG/DL — HIGH (ref 70–99)
GLUCOSE BLDC GLUCOMTR-MCNC: 144 MG/DL — HIGH (ref 70–99)
GLUCOSE BLDC GLUCOMTR-MCNC: 273 MG/DL — HIGH (ref 70–99)
GLUCOSE BLDC GLUCOMTR-MCNC: 385 MG/DL — HIGH (ref 70–99)
GLUCOSE SERPL-MCNC: 290 MG/DL — HIGH (ref 70–99)
HCT VFR BLD CALC: 23.1 % — LOW (ref 34.5–45)
HGB BLD-MCNC: 6.9 G/DL — CRITICAL LOW (ref 11.5–15.5)
MCHC RBC-ENTMCNC: 27.8 PG — SIGNIFICANT CHANGE UP (ref 27–34)
MCHC RBC-ENTMCNC: 29.9 GM/DL — LOW (ref 32–36)
MCV RBC AUTO: 93.1 FL — SIGNIFICANT CHANGE UP (ref 80–100)
NRBC # BLD: 2 /100 WBCS — HIGH (ref 0–0)
PLATELET # BLD AUTO: 194 K/UL — SIGNIFICANT CHANGE UP (ref 150–400)
POTASSIUM SERPL-MCNC: 3.4 MMOL/L — LOW (ref 3.5–5.3)
POTASSIUM SERPL-SCNC: 3.4 MMOL/L — LOW (ref 3.5–5.3)
RBC # BLD: 2.48 M/UL — LOW (ref 3.8–5.2)
RBC # FLD: 22 % — HIGH (ref 10.3–14.5)
SODIUM SERPL-SCNC: 158 MMOL/L — HIGH (ref 135–145)
WBC # BLD: 8.84 K/UL — SIGNIFICANT CHANGE UP (ref 3.8–10.5)
WBC # FLD AUTO: 8.84 K/UL — SIGNIFICANT CHANGE UP (ref 3.8–10.5)

## 2021-03-29 PROCEDURE — 74018 RADEX ABDOMEN 1 VIEW: CPT | Mod: 26

## 2021-03-29 RX ORDER — POTASSIUM CHLORIDE 20 MEQ
40 PACKET (EA) ORAL ONCE
Refills: 0 | Status: COMPLETED | OUTPATIENT
Start: 2021-03-29 | End: 2021-03-29

## 2021-03-29 RX ADMIN — GABAPENTIN 300 MILLIGRAM(S): 400 CAPSULE ORAL at 18:16

## 2021-03-29 RX ADMIN — Medication 40 MILLIEQUIVALENT(S): at 18:16

## 2021-03-29 RX ADMIN — MIRTAZAPINE 7.5 MILLIGRAM(S): 45 TABLET, ORALLY DISINTEGRATING ORAL at 21:36

## 2021-03-29 RX ADMIN — PANTOPRAZOLE SODIUM 40 MILLIGRAM(S): 20 TABLET, DELAYED RELEASE ORAL at 13:03

## 2021-03-29 RX ADMIN — GABAPENTIN 300 MILLIGRAM(S): 400 CAPSULE ORAL at 06:15

## 2021-03-29 RX ADMIN — SENNA PLUS 2 TABLET(S): 8.6 TABLET ORAL at 21:36

## 2021-03-29 RX ADMIN — CHLORHEXIDINE GLUCONATE 1 APPLICATION(S): 213 SOLUTION TOPICAL at 06:15

## 2021-03-29 RX ADMIN — Medication 325 MILLIGRAM(S): at 13:03

## 2021-03-29 RX ADMIN — Medication 6: at 12:58

## 2021-03-29 RX ADMIN — ENOXAPARIN SODIUM 80 MILLIGRAM(S): 100 INJECTION SUBCUTANEOUS at 06:16

## 2021-03-29 RX ADMIN — LISINOPRIL 5 MILLIGRAM(S): 2.5 TABLET ORAL at 06:16

## 2021-03-29 RX ADMIN — ENOXAPARIN SODIUM 80 MILLIGRAM(S): 100 INJECTION SUBCUTANEOUS at 18:16

## 2021-03-29 RX ADMIN — Medication 10: at 09:13

## 2021-03-29 NOTE — PROGRESS NOTE ADULT - SUBJECTIVE AND OBJECTIVE BOX
INTERVAL HPI/OVERNIGHT EVENTS:        REVIEW OF SYSTEMS:  CONSTITUTIONAL: continues  on  ngt  feedigs    NECK: No pain or stiffnes  RESPIRATORY: No SOB   CARDIOVASCULAR: No chest pain, palpitations, dizziness,   GASTROINTESTINAL: No abdominal pain. No nausea, vomiting,   NEUROLOGICAL: No headaches, no  blurry  vision no  dizziness  SKIN: No itching,   MUSCULOSKELETAL: No pain    MEDICATION:  acetaminophen   Tablet .. 650 milliGRAM(s) Oral every 6 hours PRN  ALBUTerol    90 MICROgram(s) HFA Inhaler 2 Puff(s) Inhalation every 4 hours  ALBUTerol    90 MICROgram(s) HFA Inhaler 2 Puff(s) Inhalation every 6 hours PRN  chlorhexidine 2% Cloths 1 Application(s) Topical <User Schedule>  dextrose 40% Gel 15 Gram(s) Oral once  dextrose 5%. 1000 milliLiter(s) IV Continuous <Continuous>  dextrose 5%. 1000 milliLiter(s) IV Continuous <Continuous>  dextrose 50% Injectable 25 Gram(s) IV Push once  dextrose 50% Injectable 12.5 Gram(s) IV Push once  dextrose 50% Injectable 25 Gram(s) IV Push once  enoxaparin Injectable 80 milliGRAM(s) SubCutaneous every 12 hours  ferrous    sulfate 325 milliGRAM(s) Oral daily  gabapentin 300 milliGRAM(s) Oral two times a day  glucagon  Injectable 1 milliGRAM(s) IntraMuscular once  insulin lispro (ADMELOG) corrective regimen sliding scale   SubCutaneous three times a day before meals  lisinopril 5 milliGRAM(s) Oral daily  mirtazapine 7.5 milliGRAM(s) Oral at bedtime  pantoprazole   Suspension 40 milliGRAM(s) Oral daily  polyethylene glycol 3350 17 Gram(s) Oral daily  senna 2 Tablet(s) Oral at bedtime    Vital Signs Last 24 Hrs  T(C): 36.5 (29 Mar 2021 06:14), Max: 38.1 (28 Mar 2021 21:19)  T(F): 97.7 (29 Mar 2021 06:14), Max: 100.5 (28 Mar 2021 21:19)  HR: 100 (29 Mar 2021 06:14) (93 - 103)  BP: 115/69 (29 Mar 2021 06:14) (115/69 - 145/68)  BP(mean): --  RR: 19 (29 Mar 2021 06:14) (17 - 20)  SpO2: 95% (29 Mar 2021 06:14) (95% - 98%)    PHYSICAL EXAM:  GENERAL: NAD, well-groomed, well-developed  HEENT : Conjuntivae  clear sclerae anicteric  NECK: Supple, No JVD, Normal thyroid  NERVOUS SYSTEM: somnolent  arouseable  CHEST/LUNG: no  broncochhaspam  HEART: Regular rate and rhythm; No murmurs, rubs, or gallops  ABDOMEN: Soft, Nontender, Nondistended; Bowel sounds present  EXTREMITIES:  no  edema no  tenderness  SKIN: No rashes   LABS:                        8.3    10.67 )-----------( 142      ( 28 Mar 2021 21:51 )             27.9     03-28    151<H>  |  118<H>  |  30<H>  ----------------------------<  225<H>  5.4<H>   |  31  |  0.68    Ca    8.1<L>      28 Mar 2021 21:51        Urinalysis Basic - ( 28 Mar 2021 23:05 )    Color: Yellow / Appearance: Clear / S.010 / pH: x  Gluc: x / Ketone: Negative  / Bili: Negative / Urobili: Negative mg/dL   Blood: x / Protein: 30 mg/dL / Nitrite: Negative   Leuk Esterase: Negative / RBC: Negative /HPF / WBC 0-2   Sq Epi: x / Non Sq Epi: Occasional / Bacteria: Occasional      CAPILLARY BLOOD GLUCOSE      POCT Blood Glucose.: 385 mg/dL (29 Mar 2021 08:53)  POCT Blood Glucose.: 249 mg/dL (28 Mar 2021 22:03)  POCT Blood Glucose.: 246 mg/dL (28 Mar 2021 16:13)  POCT Blood Glucose.: 268 mg/dL (28 Mar 2021 11:44)      RADIOLOGY & ADDITIONAL TESTS:    Imaging reports  Personally Reviewed:  [ ] YES  [ ] NO    Consultant(s) Notes Reviewed:  [ x] YES  [ ] NO    Care Discussed with Consultants/Other Providers [x ] YES  [ ] NO  Problem/Plan - 1:  ·  Problem: Pulmonary emboli.  Plan: lovenox    Problem/Plan - 2:  ·  Problem: DVT (deep venous thrombosis).  Plan: lovenox    HYPERNATREMIAN  CHANGE  IVF  TO  D5W 1/2 NS  Problem/Plan - 4:  ·  Problem: Asthma.  Plan: symbicort.     Problem/Plan - 5:  ·  Problem: DM (diabetes mellitus).  Plan: insulin coverage.     Problem/Plan - 6:  Problem: Failure to thrive in adult. Plan: encourage  oral  intake  rx  underlying  causes.   REMERON       anemia  on  AC  for  dvt  and  PE    monitor  labs  transfuse  as needed     condition  stable  prognosis  poor

## 2021-03-29 NOTE — CHART NOTE - NSCHARTNOTEFT_GEN_A_CORE
Medicine PA Note    NGT tube Insertion      Called by RN for NGT insertion. Patient is a 89y old  Female who presents with a chief complaint of PE  DVT  HX  COVID 19 (29 Mar 2021 09:25)   Order noted on chart.    T(C): 36.7 (03-29-21 @ 19:20), Max: 36.9 (03-29-21 @ 16:15)  T(F): 98.1 (03-29-21 @ 19:20), Max: 98.4 (03-29-21 @ 16:15)  HR: 82 (03-29-21 @ 19:20) (78 - 103)  BP: 112/67 (03-29-21 @ 19:20) (105/69 - 135/72)  RR: 18 (03-29-21 @ 19:20) (17 - 20)  SpO2: 98% (03-29-21 @ 19:20) (94% - 99%)    NGT 14 Upper sorbian  inserted. Pt tolerated procedure well. Placement verified via auscultation. KUB ordered to confirm placement    [facility]

## 2021-03-30 LAB
ALBUMIN SERPL ELPH-MCNC: 1.4 G/DL — LOW (ref 3.3–5)
ALP SERPL-CCNC: 94 U/L — SIGNIFICANT CHANGE UP (ref 40–120)
ALT FLD-CCNC: 16 U/L — SIGNIFICANT CHANGE UP (ref 12–78)
ANION GAP SERPL CALC-SCNC: 3 MMOL/L — LOW (ref 5–17)
APTT BLD: 42 SEC — HIGH (ref 27.5–35.5)
AST SERPL-CCNC: 23 U/L — SIGNIFICANT CHANGE UP (ref 15–37)
BILIRUB SERPL-MCNC: 0.6 MG/DL — SIGNIFICANT CHANGE UP (ref 0.2–1.2)
BUN SERPL-MCNC: 27 MG/DL — HIGH (ref 7–23)
CALCIUM SERPL-MCNC: 8.3 MG/DL — LOW (ref 8.5–10.1)
CHLORIDE SERPL-SCNC: 120 MMOL/L — HIGH (ref 96–108)
CO2 SERPL-SCNC: 34 MMOL/L — HIGH (ref 22–31)
CREAT SERPL-MCNC: 0.55 MG/DL — SIGNIFICANT CHANGE UP (ref 0.5–1.3)
GLUCOSE BLDC GLUCOMTR-MCNC: 135 MG/DL — HIGH (ref 70–99)
GLUCOSE BLDC GLUCOMTR-MCNC: 171 MG/DL — HIGH (ref 70–99)
GLUCOSE BLDC GLUCOMTR-MCNC: 194 MG/DL — HIGH (ref 70–99)
GLUCOSE BLDC GLUCOMTR-MCNC: 220 MG/DL — HIGH (ref 70–99)
GLUCOSE SERPL-MCNC: 200 MG/DL — HIGH (ref 70–99)
HCT VFR BLD CALC: 27.2 % — LOW (ref 34.5–45)
HGB BLD-MCNC: 8.2 G/DL — LOW (ref 11.5–15.5)
INR BLD: 1.1 RATIO — SIGNIFICANT CHANGE UP (ref 0.88–1.16)
MCHC RBC-ENTMCNC: 28.7 PG — SIGNIFICANT CHANGE UP (ref 27–34)
MCHC RBC-ENTMCNC: 30.1 GM/DL — LOW (ref 32–36)
MCV RBC AUTO: 95.1 FL — SIGNIFICANT CHANGE UP (ref 80–100)
NRBC # BLD: 1 /100 WBCS — HIGH (ref 0–0)
PLATELET # BLD AUTO: 225 K/UL — SIGNIFICANT CHANGE UP (ref 150–400)
POTASSIUM SERPL-MCNC: 3.9 MMOL/L — SIGNIFICANT CHANGE UP (ref 3.5–5.3)
POTASSIUM SERPL-SCNC: 3.9 MMOL/L — SIGNIFICANT CHANGE UP (ref 3.5–5.3)
PROT SERPL-MCNC: 4.6 GM/DL — LOW (ref 6–8.3)
PROTHROM AB SERPL-ACNC: 12.7 SEC — SIGNIFICANT CHANGE UP (ref 10.6–13.6)
RBC # BLD: 2.86 M/UL — LOW (ref 3.8–5.2)
RBC # FLD: 20.3 % — HIGH (ref 10.3–14.5)
SODIUM SERPL-SCNC: 157 MMOL/L — HIGH (ref 135–145)
WBC # BLD: 8.43 K/UL — SIGNIFICANT CHANGE UP (ref 3.8–10.5)
WBC # FLD AUTO: 8.43 K/UL — SIGNIFICANT CHANGE UP (ref 3.8–10.5)

## 2021-03-30 RX ADMIN — Medication 325 MILLIGRAM(S): at 11:44

## 2021-03-30 RX ADMIN — CHLORHEXIDINE GLUCONATE 1 APPLICATION(S): 213 SOLUTION TOPICAL at 05:59

## 2021-03-30 RX ADMIN — SENNA PLUS 2 TABLET(S): 8.6 TABLET ORAL at 21:24

## 2021-03-30 RX ADMIN — Medication 2: at 08:09

## 2021-03-30 RX ADMIN — ENOXAPARIN SODIUM 80 MILLIGRAM(S): 100 INJECTION SUBCUTANEOUS at 05:59

## 2021-03-30 RX ADMIN — PANTOPRAZOLE SODIUM 40 MILLIGRAM(S): 20 TABLET, DELAYED RELEASE ORAL at 11:44

## 2021-03-30 RX ADMIN — GABAPENTIN 300 MILLIGRAM(S): 400 CAPSULE ORAL at 17:04

## 2021-03-30 RX ADMIN — ENOXAPARIN SODIUM 80 MILLIGRAM(S): 100 INJECTION SUBCUTANEOUS at 17:04

## 2021-03-30 RX ADMIN — MIRTAZAPINE 7.5 MILLIGRAM(S): 45 TABLET, ORALLY DISINTEGRATING ORAL at 21:24

## 2021-03-30 RX ADMIN — POLYETHYLENE GLYCOL 3350 17 GRAM(S): 17 POWDER, FOR SOLUTION ORAL at 11:44

## 2021-03-30 RX ADMIN — Medication 2: at 16:27

## 2021-03-30 NOTE — PROGRESS NOTE ADULT - SUBJECTIVE AND OBJECTIVE BOX
INTERVAL HPI/OVERNIGHT EVENTS:        REVIEW OF SYSTEMS:  CONSTITUTIONAL:   continues  with  NGT  feedings      MEDICATION:  acetaminophen   Tablet .. 650 milliGRAM(s) Oral every 6 hours PRN  ALBUTerol    90 MICROgram(s) HFA Inhaler 2 Puff(s) Inhalation every 4 hours  ALBUTerol    90 MICROgram(s) HFA Inhaler 2 Puff(s) Inhalation every 6 hours PRN  chlorhexidine 2% Cloths 1 Application(s) Topical <User Schedule>  dextrose 40% Gel 15 Gram(s) Oral once  dextrose 5%. 1000 milliLiter(s) IV Continuous <Continuous>  dextrose 5%. 1000 milliLiter(s) IV Continuous <Continuous>  dextrose 50% Injectable 25 Gram(s) IV Push once  dextrose 50% Injectable 12.5 Gram(s) IV Push once  dextrose 50% Injectable 25 Gram(s) IV Push once  enoxaparin Injectable 80 milliGRAM(s) SubCutaneous every 12 hours  ferrous    sulfate 325 milliGRAM(s) Oral daily  gabapentin 300 milliGRAM(s) Oral two times a day  glucagon  Injectable 1 milliGRAM(s) IntraMuscular once  insulin lispro (ADMELOG) corrective regimen sliding scale   SubCutaneous three times a day before meals  lisinopril 5 milliGRAM(s) Oral daily  mirtazapine 7.5 milliGRAM(s) Oral at bedtime  pantoprazole   Suspension 40 milliGRAM(s) Oral daily  polyethylene glycol 3350 17 Gram(s) Oral daily  senna 2 Tablet(s) Oral at bedtime    Vital Signs Last 24 Hrs  T(C): 36.8 (30 Mar 2021 04:46), Max: 36.9 (29 Mar 2021 16:15)  T(F): 98.2 (30 Mar 2021 04:46), Max: 98.4 (29 Mar 2021 16:15)  HR: 96 (30 Mar 2021 04:46) (78 - 98)  BP: 117/63 (30 Mar 2021 04:46) (105/69 - 135/72)  BP(mean): --  RR: 20 (30 Mar 2021 04:46) (17 - 20)  SpO2: 95% (30 Mar 2021 04:46) (94% - 99%)    PHYSICAL EXAM:  GENERAL: NAD, well-groomed, well-developed  HEENT : Conjuntivae  clear sclerae anicteric  NECK: Supple, No JVD, Normal thyroid  NERVOUS SYSTEM:  Alert oriented   no  focal  deficits;   CHEST/LUNG: Clear    HEART: Regular rate and rhythm; No murmurs, rubs, or gallops  ABDOMEN: Soft, Nontender, Nondistended; Bowel sounds present  EXTREMITIES:  no  edema no  tenderness  SKIN: No rashes   LABS:                        8.2    8.43  )-----------( 225      ( 30 Mar 2021 07:55 )             27.2     03-30    157<H>  |  120<H>  |  27<H>  ----------------------------<  200<H>  3.9   |  34<H>  |  0.55    Ca    8.3<L>      30 Mar 2021 07:55    TPro  4.6<L>  /  Alb  1.4<L>  /  TBili  0.6  /  DBili  x   /  AST  23  /  ALT  16  /  AlkPhos  94  03-30    PT/INR - ( 30 Mar 2021 07:55 )   PT: 12.7 sec;   INR: 1.10 ratio         PTT - ( 30 Mar 2021 07:55 )  PTT:42.0 sec  Urinalysis Basic - ( 28 Mar 2021 23:05 )    Color: Yellow / Appearance: Clear / S.010 / pH: x  Gluc: x / Ketone: Negative  / Bili: Negative / Urobili: Negative mg/dL   Blood: x / Protein: 30 mg/dL / Nitrite: Negative   Leuk Esterase: Negative / RBC: Negative /HPF / WBC 0-2   Sq Epi: x / Non Sq Epi: Occasional / Bacteria: Occasional      CAPILLARY BLOOD GLUCOSE      POCT Blood Glucose.: 194 mg/dL (30 Mar 2021 07:36)  POCT Blood Glucose.: 115 mg/dL (29 Mar 2021 21:28)  POCT Blood Glucose.: 144 mg/dL (29 Mar 2021 17:00)  POCT Blood Glucose.: 273 mg/dL (29 Mar 2021 12:52)      RADIOLOGY & ADDITIONAL TESTS:    Imaging reports  Personally Reviewed:  [ ] YES  [ ] NO    Consultant(s) Notes Reviewed:  [ x] YES  [ ] NO    Care Discussed with Consultants/Other Providers [ x] YES  [ ] NO  tProblem/Plan - 1:  ·  Problem: Pulmonary emboli.  Plan: lovenox    Problem/Plan - 2:  ·  Problem: DVT (deep venous thrombosis).  Plan: lovenox    HYPERNATREMIAN  CHANGE  IVF  TO  D5W 1/2 NS  Problem/Plan - 4:  ·  Problem: Asthma.  Plan: symbicort.     Problem/Plan - 5:  ·  Problem: DM (diabetes mellitus).  Plan: insulin coverage.     Problem/Plan - 6:  Problem: Failure to thrive in adult. Plan: encourage  oral  intake  rx  underlying  causes.   REMERON       anemia  on  AC  for  dvt  and  PE    monitor  labs  transfuse  as needed     condition  stable  prognosis  poor

## 2021-03-30 NOTE — CHART NOTE - NSCHARTNOTEFT_GEN_A_CORE
Assessment: Pt assessed for & remains with malnutrition. Pt admitted c FTT from [facility]. Pt c COVID 19 related PNA, DVT,  UTI, pulmonary emboli, anemia, hypernatremia +IVF D5W 1/2 NS. PMH asthma, stroke, diverticulitis. Pt confused, disoriented, non verbal. Per RN, NGT feeding held overnight, replaced and restarted 03/30 11AM; tolerating feeding now. Palliative care team follow, pt not candidate for PEG placement; prognosis poor. Pt remains Full Code per family.     Factors impacting intake: [x ] none [ ] nausea  [ ] vomiting [ ] diarrhea [ ] constipation  [ ]chewing problems [ ] swallowing issues  [ ] other: NGT placed     Diet Prescription: Diet, NPO with Tube Feed:   Tube Feeding Modality: Nasogastric  Glucerna 1.2 Renzo  Total Volume for 24 Hours (mL): 1320  Continuous  Starting Tube Feed Rate {mL per Hour}: 30  Increase Tube Feed Rate by (mL): 10     Every 8 hours  Until Goal Tube Feed Rate (mL per Hour): 55  Tube Feed Duration (in Hours): 24  Tube Feed Start Time: 16:00  Free Water Flush   Total Volume per Flush (mL): 150   Frequency: Every 6 Hours   Total Daily Volume of Flush (mL): 600    Start Time: 16:00 (03-23-21 @ 15:29)    Intake: Goal rate provides: 1584 kcal, 79.2 g of protein, 1284 mL ; plus 600 mL H2O flushes     Current Weight: 80.8 kg (03/30/2021)  ;  76.5 kg (03/14/2021)   % Weight Change: unable to accurately assess large fluctuations in fluid accumulation noted throughout stay.     Physical appearance: Generalized edema 3+; BILAT arm, wrist, hand 4+ edema noted     Pertinent Medications: MEDICATIONS  (STANDING):  ALBUTerol    90 MICROgram(s) HFA Inhaler 2 Puff(s) Inhalation every 4 hours  chlorhexidine 2% Cloths 1 Application(s) Topical <User Schedule>  dextrose 40% Gel 15 Gram(s) Oral once  dextrose 5%. 1000 milliLiter(s) (50 mL/Hr) IV Continuous <Continuous>  dextrose 5%. 1000 milliLiter(s) (100 mL/Hr) IV Continuous <Continuous>  dextrose 50% Injectable 25 Gram(s) IV Push once  dextrose 50% Injectable 25 Gram(s) IV Push once  dextrose 50% Injectable 12.5 Gram(s) IV Push once  enoxaparin Injectable 80 milliGRAM(s) SubCutaneous every 12 hours  ferrous    sulfate 325 milliGRAM(s) Oral daily  gabapentin 300 milliGRAM(s) Oral two times a day  glucagon  Injectable 1 milliGRAM(s) IntraMuscular once  insulin lispro (ADMELOG) corrective regimen sliding scale   SubCutaneous three times a day before meals  lisinopril 5 milliGRAM(s) Oral daily  mirtazapine 7.5 milliGRAM(s) Oral at bedtime  pantoprazole   Suspension 40 milliGRAM(s) Oral daily  polyethylene glycol 3350 17 Gram(s) Oral daily  senna 2 Tablet(s) Oral at bedtime    MEDICATIONS  (PRN):  acetaminophen   Tablet .. 650 milliGRAM(s) Oral every 6 hours PRN Mild Pain (1 - 3)  ALBUTerol    90 MICROgram(s) HFA Inhaler 2 Puff(s) Inhalation every 6 hours PRN Wheezing    Pertinent Labs: 03-30 Na 157 mmol/L<H> Glu 200 mg/dL<H> K+ 3.9 mmol/L Cr 0.55 mg/dL BUN 27 mg/dL<H> Phos n/a   Alb 1.4 g/dL<L> PAB n/a   Hgb 8.2 g/dL<L> Hct 27.2 %<L> HgA1C n/a    Glucose, Serum: 200 mg/dL *H*   24Hr FS:135 mg/dL  194 mg/dL  115 mg/dL  144 mg/dL    Skin: R achilles bruised, otherwise intact      Estimated Needs:   [ x] no change since previous assessment (03-)   [ ] recalculated:     Previous Nutrition Diagnosis:   Severe Malnutrition in context of acute illness  Etiology:  Inadequate energy/protein intake related to COVID+ illness, AMS, FTT dx  Signs & Symptoms:  <50% nutrition needs >5 days; 5% wt loss x 1 month; 2+ edema noted b/l arms & 3+ generalized edema    GOAL:  Provide nutrition/hydration as medically appropriate, as tolerated, & within family's wishes for tx (continuing)       Nutrition Diagnosis is [ x] ongoing  [ ] resolved  [ ] improved  [ ] not applicable       New Nutrition Diagnosis: [x ] not applicable       Interventions:   Recommend  [x ] Continue: Continue nutrition as per family wishes.   [ ] Change Diet To:  [ ] Nutrition Supplement:  [ ] Nutrition Support:  [ ] Other:     Monitoring and Evaluation:   [ ] PO intake [ x ] Tolerance to diet prescription [ x ] weights [ x ] labs[ x ] follow up per protocol  [ ] other:

## 2021-03-31 LAB
ANION GAP SERPL CALC-SCNC: 3 MMOL/L — LOW (ref 5–17)
BUN SERPL-MCNC: 24 MG/DL — HIGH (ref 7–23)
CALCIUM SERPL-MCNC: 8.6 MG/DL — SIGNIFICANT CHANGE UP (ref 8.5–10.1)
CHLORIDE SERPL-SCNC: 119 MMOL/L — HIGH (ref 96–108)
CO2 SERPL-SCNC: 33 MMOL/L — HIGH (ref 22–31)
CREAT SERPL-MCNC: 0.58 MG/DL — SIGNIFICANT CHANGE UP (ref 0.5–1.3)
CRP SERPL-MCNC: 44 MG/L — HIGH
ERYTHROCYTE [SEDIMENTATION RATE] IN BLOOD: 79 MM/HR — HIGH (ref 0–20)
GLUCOSE BLDC GLUCOMTR-MCNC: 189 MG/DL — HIGH (ref 70–99)
GLUCOSE BLDC GLUCOMTR-MCNC: 219 MG/DL — HIGH (ref 70–99)
GLUCOSE BLDC GLUCOMTR-MCNC: 231 MG/DL — HIGH (ref 70–99)
GLUCOSE BLDC GLUCOMTR-MCNC: 293 MG/DL — HIGH (ref 70–99)
GLUCOSE SERPL-MCNC: 278 MG/DL — HIGH (ref 70–99)
HCT VFR BLD CALC: 26.7 % — LOW (ref 34.5–45)
HGB BLD-MCNC: 7.9 G/DL — LOW (ref 11.5–15.5)
MCHC RBC-ENTMCNC: 28.4 PG — SIGNIFICANT CHANGE UP (ref 27–34)
MCHC RBC-ENTMCNC: 29.6 GM/DL — LOW (ref 32–36)
MCV RBC AUTO: 96 FL — SIGNIFICANT CHANGE UP (ref 80–100)
NRBC # BLD: 2 /100 WBCS — HIGH (ref 0–0)
PLATELET # BLD AUTO: 266 K/UL — SIGNIFICANT CHANGE UP (ref 150–400)
POTASSIUM SERPL-MCNC: 4.1 MMOL/L — SIGNIFICANT CHANGE UP (ref 3.5–5.3)
POTASSIUM SERPL-SCNC: 4.1 MMOL/L — SIGNIFICANT CHANGE UP (ref 3.5–5.3)
RBC # BLD: 2.78 M/UL — LOW (ref 3.8–5.2)
RBC # FLD: 21.4 % — HIGH (ref 10.3–14.5)
SODIUM SERPL-SCNC: 155 MMOL/L — HIGH (ref 135–145)
WBC # BLD: 7.23 K/UL — SIGNIFICANT CHANGE UP (ref 3.8–10.5)
WBC # FLD AUTO: 7.23 K/UL — SIGNIFICANT CHANGE UP (ref 3.8–10.5)

## 2021-03-31 RX ADMIN — Medication 4: at 12:08

## 2021-03-31 RX ADMIN — GABAPENTIN 300 MILLIGRAM(S): 400 CAPSULE ORAL at 05:07

## 2021-03-31 RX ADMIN — ENOXAPARIN SODIUM 80 MILLIGRAM(S): 100 INJECTION SUBCUTANEOUS at 17:07

## 2021-03-31 RX ADMIN — PANTOPRAZOLE SODIUM 40 MILLIGRAM(S): 20 TABLET, DELAYED RELEASE ORAL at 11:16

## 2021-03-31 RX ADMIN — Medication 6: at 08:12

## 2021-03-31 RX ADMIN — MIRTAZAPINE 7.5 MILLIGRAM(S): 45 TABLET, ORALLY DISINTEGRATING ORAL at 21:53

## 2021-03-31 RX ADMIN — GABAPENTIN 300 MILLIGRAM(S): 400 CAPSULE ORAL at 17:07

## 2021-03-31 RX ADMIN — Medication 325 MILLIGRAM(S): at 11:16

## 2021-03-31 RX ADMIN — SENNA PLUS 2 TABLET(S): 8.6 TABLET ORAL at 21:53

## 2021-03-31 RX ADMIN — POLYETHYLENE GLYCOL 3350 17 GRAM(S): 17 POWDER, FOR SOLUTION ORAL at 11:16

## 2021-03-31 RX ADMIN — Medication 4: at 16:43

## 2021-03-31 RX ADMIN — CHLORHEXIDINE GLUCONATE 1 APPLICATION(S): 213 SOLUTION TOPICAL at 05:07

## 2021-03-31 RX ADMIN — ENOXAPARIN SODIUM 80 MILLIGRAM(S): 100 INJECTION SUBCUTANEOUS at 05:07

## 2021-03-31 NOTE — PROGRESS NOTE ADULT - SUBJECTIVE AND OBJECTIVE BOX
INTERVAL HPI/OVERNIGHT EVENTS:        REVIEW OF SYSTEMS:  CONSTITUTIONAL:  somnolent  arouseable  poorly  communicative      MEDICATION:  acetaminophen   Tablet .. 650 milliGRAM(s) Oral every 6 hours PRN  ALBUTerol    90 MICROgram(s) HFA Inhaler 2 Puff(s) Inhalation every 4 hours  ALBUTerol    90 MICROgram(s) HFA Inhaler 2 Puff(s) Inhalation every 6 hours PRN  chlorhexidine 2% Cloths 1 Application(s) Topical <User Schedule>  dextrose 40% Gel 15 Gram(s) Oral once  dextrose 5%. 1000 milliLiter(s) IV Continuous <Continuous>  dextrose 5%. 1000 milliLiter(s) IV Continuous <Continuous>  dextrose 50% Injectable 25 Gram(s) IV Push once  dextrose 50% Injectable 12.5 Gram(s) IV Push once  dextrose 50% Injectable 25 Gram(s) IV Push once  enoxaparin Injectable 80 milliGRAM(s) SubCutaneous every 12 hours  ferrous    sulfate 325 milliGRAM(s) Oral daily  gabapentin 300 milliGRAM(s) Oral two times a day  glucagon  Injectable 1 milliGRAM(s) IntraMuscular once  insulin lispro (ADMELOG) corrective regimen sliding scale   SubCutaneous three times a day before meals  lisinopril 5 milliGRAM(s) Oral daily  mirtazapine 7.5 milliGRAM(s) Oral at bedtime  pantoprazole   Suspension 40 milliGRAM(s) Oral daily  polyethylene glycol 3350 17 Gram(s) Oral daily  senna 2 Tablet(s) Oral at bedtime    Vital Signs Last 24 Hrs  T(C): 36.7 (31 Mar 2021 17:08), Max: 37.1 (30 Mar 2021 23:11)  T(F): 98 (31 Mar 2021 17:08), Max: 98.8 (30 Mar 2021 23:11)  HR: 96 (31 Mar 2021 17:08) (90 - 100)  BP: 123/71 (31 Mar 2021 17:08) (107/60 - 123/71)  BP(mean): --  RR: 18 (31 Mar 2021 17:08) (18 - 20)  SpO2: 100% (31 Mar 2021 17:08) (95% - 100%)    PHYSICAL EXAM:  GENERAL: NAD,   HEENT : Conjuntivae  pale sclerae anicteric  NECK: Supple, No JVD, Normal thyroid  NERVOUS SYSTEM:  Alert oriented   no  focal  deficits;   CHEST/LUNG:  ronchis    HEART: Regular rate and rhythm;  ABDOMEN: Soft, Nontender, Nondistended; Bowel sounds present  EXTREMITIES: + edema no  tenderness  SKIN: No rashes   LABS:                        7.9    7.23  )-----------( 266      ( 31 Mar 2021 06:46 )             26.7     03-31    155<H>  |  119<H>  |  24<H>  ----------------------------<  278<H>  4.1   |  33<H>  |  0.58    Ca    8.6      31 Mar 2021 06:46    TPro  4.6<L>  /  Alb  1.4<L>  /  TBili  0.6  /  DBili  x   /  AST  23  /  ALT  16  /  AlkPhos  94  03-30    PT/INR - ( 30 Mar 2021 07:55 )   PT: 12.7 sec;   INR: 1.10 ratio         PTT - ( 30 Mar 2021 07:55 )  PTT:42.0 sec    CAPILLARY BLOOD GLUCOSE      POCT Blood Glucose.: 219 mg/dL (31 Mar 2021 16:30)  POCT Blood Glucose.: 231 mg/dL (31 Mar 2021 11:59)  POCT Blood Glucose.: 293 mg/dL (31 Mar 2021 07:51)  POCT Blood Glucose.: 220 mg/dL (30 Mar 2021 21:24)      RADIOLOGY & ADDITIONAL TESTS:    Imaging reports  Personally Reviewed:  [ ] YES  [ ] NO    Consultant(s) Notes Reviewed:  [ ] YES  [ ] NO    Care Discussed with Consultants/Other Providers [ ] YES  [ ] NO  Problem/Plan - 1:  ·  Problem: Pulmonary emboli.  Plan: lovenox    Problem/Plan - 2:  ·  Problem: DVT (deep venous thrombosis).  Plan: lovenox    HYPERNATREMIAN  CHANGE  IVF  TO  D5W 1/2 NS  Problem/Plan - 4:  ·  Problem: Asthma.  Plan: symbicort.     Problem/Plan - 5:  ·  Problem: DM (diabetes mellitus).  Plan: insulin coverage.     Problem/Plan - 6:  Problem: Failure to thrive in adult. Plan: encourage  oral  intake  rx  underlying  causes.   REMERON       anemia  on  AC  for  dvt  and  PE    monitor  labs  transfuse  as needed     condition worsening prognosis  poor  discussed  with  pt's  son wanted  to know  how  his  mother is doing  states  does not  understand  when told  patient is doing very  bad  states  will have  his  son speak to me

## 2021-04-01 LAB
GLUCOSE BLDC GLUCOMTR-MCNC: 173 MG/DL — HIGH (ref 70–99)
GLUCOSE BLDC GLUCOMTR-MCNC: 208 MG/DL — HIGH (ref 70–99)
GLUCOSE BLDC GLUCOMTR-MCNC: 305 MG/DL — HIGH (ref 70–99)

## 2021-04-01 RX ORDER — POLYETHYLENE GLYCOL 3350 17 G/17G
17 POWDER, FOR SOLUTION ORAL DAILY
Refills: 0 | Status: DISCONTINUED | OUTPATIENT
Start: 2021-04-01 | End: 2021-04-28

## 2021-04-01 RX ORDER — SENNA PLUS 8.6 MG/1
1 TABLET ORAL AT BEDTIME
Refills: 0 | Status: DISCONTINUED | OUTPATIENT
Start: 2021-04-01 | End: 2021-04-28

## 2021-04-01 RX ADMIN — GABAPENTIN 300 MILLIGRAM(S): 400 CAPSULE ORAL at 17:07

## 2021-04-01 RX ADMIN — Medication 2: at 16:10

## 2021-04-01 RX ADMIN — Medication 4: at 11:29

## 2021-04-01 RX ADMIN — Medication 650 MILLIGRAM(S): at 06:03

## 2021-04-01 RX ADMIN — Medication 650 MILLIGRAM(S): at 07:00

## 2021-04-01 RX ADMIN — GABAPENTIN 300 MILLIGRAM(S): 400 CAPSULE ORAL at 06:03

## 2021-04-01 RX ADMIN — ENOXAPARIN SODIUM 80 MILLIGRAM(S): 100 INJECTION SUBCUTANEOUS at 06:19

## 2021-04-01 RX ADMIN — PANTOPRAZOLE SODIUM 40 MILLIGRAM(S): 20 TABLET, DELAYED RELEASE ORAL at 11:29

## 2021-04-01 RX ADMIN — ENOXAPARIN SODIUM 80 MILLIGRAM(S): 100 INJECTION SUBCUTANEOUS at 17:07

## 2021-04-01 RX ADMIN — CHLORHEXIDINE GLUCONATE 1 APPLICATION(S): 213 SOLUTION TOPICAL at 06:18

## 2021-04-01 RX ADMIN — Medication 325 MILLIGRAM(S): at 11:29

## 2021-04-01 RX ADMIN — LISINOPRIL 5 MILLIGRAM(S): 2.5 TABLET ORAL at 06:03

## 2021-04-01 RX ADMIN — Medication 8: at 07:54

## 2021-04-01 RX ADMIN — MIRTAZAPINE 7.5 MILLIGRAM(S): 45 TABLET, ORALLY DISINTEGRATING ORAL at 21:57

## 2021-04-01 NOTE — PROGRESS NOTE ADULT - SUBJECTIVE AND OBJECTIVE BOX
INTERVAL HPI/OVERNIGHT EVENTS:        REVIEW OF SYSTEMS:  CONSTITUTIONAL: somnolent  arouseable  continues  with NGT feedings    MEDICATION:  acetaminophen   Tablet .. 650 milliGRAM(s) Oral every 6 hours PRN  ALBUTerol    90 MICROgram(s) HFA Inhaler 2 Puff(s) Inhalation every 4 hours  ALBUTerol    90 MICROgram(s) HFA Inhaler 2 Puff(s) Inhalation every 6 hours PRN  chlorhexidine 2% Cloths 1 Application(s) Topical <User Schedule>  dextrose 40% Gel 15 Gram(s) Oral once  dextrose 5%. 1000 milliLiter(s) IV Continuous <Continuous>  dextrose 5%. 1000 milliLiter(s) IV Continuous <Continuous>  dextrose 50% Injectable 25 Gram(s) IV Push once  dextrose 50% Injectable 12.5 Gram(s) IV Push once  dextrose 50% Injectable 25 Gram(s) IV Push once  enoxaparin Injectable 80 milliGRAM(s) SubCutaneous every 12 hours  ferrous    sulfate 325 milliGRAM(s) Oral daily  gabapentin 300 milliGRAM(s) Oral two times a day  glucagon  Injectable 1 milliGRAM(s) IntraMuscular once  insulin lispro (ADMELOG) corrective regimen sliding scale   SubCutaneous three times a day before meals  lisinopril 5 milliGRAM(s) Oral daily  mirtazapine 7.5 milliGRAM(s) Oral at bedtime  pantoprazole   Suspension 40 milliGRAM(s) Oral daily  polyethylene glycol 3350 17 Gram(s) Oral daily  senna 2 Tablet(s) Oral at bedtime    Vital Signs Last 24 Hrs  T(C): 38.6 (01 Apr 2021 06:42), Max: 38.6 (01 Apr 2021 06:42)  T(F): 101.4 (01 Apr 2021 06:42), Max: 101.4 (01 Apr 2021 06:42)  HR: 112 (01 Apr 2021 06:42) (90 - 114)  BP: 133/79 (01 Apr 2021 06:42) (118/61 - 147/80)  BP(mean): --  RR: 20 (01 Apr 2021 06:42) (18 - 20)  SpO2: 96% (01 Apr 2021 08:15) (95% - 100%)    PHYSICAL EXAM:  GENERAL: NAD,   HEENT : Conjuntivae  clear sclerae anicteric  NERVOUS SYSTEM:  somnolent  opens  eyes   CHEST/LUNG:    clear  HEART: Regular rate and rhythm; No murmurs, rubs, or gallops  ABDOMEN: Soft, Nontender, Nondistended; Bowel sounds present  EXTREMITIES:  diffuse  edemas  SKIN: No rashes   LABS:                        7.9    7.23  )-----------( 266      ( 31 Mar 2021 06:46 )             26.7     03-31    155<H>  |  119<H>  |  24<H>  ----------------------------<  278<H>  4.1   |  33<H>  |  0.58    Ca    8.6      31 Mar 2021 06:46          CAPILLARY BLOOD GLUCOSE      POCT Blood Glucose.: 305 mg/dL (01 Apr 2021 07:45)  POCT Blood Glucose.: 189 mg/dL (31 Mar 2021 21:24)  POCT Blood Glucose.: 219 mg/dL (31 Mar 2021 16:30)  POCT Blood Glucose.: 231 mg/dL (31 Mar 2021 11:59)      RADIOLOGY & ADDITIONAL TESTS:    Imaging reports  Personally Reviewed:  [ ] YES  [ ] NO    Consultant(s) Notes Reviewed:  [x ] YES  [ ] NO    Care Discussed with Consultants/Other Providers [x ] YES  [ ] NO  Problem/Plan - 1:  ·  Problem: Pulmonary emboli.  Plan: lovenox    Problem/Plan - 2:  ·  Problem: DVT (deep venous thrombosis).  Plan: lovenox    HYPERNATREMIAN  CHANGE  IVF  TO  D5W 1/2 NS  Problem/Plan - 4:  ·  Problem: Asthma.  Plan: symbicort.     Problem/Plan - 5:  ·  Problem: DM (diabetes mellitus).  Plan: insulin coverage.     Problem/Plan - 6:  Problem: Failure to thrive in adult. Plan: encourage  oral  intake  rx  underlying  causes.   REMERON       anemia  on  AC  for  dvt  and  PE    monitor  labs  transfuse  as needed     condition worsening prognosis  poor  no call  from  patient's  grandson    have  left  message  with  pt's  son  [phone]

## 2021-04-02 LAB
GLUCOSE BLDC GLUCOMTR-MCNC: 182 MG/DL — HIGH (ref 70–99)
GLUCOSE BLDC GLUCOMTR-MCNC: 184 MG/DL — HIGH (ref 70–99)
GLUCOSE BLDC GLUCOMTR-MCNC: 244 MG/DL — HIGH (ref 70–99)
GLUCOSE BLDC GLUCOMTR-MCNC: 274 MG/DL — HIGH (ref 70–99)

## 2021-04-02 RX ADMIN — GABAPENTIN 300 MILLIGRAM(S): 400 CAPSULE ORAL at 05:14

## 2021-04-02 RX ADMIN — Medication 6: at 12:43

## 2021-04-02 RX ADMIN — ENOXAPARIN SODIUM 80 MILLIGRAM(S): 100 INJECTION SUBCUTANEOUS at 18:08

## 2021-04-02 RX ADMIN — ENOXAPARIN SODIUM 80 MILLIGRAM(S): 100 INJECTION SUBCUTANEOUS at 05:14

## 2021-04-02 RX ADMIN — Medication 325 MILLIGRAM(S): at 12:42

## 2021-04-02 RX ADMIN — LISINOPRIL 5 MILLIGRAM(S): 2.5 TABLET ORAL at 05:14

## 2021-04-02 RX ADMIN — Medication 4: at 05:14

## 2021-04-02 RX ADMIN — PANTOPRAZOLE SODIUM 40 MILLIGRAM(S): 20 TABLET, DELAYED RELEASE ORAL at 12:42

## 2021-04-02 RX ADMIN — GABAPENTIN 300 MILLIGRAM(S): 400 CAPSULE ORAL at 18:09

## 2021-04-02 RX ADMIN — MIRTAZAPINE 7.5 MILLIGRAM(S): 45 TABLET, ORALLY DISINTEGRATING ORAL at 21:32

## 2021-04-02 RX ADMIN — Medication 2: at 18:09

## 2021-04-02 RX ADMIN — CHLORHEXIDINE GLUCONATE 1 APPLICATION(S): 213 SOLUTION TOPICAL at 05:13

## 2021-04-02 NOTE — CHART NOTE - NSCHARTNOTEFT_GEN_A_CORE
Ethics following this patient. Of note, Specifically, physicians are not obligated to perform procedures that are not in accordance with acceptable practice and where harm exceeds benefits. Therefore, it is appropriate in cases such as this to not escalate in treatments that are deemed to introduce suffering without expectation of benefit. The benefits of interventions (e.g. chest compressions, vasopressors, hemodialysis) and other life-sustaining treatments must be weighed against the harm they may cause when they no longer improve prognosis and introduce suffering and risk.     Ethics will remain available for any discussions and decision points that may occur. Please do not hesitate to contact us.     Consultant: Tha Gavin JD, MS, NATA-C (Ethics Fellow)  Contact#: [phone] (Office) & [phone] (Fellow Cell)

## 2021-04-02 NOTE — CHART NOTE - NSCHARTNOTEFT_GEN_A_CORE
Pt admitted from [facility] for FTT (multi-factorial: COVID PNA, advanced age, abdominal pain, active PE, AMS); found c b/l PE.  Pt is confused, disoriented, lethargic.  Per palliative care note pt not a candidate for PEG; poor prognosis but family wishes pt to remain full code; medical condition worsening.   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       Factors impacting intake: [ ] none [ ] nausea  [ ] vomiting [ ] diarrhea [ ] constipation  [X ]chewing problems [ X] swallowing issues  [X ] other: AMS, FTT, lethargy    Diet Prescription: Diet, NPO with Tube Feed:   Tube Feeding Modality: Nasogastric  Glucerna 1.2 Renzo  Total Volume for 24 Hours (mL): 1320  Continuous  Starting Tube Feed Rate {mL per Hour}: 30  Increase Tube Feed Rate by (mL): 10     Every 8 hours  Until Goal Tube Feed Rate (mL per Hour): 55  Tube Feed Duration (in Hours): 24  Tube Feed Start Time: 16:00  Free Water Flush   Total Volume per Flush (mL): 150   Frequency: Every 6 Hours   Total Daily Volume of Flush (mL): 600    Start Time: 16:00 (03-23-21 @ 15:29)    Intake:   TF continues to run at goal rate (provides 1320 ml, 1584 kcal, 79 g protein, 1069 ml H2O); pt tolerating well, minimal residuals noted    Current Weight:   78.4 kg (4/2); 76.5 kg at admission (3/14)  % Weight Change:  unable to accurately assess wt change due to large fluctuation in fluid accumulation noted throughout LOS    Unable to conduct complete nutrition-focused physical exam at this time due to edematous status; noted c moderate muscle wasting at temples & clavicle    Pt c 3+ generalized edema & 4+ b/l hands, wrists, & arms    Pertinent Medications: MEDICATIONS  (STANDING):  ALBUTerol    90 MICROgram(s) HFA Inhaler 2 Puff(s) Inhalation every 4 hours  chlorhexidine 2% Cloths 1 Application(s) Topical <User Schedule>  dextrose 40% Gel 15 Gram(s) Oral once  dextrose 5%. 1000 milliLiter(s) (50 mL/Hr) IV Continuous <Continuous>  dextrose 5%. 1000 milliLiter(s) (100 mL/Hr) IV Continuous <Continuous>  dextrose 50% Injectable 12.5 Gram(s) IV Push once  dextrose 50% Injectable 25 Gram(s) IV Push once  dextrose 50% Injectable 25 Gram(s) IV Push once  enoxaparin Injectable 80 milliGRAM(s) SubCutaneous every 12 hours  ferrous    sulfate 325 milliGRAM(s) Oral daily  gabapentin 300 milliGRAM(s) Oral two times a day  glucagon  Injectable 1 milliGRAM(s) IntraMuscular once  insulin lispro (ADMELOG) corrective regimen sliding scale   SubCutaneous three times a day before meals  lisinopril 5 milliGRAM(s) Oral daily  mirtazapine 7.5 milliGRAM(s) Oral at bedtime  pantoprazole   Suspension 40 milliGRAM(s) Oral daily    MEDICATIONS  (PRN):  acetaminophen   Tablet .. 650 milliGRAM(s) Oral every 6 hours PRN Mild Pain (1 - 3)  ALBUTerol    90 MICROgram(s) HFA Inhaler 2 Puff(s) Inhalation every 6 hours PRN Wheezing  polyethylene glycol 3350 17 Gram(s) Oral daily PRN Constipation  senna 1 Tablet(s) Oral at bedtime PRN Constipation    Pertinent Labs: 03-31 Na155 mmol/L<H> Glu 278 mg/dL<H> K+ 4.1 mmol/L Cr  0.58 mg/dL BUN 24 mg/dL<H> 03-30 Alb 1.4 g/dL<L> 03-22 PAB 8 mg/dL<L>  03-15-21 A1C 7.2; 03-31 POCT:  305, 208, 173    Skin:   no pressure ulcers    Estimated Needs:   [X ] no change since previous assessment  (3/17)  [ ] recalculated:     Previous Nutrition Diagnosis:   [X ] Severe Malnutrition in context of acute illness  Etiology:  Inadequate energy/protein intake related to COVID  illness, AMS, FTT dx  Signs & Symptoms:  3+ generalized edema & 4+ upper extremities;  moderate muscle wasting as noted    Nutrition Diagnosis is [X ] ongoing  [ ] resolved [ ] not applicable     New Nutrition Diagnosis: [ x] not applicable      Interventions:   continue current TF as noted  Recommend  [ ] Change Diet To:  [ ] Nutrition Supplement  [ ] Nutrition Support  [ ] Other:     Monitoring and Evaluation:   [ ] PO intake [ x ] Tolerance to diet prescription [ x ] weights [ x ] labs[ x ] follow up per protocol  [ ] other:

## 2021-04-02 NOTE — PROGRESS NOTE ADULT - SUBJECTIVE AND OBJECTIVE BOX
INTERVAL HPI/OVERNIGHT EVENTS:        REVIEW OF SYSTEMS:  CONSTITUTIONAL:   somnolent  arouseable comfortable    MEDICATION:  acetaminophen   Tablet .. 650 milliGRAM(s) Oral every 6 hours PRN  ALBUTerol    90 MICROgram(s) HFA Inhaler 2 Puff(s) Inhalation every 4 hours  ALBUTerol    90 MICROgram(s) HFA Inhaler 2 Puff(s) Inhalation every 6 hours PRN  chlorhexidine 2% Cloths 1 Application(s) Topical <User Schedule>  dextrose 40% Gel 15 Gram(s) Oral once  dextrose 5%. 1000 milliLiter(s) IV Continuous <Continuous>  dextrose 5%. 1000 milliLiter(s) IV Continuous <Continuous>  dextrose 50% Injectable 25 Gram(s) IV Push once  dextrose 50% Injectable 25 Gram(s) IV Push once  dextrose 50% Injectable 12.5 Gram(s) IV Push once  enoxaparin Injectable 80 milliGRAM(s) SubCutaneous every 12 hours  ferrous    sulfate 325 milliGRAM(s) Oral daily  gabapentin 300 milliGRAM(s) Oral two times a day  glucagon  Injectable 1 milliGRAM(s) IntraMuscular once  insulin lispro (ADMELOG) corrective regimen sliding scale   SubCutaneous three times a day before meals  lisinopril 5 milliGRAM(s) Oral daily  mirtazapine 7.5 milliGRAM(s) Oral at bedtime  pantoprazole   Suspension 40 milliGRAM(s) Oral daily  polyethylene glycol 3350 17 Gram(s) Oral daily PRN  senna 1 Tablet(s) Oral at bedtime PRN    Vital Signs Last 24 Hrs  T(C): 37.6 (02 Apr 2021 06:19), Max: 37.6 (02 Apr 2021 06:19)  T(F): 99.7 (02 Apr 2021 06:19), Max: 99.7 (02 Apr 2021 06:19)  HR: 103 (02 Apr 2021 06:19) (83 - 108)  BP: 106/50 (02 Apr 2021 06:19) (103/59 - 138/77)  BP(mean): --  RR: 18 (02 Apr 2021 06:19) (18 - 18)  SpO2: 94% (02 Apr 2021 06:19) (94% - 98%)    PHYSICAL EXAM:  GENERAL: NAD, well-groomed, well-developed  HEENT : Conjuntivae  clear sclerae anicteric  NECK: Supple, No JVD, Normal thyroid  NERVOUS SYSTEM:  opens  eyes  with  verbal  stimulus  CHEST/LUNG: clear  HEART: Regular rate and rhythm; No murmurs, rubs, or gallops  ABDOMEN: Soft, Nontender, Nondistended; Bowel sounds present  EXTREMITIES:  diffuse  edemas  SKIN: No rashes   LABS:              CAPILLARY BLOOD GLUCOSE      POCT Blood Glucose.: 244 mg/dL (02 Apr 2021 05:09)  POCT Blood Glucose.: 173 mg/dL (01 Apr 2021 15:38)  POCT Blood Glucose.: 208 mg/dL (01 Apr 2021 10:44)      RADIOLOGY & ADDITIONAL TESTS:    Imaging reports  Personally Reviewed:  [ ] YES  [ ] NO    Consultant(s) Notes Reviewed:  [x ] YES  [ ] NO    Care Discussed with Consultants/Other Providers [x ] YES  [ ] NO  Problem/Plan - 1:  ·  Problem: Pulmonary emboli.  Plan: lovenox    Problem/Plan - 2:  ·  Problem: DVT (deep venous thrombosis).  Plan: lovenox    HYPERNATREMIAN  CHANGE  IVF  TO  D5W 1/2 NS  Problem/Plan - 4:  ·  Problem: Asthma.  Plan: symbicort.     Problem/Plan - 5:  ·  Problem: DM (diabetes mellitus).  Plan: insulin coverage.     Problem/Plan - 6:  Problem: Failure to thrive in adult. Plan: encourage  oral  intake  rx  underlying  causes.   REMERON       anemia  on  AC  for  dvt  and  PE    monitor  labs  transfuse  as needed     condition worsening prognosis  poor  no call  from  patient's  grandson  nor  son    will continue  with  present  Rx   as  per  staff  pt's  son  scheduled  to  see  mother  today   might be a  case  for  ethic  committee  discussuin

## 2021-04-03 LAB
CULTURE RESULTS: SIGNIFICANT CHANGE UP
CULTURE RESULTS: SIGNIFICANT CHANGE UP
GLUCOSE BLDC GLUCOMTR-MCNC: 176 MG/DL — HIGH (ref 70–99)
GLUCOSE BLDC GLUCOMTR-MCNC: 187 MG/DL — HIGH (ref 70–99)
GLUCOSE BLDC GLUCOMTR-MCNC: 212 MG/DL — HIGH (ref 70–99)
GLUCOSE BLDC GLUCOMTR-MCNC: 298 MG/DL — HIGH (ref 70–99)
SPECIMEN SOURCE: SIGNIFICANT CHANGE UP
SPECIMEN SOURCE: SIGNIFICANT CHANGE UP

## 2021-04-03 RX ORDER — ACETAMINOPHEN 500 MG
650 TABLET ORAL EVERY 6 HOURS
Refills: 0 | Status: DISCONTINUED | OUTPATIENT
Start: 2021-04-03 | End: 2021-04-07

## 2021-04-03 RX ADMIN — Medication 650 MILLIGRAM(S): at 12:45

## 2021-04-03 RX ADMIN — MIRTAZAPINE 7.5 MILLIGRAM(S): 45 TABLET, ORALLY DISINTEGRATING ORAL at 21:16

## 2021-04-03 RX ADMIN — LISINOPRIL 5 MILLIGRAM(S): 2.5 TABLET ORAL at 05:38

## 2021-04-03 RX ADMIN — PANTOPRAZOLE SODIUM 40 MILLIGRAM(S): 20 TABLET, DELAYED RELEASE ORAL at 11:26

## 2021-04-03 RX ADMIN — GABAPENTIN 300 MILLIGRAM(S): 400 CAPSULE ORAL at 05:40

## 2021-04-03 RX ADMIN — Medication 2: at 17:00

## 2021-04-03 RX ADMIN — CHLORHEXIDINE GLUCONATE 1 APPLICATION(S): 213 SOLUTION TOPICAL at 05:40

## 2021-04-03 RX ADMIN — Medication 4: at 11:25

## 2021-04-03 RX ADMIN — Medication 6: at 07:50

## 2021-04-03 RX ADMIN — ENOXAPARIN SODIUM 80 MILLIGRAM(S): 100 INJECTION SUBCUTANEOUS at 05:38

## 2021-04-03 RX ADMIN — GABAPENTIN 300 MILLIGRAM(S): 400 CAPSULE ORAL at 17:35

## 2021-04-03 RX ADMIN — Medication 650 MILLIGRAM(S): at 19:38

## 2021-04-03 RX ADMIN — ENOXAPARIN SODIUM 80 MILLIGRAM(S): 100 INJECTION SUBCUTANEOUS at 17:36

## 2021-04-03 RX ADMIN — Medication 325 MILLIGRAM(S): at 11:26

## 2021-04-03 NOTE — PROGRESS NOTE ADULT - SUBJECTIVE AND OBJECTIVE BOX
INTERVAL HPI/OVERNIGHT EVENTS:        REVIEW OF SYSTEMS:  CONSTITUTIONAL: opens eyes    NECK: No pain or stiffnes  RESPIRATORY: No SOB   CARDIOVASCULAR: No chest pain, palpitations, dizziness,   GASTROINTESTINAL: No abdominal pain. No nausea, vomiting,   NEUROLOGICAL: No headaches, no  blurry  vision no  dizziness  SKIN: No itching,   MUSCULOSKELETAL: No pain    MEDICATION:  acetaminophen   Tablet .. 650 milliGRAM(s) Oral every 6 hours PRN  ALBUTerol    90 MICROgram(s) HFA Inhaler 2 Puff(s) Inhalation every 4 hours  ALBUTerol    90 MICROgram(s) HFA Inhaler 2 Puff(s) Inhalation every 6 hours PRN  chlorhexidine 2% Cloths 1 Application(s) Topical <User Schedule>  dextrose 40% Gel 15 Gram(s) Oral once  dextrose 5%. 1000 milliLiter(s) IV Continuous <Continuous>  dextrose 5%. 1000 milliLiter(s) IV Continuous <Continuous>  dextrose 50% Injectable 25 Gram(s) IV Push once  dextrose 50% Injectable 12.5 Gram(s) IV Push once  dextrose 50% Injectable 25 Gram(s) IV Push once  enoxaparin Injectable 80 milliGRAM(s) SubCutaneous every 12 hours  ferrous    sulfate 325 milliGRAM(s) Oral daily  gabapentin 300 milliGRAM(s) Oral two times a day  glucagon  Injectable 1 milliGRAM(s) IntraMuscular once  insulin lispro (ADMELOG) corrective regimen sliding scale   SubCutaneous three times a day before meals  lisinopril 5 milliGRAM(s) Oral daily  mirtazapine 7.5 milliGRAM(s) Oral at bedtime  pantoprazole   Suspension 40 milliGRAM(s) Oral daily  polyethylene glycol 3350 17 Gram(s) Oral daily PRN  senna 1 Tablet(s) Oral at bedtime PRN    Vital Signs Last 24 Hrs  T(C): 37.1 (03 Apr 2021 05:34), Max: 37.2 (02 Apr 2021 12:25)  T(F): 98.7 (03 Apr 2021 05:34), Max: 99 (02 Apr 2021 12:25)  HR: 103 (03 Apr 2021 05:34) (85 - 103)  BP: 142/78 (03 Apr 2021 05:34) (100/52 - 142/78)  BP(mean): --  RR: 19 (03 Apr 2021 05:34) (18 - 19)  SpO2: 92% (03 Apr 2021 05:34) (92% - 96%)    PHYSICAL EXAM:  GENERAL: NAD, well-groomed, well-developed  HEENT : Conjuntivae  clear sclerae anicteric  NECK:, No JVD  NERVOUS SYSTEM:  opens  eyes  CHEST/LUNG: ronchis  HEART: Regular rate and rhythm; No murmurs, rubs, or gallops  ABDOMEN: Soft, Nontender, Nondistended; Bowel sounds present  EXTREMITIES:  no  edema no  tenderness  SKIN: No rashes   LABS:              CAPILLARY BLOOD GLUCOSE      POCT Blood Glucose.: 298 mg/dL (03 Apr 2021 07:44)  POCT Blood Glucose.: 184 mg/dL (02 Apr 2021 21:21)  POCT Blood Glucose.: 182 mg/dL (02 Apr 2021 16:19)  POCT Blood Glucose.: 274 mg/dL (02 Apr 2021 12:42)      RADIOLOGY & ADDITIONAL TESTS:    Imaging reports  Personally Reviewed:  [ ] YES  [ ] NO    Consultant(s) Notes Reviewed:  [x ] YES  [ ] NO    Care Discussed with Consultants/Other Providers [x ] YES  [ ] NO  Problem/Plan - 1:  ·  Problem: Pulmonary emboli.  Plan: lovenox    Problem/Plan - 2:  ·  Problem: DVT (deep venous thrombosis).  Plan: lovenox    HYPERNATREMIAN  CHANGE  IVF  TO  D5W 1/2 NS  Problem/Plan - 4:  ·  Problem: Asthma.  Plan: symbicort.     Problem/Plan - 5:  ·  Problem: DM (diabetes mellitus).  Plan: insulin coverage.     Problem/Plan - 6:  Problem: Failure to thrive in adult. Plan: encourage  oral  intake  rx  underlying  causes.   REMERON       anemia  on  AC  for  dvt  and  PE    monitor  labs  transfuse  as needed     condition worsening prognosis  poor  no call  from  patient's  grandson  nor  son    will continue  with  present  Rx     ethic  committee  evaluation appreciated  discussed  with pt's  Grandson  states  family  not  yet  ready  to  make  decision on  DNR    want  "everything  done" including  intubation

## 2021-04-04 LAB
ALBUMIN SERPL ELPH-MCNC: 1.6 G/DL — LOW (ref 3.3–5)
ALP SERPL-CCNC: 143 U/L — HIGH (ref 40–120)
ALT FLD-CCNC: 20 U/L — SIGNIFICANT CHANGE UP (ref 12–78)
ANION GAP SERPL CALC-SCNC: 3 MMOL/L — LOW (ref 5–17)
AST SERPL-CCNC: 21 U/L — SIGNIFICANT CHANGE UP (ref 15–37)
BILIRUB SERPL-MCNC: 0.7 MG/DL — SIGNIFICANT CHANGE UP (ref 0.2–1.2)
BUN SERPL-MCNC: 23 MG/DL — SIGNIFICANT CHANGE UP (ref 7–23)
CALCIUM SERPL-MCNC: 8.3 MG/DL — LOW (ref 8.5–10.1)
CHLORIDE SERPL-SCNC: 109 MMOL/L — HIGH (ref 96–108)
CO2 SERPL-SCNC: 33 MMOL/L — HIGH (ref 22–31)
CREAT SERPL-MCNC: 0.73 MG/DL — SIGNIFICANT CHANGE UP (ref 0.5–1.3)
GLUCOSE BLDC GLUCOMTR-MCNC: 171 MG/DL — HIGH (ref 70–99)
GLUCOSE BLDC GLUCOMTR-MCNC: 186 MG/DL — HIGH (ref 70–99)
GLUCOSE BLDC GLUCOMTR-MCNC: 187 MG/DL — HIGH (ref 70–99)
GLUCOSE BLDC GLUCOMTR-MCNC: 190 MG/DL — HIGH (ref 70–99)
GLUCOSE SERPL-MCNC: 160 MG/DL — HIGH (ref 70–99)
HCT VFR BLD CALC: 26.4 % — LOW (ref 34.5–45)
HGB BLD-MCNC: 7.7 G/DL — LOW (ref 11.5–15.5)
MCHC RBC-ENTMCNC: 28.6 PG — SIGNIFICANT CHANGE UP (ref 27–34)
MCHC RBC-ENTMCNC: 29.2 GM/DL — LOW (ref 32–36)
MCV RBC AUTO: 98.1 FL — SIGNIFICANT CHANGE UP (ref 80–100)
NRBC # BLD: 1 /100 WBCS — HIGH (ref 0–0)
PLATELET # BLD AUTO: 312 K/UL — SIGNIFICANT CHANGE UP (ref 150–400)
POTASSIUM SERPL-MCNC: 4.3 MMOL/L — SIGNIFICANT CHANGE UP (ref 3.5–5.3)
POTASSIUM SERPL-SCNC: 4.3 MMOL/L — SIGNIFICANT CHANGE UP (ref 3.5–5.3)
PROT SERPL-MCNC: 5.3 GM/DL — LOW (ref 6–8.3)
RBC # BLD: 2.69 M/UL — LOW (ref 3.8–5.2)
RBC # FLD: 22.1 % — HIGH (ref 10.3–14.5)
SODIUM SERPL-SCNC: 145 MMOL/L — SIGNIFICANT CHANGE UP (ref 135–145)
WBC # BLD: 6.85 K/UL — SIGNIFICANT CHANGE UP (ref 3.8–10.5)
WBC # FLD AUTO: 6.85 K/UL — SIGNIFICANT CHANGE UP (ref 3.8–10.5)

## 2021-04-04 RX ADMIN — Medication 2: at 16:33

## 2021-04-04 RX ADMIN — MIRTAZAPINE 7.5 MILLIGRAM(S): 45 TABLET, ORALLY DISINTEGRATING ORAL at 22:08

## 2021-04-04 RX ADMIN — ENOXAPARIN SODIUM 80 MILLIGRAM(S): 100 INJECTION SUBCUTANEOUS at 05:30

## 2021-04-04 RX ADMIN — Medication 325 MILLIGRAM(S): at 12:22

## 2021-04-04 RX ADMIN — CHLORHEXIDINE GLUCONATE 1 APPLICATION(S): 213 SOLUTION TOPICAL at 05:31

## 2021-04-04 RX ADMIN — Medication 2: at 08:08

## 2021-04-04 RX ADMIN — Medication 2: at 11:48

## 2021-04-04 RX ADMIN — PANTOPRAZOLE SODIUM 40 MILLIGRAM(S): 20 TABLET, DELAYED RELEASE ORAL at 12:22

## 2021-04-04 RX ADMIN — GABAPENTIN 300 MILLIGRAM(S): 400 CAPSULE ORAL at 05:30

## 2021-04-04 RX ADMIN — ENOXAPARIN SODIUM 80 MILLIGRAM(S): 100 INJECTION SUBCUTANEOUS at 17:31

## 2021-04-04 RX ADMIN — GABAPENTIN 300 MILLIGRAM(S): 400 CAPSULE ORAL at 17:33

## 2021-04-04 NOTE — PROGRESS NOTE ADULT - SUBJECTIVE AND OBJECTIVE BOX
INTERVAL HPI/OVERNIGHT EVENTS:        REVIEW OF SYSTEMS:  CONSTITUTIONAL:  poorly  responsive comfortable    MEDICATION:  acetaminophen   Tablet .. 650 milliGRAM(s) Oral every 6 hours PRN  acetaminophen   Tablet .. 650 milliGRAM(s) Oral every 6 hours PRN  ALBUTerol    90 MICROgram(s) HFA Inhaler 2 Puff(s) Inhalation every 4 hours  ALBUTerol    90 MICROgram(s) HFA Inhaler 2 Puff(s) Inhalation every 6 hours PRN  chlorhexidine 2% Cloths 1 Application(s) Topical <User Schedule>  dextrose 40% Gel 15 Gram(s) Oral once  dextrose 5%. 1000 milliLiter(s) IV Continuous <Continuous>  dextrose 5%. 1000 milliLiter(s) IV Continuous <Continuous>  dextrose 50% Injectable 25 Gram(s) IV Push once  dextrose 50% Injectable 12.5 Gram(s) IV Push once  dextrose 50% Injectable 25 Gram(s) IV Push once  enoxaparin Injectable 80 milliGRAM(s) SubCutaneous every 12 hours  ferrous    sulfate 325 milliGRAM(s) Oral daily  gabapentin 300 milliGRAM(s) Oral two times a day  glucagon  Injectable 1 milliGRAM(s) IntraMuscular once  insulin lispro (ADMELOG) corrective regimen sliding scale   SubCutaneous three times a day before meals  lisinopril 5 milliGRAM(s) Oral daily  mirtazapine 7.5 milliGRAM(s) Oral at bedtime  pantoprazole   Suspension 40 milliGRAM(s) Oral daily  polyethylene glycol 3350 17 Gram(s) Oral daily PRN  senna 1 Tablet(s) Oral at bedtime PRN    Vital Signs Last 24 Hrs  T(C): 37.4 (04 Apr 2021 06:08), Max: 38.5 (03 Apr 2021 19:32)  T(F): 99.4 (04 Apr 2021 06:08), Max: 101.3 (03 Apr 2021 19:32)  HR: 87 (04 Apr 2021 06:08) (87 - 110)  BP: 104/62 (04 Apr 2021 06:08) (101/54 - 120/84)  BP(mean): --  RR: 18 (04 Apr 2021 06:08) (18 - 19)  SpO2: 94% (04 Apr 2021 06:08) (92% - 99%)    PHYSICAL EXAM:  GENERAL: NAD,   HEENT : Conjuntivae pale  NERVOUS SYSTEM:  poorly  responsive  CHEST/LUNG: Clear    HEART: Regular rate and rhythm;  ABDOMEN: Soft, Nontender, Nondistended; Bowel sounds present  EXTREMITIES:    diffuse  edemas  SKIN: No rashes   LABS:                        7.7    6.85  )-----------( 312      ( 04 Apr 2021 06:46 )             26.4     04-04    145  |  109<H>  |  23  ----------------------------<  160<H>  4.3   |  33<H>  |  0.73    Ca    8.3<L>      04 Apr 2021 06:46    TPro  5.3<L>  /  Alb  1.6<L>  /  TBili  0.7  /  DBili  x   /  AST  21  /  ALT  20  /  AlkPhos  143<H>  04-04        CAPILLARY BLOOD GLUCOSE      POCT Blood Glucose.: 187 mg/dL (04 Apr 2021 08:03)  POCT Blood Glucose.: 176 mg/dL (03 Apr 2021 21:30)  POCT Blood Glucose.: 187 mg/dL (03 Apr 2021 16:28)  POCT Blood Glucose.: 212 mg/dL (03 Apr 2021 11:20)      RADIOLOGY & ADDITIONAL TESTS:    Imaging reports  Personally Reviewed:  [ ] YES  [ ] NO    Consultant(s) Notes Reviewed:  [ x] YES  [ ] NO    Care Discussed with Consultants/Other Providers [x ] YES  [ ] NO  Problem/Plan - 1:  ·  Problem: Pulmonary emboli.  Plan: lovenox    Problem/Plan - 2:  ·  Problem: DVT (deep venous thrombosis).  Plan: lovenox    HYPERNATREMIAN  CHANGE  IVF  TO  D5W 1/2 NS  Problem/Plan - 4:  ·  Problem: Asthma.  Plan: symbicort.     Problem/Plan - 5:  ·  Problem: DM (diabetes mellitus).  Plan: insulin coverage.     Problem/Plan - 6:  Problem: Failure to thrive in adult. Plan: encourage  oral  intake  rx  underlying  causes.   REMERON       anemia  on  AC  for  dvt  and  PE    monitor  labs  transfuse  as needed     condition worsening prognosis  poor  spoken  to  pt's  grand son  family  still not  decided  on DNR  will continue  with  present  Rx     ethic  committee  evaluation appreciated

## 2021-04-05 LAB
GLUCOSE BLDC GLUCOMTR-MCNC: 133 MG/DL — HIGH (ref 70–99)
GLUCOSE BLDC GLUCOMTR-MCNC: 147 MG/DL — HIGH (ref 70–99)
GLUCOSE BLDC GLUCOMTR-MCNC: 207 MG/DL — HIGH (ref 70–99)
GLUCOSE BLDC GLUCOMTR-MCNC: 277 MG/DL — HIGH (ref 70–99)

## 2021-04-05 RX ADMIN — Medication 6: at 08:18

## 2021-04-05 RX ADMIN — GABAPENTIN 300 MILLIGRAM(S): 400 CAPSULE ORAL at 17:06

## 2021-04-05 RX ADMIN — Medication 4: at 11:30

## 2021-04-05 RX ADMIN — GABAPENTIN 300 MILLIGRAM(S): 400 CAPSULE ORAL at 05:10

## 2021-04-05 RX ADMIN — ENOXAPARIN SODIUM 80 MILLIGRAM(S): 100 INJECTION SUBCUTANEOUS at 05:11

## 2021-04-05 RX ADMIN — PANTOPRAZOLE SODIUM 40 MILLIGRAM(S): 20 TABLET, DELAYED RELEASE ORAL at 11:30

## 2021-04-05 RX ADMIN — LISINOPRIL 5 MILLIGRAM(S): 2.5 TABLET ORAL at 05:10

## 2021-04-05 RX ADMIN — CHLORHEXIDINE GLUCONATE 1 APPLICATION(S): 213 SOLUTION TOPICAL at 05:09

## 2021-04-05 RX ADMIN — ENOXAPARIN SODIUM 80 MILLIGRAM(S): 100 INJECTION SUBCUTANEOUS at 17:06

## 2021-04-05 RX ADMIN — Medication 325 MILLIGRAM(S): at 11:30

## 2021-04-05 RX ADMIN — MIRTAZAPINE 7.5 MILLIGRAM(S): 45 TABLET, ORALLY DISINTEGRATING ORAL at 21:47

## 2021-04-05 NOTE — PROGRESS NOTE ADULT - SUBJECTIVE AND OBJECTIVE BOX
INTERVAL HPI/OVERNIGHT EVENTS:    continues  with  NGT feedings    REVIEW OF SYSTEMS:  CONSTITUTIONAL:  alert  no  complaints      MEDICATION:  acetaminophen   Tablet .. 650 milliGRAM(s) Oral every 6 hours PRN  acetaminophen   Tablet .. 650 milliGRAM(s) Oral every 6 hours PRN  ALBUTerol    90 MICROgram(s) HFA Inhaler 2 Puff(s) Inhalation every 4 hours  ALBUTerol    90 MICROgram(s) HFA Inhaler 2 Puff(s) Inhalation every 6 hours PRN  chlorhexidine 2% Cloths 1 Application(s) Topical <User Schedule>  dextrose 40% Gel 15 Gram(s) Oral once  dextrose 5%. 1000 milliLiter(s) IV Continuous <Continuous>  dextrose 5%. 1000 milliLiter(s) IV Continuous <Continuous>  dextrose 50% Injectable 25 Gram(s) IV Push once  dextrose 50% Injectable 12.5 Gram(s) IV Push once  dextrose 50% Injectable 25 Gram(s) IV Push once  enoxaparin Injectable 80 milliGRAM(s) SubCutaneous every 12 hours  ferrous    sulfate 325 milliGRAM(s) Oral daily  gabapentin 300 milliGRAM(s) Oral two times a day  glucagon  Injectable 1 milliGRAM(s) IntraMuscular once  insulin lispro (ADMELOG) corrective regimen sliding scale   SubCutaneous three times a day before meals  lisinopril 5 milliGRAM(s) Oral daily  mirtazapine 7.5 milliGRAM(s) Oral at bedtime  pantoprazole   Suspension 40 milliGRAM(s) Oral daily  polyethylene glycol 3350 17 Gram(s) Oral daily PRN  senna 1 Tablet(s) Oral at bedtime PRN    Vital Signs Last 24 Hrs  T(C): 37.7 (05 Apr 2021 05:29), Max: 37.8 (04 Apr 2021 17:08)  T(F): 99.9 (05 Apr 2021 05:29), Max: 100 (04 Apr 2021 17:08)  HR: 94 (05 Apr 2021 05:29) (61 - 99)  BP: 103/46 (05 Apr 2021 05:29) (102/65 - 119/77)  BP(mean): --  RR: 18 (05 Apr 2021 06:01) (18 - 20)  SpO2: 98% (05 Apr 2021 06:01) (95% - 98%)    PHYSICAL EXAM:  GENERAL: NAD,   HEENT : Conjuntivae  pale    NERVOUS SYSTEM:  Alert   CHEST/LUNG: Clear    HEART: Regular rate and rhythm; No murmurs, rubs, or gallops  ABDOMEN: Soft, Nontender, Nondistended; Bowel sounds present  EXTREMITIES:  no  edema no  tenderness  SKIN: No rashes   LABS:                        7.7    6.85  )-----------( 312      ( 04 Apr 2021 06:46 )             26.4     04-04    145  |  109<H>  |  23  ----------------------------<  160<H>  4.3   |  33<H>  |  0.73    Ca    8.3<L>      04 Apr 2021 06:46    TPro  5.3<L>  /  Alb  1.6<L>  /  TBili  0.7  /  DBili  x   /  AST  21  /  ALT  20  /  AlkPhos  143<H>  04-04        CAPILLARY BLOOD GLUCOSE      POCT Blood Glucose.: 277 mg/dL (05 Apr 2021 08:01)  POCT Blood Glucose.: 186 mg/dL (04 Apr 2021 21:26)  POCT Blood Glucose.: 190 mg/dL (04 Apr 2021 16:21)  POCT Blood Glucose.: 171 mg/dL (04 Apr 2021 11:19)      RADIOLOGY & ADDITIONAL TESTS:    Imaging reports  Personally Reviewed:  [ ] YES  [ ] NO    Consultant(s) Notes Reviewed:  [x ] YES  [ ] NO    Care Discussed with Consultants/Other Providers [x ] YES  [ ] NO  Problem/Plan - 1:  ·  Problem: Pulmonary emboli.  Plan: lovenox    Problem/Plan - 2:  ·  Problem: DVT (deep venous thrombosis).  Plan: lovenox    HYPERNATREMIAN  CHANGE  IVF  TO  D5W 1/2 NS  Problem/Plan - 4:  ·  Problem: Asthma.  Plan: symbicort.     Problem/Plan - 5:  ·  Problem: DM (diabetes mellitus).  Plan: insulin coverage.     Problem/Plan - 6:  Problem: Failure to thrive in adult. Plan: encourage  oral  intake  rx  underlying  causes.   REMERON       anemia  on  AC  for  dvt  and  PE    monitor  labs  transfuse  as needed     prognosis  poor  awaiting  family"s  decision on  DNR  will continue  with  present  Rx     ethic  committee  evaluation appreciated

## 2021-04-05 NOTE — PROGRESS NOTE ADULT - SUBJECTIVE AND OBJECTIVE BOX
INTERVAL HPI:  89yo F hx HTN, DM, Hypokalemia, non-verbal at baseline, asthma, sent to ED from [facility] for failure to thrive. Per charts, patient continues to have electrolyte disturbances with potassium repletion. Has been refusing food in the past few days,putting hands over her mouth when they attempt to feed her. Overall weight loss of 10 kg since admission. PT  WITH  HX   COVID  19  ALREADY  RX  IN  HOSPITAL  CONTINUES  +  IN  [facility] SINCE  3/8/21  EVALUATED  IN  ER  FOUND  TO  LAVE  EXTENSIVE DVT  BILATERAL PE  AFIB STARTED  ON  IV  HEPARIN  +  SOB  LEG  PAIN  POOR  APETITE    OVERNIGHT EVENTS:  Remains non communicative, with poor PO intake/dysphagia, so with NGT. Evaluated by [facility].     Vital Signs Last 24 Hrs  T(C): 36.7 (05 Apr 2021 12:34), Max: 37.8 (04 Apr 2021 17:08)  T(F): 98 (05 Apr 2021 12:34), Max: 100 (04 Apr 2021 17:08)  HR: 87 (05 Apr 2021 12:34) (61 - 99)  BP: 109/68 (05 Apr 2021 12:34) (103/46 - 119/77)  BP(mean): --  RR: 18 (05 Apr 2021 12:34) (18 - 20)  SpO2: 95% (05 Apr 2021 12:34) (95% - 98%)    PHYSICAL EXAM:  GEN:         Awake, non communicative.  HEENT:    NGT   RESP:        no distress  CVS:          Regular rate and rhythm.   ABD:         Soft, non-tender, non-distended;     MEDICATIONS  (STANDING):  ALBUTerol    90 MICROgram(s) HFA Inhaler 2 Puff(s) Inhalation every 4 hours  chlorhexidine 2% Cloths 1 Application(s) Topical <User Schedule>  dextrose 40% Gel 15 Gram(s) Oral once  dextrose 5%. 1000 milliLiter(s) (50 mL/Hr) IV Continuous <Continuous>  dextrose 5%. 1000 milliLiter(s) (100 mL/Hr) IV Continuous <Continuous>  dextrose 50% Injectable 25 Gram(s) IV Push once  dextrose 50% Injectable 12.5 Gram(s) IV Push once  dextrose 50% Injectable 25 Gram(s) IV Push once  enoxaparin Injectable 80 milliGRAM(s) SubCutaneous every 12 hours  ferrous    sulfate 325 milliGRAM(s) Oral daily  gabapentin 300 milliGRAM(s) Oral two times a day  glucagon  Injectable 1 milliGRAM(s) IntraMuscular once  insulin lispro (ADMELOG) corrective regimen sliding scale   SubCutaneous three times a day before meals  lisinopril 5 milliGRAM(s) Oral daily  mirtazapine 7.5 milliGRAM(s) Oral at bedtime  pantoprazole   Suspension 40 milliGRAM(s) Oral daily    MEDICATIONS  (PRN):  acetaminophen   Tablet .. 650 milliGRAM(s) Oral every 6 hours PRN Mild Pain (1 - 3)  acetaminophen   Tablet .. 650 milliGRAM(s) Oral every 6 hours PRN Temp greater or equal to 38C (100.4F)  ALBUTerol    90 MICROgram(s) HFA Inhaler 2 Puff(s) Inhalation every 6 hours PRN Wheezing  polyethylene glycol 3350 17 Gram(s) Oral daily PRN Constipation  senna 1 Tablet(s) Oral at bedtime PRN Constipation    LABS:                        7.7    6.85  )-----------( 312      ( 04 Apr 2021 06:46 )             26.4     04-04    145  |  109<H>  |  23  ----------------------------<  160<H>  4.3   |  33<H>  |  0.73    Ca    8.3<L>      04 Apr 2021 06:46    TPro  5.3<L>  /  Alb  1.6<L>  /  TBili  0.7  /  DBili  x   /  AST  21  /  ALT  20  /  AlkPhos  143<H>  04-04    ASSESSMENT AND PLAN:  ·	Bilateral PE.  ·	Left Iliac DVT.  ·	CVA history.  ·	S/P COVID  ·	HTN.  ·	DM  ·	Anemia.  ·	Dysphagia.    SPO2 stable on room air.  On full dose Lovenox for VTE.  Awaiting family decision for goals of care.

## 2021-04-06 LAB
ALBUMIN SERPL ELPH-MCNC: 1.2 G/DL — LOW (ref 3.3–5)
ALP SERPL-CCNC: 154 U/L — HIGH (ref 40–120)
ALT FLD-CCNC: 14 U/L — SIGNIFICANT CHANGE UP (ref 12–78)
ANION GAP SERPL CALC-SCNC: 1 MMOL/L — LOW (ref 5–17)
AST SERPL-CCNC: 14 U/L — LOW (ref 15–37)
BILIRUB SERPL-MCNC: 0.5 MG/DL — SIGNIFICANT CHANGE UP (ref 0.2–1.2)
BUN SERPL-MCNC: 15 MG/DL — SIGNIFICANT CHANGE UP (ref 7–23)
CALCIUM SERPL-MCNC: 7.1 MG/DL — LOW (ref 8.5–10.1)
CHLORIDE SERPL-SCNC: 111 MMOL/L — HIGH (ref 96–108)
CO2 SERPL-SCNC: 30 MMOL/L — SIGNIFICANT CHANGE UP (ref 22–31)
CREAT SERPL-MCNC: 0.42 MG/DL — LOW (ref 0.5–1.3)
GLUCOSE BLDC GLUCOMTR-MCNC: 171 MG/DL — HIGH (ref 70–99)
GLUCOSE BLDC GLUCOMTR-MCNC: 179 MG/DL — HIGH (ref 70–99)
GLUCOSE BLDC GLUCOMTR-MCNC: 195 MG/DL — HIGH (ref 70–99)
GLUCOSE SERPL-MCNC: 150 MG/DL — HIGH (ref 70–99)
HCT VFR BLD CALC: 24.2 % — LOW (ref 34.5–45)
HGB BLD-MCNC: 7.3 G/DL — LOW (ref 11.5–15.5)
MAGNESIUM SERPL-MCNC: 1.6 MG/DL — SIGNIFICANT CHANGE UP (ref 1.6–2.6)
MCHC RBC-ENTMCNC: 29.3 PG — SIGNIFICANT CHANGE UP (ref 27–34)
MCHC RBC-ENTMCNC: 30.2 GM/DL — LOW (ref 32–36)
MCV RBC AUTO: 97.2 FL — SIGNIFICANT CHANGE UP (ref 80–100)
NRBC # BLD: 1 /100 WBCS — HIGH (ref 0–0)
PHOSPHATE SERPL-MCNC: 2.3 MG/DL — LOW (ref 2.5–4.5)
PLATELET # BLD AUTO: 316 K/UL — SIGNIFICANT CHANGE UP (ref 150–400)
POTASSIUM SERPL-MCNC: 3.3 MMOL/L — LOW (ref 3.5–5.3)
POTASSIUM SERPL-SCNC: 3.3 MMOL/L — LOW (ref 3.5–5.3)
PROT SERPL-MCNC: 4.2 GM/DL — LOW (ref 6–8.3)
RBC # BLD: 2.49 M/UL — LOW (ref 3.8–5.2)
RBC # FLD: 21.2 % — HIGH (ref 10.3–14.5)
SODIUM SERPL-SCNC: 142 MMOL/L — SIGNIFICANT CHANGE UP (ref 135–145)
WBC # BLD: 5.53 K/UL — SIGNIFICANT CHANGE UP (ref 3.8–10.5)
WBC # FLD AUTO: 5.53 K/UL — SIGNIFICANT CHANGE UP (ref 3.8–10.5)

## 2021-04-06 PROCEDURE — 71045 X-RAY EXAM CHEST 1 VIEW: CPT | Mod: 26

## 2021-04-06 RX ORDER — MAGNESIUM SULFATE 500 MG/ML
1 VIAL (ML) INJECTION ONCE
Refills: 0 | Status: COMPLETED | OUTPATIENT
Start: 2021-04-06 | End: 2021-04-06

## 2021-04-06 RX ORDER — POTASSIUM CHLORIDE 20 MEQ
40 PACKET (EA) ORAL EVERY 4 HOURS
Refills: 0 | Status: COMPLETED | OUTPATIENT
Start: 2021-04-06 | End: 2021-04-07

## 2021-04-06 RX ADMIN — Medication 2: at 08:03

## 2021-04-06 RX ADMIN — Medication 325 MILLIGRAM(S): at 12:30

## 2021-04-06 RX ADMIN — ENOXAPARIN SODIUM 80 MILLIGRAM(S): 100 INJECTION SUBCUTANEOUS at 17:45

## 2021-04-06 RX ADMIN — PANTOPRAZOLE SODIUM 40 MILLIGRAM(S): 20 TABLET, DELAYED RELEASE ORAL at 12:31

## 2021-04-06 RX ADMIN — GABAPENTIN 300 MILLIGRAM(S): 400 CAPSULE ORAL at 05:21

## 2021-04-06 RX ADMIN — LISINOPRIL 5 MILLIGRAM(S): 2.5 TABLET ORAL at 05:19

## 2021-04-06 RX ADMIN — MIRTAZAPINE 7.5 MILLIGRAM(S): 45 TABLET, ORALLY DISINTEGRATING ORAL at 21:17

## 2021-04-06 RX ADMIN — Medication 2: at 16:41

## 2021-04-06 RX ADMIN — ENOXAPARIN SODIUM 80 MILLIGRAM(S): 100 INJECTION SUBCUTANEOUS at 05:19

## 2021-04-06 RX ADMIN — Medication 40 MILLIEQUIVALENT(S): at 21:17

## 2021-04-06 RX ADMIN — GABAPENTIN 300 MILLIGRAM(S): 400 CAPSULE ORAL at 17:45

## 2021-04-06 RX ADMIN — Medication 2: at 11:19

## 2021-04-06 RX ADMIN — CHLORHEXIDINE GLUCONATE 1 APPLICATION(S): 213 SOLUTION TOPICAL at 05:19

## 2021-04-06 NOTE — PROGRESS NOTE ADULT - SUBJECTIVE AND OBJECTIVE BOX
INTERVAL HPI/OVERNIGHT EVENTS:        REVIEW OF SYSTEMS:  CONSTITUTIONAL:    somnolent  opens  eyes        MEDICATION:  acetaminophen   Tablet .. 650 milliGRAM(s) Oral every 6 hours PRN  acetaminophen   Tablet .. 650 milliGRAM(s) Oral every 6 hours PRN  ALBUTerol    90 MICROgram(s) HFA Inhaler 2 Puff(s) Inhalation every 4 hours  ALBUTerol    90 MICROgram(s) HFA Inhaler 2 Puff(s) Inhalation every 6 hours PRN  chlorhexidine 2% Cloths 1 Application(s) Topical <User Schedule>  dextrose 40% Gel 15 Gram(s) Oral once  dextrose 5%. 1000 milliLiter(s) IV Continuous <Continuous>  dextrose 5%. 1000 milliLiter(s) IV Continuous <Continuous>  dextrose 50% Injectable 25 Gram(s) IV Push once  dextrose 50% Injectable 12.5 Gram(s) IV Push once  dextrose 50% Injectable 25 Gram(s) IV Push once  enoxaparin Injectable 80 milliGRAM(s) SubCutaneous every 12 hours  ferrous    sulfate 325 milliGRAM(s) Oral daily  gabapentin 300 milliGRAM(s) Oral two times a day  glucagon  Injectable 1 milliGRAM(s) IntraMuscular once  insulin lispro (ADMELOG) corrective regimen sliding scale   SubCutaneous three times a day before meals  lisinopril 5 milliGRAM(s) Oral daily  mirtazapine 7.5 milliGRAM(s) Oral at bedtime  pantoprazole   Suspension 40 milliGRAM(s) Oral daily  polyethylene glycol 3350 17 Gram(s) Oral daily PRN  senna 1 Tablet(s) Oral at bedtime PRN    Vital Signs Last 24 Hrs  T(C): 37.7 (06 Apr 2021 05:26), Max: 37.7 (06 Apr 2021 05:26)  T(F): 99.8 (06 Apr 2021 05:26), Max: 99.8 (06 Apr 2021 05:26)  HR: 98 (06 Apr 2021 05:26) (85 - 98)  BP: 108/67 (06 Apr 2021 05:26) (108/67 - 125/51)  BP(mean): --  RR: 18 (06 Apr 2021 05:26) (18 - 19)  SpO2: 94% (06 Apr 2021 05:26) (94% - 97%)    PHYSICAL EXAM:  GENERAL: NAD, well-groomed, well-developed  HEENT : Conjuntivae  clear sclerae anicteric  NECK: Supple, No JVD, Normal thyroid  NERVOUS SYSTEM:  opens  eyes  CHEST/LUNG: Clear    HEART: Regular rate and rhythm; No murmurs, rubs, or gallops  ABDOMEN: Soft, Nontender, Nondistended; Bowel sounds present  EXTREMITIES:  no  edema no  tenderness  SKIN: diffuse  edemas  LABS:              CAPILLARY BLOOD GLUCOSE      POCT Blood Glucose.: 195 mg/dL (06 Apr 2021 07:32)  POCT Blood Glucose.: 147 mg/dL (05 Apr 2021 21:44)  POCT Blood Glucose.: 133 mg/dL (05 Apr 2021 16:09)  POCT Blood Glucose.: 207 mg/dL (05 Apr 2021 11:28)      RADIOLOGY & ADDITIONAL TESTS:    Imaging reports  Personally Reviewed:  [ ] YES  [ ] NO    Consultant(s) Notes Reviewed:  [x ] YES  [ ] NO    Care Discussed with Consultants/Other Providers [x ] YES  [ ] NO  Problem/Plan - 1:  ·  Problem: Pulmonary emboli.  Plan: lovenox    Problem/Plan - 2:  ·  Problem: DVT (deep venous thrombosis).  Plan: lovenox    HYPERNATREMIAN  CHANGE  IVF  TO  D5W 1/2 NS  Problem/Plan - 4:  ·  Problem: Asthma.  Plan: symbicort.     Problem/Plan - 5:  ·  Problem: DM (diabetes mellitus).  Plan: insulin coverage.     Problem/Plan - 6:  Problem: Failure to thrive in adult. Plan: encourage  oral  intake  rx  underlying  causes.   REMERON       anemia  on  AC  for  dvt  and  PE    monitor  labs  transfuse  as needed     prognosis  poor  awaiting  family"s  decision on  DNR  will continue  with  present  Rx     ethic  committee  evaluation appreciated  hospice  evaluation

## 2021-04-06 NOTE — PROGRESS NOTE ADULT - SUBJECTIVE AND OBJECTIVE BOX
INTERVAL HPI:  89yo F hx HTN, DM, Hypokalemia, non-verbal at baseline, asthma, sent to ED from [facility] for failure to thrive. Per charts, patient continues to have electrolyte disturbances with potassium repletion. Has been refusing food in the past few days,putting hands over her mouth when they attempt to feed her. Overall weight loss of 10 kg since admission. PT  WITH  HX   COVID  19  ALREADY  RX  IN  HOSPITAL  CONTINUES  +  IN  [facility] SINCE  3/8/21  EVALUATED  IN  ER  FOUND  TO  LAVE  EXTENSIVE DVT  BILATERAL PE  AFIB STARTED  ON  IV  HEPARIN  +  SOB  LEG  PAIN  POOR  APETITE    OVERNIGHT EVENTS:  Awake comfortable but non communicative    Vital Signs Last 24 Hrs  T(C): 36.1 (06 Apr 2021 17:07), Max: 37.7 (06 Apr 2021 05:26)  T(F): 97 (06 Apr 2021 17:07), Max: 99.8 (06 Apr 2021 05:26)  HR: 87 (06 Apr 2021 17:07) (85 - 98)  BP: 108/62 (06 Apr 2021 17:07) (108/62 - 125/51)  BP(mean): --  RR: 15 (06 Apr 2021 17:07) (15 - 18)  SpO2: 97% (06 Apr 2021 17:07) (94% - 97%)    PHYSICAL EXAM:  GEN:         Awake, responsive and comfortable.  HEENT:    NGT.    RESP:       no distress  CVS:          Regular rate and rhythm.   ABD:         Soft, non-tender, non-distended;     MEDICATIONS  (STANDING):  ALBUTerol    90 MICROgram(s) HFA Inhaler 2 Puff(s) Inhalation every 4 hours  chlorhexidine 2% Cloths 1 Application(s) Topical <User Schedule>  dextrose 40% Gel 15 Gram(s) Oral once  dextrose 5%. 1000 milliLiter(s) (100 mL/Hr) IV Continuous <Continuous>  dextrose 5%. 1000 milliLiter(s) (50 mL/Hr) IV Continuous <Continuous>  dextrose 50% Injectable 25 Gram(s) IV Push once  dextrose 50% Injectable 12.5 Gram(s) IV Push once  dextrose 50% Injectable 25 Gram(s) IV Push once  enoxaparin Injectable 80 milliGRAM(s) SubCutaneous every 12 hours  ferrous    sulfate 325 milliGRAM(s) Oral daily  gabapentin 300 milliGRAM(s) Oral two times a day  glucagon  Injectable 1 milliGRAM(s) IntraMuscular once  insulin lispro (ADMELOG) corrective regimen sliding scale   SubCutaneous three times a day before meals  lisinopril 5 milliGRAM(s) Oral daily  mirtazapine 7.5 milliGRAM(s) Oral at bedtime  pantoprazole   Suspension 40 milliGRAM(s) Oral daily    MEDICATIONS  (PRN):  acetaminophen   Tablet .. 650 milliGRAM(s) Oral every 6 hours PRN Temp greater or equal to 38C (100.4F)  acetaminophen   Tablet .. 650 milliGRAM(s) Oral every 6 hours PRN Mild Pain (1 - 3)  ALBUTerol    90 MICROgram(s) HFA Inhaler 2 Puff(s) Inhalation every 6 hours PRN Wheezing  polyethylene glycol 3350 17 Gram(s) Oral daily PRN Constipation  senna 1 Tablet(s) Oral at bedtime PRN Constipation    ASSESSMENT AND PLAN:  ·	Bilateral PE.  ·	Left Iliac DVT.  ·	CVA history.  ·	S/P COVID  ·	HTN.  ·	DM  ·	Anemia.  ·	Dysphagia.    SPO2 97% on room air.  On full dose Lovenox for VTE.  Awaiting family decision for goals of care.

## 2021-04-06 NOTE — CHART NOTE - NSCHARTNOTEFT_GEN_A_CORE
Pt admitted from [facility] for FTT (multi-factorial: COVID PNA, advanced age, abdominal pain, active PE, AMS); found c b/l PE.  Pt is confused, disoriented, lethargic.  Per palliative care note pt not a candidate for PEG; poor prognosis but family wishes pt to remain full code; medical condition worsening; discussion c family continues.   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          Factors impacting intake: [X ] none [ ] nausea  [ ] vomiting [ ] diarrhea [ ] constipation  [ ]chewing problems [ ] swallowing issues  [ ] other    Diet Prescription: Diet, NPO with Tube Feed:   Tube Feeding Modality: Nasogastric  Glucerna 1.2 Renzo  Total Volume for 24 Hours (mL): 1320  Continuous  Starting Tube Feed Rate {mL per Hour}: 30  Increase Tube Feed Rate by (mL): 10     Every 8 hours  Until Goal Tube Feed Rate (mL per Hour): 55  Tube Feed Duration (in Hours): 24  Tube Feed Start Time: 16:00  Free Water Flush   Total Volume per Flush (mL): 150   Frequency: Every 6 Hours   Total Daily Volume of Flush (mL): 600    Start Time: 16:00 (03-23-21 @ 15:29)    Intake:   TF remains at goal rate (provides 1320 ml, 1584 kcal, 79 g protein, 1069 ml H2O; pt continues to tolerate well    Current Weight:   75.9 kg (4/6); admission wt 76.5 kg (3/14)  % Weight Change:  <1% wt loss x 3+ weeks; fluid fluctuations throughout admission may affect true wt loss    Unable to conduct complete nutrition-focused physical exam due to edematous status; noted c moderate muscle wasting at temples & clavicle    Pt c 3+ generalized edema & 4+ b/l arms    Pertinent Medications: MEDICATIONS  (STANDING):  ALBUTerol    90 MICROgram(s) HFA Inhaler 2 Puff(s) Inhalation every 4 hours  chlorhexidine 2% Cloths 1 Application(s) Topical <User Schedule>  dextrose 40% Gel 15 Gram(s) Oral once  dextrose 5%. 1000 milliLiter(s) (50 mL/Hr) IV Continuous <Continuous>  dextrose 5%. 1000 milliLiter(s) (100 mL/Hr) IV Continuous <Continuous>  dextrose 50% Injectable 25 Gram(s) IV Push once  dextrose 50% Injectable 12.5 Gram(s) IV Push once  dextrose 50% Injectable 25 Gram(s) IV Push once  enoxaparin Injectable 80 milliGRAM(s) SubCutaneous every 12 hours  ferrous    sulfate 325 milliGRAM(s) Oral daily  gabapentin 300 milliGRAM(s) Oral two times a day  glucagon  Injectable 1 milliGRAM(s) IntraMuscular once  insulin lispro (ADMELOG) corrective regimen sliding scale   SubCutaneous three times a day before meals  lisinopril 5 milliGRAM(s) Oral daily  mirtazapine 7.5 milliGRAM(s) Oral at bedtime  pantoprazole   Suspension 40 milliGRAM(s) Oral daily    MEDICATIONS  (PRN):  acetaminophen   Tablet .. 650 milliGRAM(s) Oral every 6 hours PRN Temp greater or equal to 38C (100.4F)  acetaminophen   Tablet .. 650 milliGRAM(s) Oral every 6 hours PRN Mild Pain (1 - 3)  ALBUTerol    90 MICROgram(s) HFA Inhaler 2 Puff(s) Inhalation every 6 hours PRN Wheezing  polyethylene glycol 3350 17 Gram(s) Oral daily PRN Constipation  senna 1 Tablet(s) Oral at bedtime PRN Constipation    Pertinent Labs: 04-04 Na145 mmol/L Glu 160 mg/dL<H> K+ 4.3 mmol/L Cr  0.73 mg/dL BUN 23 mg/dL 04-04 Alb 1.6 g/dL<L> 03-22 PAB 8 mg/dL<L>  03-15 POCT:  A1c 7.2%; 04-05 POCT:  277, 207, 133, 147    Skin:   no pressure ulcers     Estimated Needs:   [x ] no change since previous assessment (3/17)  [ ] recalculated:     Previous Nutrition Diagnosis:   [x ] Severe Malnutrition in context of acute illness  Etiology:  Inadequate energy/protein intake related to COVID illness, AMS, FTT dx  Signs & Symptoms:  3+ generalized edema & 4+ b/l arms; moderate muscle wasting     GOAL:  Pt to meet >75% energy/protein needs via tube feeding - continues to be met    Nutrition Diagnosis is [X ] ongoing  [ ] resolved [ ] not applicable     New Nutrition Diagnosis: [x ] not applicable    Interventions:     continue current TF as noted  Recommend  [ ] Change Diet To:  [ ] Nutrition Supplement  [ ] Nutrition Support  [ ] Other:     Monitoring and Evaluation:   [ ] PO intake [ x ] Tolerance to diet prescription [ x ] weights [ x ] labs[ x ] follow up per protocol  [ ] other:

## 2021-04-07 LAB
ALBUMIN SERPL ELPH-MCNC: 1.4 G/DL — LOW (ref 3.3–5)
ALP SERPL-CCNC: 177 U/L — HIGH (ref 40–120)
ALT FLD-CCNC: 17 U/L — SIGNIFICANT CHANGE UP (ref 12–78)
ANION GAP SERPL CALC-SCNC: 4 MMOL/L — LOW (ref 5–17)
ANION GAP SERPL CALC-SCNC: 5 MMOL/L — SIGNIFICANT CHANGE UP (ref 5–17)
AST SERPL-CCNC: 20 U/L — SIGNIFICANT CHANGE UP (ref 15–37)
BILIRUB SERPL-MCNC: 0.7 MG/DL — SIGNIFICANT CHANGE UP (ref 0.2–1.2)
BLD GP AB SCN SERPL QL: SIGNIFICANT CHANGE UP
BUN SERPL-MCNC: 16 MG/DL — SIGNIFICANT CHANGE UP (ref 7–23)
BUN SERPL-MCNC: 17 MG/DL — SIGNIFICANT CHANGE UP (ref 7–23)
CALCIUM SERPL-MCNC: 8.5 MG/DL — SIGNIFICANT CHANGE UP (ref 8.5–10.1)
CALCIUM SERPL-MCNC: 8.7 MG/DL — SIGNIFICANT CHANGE UP (ref 8.5–10.1)
CHLORIDE SERPL-SCNC: 103 MMOL/L — SIGNIFICANT CHANGE UP (ref 96–108)
CHLORIDE SERPL-SCNC: 104 MMOL/L — SIGNIFICANT CHANGE UP (ref 96–108)
CO2 SERPL-SCNC: 28 MMOL/L — SIGNIFICANT CHANGE UP (ref 22–31)
CO2 SERPL-SCNC: 31 MMOL/L — SIGNIFICANT CHANGE UP (ref 22–31)
CREAT SERPL-MCNC: 0.57 MG/DL — SIGNIFICANT CHANGE UP (ref 0.5–1.3)
CREAT SERPL-MCNC: 0.64 MG/DL — SIGNIFICANT CHANGE UP (ref 0.5–1.3)
GLUCOSE BLDC GLUCOMTR-MCNC: 160 MG/DL — HIGH (ref 70–99)
GLUCOSE BLDC GLUCOMTR-MCNC: 189 MG/DL — HIGH (ref 70–99)
GLUCOSE BLDC GLUCOMTR-MCNC: 208 MG/DL — HIGH (ref 70–99)
GLUCOSE BLDC GLUCOMTR-MCNC: 250 MG/DL — HIGH (ref 70–99)
GLUCOSE SERPL-MCNC: 153 MG/DL — HIGH (ref 70–99)
GLUCOSE SERPL-MCNC: 236 MG/DL — HIGH (ref 70–99)
HCT VFR BLD CALC: 24.6 % — LOW (ref 34.5–45)
HCT VFR BLD CALC: 25.9 % — LOW (ref 34.5–45)
HGB BLD-MCNC: 7.4 G/DL — LOW (ref 11.5–15.5)
HGB BLD-MCNC: 7.7 G/DL — LOW (ref 11.5–15.5)
LACTATE SERPL-SCNC: 1 MMOL/L — SIGNIFICANT CHANGE UP (ref 0.7–2)
MAGNESIUM SERPL-MCNC: 2.3 MG/DL — SIGNIFICANT CHANGE UP (ref 1.6–2.6)
MCHC RBC-ENTMCNC: 28.8 PG — SIGNIFICANT CHANGE UP (ref 27–34)
MCHC RBC-ENTMCNC: 28.9 PG — SIGNIFICANT CHANGE UP (ref 27–34)
MCHC RBC-ENTMCNC: 29.7 GM/DL — LOW (ref 32–36)
MCHC RBC-ENTMCNC: 30.1 GM/DL — LOW (ref 32–36)
MCV RBC AUTO: 95.7 FL — SIGNIFICANT CHANGE UP (ref 80–100)
MCV RBC AUTO: 97.4 FL — SIGNIFICANT CHANGE UP (ref 80–100)
NRBC # BLD: 1 /100 WBCS — HIGH (ref 0–0)
PHOSPHATE SERPL-MCNC: 2.8 MG/DL — SIGNIFICANT CHANGE UP (ref 2.5–4.5)
PLATELET # BLD AUTO: 356 K/UL — SIGNIFICANT CHANGE UP (ref 150–400)
PLATELET # BLD AUTO: 386 K/UL — SIGNIFICANT CHANGE UP (ref 150–400)
POTASSIUM SERPL-MCNC: 4.7 MMOL/L — SIGNIFICANT CHANGE UP (ref 3.5–5.3)
POTASSIUM SERPL-MCNC: 5.9 MMOL/L — HIGH (ref 3.5–5.3)
POTASSIUM SERPL-SCNC: 4.7 MMOL/L — SIGNIFICANT CHANGE UP (ref 3.5–5.3)
POTASSIUM SERPL-SCNC: 5.9 MMOL/L — HIGH (ref 3.5–5.3)
PROT SERPL-MCNC: 5.2 GM/DL — LOW (ref 6–8.3)
RBC # BLD: 2.57 M/UL — LOW (ref 3.8–5.2)
RBC # BLD: 2.66 M/UL — LOW (ref 3.8–5.2)
RBC # FLD: 20.9 % — HIGH (ref 10.3–14.5)
RBC # FLD: 21.2 % — HIGH (ref 10.3–14.5)
SODIUM SERPL-SCNC: 136 MMOL/L — SIGNIFICANT CHANGE UP (ref 135–145)
SODIUM SERPL-SCNC: 139 MMOL/L — SIGNIFICANT CHANGE UP (ref 135–145)
WBC # BLD: 6.04 K/UL — SIGNIFICANT CHANGE UP (ref 3.8–10.5)
WBC # BLD: 6.6 K/UL — SIGNIFICANT CHANGE UP (ref 3.8–10.5)
WBC # FLD AUTO: 6.04 K/UL — SIGNIFICANT CHANGE UP (ref 3.8–10.5)
WBC # FLD AUTO: 6.6 K/UL — SIGNIFICANT CHANGE UP (ref 3.8–10.5)

## 2021-04-07 RX ORDER — ACETAMINOPHEN 500 MG
650 TABLET ORAL EVERY 6 HOURS
Refills: 0 | Status: DISCONTINUED | OUTPATIENT
Start: 2021-04-07 | End: 2021-04-28

## 2021-04-07 RX ADMIN — ENOXAPARIN SODIUM 80 MILLIGRAM(S): 100 INJECTION SUBCUTANEOUS at 05:01

## 2021-04-07 RX ADMIN — LISINOPRIL 5 MILLIGRAM(S): 2.5 TABLET ORAL at 05:01

## 2021-04-07 RX ADMIN — Medication 40 MILLIEQUIVALENT(S): at 01:59

## 2021-04-07 RX ADMIN — Medication 4: at 08:06

## 2021-04-07 RX ADMIN — Medication 4: at 17:49

## 2021-04-07 RX ADMIN — Medication 325 MILLIGRAM(S): at 12:13

## 2021-04-07 RX ADMIN — PANTOPRAZOLE SODIUM 40 MILLIGRAM(S): 20 TABLET, DELAYED RELEASE ORAL at 12:13

## 2021-04-07 RX ADMIN — Medication 100 GRAM(S): at 01:58

## 2021-04-07 RX ADMIN — GABAPENTIN 300 MILLIGRAM(S): 400 CAPSULE ORAL at 05:01

## 2021-04-07 RX ADMIN — Medication 2: at 12:11

## 2021-04-07 RX ADMIN — GABAPENTIN 300 MILLIGRAM(S): 400 CAPSULE ORAL at 17:50

## 2021-04-07 RX ADMIN — CHLORHEXIDINE GLUCONATE 1 APPLICATION(S): 213 SOLUTION TOPICAL at 05:02

## 2021-04-07 RX ADMIN — ENOXAPARIN SODIUM 80 MILLIGRAM(S): 100 INJECTION SUBCUTANEOUS at 17:50

## 2021-04-07 RX ADMIN — Medication 650 MILLIGRAM(S): at 22:30

## 2021-04-07 RX ADMIN — Medication 650 MILLIGRAM(S): at 23:17

## 2021-04-07 NOTE — PROGRESS NOTE ADULT - SUBJECTIVE AND OBJECTIVE BOX
INTERVAL HPI/OVERNIGHT EVENTS:        REVIEW OF SYSTEMS:  CONSTITUTIONAL:  opens  eyes      MEDICATION:  acetaminophen   Tablet .. 650 milliGRAM(s) Oral every 6 hours PRN  acetaminophen   Tablet .. 650 milliGRAM(s) Oral every 6 hours PRN  ALBUTerol    90 MICROgram(s) HFA Inhaler 2 Puff(s) Inhalation every 4 hours  ALBUTerol    90 MICROgram(s) HFA Inhaler 2 Puff(s) Inhalation every 6 hours PRN  chlorhexidine 2% Cloths 1 Application(s) Topical <User Schedule>  dextrose 40% Gel 15 Gram(s) Oral once  dextrose 5%. 1000 milliLiter(s) IV Continuous <Continuous>  dextrose 5%. 1000 milliLiter(s) IV Continuous <Continuous>  dextrose 50% Injectable 25 Gram(s) IV Push once  dextrose 50% Injectable 12.5 Gram(s) IV Push once  dextrose 50% Injectable 25 Gram(s) IV Push once  enoxaparin Injectable 80 milliGRAM(s) SubCutaneous every 12 hours  ferrous    sulfate 325 milliGRAM(s) Oral daily  gabapentin 300 milliGRAM(s) Oral two times a day  glucagon  Injectable 1 milliGRAM(s) IntraMuscular once  insulin lispro (ADMELOG) corrective regimen sliding scale   SubCutaneous three times a day before meals  lisinopril 5 milliGRAM(s) Oral daily  mirtazapine 7.5 milliGRAM(s) Oral at bedtime  pantoprazole   Suspension 40 milliGRAM(s) Oral daily  polyethylene glycol 3350 17 Gram(s) Oral daily PRN  senna 1 Tablet(s) Oral at bedtime PRN    Vital Signs Last 24 Hrs  T(C): 37 (07 Apr 2021 05:13), Max: 37.2 (06 Apr 2021 11:44)  T(F): 98.6 (07 Apr 2021 05:13), Max: 99 (06 Apr 2021 11:44)  HR: 86 (07 Apr 2021 05:13) (86 - 95)  BP: 107/63 (07 Apr 2021 05:13) (107/63 - 111/63)  BP(mean): --  RR: 17 (07 Apr 2021 05:13) (15 - 17)  SpO2: 98% (07 Apr 2021 05:13) (95% - 98%)    PHYSICAL EXAM:  GENERAL: NAD,   HEENT : Conjuntivae  clear sclerae  anicteric  NECK: Supple, No JVD, Normal thyroid  NERVOUS SYSTEM:  Alert oriented   no  focal  deficits;   CHEST/LUNG: Clear    HEART: Regular rate and rhythm; No murmurs, rubs, or gallops  ABDOMEN: Soft, Nontender, Nondistended; Bowel sounds present  EXTREMITIES:  no  edema no  tenderness  SKIN: No rashes   LABS:                        7.3    5.53  )-----------( 316      ( 06 Apr 2021 18:17 )             24.2     04-06    142  |  111<H>  |  15  ----------------------------<  150<H>  3.3<L>   |  30  |  0.42<L>    Ca    7.1<L>      06 Apr 2021 18:17  Phos  2.3     04-06  Mg     1.6     04-06    TPro  4.2<L>  /  Alb  1.2<L>  /  TBili  0.5  /  DBili  x   /  AST  14<L>  /  ALT  14  /  AlkPhos  154<H>  04-06        CAPILLARY BLOOD GLUCOSE      POCT Blood Glucose.: 179 mg/dL (06 Apr 2021 16:06)  POCT Blood Glucose.: 171 mg/dL (06 Apr 2021 11:09)      RADIOLOGY & ADDITIONAL TESTS:    Imaging reports  Personally Reviewed:  [ ] YES  [ ] NO    Consultant(s) Notes Reviewed:  [ x] YES  [ ] NO    Care Discussed with Consultants/Other Providers [ x] YES  [ ] NO  Problem/Plan - 1:  ·  Problem: Pulmonary emboli.  Plan: lovenox    Problem/Plan - 2:  ·  Problem: DVT (deep venous thrombosis).  Plan: lovenox    HYPERNATREMIAN  CHANGE  IVF  TO  D5W 1/2 NS  Problem/Plan - 4:  ·  Problem: Asthma.  Plan: symbicort.     Problem/Plan - 5:  ·  Problem: DM (diabetes mellitus).  Plan: insulin coverage.     Problem/Plan - 6:  Problem: Failure to thrive in adult. Plan: encourage  oral  intake  rx  underlying  causes.   REMERON   severe  calorie  malnutrition cont  gt  feedings  will  add  additional  protein      anemia  on  AC  for  dvt  and  PE    monitor  labs  transfuse  as needed   awaiting  family"s  decision on  DNR  will continue  with  present  Rx     ethic  committee  evaluation appreciated  discussed  with  pt's  son  prognosis  poor continues  to  want  all  done

## 2021-04-07 NOTE — PROGRESS NOTE ADULT - SUBJECTIVE AND OBJECTIVE BOX
INTERVAL HPI:  89yo F hx HTN, DM, Hypokalemia, non-verbal at baseline, asthma, sent to ED from [facility] for failure to thrive. Per charts, patient continues to have electrolyte disturbances with potassium repletion. Has been refusing food in the past few days,putting hands over her mouth when they attempt to feed her. Overall weight loss of 10 kg since admission. PT  WITH  HX   COVID  19  ALREADY  RX  IN  HOSPITAL  CONTINUES  +  IN  [facility] SINCE  3/8/21  EVALUATED  IN  ER  FOUND  TO  LAVE  EXTENSIVE DVT  BILATERAL PE  AFIB STARTED  ON  IV  HEPARIN  +  SOB  LEG  PAIN  POOR  APETITE    OVERNIGHT EVENTS:  No significant change.    Vital Signs Last 24 Hrs  T(C): 36.8 (07 Apr 2021 11:39), Max: 37 (07 Apr 2021 05:13)  T(F): 98.3 (07 Apr 2021 11:39), Max: 98.6 (07 Apr 2021 05:13)  HR: 99 (07 Apr 2021 11:39) (86 - 99)  BP: 125/57 (07 Apr 2021 11:39) (107/63 - 125/57)  BP(mean): --  RR: 19 (07 Apr 2021 11:39) (15 - 19)  SpO2: 96% (07 Apr 2021 11:39) (96% - 98%)    PHYSICAL EXAM:  GEN:        comfortable.  HEENT:    Normal.    RESP:        no distress  CVS:         Regular rate and rhythm.   ABD:         Soft, non-tender, non-distended;     MEDICATIONS  (STANDING):  ALBUTerol    90 MICROgram(s) HFA Inhaler 2 Puff(s) Inhalation every 4 hours  chlorhexidine 2% Cloths 1 Application(s) Topical <User Schedule>  dextrose 40% Gel 15 Gram(s) Oral once  dextrose 5%. 1000 milliLiter(s) (50 mL/Hr) IV Continuous <Continuous>  dextrose 5%. 1000 milliLiter(s) (100 mL/Hr) IV Continuous <Continuous>  dextrose 50% Injectable 25 Gram(s) IV Push once  dextrose 50% Injectable 12.5 Gram(s) IV Push once  dextrose 50% Injectable 25 Gram(s) IV Push once  enoxaparin Injectable 80 milliGRAM(s) SubCutaneous every 12 hours  ferrous    sulfate 325 milliGRAM(s) Oral daily  gabapentin 300 milliGRAM(s) Oral two times a day  glucagon  Injectable 1 milliGRAM(s) IntraMuscular once  insulin lispro (ADMELOG) corrective regimen sliding scale   SubCutaneous three times a day before meals  lisinopril 5 milliGRAM(s) Oral daily  mirtazapine 7.5 milliGRAM(s) Oral at bedtime  pantoprazole   Suspension 40 milliGRAM(s) Oral daily    MEDICATIONS  (PRN):  acetaminophen   Tablet .. 650 milliGRAM(s) Oral every 6 hours PRN Temp greater or equal to 38C (100.4F)  acetaminophen   Tablet .. 650 milliGRAM(s) Oral every 6 hours PRN Mild Pain (1 - 3)  ALBUTerol    90 MICROgram(s) HFA Inhaler 2 Puff(s) Inhalation every 6 hours PRN Wheezing  polyethylene glycol 3350 17 Gram(s) Oral daily PRN Constipation  senna 1 Tablet(s) Oral at bedtime PRN Constipation    LABS:                        7.7    6.04  )-----------( 356      ( 07 Apr 2021 11:28 )             25.9     04-07    136  |  104  |  16  ----------------------------<  236<H>  5.9<H>   |  28  |  0.64    Ca    8.5      07 Apr 2021 08:09  Phos  2.3     04-06  Mg     1.6     04-06    TPro  5.2<L>  /  Alb  1.4<L>  /  TBili  0.7  /  DBili  x   /  AST  20  /  ALT  17  /  AlkPhos  177<H>  04-07    ASSESSMENT AND PLAN:  ·	Bilateral PE.  ·	Left Iliac DVT.  ·	CVA history.  ·	S/P COVID  ·	HTN.  ·	DM  ·	Anemia.  ·	Dysphagia.    Over all pulmonary status is stable.  On full dose Lovenox for VTE.  Awaiting family decision for goals of care.

## 2021-04-08 LAB
ANISOCYTOSIS BLD QL: SIGNIFICANT CHANGE UP
APPEARANCE UR: CLEAR — SIGNIFICANT CHANGE UP
BACTERIA # UR AUTO: ABNORMAL
BASOPHILS # BLD AUTO: 0 K/UL — SIGNIFICANT CHANGE UP (ref 0–0.2)
BASOPHILS NFR BLD AUTO: 0 % — SIGNIFICANT CHANGE UP (ref 0–2)
BILIRUB UR-MCNC: NEGATIVE — SIGNIFICANT CHANGE UP
COLOR SPEC: YELLOW — SIGNIFICANT CHANGE UP
DIFF PNL FLD: NEGATIVE — SIGNIFICANT CHANGE UP
EOSINOPHIL # BLD AUTO: 0.33 K/UL — SIGNIFICANT CHANGE UP (ref 0–0.5)
EOSINOPHIL NFR BLD AUTO: 5 % — SIGNIFICANT CHANGE UP (ref 0–6)
EPI CELLS # UR: SIGNIFICANT CHANGE UP
FLUAV AG NPH QL: SIGNIFICANT CHANGE UP
FLUBV AG NPH QL: SIGNIFICANT CHANGE UP
GLUCOSE BLDC GLUCOMTR-MCNC: 175 MG/DL — HIGH (ref 70–99)
GLUCOSE BLDC GLUCOMTR-MCNC: 192 MG/DL — HIGH (ref 70–99)
GLUCOSE BLDC GLUCOMTR-MCNC: 199 MG/DL — HIGH (ref 70–99)
GLUCOSE BLDC GLUCOMTR-MCNC: 209 MG/DL — HIGH (ref 70–99)
GLUCOSE UR QL: NEGATIVE MG/DL — SIGNIFICANT CHANGE UP
HOWELL-JOLLY BOD BLD QL SMEAR: PRESENT — SIGNIFICANT CHANGE UP
HYPOCHROMIA BLD QL: SLIGHT — SIGNIFICANT CHANGE UP
KETONES UR-MCNC: NEGATIVE — SIGNIFICANT CHANGE UP
LEUKOCYTE ESTERASE UR-ACNC: ABNORMAL
LG PLATELETS BLD QL AUTO: SLIGHT — SIGNIFICANT CHANGE UP
LYMPHOCYTES # BLD AUTO: 1.78 K/UL — SIGNIFICANT CHANGE UP (ref 1–3.3)
LYMPHOCYTES # BLD AUTO: 27 % — SIGNIFICANT CHANGE UP (ref 13–44)
MACROCYTES BLD QL: SLIGHT — SIGNIFICANT CHANGE UP
MANUAL SMEAR VERIFICATION: SIGNIFICANT CHANGE UP
METAMYELOCYTES # FLD: 1 % — HIGH (ref 0–0)
MICROCYTES BLD QL: SIGNIFICANT CHANGE UP
MONOCYTES # BLD AUTO: 0.46 K/UL — SIGNIFICANT CHANGE UP (ref 0–0.9)
MONOCYTES NFR BLD AUTO: 7 % — SIGNIFICANT CHANGE UP (ref 2–14)
NEUTROPHILS # BLD AUTO: 3.96 K/UL — SIGNIFICANT CHANGE UP (ref 1.8–7.4)
NEUTROPHILS NFR BLD AUTO: 56 % — SIGNIFICANT CHANGE UP (ref 43–77)
NEUTS BAND # BLD: 4 % — SIGNIFICANT CHANGE UP (ref 0–8)
NITRITE UR-MCNC: NEGATIVE — SIGNIFICANT CHANGE UP
NRBC # BLD: 2 /100 — HIGH (ref 0–0)
NRBC # BLD: SIGNIFICANT CHANGE UP /100 WBCS (ref 0–0)
OVALOCYTES BLD QL SMEAR: SLIGHT — SIGNIFICANT CHANGE UP
PH UR: 7 — SIGNIFICANT CHANGE UP (ref 5–8)
PLAT MORPH BLD: ABNORMAL
POIKILOCYTOSIS BLD QL AUTO: SIGNIFICANT CHANGE UP
POLYCHROMASIA BLD QL SMEAR: SIGNIFICANT CHANGE UP
PROCALCITONIN SERPL-MCNC: 0.31 NG/ML — HIGH (ref 0.02–0.1)
PROT UR-MCNC: 15 MG/DL
RAPID RVP RESULT: SIGNIFICANT CHANGE UP
RBC BLD AUTO: ABNORMAL
RBC CASTS # UR COMP ASSIST: SIGNIFICANT CHANGE UP /HPF (ref 0–4)
SARS-COV-2 RNA SPEC QL NAA+PROBE: SIGNIFICANT CHANGE UP
SARS-COV-2 RNA SPEC QL NAA+PROBE: SIGNIFICANT CHANGE UP
SP GR SPEC: 1 — LOW (ref 1.01–1.02)
UROBILINOGEN FLD QL: 1 MG/DL
WBC UR QL: SIGNIFICANT CHANGE UP

## 2021-04-08 PROCEDURE — 71045 X-RAY EXAM CHEST 1 VIEW: CPT | Mod: 26

## 2021-04-08 RX ADMIN — Medication 2: at 16:54

## 2021-04-08 RX ADMIN — Medication 650 MILLIGRAM(S): at 18:25

## 2021-04-08 RX ADMIN — PANTOPRAZOLE SODIUM 40 MILLIGRAM(S): 20 TABLET, DELAYED RELEASE ORAL at 11:56

## 2021-04-08 RX ADMIN — ENOXAPARIN SODIUM 80 MILLIGRAM(S): 100 INJECTION SUBCUTANEOUS at 06:04

## 2021-04-08 RX ADMIN — Medication 2: at 11:55

## 2021-04-08 RX ADMIN — ENOXAPARIN SODIUM 80 MILLIGRAM(S): 100 INJECTION SUBCUTANEOUS at 18:03

## 2021-04-08 RX ADMIN — MIRTAZAPINE 7.5 MILLIGRAM(S): 45 TABLET, ORALLY DISINTEGRATING ORAL at 21:53

## 2021-04-08 RX ADMIN — CHLORHEXIDINE GLUCONATE 1 APPLICATION(S): 213 SOLUTION TOPICAL at 06:05

## 2021-04-08 RX ADMIN — LISINOPRIL 5 MILLIGRAM(S): 2.5 TABLET ORAL at 06:06

## 2021-04-08 RX ADMIN — Medication 650 MILLIGRAM(S): at 22:20

## 2021-04-08 RX ADMIN — GABAPENTIN 300 MILLIGRAM(S): 400 CAPSULE ORAL at 06:06

## 2021-04-08 RX ADMIN — GABAPENTIN 300 MILLIGRAM(S): 400 CAPSULE ORAL at 18:04

## 2021-04-08 RX ADMIN — Medication 2: at 08:25

## 2021-04-08 RX ADMIN — Medication 325 MILLIGRAM(S): at 11:57

## 2021-04-08 NOTE — PROGRESS NOTE ADULT - SUBJECTIVE AND OBJECTIVE BOX
INTERVAL HPI:  89yo F hx HTN, DM, Hypokalemia, non-verbal at baseline, asthma, sent to ED from [facility] for failure to thrive. Per charts, patient continues to have electrolyte disturbances with potassium repletion. Has been refusing food in the past few days,putting hands over her mouth when they attempt to feed her. Overall weight loss of 10 kg since admission. PT  WITH  HX   COVID  19  ALREADY  RX  IN  HOSPITAL  CONTINUES  +  IN  [facility] SINCE  3/8/21  EVALUATED  IN  ER  FOUND  TO  LAVE  EXTENSIVE DVT  BILATERAL PE  AFIB STARTED  ON  IV  HEPARIN  +  SOB  LEG  PAIN  POOR  APETITE    OVERNIGHT EVENTS:  Awake, non communicative    Vital Signs Last 24 Hrs  T(C): 37.9 (2021 11:44), Max: 38.6 (2021 21:59)  T(F): 100.2 (2021 11:44), Max: 101.5 (2021 21:59)  HR: 95 (2021 11:44) (76 - 101)  BP: 128/63 (2021 11:44) (91/46 - 138/72)  BP(mean): --  RR: 19 (2021 12:22) (18 - 20)  SpO2: 96% (2021 12:22) (88% - 100%)    PHYSICAL EXAM:  GEN:         Awake, responsive and comfortable.  HEENT:    Normal.    RESP:      no distress  CVS:         Regular rate and rhythm.   ABD:         Soft, non-tender, non-distended;     MEDICATIONS  (STANDING):  ALBUTerol    90 MICROgram(s) HFA Inhaler 2 Puff(s) Inhalation every 4 hours  chlorhexidine 2% Cloths 1 Application(s) Topical <User Schedule>  dextrose 40% Gel 15 Gram(s) Oral once  dextrose 5%. 1000 milliLiter(s) (50 mL/Hr) IV Continuous <Continuous>  dextrose 5%. 1000 milliLiter(s) (100 mL/Hr) IV Continuous <Continuous>  dextrose 50% Injectable 25 Gram(s) IV Push once  dextrose 50% Injectable 12.5 Gram(s) IV Push once  dextrose 50% Injectable 25 Gram(s) IV Push once  enoxaparin Injectable 80 milliGRAM(s) SubCutaneous every 12 hours  ferrous    sulfate 325 milliGRAM(s) Oral daily  gabapentin 300 milliGRAM(s) Oral two times a day  glucagon  Injectable 1 milliGRAM(s) IntraMuscular once  insulin lispro (ADMELOG) corrective regimen sliding scale   SubCutaneous three times a day before meals  lisinopril 5 milliGRAM(s) Oral daily  mirtazapine 7.5 milliGRAM(s) Oral at bedtime  pantoprazole   Suspension 40 milliGRAM(s) Oral daily    MEDICATIONS  (PRN):  acetaminophen    Suspension .. 650 milliGRAM(s) Oral every 6 hours PRN Temp greater or equal to 38C (100.4F), Mild Pain (1 - 3)  ALBUTerol    90 MICROgram(s) HFA Inhaler 2 Puff(s) Inhalation every 6 hours PRN Wheezing  polyethylene glycol 3350 17 Gram(s) Oral daily PRN Constipation  senna 1 Tablet(s) Oral at bedtime PRN Constipation    LABS:                        7.4    6.60  )-----------( 386      ( 2021 23:09 )             24.6     04-07    139  |  103  |  17  ----------------------------<  153<H>  4.7   |  31  |  0.57    Ca    8.7      2021 23:09  Phos  2.8     04-07  Mg     2.3     04-07    TPro  5.2<L>  /  Alb  1.4<L>  /  TBili  0.7  /  DBili  x   /  AST  20  /  ALT  17  /  AlkPhos  177<H>  04-07    Urinalysis Basic - ( 2021 12:17 )    Color: Yellow / Appearance: Clear / S.005 / pH: x  Gluc: x / Ketone: Negative  / Bili: Negative / Urobili: 1 mg/dL   Blood: x / Protein: 15 mg/dL / Nitrite: Negative   Leuk Esterase: Trace / RBC: 0-2 /HPF / WBC 0-2   Sq Epi: x / Non Sq Epi: Occasional / Bacteria: Many    ASSESSMENT AND PLAN:  ·	Bilateral PE.  ·	Left Iliac DVT.  ·	CVA history.  ·	S/P COVID  ·	HTN.  ·	DM  ·	Anemia.  ·	Dysphagia.    On full dose Lovenox for VTE.  Over all pulmonary status is stable.  Awaiting family decision for goals of care.

## 2021-04-08 NOTE — PROGRESS NOTE ADULT - SUBJECTIVE AND OBJECTIVE BOX
INTERVAL HPI/OVERNIGHT EVENTS:        REVIEW OF SYSTEMS:  CONSTITUTIONAL:  somnolent  opens  eyes with  verbal  stimulus      MEDICATION:  acetaminophen    Suspension .. 650 milliGRAM(s) Oral every 6 hours PRN  ALBUTerol    90 MICROgram(s) HFA Inhaler 2 Puff(s) Inhalation every 4 hours  ALBUTerol    90 MICROgram(s) HFA Inhaler 2 Puff(s) Inhalation every 6 hours PRN  chlorhexidine 2% Cloths 1 Application(s) Topical <User Schedule>  dextrose 40% Gel 15 Gram(s) Oral once  dextrose 5%. 1000 milliLiter(s) IV Continuous <Continuous>  dextrose 5%. 1000 milliLiter(s) IV Continuous <Continuous>  dextrose 50% Injectable 25 Gram(s) IV Push once  dextrose 50% Injectable 12.5 Gram(s) IV Push once  dextrose 50% Injectable 25 Gram(s) IV Push once  enoxaparin Injectable 80 milliGRAM(s) SubCutaneous every 12 hours  ferrous    sulfate 325 milliGRAM(s) Oral daily  gabapentin 300 milliGRAM(s) Oral two times a day  glucagon  Injectable 1 milliGRAM(s) IntraMuscular once  insulin lispro (ADMELOG) corrective regimen sliding scale   SubCutaneous three times a day before meals  lisinopril 5 milliGRAM(s) Oral daily  mirtazapine 7.5 milliGRAM(s) Oral at bedtime  pantoprazole   Suspension 40 milliGRAM(s) Oral daily  polyethylene glycol 3350 17 Gram(s) Oral daily PRN  senna 1 Tablet(s) Oral at bedtime PRN    Vital Signs Last 24 Hrs  T(C): 37.1 (08 Apr 2021 05:11), Max: 38.6 (07 Apr 2021 21:59)  T(F): 98.7 (08 Apr 2021 05:11), Max: 101.5 (07 Apr 2021 21:59)  HR: 76 (08 Apr 2021 05:11) (76 - 101)  BP: 138/72 (08 Apr 2021 05:11) (91/46 - 138/72)  BP(mean): --  RR: 18 (08 Apr 2021 05:11) (18 - 20)  SpO2: 100% (08 Apr 2021 05:11) (88% - 100%)    PHYSICAL EXAM:  GENERAL: NAD,  HEENT : Conjuntivae  clear sclerae anicteric  NECK: Supple, No JVD,  NERVOUS SYSTEM:    CHEST/LUNG: Clear    HEART: Regular rate and rhythm; No murmurs, rubs, or gallops  ABDOMEN: Soft, Nontender, Nondistended; Bowel sounds present  EXTREMITIES:  diffuse  edemas  SKIN: No rashes   LABS:                        7.4    6.60  )-----------( 386      ( 07 Apr 2021 23:09 )             24.6     04-07    139  |  103  |  17  ----------------------------<  153<H>  4.7   |  31  |  0.57    Ca    8.7      07 Apr 2021 23:09  Phos  2.8     04-07  Mg     2.3     04-07    TPro  5.2<L>  /  Alb  1.4<L>  /  TBili  0.7  /  DBili  x   /  AST  20  /  ALT  17  /  AlkPhos  177<H>  04-07        CAPILLARY BLOOD GLUCOSE      POCT Blood Glucose.: 192 mg/dL (08 Apr 2021 07:50)  POCT Blood Glucose.: 160 mg/dL (07 Apr 2021 22:13)  POCT Blood Glucose.: 208 mg/dL (07 Apr 2021 17:02)  POCT Blood Glucose.: 189 mg/dL (07 Apr 2021 11:16)      RADIOLOGY & ADDITIONAL TESTS:    Imaging reports  Personally Reviewed:  [ ] YES  [ ] NO    Consultant(s) Notes Reviewed:  [x ] YES  [ ] NO    Care Discussed with Consultants/Other Providers [x ] YES  [ ] NO  Problem/Plan - 1:  ·  Problem: Pulmonary emboli.  Plan: lovenox    Problem/Plan - 2:  ·  Problem: DVT (deep venous thrombosis).  Plan: lovenox    HYPERNATREMIAN  CHANGE  IVF  TO  D5W 1/2 NS  Problem/Plan - 4:  ·  Problem: Asthma.  Plan: symbicort.     Problem/Plan - 5:  ·  Problem: DM (diabetes mellitus).  Plan: insulin coverage.     Problem/Plan - 6:  Problem: Failure to thrive in adult. Plan: encourage  oral  intake  rx  underlying  causes.   REMERON   severe  calorie  malnutrition cont  gt  feedings  will  add  additional  protein      anemia  on  AC  for  dvt  and  PE    monitor  labs  transfuse  as needed   awaiting  family"s  decision on  DNR  will continue  with  present  Rx     ethic  committee  evaluation appreciated  no  response  from  family  on DNR

## 2021-04-09 DIAGNOSIS — R13.10 DYSPHAGIA, UNSPECIFIED: ICD-10-CM

## 2021-04-09 DIAGNOSIS — I26.99 OTHER PULMONARY EMBOLISM WITHOUT ACUTE COR PULMONALE: ICD-10-CM

## 2021-04-09 LAB
ALBUMIN SERPL ELPH-MCNC: 1.7 G/DL — LOW (ref 3.3–5)
ALP SERPL-CCNC: 171 U/L — HIGH (ref 40–120)
ALT FLD-CCNC: 15 U/L — SIGNIFICANT CHANGE UP (ref 12–78)
ANION GAP SERPL CALC-SCNC: 5 MMOL/L — SIGNIFICANT CHANGE UP (ref 5–17)
AST SERPL-CCNC: 25 U/L — SIGNIFICANT CHANGE UP (ref 15–37)
BILIRUB SERPL-MCNC: 0.5 MG/DL — SIGNIFICANT CHANGE UP (ref 0.2–1.2)
BUN SERPL-MCNC: 17 MG/DL — SIGNIFICANT CHANGE UP (ref 7–23)
CALCIUM SERPL-MCNC: 8.7 MG/DL — SIGNIFICANT CHANGE UP (ref 8.5–10.1)
CHLORIDE SERPL-SCNC: 100 MMOL/L — SIGNIFICANT CHANGE UP (ref 96–108)
CO2 SERPL-SCNC: 32 MMOL/L — HIGH (ref 22–31)
CREAT SERPL-MCNC: 0.46 MG/DL — LOW (ref 0.5–1.3)
GLUCOSE BLDC GLUCOMTR-MCNC: 194 MG/DL — HIGH (ref 70–99)
GLUCOSE BLDC GLUCOMTR-MCNC: 229 MG/DL — HIGH (ref 70–99)
GLUCOSE BLDC GLUCOMTR-MCNC: 239 MG/DL — HIGH (ref 70–99)
GLUCOSE BLDC GLUCOMTR-MCNC: 249 MG/DL — HIGH (ref 70–99)
GLUCOSE SERPL-MCNC: 224 MG/DL — HIGH (ref 70–99)
HCT VFR BLD CALC: 26.9 % — LOW (ref 34.5–45)
HGB BLD-MCNC: 8.1 G/DL — LOW (ref 11.5–15.5)
MCHC RBC-ENTMCNC: 28.8 PG — SIGNIFICANT CHANGE UP (ref 27–34)
MCHC RBC-ENTMCNC: 30.1 GM/DL — LOW (ref 32–36)
MCV RBC AUTO: 95.7 FL — SIGNIFICANT CHANGE UP (ref 80–100)
NRBC # BLD: 0 /100 WBCS — SIGNIFICANT CHANGE UP (ref 0–0)
PLATELET # BLD AUTO: 528 K/UL — HIGH (ref 150–400)
POTASSIUM SERPL-MCNC: 4.3 MMOL/L — SIGNIFICANT CHANGE UP (ref 3.5–5.3)
POTASSIUM SERPL-SCNC: 4.3 MMOL/L — SIGNIFICANT CHANGE UP (ref 3.5–5.3)
PROT SERPL-MCNC: 5.9 GM/DL — LOW (ref 6–8.3)
RBC # BLD: 2.81 M/UL — LOW (ref 3.8–5.2)
RBC # FLD: 19.6 % — HIGH (ref 10.3–14.5)
SODIUM SERPL-SCNC: 137 MMOL/L — SIGNIFICANT CHANGE UP (ref 135–145)
WBC # BLD: 7.24 K/UL — SIGNIFICANT CHANGE UP (ref 3.8–10.5)
WBC # FLD AUTO: 7.24 K/UL — SIGNIFICANT CHANGE UP (ref 3.8–10.5)

## 2021-04-09 PROCEDURE — 71045 X-RAY EXAM CHEST 1 VIEW: CPT | Mod: 26

## 2021-04-09 PROCEDURE — 71045 X-RAY EXAM CHEST 1 VIEW: CPT | Mod: 26,77

## 2021-04-09 PROCEDURE — 36415 COLL VENOUS BLD VENIPUNCTURE: CPT

## 2021-04-09 RX ORDER — PIPERACILLIN AND TAZOBACTAM 4; .5 G/20ML; G/20ML
3.38 INJECTION, POWDER, LYOPHILIZED, FOR SOLUTION INTRAVENOUS EVERY 8 HOURS
Refills: 0 | Status: DISCONTINUED | OUTPATIENT
Start: 2021-04-09 | End: 2021-04-17

## 2021-04-09 RX ORDER — PIPERACILLIN AND TAZOBACTAM 4; .5 G/20ML; G/20ML
3.38 INJECTION, POWDER, LYOPHILIZED, FOR SOLUTION INTRAVENOUS ONCE
Refills: 0 | Status: COMPLETED | OUTPATIENT
Start: 2021-04-09 | End: 2021-04-09

## 2021-04-09 RX ADMIN — ENOXAPARIN SODIUM 80 MILLIGRAM(S): 100 INJECTION SUBCUTANEOUS at 05:37

## 2021-04-09 RX ADMIN — MIRTAZAPINE 7.5 MILLIGRAM(S): 45 TABLET, ORALLY DISINTEGRATING ORAL at 22:05

## 2021-04-09 RX ADMIN — Medication 650 MILLIGRAM(S): at 22:21

## 2021-04-09 RX ADMIN — GABAPENTIN 300 MILLIGRAM(S): 400 CAPSULE ORAL at 22:04

## 2021-04-09 RX ADMIN — Medication 4: at 08:32

## 2021-04-09 RX ADMIN — Medication 4: at 11:54

## 2021-04-09 RX ADMIN — Medication 4: at 16:42

## 2021-04-09 RX ADMIN — PIPERACILLIN AND TAZOBACTAM 200 GRAM(S): 4; .5 INJECTION, POWDER, LYOPHILIZED, FOR SOLUTION INTRAVENOUS at 23:19

## 2021-04-09 RX ADMIN — Medication 325 MILLIGRAM(S): at 11:54

## 2021-04-09 RX ADMIN — GABAPENTIN 300 MILLIGRAM(S): 400 CAPSULE ORAL at 05:37

## 2021-04-09 RX ADMIN — ENOXAPARIN SODIUM 80 MILLIGRAM(S): 100 INJECTION SUBCUTANEOUS at 17:44

## 2021-04-09 RX ADMIN — PANTOPRAZOLE SODIUM 40 MILLIGRAM(S): 20 TABLET, DELAYED RELEASE ORAL at 11:54

## 2021-04-09 RX ADMIN — CHLORHEXIDINE GLUCONATE 1 APPLICATION(S): 213 SOLUTION TOPICAL at 05:38

## 2021-04-09 NOTE — PROGRESS NOTE ADULT - ASSESSMENT
89yo F hx HTN, DM, hypokalemia, non-verbal at baseline, asthma, recent covid pna, sent to ED from [facility] for failure to thrive and found to have bilateral PEs, ?pulm htn    GI consulted for PEG placement.     High risk patient given recent pneumonia, advanced age, PE. Hospice consulted. NG tube in place.     Will discuss PEG placement with family. Tentatively scheduled for Tuesday

## 2021-04-09 NOTE — PROGRESS NOTE ADULT - SUBJECTIVE AND OBJECTIVE BOX
INTERVAL HPI/OVERNIGHT EVENTS:        REVIEW OF SYSTEMS:  CONSTITUTIONAL:    alert comfortable      MEDICATION:  acetaminophen    Suspension .. 650 milliGRAM(s) Oral every 6 hours PRN  ALBUTerol    90 MICROgram(s) HFA Inhaler 2 Puff(s) Inhalation every 4 hours  ALBUTerol    90 MICROgram(s) HFA Inhaler 2 Puff(s) Inhalation every 6 hours PRN  chlorhexidine 2% Cloths 1 Application(s) Topical <User Schedule>  dextrose 40% Gel 15 Gram(s) Oral once  dextrose 5%. 1000 milliLiter(s) IV Continuous <Continuous>  dextrose 5%. 1000 milliLiter(s) IV Continuous <Continuous>  dextrose 50% Injectable 25 Gram(s) IV Push once  dextrose 50% Injectable 12.5 Gram(s) IV Push once  dextrose 50% Injectable 25 Gram(s) IV Push once  enoxaparin Injectable 80 milliGRAM(s) SubCutaneous every 12 hours  ferrous    sulfate 325 milliGRAM(s) Oral daily  gabapentin 300 milliGRAM(s) Oral two times a day  glucagon  Injectable 1 milliGRAM(s) IntraMuscular once  insulin lispro (ADMELOG) corrective regimen sliding scale   SubCutaneous three times a day before meals  lisinopril 5 milliGRAM(s) Oral daily  mirtazapine 7.5 milliGRAM(s) Oral at bedtime  pantoprazole   Suspension 40 milliGRAM(s) Oral daily  polyethylene glycol 3350 17 Gram(s) Oral daily PRN  senna 1 Tablet(s) Oral at bedtime PRN    Vital Signs Last 24 Hrs  T(C): 37 (2021 05:08), Max: 38.7 (2021 22:20)  T(F): 98.6 (2021 05:08), Max: 101.6 (2021 22:20)  HR: 92 (2021 05:08) (92 - 112)  BP: 107/61 (2021 05:08) (107/61 - 128/63)  BP(mean): --  RR: 18 (2021 05:08) (18 - 19)  SpO2: 98% (2021 05:08) (93% - 98%)    PHYSICAL EXAM:  GENERAL: NAD, [id number]    HEENT : Conjuntivae  clear sclerae anicteric  NECK: Supple, No JVD, Normal thyroid  NERVOUS SYSTEM:  Alert oriented     CHEST/LUNG: Clear    HEART: Regular rate and rhythm; No murmurs, rubs, or gallops  ABDOMEN: Soft, Nontender, Nondistended; Bowel sounds present  EXTREMITIES:   diffuse  edemas  SKIN: No rashes   LABS:                        7.4    6.60  )-----------( 386      ( 2021 23:09 )             24.6     04-07    139  |  103  |  17  ----------------------------<  153<H>  4.7   |  31  |  0.57    Ca    8.7      2021 23:09  Phos  2.8     04-07  Mg     2.3     04-07        Urinalysis Basic - ( 2021 12:17 )    Color: Yellow / Appearance: Clear / S.005 / pH: x  Gluc: x / Ketone: Negative  / Bili: Negative / Urobili: 1 mg/dL   Blood: x / Protein: 15 mg/dL / Nitrite: Negative   Leuk Esterase: Trace / RBC: 0-2 /HPF / WBC 0-2   Sq Epi: x / Non Sq Epi: Occasional / Bacteria: Many      CAPILLARY BLOOD GLUCOSE      POCT Blood Glucose.: 249 mg/dL (2021 07:37)  POCT Blood Glucose.: 209 mg/dL (2021 21:17)  POCT Blood Glucose.: 199 mg/dL (2021 16:17)  POCT Blood Glucose.: 175 mg/dL (2021 11:13)      RADIOLOGY & ADDITIONAL TESTS:    Imaging reports  Personally Reviewed:  [ ] YES  [ ] NO    Consultant(s) Notes Reviewed:  [x ] YES  [ ] NO    Care Discussed with Consultants/Other Providers [x ] YES  [ ] NO  Problem/Plan - 1:  ·  Problem: Pulmonary emboli.  Plan: lovenox    Problem/Plan - 2:  ·  Problem: DVT (deep venous thrombosis).  Plan: lovenox    HYPERNATREMIAN  CHANGE  IVF  TO  D5W 1/2 NS  Problem/Plan - 4:  ·  Problem: Asthma.  Plan: symbicort.     Problem/Plan - 5:  ·  Problem: DM (diabetes mellitus).  Plan: insulin coverage.     Problem/Plan - 6:  Problem: Failure to thrive in adult. Plan: encourage  oral  intake  rx  underlying  causes.   REMERON   severe  calorie  malnutrition cont  gt  feedings  will  add  additional  protein  fever  check  urine c/s  cxr tylenol  for  comfort    anemia  on  AC  for  dvt  and  PE    monitor  labs  transfuse  as needed         palliative  care  evaluation  appreciated    ethic  committee  evaluation appreciated    hospice  evaluation  appreciated  discussed  again  with  Pt's  son wants to  continue  to "do everything "  will ask  for  GI  re evaluation for  PEG  placement

## 2021-04-09 NOTE — CHART NOTE - NSCHARTNOTEFT_GEN_A_CORE
Hospitalist Medicine DNP      Requested by RN to obtain blood specimen from patient. Pt difficult stick, unsuccessful attempts from phlebotomist. Obtained blood from left arm . Pt tolerated procedure well. No hematoma or bleeding noted. Specimen labeled at bedside and given to the RN.

## 2021-04-09 NOTE — PROGRESS NOTE ADULT - SUBJECTIVE AND OBJECTIVE BOX
INTERVAL HPI:  89yo F hx HTN, DM, Hypokalemia, non-verbal at baseline, asthma, sent to ED from [facility] for failure to thrive. Per charts, patient continues to have electrolyte disturbances with potassium repletion. Has been refusing food in the past few days,putting hands over her mouth when they attempt to feed her. Overall weight loss of 10 kg since admission. PT  WITH  HX   COVID  19  ALREADY  RX  IN  HOSPITAL  CONTINUES  +  IN  [facility] SINCE  3/8/21  EVALUATED  IN  ER  FOUND  TO  LAVE  EXTENSIVE DVT  BILATERAL PE  AFIB STARTED  ON  IV  HEPARIN  +  SOB  LEG  PAIN  POOR  APETITE    OVERNIGHT EVENTS:  NO significant change.    Vital Signs Last 24 Hrs  T(C): 36.7 (2021 16:58), Max: 38.7 (2021 22:20)  T(F): 98 (2021 16:58), Max: 101.6 (2021 22:20)  HR: 91 (2021 16:58) (79 - 112)  BP: 128/72 (2021 16:58) (107/61 - 132/75)  BP(mean): --  RR: 19 (2021 16:58) (18 - 19)  SpO2: 97% (2021 16:58) (93% - 98%)    PHYSICAL EXAM:  GEN:         comfortable.  HEENT:    Normal.    RESP:      no distress  CVS:          Regular rate and rhythm.   ABD:         Soft, non-tender, non-distended;     MEDICATIONS  (STANDING):  ALBUTerol    90 MICROgram(s) HFA Inhaler 2 Puff(s) Inhalation every 4 hours  chlorhexidine 2% Cloths 1 Application(s) Topical <User Schedule>  dextrose 40% Gel 15 Gram(s) Oral once  dextrose 5%. 1000 milliLiter(s) (50 mL/Hr) IV Continuous <Continuous>  dextrose 5%. 1000 milliLiter(s) (100 mL/Hr) IV Continuous <Continuous>  dextrose 50% Injectable 25 Gram(s) IV Push once  dextrose 50% Injectable 12.5 Gram(s) IV Push once  dextrose 50% Injectable 25 Gram(s) IV Push once  enoxaparin Injectable 80 milliGRAM(s) SubCutaneous every 12 hours  ferrous    sulfate 325 milliGRAM(s) Oral daily  gabapentin 300 milliGRAM(s) Oral two times a day  glucagon  Injectable 1 milliGRAM(s) IntraMuscular once  insulin lispro (ADMELOG) corrective regimen sliding scale   SubCutaneous three times a day before meals  lisinopril 5 milliGRAM(s) Oral daily  mirtazapine 7.5 milliGRAM(s) Oral at bedtime  pantoprazole   Suspension 40 milliGRAM(s) Oral daily    MEDICATIONS  (PRN):  acetaminophen    Suspension .. 650 milliGRAM(s) Oral every 6 hours PRN Temp greater or equal to 38C (100.4F), Mild Pain (1 - 3)  ALBUTerol    90 MICROgram(s) HFA Inhaler 2 Puff(s) Inhalation every 6 hours PRN Wheezing  polyethylene glycol 3350 17 Gram(s) Oral daily PRN Constipation  senna 1 Tablet(s) Oral at bedtime PRN Constipation    LABS:                        7.4    6.60  )-----------( 386      ( 2021 23:09 )             24.6     04-07    139  |  103  |  17  ----------------------------<  153<H>  4.7   |  31  |  0.57    Ca    8.7      2021 23:09  Phos  2.8     04-07  Mg     2.3     04-07    Urinalysis Basic - ( 2021 12:17 )    Color: Yellow / Appearance: Clear / S.005 / pH: x  Gluc: x / Ketone: Negative  / Bili: Negative / Urobili: 1 mg/dL   Blood: x / Protein: 15 mg/dL / Nitrite: Negative   Leuk Esterase: Trace / RBC: 0-2 /HPF / WBC 0-2   Sq Epi: x / Non Sq Epi: Occasional / Bacteria: Many    ASSESSMENT AND PLAN:  ·	Bilateral PE.  ·	Left Iliac DVT.  ·	CVA history.  ·	S/P COVID  ·	HTN.  ·	DM  ·	Anemia.  ·	Dysphagia.    Over all pulmonary status is stable.  On full dose Lovenox for VTE.  Awaiting family decision for goals of care.

## 2021-04-10 LAB
ALBUMIN SERPL ELPH-MCNC: 1.5 G/DL — LOW (ref 3.3–5)
ALP SERPL-CCNC: 146 U/L — HIGH (ref 40–120)
ALT FLD-CCNC: 14 U/L — SIGNIFICANT CHANGE UP (ref 12–78)
ANION GAP SERPL CALC-SCNC: 5 MMOL/L — SIGNIFICANT CHANGE UP (ref 5–17)
AST SERPL-CCNC: 18 U/L — SIGNIFICANT CHANGE UP (ref 15–37)
BILIRUB SERPL-MCNC: 0.6 MG/DL — SIGNIFICANT CHANGE UP (ref 0.2–1.2)
BUN SERPL-MCNC: 16 MG/DL — SIGNIFICANT CHANGE UP (ref 7–23)
CALCIUM SERPL-MCNC: 8.4 MG/DL — LOW (ref 8.5–10.1)
CHLORIDE SERPL-SCNC: 100 MMOL/L — SIGNIFICANT CHANGE UP (ref 96–108)
CO2 SERPL-SCNC: 31 MMOL/L — SIGNIFICANT CHANGE UP (ref 22–31)
CREAT SERPL-MCNC: 0.57 MG/DL — SIGNIFICANT CHANGE UP (ref 0.5–1.3)
GLUCOSE BLDC GLUCOMTR-MCNC: 219 MG/DL — HIGH (ref 70–99)
GLUCOSE BLDC GLUCOMTR-MCNC: 230 MG/DL — HIGH (ref 70–99)
GLUCOSE BLDC GLUCOMTR-MCNC: 274 MG/DL — HIGH (ref 70–99)
GLUCOSE BLDC GLUCOMTR-MCNC: 287 MG/DL — HIGH (ref 70–99)
GLUCOSE SERPL-MCNC: 262 MG/DL — HIGH (ref 70–99)
HCT VFR BLD CALC: 25.7 % — LOW (ref 34.5–45)
HGB BLD-MCNC: 7.4 G/DL — LOW (ref 11.5–15.5)
MCHC RBC-ENTMCNC: 27.8 PG — SIGNIFICANT CHANGE UP (ref 27–34)
MCHC RBC-ENTMCNC: 28.8 GM/DL — LOW (ref 32–36)
MCV RBC AUTO: 96.6 FL — SIGNIFICANT CHANGE UP (ref 80–100)
NRBC # BLD: 0 /100 WBCS — SIGNIFICANT CHANGE UP (ref 0–0)
PLATELET # BLD AUTO: 543 K/UL — HIGH (ref 150–400)
POTASSIUM SERPL-MCNC: 4.2 MMOL/L — SIGNIFICANT CHANGE UP (ref 3.5–5.3)
POTASSIUM SERPL-SCNC: 4.2 MMOL/L — SIGNIFICANT CHANGE UP (ref 3.5–5.3)
PROT SERPL-MCNC: 5.4 GM/DL — LOW (ref 6–8.3)
RBC # BLD: 2.66 M/UL — LOW (ref 3.8–5.2)
RBC # FLD: 19.2 % — HIGH (ref 10.3–14.5)
SODIUM SERPL-SCNC: 136 MMOL/L — SIGNIFICANT CHANGE UP (ref 135–145)
WBC # BLD: 11.41 K/UL — HIGH (ref 3.8–10.5)
WBC # FLD AUTO: 11.41 K/UL — HIGH (ref 3.8–10.5)

## 2021-04-10 RX ADMIN — Medication 4: at 16:39

## 2021-04-10 RX ADMIN — MIRTAZAPINE 7.5 MILLIGRAM(S): 45 TABLET, ORALLY DISINTEGRATING ORAL at 21:51

## 2021-04-10 RX ADMIN — PIPERACILLIN AND TAZOBACTAM 25 GRAM(S): 4; .5 INJECTION, POWDER, LYOPHILIZED, FOR SOLUTION INTRAVENOUS at 05:35

## 2021-04-10 RX ADMIN — Medication 6: at 08:11

## 2021-04-10 RX ADMIN — ENOXAPARIN SODIUM 80 MILLIGRAM(S): 100 INJECTION SUBCUTANEOUS at 17:55

## 2021-04-10 RX ADMIN — Medication 325 MILLIGRAM(S): at 11:31

## 2021-04-10 RX ADMIN — PIPERACILLIN AND TAZOBACTAM 25 GRAM(S): 4; .5 INJECTION, POWDER, LYOPHILIZED, FOR SOLUTION INTRAVENOUS at 13:40

## 2021-04-10 RX ADMIN — PIPERACILLIN AND TAZOBACTAM 25 GRAM(S): 4; .5 INJECTION, POWDER, LYOPHILIZED, FOR SOLUTION INTRAVENOUS at 21:51

## 2021-04-10 RX ADMIN — GABAPENTIN 300 MILLIGRAM(S): 400 CAPSULE ORAL at 05:35

## 2021-04-10 RX ADMIN — PANTOPRAZOLE SODIUM 40 MILLIGRAM(S): 20 TABLET, DELAYED RELEASE ORAL at 11:31

## 2021-04-10 RX ADMIN — CHLORHEXIDINE GLUCONATE 1 APPLICATION(S): 213 SOLUTION TOPICAL at 05:35

## 2021-04-10 RX ADMIN — LISINOPRIL 5 MILLIGRAM(S): 2.5 TABLET ORAL at 05:35

## 2021-04-10 RX ADMIN — ENOXAPARIN SODIUM 80 MILLIGRAM(S): 100 INJECTION SUBCUTANEOUS at 05:35

## 2021-04-10 RX ADMIN — Medication 650 MILLIGRAM(S): at 00:33

## 2021-04-10 RX ADMIN — Medication 6: at 11:30

## 2021-04-10 RX ADMIN — GABAPENTIN 300 MILLIGRAM(S): 400 CAPSULE ORAL at 17:55

## 2021-04-10 NOTE — PROGRESS NOTE ADULT - SUBJECTIVE AND OBJECTIVE BOX
INTERVAL HPI/OVERNIGHT EVENTS:        REVIEW OF SYSTEMS:  CONSTITUTIONAL:   somnolent  arouseable      MEDICATION:  acetaminophen    Suspension .. 650 milliGRAM(s) Oral every 6 hours PRN  ALBUTerol    90 MICROgram(s) HFA Inhaler 2 Puff(s) Inhalation every 6 hours PRN  ALBUTerol    90 MICROgram(s) HFA Inhaler 2 Puff(s) Inhalation every 4 hours  chlorhexidine 2% Cloths 1 Application(s) Topical <User Schedule>  dextrose 40% Gel 15 Gram(s) Oral once  dextrose 5%. 1000 milliLiter(s) IV Continuous <Continuous>  dextrose 5%. 1000 milliLiter(s) IV Continuous <Continuous>  dextrose 50% Injectable 25 Gram(s) IV Push once  dextrose 50% Injectable 12.5 Gram(s) IV Push once  dextrose 50% Injectable 25 Gram(s) IV Push once  enoxaparin Injectable 80 milliGRAM(s) SubCutaneous every 12 hours  ferrous    sulfate 325 milliGRAM(s) Oral daily  gabapentin 300 milliGRAM(s) Oral two times a day  glucagon  Injectable 1 milliGRAM(s) IntraMuscular once  insulin lispro (ADMELOG) corrective regimen sliding scale   SubCutaneous three times a day before meals  lisinopril 5 milliGRAM(s) Oral daily  mirtazapine 7.5 milliGRAM(s) Oral at bedtime  pantoprazole   Suspension 40 milliGRAM(s) Oral daily  piperacillin/tazobactam IVPB.. 3.375 Gram(s) IV Intermittent every 8 hours  polyethylene glycol 3350 17 Gram(s) Oral daily PRN  senna 1 Tablet(s) Oral at bedtime PRN    Vital Signs Last 24 Hrs  T(C): 37.7 (10 Apr 2021 05:19), Max: 38.8 (2021 22:20)  T(F): 99.8 (10 Apr 2021 05:19), Max: 101.9 (2021 22:20)  HR: 91 (10 Apr 2021 05:19) (79 - 119)  BP: 112/70 (10 Apr 2021 05:19) (112/70 - 144/74)  BP(mean): --  RR: 18 (10 Apr 2021 05:19) (18 - 19)  SpO2: 99% (10 Apr 2021 05:19) (92% - 100%)    PHYSICAL EXAM:  GENERAL: NAD,   HEENT : Conjuntivae  clear sclerae anicteric    NERVOUS SYSTEM: opens  eyes with  verbal  stimulus  CHEST/LUNG: Clear    HEART: Regular rate and rhythm; No murmurs, rubs, or gallops  ABDOMEN: Soft, Nontender, Nondistended; Bowel sounds present  EXTREMITIES:    diffuse  edemas  SKIN: No rashes   LABS:                        7.4    11.41 )-----------( 543      ( 10 Apr 2021 06:47 )             25.7     04-10    136  |  100  |  16  ----------------------------<  262<H>  4.2   |  31  |  0.57    Ca    8.4<L>      10 Apr 2021 06:47    TPro  5.4<L>  /  Alb  1.5<L>  /  TBili  0.6  /  DBili  x   /  AST  18  /  ALT  14  /  AlkPhos  146<H>  04-10      Urinalysis Basic - ( 2021 12:17 )    Color: Yellow / Appearance: Clear / S.005 / pH: x  Gluc: x / Ketone: Negative  / Bili: Negative / Urobili: 1 mg/dL   Blood: x / Protein: 15 mg/dL / Nitrite: Negative   Leuk Esterase: Trace / RBC: 0-2 /HPF / WBC 0-2   Sq Epi: x / Non Sq Epi: Occasional / Bacteria: Many      CAPILLARY BLOOD GLUCOSE      POCT Blood Glucose.: 274 mg/dL (10 Apr 2021 07:55)  POCT Blood Glucose.: 194 mg/dL (2021 21:33)  POCT Blood Glucose.: 229 mg/dL (2021 16:19)  POCT Blood Glucose.: 239 mg/dL (2021 11:10)      RADIOLOGY & ADDITIONAL TESTS:    Imaging reports  Personally Reviewed:  [ ] YES  [ ] NO    Consultant(s) Notes Reviewed:  [ x] YES  [ ] NO    Care Discussed with Consultants/Other Providers [x ] YES  [ ] NO  Problem/Plan - 1:  ·  Problem: Pulmonary emboli.  Plan: lovenox    Problem/Plan - 2:  ·  Problem: DVT (deep venous thrombosis).  Plan: lovenox    HYPERNATREMIAN  CHANGE  IVF  TO  D5W 1/2 NS  Problem/Plan - 4:  ·  Problem: Asthma.  Plan: symbicort.     Problem/Plan - 5:  ·  Problem: DM (diabetes mellitus).  Plan: insulin coverage.     Problem/Plan - 6:  Problem: Failure to thrive in adult. Plan: encourage  oral  intake  rx  underlying  causes.   REMERON   severe  calorie  malnutrition cont  gt  feedings  will  add  additional  protein  fever  check  urine c/s  cxr tylenol  for  comfort    anemia  on  AC  for  dvt  and  PE    monitor  labs  transfuse  as needed     PNEUMONIA  ON ZOSYN     palliative  care  evaluation  appreciated    ethic  committee  evaluation appreciated    hospice  evaluation  appreciated  discussed  again  with  Pt's  son wants to  continue  to "do everything "   for  PEG  placement

## 2021-04-11 LAB
-  AMIKACIN: SIGNIFICANT CHANGE UP
-  AMOXICILLIN/CLAVULANIC ACID: SIGNIFICANT CHANGE UP
-  AMPICILLIN/SULBACTAM: SIGNIFICANT CHANGE UP
-  AMPICILLIN: SIGNIFICANT CHANGE UP
-  AZTREONAM: SIGNIFICANT CHANGE UP
-  CEFAZOLIN: SIGNIFICANT CHANGE UP
-  CEFEPIME: SIGNIFICANT CHANGE UP
-  CEFOXITIN: SIGNIFICANT CHANGE UP
-  CEFTRIAXONE: SIGNIFICANT CHANGE UP
-  CIPROFLOXACIN: SIGNIFICANT CHANGE UP
-  ERTAPENEM: SIGNIFICANT CHANGE UP
-  GENTAMICIN: SIGNIFICANT CHANGE UP
-  LEVOFLOXACIN: SIGNIFICANT CHANGE UP
-  MEROPENEM: SIGNIFICANT CHANGE UP
-  NITROFURANTOIN: SIGNIFICANT CHANGE UP
-  PIPERACILLIN/TAZOBACTAM: SIGNIFICANT CHANGE UP
-  TIGECYCLINE: SIGNIFICANT CHANGE UP
-  TOBRAMYCIN: SIGNIFICANT CHANGE UP
-  TRIMETHOPRIM/SULFAMETHOXAZOLE: SIGNIFICANT CHANGE UP
CULTURE RESULTS: SIGNIFICANT CHANGE UP
GLUCOSE BLDC GLUCOMTR-MCNC: 154 MG/DL — HIGH (ref 70–99)
GLUCOSE BLDC GLUCOMTR-MCNC: 172 MG/DL — HIGH (ref 70–99)
GLUCOSE BLDC GLUCOMTR-MCNC: 239 MG/DL — HIGH (ref 70–99)
GLUCOSE BLDC GLUCOMTR-MCNC: 261 MG/DL — HIGH (ref 70–99)
METHOD TYPE: SIGNIFICANT CHANGE UP
ORGANISM # SPEC MICROSCOPIC CNT: SIGNIFICANT CHANGE UP
ORGANISM # SPEC MICROSCOPIC CNT: SIGNIFICANT CHANGE UP
SPECIMEN SOURCE: SIGNIFICANT CHANGE UP

## 2021-04-11 RX ADMIN — LISINOPRIL 5 MILLIGRAM(S): 2.5 TABLET ORAL at 05:36

## 2021-04-11 RX ADMIN — PIPERACILLIN AND TAZOBACTAM 25 GRAM(S): 4; .5 INJECTION, POWDER, LYOPHILIZED, FOR SOLUTION INTRAVENOUS at 21:29

## 2021-04-11 RX ADMIN — PIPERACILLIN AND TAZOBACTAM 25 GRAM(S): 4; .5 INJECTION, POWDER, LYOPHILIZED, FOR SOLUTION INTRAVENOUS at 15:22

## 2021-04-11 RX ADMIN — Medication 2: at 11:58

## 2021-04-11 RX ADMIN — ENOXAPARIN SODIUM 80 MILLIGRAM(S): 100 INJECTION SUBCUTANEOUS at 17:16

## 2021-04-11 RX ADMIN — Medication 325 MILLIGRAM(S): at 11:58

## 2021-04-11 RX ADMIN — PIPERACILLIN AND TAZOBACTAM 25 GRAM(S): 4; .5 INJECTION, POWDER, LYOPHILIZED, FOR SOLUTION INTRAVENOUS at 05:36

## 2021-04-11 RX ADMIN — PANTOPRAZOLE SODIUM 40 MILLIGRAM(S): 20 TABLET, DELAYED RELEASE ORAL at 11:58

## 2021-04-11 RX ADMIN — ENOXAPARIN SODIUM 80 MILLIGRAM(S): 100 INJECTION SUBCUTANEOUS at 05:37

## 2021-04-11 RX ADMIN — Medication 4: at 16:38

## 2021-04-11 RX ADMIN — GABAPENTIN 300 MILLIGRAM(S): 400 CAPSULE ORAL at 17:16

## 2021-04-11 RX ADMIN — MIRTAZAPINE 7.5 MILLIGRAM(S): 45 TABLET, ORALLY DISINTEGRATING ORAL at 21:29

## 2021-04-11 RX ADMIN — GABAPENTIN 300 MILLIGRAM(S): 400 CAPSULE ORAL at 05:36

## 2021-04-11 RX ADMIN — CHLORHEXIDINE GLUCONATE 1 APPLICATION(S): 213 SOLUTION TOPICAL at 05:38

## 2021-04-11 RX ADMIN — Medication 6: at 07:59

## 2021-04-11 NOTE — CHART NOTE - NSCHARTNOTEFT_GEN_A_CORE
Assessment: Pt's PMHx includes HTN, DM, CVA, asthma, recent COVID pna, non-verbal at baseline. Pt admitted from [facility] for FTT; found to have bilateral PEs. Per Gastroenterology, high risk for PEG given recent pna, advanced age, PE on anticoagulation; however, PEG placement to be discussed with family, tentatively planned for Tuesday. Pt remains on NGT feedings.    Factors impacting intake: [ ] none [ ] nausea  [ ] vomiting [ ] diarrhea [ ] constipation  [ ]chewing problems [ ] swallowing issues  [x] other: NGT feedings    Diet Prescription: Diet, NPO with Tube Feed:   Tube Feeding Modality: Nasogastric  Glucerna 1.2 Renzo  Total Volume for 24 Hours (mL): 1320  Continuous  Starting Tube Feed Rate {mL per Hour}: 30  Increase Tube Feed Rate by (mL): 10     Every 8 hours  Until Goal Tube Feed Rate (mL per Hour): 55  Tube Feed Duration (in Hours): 24  Tube Feed Start Time: 16:00  Free Water Flush   Total Volume per Flush (mL): 150   Frequency: Every 6 Hours   Total Daily Volume of Flush (mL): 600    Start Time: 16:00  Prosource Gelatein 20 Sugar Free     Qty per Day:  60 ml (04-07-21 @ 07:58)    Intake:     Current Weight:   % Weight Change    Pertinent Medications: MEDICATIONS  (STANDING):  ALBUTerol    90 MICROgram(s) HFA Inhaler 2 Puff(s) Inhalation every 4 hours  chlorhexidine 2% Cloths 1 Application(s) Topical <User Schedule>  dextrose 40% Gel 15 Gram(s) Oral once  dextrose 5%. 1000 milliLiter(s) (100 mL/Hr) IV Continuous <Continuous>  dextrose 5%. 1000 milliLiter(s) (50 mL/Hr) IV Continuous <Continuous>  dextrose 50% Injectable 25 Gram(s) IV Push once  dextrose 50% Injectable 12.5 Gram(s) IV Push once  dextrose 50% Injectable 25 Gram(s) IV Push once  enoxaparin Injectable 80 milliGRAM(s) SubCutaneous every 12 hours  ferrous    sulfate 325 milliGRAM(s) Oral daily  gabapentin 300 milliGRAM(s) Oral two times a day  glucagon  Injectable 1 milliGRAM(s) IntraMuscular once  insulin lispro (ADMELOG) corrective regimen sliding scale   SubCutaneous three times a day before meals  lisinopril 5 milliGRAM(s) Oral daily  mirtazapine 7.5 milliGRAM(s) Oral at bedtime  pantoprazole   Suspension 40 milliGRAM(s) Oral daily  piperacillin/tazobactam IVPB.. 3.375 Gram(s) IV Intermittent every 8 hours    MEDICATIONS  (PRN):  acetaminophen    Suspension .. 650 milliGRAM(s) Oral every 6 hours PRN Temp greater or equal to 38C (100.4F), Mild Pain (1 - 3)  ALBUTerol    90 MICROgram(s) HFA Inhaler 2 Puff(s) Inhalation every 6 hours PRN Wheezing  polyethylene glycol 3350 17 Gram(s) Oral daily PRN Constipation  senna 1 Tablet(s) Oral at bedtime PRN Constipation    Pertinent Labs: 04-10 Na136 mmol/L Glu 262 mg/dL<H> K+ 4.2 mmol/L Cr  0.57 mg/dL BUN 16 mg/dL 04-07 Phos 2.8 mg/dL 04-10 Alb 1.5 g/dL<L> 03-22 PAB 8 mg/dL<L>     CAPILLARY BLOOD GLUCOSE      POCT Blood Glucose.: 261 mg/dL (11 Apr 2021 07:39)  POCT Blood Glucose.: 219 mg/dL (10 Apr 2021 21:17)  POCT Blood Glucose.: 230 mg/dL (10 Apr 2021 16:24)  POCT Blood Glucose.: 287 mg/dL (10 Apr 2021 11:29)    Skin:     Estimated Needs:   [ ] no change since previous assessment  [ ] recalculated:     Previous Nutrition Diagnosis:   [ ] Inadequate Energy Intake [ ]Inadequate Oral Intake [ ] Excessive Energy Intake   [ ] Underweight [ ] Increased Nutrient Needs [ ] Overweight/Obesity   [ ] Altered GI Function [ ] Unintended Weight Loss [ ] Food & Nutrition Related Knowledge Deficit [ ] Malnutrition     Nutrition Diagnosis is [ ] ongoing  [ ] resolved [ ] not applicable     New Nutrition Diagnosis: [ ] not applicable       Interventions:   Recommend  [ ] Change Diet To:  [ ] Nutrition Supplement  [ ] Nutrition Support  [ ] Other:     Monitoring and Evaluation:   [ ] PO intake [ x ] Tolerance to diet prescription [ x ] weights [ x ] labs[ x ] follow up per protocol  [ ] other: Assessment: Pt's PMHx includes HTN, DM, CVA, asthma, recent COVID pna, non-verbal at baseline. Pt admitted from [facility] for FTT; found to have bilateral PEs. Per Gastroenterology, high risk for PEG given recent pna, advanced age, PE on anticoagulation; however, PEG placement to be discussed with family, tentatively planned for Tuesday. Pt remains on NGT feedings.    Factors impacting intake: [ ] none [ ] nausea  [ ] vomiting [ ] diarrhea [ ] constipation  [ ]chewing problems [ ] swallowing issues  [x] other: NGT feedings    Diet Prescription: Diet, NPO with Tube Feed:   Tube Feeding Modality: Nasogastric  Glucerna 1.2 Renzo  Total Volume for 24 Hours (mL): 1320  Continuous  Starting Tube Feed Rate {mL per Hour}: 30  Increase Tube Feed Rate by (mL): 10     Every 8 hours  Until Goal Tube Feed Rate (mL per Hour): 55  Tube Feed Duration (in Hours): 24  Tube Feed Start Time: 16:00  Free Water Flush   Total Volume per Flush (mL): 150   Frequency: Every 6 Hours   Total Daily Volume of Flush (mL): 600    Start Time: 16:00  Prosource Gelatein 20 Sugar Free     Qty per Day:  60 ml (04-07-21 @ 07:58)    Intake: TF running @ goal rate 55 ml/hr providing 1320 ml, 1584 kcal, 79 gm protein, 1069 ml free water; Spoke to pt's RN, pt not receiving Prosource Gelatein 20 sugar Free @ 60 ml per day; recommend change to No Carb Prosource 1x daily (60 kcal & 15 gm protein)    Current Weight: No wt since 4/6  % Weight Change: Unable to determine wt change    Fluid accumulation: 2+ edema bilateral hands; 3+ generalized edema    Physical appearance: Pt with visible muscle wasting/fat depletion in temporal(moderate), orbital(mild) & clavicle regions(moderate).    Pertinent Medications: MEDICATIONS  (STANDING):  ALBUTerol    90 MICROgram(s) HFA Inhaler 2 Puff(s) Inhalation every 4 hours  chlorhexidine 2% Cloths 1 Application(s) Topical <User Schedule>  dextrose 40% Gel 15 Gram(s) Oral once  dextrose 5%. 1000 milliLiter(s) (100 mL/Hr) IV Continuous <Continuous>  dextrose 5%. 1000 milliLiter(s) (50 mL/Hr) IV Continuous <Continuous>  dextrose 50% Injectable 25 Gram(s) IV Push once  dextrose 50% Injectable 12.5 Gram(s) IV Push once  dextrose 50% Injectable 25 Gram(s) IV Push once  enoxaparin Injectable 80 milliGRAM(s) SubCutaneous every 12 hours  ferrous    sulfate 325 milliGRAM(s) Oral daily  gabapentin 300 milliGRAM(s) Oral two times a day  glucagon  Injectable 1 milliGRAM(s) IntraMuscular once  insulin lispro (ADMELOG) corrective regimen sliding scale   SubCutaneous three times a day before meals  lisinopril 5 milliGRAM(s) Oral daily  mirtazapine 7.5 milliGRAM(s) Oral at bedtime  pantoprazole   Suspension 40 milliGRAM(s) Oral daily  piperacillin/tazobactam IVPB.. 3.375 Gram(s) IV Intermittent every 8 hours    MEDICATIONS  (PRN):  acetaminophen    Suspension .. 650 milliGRAM(s) Oral every 6 hours PRN Temp greater or equal to 38C (100.4F), Mild Pain (1 - 3)  ALBUTerol    90 MICROgram(s) HFA Inhaler 2 Puff(s) Inhalation every 6 hours PRN Wheezing  polyethylene glycol 3350 17 Gram(s) Oral daily PRN Constipation  senna 1 Tablet(s) Oral at bedtime PRN Constipation    Pertinent Labs: 04-10 Na136 mmol/L Glu 262 mg/dL<H> K+ 4.2 mmol/L Cr  0.57 mg/dL BUN 16 mg/dL 04-07 Phos 2.8 mg/dL 04-10 Alb 1.5 g/dL<L> 03-22 PAB 8 mg/dL<L>    CAPILLARY BLOOD GLUCOSE    POCT Blood Glucose.: 261 mg/dL (11 Apr 2021 07:39)  POCT Blood Glucose.: 219 mg/dL (10 Apr 2021 21:17)  POCT Blood Glucose.: 230 mg/dL (10 Apr 2021 16:24)  POCT Blood Glucose.: 287 mg/dL (10 Apr 2021 11:29)    Skin: Pt with stage II pressure ulcer on sacrum (4/7), stage II pressure ulcer on R buttocks (4/7); pt also with perirectal skin tear & wound (bruise) on R achilles tendon    Estimated Needs:   [x] no change since previous assessment on 3/17  [ ] recalculated:     Previous Nutrition Diagnosis:   [x] Severe Malnutrition in context of acute illness  Etiology:  Inadequate energy/protein intake related to COVID illness, AMS, FTT dx  Signs & Symptoms:  3+ generalized edema & 4+ b/l arms; moderate muscle wasting     GOAL:  Pt to meet >75% energy/protein needs via tube feeding - goal met    Nutrition Diagnosis is [x] ongoing  [ ] resolved [ ] not applicable     New Nutrition Diagnosis: [x] not applicable       Interventions:   Recommend  [ ] Change Diet To:  [ ] Nutrition Supplement  [x] Nutrition Support: Discontinue Prosource Gelatein 20 Sugar Free 60 ml daily & add No Carb Prosource 1 packet daily (60 kcal & 15 gm protein) to TF rx  [ ] Other:     Monitoring and Evaluation:   [ ] PO intake [ x ] Tolerance to diet prescription [ x ] weights [ x ] labs[ x ] follow up per protocol  [ ] other: Assessment: Pt's PMHx includes HTN, DM, CVA, asthma, recent COVID pna, non-verbal at baseline. Pt admitted from [facility] for FTT; found to have bilateral PEs. Per Gastroenterology, high risk for PEG given recent pna, advanced age, PE on anticoagulation; however, PEG placement to be discussed with family, tentatively planned for Tuesday. Pt remains on NGT feedings.    Factors impacting intake: [ ] none [ ] nausea  [ ] vomiting [ ] diarrhea [ ] constipation  [ ]chewing problems [ ] swallowing issues  [x] other: NGT feedings    Diet Prescription: Diet, NPO with Tube Feed:   Tube Feeding Modality: Nasogastric  Glucerna 1.2 Renzo  Total Volume for 24 Hours (mL): 1320  Continuous  Starting Tube Feed Rate {mL per Hour}: 30  Increase Tube Feed Rate by (mL): 10     Every 8 hours  Until Goal Tube Feed Rate (mL per Hour): 55  Tube Feed Duration (in Hours): 24  Tube Feed Start Time: 16:00  Free Water Flush   Total Volume per Flush (mL): 150   Frequency: Every 6 Hours   Total Daily Volume of Flush (mL): 600    Start Time: 16:00  Prosource Gelatein 20 Sugar Free     Qty per Day:  60 ml (04-07-21 @ 07:58)    Intake: TF running @ goal rate 55 ml/hr providing 1320 ml, 1584 kcal, 79 gm protein, 1069 ml free water; Spoke to pt's RN, pt not receiving Prosource Gelatein 20 sugar Free @ 60 ml per day; recommend change to No Carb Prosource 1x daily (60 kcal & 15 gm protein)    Current Weight: No wt x 5 days; 75.9 kg (4/6), 76.5 kg (3/14)  % Weight Change: 0.8% wt loss x 23 days; pt with fluid fluctuations throughout admission, which may affect true wt loss    Fluid accumulation: 2+ edema bilateral hands; 3+ generalized edema    Physical appearance: Pt with visible muscle wasting/fat depletion in temporal(moderate), orbital(mild) & clavicle regions(moderate).    Pertinent Medications: MEDICATIONS  (STANDING):  ALBUTerol    90 MICROgram(s) HFA Inhaler 2 Puff(s) Inhalation every 4 hours  chlorhexidine 2% Cloths 1 Application(s) Topical <User Schedule>  dextrose 40% Gel 15 Gram(s) Oral once  dextrose 5%. 1000 milliLiter(s) (100 mL/Hr) IV Continuous <Continuous>  dextrose 5%. 1000 milliLiter(s) (50 mL/Hr) IV Continuous <Continuous>  dextrose 50% Injectable 25 Gram(s) IV Push once  dextrose 50% Injectable 12.5 Gram(s) IV Push once  dextrose 50% Injectable 25 Gram(s) IV Push once  enoxaparin Injectable 80 milliGRAM(s) SubCutaneous every 12 hours  ferrous    sulfate 325 milliGRAM(s) Oral daily  gabapentin 300 milliGRAM(s) Oral two times a day  glucagon  Injectable 1 milliGRAM(s) IntraMuscular once  insulin lispro (ADMELOG) corrective regimen sliding scale   SubCutaneous three times a day before meals  lisinopril 5 milliGRAM(s) Oral daily  mirtazapine 7.5 milliGRAM(s) Oral at bedtime  pantoprazole   Suspension 40 milliGRAM(s) Oral daily  piperacillin/tazobactam IVPB.. 3.375 Gram(s) IV Intermittent every 8 hours    MEDICATIONS  (PRN):  acetaminophen    Suspension .. 650 milliGRAM(s) Oral every 6 hours PRN Temp greater or equal to 38C (100.4F), Mild Pain (1 - 3)  ALBUTerol    90 MICROgram(s) HFA Inhaler 2 Puff(s) Inhalation every 6 hours PRN Wheezing  polyethylene glycol 3350 17 Gram(s) Oral daily PRN Constipation  senna 1 Tablet(s) Oral at bedtime PRN Constipation    Pertinent Labs: 04-10 Na136 mmol/L Glu 262 mg/dL<H> K+ 4.2 mmol/L Cr  0.57 mg/dL BUN 16 mg/dL 04-07 Phos 2.8 mg/dL 04-10 Alb 1.5 g/dL<L> 03-22 PAB 8 mg/dL<L>    CAPILLARY BLOOD GLUCOSE    POCT Blood Glucose.: 261 mg/dL (11 Apr 2021 07:39)  POCT Blood Glucose.: 219 mg/dL (10 Apr 2021 21:17)  POCT Blood Glucose.: 230 mg/dL (10 Apr 2021 16:24)  POCT Blood Glucose.: 287 mg/dL (10 Apr 2021 11:29)    Skin: Pt with stage II pressure ulcer on sacrum (4/7), stage II pressure ulcer on R buttocks (4/7); pt also with perirectal skin tear & wound (bruise) on R achilles tendon    Estimated Needs:   [x] no change since previous assessment on 3/17  [ ] recalculated:     Previous Nutrition Diagnosis:   [x] Severe Malnutrition in context of acute illness  Etiology:  Inadequate energy/protein intake related to COVID illness, AMS, FTT dx  Signs & Symptoms:  3+ generalized edema & 4+ b/l arms; moderate muscle wasting     GOAL:  Pt to meet >75% energy/protein needs via tube feeding - goal met    Nutrition Diagnosis is [x] ongoing  [ ] resolved [ ] not applicable     New Nutrition Diagnosis: [x] not applicable       Interventions:   Recommend  [ ] Change Diet To:  [ ] Nutrition Supplement  [x] Nutrition Support: Discontinue Prosource Gelatein 20 Sugar Free 60 ml daily & add No Carb Prosource 1 packet daily (60 kcal & 15 gm protein) to TF order  [ ] Other:     Monitoring and Evaluation:   [ ] PO intake [ x ] Tolerance to diet prescription [ x ] weights [ x ] labs[ x ] follow up per protocol  [ ] other:

## 2021-04-11 NOTE — PROGRESS NOTE ADULT - SUBJECTIVE AND OBJECTIVE BOX
Gastroenterology  Opens eyes, arousable    T(F): 99.8 (04-11-21 @ 04:57), Max: 99.8 (04-11-21 @ 04:57)  HR: 110 (04-11-21 @ 04:57) (88 - 110)  BP: 133/63 (04-11-21 @ 04:57) (105/67 - 133/63)  RR: 18 (04-11-21 @ 04:57) (18 - 20)  SpO2: 99% (04-11-21 @ 04:57) (98% - 100%)  Wt(kg): --  CAPILLARY BLOOD GLUCOSE      POCT Blood Glucose.: 172 mg/dL (11 Apr 2021 11:02)  POCT Blood Glucose.: 261 mg/dL (11 Apr 2021 07:39)  POCT Blood Glucose.: 219 mg/dL (10 Apr 2021 21:17)  POCT Blood Glucose.: 230 mg/dL (10 Apr 2021 16:24)      PHYSICAL EXAM:  General: NAD,  Neuro:  Opens eyes, arousable  HEENT: NCAT, EOMI, conjunctiva clear  CV: +S1+S2 regular rate and rhythm  Lung: course anterior breath sounds   Abdomen: soft, Non Tender, No distention Normal active BS  Extremities: no cyanosis, clubbing or edema    LABS:                        7.4    11.41 )-----------( 543      ( 10 Apr 2021 06:47 )             25.7     04-10    136  |  100  |  16  ----------------------------<  262<H>  4.2   |  31  |  0.57    Ca    8.4<L>      10 Apr 2021 06:47    TPro  5.4<L>  /  Alb  1.5<L>  /  TBili  0.6  /  DBili  x   /  AST  18  /  ALT  14  /  AlkPhos  146<H>  04-10    LIVER FUNCTIONS - ( 10 Apr 2021 06:47 )  Alb: 1.5 g/dL / Pro: 5.4 gm/dL / ALK PHOS: 146 U/L / ALT: 14 U/L / AST: 18 U/L / GGT: x             I&O's Detail    10 Apr 2021 07:01  -  11 Apr 2021 07:00  --------------------------------------------------------  IN:    Enteral Tube Flush: 300 mL    Glucerna: 660 mL    IV PiggyBack: 200 mL  Total IN: 1160 mL    OUT:    Voided (mL): 1200 mL  Total OUT: 1200 mL    Total NET: -40 mL        04-10 @ 06:47    136 | 100 | 16  /8.4 | -- | --  _______________________/  4.2 | 31 | 0.57                           \par

## 2021-04-11 NOTE — PROGRESS NOTE ADULT - ASSESSMENT
89yo F hx HTN, DM, hypokalemia, non-verbal at baseline, asthma, recent covid pna, sent to ED from [facility] for failure to thrive and found to have bilateral PEs, ?pulm htn    GI consulted for PEG placement.     High risk patient given recent pneumonia, advanced age, PE. Hospice consulted. Ethics consulted. NG tube in place.     **Discussed PEG with grandson who previously approved, however after further discussion with son, they are not sure. They will discuss with family before making decision.

## 2021-04-11 NOTE — PROGRESS NOTE ADULT - SUBJECTIVE AND OBJECTIVE BOX
INTERVAL HPI/OVERNIGHT EVENTS:        REVIEW OF SYSTEMS:  CONSTITUTIONAL:  patient lethargic   arauseable      MEDICATION:  acetaminophen    Suspension .. 650 milliGRAM(s) Oral every 6 hours PRN  ALBUTerol    90 MICROgram(s) HFA Inhaler 2 Puff(s) Inhalation every 6 hours PRN  ALBUTerol    90 MICROgram(s) HFA Inhaler 2 Puff(s) Inhalation every 4 hours  chlorhexidine 2% Cloths 1 Application(s) Topical <User Schedule>  dextrose 40% Gel 15 Gram(s) Oral once  dextrose 5%. 1000 milliLiter(s) IV Continuous <Continuous>  dextrose 5%. 1000 milliLiter(s) IV Continuous <Continuous>  dextrose 50% Injectable 25 Gram(s) IV Push once  dextrose 50% Injectable 12.5 Gram(s) IV Push once  dextrose 50% Injectable 25 Gram(s) IV Push once  enoxaparin Injectable 80 milliGRAM(s) SubCutaneous every 12 hours  ferrous    sulfate 325 milliGRAM(s) Oral daily  gabapentin 300 milliGRAM(s) Oral two times a day  glucagon  Injectable 1 milliGRAM(s) IntraMuscular once  insulin lispro (ADMELOG) corrective regimen sliding scale   SubCutaneous three times a day before meals  lisinopril 5 milliGRAM(s) Oral daily  mirtazapine 7.5 milliGRAM(s) Oral at bedtime  pantoprazole   Suspension 40 milliGRAM(s) Oral daily  piperacillin/tazobactam IVPB.. 3.375 Gram(s) IV Intermittent every 8 hours  polyethylene glycol 3350 17 Gram(s) Oral daily PRN  senna 1 Tablet(s) Oral at bedtime PRN    Vital Signs Last 24 Hrs  T(C): 37.7 (11 Apr 2021 04:57), Max: 37.7 (11 Apr 2021 04:57)  T(F): 99.8 (11 Apr 2021 04:57), Max: 99.8 (11 Apr 2021 04:57)  HR: 110 (11 Apr 2021 04:57) (88 - 110)  BP: 133/63 (11 Apr 2021 04:57) (105/67 - 133/63)  BP(mean): --  RR: 18 (11 Apr 2021 04:57) (18 - 20)  SpO2: 99% (11 Apr 2021 04:57) (98% - 100%)    PHYSICAL EXAM:  GENERAL: NAD,   HEENT : Conjuntivae  clear sclerae anicteric   NECK: Supple, No JVD, Normal thyroid  NERVOUS SYSTEM:   opens  eyes  CHEST/LUNG:   few  ronchis  HEART: Regular rate and rhythm; No murmurs, rubs, or gallops  ABDOMEN: Soft, Nontender, Nondistended; Bowel sounds present  EXTREMITIES:   diffuse  edemas  SKIN: No rashes   LABS:                        7.4    11.41 )-----------( 543      ( 10 Apr 2021 06:47 )             25.7     04-10    136  |  100  |  16  ----------------------------<  262<H>  4.2   |  31  |  0.57    Ca    8.4<L>      10 Apr 2021 06:47    TPro  5.4<L>  /  Alb  1.5<L>  /  TBili  0.6  /  DBili  x   /  AST  18  /  ALT  14  /  AlkPhos  146<H>  04-10        CAPILLARY BLOOD GLUCOSE      POCT Blood Glucose.: 261 mg/dL (11 Apr 2021 07:39)  POCT Blood Glucose.: 219 mg/dL (10 Apr 2021 21:17)  POCT Blood Glucose.: 230 mg/dL (10 Apr 2021 16:24)  POCT Blood Glucose.: 287 mg/dL (10 Apr 2021 11:29)      RADIOLOGY & ADDITIONAL TESTS:    Imaging reports  Personally Reviewed:  [ ] YES  [ ] NO    Consultant(s) Notes Reviewed:  [x ] YES  [ ] NO    Care Discussed with Consultants/Other Providers [x ] YES  [ ] NO  Problem/Plan - 1:  ·  Problem: Pulmonary emboli.  Plan: lovenox    Problem/Plan - 2:  ·  Problem: DVT (deep venous thrombosis).  Plan: lovenox    HYPERNATREMIAN  CHANGE  IVF  TO  D5W 1/2 NS  Problem/Plan - 4:  ·  Problem: Asthma.  Plan: symbicort.     Problem/Plan - 5:  ·  Problem: DM (diabetes mellitus).  Plan: insulin coverage.     Problem/Plan - 6:  Problem: Failure to thrive in adult. Plan: encourage  oral  intake  rx  underlying  causes.   REMERON   severe  calorie  malnutrition cont  gt  feedings  will  add  additional  protein  fever  check  urine c/s  cxr tylenol  for  comfort    anemia  on  AC  for  dvt  and  PE    monitor  labs  transfuse  as needed     PNEUMONIA  ON ZOSYN     palliative  care  evaluation  appreciated    ethic  committee  evaluation appreciated    hospice  evaluation  appreciated     for  PEG  placement at  family's  request

## 2021-04-12 LAB
ANION GAP SERPL CALC-SCNC: 5 MMOL/L — SIGNIFICANT CHANGE UP (ref 5–17)
APTT BLD: 38.7 SEC — HIGH (ref 27.5–35.5)
BUN SERPL-MCNC: 15 MG/DL — SIGNIFICANT CHANGE UP (ref 7–23)
CALCIUM SERPL-MCNC: 8.3 MG/DL — LOW (ref 8.5–10.1)
CHLORIDE SERPL-SCNC: 100 MMOL/L — SIGNIFICANT CHANGE UP (ref 96–108)
CO2 SERPL-SCNC: 32 MMOL/L — HIGH (ref 22–31)
CREAT SERPL-MCNC: 0.58 MG/DL — SIGNIFICANT CHANGE UP (ref 0.5–1.3)
GLUCOSE BLDC GLUCOMTR-MCNC: 168 MG/DL — HIGH (ref 70–99)
GLUCOSE BLDC GLUCOMTR-MCNC: 202 MG/DL — HIGH (ref 70–99)
GLUCOSE BLDC GLUCOMTR-MCNC: 233 MG/DL — HIGH (ref 70–99)
GLUCOSE BLDC GLUCOMTR-MCNC: 270 MG/DL — HIGH (ref 70–99)
GLUCOSE BLDC GLUCOMTR-MCNC: 318 MG/DL — HIGH (ref 70–99)
GLUCOSE SERPL-MCNC: 321 MG/DL — HIGH (ref 70–99)
HCT VFR BLD CALC: 26.4 % — LOW (ref 34.5–45)
HGB BLD-MCNC: 7.6 G/DL — LOW (ref 11.5–15.5)
INR BLD: 1.16 RATIO — SIGNIFICANT CHANGE UP (ref 0.88–1.16)
MCHC RBC-ENTMCNC: 28 PG — SIGNIFICANT CHANGE UP (ref 27–34)
MCHC RBC-ENTMCNC: 28.8 GM/DL — LOW (ref 32–36)
MCV RBC AUTO: 97.4 FL — SIGNIFICANT CHANGE UP (ref 80–100)
NRBC # BLD: 0 /100 WBCS — SIGNIFICANT CHANGE UP (ref 0–0)
PLATELET # BLD AUTO: 663 K/UL — HIGH (ref 150–400)
POTASSIUM SERPL-MCNC: 4.4 MMOL/L — SIGNIFICANT CHANGE UP (ref 3.5–5.3)
POTASSIUM SERPL-SCNC: 4.4 MMOL/L — SIGNIFICANT CHANGE UP (ref 3.5–5.3)
PROTHROM AB SERPL-ACNC: 13.4 SEC — SIGNIFICANT CHANGE UP (ref 10.6–13.6)
RBC # BLD: 2.71 M/UL — LOW (ref 3.8–5.2)
RBC # FLD: 18.7 % — HIGH (ref 10.3–14.5)
SODIUM SERPL-SCNC: 137 MMOL/L — SIGNIFICANT CHANGE UP (ref 135–145)
WBC # BLD: 8.83 K/UL — SIGNIFICANT CHANGE UP (ref 3.8–10.5)
WBC # FLD AUTO: 8.83 K/UL — SIGNIFICANT CHANGE UP (ref 3.8–10.5)

## 2021-04-12 RX ADMIN — PIPERACILLIN AND TAZOBACTAM 25 GRAM(S): 4; .5 INJECTION, POWDER, LYOPHILIZED, FOR SOLUTION INTRAVENOUS at 22:45

## 2021-04-12 RX ADMIN — ENOXAPARIN SODIUM 80 MILLIGRAM(S): 100 INJECTION SUBCUTANEOUS at 05:42

## 2021-04-12 RX ADMIN — PANTOPRAZOLE SODIUM 40 MILLIGRAM(S): 20 TABLET, DELAYED RELEASE ORAL at 12:26

## 2021-04-12 RX ADMIN — PIPERACILLIN AND TAZOBACTAM 25 GRAM(S): 4; .5 INJECTION, POWDER, LYOPHILIZED, FOR SOLUTION INTRAVENOUS at 05:44

## 2021-04-12 RX ADMIN — ENOXAPARIN SODIUM 80 MILLIGRAM(S): 100 INJECTION SUBCUTANEOUS at 17:04

## 2021-04-12 RX ADMIN — GABAPENTIN 300 MILLIGRAM(S): 400 CAPSULE ORAL at 05:43

## 2021-04-12 RX ADMIN — Medication 650 MILLIGRAM(S): at 05:58

## 2021-04-12 RX ADMIN — Medication 2: at 16:14

## 2021-04-12 RX ADMIN — Medication 4: at 12:34

## 2021-04-12 RX ADMIN — Medication 8: at 08:01

## 2021-04-12 RX ADMIN — CHLORHEXIDINE GLUCONATE 1 APPLICATION(S): 213 SOLUTION TOPICAL at 05:42

## 2021-04-12 RX ADMIN — MIRTAZAPINE 7.5 MILLIGRAM(S): 45 TABLET, ORALLY DISINTEGRATING ORAL at 22:45

## 2021-04-12 RX ADMIN — PIPERACILLIN AND TAZOBACTAM 25 GRAM(S): 4; .5 INJECTION, POWDER, LYOPHILIZED, FOR SOLUTION INTRAVENOUS at 14:18

## 2021-04-12 RX ADMIN — Medication 325 MILLIGRAM(S): at 12:26

## 2021-04-12 RX ADMIN — GABAPENTIN 300 MILLIGRAM(S): 400 CAPSULE ORAL at 17:04

## 2021-04-12 NOTE — PROGRESS NOTE ADULT - SUBJECTIVE AND OBJECTIVE BOX
INTERVAL HPI/OVERNIGHT EVENTS:        REVIEW OF SYSTEMS:  CONSTITUTIONAL:  alert  comfortable      MEDICATION:  acetaminophen    Suspension .. 650 milliGRAM(s) Oral every 6 hours PRN  ALBUTerol    90 MICROgram(s) HFA Inhaler 2 Puff(s) Inhalation every 6 hours PRN  ALBUTerol    90 MICROgram(s) HFA Inhaler 2 Puff(s) Inhalation every 4 hours  chlorhexidine 2% Cloths 1 Application(s) Topical <User Schedule>  dextrose 40% Gel 15 Gram(s) Oral once  dextrose 5%. 1000 milliLiter(s) IV Continuous <Continuous>  dextrose 5%. 1000 milliLiter(s) IV Continuous <Continuous>  dextrose 50% Injectable 25 Gram(s) IV Push once  dextrose 50% Injectable 12.5 Gram(s) IV Push once  dextrose 50% Injectable 25 Gram(s) IV Push once  enoxaparin Injectable 80 milliGRAM(s) SubCutaneous every 12 hours  ferrous    sulfate 325 milliGRAM(s) Oral daily  gabapentin 300 milliGRAM(s) Oral two times a day  glucagon  Injectable 1 milliGRAM(s) IntraMuscular once  insulin lispro (ADMELOG) corrective regimen sliding scale   SubCutaneous three times a day before meals  lisinopril 5 milliGRAM(s) Oral daily  mirtazapine 7.5 milliGRAM(s) Oral at bedtime  pantoprazole   Suspension 40 milliGRAM(s) Oral daily  piperacillin/tazobactam IVPB.. 3.375 Gram(s) IV Intermittent every 8 hours  polyethylene glycol 3350 17 Gram(s) Oral daily PRN  senna 1 Tablet(s) Oral at bedtime PRN    Vital Signs Last 24 Hrs  T(C): 38.1 (12 Apr 2021 05:29), Max: 38.1 (12 Apr 2021 05:29)  T(F): 100.5 (12 Apr 2021 05:29), Max: 100.5 (12 Apr 2021 05:29)  HR: 109 (12 Apr 2021 05:29) (82 - 109)  BP: 112/60 (12 Apr 2021 05:29) (107/53 - 125/61)  BP(mean): --  RR: 17 (12 Apr 2021 05:29) (17 - 20)  SpO2: 98% (12 Apr 2021 05:29) (98% - 100%)    PHYSICAL EXAM:  GENERAL: NAD,   HEENT : Conjuntivae  clear sclerae anicteric  NECK: Supple, No JVD, Normal thyroid  NERVOUS SYSTEM:  Alert   CHEST/LUNG: Clear    HEART: Regular rate and rhythm; No murmurs, rubs, or gallops  ABDOMEN: Soft, Nontender, Nondistended; Bowel sounds present  EXTREMITIES:   diffuse  edemas  SKIN: No rashes   LABS:                        7.6    8.83  )-----------( 663      ( 12 Apr 2021 08:56 )             26.4     04-12    137  |  100  |  15  ----------------------------<  321<H>  4.4   |  32<H>  |  0.58    Ca    8.3<L>      12 Apr 2021 08:56          CAPILLARY BLOOD GLUCOSE      POCT Blood Glucose.: 318 mg/dL (12 Apr 2021 07:41)  POCT Blood Glucose.: 154 mg/dL (11 Apr 2021 21:07)  POCT Blood Glucose.: 239 mg/dL (11 Apr 2021 16:10)  POCT Blood Glucose.: 172 mg/dL (11 Apr 2021 11:02)      RADIOLOGY & ADDITIONAL TESTS:    Imaging reports  Personally Reviewed:  [ ] YES  [ ] NO    Consultant(s) Notes Reviewed:  [x ] YES  [ ] NO    Care Discussed with Consultants/Other Providers [x ] YES  [ ] NO  ·  Problem: Pulmonary emboli.  Plan: lovenox    Problem/Plan - 2:  ·  Problem: DVT (deep venous thrombosis).  Plan: lovenox    HYPERNATREMIAN  CHANGE  IVF  TO  D5W 1/2 NS  Problem/Plan - 4:  ·  Problem: Asthma.  Plan: symbicort.     Problem/Plan - 5:  ·  Problem: DM (diabetes mellitus).  Plan: insulin coverage.     Problem/Plan - 6:  Problem: Failure to thrive in adult. Plan: encourage  oral  intake  rx  underlying  causes.   REMERON   severe  calorie  malnutrition cont  gt  feedings  will  add  additional  protein  fever  check  urine c/s  cxr tylenol  for  comfort    anemia  on  AC  for  dvt  and  PE    monitor  labs  transfuse  as needed     PNEUMONIA  ON ZOSYN     palliative  care  evaluation  appreciated    ethic  committee  evaluation appreciated    hospice  evaluation  appreciated     for  PEG  placement family  ow  reported  hesitant

## 2021-04-12 NOTE — PROGRESS NOTE ADULT - ASSESSMENT
89yo F hx HTN, DM, hypokalemia, non-verbal at baseline, asthma, recent covid pna, sent to ED from [facility] for failure to thrive and found to have bilateral PEs, ?pulm htn    GI consulted for PEG placement.     High risk patient given recent pneumonia, advanced age, PE. Hospice consulted. Ethics consulted. NG tube in place.     **Discussed PEG with grandson who previously approved, however after further discussion with son, they are not sure. They will discuss with family before making decision.

## 2021-04-12 NOTE — PROGRESS NOTE ADULT - SUBJECTIVE AND OBJECTIVE BOX
Gastroenterology  Patient seen and examined bedside resting comfortably.  arousable no verbal    T(F): 100.5 (04-12-21 @ 05:29), Max: 100.5 (04-12-21 @ 05:29)  HR: 109 (04-12-21 @ 05:29) (82 - 109)  BP: 112/60 (04-12-21 @ 05:29) (107/53 - 125/61)  RR: 17 (04-12-21 @ 05:29) (17 - 20)  SpO2: 98% (04-12-21 @ 05:29) (98% - 100%)  Wt(kg): --  CAPILLARY BLOOD GLUCOSE      POCT Blood Glucose.: 233 mg/dL (12 Apr 2021 11:19)  POCT Blood Glucose.: 318 mg/dL (12 Apr 2021 07:41)  POCT Blood Glucose.: 154 mg/dL (11 Apr 2021 21:07)  POCT Blood Glucose.: 239 mg/dL (11 Apr 2021 16:10)      PHYSICAL EXAM:  General: NAD, WDWN.   Neuro:  minimal responsive   HEENT: NCAT, EOMI, conjunctiva clear  CV: +S1+S2 regular rate and rhythm  Lung: clear to ausculation bilaterally, respirations nonlabored, good inspiratory effort  Abdomen: soft, Non Tender, No distention Normal active BS  Extremities: no cyanosis, clubbing or edema    LABS:                        7.6    8.83  )-----------( 663      ( 12 Apr 2021 08:56 )             26.4     04-12    137  |  100  |  15  ----------------------------<  321<H>  4.4   |  32<H>  |  0.58    Ca    8.3<L>      12 Apr 2021 08:56          I&O's Detail    11 Apr 2021 07:01  -  12 Apr 2021 07:00  --------------------------------------------------------  IN:    Enteral Tube Flush: 660 mL    Free Water: 300 mL    IV PiggyBack: 200 mL  Total IN: 1160 mL    OUT:    Voided (mL): 1750 mL  Total OUT: 1750 mL    Total NET: -590 mL        04-12 @ 08:56    137 | 100 | 15  /8.3 | -- | --  _______________________/  4.4 | 32 | 0.58                           \par

## 2021-04-13 LAB
ALBUMIN SERPL ELPH-MCNC: 1.5 G/DL — LOW (ref 3.3–5)
ALP SERPL-CCNC: 142 U/L — HIGH (ref 40–120)
ALT FLD-CCNC: 12 U/L — SIGNIFICANT CHANGE UP (ref 12–78)
ANION GAP SERPL CALC-SCNC: 3 MMOL/L — LOW (ref 5–17)
AST SERPL-CCNC: 14 U/L — LOW (ref 15–37)
BILIRUB SERPL-MCNC: 0.6 MG/DL — SIGNIFICANT CHANGE UP (ref 0.2–1.2)
BUN SERPL-MCNC: 13 MG/DL — SIGNIFICANT CHANGE UP (ref 7–23)
CALCIUM SERPL-MCNC: 8.9 MG/DL — SIGNIFICANT CHANGE UP (ref 8.5–10.1)
CHLORIDE SERPL-SCNC: 99 MMOL/L — SIGNIFICANT CHANGE UP (ref 96–108)
CO2 SERPL-SCNC: 33 MMOL/L — HIGH (ref 22–31)
CREAT SERPL-MCNC: 0.54 MG/DL — SIGNIFICANT CHANGE UP (ref 0.5–1.3)
CULTURE RESULTS: SIGNIFICANT CHANGE UP
CULTURE RESULTS: SIGNIFICANT CHANGE UP
GLUCOSE BLDC GLUCOMTR-MCNC: 177 MG/DL — HIGH (ref 70–99)
GLUCOSE BLDC GLUCOMTR-MCNC: 181 MG/DL — HIGH (ref 70–99)
GLUCOSE BLDC GLUCOMTR-MCNC: 213 MG/DL — HIGH (ref 70–99)
GLUCOSE BLDC GLUCOMTR-MCNC: 245 MG/DL — HIGH (ref 70–99)
GLUCOSE SERPL-MCNC: 234 MG/DL — HIGH (ref 70–99)
HCT VFR BLD CALC: 25.6 % — LOW (ref 34.5–45)
HGB BLD-MCNC: 7.3 G/DL — LOW (ref 11.5–15.5)
MCHC RBC-ENTMCNC: 27.5 PG — SIGNIFICANT CHANGE UP (ref 27–34)
MCHC RBC-ENTMCNC: 28.5 GM/DL — LOW (ref 32–36)
MCV RBC AUTO: 96.6 FL — SIGNIFICANT CHANGE UP (ref 80–100)
NRBC # BLD: 0 /100 WBCS — SIGNIFICANT CHANGE UP (ref 0–0)
PLATELET # BLD AUTO: 746 K/UL — HIGH (ref 150–400)
POTASSIUM SERPL-MCNC: 4.2 MMOL/L — SIGNIFICANT CHANGE UP (ref 3.5–5.3)
POTASSIUM SERPL-SCNC: 4.2 MMOL/L — SIGNIFICANT CHANGE UP (ref 3.5–5.3)
PROT SERPL-MCNC: 5.9 GM/DL — LOW (ref 6–8.3)
RBC # BLD: 2.65 M/UL — LOW (ref 3.8–5.2)
RBC # FLD: 18.5 % — HIGH (ref 10.3–14.5)
SODIUM SERPL-SCNC: 135 MMOL/L — SIGNIFICANT CHANGE UP (ref 135–145)
SPECIMEN SOURCE: SIGNIFICANT CHANGE UP
SPECIMEN SOURCE: SIGNIFICANT CHANGE UP
WBC # BLD: 9.67 K/UL — SIGNIFICANT CHANGE UP (ref 3.8–10.5)
WBC # FLD AUTO: 9.67 K/UL — SIGNIFICANT CHANGE UP (ref 3.8–10.5)

## 2021-04-13 RX ADMIN — ENOXAPARIN SODIUM 80 MILLIGRAM(S): 100 INJECTION SUBCUTANEOUS at 05:27

## 2021-04-13 RX ADMIN — PIPERACILLIN AND TAZOBACTAM 25 GRAM(S): 4; .5 INJECTION, POWDER, LYOPHILIZED, FOR SOLUTION INTRAVENOUS at 14:04

## 2021-04-13 RX ADMIN — PANTOPRAZOLE SODIUM 40 MILLIGRAM(S): 20 TABLET, DELAYED RELEASE ORAL at 11:54

## 2021-04-13 RX ADMIN — Medication 325 MILLIGRAM(S): at 11:54

## 2021-04-13 RX ADMIN — Medication 4: at 16:37

## 2021-04-13 RX ADMIN — ENOXAPARIN SODIUM 80 MILLIGRAM(S): 100 INJECTION SUBCUTANEOUS at 17:47

## 2021-04-13 RX ADMIN — Medication 2: at 11:54

## 2021-04-13 RX ADMIN — PIPERACILLIN AND TAZOBACTAM 25 GRAM(S): 4; .5 INJECTION, POWDER, LYOPHILIZED, FOR SOLUTION INTRAVENOUS at 05:28

## 2021-04-13 RX ADMIN — LISINOPRIL 5 MILLIGRAM(S): 2.5 TABLET ORAL at 05:27

## 2021-04-13 RX ADMIN — Medication 4: at 08:03

## 2021-04-13 RX ADMIN — GABAPENTIN 300 MILLIGRAM(S): 400 CAPSULE ORAL at 05:27

## 2021-04-13 RX ADMIN — MIRTAZAPINE 7.5 MILLIGRAM(S): 45 TABLET, ORALLY DISINTEGRATING ORAL at 22:01

## 2021-04-13 RX ADMIN — GABAPENTIN 300 MILLIGRAM(S): 400 CAPSULE ORAL at 17:47

## 2021-04-13 RX ADMIN — PIPERACILLIN AND TAZOBACTAM 25 GRAM(S): 4; .5 INJECTION, POWDER, LYOPHILIZED, FOR SOLUTION INTRAVENOUS at 22:02

## 2021-04-13 RX ADMIN — CHLORHEXIDINE GLUCONATE 1 APPLICATION(S): 213 SOLUTION TOPICAL at 05:28

## 2021-04-13 NOTE — PROGRESS NOTE ADULT - SUBJECTIVE AND OBJECTIVE BOX
INTERVAL HPI/OVERNIGHT EVENTS:        REVIEW OF SYSTEMS:  CONSTITUTIONAL:   somnolent  arouseable    MEDICATION:  acetaminophen    Suspension .. 650 milliGRAM(s) Oral every 6 hours PRN  ALBUTerol    90 MICROgram(s) HFA Inhaler 2 Puff(s) Inhalation every 6 hours PRN  ALBUTerol    90 MICROgram(s) HFA Inhaler 2 Puff(s) Inhalation every 4 hours  chlorhexidine 2% Cloths 1 Application(s) Topical <User Schedule>  dextrose 40% Gel 15 Gram(s) Oral once  dextrose 5%. 1000 milliLiter(s) IV Continuous <Continuous>  dextrose 5%. 1000 milliLiter(s) IV Continuous <Continuous>  dextrose 50% Injectable 25 Gram(s) IV Push once  dextrose 50% Injectable 25 Gram(s) IV Push once  dextrose 50% Injectable 12.5 Gram(s) IV Push once  enoxaparin Injectable 80 milliGRAM(s) SubCutaneous every 12 hours  ferrous    sulfate 325 milliGRAM(s) Oral daily  gabapentin 300 milliGRAM(s) Oral two times a day  glucagon  Injectable 1 milliGRAM(s) IntraMuscular once  insulin lispro (ADMELOG) corrective regimen sliding scale   SubCutaneous three times a day before meals  lisinopril 5 milliGRAM(s) Oral daily  mirtazapine 7.5 milliGRAM(s) Oral at bedtime  pantoprazole   Suspension 40 milliGRAM(s) Oral daily  piperacillin/tazobactam IVPB.. 3.375 Gram(s) IV Intermittent every 8 hours  polyethylene glycol 3350 17 Gram(s) Oral daily PRN  senna 1 Tablet(s) Oral at bedtime PRN    Vital Signs Last 24 Hrs  T(C): 37.5 (13 Apr 2021 04:52), Max: 37.5 (12 Apr 2021 23:31)  T(F): 99.5 (13 Apr 2021 04:52), Max: 99.5 (12 Apr 2021 23:31)  HR: 99 (13 Apr 2021 04:52) (96 - 107)  BP: 139/67 (13 Apr 2021 04:52) (109/67 - 139/67)  BP(mean): --  RR: 19 (13 Apr 2021 04:52) (18 - 19)  SpO2: 100% (13 Apr 2021 04:52) (99% - 100%)    PHYSICAL EXAM:  GENERAL: NAD,   HEENT : Conjuntivae pale sclerae  anicteric  NECK: Supple, No JVD, Normal thyroid  NERVOUS SYSTEM:  opens  eyes  CHEST/LUNG: Clear    HEART: Regular rate and rhythm; No murmurs, rubs, or gallops  ABDOMEN: Soft, Nontender, Nondistended; Bowel sounds present  EXTREMITIES:   diffuse  edemas  SKIN: No rashes   LABS:                        7.3    9.67  )-----------( 746      ( 13 Apr 2021 07:54 )             25.6     04-13    135  |  99  |  13  ----------------------------<  234<H>  4.2   |  33<H>  |  0.54    Ca    8.9      13 Apr 2021 07:54    TPro  5.9<L>  /  Alb  1.5<L>  /  TBili  0.6  /  DBili  x   /  AST  14<L>  /  ALT  12  /  AlkPhos  142<H>  04-13    PT/INR - ( 12 Apr 2021 16:08 )   PT: 13.4 sec;   INR: 1.16 ratio         PTT - ( 12 Apr 2021 16:08 )  PTT:38.7 sec    CAPILLARY BLOOD GLUCOSE      POCT Blood Glucose.: 245 mg/dL (13 Apr 2021 07:51)  POCT Blood Glucose.: 270 mg/dL (12 Apr 2021 21:40)  POCT Blood Glucose.: 168 mg/dL (12 Apr 2021 16:02)  POCT Blood Glucose.: 202 mg/dL (12 Apr 2021 12:29)  POCT Blood Glucose.: 233 mg/dL (12 Apr 2021 11:19)      RADIOLOGY & ADDITIONAL TESTS:    Imaging reports  Personally Reviewed:  [ ] YES  [ ] NO    Consultant(s) Notes Reviewed:  [ x] YES  [ ] NO    Care Discussed with Consultants/Other Providers [x ] YES  [ ] NO  Problem/Plan - 1:  ·  Problem: Pulmonary emboli.  Plan: lovenox    Problem/Plan - 2:  ·  Problem: DVT (deep venous thrombosis).  Plan: lovenox    HYPERNATREMIAN  CHANGE  IVF  TO  D5W 1/2 NS  Problem/Plan - 4:  ·  Problem: Asthma.  Plan: symbicort.     Problem/Plan - 5:  ·  Problem: DM (diabetes mellitus).  Plan: insulin coverage.     Problem/Plan - 6:  Problem: Failure to thrive in adult. Plan: encourage  oral  intake  rx  underlying  causes.   REMERON   severe  calorie  malnutrition cont  gt  feedings  will  add  additional  protein  fever  check  urine c/s  cxr tylenol  for  comfort    anemia  on  AC  for  dvt  and  PE    monitor  labs  transfuse  as needed     PNEUMONIA  ON ZOSYN     palliative  care  evaluation  appreciated    ethic  committee  evaluation appreciated    hospice  evaluation  appreciated     awaiting  family's  consent for PEG  placement which  they had  requested  have  left  message  with  pt's  grandson Satya Cosby   [phone]

## 2021-04-14 LAB
ANION GAP SERPL CALC-SCNC: 6 MMOL/L — SIGNIFICANT CHANGE UP (ref 5–17)
BUN SERPL-MCNC: 13 MG/DL — SIGNIFICANT CHANGE UP (ref 7–23)
CALCIUM SERPL-MCNC: 8.6 MG/DL — SIGNIFICANT CHANGE UP (ref 8.5–10.1)
CHLORIDE SERPL-SCNC: 102 MMOL/L — SIGNIFICANT CHANGE UP (ref 96–108)
CO2 SERPL-SCNC: 33 MMOL/L — HIGH (ref 22–31)
CREAT SERPL-MCNC: 0.69 MG/DL — SIGNIFICANT CHANGE UP (ref 0.5–1.3)
GLUCOSE BLDC GLUCOMTR-MCNC: 219 MG/DL — HIGH (ref 70–99)
GLUCOSE BLDC GLUCOMTR-MCNC: 235 MG/DL — HIGH (ref 70–99)
GLUCOSE BLDC GLUCOMTR-MCNC: 250 MG/DL — HIGH (ref 70–99)
GLUCOSE SERPL-MCNC: 222 MG/DL — HIGH (ref 70–99)
HCT VFR BLD CALC: 24.6 % — LOW (ref 34.5–45)
HGB BLD-MCNC: 7.3 G/DL — LOW (ref 11.5–15.5)
MCHC RBC-ENTMCNC: 27.8 PG — SIGNIFICANT CHANGE UP (ref 27–34)
MCHC RBC-ENTMCNC: 29.7 GM/DL — LOW (ref 32–36)
MCV RBC AUTO: 93.5 FL — SIGNIFICANT CHANGE UP (ref 80–100)
NRBC # BLD: 0 /100 WBCS — SIGNIFICANT CHANGE UP (ref 0–0)
PLATELET # BLD AUTO: 713 K/UL — HIGH (ref 150–400)
POTASSIUM SERPL-MCNC: 4.2 MMOL/L — SIGNIFICANT CHANGE UP (ref 3.5–5.3)
POTASSIUM SERPL-SCNC: 4.2 MMOL/L — SIGNIFICANT CHANGE UP (ref 3.5–5.3)
RBC # BLD: 2.63 M/UL — LOW (ref 3.8–5.2)
RBC # FLD: 18.4 % — HIGH (ref 10.3–14.5)
SODIUM SERPL-SCNC: 141 MMOL/L — SIGNIFICANT CHANGE UP (ref 135–145)
WBC # BLD: 8.73 K/UL — SIGNIFICANT CHANGE UP (ref 3.8–10.5)
WBC # FLD AUTO: 8.73 K/UL — SIGNIFICANT CHANGE UP (ref 3.8–10.5)

## 2021-04-14 RX ADMIN — Medication 4: at 16:57

## 2021-04-14 RX ADMIN — Medication 325 MILLIGRAM(S): at 11:45

## 2021-04-14 RX ADMIN — PIPERACILLIN AND TAZOBACTAM 25 GRAM(S): 4; .5 INJECTION, POWDER, LYOPHILIZED, FOR SOLUTION INTRAVENOUS at 14:43

## 2021-04-14 RX ADMIN — Medication 4: at 11:36

## 2021-04-14 RX ADMIN — Medication 4: at 08:19

## 2021-04-14 RX ADMIN — LISINOPRIL 5 MILLIGRAM(S): 2.5 TABLET ORAL at 05:53

## 2021-04-14 RX ADMIN — MIRTAZAPINE 7.5 MILLIGRAM(S): 45 TABLET, ORALLY DISINTEGRATING ORAL at 21:33

## 2021-04-14 RX ADMIN — ENOXAPARIN SODIUM 80 MILLIGRAM(S): 100 INJECTION SUBCUTANEOUS at 05:53

## 2021-04-14 RX ADMIN — PIPERACILLIN AND TAZOBACTAM 25 GRAM(S): 4; .5 INJECTION, POWDER, LYOPHILIZED, FOR SOLUTION INTRAVENOUS at 21:38

## 2021-04-14 RX ADMIN — CHLORHEXIDINE GLUCONATE 1 APPLICATION(S): 213 SOLUTION TOPICAL at 05:53

## 2021-04-14 RX ADMIN — ENOXAPARIN SODIUM 80 MILLIGRAM(S): 100 INJECTION SUBCUTANEOUS at 17:49

## 2021-04-14 RX ADMIN — PIPERACILLIN AND TAZOBACTAM 25 GRAM(S): 4; .5 INJECTION, POWDER, LYOPHILIZED, FOR SOLUTION INTRAVENOUS at 05:53

## 2021-04-14 RX ADMIN — GABAPENTIN 300 MILLIGRAM(S): 400 CAPSULE ORAL at 05:53

## 2021-04-14 RX ADMIN — PANTOPRAZOLE SODIUM 40 MILLIGRAM(S): 20 TABLET, DELAYED RELEASE ORAL at 11:45

## 2021-04-14 RX ADMIN — GABAPENTIN 300 MILLIGRAM(S): 400 CAPSULE ORAL at 17:50

## 2021-04-14 NOTE — PROGRESS NOTE ADULT - SUBJECTIVE AND OBJECTIVE BOX
Gastroenterology  No events overnight   NG tube     T(F): 99.8 (04-14-21 @ 05:04), Max: 99.8 (04-13-21 @ 17:08)  HR: 102 (04-14-21 @ 05:04) (95 - 102)  BP: 135/68 (04-14-21 @ 05:04) (110/64 - 135/68)  RR: 18 (04-14-21 @ 05:04) (18 - 19)  SpO2: 95% (04-14-21 @ 05:04) (95% - 98%)  Wt(kg): --  CAPILLARY BLOOD GLUCOSE      POCT Blood Glucose.: 181 mg/dL (13 Apr 2021 22:04)  POCT Blood Glucose.: 213 mg/dL (13 Apr 2021 16:32)  POCT Blood Glucose.: 177 mg/dL (13 Apr 2021 11:32)  POCT Blood Glucose.: 245 mg/dL (13 Apr 2021 07:51)      PHYSICAL EXAM:  General: NAD  Neuro:  Arousable   HEENT: NCAT, EOMI, conjunctiva clear  CV: +S1+S2 regular rate and rhythm  Lung: clear to ausculation bilaterally, respirations nonlabored, good inspiratory effort  Abdomen: soft, Non Tender, No distention Normal active BS  Extremities: no cyanosis, clubbing or edema    LABS:                        7.3    9.67  )-----------( 746      ( 13 Apr 2021 07:54 )             25.6     04-13    135  |  99  |  13  ----------------------------<  234<H>  4.2   |  33<H>  |  0.54    Ca    8.9      13 Apr 2021 07:54    TPro  5.9<L>  /  Alb  1.5<L>  /  TBili  0.6  /  DBili  x   /  AST  14<L>  /  ALT  12  /  AlkPhos  142<H>  04-13    LIVER FUNCTIONS - ( 13 Apr 2021 07:54 )  Alb: 1.5 g/dL / Pro: 5.9 gm/dL / ALK PHOS: 142 U/L / ALT: 12 U/L / AST: 14 U/L / GGT: x           PT/INR - ( 12 Apr 2021 16:08 )   PT: 13.4 sec;   INR: 1.16 ratio         PTT - ( 12 Apr 2021 16:08 )  PTT:38.7 sec  I&O's Detail    13 Apr 2021 07:01  -  14 Apr 2021 07:00  --------------------------------------------------------  IN:    Enteral Tube Flush: 300 mL    Free Water: 300 mL    Glucerna: 1320 mL    IV PiggyBack: 300 mL  Total IN: 2220 mL    OUT:    Oral Fluid: 0 mL    Voided (mL): 200 mL  Total OUT: 200 mL    Total NET: 2020 mL        04-13 @ 07:54    135 | 99 | 13  /8.9 | -- | --  _______________________/  4.2 | 33 | 0.54                           \par

## 2021-04-14 NOTE — PROGRESS NOTE ADULT - SUBJECTIVE AND OBJECTIVE BOX
INTERVAL HPI/OVERNIGHT EVENTS:    continues  with  NGT  feedings    REVIEW OF SYSTEMS:  CONSTITUTIONAL:  opens  eyes      MEDICATION:  acetaminophen    Suspension .. 650 milliGRAM(s) Oral every 6 hours PRN  ALBUTerol    90 MICROgram(s) HFA Inhaler 2 Puff(s) Inhalation every 4 hours  ALBUTerol    90 MICROgram(s) HFA Inhaler 2 Puff(s) Inhalation every 6 hours PRN  chlorhexidine 2% Cloths 1 Application(s) Topical <User Schedule>  dextrose 40% Gel 15 Gram(s) Oral once  dextrose 5%. 1000 milliLiter(s) IV Continuous <Continuous>  dextrose 5%. 1000 milliLiter(s) IV Continuous <Continuous>  dextrose 50% Injectable 25 Gram(s) IV Push once  dextrose 50% Injectable 12.5 Gram(s) IV Push once  dextrose 50% Injectable 25 Gram(s) IV Push once  enoxaparin Injectable 80 milliGRAM(s) SubCutaneous every 12 hours  ferrous    sulfate 325 milliGRAM(s) Oral daily  gabapentin 300 milliGRAM(s) Oral two times a day  glucagon  Injectable 1 milliGRAM(s) IntraMuscular once  insulin lispro (ADMELOG) corrective regimen sliding scale   SubCutaneous three times a day before meals  lisinopril 5 milliGRAM(s) Oral daily  mirtazapine 7.5 milliGRAM(s) Oral at bedtime  pantoprazole   Suspension 40 milliGRAM(s) Oral daily  piperacillin/tazobactam IVPB.. 3.375 Gram(s) IV Intermittent every 8 hours  polyethylene glycol 3350 17 Gram(s) Oral daily PRN  senna 1 Tablet(s) Oral at bedtime PRN    Vital Signs Last 24 Hrs  T(C): 37.7 (14 Apr 2021 05:04), Max: 37.7 (13 Apr 2021 17:08)  T(F): 99.8 (14 Apr 2021 05:04), Max: 99.8 (13 Apr 2021 17:08)  HR: 102 (14 Apr 2021 05:04) (95 - 102)  BP: 135/68 (14 Apr 2021 05:04) (110/64 - 135/68)  BP(mean): --  RR: 18 (14 Apr 2021 05:04) (18 - 19)  SpO2: 95% (14 Apr 2021 05:04) (95% - 98%)    PHYSICAL EXAM:  GENERAL: NAD,   HEENT : Conjuntivae  clear sclerae anicteric    NERVOUS SYSTEM:  opens  eyes  with  painful  stimulus  CHEST/LUNG: Clear    HEART: Regular rate and rhythm; No murmurs, rubs, or gallops  ABDOMEN: Soft, Nontender, Nondistended; Bowel sounds present  EXTREMITIES:    diffuse  edemas  SKIN: No rashes   LABS:                        7.3    9.67  )-----------( 746      ( 13 Apr 2021 07:54 )             25.6     04-13    135  |  99  |  13  ----------------------------<  234<H>  4.2   |  33<H>  |  0.54    Ca    8.9      13 Apr 2021 07:54    TPro  5.9<L>  /  Alb  1.5<L>  /  TBili  0.6  /  DBili  x   /  AST  14<L>  /  ALT  12  /  AlkPhos  142<H>  04-13    PT/INR - ( 12 Apr 2021 16:08 )   PT: 13.4 sec;   INR: 1.16 ratio         PTT - ( 12 Apr 2021 16:08 )  PTT:38.7 sec    CAPILLARY BLOOD GLUCOSE      POCT Blood Glucose.: 250 mg/dL (14 Apr 2021 07:57)  POCT Blood Glucose.: 181 mg/dL (13 Apr 2021 22:04)  POCT Blood Glucose.: 213 mg/dL (13 Apr 2021 16:32)  POCT Blood Glucose.: 177 mg/dL (13 Apr 2021 11:32)      RADIOLOGY & ADDITIONAL TESTS:    Imaging reports  Personally Reviewed:  [ ] YES  [ ] NO    Consultant(s) Notes Reviewed:  [x ] YES  [ ] NO    Care Discussed with Consultants/Other Providers [ x] YES  [ ] NO  Problem/Plan - 1:  ·  Problem: Pulmonary emboli.  Plan: lovenox    Problem/Plan - 2:  ·  Problem: DVT (deep venous thrombosis).  Plan: lovenox    HYPERNATREMIAN  CHANGE  IVF  TO  D5W 1/2 NS  Problem/Plan - 4:  ·  Problem: Asthma.  Plan: symbicort.     Problem/Plan - 5:  ·  Problem: DM (diabetes mellitus).  Plan: insulin coverage.     Problem/Plan - 6:  Problem: Failure to thrive in adult. Plan: encourage  oral  intake  rx  underlying  causes.   REMERON   severe  calorie  malnutrition cont  gt  feedings  will  add  additional  protein  fever  check  urine c/s  cxr tylenol  for  comfort    anemia  on  AC  for  dvt  and  PE    monitor  labs  transfuse  as needed     PNEUMONIA  ON ZOSYN     palliative  care  evaluation  appreciated    ethic  committee  evaluation appreciated    hospice  evaluation  appreciated     discusssed  with  pt's  son  agrees  to  PEG  placement

## 2021-04-14 NOTE — PROGRESS NOTE ADULT - ASSESSMENT
89yo F hx HTN, DM, hypokalemia, non-verbal at baseline, asthma, recent covid pna, sent to ED from [facility] for failure to thrive and found to have bilateral PEs, ?pulm htn    GI consulted for PEG placement.     High risk patient given recent pneumonia, advanced age, PE. Hospice consulted. Ethics consulted. NG tube in place.     **Discussed PEG at length with grandson and son. Discussed higher risk procedure including infection, perforation, bleeding, death. Unlikely to length life given clinical status, however they are adament with proceeding with the procedure.

## 2021-04-15 LAB
FLUAV AG NPH QL: SIGNIFICANT CHANGE UP
FLUBV AG NPH QL: SIGNIFICANT CHANGE UP
GLUCOSE BLDC GLUCOMTR-MCNC: 206 MG/DL — HIGH (ref 70–99)
GLUCOSE BLDC GLUCOMTR-MCNC: 217 MG/DL — HIGH (ref 70–99)
GLUCOSE BLDC GLUCOMTR-MCNC: 218 MG/DL — HIGH (ref 70–99)
GLUCOSE BLDC GLUCOMTR-MCNC: 226 MG/DL — HIGH (ref 70–99)
GLUCOSE BLDC GLUCOMTR-MCNC: 263 MG/DL — HIGH (ref 70–99)
GLUCOSE BLDC GLUCOMTR-MCNC: 293 MG/DL — HIGH (ref 70–99)
GLUCOSE BLDC GLUCOMTR-MCNC: 297 MG/DL — HIGH (ref 70–99)
SARS-COV-2 RNA SPEC QL NAA+PROBE: SIGNIFICANT CHANGE UP

## 2021-04-15 RX ADMIN — GABAPENTIN 300 MILLIGRAM(S): 400 CAPSULE ORAL at 17:41

## 2021-04-15 RX ADMIN — GABAPENTIN 300 MILLIGRAM(S): 400 CAPSULE ORAL at 05:41

## 2021-04-15 RX ADMIN — PIPERACILLIN AND TAZOBACTAM 25 GRAM(S): 4; .5 INJECTION, POWDER, LYOPHILIZED, FOR SOLUTION INTRAVENOUS at 18:16

## 2021-04-15 RX ADMIN — Medication 325 MILLIGRAM(S): at 12:32

## 2021-04-15 RX ADMIN — MIRTAZAPINE 7.5 MILLIGRAM(S): 45 TABLET, ORALLY DISINTEGRATING ORAL at 21:30

## 2021-04-15 RX ADMIN — Medication 6: at 12:31

## 2021-04-15 RX ADMIN — CHLORHEXIDINE GLUCONATE 1 APPLICATION(S): 213 SOLUTION TOPICAL at 05:41

## 2021-04-15 RX ADMIN — PANTOPRAZOLE SODIUM 40 MILLIGRAM(S): 20 TABLET, DELAYED RELEASE ORAL at 12:32

## 2021-04-15 RX ADMIN — LISINOPRIL 5 MILLIGRAM(S): 2.5 TABLET ORAL at 05:43

## 2021-04-15 RX ADMIN — Medication 4: at 16:19

## 2021-04-15 RX ADMIN — Medication 6: at 07:54

## 2021-04-15 RX ADMIN — ENOXAPARIN SODIUM 80 MILLIGRAM(S): 100 INJECTION SUBCUTANEOUS at 05:42

## 2021-04-15 RX ADMIN — PIPERACILLIN AND TAZOBACTAM 25 GRAM(S): 4; .5 INJECTION, POWDER, LYOPHILIZED, FOR SOLUTION INTRAVENOUS at 05:41

## 2021-04-15 RX ADMIN — PIPERACILLIN AND TAZOBACTAM 25 GRAM(S): 4; .5 INJECTION, POWDER, LYOPHILIZED, FOR SOLUTION INTRAVENOUS at 21:30

## 2021-04-15 NOTE — CHART NOTE - NSCHARTNOTEFT_GEN_A_CORE
Assessment: Pt's PMHx includes HTN, DM, CVA, asthma, recent COVID pna, non-verbal at baseline. Pt admitted from [facility] for FTT; found to have bilateral PEs. Per Gastroenterology, PEG placement discussed with family, risks explained, however family adamant about proceeding with PEG placement procedure; planned for Friday 4/16.    Factors impacting intake: [ ] none [ ] nausea  [ ] vomiting [ ] diarrhea [ ] constipation  [ ]chewing problems [ ] swallowing issues  [ ] other: NGT feedings    Diet Prescription: Diet, NPO with Tube Feed:   Tube Feeding Modality: Nasogastric  Glucerna 1.2 Renzo  Total Volume for 24 Hours (mL): 1320  Continuous  Starting Tube Feed Rate {mL per Hour}: 55  Increase Tube Feed Rate by (mL): 0     Every hour  Until Goal Tube Feed Rate (mL per Hour): 55  Tube Feed Duration (in Hours): 24  Tube Feed Start Time: 14:00  Free Water Flush   Total Volume per Flush (mL): 150   Frequency: Every 6 Hours   Total Daily Volume of Flush (mL): 600    Start Time: 14:00  No Carb Prosource (1pkg = 15gms Protein)     Qty per Day:  1 (04-13-21 @ 19:31)    Intake: TF running @ goal rate 55 ml/hr providing 1320 ml, 1584 kcal, 79 gm protein, 1069 ml free water + pt receiving No Carb Prosource 1x daily (60 kcal & 15 gm protein); Pt tolerating feeding well, no residuals    Current Weight: 71.1 kg (4/15), 78 kg (4/5), 76.5 kg (3/14)  % Weight Change: 7% wt loss x 1 month; pt with fluid fluctuations throughout admission, which eben affect true wt loss  Fluid accumulation: 1+ generalized edema    Pertinent Medications: MEDICATIONS  (STANDING):  ALBUTerol    90 MICROgram(s) HFA Inhaler 2 Puff(s) Inhalation every 4 hours  chlorhexidine 2% Cloths 1 Application(s) Topical <User Schedule>  dextrose 40% Gel 15 Gram(s) Oral once  dextrose 5%. 1000 milliLiter(s) (50 mL/Hr) IV Continuous <Continuous>  dextrose 5%. 1000 milliLiter(s) (100 mL/Hr) IV Continuous <Continuous>  dextrose 50% Injectable 25 Gram(s) IV Push once  dextrose 50% Injectable 12.5 Gram(s) IV Push once  dextrose 50% Injectable 25 Gram(s) IV Push once  enoxaparin Injectable 80 milliGRAM(s) SubCutaneous every 12 hours  ferrous    sulfate 325 milliGRAM(s) Oral daily  gabapentin 300 milliGRAM(s) Oral two times a day  glucagon  Injectable 1 milliGRAM(s) IntraMuscular once  insulin lispro (ADMELOG) corrective regimen sliding scale   SubCutaneous three times a day before meals  lisinopril 5 milliGRAM(s) Oral daily  mirtazapine 7.5 milliGRAM(s) Oral at bedtime  pantoprazole   Suspension 40 milliGRAM(s) Oral daily  piperacillin/tazobactam IVPB.. 3.375 Gram(s) IV Intermittent every 8 hours    MEDICATIONS  (PRN):  acetaminophen    Suspension .. 650 milliGRAM(s) Oral every 6 hours PRN Temp greater or equal to 38C (100.4F), Mild Pain (1 - 3)  ALBUTerol    90 MICROgram(s) HFA Inhaler 2 Puff(s) Inhalation every 6 hours PRN Wheezing  polyethylene glycol 3350 17 Gram(s) Oral daily PRN Constipation  senna 1 Tablet(s) Oral at bedtime PRN Constipation    Pertinent Labs: 04-14 Na141 mmol/L Glu 222 mg/dL<H> K+ 4.2 mmol/L Cr  0.69 mg/dL BUN 13 mg/dL 04-13 Alb 1.5 g/dL<L> 03-22 PAB 8 mg/dL<L>    CAPILLARY BLOOD GLUCOSE    POCT Blood Glucose.: 263 mg/dL (15 Apr 2021 07:39)  POCT Blood Glucose.: 226 mg/dL (15 Apr 2021 05:27)  POCT Blood Glucose.: 218 mg/dL (15 Apr 2021 00:35)  POCT Blood Glucose.: 235 mg/dL (14 Apr 2021 16:44)  POCT Blood Glucose.: 219 mg/dL (14 Apr 2021 11:07)    Skin: Pt with stage II pressure ulcer on sacrum (4/7), stage II pressure ulcer on R buttocks (4/7); pt also with perirectal skin tear & wound (bruise) on R achilles tendon    Estimated Needs:   [x] no change since previous assessment on 3/17  [ ] recalculated:     Previous Nutrition Diagnosis:   [x] Severe Malnutrition in context of acute illness  Etiology:  Inadequate energy/protein intake related to COVID illness, AMS, FTT dx  Signs & Symptoms:  3+ generalized edema & 4+ b/l arms; moderate muscle wasting     GOAL:  Pt to meet >75% energy/protein needs via tube feeding - goal continues to be met    Nutrition Diagnosis is [x] ongoing  [ ] resolved [ ] not applicable     New Nutrition Diagnosis: [x] not applicable       Interventions:   Recommend  [ ] Change Diet To:  [ ] Nutrition Supplement  [x] Nutrition Support: Continue with tube feed regimen  [x] Other: pending peg placement    Monitoring and Evaluation:   [ ] PO intake [ x ] Tolerance to diet prescription [ x ] weights [ x ] labs[ x ] follow up per protocol  [ ] other: Assessment: Pt's PMHx includes HTN, DM, CVA, asthma, recent COVID pna, non-verbal at baseline. Pt admitted from [facility] for FTT; found to have bilateral PEs. Per Gastroenterology, PEG placement discussed with family, risks explained, however family adamant about proceeding with PEG placement procedure; planned for Friday 4/16.    Factors impacting intake: [ ] none [ ] nausea  [ ] vomiting [ ] diarrhea [ ] constipation  [ ]chewing problems [ ] swallowing issues  [x] other: NGT feedings    Diet Prescription: Diet, NPO with Tube Feed:   Tube Feeding Modality: Nasogastric  Glucerna 1.2 Renzo  Total Volume for 24 Hours (mL): 1320  Continuous  Starting Tube Feed Rate {mL per Hour}: 55  Increase Tube Feed Rate by (mL): 0     Every hour  Until Goal Tube Feed Rate (mL per Hour): 55  Tube Feed Duration (in Hours): 24  Tube Feed Start Time: 14:00  Free Water Flush   Total Volume per Flush (mL): 150   Frequency: Every 6 Hours   Total Daily Volume of Flush (mL): 600    Start Time: 14:00  No Carb Prosource (1pkg = 15gms Protein)     Qty per Day:  1 (04-13-21 @ 19:31)    Intake: TF running @ goal rate 55 ml/hr providing 1320 ml, 1584 kcal, 79 gm protein, 1069 ml free water + pt receiving No Carb Prosource 1x daily (60 kcal & 15 gm protein); Pt tolerating feeding well, no residuals    Current Weight: 71.1 kg (4/15), 78 kg (4/5), 76.5 kg (3/14)  % Weight Change: 7% wt loss x 1 month; pt with fluid fluctuations throughout admission, which may affect true wt loss  Fluid accumulation: 1+ generalized edema    Pertinent Medications: MEDICATIONS  (STANDING):  ALBUTerol    90 MICROgram(s) HFA Inhaler 2 Puff(s) Inhalation every 4 hours  chlorhexidine 2% Cloths 1 Application(s) Topical <User Schedule>  dextrose 40% Gel 15 Gram(s) Oral once  dextrose 5%. 1000 milliLiter(s) (50 mL/Hr) IV Continuous <Continuous>  dextrose 5%. 1000 milliLiter(s) (100 mL/Hr) IV Continuous <Continuous>  dextrose 50% Injectable 25 Gram(s) IV Push once  dextrose 50% Injectable 12.5 Gram(s) IV Push once  dextrose 50% Injectable 25 Gram(s) IV Push once  enoxaparin Injectable 80 milliGRAM(s) SubCutaneous every 12 hours  ferrous    sulfate 325 milliGRAM(s) Oral daily  gabapentin 300 milliGRAM(s) Oral two times a day  glucagon  Injectable 1 milliGRAM(s) IntraMuscular once  insulin lispro (ADMELOG) corrective regimen sliding scale   SubCutaneous three times a day before meals  lisinopril 5 milliGRAM(s) Oral daily  mirtazapine 7.5 milliGRAM(s) Oral at bedtime  pantoprazole   Suspension 40 milliGRAM(s) Oral daily  piperacillin/tazobactam IVPB.. 3.375 Gram(s) IV Intermittent every 8 hours    MEDICATIONS  (PRN):  acetaminophen    Suspension .. 650 milliGRAM(s) Oral every 6 hours PRN Temp greater or equal to 38C (100.4F), Mild Pain (1 - 3)  ALBUTerol    90 MICROgram(s) HFA Inhaler 2 Puff(s) Inhalation every 6 hours PRN Wheezing  polyethylene glycol 3350 17 Gram(s) Oral daily PRN Constipation  senna 1 Tablet(s) Oral at bedtime PRN Constipation    Pertinent Labs: 04-14 Na141 mmol/L Glu 222 mg/dL<H> K+ 4.2 mmol/L Cr  0.69 mg/dL BUN 13 mg/dL 04-13 Alb 1.5 g/dL<L> 03-22 PAB 8 mg/dL<L>    CAPILLARY BLOOD GLUCOSE    POCT Blood Glucose.: 263 mg/dL (15 Apr 2021 07:39)  POCT Blood Glucose.: 226 mg/dL (15 Apr 2021 05:27)  POCT Blood Glucose.: 218 mg/dL (15 Apr 2021 00:35)  POCT Blood Glucose.: 235 mg/dL (14 Apr 2021 16:44)  POCT Blood Glucose.: 219 mg/dL (14 Apr 2021 11:07)    Skin: Pt with stage II pressure ulcer on sacrum (4/7), stage II pressure ulcer on R buttocks (4/7); pt also with perirectal skin tear, & wound (bruise) on R achilles tendon    Estimated Needs:   [x] no change since previous assessment on 3/17  [ ] recalculated:     Previous Nutrition Diagnosis:   [x] Severe Malnutrition in context of acute illness  Etiology:  Inadequate energy/protein intake related to COVID illness, AMS, FTT dx  Signs & Symptoms:  3+ generalized edema & 4+ b/l arms; moderate muscle wasting     GOAL:  Pt to meet >75% energy/protein needs via tube feeding - goal continues to be met    Nutrition Diagnosis is [x] ongoing  [ ] resolved [ ] not applicable     New Nutrition Diagnosis: [x] not applicable       Interventions:   Recommend  [ ] Change Diet To:  [ ] Nutrition Supplement  [x] Nutrition Support: Continue with current tube feed regimen  [ ] Other:    Monitoring and Evaluation:   [ ] PO intake [ x ] Tolerance to diet prescription [ x ] weights [ x ] labs[ x ] follow up per protocol  [ ] other:

## 2021-04-15 NOTE — PROGRESS NOTE ADULT - PROBLEM SELECTOR PROBLEM 2
Failure to thrive in adult
Other pulmonary embolism without acute cor pulmonale, unspecified chronicity
Electrolyte abnormality
Electrolyte abnormality

## 2021-04-15 NOTE — PROGRESS NOTE ADULT - SUBJECTIVE AND OBJECTIVE BOX
Gastroenterology  No events overnight   Arousable    T(F): 98 (04-15-21 @ 18:15), Max: 99.6 (04-15-21 @ 05:00)  HR: 96 (04-15-21 @ 18:15) (95 - 104)  BP: 122/67 (04-15-21 @ 18:15) (114/70 - 130/77)  RR: 17 (04-15-21 @ 18:15) (17 - 19)  SpO2: 96% (04-15-21 @ 18:15) (96% - 98%)  Wt(kg): --  CAPILLARY BLOOD GLUCOSE      POCT Blood Glucose.: 206 mg/dL (15 Apr 2021 16:11)  POCT Blood Glucose.: 293 mg/dL (15 Apr 2021 12:30)  POCT Blood Glucose.: 297 mg/dL (15 Apr 2021 11:08)  POCT Blood Glucose.: 263 mg/dL (15 Apr 2021 07:39)  POCT Blood Glucose.: 226 mg/dL (15 Apr 2021 05:27)  POCT Blood Glucose.: 218 mg/dL (15 Apr 2021 00:35)        LABS:                        7.3    8.73  )-----------( 713      ( 14 Apr 2021 10:19 )             24.6     04-14    141  |  102  |  13  ----------------------------<  222<H>  4.2   |  33<H>  |  0.69    Ca    8.6      14 Apr 2021 10:19          I&O's Detail    14 Apr 2021 07:01  -  15 Apr 2021 07:00  --------------------------------------------------------  IN:    Free Water: 150 mL    Glucerna: 550 mL    IV PiggyBack: 200 mL  Total IN: 900 mL    OUT:  Total OUT: 0 mL    Total NET: 900 mL        04-14 @ 10:19    141 | 102 | 13  /8.6 | -- | --  _______________________/  4.2 | 33 | 0.69                           \par

## 2021-04-15 NOTE — PROGRESS NOTE ADULT - PROBLEM SELECTOR PLAN 2
-On lovenox; hold 12 hours prior to PEG  -Appreciate pulmonary recommendations; clearance advised.
-On lovenox; hold 12 hours prior to PEG  -Appreciate pulmonary recommedations; clearance advised
-On lovenox; hold 12 hours prior to PEG  -Appreciate pulmonary recommendations; clearance advised.
-Appreciate speech and swallow evaluation; dysphagia diet   -Recommend palliative consultation; poor prognosis.
-On lovenox; hold 12 hours prior to PEG  -Appreciate pulmonary recommendations; clearance advised.
: -On lovenox; hold 12 hours prior to PEG  -Appreciate pulmonary recommendations; clearance advised.
hypernatremia today due to poor intake, water deficit  recurrent hypokalemia requiring replacement
hypernatremia today due to poor intake, water deficit

## 2021-04-15 NOTE — PROGRESS NOTE ADULT - ASSESSMENT
89yo F hx HTN, DM, hypokalemia, non-verbal at baseline, asthma, recent covid pna, sent to ED from [facility] for failure to thrive and found to have bilateral PEs, ?pulm htn    GI consulted for PEG placement.     High risk patient given recent pneumonia, advanced age, PE. Hospice consulted. Ethics consulted. NG tube in place.     **Discussed PEG at length with grandson and son. Discussed higher risk procedure including infection, perforation, bleeding, death.     Will proceed with PEG. Scheduled for tomorrow. Holding anticoagulation, NPO at midnight   *Consent obtained from kyra Cosby [phone]

## 2021-04-15 NOTE — PROGRESS NOTE ADULT - SUBJECTIVE AND OBJECTIVE BOX
INTERVAL HPI/OVERNIGHT EVENTS:        REVIEW OF SYSTEMS:  CONSTITUTIONAL:  opens  eyes      MEDICATION:  acetaminophen    Suspension .. 650 milliGRAM(s) Oral every 6 hours PRN  ALBUTerol    90 MICROgram(s) HFA Inhaler 2 Puff(s) Inhalation every 4 hours  ALBUTerol    90 MICROgram(s) HFA Inhaler 2 Puff(s) Inhalation every 6 hours PRN  chlorhexidine 2% Cloths 1 Application(s) Topical <User Schedule>  dextrose 40% Gel 15 Gram(s) Oral once  dextrose 5%. 1000 milliLiter(s) IV Continuous <Continuous>  dextrose 5%. 1000 milliLiter(s) IV Continuous <Continuous>  dextrose 50% Injectable 25 Gram(s) IV Push once  dextrose 50% Injectable 12.5 Gram(s) IV Push once  dextrose 50% Injectable 25 Gram(s) IV Push once  enoxaparin Injectable 80 milliGRAM(s) SubCutaneous every 12 hours  ferrous    sulfate 325 milliGRAM(s) Oral daily  gabapentin 300 milliGRAM(s) Oral two times a day  glucagon  Injectable 1 milliGRAM(s) IntraMuscular once  insulin lispro (ADMELOG) corrective regimen sliding scale   SubCutaneous three times a day before meals  lisinopril 5 milliGRAM(s) Oral daily  mirtazapine 7.5 milliGRAM(s) Oral at bedtime  pantoprazole   Suspension 40 milliGRAM(s) Oral daily  piperacillin/tazobactam IVPB.. 3.375 Gram(s) IV Intermittent every 8 hours  polyethylene glycol 3350 17 Gram(s) Oral daily PRN  senna 1 Tablet(s) Oral at bedtime PRN    Vital Signs Last 24 Hrs  T(C): 37.6 (15 Apr 2021 05:00), Max: 37.8 (14 Apr 2021 13:06)  T(F): 99.6 (15 Apr 2021 05:00), Max: 100 (14 Apr 2021 13:06)  HR: 99 (15 Apr 2021 05:00) (99 - 109)  BP: 130/77 (15 Apr 2021 05:00) (112/56 - 134/71)  BP(mean): --  RR: 18 (15 Apr 2021 05:00) (18 - 18)  SpO2: 97% (15 Apr 2021 05:00) (96% - 99%)    PHYSICAL EXAM:  GENERAL: NAD, well-groomed, well-developed  HEENT : Conjuntivae  pale  sclerae  anicteric  NERVOUS SYSTEM:  opens  eyes  with  verbal  stimulus  CHEST/LUNG: Clear    HEART: Regular rate and rhythm; No murmurs, rubs, or gallops  ABDOMEN: Soft, Nontender, Nondistended; Bowel sounds present  EXTREMITIES:    diffuse  edemas  SKIN: No rashes   LABS:                        7.3    8.73  )-----------( 713      ( 14 Apr 2021 10:19 )             24.6     04-14    141  |  102  |  13  ----------------------------<  222<H>  4.2   |  33<H>  |  0.69    Ca    8.6      14 Apr 2021 10:19          CAPILLARY BLOOD GLUCOSE      POCT Blood Glucose.: 263 mg/dL (15 Apr 2021 07:39)  POCT Blood Glucose.: 226 mg/dL (15 Apr 2021 05:27)  POCT Blood Glucose.: 218 mg/dL (15 Apr 2021 00:35)  POCT Blood Glucose.: 235 mg/dL (14 Apr 2021 16:44)  POCT Blood Glucose.: 219 mg/dL (14 Apr 2021 11:07)      RADIOLOGY & ADDITIONAL TESTS:    Imaging reports  Personally Reviewed:  [ ] YES  [ ] NO    Consultant(s) Notes Reviewed:  [x ] YES  [ ] NO    Care Discussed with Consultants/Other Providers [ x] YES  [ ] NO  Problem/Plan - 1:  ·  Problem: Pulmonary emboli.  Plan: lovenox    Problem/Plan - 2:  ·  Problem: DVT (deep venous thrombosis).  Plan: lovenox    HYPERNATREMIAN  CHANGE  IVF  TO  D5W 1/2 NS  Problem/Plan - 4:  ·  Problem: Asthma.  Plan: symbicort.     Problem/Plan - 5:  ·  Problem: DM (diabetes mellitus).  Plan: insulin coverage.     Problem/Plan - 6:  Problem: Failure to thrive in adult. Plan: encourage  oral  intake  rx  underlying  causes.   REMERON   severe  calorie  malnutrition cont  gt  feedings  will  add  additional  protein  fever  check  urine c/s  cxr tylenol  for  comfort    anemia  on  AC  for  dvt  and  PE    monitor  labs  transfuse  as needed     PNEUMONIA  ON ZOSYN     palliative  care  evaluation  appreciated    ethic  committee  evaluation appreciated    hospice  evaluation  appreciated     for  peg  placement  at  family's  request   patient    high  risk 2/2 comorbidities DVT  PE  DM   HTN  S/P  Covid 19  severe  protein  calorie  malnutrition   will  need  D/C  AC  for  procedure will give  tranfusion PRBCs

## 2021-04-16 LAB
ALBUMIN SERPL ELPH-MCNC: 1.5 G/DL — LOW (ref 3.3–5)
ALP SERPL-CCNC: 134 U/L — HIGH (ref 40–120)
ALT FLD-CCNC: 12 U/L — SIGNIFICANT CHANGE UP (ref 12–78)
ANION GAP SERPL CALC-SCNC: 5 MMOL/L — SIGNIFICANT CHANGE UP (ref 5–17)
AST SERPL-CCNC: 18 U/L — SIGNIFICANT CHANGE UP (ref 15–37)
BILIRUB SERPL-MCNC: 0.7 MG/DL — SIGNIFICANT CHANGE UP (ref 0.2–1.2)
BUN SERPL-MCNC: 11 MG/DL — SIGNIFICANT CHANGE UP (ref 7–23)
CALCIUM SERPL-MCNC: 9 MG/DL — SIGNIFICANT CHANGE UP (ref 8.5–10.1)
CHLORIDE SERPL-SCNC: 102 MMOL/L — SIGNIFICANT CHANGE UP (ref 96–108)
CO2 SERPL-SCNC: 28 MMOL/L — SIGNIFICANT CHANGE UP (ref 22–31)
CREAT SERPL-MCNC: 0.64 MG/DL — SIGNIFICANT CHANGE UP (ref 0.5–1.3)
GLUCOSE BLDC GLUCOMTR-MCNC: 131 MG/DL — HIGH (ref 70–99)
GLUCOSE BLDC GLUCOMTR-MCNC: 198 MG/DL — HIGH (ref 70–99)
GLUCOSE BLDC GLUCOMTR-MCNC: 213 MG/DL — HIGH (ref 70–99)
GLUCOSE BLDC GLUCOMTR-MCNC: 230 MG/DL — HIGH (ref 70–99)
GLUCOSE BLDC GLUCOMTR-MCNC: 236 MG/DL — HIGH (ref 70–99)
GLUCOSE SERPL-MCNC: 212 MG/DL — HIGH (ref 70–99)
HCT VFR BLD CALC: 29.2 % — LOW (ref 34.5–45)
HGB BLD-MCNC: 8.8 G/DL — LOW (ref 11.5–15.5)
MCHC RBC-ENTMCNC: 27.3 PG — SIGNIFICANT CHANGE UP (ref 27–34)
MCHC RBC-ENTMCNC: 30.1 GM/DL — LOW (ref 32–36)
MCV RBC AUTO: 90.7 FL — SIGNIFICANT CHANGE UP (ref 80–100)
NRBC # BLD: 0 /100 WBCS — SIGNIFICANT CHANGE UP (ref 0–0)
PLATELET # BLD AUTO: 694 K/UL — HIGH (ref 150–400)
POTASSIUM SERPL-MCNC: 4.3 MMOL/L — SIGNIFICANT CHANGE UP (ref 3.5–5.3)
POTASSIUM SERPL-SCNC: 4.3 MMOL/L — SIGNIFICANT CHANGE UP (ref 3.5–5.3)
PROT SERPL-MCNC: 6.5 GM/DL — SIGNIFICANT CHANGE UP (ref 6–8.3)
RBC # BLD: 3.22 M/UL — LOW (ref 3.8–5.2)
RBC # FLD: 18.3 % — HIGH (ref 10.3–14.5)
SODIUM SERPL-SCNC: 135 MMOL/L — SIGNIFICANT CHANGE UP (ref 135–145)
WBC # BLD: 9.82 K/UL — SIGNIFICANT CHANGE UP (ref 3.8–10.5)
WBC # FLD AUTO: 9.82 K/UL — SIGNIFICANT CHANGE UP (ref 3.8–10.5)

## 2021-04-16 PROCEDURE — 43246 EGD PLACE GASTROSTOMY TUBE: CPT | Mod: AS

## 2021-04-16 RX ORDER — SODIUM CHLORIDE 9 MG/ML
1000 INJECTION, SOLUTION INTRAVENOUS
Refills: 0 | Status: DISCONTINUED | OUTPATIENT
Start: 2021-04-16 | End: 2021-04-16

## 2021-04-16 RX ORDER — FENTANYL CITRATE 50 UG/ML
25 INJECTION INTRAVENOUS
Refills: 0 | Status: DISCONTINUED | OUTPATIENT
Start: 2021-04-16 | End: 2021-04-16

## 2021-04-16 RX ADMIN — GABAPENTIN 300 MILLIGRAM(S): 400 CAPSULE ORAL at 05:38

## 2021-04-16 RX ADMIN — CHLORHEXIDINE GLUCONATE 1 APPLICATION(S): 213 SOLUTION TOPICAL at 05:38

## 2021-04-16 RX ADMIN — MIRTAZAPINE 7.5 MILLIGRAM(S): 45 TABLET, ORALLY DISINTEGRATING ORAL at 21:42

## 2021-04-16 RX ADMIN — PIPERACILLIN AND TAZOBACTAM 25 GRAM(S): 4; .5 INJECTION, POWDER, LYOPHILIZED, FOR SOLUTION INTRAVENOUS at 05:38

## 2021-04-16 RX ADMIN — LISINOPRIL 5 MILLIGRAM(S): 2.5 TABLET ORAL at 05:38

## 2021-04-16 RX ADMIN — PIPERACILLIN AND TAZOBACTAM 25 GRAM(S): 4; .5 INJECTION, POWDER, LYOPHILIZED, FOR SOLUTION INTRAVENOUS at 13:43

## 2021-04-16 RX ADMIN — Medication 4: at 08:04

## 2021-04-16 RX ADMIN — PIPERACILLIN AND TAZOBACTAM 25 GRAM(S): 4; .5 INJECTION, POWDER, LYOPHILIZED, FOR SOLUTION INTRAVENOUS at 21:42

## 2021-04-16 RX ADMIN — Medication 650 MILLIGRAM(S): at 05:50

## 2021-04-16 NOTE — PROGRESS NOTE ADULT - ASSESSMENT
89yo F hx HTN, DM, hypokalemia, non-verbal at baseline, asthma, recent covid pna, sent to ED from [facility] for failure to thrive and found to have bilateral PEs, ?pulm htn    GI consulted for PEG placement.     High risk patient given recent pneumonia, advanced age, PE. Hospice consulted. Ethics consulted. NG tube in place.     **Discussed PEG at length with grandson and son. Discussed higher risk procedure including infection, perforation, bleeding, death.      PEG. 4/16. placement successfully

## 2021-04-16 NOTE — PROGRESS NOTE ADULT - SUBJECTIVE AND OBJECTIVE BOX
Gastroenterology  Patient seen and examined bedside resting comfortably.  non verbal    T(F): 98.7 (04-16-21 @ 11:30), Max: 100.4 (04-16-21 @ 05:00)  HR: 93 (04-16-21 @ 11:30) (93 - 101)  BP: 114/78 (04-16-21 @ 11:30) (114/78 - 126/75)  RR: 18 (04-16-21 @ 11:30) (17 - 18)  SpO2: 95% (04-16-21 @ 11:30) (95% - 98%)  Wt(kg): --  CAPILLARY BLOOD GLUCOSE      POCT Blood Glucose.: 131 mg/dL (16 Apr 2021 11:31)  POCT Blood Glucose.: 236 mg/dL (16 Apr 2021 07:35)  POCT Blood Glucose.: 198 mg/dL (16 Apr 2021 05:50)  POCT Blood Glucose.: 230 mg/dL (16 Apr 2021 00:10)  POCT Blood Glucose.: 217 mg/dL (15 Apr 2021 21:28)  POCT Blood Glucose.: 206 mg/dL (15 Apr 2021 16:11)      PHYSICAL EXAM:  General: NAD, WDWN.   Neuro:  Alert   HEENT: NCAT, EOMI, conjunctiva clear  CV: +S1+S2 regular rate and rhythm  Lung: clear to ausculation bilaterally, respirations nonlabored, good inspiratory effort  Abdomen: soft, Obese Non Tender, No distention Normal active BS  Extremities: no cyanosis, clubbing or edema    LABS:                        8.8    9.82  )-----------( 694      ( 16 Apr 2021 07:18 )             29.2     04-16    135  |  102  |  11  ----------------------------<  212<H>  4.3   |  28  |  0.64    Ca    9.0      16 Apr 2021 07:18    TPro  6.5  /  Alb  1.5<L>  /  TBili  0.7  /  DBili  x   /  AST  18  /  ALT  12  /  AlkPhos  134<H>  04-16    LIVER FUNCTIONS - ( 16 Apr 2021 07:18 )  Alb: 1.5 g/dL / Pro: 6.5 gm/dL / ALK PHOS: 134 U/L / ALT: 12 U/L / AST: 18 U/L / GGT: x             I&O's Detail    15 Apr 2021 07:01  -  16 Apr 2021 07:00  --------------------------------------------------------  IN:    IV PiggyBack: 200 mL  Total IN: 200 mL    OUT:    Voided (mL): 1250 mL  Total OUT: 1250 mL    Total NET: -1050 mL      16 Apr 2021 07:01  -  16 Apr 2021 15:59  --------------------------------------------------------  IN:  Total IN: 0 mL    OUT:    Voided (mL): 600 mL  Total OUT: 600 mL    Total NET: -600 mL        04-16 @ 07:18    135 | 102 | 11  /9.0 | -- | --  _______________________/  4.3 | 28 | 0.64                           \par

## 2021-04-16 NOTE — PROGRESS NOTE ADULT - ASSESSMENT
89yo F hx HTN, DM, hypokalemia, non-verbal at baseline, asthma, recent covid pna, sent to ED from [facility] for failure to thrive and found to have bilateral PEs, ?pulm htn    GI consulted for PEG placement.     High risk patient given recent pneumonia, advanced age, PE. Hospice consulted. Ethics consulted. NG tube in place.     **Discussed PEG at length with grandson and son. Discussed higher risk procedure including infection, perforation, bleeding, death.     Will proceed with PEG. Scheduled for tomorrow. Holding anticoagulation, NPO at midnight   *Consent obtained from kyra Cosby [phone]

## 2021-04-16 NOTE — PROGRESS NOTE ADULT - SUBJECTIVE AND OBJECTIVE BOX
EGD/PEG NOTE  Indication: DYSPHAGIA    Anesthesia MAC provided by :DR WATSON     Assistant: JENNA MORALES    Pre-Medications: ZOSYN    PROCEDURE: The patient was taken to the endoscopy suite. She was placed in 30 degrees head-up position. A time-out was held, and the patient and the planned procedure were confirmed with all those present. The patient was placed on pulse oximetry and a monitor as well as nasal cannula oxygen. Once an adequate level of sedation was reached, a bite block was placed, and the esophagus was intubated. We then passed the GE junction and entered the stomach. We continued the endoscopy out to the first portion of the duodenum. There were no ulcers present.     The light from the endoscope was readily visible through the patient's anterior abdominal wall, 2 cm inferior to the left costal margin. This transilluminated quite easily. A single finger impulse was seen briskly through the gastroscope as well. The patient's epigastrium was then prepped with Betadine and draped in a standard sterile fashion. We used a local needle to infiltrate the skin over the proposed PEG site first. This was infiltrated with a wheal of lidocaine. The local needle was then used to enter the stomach. Suction was held on this as it was used to enter the stomach, and we saw no air or succus or stool prior to entering the gastric lumen. Local was then infiltrated as we withdrew the needle along the path of this.    A small stab incision was then made, and the introducer needle was then placed while aspirating into the gastric lumen. Again, no air, succus or stool was noted prior to visualizing the tip of the needle in the gastric lumen. The guidewire was then placed through the introducer needle. This was grasped and was withdrawn through the patient's mouth. This was then attached to a #20 PEG tube, which was then pulled in an antegrade manner into the stomach and out the anterior abdominal wall. The esophagus was reentered with the gastroscope, and this was advanced into the stomach. The tube at 2.5 - 3. 0 cm was noted. It appeared it spin easily. It was secured at this depth, then cut to size, and placed to gravity drainage.    The stomach was then desufflated, and the endoscope was then withdrawn along the length of the esophagus. The patient tolerated the procedure well. There were no immediate complications noted.

## 2021-04-16 NOTE — PROGRESS NOTE ADULT - SUBJECTIVE AND OBJECTIVE BOX
INTERVAL HPI/OVERNIGHT EVENTS:        REVIEW OF SYSTEMS:  CONSTITUTIONAL:  alert      MEDICATION:  acetaminophen    Suspension .. 650 milliGRAM(s) Oral every 6 hours PRN  ALBUTerol    90 MICROgram(s) HFA Inhaler 2 Puff(s) Inhalation every 4 hours  ALBUTerol    90 MICROgram(s) HFA Inhaler 2 Puff(s) Inhalation every 6 hours PRN  chlorhexidine 2% Cloths 1 Application(s) Topical <User Schedule>  dextrose 40% Gel 15 Gram(s) Oral once  dextrose 5%. 1000 milliLiter(s) IV Continuous <Continuous>  dextrose 5%. 1000 milliLiter(s) IV Continuous <Continuous>  dextrose 50% Injectable 25 Gram(s) IV Push once  dextrose 50% Injectable 12.5 Gram(s) IV Push once  dextrose 50% Injectable 25 Gram(s) IV Push once  ferrous    sulfate 325 milliGRAM(s) Oral daily  gabapentin 300 milliGRAM(s) Oral two times a day  glucagon  Injectable 1 milliGRAM(s) IntraMuscular once  insulin lispro (ADMELOG) corrective regimen sliding scale   SubCutaneous three times a day before meals  lisinopril 5 milliGRAM(s) Oral daily  mirtazapine 7.5 milliGRAM(s) Oral at bedtime  pantoprazole   Suspension 40 milliGRAM(s) Oral daily  piperacillin/tazobactam IVPB.. 3.375 Gram(s) IV Intermittent every 8 hours  polyethylene glycol 3350 17 Gram(s) Oral daily PRN  senna 1 Tablet(s) Oral at bedtime PRN    Vital Signs Last 24 Hrs  T(C): 38 (16 Apr 2021 05:00), Max: 38 (16 Apr 2021 05:00)  T(F): 100.4 (16 Apr 2021 05:00), Max: 100.4 (16 Apr 2021 05:00)  HR: 101 (16 Apr 2021 05:00) (94 - 101)  BP: 121/77 (16 Apr 2021 05:00) (114/70 - 128/77)  BP(mean): --  RR: 17 (16 Apr 2021 05:00) (17 - 19)  SpO2: 98% (16 Apr 2021 05:00) (95% - 98%)    PHYSICAL EXAM:  GENERAL: NAD,   HEENT : Conjuntivae  clear sclerae anicteric  NERVOUS SYSTEM:  Alert   CHEST/LUNG: Clear    HEART: Regular rate and rhythm; No murmurs, rubs, or gallops  ABDOMEN: Soft, Nontender, Nondistended; Bowel sounds present  EXTREMITIES:  no  edema no  tenderness  SKIN: No rashes   LABS:                        8.8    9.82  )-----------( 694      ( 16 Apr 2021 07:18 )             29.2     04-16    135  |  102  |  11  ----------------------------<  212<H>  4.3   |  28  |  0.64    Ca    9.0      16 Apr 2021 07:18    TPro  6.5  /  Alb  1.5<L>  /  TBili  0.7  /  DBili  x   /  AST  18  /  ALT  12  /  AlkPhos  134<H>  04-16        CAPILLARY BLOOD GLUCOSE      POCT Blood Glucose.: 236 mg/dL (16 Apr 2021 07:35)  POCT Blood Glucose.: 198 mg/dL (16 Apr 2021 05:50)  POCT Blood Glucose.: 230 mg/dL (16 Apr 2021 00:10)  POCT Blood Glucose.: 217 mg/dL (15 Apr 2021 21:28)  POCT Blood Glucose.: 206 mg/dL (15 Apr 2021 16:11)  POCT Blood Glucose.: 293 mg/dL (15 Apr 2021 12:30)  POCT Blood Glucose.: 297 mg/dL (15 Apr 2021 11:08)      RADIOLOGY & ADDITIONAL TESTS:    Imaging reports  Personally Reviewed:  [ ] YES  [ ] NO    Consultant(s) Notes Reviewed:  [ x] YES  [ ] NO    Care Discussed with Consultants/Other Providers [x ] YES  [ ] NO  Problem/Plan - 1:  ·  Problem: Pulmonary emboli.  Plan: lovenox    Problem/Plan - 2:  ·  Problem: DVT (deep venous thrombosis).  Plan: lovenox    HYPERNATREMIAN  CHANGE  IVF  TO  D5W 1/2 NS  Problem/Plan - 4:  ·  Problem: Asthma.  Plan: symbicort.     Problem/Plan - 5:  ·  Problem: DM (diabetes mellitus).  Plan: insulin coverage.     Problem/Plan - 6:  Problem: Failure to thrive in adult. Plan: encourage  oral  intake  rx  underlying  causes.   REMERON   severe  calorie  malnutrition cont  gt  feedings  will  add  additional  protein  fever  check  urine c/s  cxr tylenol  for  comfort    anemia  on  AC  for  dvt  and  PE    monitor  labs  transfuse  as needed     PNEUMONIA  ON ZOSYN   oxygenating  well tylenol  for  fever      palliative  care  evaluation  appreciated    ethic  committee  evaluation appreciated    hospice  evaluation  appreciated     for  peg  placement  at  family's  request   patient    high  risk 2/2 comorbidities DVT  PE  DM   HTN  S/P  Covid 19  severe  protein  calorie  malnutrition   patient  optimized  for  procedure

## 2021-04-17 LAB
ALBUMIN SERPL ELPH-MCNC: 1.4 G/DL — LOW (ref 3.3–5)
ALP SERPL-CCNC: 131 U/L — HIGH (ref 40–120)
ALT FLD-CCNC: 14 U/L — SIGNIFICANT CHANGE UP (ref 12–78)
ANION GAP SERPL CALC-SCNC: 7 MMOL/L — SIGNIFICANT CHANGE UP (ref 5–17)
AST SERPL-CCNC: 16 U/L — SIGNIFICANT CHANGE UP (ref 15–37)
BILIRUB SERPL-MCNC: 0.5 MG/DL — SIGNIFICANT CHANGE UP (ref 0.2–1.2)
BUN SERPL-MCNC: 12 MG/DL — SIGNIFICANT CHANGE UP (ref 7–23)
CALCIUM SERPL-MCNC: 8.4 MG/DL — LOW (ref 8.5–10.1)
CHLORIDE SERPL-SCNC: 104 MMOL/L — SIGNIFICANT CHANGE UP (ref 96–108)
CO2 SERPL-SCNC: 28 MMOL/L — SIGNIFICANT CHANGE UP (ref 22–31)
CREAT SERPL-MCNC: 0.65 MG/DL — SIGNIFICANT CHANGE UP (ref 0.5–1.3)
GLUCOSE BLDC GLUCOMTR-MCNC: 214 MG/DL — HIGH (ref 70–99)
GLUCOSE BLDC GLUCOMTR-MCNC: 222 MG/DL — HIGH (ref 70–99)
GLUCOSE BLDC GLUCOMTR-MCNC: 364 MG/DL — HIGH (ref 70–99)
GLUCOSE SERPL-MCNC: 350 MG/DL — HIGH (ref 70–99)
HCT VFR BLD CALC: 28.3 % — LOW (ref 34.5–45)
HGB BLD-MCNC: 8.2 G/DL — LOW (ref 11.5–15.5)
MCHC RBC-ENTMCNC: 27 PG — SIGNIFICANT CHANGE UP (ref 27–34)
MCHC RBC-ENTMCNC: 29 GM/DL — LOW (ref 32–36)
MCV RBC AUTO: 93.1 FL — SIGNIFICANT CHANGE UP (ref 80–100)
NRBC # BLD: 0 /100 WBCS — SIGNIFICANT CHANGE UP (ref 0–0)
PLATELET # BLD AUTO: 579 K/UL — HIGH (ref 150–400)
POTASSIUM SERPL-MCNC: 4.3 MMOL/L — SIGNIFICANT CHANGE UP (ref 3.5–5.3)
POTASSIUM SERPL-SCNC: 4.3 MMOL/L — SIGNIFICANT CHANGE UP (ref 3.5–5.3)
PROT SERPL-MCNC: 5.9 GM/DL — LOW (ref 6–8.3)
RBC # BLD: 3.04 M/UL — LOW (ref 3.8–5.2)
RBC # FLD: 18 % — HIGH (ref 10.3–14.5)
SODIUM SERPL-SCNC: 139 MMOL/L — SIGNIFICANT CHANGE UP (ref 135–145)
WBC # BLD: 9.64 K/UL — SIGNIFICANT CHANGE UP (ref 3.8–10.5)
WBC # FLD AUTO: 9.64 K/UL — SIGNIFICANT CHANGE UP (ref 3.8–10.5)

## 2021-04-17 PROCEDURE — 93970 EXTREMITY STUDY: CPT | Mod: 26

## 2021-04-17 RX ORDER — ENOXAPARIN SODIUM 100 MG/ML
80 INJECTION SUBCUTANEOUS EVERY 12 HOURS
Refills: 0 | Status: DISCONTINUED | OUTPATIENT
Start: 2021-04-17 | End: 2021-04-27

## 2021-04-17 RX ADMIN — PANTOPRAZOLE SODIUM 40 MILLIGRAM(S): 20 TABLET, DELAYED RELEASE ORAL at 11:46

## 2021-04-17 RX ADMIN — ENOXAPARIN SODIUM 80 MILLIGRAM(S): 100 INJECTION SUBCUTANEOUS at 17:17

## 2021-04-17 RX ADMIN — Medication 10: at 08:15

## 2021-04-17 RX ADMIN — Medication 4: at 12:03

## 2021-04-17 RX ADMIN — Medication 650 MILLIGRAM(S): at 22:45

## 2021-04-17 RX ADMIN — Medication 325 MILLIGRAM(S): at 11:46

## 2021-04-17 RX ADMIN — CHLORHEXIDINE GLUCONATE 1 APPLICATION(S): 213 SOLUTION TOPICAL at 05:23

## 2021-04-17 RX ADMIN — MIRTAZAPINE 7.5 MILLIGRAM(S): 45 TABLET, ORALLY DISINTEGRATING ORAL at 22:45

## 2021-04-17 RX ADMIN — LISINOPRIL 5 MILLIGRAM(S): 2.5 TABLET ORAL at 05:23

## 2021-04-17 RX ADMIN — GABAPENTIN 300 MILLIGRAM(S): 400 CAPSULE ORAL at 05:23

## 2021-04-17 RX ADMIN — Medication 4: at 17:17

## 2021-04-17 RX ADMIN — PIPERACILLIN AND TAZOBACTAM 25 GRAM(S): 4; .5 INJECTION, POWDER, LYOPHILIZED, FOR SOLUTION INTRAVENOUS at 05:23

## 2021-04-17 RX ADMIN — GABAPENTIN 300 MILLIGRAM(S): 400 CAPSULE ORAL at 17:18

## 2021-04-17 NOTE — PROGRESS NOTE ADULT - ASSESSMENT
89yo F hx HTN, DM, hypokalemia, non-verbal at baseline, asthma, recent covid pna, sent to ED from [facility] for failure to thrive and found to have bilateral PEs, ?pulm htn    GI consulted for PEG placement.     High risk patient given recent pneumonia, advanced age, PE. Hospice consulted. Ethics consulted. NG tube in place.     **Discussed PEG at length with grandson and son. Discussed higher risk procedure including infection, perforation, bleeding, death.      PEG. 4/16. placement successfully

## 2021-04-17 NOTE — CHART NOTE - NSCHARTNOTEFT_GEN_A_CORE
Hospitalist Medicine NP Note.    EVENT: RN Called to see patient with a large ecchymotic area on patients left lower extremity and a swelling with redness and warm to touch area on her right shin area.    SUBJECTIVE: Patient is seen and evaluated and patient is confused and she is unable to give any explanation what happened.    OBJECTIVE: Noted a large ecchymosis with swelling posterior aspect of below left knee and a swelling with warm to touch area on her right shin area.  Vital Signs Last 24 Hrs  T(C): 36.4 (17 Apr 2021 11:20), Max: 37.7 (17 Apr 2021 05:06)  T(F): 97.5 (17 Apr 2021 11:20), Max: 99.9 (17 Apr 2021 05:06)  HR: 108 (17 Apr 2021 16:38) (66 - 108)  BP: 125/66 (17 Apr 2021 11:20) (109/62 - 126/72)  RR: 20 (17 Apr 2021 16:38) (18 - 20)  SpO2: 92% (17 Apr 2021 16:38) (92% - 100%)    LABS:                        8.2    9.64  )-----------( 579      ( 17 Apr 2021 07:47 )             28.3     04-17    139  |  104  |  12  ----------------------------<  350<H>  4.3   |  28  |  0.65    Ca    8.4<L>      17 Apr 2021 07:47    TPro  5.9<L>  /  Alb  1.4<L>  /  TBili  0.5  /  DBili  x   /  AST  16  /  ALT  14  /  AlkPhos  131<H>  04-17      EKG:   IMGAGING:    ASSESSMENT:  HPI:   89yo F hx HTN, DM, hypokalemia, non-verbal at baseline, asthma, sent to ED from [facility] for failure to thrive. History obtained from chart review. Per charts, patient continues to have electrolyte disturbances with potassium repletion. Has been refusing food in the past few days,putting hands over her mouth when they attempt to feed her. Overall weight loss of 10 kg since admission. PT  WITH  HX   COVID  19  ALREADY  RX  IN  HOSPITAL  CONTINUES  +  IN  [facility] SINCE  3/8/21  EVALUATED  IN  ER  FOUND  TO  LAVE  EXTENSIVE DVT  BILATERAL PE  AFIB STARTED  ON  IV  HEPARIN  +  SOB  LEG  PAIN  POOR  APETITE     (14 Mar 2021 20:05)      PLAN: VENOUS DOPPLER BILATERAL LOWER EXTREMITY URGENT ORDERED.  Dr. Barbosa made aware.  labs in am.  EUGENIE Agudelo C

## 2021-04-17 NOTE — PROGRESS NOTE ADULT - SUBJECTIVE AND OBJECTIVE BOX
Gastroenterology  Patient seen and examined bedside resting comfortably.  without distress    T(F): 97.5 (04-17-21 @ 11:20), Max: 99.9 (04-17-21 @ 05:06)  HR: 96 (04-17-21 @ 11:20) (66 - 109)  BP: 125/66 (04-17-21 @ 11:20) (109/62 - 146/68)  RR: 19 (04-17-21 @ 11:20) (18 - 29)  SpO2: 93% (04-17-21 @ 11:20) (93% - 100%)  Wt(kg): --  CAPILLARY BLOOD GLUCOSE      POCT Blood Glucose.: 222 mg/dL (17 Apr 2021 11:50)  POCT Blood Glucose.: 364 mg/dL (17 Apr 2021 08:07)  POCT Blood Glucose.: 213 mg/dL (16 Apr 2021 21:14)      PHYSICAL EXAM:  General: NAD, WDWN.   Neuro:  Alert & responsive  HEENT: NCAT, EOMI, conjunctiva clear  CV: +S1+S2 regular rate and rhythm  Lung: clear to ausculation bilaterally, respirations nonlabored, good inspiratory effort  Abdomen: soft, Non Tender, Obese, + PEG site clear  No distention Normal active BS  Extremities: no cyanosis, clubbing or edema    LABS:                        8.2    9.64  )-----------( 579      ( 17 Apr 2021 07:47 )             28.3     04-17    139  |  104  |  12  ----------------------------<  350<H>  4.3   |  28  |  0.65    Ca    8.4<L>      17 Apr 2021 07:47    TPro  5.9<L>  /  Alb  1.4<L>  /  TBili  0.5  /  DBili  x   /  AST  16  /  ALT  14  /  AlkPhos  131<H>  04-17    LIVER FUNCTIONS - ( 17 Apr 2021 07:47 )  Alb: 1.4 g/dL / Pro: 5.9 gm/dL / ALK PHOS: 131 U/L / ALT: 14 U/L / AST: 16 U/L / GGT: x             I&O's Detail    16 Apr 2021 07:01  -  17 Apr 2021 07:00  --------------------------------------------------------  IN:  Total IN: 0 mL    OUT:    Voided (mL): 600 mL  Total OUT: 600 mL    Total NET: -600 mL      17 Apr 2021 07:01  -  17 Apr 2021 15:52  --------------------------------------------------------  IN:  Total IN: 0 mL    OUT:    Voided (mL): 600 mL  Total OUT: 600 mL    Total NET: -600 mL        04-17 @ 07:47    139 | 104 | 12  /8.4 | -- | --  _______________________/  4.3 | 28 | 0.65                           \par

## 2021-04-17 NOTE — PROGRESS NOTE ADULT - SUBJECTIVE AND OBJECTIVE BOX
INTERVAL HPI/OVERNIGHT EVENTS:    s/p  peg  placement    REVIEW OF SYSTEMS:  CONSTITUTIONAL:  alert    MEDICATION:  acetaminophen    Suspension .. 650 milliGRAM(s) Oral every 6 hours PRN  ALBUTerol    90 MICROgram(s) HFA Inhaler 2 Puff(s) Inhalation every 4 hours  ALBUTerol    90 MICROgram(s) HFA Inhaler 2 Puff(s) Inhalation every 6 hours PRN  chlorhexidine 2% Cloths 1 Application(s) Topical <User Schedule>  dextrose 40% Gel 15 Gram(s) Oral once  dextrose 5%. 1000 milliLiter(s) IV Continuous <Continuous>  dextrose 5%. 1000 milliLiter(s) IV Continuous <Continuous>  dextrose 50% Injectable 25 Gram(s) IV Push once  dextrose 50% Injectable 12.5 Gram(s) IV Push once  dextrose 50% Injectable 25 Gram(s) IV Push once  ferrous    sulfate 325 milliGRAM(s) Oral daily  gabapentin 300 milliGRAM(s) Oral two times a day  glucagon  Injectable 1 milliGRAM(s) IntraMuscular once  insulin lispro (ADMELOG) corrective regimen sliding scale   SubCutaneous three times a day before meals  lisinopril 5 milliGRAM(s) Oral daily  mirtazapine 7.5 milliGRAM(s) Oral at bedtime  pantoprazole   Suspension 40 milliGRAM(s) Oral daily  piperacillin/tazobactam IVPB.. 3.375 Gram(s) IV Intermittent every 8 hours  polyethylene glycol 3350 17 Gram(s) Oral daily PRN  senna 1 Tablet(s) Oral at bedtime PRN    Vital Signs Last 24 Hrs  T(C): 37.7 (17 Apr 2021 05:06), Max: 37.7 (17 Apr 2021 05:06)  T(F): 99.9 (17 Apr 2021 05:06), Max: 99.9 (17 Apr 2021 05:06)  HR: 103 (17 Apr 2021 05:06) (88 - 109)  BP: 111/53 (17 Apr 2021 05:06) (109/62 - 146/68)  BP(mean): --  RR: 18 (17 Apr 2021 05:06) (18 - 29)  SpO2: 98% (17 Apr 2021 05:06) (95% - 100%)    PHYSICAL EXAM:  GENERAL: NAD, well-groomed, well-developed  HEENT : Conjuntivae  clear sclerae anicteric  NECK: Supple, No JVD, Normal thyroid  NERVOUS SYSTEM:  Alert  CHEST/LUNG: Clear    HEART: Regular rate and rhythm; No murmurs, rubs, or gallops  ABDOMEN: Soft, Nontender, Nondistended; Bowel sounds present  EXTREMITIES:  no  edema no  tenderness  SKIN: No rashes   LABS:                        8.2    9.64  )-----------( 579      ( 17 Apr 2021 07:47 )             28.3     04-16    135  |  102  |  11  ----------------------------<  212<H>  4.3   |  28  |  0.64    Ca    9.0      16 Apr 2021 07:18    TPro  6.5  /  Alb  1.5<L>  /  TBili  0.7  /  DBili  x   /  AST  18  /  ALT  12  /  AlkPhos  134<H>  04-16        CAPILLARY BLOOD GLUCOSE      POCT Blood Glucose.: 364 mg/dL (17 Apr 2021 08:07)  POCT Blood Glucose.: 213 mg/dL (16 Apr 2021 21:14)  POCT Blood Glucose.: 131 mg/dL (16 Apr 2021 11:31)      RADIOLOGY & ADDITIONAL TESTS:    Imaging reports  Personally Reviewed:  [ ] YES  [ ] NO    Consultant(s) Notes Reviewed:  [ x] YES  [ ] NO    Care Discussed with Consultants/Other Providers [x ] YES  [ ] NO  Problem/Plan - 1:  ·  Problem: Pulmonary emboli.  Plan: lovenox    Problem/Plan - 2:  ·  Problem: DVT (deep venous thrombosis).  Plan: lovenox    HYPERNATREMIAN  CHANGE  IVF  TO  D5W 1/2 NS  Problem/Plan - 4:  ·  Problem: Asthma.  Plan: symbicort.     Problem/Plan - 5:  ·  Problem: DM (diabetes mellitus).  Plan: insulin coverage.     Problem/Plan - 6:  Problem: Failure to thrive in adult. Plan: encourage  oral  intake  rx  underlying  causes.   REMERON   severe  calorie  malnutrition cont  gt  feedings  will  add  additional  protein  fever  check  urine c/s  cxr tylenol  for  comfort    anemia  on  AC  for  dvt  and  PE    monitor  labs  transfuse  as needed     PNEUMONIA    d/c  zosyn      palliative  care  evaluation  appreciated    ethic  committee  evaluation appreciated    hospice  evaluation  appreciated    s/p  peg  placement  cont  feedings  via  PEG

## 2021-04-17 NOTE — PROGRESS NOTE ADULT - PROBLEM SELECTOR PLAN 1
-PEG on hold pending family discussion; they are unsure they want to proceed at this time and may consider comfort feeding  mLM on cell for magi  -Pulmonary clearance prior to PEG  *Lovenox will need to be held 12 hours prior   -Continue NG tube feeds   -Appreciate palliative and hospice recommendations re: goals of care.
-PEG planned for Friday 4/16/21  -NPO after midnight   -Lovenox held 12 hours prior  -Appreciate palliative and hospice recommendations.
- see post orders
-PEG on hold pending family discussion; they are unsure they want to proceed at this time and may consider comfort feeding  -Pulmonary clearance prior to PEG  *Lovenox will need to be held 12 hours prior   -Continue NG tube feeds   -Appreciate palliative and hospice recommendations re: goals of care.
- advance to goal rate   - please recall as needed
-PEG planned for Friday   -Pulmonary clearance prior to PEG  *Lovenox will need to be held 12 hours prior   -Continue NG tube feeds   -Appreciate palliative and hospice recommendations re: goals of care.
-Pantoprazole 40 mg twice daily  -Zofran prn   -Dysphagia diet  **Holding endoscopic evaluation given acute PE; poor prognosis in the setting of acute PE, advanced age, ?dementia (non-verbal). Decreased po intake is a very poor prognostic indicator.
-PEG planned for Friday 4/16/21  -NPO after midnight   -Lovenox held 12 hours prior  -Appreciate palliative and hospice recommendations
-PEG tentatively planned for Tuesday; high risk procedure given advanced age, recent covid pna, PE on anticoagluation, ?pHTN; will discuss risks/benefits with family before scheduling   -Pulmonary clearance prior to PEG  *Lovenox will need to be held 12 hours prior   -Continue NG tube feeds   -Appreciate palliative and hospice recommendations re: goals of care
pt refusing PO, covering mouth, pooling, not taking much   recurrent hypokalemia   low albumin stores.
: pt refusing PO, covering mouth, pooling, not taking much   recurrent hypokalemia   low albumin stores.

## 2021-04-18 LAB
ALBUMIN SERPL ELPH-MCNC: 1.5 G/DL — LOW (ref 3.3–5)
ALP SERPL-CCNC: 115 U/L — SIGNIFICANT CHANGE UP (ref 40–120)
ALT FLD-CCNC: 13 U/L — SIGNIFICANT CHANGE UP (ref 12–78)
ANION GAP SERPL CALC-SCNC: 5 MMOL/L — SIGNIFICANT CHANGE UP (ref 5–17)
ANISOCYTOSIS BLD QL: SIGNIFICANT CHANGE UP
APPEARANCE UR: CLEAR — SIGNIFICANT CHANGE UP
AST SERPL-CCNC: 16 U/L — SIGNIFICANT CHANGE UP (ref 15–37)
BILIRUB SERPL-MCNC: 0.5 MG/DL — SIGNIFICANT CHANGE UP (ref 0.2–1.2)
BILIRUB UR-MCNC: NEGATIVE — SIGNIFICANT CHANGE UP
BUN SERPL-MCNC: 12 MG/DL — SIGNIFICANT CHANGE UP (ref 7–23)
CALCIUM SERPL-MCNC: 9 MG/DL — SIGNIFICANT CHANGE UP (ref 8.5–10.1)
CHLORIDE SERPL-SCNC: 102 MMOL/L — SIGNIFICANT CHANGE UP (ref 96–108)
CO2 SERPL-SCNC: 30 MMOL/L — SIGNIFICANT CHANGE UP (ref 22–31)
COLOR SPEC: YELLOW — SIGNIFICANT CHANGE UP
CREAT SERPL-MCNC: 0.63 MG/DL — SIGNIFICANT CHANGE UP (ref 0.5–1.3)
DIFF PNL FLD: NEGATIVE — SIGNIFICANT CHANGE UP
EOSINOPHIL NFR BLD AUTO: 3 % — SIGNIFICANT CHANGE UP (ref 0–6)
EPI CELLS # UR: SIGNIFICANT CHANGE UP
GLUCOSE BLDC GLUCOMTR-MCNC: 210 MG/DL — HIGH (ref 70–99)
GLUCOSE BLDC GLUCOMTR-MCNC: 232 MG/DL — HIGH (ref 70–99)
GLUCOSE BLDC GLUCOMTR-MCNC: 287 MG/DL — HIGH (ref 70–99)
GLUCOSE SERPL-MCNC: 214 MG/DL — HIGH (ref 70–99)
GLUCOSE UR QL: 250 MG/DL
HCT VFR BLD CALC: 27.1 % — LOW (ref 34.5–45)
HGB BLD-MCNC: 8.2 G/DL — LOW (ref 11.5–15.5)
HYPOCHROMIA BLD QL: SLIGHT — SIGNIFICANT CHANGE UP
KETONES UR-MCNC: NEGATIVE — SIGNIFICANT CHANGE UP
LACTATE SERPL-SCNC: 3 MMOL/L — HIGH (ref 0.7–2)
LEUKOCYTE ESTERASE UR-ACNC: ABNORMAL
LYMPHOCYTES # BLD AUTO: 19 % — SIGNIFICANT CHANGE UP (ref 13–44)
MACROCYTES BLD QL: SLIGHT — SIGNIFICANT CHANGE UP
MCHC RBC-ENTMCNC: 27.6 PG — SIGNIFICANT CHANGE UP (ref 27–34)
MCHC RBC-ENTMCNC: 30.3 GM/DL — LOW (ref 32–36)
MCV RBC AUTO: 91.2 FL — SIGNIFICANT CHANGE UP (ref 80–100)
MICROCYTES BLD QL: SIGNIFICANT CHANGE UP
MONOCYTES NFR BLD AUTO: 9 % — SIGNIFICANT CHANGE UP (ref 2–14)
NEUTROPHILS NFR BLD AUTO: 69 % — SIGNIFICANT CHANGE UP (ref 43–77)
NEUTS BAND # BLD: 2 % — SIGNIFICANT CHANGE UP (ref 0–8)
NITRITE UR-MCNC: NEGATIVE — SIGNIFICANT CHANGE UP
NRBC # BLD: 0 /100 WBCS — SIGNIFICANT CHANGE UP (ref 0–0)
PH UR: 5 — SIGNIFICANT CHANGE UP (ref 5–8)
PLAT MORPH BLD: NORMAL — SIGNIFICANT CHANGE UP
PLATELET # BLD AUTO: 524 K/UL — HIGH (ref 150–400)
POLYCHROMASIA BLD QL SMEAR: SLIGHT — SIGNIFICANT CHANGE UP
POTASSIUM SERPL-MCNC: 3.4 MMOL/L — LOW (ref 3.5–5.3)
POTASSIUM SERPL-SCNC: 3.4 MMOL/L — LOW (ref 3.5–5.3)
PROCALCITONIN SERPL-MCNC: 0.2 NG/ML — HIGH (ref 0.02–0.1)
PROT SERPL-MCNC: 6 GM/DL — SIGNIFICANT CHANGE UP (ref 6–8.3)
PROT UR-MCNC: 30 MG/DL
RBC # BLD: 2.97 M/UL — LOW (ref 3.8–5.2)
RBC # FLD: 17.7 % — HIGH (ref 10.3–14.5)
RBC BLD AUTO: ABNORMAL
RBC CASTS # UR COMP ASSIST: SIGNIFICANT CHANGE UP /HPF (ref 0–4)
SODIUM SERPL-SCNC: 137 MMOL/L — SIGNIFICANT CHANGE UP (ref 135–145)
SP GR SPEC: 1.01 — SIGNIFICANT CHANGE UP (ref 1.01–1.02)
UROBILINOGEN FLD QL: NEGATIVE MG/DL — SIGNIFICANT CHANGE UP
WBC # BLD: 10.88 K/UL — HIGH (ref 3.8–10.5)
WBC # FLD AUTO: 10.88 K/UL — HIGH (ref 3.8–10.5)
WBC UR QL: SIGNIFICANT CHANGE UP

## 2021-04-18 PROCEDURE — 71045 X-RAY EXAM CHEST 1 VIEW: CPT | Mod: 26

## 2021-04-18 RX ORDER — POTASSIUM CHLORIDE 20 MEQ
40 PACKET (EA) ORAL ONCE
Refills: 0 | Status: COMPLETED | OUTPATIENT
Start: 2021-04-18 | End: 2021-04-18

## 2021-04-18 RX ORDER — SODIUM CHLORIDE 9 MG/ML
1000 INJECTION INTRAMUSCULAR; INTRAVENOUS; SUBCUTANEOUS ONCE
Refills: 0 | Status: COMPLETED | OUTPATIENT
Start: 2021-04-18 | End: 2021-04-18

## 2021-04-18 RX ORDER — POTASSIUM CHLORIDE 20 MEQ
40 PACKET (EA) ORAL ONCE
Refills: 0 | Status: DISCONTINUED | OUTPATIENT
Start: 2021-04-18 | End: 2021-04-18

## 2021-04-18 RX ADMIN — MIRTAZAPINE 7.5 MILLIGRAM(S): 45 TABLET, ORALLY DISINTEGRATING ORAL at 21:51

## 2021-04-18 RX ADMIN — Medication 650 MILLIGRAM(S): at 06:43

## 2021-04-18 RX ADMIN — Medication 40 MILLIEQUIVALENT(S): at 13:32

## 2021-04-18 RX ADMIN — Medication 650 MILLIGRAM(S): at 00:49

## 2021-04-18 RX ADMIN — Medication 6: at 08:13

## 2021-04-18 RX ADMIN — GABAPENTIN 300 MILLIGRAM(S): 400 CAPSULE ORAL at 17:03

## 2021-04-18 RX ADMIN — PANTOPRAZOLE SODIUM 40 MILLIGRAM(S): 20 TABLET, DELAYED RELEASE ORAL at 13:34

## 2021-04-18 RX ADMIN — CHLORHEXIDINE GLUCONATE 1 APPLICATION(S): 213 SOLUTION TOPICAL at 05:05

## 2021-04-18 RX ADMIN — Medication 650 MILLIGRAM(S): at 18:48

## 2021-04-18 RX ADMIN — SODIUM CHLORIDE 1000 MILLILITER(S): 9 INJECTION INTRAMUSCULAR; INTRAVENOUS; SUBCUTANEOUS at 13:37

## 2021-04-18 RX ADMIN — Medication 650 MILLIGRAM(S): at 23:35

## 2021-04-18 RX ADMIN — Medication 4: at 13:35

## 2021-04-18 RX ADMIN — GABAPENTIN 300 MILLIGRAM(S): 400 CAPSULE ORAL at 05:04

## 2021-04-18 RX ADMIN — Medication 650 MILLIGRAM(S): at 05:04

## 2021-04-18 RX ADMIN — Medication 650 MILLIGRAM(S): at 17:21

## 2021-04-18 RX ADMIN — ENOXAPARIN SODIUM 80 MILLIGRAM(S): 100 INJECTION SUBCUTANEOUS at 05:04

## 2021-04-18 RX ADMIN — Medication 325 MILLIGRAM(S): at 13:31

## 2021-04-18 RX ADMIN — ENOXAPARIN SODIUM 80 MILLIGRAM(S): 100 INJECTION SUBCUTANEOUS at 17:03

## 2021-04-18 RX ADMIN — Medication 4: at 17:03

## 2021-04-18 NOTE — PROVIDER CONTACT NOTE (CRITICAL VALUE NOTIFICATION) - BACKGROUND
pt finished 9 day course of zosyn for PNA   pt spiked fevers 4/17 into 4/18. However pt is afebrile at this time  wet cough observed and crackles heard in LLL today

## 2021-04-18 NOTE — PROVIDER CONTACT NOTE (CRITICAL VALUE NOTIFICATION) - RECOMMENDATIONS
procalcitonin, monitor vitals, fluids (as tolerated b/c pt is very edematous) rigorous oral and pulmonary hygiene

## 2021-04-18 NOTE — PROGRESS NOTE ADULT - SUBJECTIVE AND OBJECTIVE BOX
INTERVAL HPI/OVERNIGHT EVENTS:    patient reported  with  fever  this  AM  discussed  with  medical  PA  labs  urine  cxr  ordered      REVIEW OF SYSTEMS:  CONSTITUTIONAL:  somnolent      MEDICATION:  acetaminophen    Suspension .. 650 milliGRAM(s) Oral every 6 hours PRN  ALBUTerol    90 MICROgram(s) HFA Inhaler 2 Puff(s) Inhalation every 4 hours  ALBUTerol    90 MICROgram(s) HFA Inhaler 2 Puff(s) Inhalation every 6 hours PRN  chlorhexidine 2% Cloths 1 Application(s) Topical <User Schedule>  dextrose 40% Gel 15 Gram(s) Oral once  dextrose 5%. 1000 milliLiter(s) IV Continuous <Continuous>  dextrose 5%. 1000 milliLiter(s) IV Continuous <Continuous>  dextrose 50% Injectable 25 Gram(s) IV Push once  dextrose 50% Injectable 12.5 Gram(s) IV Push once  dextrose 50% Injectable 25 Gram(s) IV Push once  enoxaparin Injectable 80 milliGRAM(s) SubCutaneous every 12 hours  ferrous    sulfate 325 milliGRAM(s) Oral daily  gabapentin 300 milliGRAM(s) Oral two times a day  glucagon  Injectable 1 milliGRAM(s) IntraMuscular once  insulin lispro (ADMELOG) corrective regimen sliding scale   SubCutaneous three times a day before meals  lisinopril 5 milliGRAM(s) Oral daily  mirtazapine 7.5 milliGRAM(s) Oral at bedtime  pantoprazole   Suspension 40 milliGRAM(s) Oral daily  polyethylene glycol 3350 17 Gram(s) Oral daily PRN  potassium chloride   Powder 40 milliEquivalent(s) Oral once  senna 1 Tablet(s) Oral at bedtime PRN    Vital Signs Last 24 Hrs  T(C): 37.3 (18 Apr 2021 08:15), Max: 38.6 (17 Apr 2021 21:45)  T(F): 99.1 (18 Apr 2021 08:15), Max: 101.5 (17 Apr 2021 21:45)  HR: 102 (18 Apr 2021 05:48) (92 - 110)  BP: 119/64 (18 Apr 2021 05:48) (104/53 - 125/66)  BP(mean): --  RR: 18 (18 Apr 2021 05:48) (18 - 20)  SpO2: 97% (18 Apr 2021 05:48) (92% - 97%)    PHYSICAL EXAM:  GENERAL: NAD,   HEENT : Conjuntivae  clear sclerae anicteric  NECK: Supple, No JVD, Normal thyroid  NERVOUS SYSTEM:  somnolent  opens  eyes  CHEST/LUNG: Clear    HEART: Regular rate and rhythm; No murmurs, rubs, or gallops  ABDOMEN: Soft, Nontender, Nondistended; Bowel sounds present  EXTREMITIES:  no  edema no  tenderness  SKIN: No rashes   LABS:                        8.2    10.88 )-----------( 524      ( 18 Apr 2021 06:48 )             27.1     04-18    137  |  102  |  12  ----------------------------<  214<H>  3.4<L>   |  30  |  0.63    Ca    9.0      18 Apr 2021 06:48    TPro  6.0  /  Alb  1.5<L>  /  TBili  0.5  /  DBili  x   /  AST  16  /  ALT  13  /  AlkPhos  115  04-18        CAPILLARY BLOOD GLUCOSE      POCT Blood Glucose.: 287 mg/dL (18 Apr 2021 08:03)  POCT Blood Glucose.: 214 mg/dL (17 Apr 2021 16:23)  POCT Blood Glucose.: 222 mg/dL (17 Apr 2021 11:50)      RADIOLOGY & ADDITIONAL TESTS:    Imaging reports  Personally Reviewed:  [ ] YES  [ ] NO    Consultant(s) Notes Reviewed:  [x ] YES  [ ] NO    Care Discussed with Consultants/Other Providers [x ] YES  [ ] NO  Problem/Plan - 1:  ·  Problem: Pulmonary emboli.  Plan: lovenox    Problem/Plan - 2:  ·  Problem: DVT (deep venous thrombosis).  Plan: lovenox    HYPERNATREMIAN  CHANGE  IVF  TO  D5W 1/2 NS  Problem/Plan - 4:  ·  Problem: Asthma.  Plan: symbicort.     Problem/Plan - 5:  ·  Problem: DM (diabetes mellitus).  Plan: insulin coverage.     Problem/Plan - 6:  Problem: Failure to thrive in adult. Plan: encourage  oral  intake  rx  underlying  causes.   REMERON   severe  calorie  malnutrition cont  gt  feedings  will  add  additional  protein  fever  check  urine c/s  cxr tylenol  for  comfort    anemia  on  AC  for  dvt  and  PE    monitor  labs  transfuse  as needed     PNEUMONIA    d/c  zosyn      palliative  care  evaluation  appreciated    ethic  committee  evaluation appreciated    hospice  evaluation  appreciated    s/p  peg  placement  cont  feedings  via  PEG  hypokalemia  supplement  fever w/u  in progress

## 2021-04-18 NOTE — CHART NOTE - NSCHARTNOTEFT_GEN_A_CORE
Patient spiked to 101 overnight.  Will send fever work-up - blood cx, UA/ UCx, CXR, lactate, and procalcitonin.  WBC mildly elevated this am, will add on differential.  D/w Dr Barbosa.

## 2021-04-19 LAB
ALBUMIN SERPL ELPH-MCNC: 1.4 G/DL — LOW (ref 3.3–5)
ALP SERPL-CCNC: 124 U/L — HIGH (ref 40–120)
ALT FLD-CCNC: 15 U/L — SIGNIFICANT CHANGE UP (ref 12–78)
ANION GAP SERPL CALC-SCNC: 4 MMOL/L — LOW (ref 5–17)
AST SERPL-CCNC: 18 U/L — SIGNIFICANT CHANGE UP (ref 15–37)
BILIRUB SERPL-MCNC: 0.4 MG/DL — SIGNIFICANT CHANGE UP (ref 0.2–1.2)
BUN SERPL-MCNC: 12 MG/DL — SIGNIFICANT CHANGE UP (ref 7–23)
CALCIUM SERPL-MCNC: 8.8 MG/DL — SIGNIFICANT CHANGE UP (ref 8.5–10.1)
CHLORIDE SERPL-SCNC: 105 MMOL/L — SIGNIFICANT CHANGE UP (ref 96–108)
CO2 SERPL-SCNC: 28 MMOL/L — SIGNIFICANT CHANGE UP (ref 22–31)
CREAT SERPL-MCNC: 0.53 MG/DL — SIGNIFICANT CHANGE UP (ref 0.5–1.3)
GLUCOSE BLDC GLUCOMTR-MCNC: 194 MG/DL — HIGH (ref 70–99)
GLUCOSE BLDC GLUCOMTR-MCNC: 236 MG/DL — HIGH (ref 70–99)
GLUCOSE BLDC GLUCOMTR-MCNC: 242 MG/DL — HIGH (ref 70–99)
GLUCOSE BLDC GLUCOMTR-MCNC: 272 MG/DL — HIGH (ref 70–99)
GLUCOSE SERPL-MCNC: 231 MG/DL — HIGH (ref 70–99)
HCT VFR BLD CALC: 29.4 % — LOW (ref 34.5–45)
HGB BLD-MCNC: 8.4 G/DL — LOW (ref 11.5–15.5)
LACTATE SERPL-SCNC: 1.8 MMOL/L — SIGNIFICANT CHANGE UP (ref 0.7–2)
MCHC RBC-ENTMCNC: 27.4 PG — SIGNIFICANT CHANGE UP (ref 27–34)
MCHC RBC-ENTMCNC: 28.6 GM/DL — LOW (ref 32–36)
MCV RBC AUTO: 95.8 FL — SIGNIFICANT CHANGE UP (ref 80–100)
NRBC # BLD: 0 /100 WBCS — SIGNIFICANT CHANGE UP (ref 0–0)
PLATELET # BLD AUTO: 465 K/UL — HIGH (ref 150–400)
POTASSIUM SERPL-MCNC: 4.6 MMOL/L — SIGNIFICANT CHANGE UP (ref 3.5–5.3)
POTASSIUM SERPL-SCNC: 4.6 MMOL/L — SIGNIFICANT CHANGE UP (ref 3.5–5.3)
PROCALCITONIN SERPL-MCNC: 0.16 NG/ML — HIGH (ref 0.02–0.1)
PROT SERPL-MCNC: 6 GM/DL — SIGNIFICANT CHANGE UP (ref 6–8.3)
RBC # BLD: 3.07 M/UL — LOW (ref 3.8–5.2)
RBC # FLD: 17.5 % — HIGH (ref 10.3–14.5)
SODIUM SERPL-SCNC: 137 MMOL/L — SIGNIFICANT CHANGE UP (ref 135–145)
WBC # BLD: 9.9 K/UL — SIGNIFICANT CHANGE UP (ref 3.8–10.5)
WBC # FLD AUTO: 9.9 K/UL — SIGNIFICANT CHANGE UP (ref 3.8–10.5)

## 2021-04-19 RX ADMIN — LISINOPRIL 5 MILLIGRAM(S): 2.5 TABLET ORAL at 05:15

## 2021-04-19 RX ADMIN — PANTOPRAZOLE SODIUM 40 MILLIGRAM(S): 20 TABLET, DELAYED RELEASE ORAL at 12:21

## 2021-04-19 RX ADMIN — CHLORHEXIDINE GLUCONATE 1 APPLICATION(S): 213 SOLUTION TOPICAL at 05:15

## 2021-04-19 RX ADMIN — ENOXAPARIN SODIUM 80 MILLIGRAM(S): 100 INJECTION SUBCUTANEOUS at 17:45

## 2021-04-19 RX ADMIN — Medication 650 MILLIGRAM(S): at 00:54

## 2021-04-19 RX ADMIN — Medication 2: at 12:20

## 2021-04-19 RX ADMIN — GABAPENTIN 300 MILLIGRAM(S): 400 CAPSULE ORAL at 05:15

## 2021-04-19 RX ADMIN — MIRTAZAPINE 7.5 MILLIGRAM(S): 45 TABLET, ORALLY DISINTEGRATING ORAL at 22:30

## 2021-04-19 RX ADMIN — Medication 6: at 08:13

## 2021-04-19 RX ADMIN — ENOXAPARIN SODIUM 80 MILLIGRAM(S): 100 INJECTION SUBCUTANEOUS at 05:15

## 2021-04-19 RX ADMIN — Medication 4: at 16:48

## 2021-04-19 RX ADMIN — GABAPENTIN 300 MILLIGRAM(S): 400 CAPSULE ORAL at 17:45

## 2021-04-19 RX ADMIN — Medication 325 MILLIGRAM(S): at 12:21

## 2021-04-19 NOTE — PHYSICAL THERAPY INITIAL EVALUATION ADULT - TRANSFER TRAINING, PT EVAL
Patient will perform sit to stand transfers with moderate assistance x 1 in 5-6 weeks with rolling walker

## 2021-04-19 NOTE — PHYSICAL THERAPY INITIAL EVALUATION ADULT - BALANCE TRAINING, PT EVAL
Pt will independently sit at edge of bed to perform UE manipulation without LOB in 5-6 weeks. Patient will improve static and dynamic standing balance by 1 grade with rolling walker in 5-6 weeks to improve safety and decrease risk of falls.

## 2021-04-19 NOTE — CHART NOTE - NSCHARTNOTEFT_GEN_A_CORE
Pt admitted from [facility] for FTT; found to have b/l PEs.  Pt has been receiving TF via NGT since 3/22; PEG placed on 4/16 after risks explained to family (advanced age, recent PNA, PE).  PMHx includes HTN, DM, CVA, asthma, recent COVID PNA, non-verbal at baseline.      Factors impacting intake: [ ] none [ ] nausea  [ ] vomiting [ ] diarrhea [ ] constipation  [ ]chewing problems [ ] swallowing issues  [ ] other:     Diet Prescription: Diet, NPO with Tube Feed:   Tube Feeding Modality: Gastrostomy  Glucerna 1.2 Renzo  Total Volume for 24 Hours (mL): 1320  Continuous  Starting Tube Feed Rate {mL per Hour}: 55  Increase Tube Feed Rate by (mL): 0     Every hour  Until Goal Tube Feed Rate (mL per Hour): 55  Tube Feed Duration (in Hours): 24  Tube Feed Start Time: 20:00  Free Water Flush   Total Volume per Flush (mL): 150   Frequency: Every 6 Hours   Total Daily Volume of Flush (mL): 600    Start Time: 14:00  No Carb Prosource (1pkg = 15gms Protein)     Qty per Day:  1 (04-16-21 @ 16:14)    Intake:  TF running at goal rate; pt continues to tolerate well; minimal residuals noted    Current Weight:   69.3 kg (4/19); admission wt 76.5 kg (3/14)  % Weight Change:  9.4% wt loss x 1+ month may be affected by fluid fluctuations throughout admission    Unable to conduct full nutrition-focused physical exam on pt due to edematous status.  However moderate muscle wasting clearly visible at temples & clavicle & shoulder    2+ generalized edema + 3+ b/l arms, right hand, & left hip noted; at admission 2+ generalized edema noted    Pertinent Medications: MEDICATIONS  (STANDING):  ALBUTerol    90 MICROgram(s) HFA Inhaler 2 Puff(s) Inhalation every 4 hours  chlorhexidine 2% Cloths 1 Application(s) Topical <User Schedule>  dextrose 40% Gel 15 Gram(s) Oral once  dextrose 5%. 1000 milliLiter(s) (50 mL/Hr) IV Continuous <Continuous>  dextrose 5%. 1000 milliLiter(s) (100 mL/Hr) IV Continuous <Continuous>  dextrose 50% Injectable 25 Gram(s) IV Push once  dextrose 50% Injectable 12.5 Gram(s) IV Push once  dextrose 50% Injectable 25 Gram(s) IV Push once  enoxaparin Injectable 80 milliGRAM(s) SubCutaneous every 12 hours  ferrous    sulfate 325 milliGRAM(s) Oral daily  gabapentin 300 milliGRAM(s) Oral two times a day  glucagon  Injectable 1 milliGRAM(s) IntraMuscular once  insulin lispro (ADMELOG) corrective regimen sliding scale   SubCutaneous three times a day before meals  lisinopril 5 milliGRAM(s) Oral daily  mirtazapine 7.5 milliGRAM(s) Oral at bedtime  pantoprazole   Suspension 40 milliGRAM(s) Oral daily    MEDICATIONS  (PRN):  acetaminophen    Suspension .. 650 milliGRAM(s) Oral every 6 hours PRN Temp greater or equal to 38C (100.4F), Mild Pain (1 - 3)  ALBUTerol    90 MICROgram(s) HFA Inhaler 2 Puff(s) Inhalation every 6 hours PRN Wheezing  polyethylene glycol 3350 17 Gram(s) Oral daily PRN Constipation  senna 1 Tablet(s) Oral at bedtime PRN Constipation    Pertinent Labs: 04-19 Na137 mmol/L Glu 231 mg/dL<H> K+ 4.6 mmol/L Cr  0.53 mg/dL BUN 12 mg/dL 04-19 Alb 1.4 g/dL<L> 03-22 PAB 8 mg/dL<L>  04/18 POCT:  287, 232, 210      Skin:   documented on 4/7 stage II pressure ulcers on sacrum & right buttocks; pt also c perirectal tear & wound  (bruise) on right achilles tendon    Estimated Needs:   [X ] no change since previous assessment  (3/17)  [ ] recalculated:     Previous Nutrition Diagnosis:   [X ]  Severe Malnutrition in context of acute malnutrition  Etiology:  Inadequate energy/protein intake related to COVID illness, AMS, FTT dx  Signs & Symptoms:  3+ edema on b/l arms, right hand, & left hip; physical signs of moderate muscle wasting as noted    GOAL:  Pt to meet >75% energy/protein needs via tube feeding - goal continues to be met    Nutrition Diagnosis is [x ] ongoing  [ ] resolved [ ] not applicable     New Nutrition Diagnosis: [ X] not applicable      Interventions:   continue current tube feeding as noted  Recommend  [ ] Change Diet To:  [ ] Nutrition Supplement  [ ] Nutrition Support  [ ] Other:     Monitoring and Evaluation:   [ ] PO intake [ x ] Tolerance to diet prescription [ x ] weights [ x ] labs[ x ] follow up per protocol  [ ] other: Pt admitted from [facility] for FTT; found to have b/l PEs.  Pt has been receiving TF via NGT since 3/22; PEG placed on 4/16 after risks explained to family (advanced age, recent PNA, PE). Ultrasound suspicious for RLE DVT; pt on Lovenox. Pt lethargic & febrile.  PMHx includes HTN, DM, CVA, asthma, recent COVID PNA, non-verbal at baseline.      Factors impacting intake: [ ] none [ ] nausea  [ ] vomiting [ ] diarrhea [ ] constipation  [ ]chewing problems [ ] swallowing issues  [ ] other:     Diet Prescription: Diet, NPO with Tube Feed:   Tube Feeding Modality: Gastrostomy  Glucerna 1.2 Renzo  Total Volume for 24 Hours (mL): 1320  Continuous  Starting Tube Feed Rate {mL per Hour}: 55  Increase Tube Feed Rate by (mL): 0     Every hour  Until Goal Tube Feed Rate (mL per Hour): 55  Tube Feed Duration (in Hours): 24  Tube Feed Start Time: 20:00  Free Water Flush   Total Volume per Flush (mL): 150   Frequency: Every 6 Hours   Total Daily Volume of Flush (mL): 600    Start Time: 14:00  No Carb Prosource (1pkg = 15gms Protein)     Qty per Day:  1 (04-16-21 @ 16:14)    Intake:  TF running at goal rate; pt continues to tolerate well; minimal residuals noted    Current Weight:   69.3 kg (4/19); admission wt 76.5 kg (3/14)  % Weight Change:  9.4% wt loss x 1+ month may be affected by fluid fluctuations throughout admission    Unable to conduct full nutrition-focused physical exam on pt due to edematous status.  However moderate muscle wasting clearly visible at temples & clavicle & shoulder    2+ generalized edema + 3+ b/l arms, right hand, & left hip noted; at admission 2+ generalized edema noted    Pertinent Medications: MEDICATIONS  (STANDING):  ALBUTerol    90 MICROgram(s) HFA Inhaler 2 Puff(s) Inhalation every 4 hours  chlorhexidine 2% Cloths 1 Application(s) Topical <User Schedule>  dextrose 40% Gel 15 Gram(s) Oral once  dextrose 5%. 1000 milliLiter(s) (50 mL/Hr) IV Continuous <Continuous>  dextrose 5%. 1000 milliLiter(s) (100 mL/Hr) IV Continuous <Continuous>  dextrose 50% Injectable 25 Gram(s) IV Push once  dextrose 50% Injectable 12.5 Gram(s) IV Push once  dextrose 50% Injectable 25 Gram(s) IV Push once  enoxaparin Injectable 80 milliGRAM(s) SubCutaneous every 12 hours  ferrous    sulfate 325 milliGRAM(s) Oral daily  gabapentin 300 milliGRAM(s) Oral two times a day  glucagon  Injectable 1 milliGRAM(s) IntraMuscular once  insulin lispro (ADMELOG) corrective regimen sliding scale   SubCutaneous three times a day before meals  lisinopril 5 milliGRAM(s) Oral daily  mirtazapine 7.5 milliGRAM(s) Oral at bedtime  pantoprazole   Suspension 40 milliGRAM(s) Oral daily    MEDICATIONS  (PRN):  acetaminophen    Suspension .. 650 milliGRAM(s) Oral every 6 hours PRN Temp greater or equal to 38C (100.4F), Mild Pain (1 - 3)  ALBUTerol    90 MICROgram(s) HFA Inhaler 2 Puff(s) Inhalation every 6 hours PRN Wheezing  polyethylene glycol 3350 17 Gram(s) Oral daily PRN Constipation  senna 1 Tablet(s) Oral at bedtime PRN Constipation    Pertinent Labs: 04-19 Na137 mmol/L Glu 231 mg/dL<H> K+ 4.6 mmol/L Cr  0.53 mg/dL BUN 12 mg/dL 04-19 Alb 1.4 g/dL<L> 03-22 PAB 8 mg/dL<L>  04/18 POCT:  287, 232, 210      Skin:   documented on 4/7 stage II pressure ulcers on sacrum & right buttocks; pt also c perirectal tear & wound  (bruise) on right achilles tendon    Estimated Needs:   [X ] no change since previous assessment  (3/17)  [ ] recalculated:     Previous Nutrition Diagnosis:   [X ]  Severe Malnutrition in context of acute malnutrition  Etiology:  Inadequate energy/protein intake related to COVID illness, AMS, FTT dx  Signs & Symptoms:  3+ edema on b/l arms, right hand, & left hip; physical signs of moderate muscle wasting as noted    GOAL:  Pt to meet >75% energy/protein needs via tube feeding - goal continues to be met    Nutrition Diagnosis is [x ] ongoing  [ ] resolved [ ] not applicable     New Nutrition Diagnosis: [ X] not applicable      Interventions:   continue current tube feeding as noted  Recommend  [ ] Change Diet To:  [ ] Nutrition Supplement  [ ] Nutrition Support  [ ] Other:     Monitoring and Evaluation:   [ ] PO intake [ x ] Tolerance to diet prescription [ x ] weights [ x ] labs[ x ] follow up per protocol  [ ] other: Pt admitted from [facility] for FTT; found to have b/l PEs.  Pt has been receiving TF via NGT since 3/22; PEG placed on 4/16 after risks explained to family (advanced age, recent PNA, PE). Ultrasound suspicious for RLE DVT; pt on Lovenox; now c UTI. Pt lethargic & febrile.  PMHx includes HTN, DM, CVA, asthma, recent COVID PNA, non-verbal at baseline.      Factors impacting intake: [ ] none [ ] nausea  [ ] vomiting [ ] diarrhea [ ] constipation  [ ]chewing problems [ ] swallowing issues  [ ] other:     Diet Prescription: Diet, NPO with Tube Feed:   Tube Feeding Modality: Gastrostomy  Glucerna 1.2 Renzo  Total Volume for 24 Hours (mL): 1320  Continuous  Starting Tube Feed Rate {mL per Hour}: 55  Increase Tube Feed Rate by (mL): 0     Every hour  Until Goal Tube Feed Rate (mL per Hour): 55  Tube Feed Duration (in Hours): 24  Tube Feed Start Time: 20:00  Free Water Flush   Total Volume per Flush (mL): 150   Frequency: Every 6 Hours   Total Daily Volume of Flush (mL): 600    Start Time: 14:00  No Carb Prosource (1pkg = 15gms Protein)     Qty per Day:  1 (04-16-21 @ 16:14)    Intake:  TF running at goal rate; pt continues to tolerate well; minimal residuals noted    Current Weight:   69.3 kg (4/19); admission wt 76.5 kg (3/14)  % Weight Change:  9.4% wt loss x 1+ month may be affected by fluid fluctuations throughout admission    Unable to conduct full nutrition-focused physical exam on pt due to edematous status.  However moderate muscle wasting clearly visible at temples & clavicle & shoulder    2+ generalized edema + 3+ b/l arms, right hand, & left hip noted; at admission 2+ generalized edema noted    Pertinent Medications: MEDICATIONS  (STANDING):  ALBUTerol    90 MICROgram(s) HFA Inhaler 2 Puff(s) Inhalation every 4 hours  chlorhexidine 2% Cloths 1 Application(s) Topical <User Schedule>  dextrose 40% Gel 15 Gram(s) Oral once  dextrose 5%. 1000 milliLiter(s) (50 mL/Hr) IV Continuous <Continuous>  dextrose 5%. 1000 milliLiter(s) (100 mL/Hr) IV Continuous <Continuous>  dextrose 50% Injectable 25 Gram(s) IV Push once  dextrose 50% Injectable 12.5 Gram(s) IV Push once  dextrose 50% Injectable 25 Gram(s) IV Push once  enoxaparin Injectable 80 milliGRAM(s) SubCutaneous every 12 hours  ferrous    sulfate 325 milliGRAM(s) Oral daily  gabapentin 300 milliGRAM(s) Oral two times a day  glucagon  Injectable 1 milliGRAM(s) IntraMuscular once  insulin lispro (ADMELOG) corrective regimen sliding scale   SubCutaneous three times a day before meals  lisinopril 5 milliGRAM(s) Oral daily  mirtazapine 7.5 milliGRAM(s) Oral at bedtime  pantoprazole   Suspension 40 milliGRAM(s) Oral daily    MEDICATIONS  (PRN):  acetaminophen    Suspension .. 650 milliGRAM(s) Oral every 6 hours PRN Temp greater or equal to 38C (100.4F), Mild Pain (1 - 3)  ALBUTerol    90 MICROgram(s) HFA Inhaler 2 Puff(s) Inhalation every 6 hours PRN Wheezing  polyethylene glycol 3350 17 Gram(s) Oral daily PRN Constipation  senna 1 Tablet(s) Oral at bedtime PRN Constipation    Pertinent Labs: 04-19 Na137 mmol/L Glu 231 mg/dL<H> K+ 4.6 mmol/L Cr  0.53 mg/dL BUN 12 mg/dL 04-19 Alb 1.4 g/dL<L> 03-22 PAB 8 mg/dL<L>  04/18 POCT:  287, 232, 210      Skin:   documented on 4/7 stage II pressure ulcers on sacrum & right buttocks; pt also c perirectal tear & wound  (bruise) on right achilles tendon    Estimated Needs:   [X ] no change since previous assessment  (3/17)  [ ] recalculated:     Previous Nutrition Diagnosis:   [X ]  Severe Malnutrition in context of acute malnutrition  Etiology:  Inadequate energy/protein intake + increased energy/protein needs related to COVID illness, AMS, FTT dx, presence of stage II pressure ulcers  Signs & Symptoms:  3+ edema on b/l arms, right hand, & left hip; physical signs of moderate muscle wasting as noted    GOAL:  Pt to meet >75% energy/protein needs via tube feeding - goal continues to be met    Nutrition Diagnosis is [x ] ongoing  [ ] resolved [ ] not applicable     New Nutrition Diagnosis: [ X] not applicable      Interventions:   continue current tube feeding as noted  Recommend  [ ] Change Diet To:  [ ] Nutrition Supplement  [ ] Nutrition Support  [ ] Other:     Monitoring and Evaluation:   [ ] PO intake [ x ] Tolerance to diet prescription [ x ] weights [ x ] labs[ x ] follow up per protocol  [ ] other:

## 2021-04-19 NOTE — PHYSICAL THERAPY INITIAL EVALUATION ADULT - PERTINENT HX OF CURRENT PROBLEM, REHAB EVAL
Patient admitted from [facility] with failure to thrive, found to have B PEs, PEG tube placed 4/16.

## 2021-04-19 NOTE — PHYSICAL THERAPY INITIAL EVALUATION ADULT - BED MOBILITY TRAINING, PT EVAL
Pt will perform all aspects of bed mobility with minimal assistance x 1 to help prevent pressure ulcers, by 6 weeks

## 2021-04-19 NOTE — PROGRESS NOTE ADULT - SUBJECTIVE AND OBJECTIVE BOX
INTERVAL HPI/OVERNIGHT EVENTS:        REVIEW OF SYSTEMS:  CONSTITUTIONAL:  alert      MEDICATION:  acetaminophen    Suspension .. 650 milliGRAM(s) Oral every 6 hours PRN  ALBUTerol    90 MICROgram(s) HFA Inhaler 2 Puff(s) Inhalation every 4 hours  ALBUTerol    90 MICROgram(s) HFA Inhaler 2 Puff(s) Inhalation every 6 hours PRN  chlorhexidine 2% Cloths 1 Application(s) Topical <User Schedule>  dextrose 40% Gel 15 Gram(s) Oral once  dextrose 5%. 1000 milliLiter(s) IV Continuous <Continuous>  dextrose 5%. 1000 milliLiter(s) IV Continuous <Continuous>  dextrose 50% Injectable 25 Gram(s) IV Push once  dextrose 50% Injectable 12.5 Gram(s) IV Push once  dextrose 50% Injectable 25 Gram(s) IV Push once  enoxaparin Injectable 80 milliGRAM(s) SubCutaneous every 12 hours  ferrous    sulfate 325 milliGRAM(s) Oral daily  gabapentin 300 milliGRAM(s) Oral two times a day  glucagon  Injectable 1 milliGRAM(s) IntraMuscular once  insulin lispro (ADMELOG) corrective regimen sliding scale   SubCutaneous three times a day before meals  lisinopril 5 milliGRAM(s) Oral daily  mirtazapine 7.5 milliGRAM(s) Oral at bedtime  pantoprazole   Suspension 40 milliGRAM(s) Oral daily  polyethylene glycol 3350 17 Gram(s) Oral daily PRN  senna 1 Tablet(s) Oral at bedtime PRN    Vital Signs Last 24 Hrs  T(C): 37.1 (2021 05:12), Max: 38.3 (2021 16:45)  T(F): 98.8 (2021 05:12), Max: 100.9 (2021 16:45)  HR: 97 (2021 05:12) (90 - 105)  BP: 127/61 (2021 05:12) (104/54 - 127/61)  BP(mean): --  RR: 18 (2021 05:12) (17 - 18)  SpO2: 98% (2021 05:12) (98% - 99%)    PHYSICAL EXAM:  GENERAL: NAD,   HEENT : Conjuntivae  clear sclerae anicteric  NECK: Supple, No JVD, Normal thyroid  NERVOUS SYSTEM:  Alert moves all  extr  CHEST/LUNG: Clear    HEART: Regular rate and rhythm; No murmurs, rubs, or gallops  ABDOMEN: Soft, Nontender, Nondistended; Bowel sounds present  EXTREMITIES:  no  edema no  tenderness  SKIN: No rashes   LABS:                        8.4    9.90  )-----------( 465      ( 2021 06:52 )             29.4     -    137  |  105  |  12  ----------------------------<  231<H>  4.6   |  28  |  0.53    Ca    8.8      2021 06:52    TPro  6.0  /  Alb  1.4<L>  /  TBili  0.4  /  DBili  x   /  AST  18  /  ALT  15  /  AlkPhos  124<H>  -      Urinalysis Basic - ( 2021 11:52 )    Color: Yellow / Appearance: Clear / S.010 / pH: x  Gluc: x / Ketone: Negative  / Bili: Negative / Urobili: Negative mg/dL   Blood: x / Protein: 30 mg/dL / Nitrite: Negative   Leuk Esterase: Trace / RBC: 0-2 /HPF / WBC 3-5   Sq Epi: x / Non Sq Epi: Occasional / Bacteria: x      CAPILLARY BLOOD GLUCOSE      POCT Blood Glucose.: 272 mg/dL (2021 08:08)  POCT Blood Glucose.: 210 mg/dL (2021 16:31)  POCT Blood Glucose.: 232 mg/dL (2021 13:33)      RADIOLOGY & ADDITIONAL TESTS:    Imaging reports  Personally Reviewed:  [ ] YES  [ ] NO    Consultant(s) Notes Reviewed:  [x ] YES  [ ] NO    Care Discussed with Consultants/Other Providers [x ] YES  [ ] NO  Problem/Plan - 1:  ·  Problem: Pulmonary emboli.  Plan: lovenox    Problem/Plan - 2:  ·  Problem: DVT (deep venous thrombosis).  Plan: lovenox    HYPERNATREMIAN  CHANGE  IVF  TO  D5W 1/2 NS  Problem/Plan - 4:  ·  Problem: Asthma.  Plan: symbicort.     Problem/Plan - 5:  ·  Problem: DM (diabetes mellitus).  Plan: insulin coverage.     Problem/Plan - 6:  Problem: Failure to thrive in adult. Plan: encourage  oral  intake  rx  underlying  causes.   REMERON   severe  calorie  malnutrition cont  gt  feedings  will  add  additional  protein  fever  check  urine c/s  cxr tylenol  for  comfort    anemia  on  AC  for  dvt  and  PE    monitor  labs  transfuse  as needed     PNEUMONIA    d/c  zosyn      palliative  care  evaluation  appreciated    ethic  committee  evaluation appreciated    hospice  evaluation  appreciated    s/p  peg  placement  cont  feedings  via  PEG  hypokalemia  supplement  fever appears  resolved

## 2021-04-19 NOTE — PHYSICAL THERAPY INITIAL EVALUATION ADULT - ADDITIONAL COMMENTS
As per patient's grandson Henrique Cosby at [phone], patient lives in an apartment, +ramp access, +elevator inside. Patient was able to ambulate with a rolling walker and perform all ADLs by herself prior to original hospitalization in February 2021.

## 2021-04-19 NOTE — PHYSICAL THERAPY INITIAL EVALUATION ADULT - GAIT TRAINING, PT EVAL
Pt will ambulate 10 feet with minimal assistance and use of rolling walker without loss of balance, by 5-6 weeks

## 2021-04-20 LAB
-  AMPICILLIN: SIGNIFICANT CHANGE UP
-  CIPROFLOXACIN: SIGNIFICANT CHANGE UP
-  DAPTOMYCIN: SIGNIFICANT CHANGE UP
-  LEVOFLOXACIN: SIGNIFICANT CHANGE UP
-  LINEZOLID: SIGNIFICANT CHANGE UP
-  NITROFURANTOIN: SIGNIFICANT CHANGE UP
-  TETRACYCLINE: SIGNIFICANT CHANGE UP
-  VANCOMYCIN: SIGNIFICANT CHANGE UP
ALBUMIN SERPL ELPH-MCNC: 1.6 G/DL — LOW (ref 3.3–5)
ALP SERPL-CCNC: 146 U/L — HIGH (ref 40–120)
ALT FLD-CCNC: 16 U/L — SIGNIFICANT CHANGE UP (ref 12–78)
ANION GAP SERPL CALC-SCNC: 6 MMOL/L — SIGNIFICANT CHANGE UP (ref 5–17)
AST SERPL-CCNC: 20 U/L — SIGNIFICANT CHANGE UP (ref 15–37)
BILIRUB SERPL-MCNC: 0.5 MG/DL — SIGNIFICANT CHANGE UP (ref 0.2–1.2)
BUN SERPL-MCNC: 15 MG/DL — SIGNIFICANT CHANGE UP (ref 7–23)
CALCIUM SERPL-MCNC: 9.2 MG/DL — SIGNIFICANT CHANGE UP (ref 8.5–10.1)
CHLORIDE SERPL-SCNC: 99 MMOL/L — SIGNIFICANT CHANGE UP (ref 96–108)
CO2 SERPL-SCNC: 28 MMOL/L — SIGNIFICANT CHANGE UP (ref 22–31)
CREAT SERPL-MCNC: 0.63 MG/DL — SIGNIFICANT CHANGE UP (ref 0.5–1.3)
CULTURE RESULTS: SIGNIFICANT CHANGE UP
GLUCOSE BLDC GLUCOMTR-MCNC: 222 MG/DL — HIGH (ref 70–99)
GLUCOSE BLDC GLUCOMTR-MCNC: 332 MG/DL — HIGH (ref 70–99)
GLUCOSE BLDC GLUCOMTR-MCNC: 395 MG/DL — HIGH (ref 70–99)
GLUCOSE SERPL-MCNC: 307 MG/DL — HIGH (ref 70–99)
HCT VFR BLD CALC: 29.4 % — LOW (ref 34.5–45)
HGB BLD-MCNC: 8.9 G/DL — LOW (ref 11.5–15.5)
MCHC RBC-ENTMCNC: 27.4 PG — SIGNIFICANT CHANGE UP (ref 27–34)
MCHC RBC-ENTMCNC: 30.3 GM/DL — LOW (ref 32–36)
MCV RBC AUTO: 90.5 FL — SIGNIFICANT CHANGE UP (ref 80–100)
METHOD TYPE: SIGNIFICANT CHANGE UP
NRBC # BLD: 0 /100 WBCS — SIGNIFICANT CHANGE UP (ref 0–0)
ORGANISM # SPEC MICROSCOPIC CNT: SIGNIFICANT CHANGE UP
ORGANISM # SPEC MICROSCOPIC CNT: SIGNIFICANT CHANGE UP
PLATELET # BLD AUTO: 510 K/UL — HIGH (ref 150–400)
POTASSIUM SERPL-MCNC: 4.4 MMOL/L — SIGNIFICANT CHANGE UP (ref 3.5–5.3)
POTASSIUM SERPL-SCNC: 4.4 MMOL/L — SIGNIFICANT CHANGE UP (ref 3.5–5.3)
PROT SERPL-MCNC: 6.4 GM/DL — SIGNIFICANT CHANGE UP (ref 6–8.3)
RBC # BLD: 3.25 M/UL — LOW (ref 3.8–5.2)
RBC # FLD: 17.2 % — HIGH (ref 10.3–14.5)
SODIUM SERPL-SCNC: 133 MMOL/L — LOW (ref 135–145)
SPECIMEN SOURCE: SIGNIFICANT CHANGE UP
WBC # BLD: 11.98 K/UL — HIGH (ref 3.8–10.5)
WBC # FLD AUTO: 11.98 K/UL — HIGH (ref 3.8–10.5)

## 2021-04-20 PROCEDURE — 99223 1ST HOSP IP/OBS HIGH 75: CPT

## 2021-04-20 PROCEDURE — 71045 X-RAY EXAM CHEST 1 VIEW: CPT | Mod: 26

## 2021-04-20 RX ORDER — CEFEPIME 1 G/1
1000 INJECTION, POWDER, FOR SOLUTION INTRAMUSCULAR; INTRAVENOUS EVERY 12 HOURS
Refills: 0 | Status: COMPLETED | OUTPATIENT
Start: 2021-04-21 | End: 2021-04-26

## 2021-04-20 RX ORDER — VANCOMYCIN HCL 1 G
1000 VIAL (EA) INTRAVENOUS ONCE
Refills: 0 | Status: COMPLETED | OUTPATIENT
Start: 2021-04-20 | End: 2021-04-20

## 2021-04-20 RX ORDER — AMPICILLIN TRIHYDRATE 250 MG
1 CAPSULE ORAL EVERY 6 HOURS
Refills: 0 | Status: DISCONTINUED | OUTPATIENT
Start: 2021-04-20 | End: 2021-04-20

## 2021-04-20 RX ORDER — AMPICILLIN TRIHYDRATE 250 MG
1 CAPSULE ORAL ONCE
Refills: 0 | Status: COMPLETED | OUTPATIENT
Start: 2021-04-20 | End: 2021-04-20

## 2021-04-20 RX ORDER — AMPICILLIN TRIHYDRATE 250 MG
CAPSULE ORAL
Refills: 0 | Status: DISCONTINUED | OUTPATIENT
Start: 2021-04-20 | End: 2021-04-20

## 2021-04-20 RX ORDER — CEFEPIME 1 G/1
1000 INJECTION, POWDER, FOR SOLUTION INTRAMUSCULAR; INTRAVENOUS ONCE
Refills: 0 | Status: COMPLETED | OUTPATIENT
Start: 2021-04-20 | End: 2021-04-20

## 2021-04-20 RX ORDER — CEFEPIME 1 G/1
INJECTION, POWDER, FOR SOLUTION INTRAMUSCULAR; INTRAVENOUS
Refills: 0 | Status: COMPLETED | OUTPATIENT
Start: 2021-04-20 | End: 2021-04-26

## 2021-04-20 RX ADMIN — ENOXAPARIN SODIUM 80 MILLIGRAM(S): 100 INJECTION SUBCUTANEOUS at 17:49

## 2021-04-20 RX ADMIN — ENOXAPARIN SODIUM 80 MILLIGRAM(S): 100 INJECTION SUBCUTANEOUS at 05:40

## 2021-04-20 RX ADMIN — Medication 650 MILLIGRAM(S): at 11:21

## 2021-04-20 RX ADMIN — CEFEPIME 100 MILLIGRAM(S): 1 INJECTION, POWDER, FOR SOLUTION INTRAMUSCULAR; INTRAVENOUS at 14:16

## 2021-04-20 RX ADMIN — Medication 108 GRAM(S): at 10:40

## 2021-04-20 RX ADMIN — GABAPENTIN 300 MILLIGRAM(S): 400 CAPSULE ORAL at 17:50

## 2021-04-20 RX ADMIN — Medication 8: at 08:01

## 2021-04-20 RX ADMIN — Medication 650 MILLIGRAM(S): at 18:27

## 2021-04-20 RX ADMIN — Medication 4: at 16:57

## 2021-04-20 RX ADMIN — LISINOPRIL 5 MILLIGRAM(S): 2.5 TABLET ORAL at 05:40

## 2021-04-20 RX ADMIN — GABAPENTIN 300 MILLIGRAM(S): 400 CAPSULE ORAL at 05:40

## 2021-04-20 RX ADMIN — Medication 650 MILLIGRAM(S): at 11:51

## 2021-04-20 RX ADMIN — Medication 10: at 11:54

## 2021-04-20 RX ADMIN — Medication 250 MILLIGRAM(S): at 15:57

## 2021-04-20 RX ADMIN — PANTOPRAZOLE SODIUM 40 MILLIGRAM(S): 20 TABLET, DELAYED RELEASE ORAL at 11:55

## 2021-04-20 RX ADMIN — Medication 650 MILLIGRAM(S): at 22:46

## 2021-04-20 RX ADMIN — Medication 650 MILLIGRAM(S): at 17:47

## 2021-04-20 RX ADMIN — Medication 325 MILLIGRAM(S): at 11:55

## 2021-04-20 RX ADMIN — CHLORHEXIDINE GLUCONATE 1 APPLICATION(S): 213 SOLUTION TOPICAL at 05:40

## 2021-04-20 NOTE — CONSULT NOTE ADULT - ASSESSMENT
89F with DM, HTN, dementia who has had fever present intermittently during hospital stay.   Cough, rhonchi on exam concern of aspiration. CXR 4/18 unrevealing, will repeat  Abdominal exam benign  PEG site ok  Has midline cath in site nearly 4 weeks, but blood cx thus far are NTD  UE with edema/erythema B, venous stasis like  No joint inflammation to implicate gout  No evidence of IV catheter related phlebitits  Areas of grade II pressure ulceration on sacrum/perineum, none infected  Soles with desquamation/intact skin underneath- ?prior drug rash. None present now  U/A bland, not possible to ascertain sx, skeptical re: U/A    Suggestiions--  Stop ampicillin  MRSA nares  Reheck CXR  Vanco 1g x1  Cefepime 1g Q8H    Thank you for the courtesy of this referral.    Cesar Muñoz MD  Attending Physician  [facility]  Division of Infectious Diseases  [phone]

## 2021-04-20 NOTE — CHART NOTE - NSCHARTNOTEFT_GEN_A_CORE
Medicine Hospitalist PA    Called for critical value, UCx from 4/18 growing >100k VRE. Discussed with ID Dr. Kelley regarding results and current abx regimen (cefepime and s/p vanco x1 dose today), who recommended continuing cefepime.    Culture - Urine (04.18.21 @ 18:39)    -  Ampicillin: R >8 Predicts results to ampicillin/sulbactam, amoxacillin-clavulanate and  piperacillin-tazobactam.    -  Ciprofloxacin: R >2    -  Daptomycin: SDD 4 The breakpoint for SDD (sensitive dose dependent)is based on a dosage regimen of 8-12 mg/kg administered every 24 h in adults and is intended for serious infections due to E. faecium. Consultation with an infectious diseases specialist is recommended.    -  Levofloxacin: R >4    -  Linezolid: S 2    -  Tetra/Doxy: R >8    -  Nitrofurantoin: S <=32 Should not be used to treat pyelonephritis.    -  Vancomycin: R >16    Specimen Source: .Urine Clean Catch (Midstream)    Culture Results:   >100,000 CFU/ml Enterococcus faecium (vancomycin resistant)    Organism Identification: Enterococcus faecium (vancomycin resistant)    Organism: Enterococcus faecium (vancomycin resistant)    Method Type: JANICE

## 2021-04-20 NOTE — PROGRESS NOTE ADULT - SUBJECTIVE AND OBJECTIVE BOX
INTERVAL HPI/OVERNIGHT EVENTS:    started on  ampicillin  for enterococcus  UTI    REVIEW OF SYSTEMS:  CONSTITUTIONAL:  alert comfortable      MEDICATION:  acetaminophen    Suspension .. 650 milliGRAM(s) Oral every 6 hours PRN  ALBUTerol    90 MICROgram(s) HFA Inhaler 2 Puff(s) Inhalation every 4 hours  ALBUTerol    90 MICROgram(s) HFA Inhaler 2 Puff(s) Inhalation every 6 hours PRN  ampicillin  IVPB      ampicillin  IVPB 1 Gram(s) IV Intermittent once  ampicillin  IVPB 1 Gram(s) IV Intermittent every 6 hours  chlorhexidine 2% Cloths 1 Application(s) Topical <User Schedule>  dextrose 40% Gel 15 Gram(s) Oral once  dextrose 5%. 1000 milliLiter(s) IV Continuous <Continuous>  dextrose 5%. 1000 milliLiter(s) IV Continuous <Continuous>  dextrose 50% Injectable 25 Gram(s) IV Push once  dextrose 50% Injectable 12.5 Gram(s) IV Push once  dextrose 50% Injectable 25 Gram(s) IV Push once  enoxaparin Injectable 80 milliGRAM(s) SubCutaneous every 12 hours  ferrous    sulfate 325 milliGRAM(s) Oral daily  gabapentin 300 milliGRAM(s) Oral two times a day  glucagon  Injectable 1 milliGRAM(s) IntraMuscular once  insulin lispro (ADMELOG) corrective regimen sliding scale   SubCutaneous three times a day before meals  lisinopril 5 milliGRAM(s) Oral daily  mirtazapine 7.5 milliGRAM(s) Oral at bedtime  pantoprazole   Suspension 40 milliGRAM(s) Oral daily  polyethylene glycol 3350 17 Gram(s) Oral daily PRN  senna 1 Tablet(s) Oral at bedtime PRN    Vital Signs Last 24 Hrs  T(C): 38 (2021 05:00), Max: 38 (2021 05:00)  T(F): 100.4 (2021 05:00), Max: 100.4 (2021 05:00)  HR: 110 (2021 05:00) (94 - 110)  BP: 133/68 (2021 05:00) (130/65 - 138/73)  BP(mean): --  RR: 18 (2021 05:00) (17 - 18)  SpO2: 96% (2021 05:00) (96% - 99%)    PHYSICAL EXAM:  GENERAL: NAD,   HEENT : Conjuntivae  clear sclerae anicteric  NECK: Supple, No JVD, Normal thyroid  NERVOUS SYSTEM:  Alert  CHEST/LUNG: Clear    HEART: Regular rate and rhythm; No murmurs, rubs, or gallops  ABDOMEN: Soft, Nontender, Nondistended; Bowel sounds present  EXTREMITIES:  no  edema no  tenderness  SKIN: No rashes   LABS:                        8.9    11.98 )-----------( 510      ( 2021 08:06 )             29.4     04-20    133<L>  |  99  |  15  ----------------------------<  307<H>  4.4   |  28  |  0.63    Ca    9.2      2021 08:06    TPro  6.4  /  Alb  1.6<L>  /  TBili  0.5  /  DBili  x   /  AST  20  /  ALT  16  /  AlkPhos  146<H>  04-20      Urinalysis Basic - ( 2021 11:52 )    Color: Yellow / Appearance: Clear / S.010 / pH: x  Gluc: x / Ketone: Negative  / Bili: Negative / Urobili: Negative mg/dL   Blood: x / Protein: 30 mg/dL / Nitrite: Negative   Leuk Esterase: Trace / RBC: 0-2 /HPF / WBC 3-5   Sq Epi: x / Non Sq Epi: Occasional / Bacteria: x      CAPILLARY BLOOD GLUCOSE      POCT Blood Glucose.: 332 mg/dL (2021 07:42)  POCT Blood Glucose.: 242 mg/dL (2021 21:45)  POCT Blood Glucose.: 236 mg/dL (2021 16:38)  POCT Blood Glucose.: 194 mg/dL (2021 11:29)      RADIOLOGY & ADDITIONAL TESTS:    Imaging reports  Personally Reviewed:  [ ] YES  [ ] NO    Consultant(s) Notes Reviewed:  [ x] YES  [ ] NO    Care Discussed with Consultants/Other Providers [x ] YES  [ ] NO  Problem/Plan - 1:  ·  Problem: Pulmonary emboli.  Plan: lovenox    Problem/Plan - 2:  ·  Problem: DVT (deep venous thrombosis).  Plan: lovenox    Problem/Plan - 4:  ·  Problem: Asthma.  Plan: symbicort.     Problem/Plan - 5:  ·  Problem: DM (diabetes mellitus).  Plan: insulin coverage.     Problem/Plan - 6:  Problem: Failure to thrive   on  GT feedings    severe  calorie  malnutrition cont  gt  feedings  will  add  additional  protein    anemia   improving continue on  AC  for  dvt  and  PE    monitor  labs  transfuse  as needed    s/p  peg  placement  cont  feedings  via  PEG will d/c  remeron  hypokalemia  supplement  UTI  observe  on  Ampicillin    for  discharge  to  rehab

## 2021-04-20 NOTE — HOSPICE CARE NOTE - CONVESATION DETAILS
Patient is not approved for hospice service at this time due to pending PEG etc as discussed with Dr Talha Barbosa. Maria Del Carmen Hanna RN.
Phone call to [facility], states no new plans for patient at this time, discharge date unclear.     Phone call to kyra Duenas, states plan is to send patient to a long term facility after discharge, declined hospice services.     Case will be placed on hold as of today until patient's discharge from hospital.    HCN RN will be available if any change in plans.     URSULA Chacko RN

## 2021-04-20 NOTE — PROVIDER CONTACT NOTE (CRITICAL VALUE NOTIFICATION) - TEST AND RESULT REPORTED:
Hemoglobin and Hematocrit 6.9/23.1
Hemoglobin 7.0
PTT > 200
H/H 6.1/19.3
Enterococcus vancomycin resistant
Lactate of 3.0

## 2021-04-20 NOTE — CONSULT NOTE ADULT - SUBJECTIVE AND OBJECTIVE BOX
Full note to follow  Fever, had been present intermittently during hospital stay.   Cough, rhonchi on exam concern of aspiration  CXR 4/18 unrevealing, will repeat  Abdominal exam benign  PEG site ok  Has midline cath in site nearly 4 weeks, but blood cx thus far are NTD  UE with edema/erythema B, venous stasis like  No joint inflammation to implicate gout  No evidence of IV catheter related phlebitits  Areas of grade II pressure ulceration on sacrum/perineum, none infected  Soles with desquamation/intact skin underneath- ?prior drug rash. None present now  U/A bland, not possible to ascertain sx, skeptical re: U/A  Stop ampicillin  MRSA nares  Reheck CXR  Vanco 1g x1  Cefepime 1g Q8H  Thank you for the courtesy of this referral.  Cesar Muñoz MD  Attending Physician  [facility]  Division of Infectious Diseases  [phone]  -----------------  [facility]  Division of Infectious Diseases  [phone]    PAULA MARIO  89y, Female  [id number]    HPI--      PMH/PSH--  HTN (hypertension)    DM (diabetes mellitus)    Asthma    Dysphagia    Diverticulitis    Pneumonia    Stroke    Lumbar spondylolysis    Hip fracture    Status post right knee replacement        Allergies--  No Known Allergies      Medications--  Antibiotics: ampicillin  IVPB      ampicillin  IVPB 1 Gram(s) IV Intermittent every 6 hours    Immunologic:   Other: acetaminophen    Suspension .. PRN  ALBUTerol    90 MICROgram(s) HFA Inhaler  ALBUTerol    90 MICROgram(s) HFA Inhaler PRN  chlorhexidine 2% Cloths  dextrose 40% Gel  dextrose 5%.  dextrose 5%.  dextrose 50% Injectable  dextrose 50% Injectable  dextrose 50% Injectable  enoxaparin Injectable  ferrous    sulfate  gabapentin  glucagon  Injectable  insulin lispro (ADMELOG) corrective regimen sliding scale  lisinopril  pantoprazole   Suspension  polyethylene glycol 3350 PRN  senna PRN    Antimicrobials last 90 days per EMR: MEDICATIONS  (STANDING):    ampicillin  IVPB   108 mL/Hr IV Intermittent (04-20-21 @ 10:40)    cefTRIAXone   IVPB   100 mL/Hr IV Intermittent (03-17-21 @ 23:36)   100 mL/Hr IV Intermittent (03-17-21 @ 00:34)   100 mL/Hr IV Intermittent (03-16-21 @ 00:55)   100 mL/Hr IV Intermittent (03-15-21 @ 00:24)    cefTRIAXone   IVPB   100 mL/Hr IV Intermittent (03-14-21 @ 16:21)    piperacillin/tazobactam IVPB.   200 mL/Hr IV Intermittent (04-09-21 @ 23:19)    piperacillin/tazobactam IVPB..   25 mL/Hr IV Intermittent (04-17-21 @ 05:23)   25 mL/Hr IV Intermittent (04-16-21 @ 21:42)   25 mL/Hr IV Intermittent (04-16-21 @ 13:43)   25 mL/Hr IV Intermittent (04-16-21 @ 05:38)   25 mL/Hr IV Intermittent (04-15-21 @ 21:30)   25 mL/Hr IV Intermittent (04-15-21 @ 18:16)   25 mL/Hr IV Intermittent (04-15-21 @ 05:41)   25 mL/Hr IV Intermittent (04-14-21 @ 21:38)   25 mL/Hr IV Intermittent (04-14-21 @ 14:43)   25 mL/Hr IV Intermittent (04-14-21 @ 05:53)   25 mL/Hr IV Intermittent (04-13-21 @ 22:02)   25 mL/Hr IV Intermittent (04-13-21 @ 14:04)   25 mL/Hr IV Intermittent (04-13-21 @ 05:28)   25 mL/Hr IV Intermittent (04-12-21 @ 22:45)   25 mL/Hr IV Intermittent (04-12-21 @ 14:18)   25 mL/Hr IV Intermittent (04-12-21 @ 05:44)   25 mL/Hr IV Intermittent (04-11-21 @ 21:29)   25 mL/Hr IV Intermittent (04-11-21 @ 15:22)   25 mL/Hr IV Intermittent (04-11-21 @ 05:36)   25 mL/Hr IV Intermittent (04-10-21 @ 21:51)   25 mL/Hr IV Intermittent (04-10-21 @ 13:40)   25 mL/Hr IV Intermittent (04-10-21 @ 05:35)        Social History--  EtOH: denies   Tobacco: denies   Drug Use: denies     Family/Marital History--        Travel/Environmental/Occupational History:      Review of Systems:  A >=10-point review of systems was obtained.     Pertinent positives and negatives--  Constitutional: No fevers. No Chills. No Rigors.   Eyes:  ENMT:  Cardiovascular: No chest pain. No palpitations.  Respiratory: No shortness of breath. No cough.  Gastrointestinal: No nausea or vomiting. No diarrhea or constipation.   Genitourinary:  Musculoskeletal:  Skin:  Neurologic:  Psychiatric: Pleasant. Appropriate affect.  Endocrine:  Heme/Lymphatic:  Allergy/Immunologic:    Review of systems otherwise negative except as previously noted.    Physical Exam--  Vital Signs: T(F): 99 (04-20-21 @ 11:51), Max: 100.8 (04-20-21 @ 11:15)  HR: 61 (04-20-21 @ 11:47)  BP: 136/77 (04-20-21 @ 11:47)  RR: 20 (04-20-21 @ 11:47)  SpO2: 95% (04-20-21 @ 11:47)  Wt(kg): --  General: Nontoxic-appearing Female in no acute distress.  HEENT: AT/NC. PERRL. EOMI. Anicteric. Conjunctiva pink and moist. Oropharynx clear. Dentition fair.  Neck: Not rigid. No sense of mass.  Nodes: None palpable.  Lungs: Clear bilaterally without rales, wheezing or rhonchi  Heart: Regular rate and rhythm. No Murmur. No rub. No gallop. No palpable thrill.  Abdomen: Bowel sounds present and normoactive. Soft. Nondistended. Nontender. No sense of mass. No organomegaly.  Back: No spinal tenderness. No costovertebral angle tenderness.   Extremities: No cyanosis or clubbing. No edema.   Skin: Warm. Dry. Good turgor. No rash. No vasculitic stigmata.  Psychiatric: Appropriate affect and mood for situation.         Laboratory & Imaging Data--  CBC                        8.9    11.98 )-----------( 510      ( 20 Apr 2021 08:06 )             29.4       Chemistries  04-20    133<L>  |  99  |  15  ----------------------------<  307<H>  4.4   |  28  |  0.63    Ca    9.2      20 Apr 2021 08:06    TPro  6.4  /  Alb  1.6<L>  /  TBili  0.5  /  DBili  x   /  AST  20  /  ALT  16  /  AlkPhos  146<H>  04-20      Culture Data    Culture - Urine (collected 18 Apr 2021 18:39)  Source: .Urine Clean Catch (Midstream)  Preliminary Report (19 Apr 2021 22:39):    >100,000 CFU/ml Enterococcus species    Culture - Blood (collected 18 Apr 2021 18:07)  Source: .Blood Blood  Preliminary Report (19 Apr 2021 19:01):    No growth to date.    Culture - Blood (collected 18 Apr 2021 18:07)  Source: .Blood Blood  Preliminary Report (19 Apr 2021 19:01):    No growth to date.         Full note to follow  Fever, had been present intermittently during hospital stay.   Cough, rhonchi on exam concern of aspiration  CXR 4/18 unrevealing, will repeat  Abdominal exam benign  PEG site ok  Has midline cath in site nearly 4 weeks, but blood cx thus far are NTD  UE with edema/erythema B, venous stasis like  No joint inflammation to implicate gout  No evidence of IV catheter related phlebitits  Areas of grade II pressure ulceration on sacrum/perineum, none infected  Soles with desquamation/intact skin underneath- ?prior drug rash. None present now  U/A bland, not possible to ascertain sx, skeptical re: U/A  Stop ampicillin  MRSA nares  Reheck CXR  Vanco 1g x1  Cefepime 1g Q8H  Thank you for the courtesy of this referral.  Cesar Muñoz MD  Attending Physician  [facility]  Division of Infectious Diseases  [phone]  -----------------  [facility]  Division of Infectious Diseases  [phone]    PAULA MARIO  89y, Female  [id number]    HPI--  89F, nonhistorian with dementia, DM, HTN, CVA, R TKR< hip fracture, COVID-19, who was admitted >1 month ago (chart reviewed) from [facility] with failure to thrive, weight loss, poor PO intake, and need for PEG placement. Patient initially treated for ?UTI with ceftriaxone, but culture grew Enterococcus spp. Patient also noted to have DVT/PE and concern of AF though subsequently thought to represent sinus with ectopy. Patient was seen by S/LP, noted to have dysphagia. Given poor status and prognosis, palliative care was consulted. Despite bleak overall outlook patient has wanted aggressive care. There was a drop in H/H and IVC filter was entertained rather than A/C, but risk of clotting was deemed too high. Patient has been intermittenly febrile throughout her hospital stay. She recieved  a course of Zosyn vs. possible pneumonia, and had PEG placed 4/16. Zosyn stopped 4/17, She had intermittent fever despite being on Zosyn, and then had fever again on the 18th off Zosyn. Cx data at that time with Enterococcus in urine again, and patient was started on ampicillin today. U/A was without significant pyuria.       PMH/PSH--  HTN (hypertension)    DM (diabetes mellitus)    Asthma    Dysphagia    Diverticulitis    Pneumonia    Stroke    Lumbar spondylolysis    Hip fracture    Status post right knee replacement        Allergies--  No Known Allergies      Medications--  Antibiotics: ampicillin  IVPB      ampicillin  IVPB 1 Gram(s) IV Intermittent every 6 hours    Immunologic:   Other: acetaminophen    Suspension .. PRN  ALBUTerol    90 MICROgram(s) HFA Inhaler  ALBUTerol    90 MICROgram(s) HFA Inhaler PRN  chlorhexidine 2% Cloths  dextrose 40% Gel  dextrose 5%.  dextrose 5%.  dextrose 50% Injectable  dextrose 50% Injectable  dextrose 50% Injectable  enoxaparin Injectable  ferrous    sulfate  gabapentin  glucagon  Injectable  insulin lispro (ADMELOG) corrective regimen sliding scale  lisinopril  pantoprazole   Suspension  polyethylene glycol 3350 PRN  senna PRN    Antimicrobials last 90 days per EMR: MEDICATIONS  (STANDING):    ampicillin  IVPB   108 mL/Hr IV Intermittent (04-20-21 @ 10:40)    cefTRIAXone   IVPB   100 mL/Hr IV Intermittent (03-17-21 @ 23:36)   100 mL/Hr IV Intermittent (03-17-21 @ 00:34)   100 mL/Hr IV Intermittent (03-16-21 @ 00:55)   100 mL/Hr IV Intermittent (03-15-21 @ 00:24)    cefTRIAXone   IVPB   100 mL/Hr IV Intermittent (03-14-21 @ 16:21)    piperacillin/tazobactam IVPB.   200 mL/Hr IV Intermittent (04-09-21 @ 23:19)    piperacillin/tazobactam IVPB..   25 mL/Hr IV Intermittent (04-17-21 @ 05:23)   25 mL/Hr IV Intermittent (04-16-21 @ 21:42)   25 mL/Hr IV Intermittent (04-16-21 @ 13:43)   25 mL/Hr IV Intermittent (04-16-21 @ 05:38)   25 mL/Hr IV Intermittent (04-15-21 @ 21:30)   25 mL/Hr IV Intermittent (04-15-21 @ 18:16)   25 mL/Hr IV Intermittent (04-15-21 @ 05:41)   25 mL/Hr IV Intermittent (04-14-21 @ 21:38)   25 mL/Hr IV Intermittent (04-14-21 @ 14:43)   25 mL/Hr IV Intermittent (04-14-21 @ 05:53)   25 mL/Hr IV Intermittent (04-13-21 @ 22:02)   25 mL/Hr IV Intermittent (04-13-21 @ 14:04)   25 mL/Hr IV Intermittent (04-13-21 @ 05:28)   25 mL/Hr IV Intermittent (04-12-21 @ 22:45)   25 mL/Hr IV Intermittent (04-12-21 @ 14:18)   25 mL/Hr IV Intermittent (04-12-21 @ 05:44)   25 mL/Hr IV Intermittent (04-11-21 @ 21:29)   25 mL/Hr IV Intermittent (04-11-21 @ 15:22)   25 mL/Hr IV Intermittent (04-11-21 @ 05:36)   25 mL/Hr IV Intermittent (04-10-21 @ 21:51)   25 mL/Hr IV Intermittent (04-10-21 @ 13:40)   25 mL/Hr IV Intermittent (04-10-21 @ 05:35)        Social History--  EtOH: none known.  Tobacco: none known.  Drug use: none known.    Family/Marital History--  Unknown and not relevant to clinical concern          Review of Systems:  Review of systems unable due to dementia.     Physical Exam--  Vital Signs: T(F): 99 (04-20-21 @ 11:51), Max: 100.8 (04-20-21 @ 11:15)  HR: 61 (04-20-21 @ 11:47)  BP: 136/77 (04-20-21 @ 11:47)  RR: 20 (04-20-21 @ 11:47)  SpO2: 95% (04-20-21 @ 11:47)  Wt(kg): --  General: Chronically ill-appearing Female in no acute distress. Wet cough during assessment  HEENT: AT/NC. Pupils/EOM unable secondary to patient compliance. Resists exam of conj/sclera. Oropharynx/dentition unable secondary to patient compliance.   Neck: Not rigid. No sense of mass.  Nodes: None palpable.  Lungs: Poor effort, diminished BS bilaterally with scattered rhonchi  Heart: Regular rate and rhythm. No Murmur. No rub. No gallop. No palpable thrill.  Abdomen: Bowel sounds present and normoactive. Soft. Nondistended. Nontender. No sense of mass. No organomegaly.  Back: No spinal tenderness. No costovertebral angle tenderness. Grade II pressure ulcers of sacrum/perineum without evidence of infection  Extremities: No cyanosis or clubbing. 1-2+ UE edema. 1+ LE edema. Midline RUE C/D/I  Skin: Warm. Dry. Good turgor. No rash. No vasculitic stigmata. Desquamation on soles of feet (not palms or elswhere). Intact skin underneath.   Psychiatric: Unable      Laboratory & Imaging Data--  CBC                        8.9    11.98 )-----------( 510      ( 20 Apr 2021 08:06 )             29.4   WBC trend  WBC Count: 11.98 K/uL (04-20-21 @ 08:06)  WBC Count: 9.90 K/uL (04-19-21 @ 06:52)  WBC Count: 10.88 K/uL (04-18-21 @ 06:48)  WBC Count: 9.64 K/uL (04-17-21 @ 07:47)  WBC Count: 9.82 K/uL (04-16-21 @ 07:18)  WBC Count: 8.73 K/uL (04-14-21 @ 10:19)  WBC Count: 9.67 K/uL (04-13-21 @ 07:54)  WBC Count: 8.83 K/uL (04-12-21 @ 08:56)  WBC Count: 11.41 K/uL (04-10-21 @ 06:47)  WBC Count: 7.24 K/uL (04-09-21 @ 18:00)  WBC Count: 6.60 K/uL (04-07-21 @ 23:09)  WBC Count: 6.04 K/uL (04-07-21 @ 11:28)  WBC Count: 5.53 K/uL (04-06-21 @ 18:17)      Chemistries  04-20    133<L>  |  99  |  15  ----------------------------<  307<H>  4.4   |  28  |  0.63    Ca    9.2      20 Apr 2021 08:06    TPro  6.4  /  Alb  1.6<L>  /  TBili  0.5  /  DBili  x   /  AST  20  /  ALT  16  /  AlkPhos  146<H>  04-20    Urinalysis (04.18.21 @ 11:52)    pH Urine: 5.0    Glucose Qualitative, Urine: 250 mg/dL    Blood, Urine: Negative    Color: Yellow    Urine Appearance: Clear    Bilirubin: Negative    Ketone - Urine: Negative    Specific Gravity: 1.010    Protein, Urine: 30 mg/dL    Urobilinogen: Negative mg/dL    Nitrite: Negative    Leukocyte Esterase Concentration: Trace  Urine Microscopic-Add On (NC) (04.18.21 @ 11:52)    Epithelial Cells: Occasional    Red Blood Cell - Urine: 0-2 /HPF    White Blood Cell - Urine: 3-5      Culture Data  Culture - Urine (collected 18 Apr 2021 18:39)  Source: .Urine Clean Catch (Midstream)  Preliminary Report (19 Apr 2021 22:39):    >100,000 CFU/ml Enterococcus species    Culture - Blood (collected 18 Apr 2021 18:07)  Source: .Blood Blood  Preliminary Report (19 Apr 2021 19:01):    No growth to date.    Culture - Blood (collected 18 Apr 2021 18:07)  Source: .Blood Blood  Preliminary Report (19 Apr 2021 19:01):    No growth to date.    CXR (personally reviewed) < from: Xray Chest 1 View-PORTABLE IMMEDIATE (Xray Chest 1 View-PORTABLE IMMEDIATE .) (04.18.21 @ 12:46) >  INTERPRETATION:  History: Cough. Fever    Chest:  one view.    Comparison: 04/09/2021    AP radiograph of the chest demonstrates no evidence of infiltrate, pleural effusion or vascular congestion. No atelectasis is seen. The cardiac silhouette is normal in size. Osseous structures are intact.    Impression: No active pulmonary disease.    < end of copied text >

## 2021-04-21 LAB
GLUCOSE BLDC GLUCOMTR-MCNC: 207 MG/DL — HIGH (ref 70–99)
GLUCOSE BLDC GLUCOMTR-MCNC: 258 MG/DL — HIGH (ref 70–99)
GLUCOSE BLDC GLUCOMTR-MCNC: 261 MG/DL — HIGH (ref 70–99)
GLUCOSE BLDC GLUCOMTR-MCNC: 297 MG/DL — HIGH (ref 70–99)
GLUCOSE BLDC GLUCOMTR-MCNC: 358 MG/DL — HIGH (ref 70–99)
MRSA PCR RESULT.: SIGNIFICANT CHANGE UP
S AUREUS DNA NOSE QL NAA+PROBE: SIGNIFICANT CHANGE UP

## 2021-04-21 PROCEDURE — 99233 SBSQ HOSP IP/OBS HIGH 50: CPT

## 2021-04-21 RX ORDER — VANCOMYCIN HCL 1 G
1000 VIAL (EA) INTRAVENOUS ONCE
Refills: 0 | Status: COMPLETED | OUTPATIENT
Start: 2021-04-21 | End: 2021-04-21

## 2021-04-21 RX ADMIN — CEFEPIME 100 MILLIGRAM(S): 1 INJECTION, POWDER, FOR SOLUTION INTRAMUSCULAR; INTRAVENOUS at 05:46

## 2021-04-21 RX ADMIN — Medication 650 MILLIGRAM(S): at 05:47

## 2021-04-21 RX ADMIN — ENOXAPARIN SODIUM 80 MILLIGRAM(S): 100 INJECTION SUBCUTANEOUS at 05:46

## 2021-04-21 RX ADMIN — Medication 650 MILLIGRAM(S): at 21:35

## 2021-04-21 RX ADMIN — CEFEPIME 100 MILLIGRAM(S): 1 INJECTION, POWDER, FOR SOLUTION INTRAMUSCULAR; INTRAVENOUS at 17:31

## 2021-04-21 RX ADMIN — Medication 6: at 16:57

## 2021-04-21 RX ADMIN — Medication 650 MILLIGRAM(S): at 23:00

## 2021-04-21 RX ADMIN — CHLORHEXIDINE GLUCONATE 1 APPLICATION(S): 213 SOLUTION TOPICAL at 05:46

## 2021-04-21 RX ADMIN — ENOXAPARIN SODIUM 80 MILLIGRAM(S): 100 INJECTION SUBCUTANEOUS at 17:32

## 2021-04-21 RX ADMIN — PANTOPRAZOLE SODIUM 40 MILLIGRAM(S): 20 TABLET, DELAYED RELEASE ORAL at 11:46

## 2021-04-21 RX ADMIN — Medication 325 MILLIGRAM(S): at 11:46

## 2021-04-21 RX ADMIN — Medication 4: at 11:45

## 2021-04-21 RX ADMIN — Medication 650 MILLIGRAM(S): at 06:47

## 2021-04-21 RX ADMIN — Medication 650 MILLIGRAM(S): at 00:00

## 2021-04-21 RX ADMIN — GABAPENTIN 300 MILLIGRAM(S): 400 CAPSULE ORAL at 17:32

## 2021-04-21 RX ADMIN — LISINOPRIL 5 MILLIGRAM(S): 2.5 TABLET ORAL at 05:46

## 2021-04-21 RX ADMIN — Medication 10: at 08:25

## 2021-04-21 RX ADMIN — Medication 250 MILLIGRAM(S): at 21:51

## 2021-04-21 RX ADMIN — GABAPENTIN 300 MILLIGRAM(S): 400 CAPSULE ORAL at 05:46

## 2021-04-21 NOTE — PROGRESS NOTE ADULT - SUBJECTIVE AND OBJECTIVE BOX
[facility]  Division of Infectious Diseases  [phone]    Name: PAULA MARIO  Age: 89y  Gender: Female  MRN: [id number]    Interval History--  Notes reviewed    Past Medical History--  HTN (hypertension)    DM (diabetes mellitus)    Asthma    Dysphagia    Diverticulitis    Pneumonia    Stroke    Lumbar spondylolysis    Hip fracture    Status post right knee replacement        For details regarding the patient's social history, family history, and other miscellaneous elements, please refer the initial infectious diseases consultation and/or the admitting history and physical examination for this admission.    Allergies    No Known Allergies    Intolerances        Medications--  Antibiotics:  cefepime   IVPB 1000 milliGRAM(s) IV Intermittent every 12 hours  cefepime   IVPB        Immunologic:    Other:  acetaminophen    Suspension .. PRN  ALBUTerol    90 MICROgram(s) HFA Inhaler  ALBUTerol    90 MICROgram(s) HFA Inhaler PRN  chlorhexidine 2% Cloths  dextrose 40% Gel  dextrose 5%.  dextrose 5%.  dextrose 50% Injectable  dextrose 50% Injectable  dextrose 50% Injectable  enoxaparin Injectable  ferrous    sulfate  gabapentin  glucagon  Injectable  insulin lispro (ADMELOG) corrective regimen sliding scale  lisinopril  pantoprazole   Suspension  polyethylene glycol 3350 PRN  senna PRN      Review of Systems--  A 10-point review of systems was obtained.     Pertinent positives and negatives--  Constitutional: No fevers. No Chills. No Rigors.   Cardiovascular: No chest pain. No palpitations.  Respiratory: No shortness of breath. No cough.  Gastrointestinal: No nausea or vomiting. No diarrhea or constipation.   Psychiatric: Pleasant. Appropriate affect.    Review of systems otherwise negative except as previously noted.    Physical Examination--  Vital Signs: T(F): 101.5 (04-21-21 @ 05:09), Max: 102 (04-20-21 @ 22:34)  HR: 105 (04-21-21 @ 05:09)  BP: 134/64 (04-21-21 @ 05:09)  RR: 18 (04-21-21 @ 06:40)  SpO2: 95% (04-21-21 @ 06:40)  Wt(kg): --  General: Nontoxic-appearing Female in no acute distress.  HEENT: AT/NC. PERRL. EOMI. Anicteric. Conjunctiva pink and moist. Oropharynx clear. Dentition fair.  Neck: Not rigid. No sense of mass.  Nodes: None palpable.  Lungs: Clear bilaterally without rales, wheezing or rhonchi  Heart: Regular rate and rhythm. No Murmur. No rub. No gallop. No palpable thrill.  Abdomen: Bowel sounds present and normoactive. Soft. Nondistended. Nontender. No sense of mass. No organomegaly.  Back: No spinal tenderness. No costovertebral angle tenderness.   Extremities: No cyanosis or clubbing. No edema.   Skin: Warm. Dry. Good turgor. No rash. No vasculitic stigmata.  Psychiatric: Appropriate affect and mood for situation.         Laboratory Studies--  CBC                        8.9    11.98 )-----------( 510      ( 20 Apr 2021 08:06 )             29.4       Chemistries  04-20    133<L>  |  99  |  15  ----------------------------<  307<H>  4.4   |  28  |  0.63    Ca    9.2      20 Apr 2021 08:06    TPro  6.4  /  Alb  1.6<L>  /  TBili  0.5  /  DBili  x   /  AST  20  /  ALT  16  /  AlkPhos  146<H>  04-20      Culture Data    Culture - Urine (collected 18 Apr 2021 18:39)  Source: .Urine Clean Catch (Midstream)  Final Report (20 Apr 2021 18:31):    >100,000 CFU/ml Enterococcus faecium (vancomycin resistant)  Organism: Enterococcus faecium (vancomycin resistant) (20 Apr 2021 18:31)  Organism: Enterococcus faecium (vancomycin resistant) (20 Apr 2021 18:31)    Culture - Blood (collected 18 Apr 2021 18:07)  Source: .Blood Blood  Preliminary Report (19 Apr 2021 19:01):    No growth to date.    Culture - Blood (collected 18 Apr 2021 18:07)  Source: .Blood Blood  Preliminary Report (19 Apr 2021 19:01):    No growth to date.             [facility]  Division of Infectious Diseases  [phone]    Name: PAULA MARIO  Age: 89y  Gender: Female  MRN: [id number]    Interval History--  Notes reviewed. Seen earlier this am. Poorly responsive, little changed.  MRSA nares not done yesterday.  Still febrile through this am.    Past Medical History--  HTN (hypertension)    DM (diabetes mellitus)    Asthma    Dysphagia    Diverticulitis    Pneumonia    Stroke    Lumbar spondylolysis    Hip fracture    Status post right knee replacement        For details regarding the patient's social history, family history, and other miscellaneous elements, please refer the initial infectious diseases consultation and/or the admitting history and physical examination for this admission.    Allergies    No Known Allergies    Intolerances        Medications--  Antibiotics:  cefepime   IVPB 1000 milliGRAM(s) IV Intermittent every 12 hours  cefepime   IVPB        Immunologic:    Other:  acetaminophen    Suspension .. PRN  ALBUTerol    90 MICROgram(s) HFA Inhaler  ALBUTerol    90 MICROgram(s) HFA Inhaler PRN  chlorhexidine 2% Cloths  dextrose 40% Gel  dextrose 5%.  dextrose 5%.  dextrose 50% Injectable  dextrose 50% Injectable  dextrose 50% Injectable  enoxaparin Injectable  ferrous    sulfate  gabapentin  glucagon  Injectable  insulin lispro (ADMELOG) corrective regimen sliding scale  lisinopril  pantoprazole   Suspension  polyethylene glycol 3350 PRN  senna PRN      Review of Systems--  Review of systems unable due to dementia.     Physical Examination--  Vital Signs: T(F): 101.5 (04-21-21 @ 05:09), Max: 102 (04-20-21 @ 22:34)  HR: 105 (04-21-21 @ 05:09)  BP: 134/64 (04-21-21 @ 05:09)  RR: 18 (04-21-21 @ 06:40)  SpO2: 95% (04-21-21 @ 06:40)  Wt(kg): --  General: Chronically ill-appearing Female in no acute distress.   HEENT: AT/NC. Pupils/EOM unable secondary to patient compliance. Resists exam of conj/sclera. Oropharynx/dentition unable secondary to patient compliance.   Neck: Not rigid. No sense of mass.  Nodes: None palpable.  Lungs: Poor effort, grossly clear with decreased B BS   Heart: Regular rate and rhythm. No Murmur. No rub. No gallop. No palpable thrill.  Abdomen: Bowel sounds present and normoactive. Soft. Nondistended. Nontender. No sense of mass. No organomegaly.  Back: No spinal tenderness. No costovertebral angle tenderness. Grade II pressure ulcers of sacrum/perineum without evidence of infection  Extremities: No cyanosis or clubbing. 1-2+ UE edema. 1+ LE edema. Midline RUE C/D/I  Skin: Warm. Dry. Good turgor. No rash. No vasculitic stigmata. Desquamation on soles no change.  Psychiatric: Unable      Laboratory Studies--  CBC                        8.9    11.98 )-----------( 510      ( 20 Apr 2021 08:06 )             29.4       Chemistries  04-20    133<L>  |  99  |  15  ----------------------------<  307<H>  4.4   |  28  |  0.63    Ca    9.2      20 Apr 2021 08:06    TPro  6.4  /  Alb  1.6<L>  /  TBili  0.5  /  DBili  x   /  AST  20  /  ALT  16  /  AlkPhos  146<H>  04-20    Urinalysis (04.18.21 @ 11:52)    Glucose Qualitative, Urine: 250 mg/dL    Blood, Urine: Negative    pH Urine: 5.0    Color: Yellow    Urine Appearance: Clear    Bilirubin: Negative    Ketone - Urine: Negative    Specific Gravity: 1.010    Protein, Urine: 30 mg/dL    Urobilinogen: Negative mg/dL    Nitrite: Negative    Leukocyte Esterase Concentration: Trace  Urine Microscopic-Add On (NC) (04.18.21 @ 11:52)    White Blood Cell - Urine: 3-5    Red Blood Cell - Urine: 0-2 /HPF    Epithelial Cells: Occasional      Culture Data  Culture - Urine (04.18.21 @ 18:39)    -  Ciprofloxacin: R >2    -  Daptomycin: SDD 4 The breakpoint for SDD (sensitive dose dependent)is based on a dosage regimen of 8-12 mg/kg administered every 24 h in adults and is intended for serious infections due to E. faecium. Consultation with an infectious diseases specialist is recommended.    -  Levofloxacin: R >4    -  Linezolid: S 2    -  Nitrofurantoin: S <=32 Should not be used to treat pyelonephritis.    -  Tetra/Doxy: R >8    -  Vancomycin: R >16    -  Ampicillin: R >8 Predicts results to ampicillin/sulbactam, amoxacillin-clavulanate and  piperacillin-tazobactam.    Specimen Source: .Urine Clean Catch (Midstream)    Culture Results:   >100,000 CFU/ml Enterococcus faecium (vancomycin resistant)    Organism Identification: Enterococcus faecium (vancomycin resistant)    Organism: Enterococcus faecium (vancomycin resistant)    Method Type: JANICE        Culture - Blood (collected 18 Apr 2021 18:07)  Source: .Blood Blood  Preliminary Report (19 Apr 2021 19:01):    No growth to date.    Culture - Blood (collected 18 Apr 2021 18:07)  Source: .Blood Blood  Preliminary Report (19 Apr 2021 19:01):    No growth to date.

## 2021-04-21 NOTE — PROGRESS NOTE ADULT - SUBJECTIVE AND OBJECTIVE BOX
INTERVAL HPI/OVERNIGHT EVENTS:    continues  with  fever    REVIEW OF SYSTEMS:  CONSTITUTIONAL:  somnolent  opens  eyes      MEDICATION:  acetaminophen    Suspension .. 650 milliGRAM(s) Oral every 6 hours PRN  ALBUTerol    90 MICROgram(s) HFA Inhaler 2 Puff(s) Inhalation every 4 hours  ALBUTerol    90 MICROgram(s) HFA Inhaler 2 Puff(s) Inhalation every 6 hours PRN  cefepime   IVPB 1000 milliGRAM(s) IV Intermittent every 12 hours  cefepime   IVPB      chlorhexidine 2% Cloths 1 Application(s) Topical <User Schedule>  dextrose 40% Gel 15 Gram(s) Oral once  dextrose 5%. 1000 milliLiter(s) IV Continuous <Continuous>  dextrose 5%. 1000 milliLiter(s) IV Continuous <Continuous>  dextrose 50% Injectable 25 Gram(s) IV Push once  dextrose 50% Injectable 12.5 Gram(s) IV Push once  dextrose 50% Injectable 25 Gram(s) IV Push once  enoxaparin Injectable 80 milliGRAM(s) SubCutaneous every 12 hours  ferrous    sulfate 325 milliGRAM(s) Oral daily  gabapentin 300 milliGRAM(s) Oral two times a day  glucagon  Injectable 1 milliGRAM(s) IntraMuscular once  insulin lispro (ADMELOG) corrective regimen sliding scale   SubCutaneous three times a day before meals  lisinopril 5 milliGRAM(s) Oral daily  pantoprazole   Suspension 40 milliGRAM(s) Oral daily  polyethylene glycol 3350 17 Gram(s) Oral daily PRN  senna 1 Tablet(s) Oral at bedtime PRN    Vital Signs Last 24 Hrs  T(C): 38.6 (21 Apr 2021 05:09), Max: 38.9 (20 Apr 2021 22:34)  T(F): 101.5 (21 Apr 2021 05:09), Max: 102 (20 Apr 2021 22:34)  HR: 105 (21 Apr 2021 05:09) (61 - 105)  BP: 134/64 (21 Apr 2021 05:09) (124/64 - 136/77)  BP(mean): --  RR: 18 (21 Apr 2021 06:40) (18 - 20)  SpO2: 95% (21 Apr 2021 06:40) (95% - 96%)    PHYSICAL EXAM:  GENERAL: NAD, well-groomed, well-developed  HEENT : Conjuntivae  clear sclerae anicteric  NECK: Supple, No JVD, Normal thyroid  NERVOUS SYSTEM:  Alert oriented   no  focal  deficits;   CHEST/LUNG:  no  bronchospasm  HEART: Regular rate and rhythm; No murmurs, rubs, or gallops  ABDOMEN: Soft, Nontender, Nondistended; Bowel sounds present  EXTREMITIES:  no  edema no  tenderness  SKIN: No rashes   LABS:                        8.9    11.98 )-----------( 510      ( 20 Apr 2021 08:06 )             29.4     04-20    133<L>  |  99  |  15  ----------------------------<  307<H>  4.4   |  28  |  0.63    Ca    9.2      20 Apr 2021 08:06    TPro  6.4  /  Alb  1.6<L>  /  TBili  0.5  /  DBili  x   /  AST  20  /  ALT  16  /  AlkPhos  146<H>  04-20        CAPILLARY BLOOD GLUCOSE      POCT Blood Glucose.: 297 mg/dL (21 Apr 2021 00:10)  POCT Blood Glucose.: 222 mg/dL (20 Apr 2021 16:15)  POCT Blood Glucose.: 395 mg/dL (20 Apr 2021 11:52)      RADIOLOGY & ADDITIONAL TESTS:    Imaging reports  Personally Reviewed:  [ x] YES  [ ] NO    Consultant(s) Notes Reviewed:  [ x] YES  [ ] NO    Care Discussed with Consultants/Other Providers [x ] YES  [ ] NO  Problem/Plan - 1:  ·  Problem: Pulmonary emboli.  Plan: lovenox    Problem/Plan - 2:  ·  Problem: DVT (deep venous thrombosis).  Plan: lovenox    Problem/Plan - 4:  ·  Problem: Asthma.  Plan: symbicort.     Problem/Plan - 5:  ·  Problem: DM (diabetes mellitus).  Plan: insulin coverage.     Problem/Plan - 6:  Problem: Failure to thrive   on  GT feedings    severe  calorie  malnutrition cont  gt  feedings  will  add  additional  protein    anemia   improving continue on  AC  for  dvt  and  PE    monitor  labs  transfuse  as needed    s/p  peg  placement  cont  feedings  via  PEG will d/c  remeron  hypokalemia  supplement  recurrent  fever UTI  possible  pneumonia  observe  with  cefepime

## 2021-04-21 NOTE — PROGRESS NOTE ADULT - ASSESSMENT
89F with DM, HTN, dementia who has had fever present intermittently during hospital stay.   Cough, rhonchi on exam concern of aspiration. CXR 4/18 unrevealing, will repeat  Abdominal exam benign  PEG site ok  Has midline cath in site nearly 4 weeks, but blood cx thus far are NTD  UE with edema/erythema B, venous stasis like  No joint inflammation to implicate gout  No evidence of IV catheter related phlebitits  Areas of grade II pressure ulceration on sacrum/perineum, none infected  Soles with desquamation/intact skin underneath- ?prior drug rash. None present now  U/A bland, not possible to ascertain sx, skeptical re: UTI    04/21: Still w fever but too early to expect response to antibiotics. I do not think urine isolate merits treatment at this time. CXR with 'unchanged' infiltrates, whereas prior report did not think that they were significant. Concur with former though not clear to me how much of CXR changes are sequelae/residual of COVID vs. bacterial provess.    Suggestiions--  F/U MRSA nares  Redose vanco 1g x1  Continue cefepime 1g Q8H      Cesar Muñoz MD  Attending Physician  [facility]  Division of Infectious Diseases  [phone]

## 2021-04-22 LAB
ANION GAP SERPL CALC-SCNC: 8 MMOL/L — SIGNIFICANT CHANGE UP (ref 5–17)
BUN SERPL-MCNC: 20 MG/DL — SIGNIFICANT CHANGE UP (ref 7–23)
CALCIUM SERPL-MCNC: 8.4 MG/DL — LOW (ref 8.5–10.1)
CHLORIDE SERPL-SCNC: 99 MMOL/L — SIGNIFICANT CHANGE UP (ref 96–108)
CO2 SERPL-SCNC: 26 MMOL/L — SIGNIFICANT CHANGE UP (ref 22–31)
CREAT SERPL-MCNC: 0.69 MG/DL — SIGNIFICANT CHANGE UP (ref 0.5–1.3)
GLUCOSE BLDC GLUCOMTR-MCNC: 193 MG/DL — HIGH (ref 70–99)
GLUCOSE BLDC GLUCOMTR-MCNC: 223 MG/DL — HIGH (ref 70–99)
GLUCOSE BLDC GLUCOMTR-MCNC: 352 MG/DL — HIGH (ref 70–99)
GLUCOSE SERPL-MCNC: 241 MG/DL — HIGH (ref 70–99)
HCT VFR BLD CALC: 28.2 % — LOW (ref 34.5–45)
HGB BLD-MCNC: 8.3 G/DL — LOW (ref 11.5–15.5)
MCHC RBC-ENTMCNC: 27.3 PG — SIGNIFICANT CHANGE UP (ref 27–34)
MCHC RBC-ENTMCNC: 29.4 GM/DL — LOW (ref 32–36)
MCV RBC AUTO: 92.8 FL — SIGNIFICANT CHANGE UP (ref 80–100)
NRBC # BLD: 0 /100 WBCS — SIGNIFICANT CHANGE UP (ref 0–0)
PLATELET # BLD AUTO: 427 K/UL — HIGH (ref 150–400)
POTASSIUM SERPL-MCNC: 4.5 MMOL/L — SIGNIFICANT CHANGE UP (ref 3.5–5.3)
POTASSIUM SERPL-SCNC: 4.5 MMOL/L — SIGNIFICANT CHANGE UP (ref 3.5–5.3)
RBC # BLD: 3.04 M/UL — LOW (ref 3.8–5.2)
RBC # FLD: 17.2 % — HIGH (ref 10.3–14.5)
SODIUM SERPL-SCNC: 133 MMOL/L — LOW (ref 135–145)
WBC # BLD: 13.01 K/UL — HIGH (ref 3.8–10.5)
WBC # FLD AUTO: 13.01 K/UL — HIGH (ref 3.8–10.5)

## 2021-04-22 PROCEDURE — 99233 SBSQ HOSP IP/OBS HIGH 50: CPT

## 2021-04-22 RX ORDER — LINEZOLID 600 MG/300ML
INJECTION, SOLUTION INTRAVENOUS
Refills: 0 | Status: COMPLETED | OUTPATIENT
Start: 2021-04-22 | End: 2021-04-26

## 2021-04-22 RX ORDER — LINEZOLID 600 MG/300ML
600 INJECTION, SOLUTION INTRAVENOUS ONCE
Refills: 0 | Status: COMPLETED | OUTPATIENT
Start: 2021-04-22 | End: 2021-04-22

## 2021-04-22 RX ORDER — SODIUM CHLORIDE 9 MG/ML
1000 INJECTION INTRAMUSCULAR; INTRAVENOUS; SUBCUTANEOUS
Refills: 0 | Status: DISCONTINUED | OUTPATIENT
Start: 2021-04-22 | End: 2021-04-28

## 2021-04-22 RX ORDER — ACETAMINOPHEN 500 MG
650 TABLET ORAL EVERY 6 HOURS
Refills: 0 | Status: DISCONTINUED | OUTPATIENT
Start: 2021-04-22 | End: 2021-04-22

## 2021-04-22 RX ORDER — LINEZOLID 600 MG/300ML
600 INJECTION, SOLUTION INTRAVENOUS EVERY 12 HOURS
Refills: 0 | Status: COMPLETED | OUTPATIENT
Start: 2021-04-23 | End: 2021-04-26

## 2021-04-22 RX ADMIN — PANTOPRAZOLE SODIUM 40 MILLIGRAM(S): 20 TABLET, DELAYED RELEASE ORAL at 11:47

## 2021-04-22 RX ADMIN — SODIUM CHLORIDE 100 MILLILITER(S): 9 INJECTION INTRAMUSCULAR; INTRAVENOUS; SUBCUTANEOUS at 14:59

## 2021-04-22 RX ADMIN — CEFEPIME 100 MILLIGRAM(S): 1 INJECTION, POWDER, FOR SOLUTION INTRAMUSCULAR; INTRAVENOUS at 05:42

## 2021-04-22 RX ADMIN — Medication 650 MILLIGRAM(S): at 12:01

## 2021-04-22 RX ADMIN — LISINOPRIL 5 MILLIGRAM(S): 2.5 TABLET ORAL at 05:42

## 2021-04-22 RX ADMIN — CEFEPIME 100 MILLIGRAM(S): 1 INJECTION, POWDER, FOR SOLUTION INTRAMUSCULAR; INTRAVENOUS at 18:36

## 2021-04-22 RX ADMIN — Medication 2: at 16:09

## 2021-04-22 RX ADMIN — GABAPENTIN 300 MILLIGRAM(S): 400 CAPSULE ORAL at 18:35

## 2021-04-22 RX ADMIN — Medication 650 MILLIGRAM(S): at 13:01

## 2021-04-22 RX ADMIN — GABAPENTIN 300 MILLIGRAM(S): 400 CAPSULE ORAL at 05:42

## 2021-04-22 RX ADMIN — CHLORHEXIDINE GLUCONATE 1 APPLICATION(S): 213 SOLUTION TOPICAL at 05:42

## 2021-04-22 RX ADMIN — ENOXAPARIN SODIUM 80 MILLIGRAM(S): 100 INJECTION SUBCUTANEOUS at 05:42

## 2021-04-22 RX ADMIN — Medication 4: at 11:46

## 2021-04-22 RX ADMIN — ENOXAPARIN SODIUM 80 MILLIGRAM(S): 100 INJECTION SUBCUTANEOUS at 18:35

## 2021-04-22 RX ADMIN — LINEZOLID 300 MILLIGRAM(S): 600 INJECTION, SOLUTION INTRAVENOUS at 16:10

## 2021-04-22 RX ADMIN — Medication 10: at 08:08

## 2021-04-22 RX ADMIN — Medication 325 MILLIGRAM(S): at 11:47

## 2021-04-22 NOTE — CHART NOTE - NSCHARTNOTEFT_GEN_A_CORE
Medicine Hospitalist PA    Notified by RN pt still with fevers temp 101 Medicine Hospitalist PA    Notified by RN pt still with fevers temp 101. Discussed with ID, on cefepime Day 3. Started on Linezolid. Possible aspiration pna. Repeat Blood cx, urine cultures, If continuing to have fevers CT chest/abd/pelvis with contrast. Continue to monitor. Medicine Hospitalist PA    Notified by RN pt still with fevers temp 101. Hypotensive SBP in 90's. Pt with continued edema and questional aspiration pna.     ICU Vital Signs Last 24 Hrs  T(C): 37.9 (22 Apr 2021 17:10), Max: 38.6 (21 Apr 2021 21:34)  T(F): 100.2 (22 Apr 2021 17:10), Max: 101.5 (21 Apr 2021 21:34)  HR: 90 (22 Apr 2021 12:30) (80 - 97)  BP: 94/50 (22 Apr 2021 13:18) (94/50 - 126/70)  RR: 20 (22 Apr 2021 12:30) (17 - 20)  SpO2: 97% (22 Apr 2021 12:30) (95% - 98%)    A/P: 89F with DM, HTN, dementia who has had fever present intermittently during hospital stay. Possible aspiration PNA.  - Discussed with ID, on cefepime Day 3.   - Started on Linezolid.   - Repeat Blood cx, urine cultures  - If continuing to have fevers consider CT chest/abd/pelvis with contrast as per ID  - Continue to monitor.    Pt temp now 100.2 rectal. Will hold of on imaging for now. If temp spikes again will consider. Will endorse to night ACP.

## 2021-04-22 NOTE — PROGRESS NOTE ADULT - SUBJECTIVE AND OBJECTIVE BOX
PAULA MARIO  MRN-[id number]    Follow Up:  Fever, leukocytosis, ? aspiration pneumonia     Interval History: The pt was seen and examined earlier, no distress, not interactive. The pt spiked a fever of 101.5 yesterday, and 101.1 at noon today, on RA during my exam and comfortable, WBC increased 13.01, MRSA/MSSA PCR negative.     PAST MEDICAL & SURGICAL HISTORY:  HTN (hypertension)    DM (diabetes mellitus)    Asthma    Dysphagia    Diverticulitis    Pneumonia    Stroke  right frontal stroke    Lumbar spondylolysis  with degenerative changes    Hip fracture  Left hip fracture    Status post right knee replacement        ROS:    [x ] Unobtainable because: the pt is not interactive, does not follow commands       Allergies  No Known Allergies        ANTIMICROBIALS:  cefepime   IVPB 1000 every 12 hours  cefepime   IVPB        OTHER MEDS:  acetaminophen    Suspension .. 650 milliGRAM(s) Oral every 6 hours PRN  ALBUTerol    90 MICROgram(s) HFA Inhaler 2 Puff(s) Inhalation every 4 hours  ALBUTerol    90 MICROgram(s) HFA Inhaler 2 Puff(s) Inhalation every 6 hours PRN  chlorhexidine 2% Cloths 1 Application(s) Topical <User Schedule>  dextrose 40% Gel 15 Gram(s) Oral once  dextrose 5%. 1000 milliLiter(s) IV Continuous <Continuous>  dextrose 5%. 1000 milliLiter(s) IV Continuous <Continuous>  dextrose 50% Injectable 25 Gram(s) IV Push once  dextrose 50% Injectable 12.5 Gram(s) IV Push once  dextrose 50% Injectable 25 Gram(s) IV Push once  enoxaparin Injectable 80 milliGRAM(s) SubCutaneous every 12 hours  ferrous    sulfate 325 milliGRAM(s) Oral daily  gabapentin 300 milliGRAM(s) Oral two times a day  glucagon  Injectable 1 milliGRAM(s) IntraMuscular once  insulin lispro (ADMELOG) corrective regimen sliding scale   SubCutaneous three times a day before meals  lisinopril 5 milliGRAM(s) Oral daily  pantoprazole   Suspension 40 milliGRAM(s) Oral daily  polyethylene glycol 3350 17 Gram(s) Oral daily PRN  senna 1 Tablet(s) Oral at bedtime PRN      Vital Signs Last 24 Hrs  T(C): 38.4 (22 Apr 2021 12:00), Max: 38.6 (21 Apr 2021 21:34)  T(F): 101.1 (22 Apr 2021 12:00), Max: 101.5 (21 Apr 2021 21:34)  HR: 90 (22 Apr 2021 12:30) (80 - 97)  BP: 117/76 (22 Apr 2021 12:30) (114/73 - 126/70)  BP(mean): --  RR: 20 (22 Apr 2021 12:30) (17 - 20)  SpO2: 97% (22 Apr 2021 12:30) (95% - 98%)    Physical Exam:  General: Chronically ill-appearing Female in no acute distress.   HEENT: AT/NC. Pupils/EOM unable secondary to patient compliance. Resists exam of conj/sclera. Oropharynx/dentition unable secondary to patient compliance.   Neck: Not rigid. No sense of mass.  Nodes: None palpable.  Lungs: Poor effort, grossly clear with decreased B BS   Heart: Regular rate and rhythm. No Murmur. No rub. No gallop. No palpable thrill.  Abdomen: Bowel sounds present and normoactive. Soft. Nondistended. Nontender. No sense of mass. No organomegaly.  Back: unable to assess   Extremities: No cyanosis or clubbing. 1-2+ UE edema. 1+ LE edema. right lower extremity anterior aspect of shin swelling - not new, Midline RUE C/D/I  Skin: Warm. Dry. Good turgor. No rash. No vasculitic stigmata. Desquamation on soles no change.  Psychiatric: Unable    WBC Count: 13.01 K/uL (04-22 @ 07:17)  WBC Count: 11.98 K/uL (04-20 @ 08:06)  WBC Count: 9.90 K/uL (04-19 @ 06:52)  WBC Count: 10.88 K/uL (04-18 @ 06:48)  WBC Count: 9.64 K/uL (04-17 @ 07:47)  WBC Count: 9.82 K/uL (04-16 @ 07:18)                            8.3    13.01 )-----------( 427      ( 22 Apr 2021 07:17 )             28.2       04-22    133<L>  |  99  |  20  ----------------------------<  241<H>  4.5   |  26  |  0.69    Ca    8.4<L>      22 Apr 2021 07:17    Creatinine Trend: 0.69<--, 0.63<--, 0.53<--, 0.63<--, 0.65<--, 0.64<--      MICROBIOLOGY:  v  .Urine Clean Catch (Midstream)  04-18-21   >100,000 CFU/ml Enterococcus faecium (vancomycin resistant)  --  Enterococcus faecium (vancomycin resistant)      .Blood Blood  04-18-21   No growth to date.  --  --      .Urine Catheterized  04-09-21   >100,000 CFU/ml Morganella morganii  --  Morganella morganii      .Blood Blood-Peripheral  04-08-21   No Growth Final  --  --      .Blood Blood-Peripheral  04-08-21   No Growth Final  --  --      .Blood Blood  03-29-21   No Growth Final  --  --      .Blood Blood  03-29-21   No Growth Final  --  --      Procalcitonin, Serum: 0.16 (04-19-21 @ 10:48)  Procalcitonin, Serum: 0.20 (04-18-21 @ 17:01)    SARS-CoV-2: NotDetec (08 Apr 2021 08:05)  SARS-CoV-2: Detected (08 Mar 2021 19:49)    RADIOLOGY:

## 2021-04-22 NOTE — PROGRESS NOTE ADULT - ASSESSMENT
89F with DM, HTN, dementia who has had fever present intermittently during hospital stay.   Cough, rhonchi on exam concern of aspiration. CXR 4/18 unrevealing, will repeat  Abdominal exam benign  PEG site ok  Has midline cath in site nearly 4 weeks, but blood cx thus far are NTD  UE with edema/erythema B, venous stasis like  No joint inflammation to implicate gout  No evidence of IV catheter related phlebitits  Areas of grade II pressure ulceration on sacrum/perineum, none infected  Soles with desquamation/intact skin underneath- ?prior drug rash. None present now  U/A bland, not possible to ascertain sx, skeptical re: UTI    04/21: Still w fever but too early to expect response to antibiotics. I do not think urine isolate merits treatment at this time. CXR with 'unchanged' infiltrates, whereas prior report did not think that they were significant. Concur with former though not clear to me how much of CXR changes are sequelae/residual of COVID vs. bacterial provess.  4/22: continues spiking fevers, 101.1 today, WBC increased 13.01, MRSA/MSSA PCR negative, on RA now and comfortable, cefepime continued (day #3), possible aspiration pneumonia    Suggestiions--  MRSA/MSSA PCR not detected   no need for Vancomycin   Continue cefepime 1g Q8H (day #3) 89F with DM, HTN, dementia who has had fever present intermittently during hospital stay.   Cough, rhonchi on exam concern of aspiration. CXR 4/18 unrevealing, will repeat  Abdominal exam benign  PEG site ok  Has midline cath in site nearly 4 weeks, but blood cx thus far are NTD  UE with edema/erythema B, venous stasis like  No joint inflammation to implicate gout  No evidence of IV catheter related phlebitits  Areas of grade II pressure ulceration on sacrum/perineum, none infected  Soles with desquamation/intact skin underneath- ?prior drug rash. None present now  U/A bland, not possible to ascertain sx, skeptical re: UTI    04/21: Still w fever but too early to expect response to antibiotics. I do not think urine isolate merits treatment at this time. CXR with 'unchanged' infiltrates, whereas prior report did not think that they were significant. Concur with former though not clear to me how much of CXR changes are sequelae/residual of COVID vs. bacterial provess.  4/22: persistent fevers, 101.1 today, WBC increased 13.01, MRSA/MSSA PCR negative, on RA now and comfortable, cefepime continued (day #3), possible aspiration pneumonia, UCx with VRE. Will order another set of cultures, urine and blood, last set of cultures from 4/18 with no growth, will start Linezolid. If the pt will continue having fevers, suggesting CT chest/abdomen/pelvis to evaluate for other sources of infection.     Suggestiions--  MRSA/MSSA PCR not detected   Continue cefepime 1g Q8H (day #3)  Start Linezolid - ordered  obtain a set of blood cultures  obtain UA and UC  consider CT chest/abd/pelvis to evaluate for other sources of infection  monitor pt's temperatures  monitor WBC  DVT - anticoagulation as per medicine     Discussed with covering NP  Discussed with Dr. Muñoz  89F with DM, HTN, dementia who has had fever present intermittently during hospital stay.   Cough, rhonchi on exam concern of aspiration. CXR 4/18 unrevealing, will repeat  Abdominal exam benign  PEG site ok  Has midline cath in site nearly 4 weeks, but blood cx thus far are NTD  UE with edema/erythema B, venous stasis like  No joint inflammation to implicate gout  No evidence of IV catheter related phlebitits  Areas of grade II pressure ulceration on sacrum/perineum, none infected  Soles with desquamation/intact skin underneath- ?prior drug rash. None present now  U/A bland, not possible to ascertain sx, skeptical re: UTI    04/21: Still w fever but too early to expect response to antibiotics. I do not think urine isolate merits treatment at this time. CXR with 'unchanged' infiltrates, whereas prior report did not think that they were significant. Concur with former though not clear to me how much of CXR changes are sequelae/residual of COVID vs. bacterial provess.  4/22: persistent fevers, 101.1 today, WBC increased 13.01, MRSA/MSSA PCR negative, on RA now and comfortable, cefepime continued (day #3), possible aspiration pneumonia, UCx with VRE. Will order another set of cultures, urine and blood, last set of cultures from 4/18 with no growth, will start Linezolid. If the pt will continue having fevers, suggesting CT chest/abdomen/pelvis to evaluate for other sources of infection.     Discussed with covering NP  Discussed with Dr. Muñoz     Attending Addendum--  Case reviewed with NP Jennifer Ordonez. Her note reviewed and modified as appropriate.   Patient personally assessed and examined.   Rhonchi on exam, worried that she is serially aspirating despite precautions. Not convinced she has UTI but given persistent fever will begin linezolid.     Suggestiions--  MRSA/MSSA PCR not detected   Continue cefepime 1g Q8H (day #3)  Start Linezolid - ordered  obtain a set of blood cultures  obtain UA and UC  consider CT chest/abd/pelvis to evaluate for other sources of infection  monitor pt's temperatures  monitor WBC  DVT - anticoagulation as per medicine       Cesar Muñoz MD  Attending Physician  [facility]  Division of Infectious Diseases  [phone]

## 2021-04-22 NOTE — PROGRESS NOTE ADULT - SUBJECTIVE AND OBJECTIVE BOX
INTERVAL HPI/OVERNIGHT EVENTS:    cintiues  with  fever    REVIEW OF SYSTEMS:  CONSTITUTIONAL:  somnolent        MEDICATION:  acetaminophen    Suspension .. 650 milliGRAM(s) Oral every 6 hours PRN  ALBUTerol    90 MICROgram(s) HFA Inhaler 2 Puff(s) Inhalation every 4 hours  ALBUTerol    90 MICROgram(s) HFA Inhaler 2 Puff(s) Inhalation every 6 hours PRN  cefepime   IVPB 1000 milliGRAM(s) IV Intermittent every 12 hours  cefepime   IVPB      chlorhexidine 2% Cloths 1 Application(s) Topical <User Schedule>  dextrose 40% Gel 15 Gram(s) Oral once  dextrose 5%. 1000 milliLiter(s) IV Continuous <Continuous>  dextrose 5%. 1000 milliLiter(s) IV Continuous <Continuous>  dextrose 50% Injectable 25 Gram(s) IV Push once  dextrose 50% Injectable 12.5 Gram(s) IV Push once  dextrose 50% Injectable 25 Gram(s) IV Push once  enoxaparin Injectable 80 milliGRAM(s) SubCutaneous every 12 hours  ferrous    sulfate 325 milliGRAM(s) Oral daily  gabapentin 300 milliGRAM(s) Oral two times a day  glucagon  Injectable 1 milliGRAM(s) IntraMuscular once  insulin lispro (ADMELOG) corrective regimen sliding scale   SubCutaneous three times a day before meals  linezolid  IVPB      lisinopril 5 milliGRAM(s) Oral daily  pantoprazole   Suspension 40 milliGRAM(s) Oral daily  polyethylene glycol 3350 17 Gram(s) Oral daily PRN  senna 1 Tablet(s) Oral at bedtime PRN  sodium chloride 0.9%. 1000 milliLiter(s) IV Continuous <Continuous>    Vital Signs Last 24 Hrs  T(C): 37.9 (22 Apr 2021 17:10), Max: 38.6 (21 Apr 2021 21:34)  T(F): 100.2 (22 Apr 2021 17:10), Max: 101.5 (21 Apr 2021 21:34)  HR: 90 (22 Apr 2021 12:30) (90 - 97)  BP: 94/50 (22 Apr 2021 13:18) (94/50 - 117/76)  BP(mean): --  RR: 20 (22 Apr 2021 12:30) (17 - 20)  SpO2: 97% (22 Apr 2021 12:30) (97% - 98%)    PHYSICAL EXAM:  GENERAL: NAD, well-groomed, well-developed  HEENT : Conjuntivae  clear sclerae anicteric  NECK: Supple, No JVD, Normal thyroid  NERVOUS SYSTEM:  Alert oriented   no  focal  deficits;   CHEST/LUNG: RONCHIS  BASES  HEART: Regular rate and rhythm; No murmurs, rubs, or gallops  ABDOMEN: Soft, Nontender, Nondistended; Bowel sounds present  EXTREMITIES:  no  edema no  tenderness  SKIN: No rashes   LABS:                        8.3    13.01 )-----------( 427      ( 22 Apr 2021 07:17 )             28.2     04-22    133<L>  |  99  |  20  ----------------------------<  241<H>  4.5   |  26  |  0.69    Ca    8.4<L>      22 Apr 2021 07:17          CAPILLARY BLOOD GLUCOSE      POCT Blood Glucose.: 193 mg/dL (22 Apr 2021 15:52)  POCT Blood Glucose.: 223 mg/dL (22 Apr 2021 11:30)  POCT Blood Glucose.: 352 mg/dL (22 Apr 2021 07:28)  POCT Blood Glucose.: 258 mg/dL (21 Apr 2021 22:02)      RADIOLOGY & ADDITIONAL TESTS:    Imaging reports  Personally Reviewed:  [ ] YES  [ ] NO    Consultant(s) Notes Reviewed:  [ x] YES  [ ] NO    Care Discussed with Consultants/Other Providers [x ] YES  [ ] NO  Problem/Plan - 1:  ·  Problem: Pulmonary emboli.  Plan: lovenox    Problem/Plan - 2:  ·  Problem: DVT (deep venous thrombosis).  Plan: lovenox    Problem/Plan - 4:  ·  Problem: Asthma.  Plan: symbicort.     Problem/Plan - 5:  ·  Problem: DM (diabetes mellitus).  Plan: insulin coverage.     Problem/Plan - 6:  Problem: Failure to thrive   on  GT feedings    severe  calorie  malnutrition cont  gt  feedings  will  add  additional  protein    anemia   improving continue on  AC  for  dvt  and  PE    monitor  labs  transfuse  as needed  FEVER  PNEUMONIA UTI  OBSERVE  WITH  CEFEPIME  LINEZOLID

## 2021-04-23 LAB
APPEARANCE UR: CLEAR — SIGNIFICANT CHANGE UP
BACTERIA # UR AUTO: ABNORMAL
BILIRUB UR-MCNC: NEGATIVE — SIGNIFICANT CHANGE UP
COLOR SPEC: YELLOW — SIGNIFICANT CHANGE UP
CULTURE RESULTS: SIGNIFICANT CHANGE UP
CULTURE RESULTS: SIGNIFICANT CHANGE UP
DIFF PNL FLD: NEGATIVE — SIGNIFICANT CHANGE UP
EPI CELLS # UR: SIGNIFICANT CHANGE UP
FLUAV AG NPH QL: SIGNIFICANT CHANGE UP
FLUBV AG NPH QL: SIGNIFICANT CHANGE UP
GLUCOSE BLDC GLUCOMTR-MCNC: 189 MG/DL — HIGH (ref 70–99)
GLUCOSE BLDC GLUCOMTR-MCNC: 219 MG/DL — HIGH (ref 70–99)
GLUCOSE BLDC GLUCOMTR-MCNC: 244 MG/DL — HIGH (ref 70–99)
GLUCOSE BLDC GLUCOMTR-MCNC: 351 MG/DL — HIGH (ref 70–99)
GLUCOSE UR QL: 100 MG/DL
KETONES UR-MCNC: NEGATIVE — SIGNIFICANT CHANGE UP
LEUKOCYTE ESTERASE UR-ACNC: NEGATIVE — SIGNIFICANT CHANGE UP
NITRITE UR-MCNC: NEGATIVE — SIGNIFICANT CHANGE UP
PH UR: 7 — SIGNIFICANT CHANGE UP (ref 5–8)
PROT UR-MCNC: 30 MG/DL
RBC CASTS # UR COMP ASSIST: NEGATIVE /HPF — SIGNIFICANT CHANGE UP (ref 0–4)
SARS-COV-2 RNA SPEC QL NAA+PROBE: DETECTED
SP GR SPEC: 1.01 — SIGNIFICANT CHANGE UP (ref 1.01–1.02)
SPECIMEN SOURCE: SIGNIFICANT CHANGE UP
SPECIMEN SOURCE: SIGNIFICANT CHANGE UP
UROBILINOGEN FLD QL: NEGATIVE MG/DL — SIGNIFICANT CHANGE UP
WBC UR QL: SIGNIFICANT CHANGE UP

## 2021-04-23 PROCEDURE — 99232 SBSQ HOSP IP/OBS MODERATE 35: CPT

## 2021-04-23 RX ADMIN — Medication 4: at 16:24

## 2021-04-23 RX ADMIN — CEFEPIME 100 MILLIGRAM(S): 1 INJECTION, POWDER, FOR SOLUTION INTRAMUSCULAR; INTRAVENOUS at 17:08

## 2021-04-23 RX ADMIN — GABAPENTIN 300 MILLIGRAM(S): 400 CAPSULE ORAL at 05:45

## 2021-04-23 RX ADMIN — SODIUM CHLORIDE 100 MILLILITER(S): 9 INJECTION INTRAMUSCULAR; INTRAVENOUS; SUBCUTANEOUS at 00:45

## 2021-04-23 RX ADMIN — PANTOPRAZOLE SODIUM 40 MILLIGRAM(S): 20 TABLET, DELAYED RELEASE ORAL at 11:47

## 2021-04-23 RX ADMIN — LINEZOLID 300 MILLIGRAM(S): 600 INJECTION, SOLUTION INTRAVENOUS at 05:44

## 2021-04-23 RX ADMIN — GABAPENTIN 300 MILLIGRAM(S): 400 CAPSULE ORAL at 17:08

## 2021-04-23 RX ADMIN — CHLORHEXIDINE GLUCONATE 1 APPLICATION(S): 213 SOLUTION TOPICAL at 05:45

## 2021-04-23 RX ADMIN — CEFEPIME 100 MILLIGRAM(S): 1 INJECTION, POWDER, FOR SOLUTION INTRAMUSCULAR; INTRAVENOUS at 05:45

## 2021-04-23 RX ADMIN — Medication 2: at 11:47

## 2021-04-23 RX ADMIN — ENOXAPARIN SODIUM 80 MILLIGRAM(S): 100 INJECTION SUBCUTANEOUS at 05:44

## 2021-04-23 RX ADMIN — Medication 10: at 07:55

## 2021-04-23 RX ADMIN — Medication 650 MILLIGRAM(S): at 23:21

## 2021-04-23 RX ADMIN — ENOXAPARIN SODIUM 80 MILLIGRAM(S): 100 INJECTION SUBCUTANEOUS at 17:08

## 2021-04-23 RX ADMIN — LINEZOLID 300 MILLIGRAM(S): 600 INJECTION, SOLUTION INTRAVENOUS at 18:06

## 2021-04-23 RX ADMIN — Medication 325 MILLIGRAM(S): at 11:47

## 2021-04-23 NOTE — PROGRESS NOTE ADULT - ASSESSMENT
89F with DM, HTN, dementia who has had fever present intermittently during hospital stay.   Cough, rhonchi on exam concern of aspiration. CXR 4/18 unrevealing, will repeat  Abdominal exam benign  PEG site ok  Has midline cath in site nearly 4 weeks, but blood cx thus far are NTD  UE with edema/erythema B, venous stasis like  No joint inflammation to implicate gout  No evidence of IV catheter related phlebitits  Areas of grade II pressure ulceration on sacrum/perineum, none infected  Soles with desquamation/intact skin underneath- ?prior drug rash. None present now  U/A bland, not possible to ascertain sx, skeptical re: UTI    04/21: Still w fever but too early to expect response to antibiotics. I do not think urine isolate merits treatment at this time. CXR with 'unchanged' infiltrates, whereas prior report did not think that they were significant. Concur with former though not clear to me how much of CXR changes are sequelae/residual of COVID vs. bacterial provess.  4/22: persistent fevers, 101.1 today, WBC increased 13.01, MRSA/MSSA PCR negative, on RA now and comfortable, cefepime continued (day #3), possible aspiration pneumonia, UCx with VRE. Will order another set of cultures, urine and blood, last set of cultures from 4/18 with no growth, will start Linezolid. If the pt will continue having fevers, suggesting CT chest/abdomen/pelvis to evaluate for other sources of infection.   4/23: temps perhaps moderating. U/A unimpressive, WBC elevated, but also increased platelets suggesting reactive component. Cefepime day #4, Linezolid day #1-2. Linezolid. Latter has the potential for interaction with monoamine oxidase inhibitiors.  This rarely can also occur with SSRI, SSRI/VIRIDIANA, tyramine-rich foods, phenylephrine, and phenylpropanolamine resulting in severe hypertension. Serotonin syndrome with tremor, agitation, fever, and altered mental status can occur with SSRI use (<1%). Caution should be excecised with SSRI and SSRI/VIRIDIANA.  Myelosuppression (most commonly thrombocytopenia) will occur in about 15% of patient, and typically becomes evident on about day 10-14 of therapy. Renal insufficiency may increase the likelihood of myelosuppression. Lactic acidosis and reversible mitochondrial toxicity are well reported. These are associated with peripheral neuropathy and optic neuropathy.  Other AE include black hairy tongue, interstital nephritis,  diarrhea, nausea, vomiting, and elevated liver function tests.    Suggestiions--  Continue cefepime  Continue Linezolid. Monitor for myelosuppression and remain cognizant of drug interactions.   consider CT chest/abd/pelvis to evaluate for other sources of infection if fever persists  DVT - anticoagulation as per medicine   F/U cx data  Aspiration precautions as able    If any ID input is needed over the weekend, please call [phone]. Thanks.    Cesar Muñoz MD  Attending Physician  [facility]  Division of Infectious Diseases  [phone]

## 2021-04-23 NOTE — PROGRESS NOTE ADULT - SUBJECTIVE AND OBJECTIVE BOX
[facility]  Division of Infectious Diseases  [phone]    Name: PAULA MARIO  Age: 89y  Gender: Female  MRN: [id number]    Interval History--  Notes reviewed    Past Medical History--  HTN (hypertension)    DM (diabetes mellitus)    Asthma    Dysphagia    Diverticulitis    Pneumonia    Stroke    Lumbar spondylolysis    Hip fracture    Status post right knee replacement        For details regarding the patient's social history, family history, and other miscellaneous elements, please refer the initial infectious diseases consultation and/or the admitting history and physical examination for this admission.    Allergies    No Known Allergies    Intolerances        Medications--  Antibiotics:  cefepime   IVPB 1000 milliGRAM(s) IV Intermittent every 12 hours  cefepime   IVPB      linezolid  IVPB      linezolid  IVPB 600 milliGRAM(s) IV Intermittent every 12 hours    Immunologic:    Other:  acetaminophen    Suspension .. PRN  ALBUTerol    90 MICROgram(s) HFA Inhaler  ALBUTerol    90 MICROgram(s) HFA Inhaler PRN  chlorhexidine 2% Cloths  dextrose 40% Gel  dextrose 5%.  dextrose 5%.  dextrose 50% Injectable  dextrose 50% Injectable  dextrose 50% Injectable  enoxaparin Injectable  ferrous    sulfate  gabapentin  glucagon  Injectable  insulin lispro (ADMELOG) corrective regimen sliding scale  lisinopril  pantoprazole   Suspension  polyethylene glycol 3350 PRN  senna PRN  sodium chloride 0.9%.      Review of Systems--  A 10-point review of systems was obtained.     Pertinent positives and negatives--  Constitutional: No fevers. No Chills. No Rigors.   Cardiovascular: No chest pain. No palpitations.  Respiratory: No shortness of breath. No cough.  Gastrointestinal: No nausea or vomiting. No diarrhea or constipation.   Psychiatric: Pleasant. Appropriate affect.    Review of systems otherwise negative except as previously noted.    Physical Examination--  Vital Signs: T(F): 97.6 (04-23-21 @ 11:35), Max: 101 (04-22-21 @ 13:18)  HR: 85 (04-23-21 @ 11:35)  BP: 120/57 (04-23-21 @ 11:35)  RR: 19 (04-23-21 @ 11:35)  SpO2: 97% (04-23-21 @ 11:35)  Wt(kg): --  General: Nontoxic-appearing Female in no acute distress.  HEENT: AT/NC. PERRL. EOMI. Anicteric. Conjunctiva pink and moist. Oropharynx clear. Dentition fair.  Neck: Not rigid. No sense of mass.  Nodes: None palpable.  Lungs: Clear bilaterally without rales, wheezing or rhonchi  Heart: Regular rate and rhythm. No Murmur. No rub. No gallop. No palpable thrill.  Abdomen: Bowel sounds present and normoactive. Soft. Nondistended. Nontender. No sense of mass. No organomegaly.  Back: No spinal tenderness. No costovertebral angle tenderness.   Extremities: No cyanosis or clubbing. No edema.   Skin: Warm. Dry. Good turgor. No rash. No vasculitic stigmata.  Psychiatric: Appropriate affect and mood for situation.         Laboratory Studies--  CBC                        8.3    13.01 )-----------( 427      ( 22 Apr 2021 07:17 )             28.2       Chemistries  04-22    133<L>  |  99  |  20  ----------------------------<  241<H>  4.5   |  26  |  0.69    Ca    8.4<L>      22 Apr 2021 07:17        Culture Data    Culture - Urine (collected 18 Apr 2021 18:39)  Source: .Urine Clean Catch (Midstream)  Final Report (20 Apr 2021 18:31):    >100,000 CFU/ml Enterococcus faecium (vancomycin resistant)  Organism: Enterococcus faecium (vancomycin resistant) (20 Apr 2021 18:31)  Organism: Enterococcus faecium (vancomycin resistant) (20 Apr 2021 18:31)    Culture - Blood (collected 18 Apr 2021 18:07)  Source: .Blood Blood  Preliminary Report (19 Apr 2021 19:01):    No growth to date.    Culture - Blood (collected 18 Apr 2021 18:07)  Source: .Blood Blood  Preliminary Report (19 Apr 2021 19:01):    No growth to date.             [facility]  Division of Infectious Diseases  [phone]    Name: PAULA MARIO  Age: 89y  Gender: Female  MRN: [id number]    Interval History--  Notes reviewed. Seen earlier today. Poorly responsive no change.     Past Medical History--  HTN (hypertension)    DM (diabetes mellitus)    Asthma    Dysphagia    Diverticulitis    Pneumonia    Stroke    Lumbar spondylolysis    Hip fracture    Status post right knee replacement        For details regarding the patient's social history, family history, and other miscellaneous elements, please refer the initial infectious diseases consultation and/or the admitting history and physical examination for this admission.    Allergies    No Known Allergies    Intolerances        Medications--  Antibiotics:  cefepime   IVPB 1000 milliGRAM(s) IV Intermittent every 12 hours  cefepime   IVPB      linezolid  IVPB      linezolid  IVPB 600 milliGRAM(s) IV Intermittent every 12 hours    Immunologic:    Other:  acetaminophen    Suspension .. PRN  ALBUTerol    90 MICROgram(s) HFA Inhaler  ALBUTerol    90 MICROgram(s) HFA Inhaler PRN  chlorhexidine 2% Cloths  dextrose 40% Gel  dextrose 5%.  dextrose 5%.  dextrose 50% Injectable  dextrose 50% Injectable  dextrose 50% Injectable  enoxaparin Injectable  ferrous    sulfate  gabapentin  glucagon  Injectable  insulin lispro (ADMELOG) corrective regimen sliding scale  lisinopril  pantoprazole   Suspension  polyethylene glycol 3350 PRN  senna PRN  sodium chloride 0.9%.      Review of Systems--  Review of systems unable secondary to clinical condition.     Physical Examination--  Vital Signs: T(F): 97.6 (04-23-21 @ 11:35), Max: 101 (04-22-21 @ 13:18)  HR: 85 (04-23-21 @ 11:35)  BP: 120/57 (04-23-21 @ 11:35)  RR: 19 (04-23-21 @ 11:35)  SpO2: 97% (04-23-21 @ 11:35)  Wt(kg): --  General: Chronically ill-appearing Female in no acute distress.   HEENT: AT/NC. Pupils/EOM unable secondary to patient compliance. Resists exam of conj/sclera. Oropharynx/dentition unable secondary to patient compliance.   Neck: Not rigid. No sense of mass.  Nodes: None palpable.  Lungs: Poor effort, grossly clear with decreased B BS   Heart: Regular rate and rhythm. No Murmur. No rub. No gallop. No palpable thrill.  Abdomen: Bowel sounds present and normoactive. Soft. Nondistended. Nontender. No sense of mass. No organomegaly.  Back: unable to assess   Extremities: No cyanosis or clubbing. 1-2+ UE edema. 1+ LE edema. right lower extremity anterior aspect of shin swelling - not new, Midline RUE C/D/I  Skin: Warm. Dry. Good turgor. No rash. No vasculitic stigmata. Desquamation on soles no change.  Psychiatric: Unable        Laboratory Studies--  CBC                        8.3    13.01 )-----------( 427      ( 22 Apr 2021 07:17 )             28.2     WBC trend  WBC Count: 13.01 K/uL (04-22-21 @ 07:17)  WBC Count: 11.98 K/uL (04-20-21 @ 08:06)  WBC Count: 9.90 K/uL (04-19-21 @ 06:52)  WBC Count: 10.88 K/uL (04-18-21 @ 06:48)  WBC Count: 9.64 K/uL (04-17-21 @ 07:47)  WBC Count: 9.82 K/uL (04-16-21 @ 07:18)  WBC Count: 8.73 K/uL (04-14-21 @ 10:19)  WBC Count: 9.67 K/uL (04-13-21 @ 07:54)  WBC Count: 8.83 K/uL (04-12-21 @ 08:56)  WBC Count: 11.41 K/uL (04-10-21 @ 06:47)      Chemistries  04-22    133<L>  |  99  |  20  ----------------------------<  241<H>  4.5   |  26  |  0.69    Ca    8.4<L>      22 Apr 2021 07:17    Urinalysis (04.23.21 @ 05:06)    Glucose Qualitative, Urine: 100 mg/dL    Blood, Urine: Negative    pH Urine: 7.0    Color: Yellow    Urine Appearance: Clear    Bilirubin: Negative    Ketone - Urine: Negative    Specific Gravity: 1.010    Protein, Urine: 30 mg/dL    Urobilinogen: Negative mg/dL    Nitrite: Negative    Leukocyte Esterase Concentration: Negative  Urine Microscopic-Add On (NC) (04.23.21 @ 05:06)    Red Blood Cell - Urine: Negative /HPF    White Blood Cell - Urine: 0-2    Bacteria: Occasional    Epithelial Cells: Occasional        Culture Data    Culture - Urine (collected 18 Apr 2021 18:39)  Source: .Urine Clean Catch (Midstream)  Final Report (20 Apr 2021 18:31):    >100,000 CFU/ml Enterococcus faecium (vancomycin resistant)  Organism: Enterococcus faecium (vancomycin resistant) (20 Apr 2021 18:31)  Organism: Enterococcus faecium (vancomycin resistant) (20 Apr 2021 18:31)    Culture - Blood (collected 18 Apr 2021 18:07)  Source: .Blood Blood  Preliminary Report (19 Apr 2021 19:01):    No growth to date.    Culture - Blood (collected 18 Apr 2021 18:07)  Source: .Blood Blood  Preliminary Report (19 Apr 2021 19:01):    No growth to date.

## 2021-04-23 NOTE — CHART NOTE - NSCHARTNOTEFT_GEN_A_CORE
Pt admitted from [facility] for FTT; found to have b/l PEs.  Pt has been receiving TF via NGT since 3/22; PEG placed on 4/16 after risks explained to family (advanced age, recent PNA, PE). Ultrasound suspicious for RLE DVT; pt on Lovenox; now c UTI. Pt lethargic & febrile.  PMHx includes HTN, DM, CVA, asthma, recent COVID PNA, non-verbal at baseline.      Factors impacting intake: [X ] none [ ] nausea  [ ] vomiting [ ] diarrhea [ ] constipation  [ ]chewing problems [ ] swallowing issues  [ ] other:     Diet Prescription: Diet, NPO with Tube Feed:   Tube Feeding Modality: Gastrostomy  Glucerna 1.2 Renzo  Total Volume for 24 Hours (mL): 1320  Continuous  Starting Tube Feed Rate {mL per Hour}: 55  Increase Tube Feed Rate by (mL): 0     Every hour  Until Goal Tube Feed Rate (mL per Hour): 55  Tube Feed Duration (in Hours): 24  Tube Feed Start Time: 20:00  Free Water Flush   Total Volume per Flush (mL): 150   Frequency: Every 6 Hours   Total Daily Volume of Flush (mL): 600    Start Time: 14:00  No Carb Prosource (1pkg = 15gms Protein)     Qty per Day:  1 (04-16-21 @ 16:14)    Intake:   TF continues to run at goal rate; tolerating well; no residuals noted    Current Weight:   72.8 kg (4/22); admission wt 76.5 kg (3/14)  % Weight Change:  4.8% wt loss x 5+ weeks    Unable to repeat conduct full nutrition-focused physical exam due to edematous status. Moderate muscle wasting clearly visible at temples, clavicle, & shoulder; moderate fat depletion at orbital & buccal regions    2+ generalized edema noted; at admission same 2+ edema present    Pertinent Medications: MEDICATIONS  (STANDING):  ALBUTerol    90 MICROgram(s) HFA Inhaler 2 Puff(s) Inhalation every 4 hours  cefepime   IVPB 1000 milliGRAM(s) IV Intermittent every 12 hours  cefepime   IVPB      chlorhexidine 2% Cloths 1 Application(s) Topical <User Schedule>  dextrose 40% Gel 15 Gram(s) Oral once  dextrose 5%. 1000 milliLiter(s) (50 mL/Hr) IV Continuous <Continuous>  dextrose 5%. 1000 milliLiter(s) (100 mL/Hr) IV Continuous <Continuous>  dextrose 50% Injectable 25 Gram(s) IV Push once  dextrose 50% Injectable 12.5 Gram(s) IV Push once  dextrose 50% Injectable 25 Gram(s) IV Push once  enoxaparin Injectable 80 milliGRAM(s) SubCutaneous every 12 hours  ferrous    sulfate 325 milliGRAM(s) Oral daily  gabapentin 300 milliGRAM(s) Oral two times a day  glucagon  Injectable 1 milliGRAM(s) IntraMuscular once  insulin lispro (ADMELOG) corrective regimen sliding scale   SubCutaneous three times a day before meals  linezolid  IVPB      linezolid  IVPB 600 milliGRAM(s) IV Intermittent every 12 hours  lisinopril 5 milliGRAM(s) Oral daily  pantoprazole   Suspension 40 milliGRAM(s) Oral daily  sodium chloride 0.9%. 1000 milliLiter(s) (100 mL/Hr) IV Continuous <Continuous>    MEDICATIONS  (PRN):  acetaminophen    Suspension .. 650 milliGRAM(s) Oral every 6 hours PRN Temp greater or equal to 38C (100.4F), Mild Pain (1 - 3)  ALBUTerol    90 MICROgram(s) HFA Inhaler 2 Puff(s) Inhalation every 6 hours PRN Wheezing  polyethylene glycol 3350 17 Gram(s) Oral daily PRN Constipation  senna 1 Tablet(s) Oral at bedtime PRN Constipation    Pertinent Labs: 04-22 Na133 mmol/L<L> Glu 241 mg/dL<H> K+ 4.5 mmol/L Cr  0.69 mg/dL BUN 20 mg/dL 04-20 Alb 1.6 g/dL<L>  04-22 POCT: 352, 223, 193; 03-15 A1c 7.2    Skin:   as of 4/7 stage II pressure ulcers documented on sacrum & right buttocks; pt also c perirectal tear & wound (bruise) on right achilles tendon    Estimated Needs:   [X ] no change since previous assessment  (3/17)  [ ] recalculated:     Previous Nutrition Diagnosis:   [X ]  Severe Malnutrition in context of acute malnutrition  Etiology:  Inadequate energy/protein intake + increased energy/protein needs related to COVID illness, AMS, FTT sx, presence of stage II pressure ulcers   Signs & Symptoms:  physical findings of moderate fat depletion & muscle wasting as noted    GOAL:  Pt to meet >75% energy/protein needs via tube feeding - goal continues to be met    Nutrition Diagnosis is [X ] ongoing  [ ] resolved [ ] not applicable     New Nutrition Diagnosis: [X ] not applicable    Interventions:   continue current TF as noted  Recommend  [ ] Change Diet To:  [ ] Nutrition Supplement  [ ] Nutrition Support  [ ] Other:     Monitoring and Evaluation:   [ ] PO intake [ x ] Tolerance to diet prescription [ x ] weights [ x ] labs[ x ] follow up per protocol  [ ] other: Pt admitted from [facility] for FTT; found to have b/l PEs.  Pt has been receiving TF via NGT since 3/22; PEG placed on 4/16 after risks explained to family (advanced age, recent PNA, PE). Ultrasound suspicious for RLE DVT; pt on Lovenox; now c UTI. Pt lethargic & febrile.  PMHx includes HTN, DM, CVA, asthma, recent COVID PNA, non-verbal at baseline.      Factors impacting intake: [X ] none [ ] nausea  [ ] vomiting [ ] diarrhea [ ] constipation  [ ]chewing problems [ ] swallowing issues  [ ] other:     Diet Prescription: Diet, NPO with Tube Feed:   Tube Feeding Modality: Gastrostomy  Glucerna 1.2 Renzo  Total Volume for 24 Hours (mL): 1320  Continuous  Starting Tube Feed Rate {mL per Hour}: 55  Increase Tube Feed Rate by (mL): 0     Every hour  Until Goal Tube Feed Rate (mL per Hour): 55  Tube Feed Duration (in Hours): 24  Tube Feed Start Time: 20:00  Free Water Flush   Total Volume per Flush (mL): 150   Frequency: Every 6 Hours   Total Daily Volume of Flush (mL): 600    Start Time: 14:00  No Carb Prosource (1pkg = 15gms Protein)     Qty per Day:  1 (04-16-21 @ 16:14)    Intake:   TF continues to run at goal rate; tolerating well; no residuals noted    Current Weight:   72.8 kg (4/22); admission wt 76.5 kg (3/14)  % Weight Change:  4.8% wt loss x 5+ weeks    Unable to repeat conduct full nutrition-focused physical exam due to edematous status. Moderate muscle wasting clearly visible at temples & clavicle; moderate fat depletion at orbital & triceps    2+ generalized edema noted; at admission same 2+ edema present    Pertinent Medications: MEDICATIONS  (STANDING):  ALBUTerol    90 MICROgram(s) HFA Inhaler 2 Puff(s) Inhalation every 4 hours  cefepime   IVPB 1000 milliGRAM(s) IV Intermittent every 12 hours  cefepime   IVPB      chlorhexidine 2% Cloths 1 Application(s) Topical <User Schedule>  dextrose 40% Gel 15 Gram(s) Oral once  dextrose 5%. 1000 milliLiter(s) (50 mL/Hr) IV Continuous <Continuous>  dextrose 5%. 1000 milliLiter(s) (100 mL/Hr) IV Continuous <Continuous>  dextrose 50% Injectable 25 Gram(s) IV Push once  dextrose 50% Injectable 12.5 Gram(s) IV Push once  dextrose 50% Injectable 25 Gram(s) IV Push once  enoxaparin Injectable 80 milliGRAM(s) SubCutaneous every 12 hours  ferrous    sulfate 325 milliGRAM(s) Oral daily  gabapentin 300 milliGRAM(s) Oral two times a day  glucagon  Injectable 1 milliGRAM(s) IntraMuscular once  insulin lispro (ADMELOG) corrective regimen sliding scale   SubCutaneous three times a day before meals  linezolid  IVPB      linezolid  IVPB 600 milliGRAM(s) IV Intermittent every 12 hours  lisinopril 5 milliGRAM(s) Oral daily  pantoprazole   Suspension 40 milliGRAM(s) Oral daily  sodium chloride 0.9%. 1000 milliLiter(s) (100 mL/Hr) IV Continuous <Continuous>    MEDICATIONS  (PRN):  acetaminophen    Suspension .. 650 milliGRAM(s) Oral every 6 hours PRN Temp greater or equal to 38C (100.4F), Mild Pain (1 - 3)  ALBUTerol    90 MICROgram(s) HFA Inhaler 2 Puff(s) Inhalation every 6 hours PRN Wheezing  polyethylene glycol 3350 17 Gram(s) Oral daily PRN Constipation  senna 1 Tablet(s) Oral at bedtime PRN Constipation    Pertinent Labs: 04-22 Na133 mmol/L<L> Glu 241 mg/dL<H> K+ 4.5 mmol/L Cr  0.69 mg/dL BUN 20 mg/dL 04-20 Alb 1.6 g/dL<L>  04-22 POCT: 352, 223, 193; 03-15 A1c 7.2    Skin:   as of 4/7 stage II pressure ulcers documented on sacrum & right buttocks; pt also c perirectal tear & wound (bruise) on right achilles tendon    Estimated Needs:   [X ] no change since previous assessment  (3/17)  [ ] recalculated:     Previous Nutrition Diagnosis:   [X ]  Severe Malnutrition in context of acute malnutrition  Etiology:  Inadequate energy/protein intake + increased energy/protein needs related to COVID illness, AMS, FTT sx, presence of stage II pressure ulcers   Signs & Symptoms:  physical findings of moderate fat depletion & muscle wasting as noted    GOAL:  Pt to meet >75% energy/protein needs via tube feeding - goal continues to be met    Nutrition Diagnosis is [X ] ongoing  [ ] resolved [ ] not applicable     New Nutrition Diagnosis: [X ] not applicable    Interventions:   continue current TF as noted  Recommend  [ ] Change Diet To:  [ ] Nutrition Supplement  [ ] Nutrition Support  [ ] Other:     Monitoring and Evaluation:   [ ] PO intake [ x ] Tolerance to diet prescription [ x ] weights [ x ] labs[ x ] follow up per protocol  [ ] other:

## 2021-04-23 NOTE — PROGRESS NOTE ADULT - SUBJECTIVE AND OBJECTIVE BOX
INTERVAL HPI/OVERNIGHT EVENTS:    fever  appears  resolved    REVIEW OF SYSTEMS:  CONSTITUTIONAL:  alert        MEDICATION:  acetaminophen    Suspension .. 650 milliGRAM(s) Oral every 6 hours PRN  ALBUTerol    90 MICROgram(s) HFA Inhaler 2 Puff(s) Inhalation every 4 hours  ALBUTerol    90 MICROgram(s) HFA Inhaler 2 Puff(s) Inhalation every 6 hours PRN  cefepime   IVPB 1000 milliGRAM(s) IV Intermittent every 12 hours  cefepime   IVPB      chlorhexidine 2% Cloths 1 Application(s) Topical <User Schedule>  dextrose 40% Gel 15 Gram(s) Oral once  dextrose 5%. 1000 milliLiter(s) IV Continuous <Continuous>  dextrose 5%. 1000 milliLiter(s) IV Continuous <Continuous>  dextrose 50% Injectable 25 Gram(s) IV Push once  dextrose 50% Injectable 12.5 Gram(s) IV Push once  dextrose 50% Injectable 25 Gram(s) IV Push once  enoxaparin Injectable 80 milliGRAM(s) SubCutaneous every 12 hours  ferrous    sulfate 325 milliGRAM(s) Oral daily  gabapentin 300 milliGRAM(s) Oral two times a day  glucagon  Injectable 1 milliGRAM(s) IntraMuscular once  insulin lispro (ADMELOG) corrective regimen sliding scale   SubCutaneous three times a day before meals  linezolid  IVPB      linezolid  IVPB 600 milliGRAM(s) IV Intermittent every 12 hours  lisinopril 5 milliGRAM(s) Oral daily  pantoprazole   Suspension 40 milliGRAM(s) Oral daily  polyethylene glycol 3350 17 Gram(s) Oral daily PRN  senna 1 Tablet(s) Oral at bedtime PRN  sodium chloride 0.9%. 1000 milliLiter(s) IV Continuous <Continuous>    Vital Signs Last 24 Hrs  T(C): 37.8 (2021 06:40), Max: 38.4 (2021 12:00)  T(F): 100.1 (2021 06:40), Max: 101.1 (2021 12:00)  HR: 88 (2021 06:40) (88 - 98)  BP: 120/77 (2021 06:40) (92/52 - 121/57)  BP(mean): --  RR: 18 (2021 06:40) (17 - 20)  SpO2: 95% (2021 06:40) (94% - 97%)    PHYSICAL EXAM:  GENERAL: NAD, w  HEENT : Conjuntivae  clear sclerae anicteric  NERVOUS SYSTEM:  Alert  CHEST/LUNG:   ronchis  bases   HEART: Regular rate and rhythm; No murmurs, rubs, or gallops  ABDOMEN: Soft, Nontender, Nondistended; Bowel sounds present  EXTREMITIES:    diffuser edemas  SKIN: No rashes   LABS:                        8.3    13.01 )-----------( 427      ( 2021 07:17 )             28.2     04-22    133<L>  |  99  |  20  ----------------------------<  241<H>  4.5   |  26  |  0.69    Ca    8.4<L>      2021 07:17        Urinalysis Basic - ( 2021 05:06 )    Color: Yellow / Appearance: Clear / S.010 / pH: x  Gluc: x / Ketone: Negative  / Bili: Negative / Urobili: Negative mg/dL   Blood: x / Protein: 30 mg/dL / Nitrite: Negative   Leuk Esterase: Negative / RBC: Negative /HPF / WBC 0-2   Sq Epi: x / Non Sq Epi: Occasional / Bacteria: Occasional      CAPILLARY BLOOD GLUCOSE      POCT Blood Glucose.: 351 mg/dL (2021 07:53)  POCT Blood Glucose.: 193 mg/dL (2021 15:52)  POCT Blood Glucose.: 223 mg/dL (2021 11:30)      RADIOLOGY & ADDITIONAL TESTS:    Imaging reports  Personally Reviewed:  [ ] YES  [ ] NO    Consultant(s) Notes Reviewed:  [x ] YES  [ ] NO    Care Discussed with Consultants/Other Providers [x ] YES  [ ] NO  Problem/Plan - 1:  ·  Problem: Pulmonary emboli.  Plan: lovenox    Problem/Plan - 2:  ·  Problem: DVT (deep venous thrombosis).  Plan: lovenox    Problem/Plan - 4:  ·  Problem: Asthma.  Plan: symbicort.     Problem/Plan - 5:  ·  Problem: DM (diabetes mellitus).  Plan: insulin coverage.     Problem/Plan - 6:  Problem: Failure to thrive   on  GT feedings    severe  calorie  malnutrition cont  gt  feedings  will  add  additional  protein    anemia   improving continue on  AC  for  dvt  and  PE    monitor  labs  transfuse  as needed   PNEUMONIA UTI  OBSERVE  WITH  CEFEPIME  LINEZOLID

## 2021-04-24 LAB
CULTURE RESULTS: SIGNIFICANT CHANGE UP
GLUCOSE BLDC GLUCOMTR-MCNC: 198 MG/DL — HIGH (ref 70–99)
GLUCOSE BLDC GLUCOMTR-MCNC: 204 MG/DL — HIGH (ref 70–99)
GLUCOSE BLDC GLUCOMTR-MCNC: 234 MG/DL — HIGH (ref 70–99)
GLUCOSE BLDC GLUCOMTR-MCNC: 258 MG/DL — HIGH (ref 70–99)
GLUCOSE BLDC GLUCOMTR-MCNC: 338 MG/DL — HIGH (ref 70–99)
SPECIMEN SOURCE: SIGNIFICANT CHANGE UP

## 2021-04-24 RX ADMIN — Medication 650 MILLIGRAM(S): at 02:09

## 2021-04-24 RX ADMIN — Medication 4: at 11:35

## 2021-04-24 RX ADMIN — LINEZOLID 300 MILLIGRAM(S): 600 INJECTION, SOLUTION INTRAVENOUS at 19:04

## 2021-04-24 RX ADMIN — CEFEPIME 100 MILLIGRAM(S): 1 INJECTION, POWDER, FOR SOLUTION INTRAMUSCULAR; INTRAVENOUS at 18:30

## 2021-04-24 RX ADMIN — LINEZOLID 300 MILLIGRAM(S): 600 INJECTION, SOLUTION INTRAVENOUS at 05:00

## 2021-04-24 RX ADMIN — GABAPENTIN 300 MILLIGRAM(S): 400 CAPSULE ORAL at 05:00

## 2021-04-24 RX ADMIN — CEFEPIME 100 MILLIGRAM(S): 1 INJECTION, POWDER, FOR SOLUTION INTRAMUSCULAR; INTRAVENOUS at 05:00

## 2021-04-24 RX ADMIN — Medication 325 MILLIGRAM(S): at 11:11

## 2021-04-24 RX ADMIN — ENOXAPARIN SODIUM 80 MILLIGRAM(S): 100 INJECTION SUBCUTANEOUS at 18:55

## 2021-04-24 RX ADMIN — LISINOPRIL 5 MILLIGRAM(S): 2.5 TABLET ORAL at 05:00

## 2021-04-24 RX ADMIN — Medication 2: at 17:59

## 2021-04-24 RX ADMIN — PANTOPRAZOLE SODIUM 40 MILLIGRAM(S): 20 TABLET, DELAYED RELEASE ORAL at 11:11

## 2021-04-24 RX ADMIN — GABAPENTIN 300 MILLIGRAM(S): 400 CAPSULE ORAL at 18:56

## 2021-04-24 RX ADMIN — Medication 8: at 08:15

## 2021-04-24 RX ADMIN — ENOXAPARIN SODIUM 80 MILLIGRAM(S): 100 INJECTION SUBCUTANEOUS at 05:00

## 2021-04-24 NOTE — PROGRESS NOTE ADULT - SUBJECTIVE AND OBJECTIVE BOX
INTERVAL HPI/OVERNIGHT EVENTS:        REVIEW OF SYSTEMS:  CONSTITUTIONAL:   alert poorly  communicative    MEDICATION:  acetaminophen    Suspension .. 650 milliGRAM(s) Oral every 6 hours PRN  ALBUTerol    90 MICROgram(s) HFA Inhaler 2 Puff(s) Inhalation every 6 hours PRN  ALBUTerol    90 MICROgram(s) HFA Inhaler 2 Puff(s) Inhalation every 4 hours  cefepime   IVPB 1000 milliGRAM(s) IV Intermittent every 12 hours  cefepime   IVPB      chlorhexidine 2% Cloths 1 Application(s) Topical <User Schedule>  dextrose 40% Gel 15 Gram(s) Oral once  dextrose 5%. 1000 milliLiter(s) IV Continuous <Continuous>  dextrose 5%. 1000 milliLiter(s) IV Continuous <Continuous>  dextrose 50% Injectable 25 Gram(s) IV Push once  dextrose 50% Injectable 12.5 Gram(s) IV Push once  dextrose 50% Injectable 25 Gram(s) IV Push once  enoxaparin Injectable 80 milliGRAM(s) SubCutaneous every 12 hours  ferrous    sulfate 325 milliGRAM(s) Oral daily  gabapentin 300 milliGRAM(s) Oral two times a day  glucagon  Injectable 1 milliGRAM(s) IntraMuscular once  insulin lispro (ADMELOG) corrective regimen sliding scale   SubCutaneous three times a day before meals  linezolid  IVPB      linezolid  IVPB 600 milliGRAM(s) IV Intermittent every 12 hours  lisinopril 5 milliGRAM(s) Oral daily  pantoprazole   Suspension 40 milliGRAM(s) Oral daily  polyethylene glycol 3350 17 Gram(s) Oral daily PRN  senna 1 Tablet(s) Oral at bedtime PRN  sodium chloride 0.9%. 1000 milliLiter(s) IV Continuous <Continuous>    Vital Signs Last 24 Hrs  T(C): 37.4 (2021 05:39), Max: 37.9 (2021 23:34)  T(F): 99.3 (2021 05:39), Max: 100.3 (2021 23:34)  HR: 81 (2021 05:39) (81 - 96)  BP: 128/69 (2021 05:39) (106/67 - 128/69)  BP(mean): --  RR: 18 (2021 05:39) (18 - 19)  SpO2: 95% (2021 05:39) (95% - 97%)    PHYSICAL EXAM:  GENERAL: NAD, well-groomed, well-developed  HEENT : Conjuntivae  clear sclerae anicteric  NECK: Supple, No JVD, Normal thyroid  NERVOUS SYSTEM:  Alert  CHEST/LUNG:  ronchis  bases  HEART: Regular rate and rhythm; No murmurs, rubs, or gallops  ABDOMEN: Soft, Nontender, Nondistended; Bowel sounds present  EXTREMITIES:    diffuse  edemas  SKIN: No rashes   LABS:            Urinalysis Basic - ( 2021 05:06 )    Color: Yellow / Appearance: Clear / S.010 / pH: x  Gluc: x / Ketone: Negative  / Bili: Negative / Urobili: Negative mg/dL   Blood: x / Protein: 30 mg/dL / Nitrite: Negative   Leuk Esterase: Negative / RBC: Negative /HPF / WBC 0-2   Sq Epi: x / Non Sq Epi: Occasional / Bacteria: Occasional      CAPILLARY BLOOD GLUCOSE      POCT Blood Glucose.: 338 mg/dL (2021 07:40)  POCT Blood Glucose.: 244 mg/dL (2021 21:41)  POCT Blood Glucose.: 219 mg/dL (2021 16:19)  POCT Blood Glucose.: 189 mg/dL (2021 11:40)      RADIOLOGY & ADDITIONAL TESTS:    Imaging reports  Personally Reviewed:  [ ] YES  [ ] NO    Consultant(s) Notes Reviewed:  [x ] YES  [ ] NO    Care Discussed with Consultants/Other Providers [x ] YES  [ ] NO  Problem/Plan - 1:  ·  Problem: Pulmonary emboli.  Plan: lovenox    Problem/Plan - 2:  ·  Problem: DVT (deep venous thrombosis).  Plan: lovenox    Problem/Plan - 4:  ·  Problem: Asthma.  Plan: symbicort.     Problem/Plan - 5:  ·  Problem: DM (diabetes mellitus).  Plan: insulin coverage.     Problem/Plan - 6:  Problem: Failure to thrive   on  GT feedings    severe  calorie  malnutrition cont  gt  feedings  will  add  additional  protein    anemia   improving continue on  AC  for  dvt  and  PE    monitor  labs  transfuse  as needed   PNEUMONIA UTI  OBSERVE  WITH  CEFEPIME  LINEZOLID

## 2021-04-25 LAB
ALBUMIN SERPL ELPH-MCNC: 1.8 G/DL — LOW (ref 3.3–5)
ALP SERPL-CCNC: 127 U/L — HIGH (ref 40–120)
ALT FLD-CCNC: 20 U/L — SIGNIFICANT CHANGE UP (ref 12–78)
ANION GAP SERPL CALC-SCNC: 7 MMOL/L — SIGNIFICANT CHANGE UP (ref 5–17)
AST SERPL-CCNC: 24 U/L — SIGNIFICANT CHANGE UP (ref 15–37)
BILIRUB SERPL-MCNC: 0.6 MG/DL — SIGNIFICANT CHANGE UP (ref 0.2–1.2)
BUN SERPL-MCNC: 17 MG/DL — SIGNIFICANT CHANGE UP (ref 7–23)
CALCIUM SERPL-MCNC: 8.8 MG/DL — SIGNIFICANT CHANGE UP (ref 8.5–10.1)
CHLORIDE SERPL-SCNC: 98 MMOL/L — SIGNIFICANT CHANGE UP (ref 96–108)
CO2 SERPL-SCNC: 27 MMOL/L — SIGNIFICANT CHANGE UP (ref 22–31)
CREAT SERPL-MCNC: 0.71 MG/DL — SIGNIFICANT CHANGE UP (ref 0.5–1.3)
GLUCOSE BLDC GLUCOMTR-MCNC: 189 MG/DL — HIGH (ref 70–99)
GLUCOSE BLDC GLUCOMTR-MCNC: 210 MG/DL — HIGH (ref 70–99)
GLUCOSE BLDC GLUCOMTR-MCNC: 336 MG/DL — HIGH (ref 70–99)
GLUCOSE BLDC GLUCOMTR-MCNC: 351 MG/DL — HIGH (ref 70–99)
GLUCOSE SERPL-MCNC: 341 MG/DL — HIGH (ref 70–99)
HCT VFR BLD CALC: 28.6 % — LOW (ref 34.5–45)
HGB BLD-MCNC: 8.5 G/DL — LOW (ref 11.5–15.5)
MCHC RBC-ENTMCNC: 27.4 PG — SIGNIFICANT CHANGE UP (ref 27–34)
MCHC RBC-ENTMCNC: 29.7 GM/DL — LOW (ref 32–36)
MCV RBC AUTO: 92.3 FL — SIGNIFICANT CHANGE UP (ref 80–100)
NRBC # BLD: 0 /100 WBCS — SIGNIFICANT CHANGE UP (ref 0–0)
PLATELET # BLD AUTO: 472 K/UL — HIGH (ref 150–400)
POTASSIUM SERPL-MCNC: 4.9 MMOL/L — SIGNIFICANT CHANGE UP (ref 3.5–5.3)
POTASSIUM SERPL-SCNC: 4.9 MMOL/L — SIGNIFICANT CHANGE UP (ref 3.5–5.3)
PROT SERPL-MCNC: 6.4 GM/DL — SIGNIFICANT CHANGE UP (ref 6–8.3)
RBC # BLD: 3.1 M/UL — LOW (ref 3.8–5.2)
RBC # FLD: 17.8 % — HIGH (ref 10.3–14.5)
SODIUM SERPL-SCNC: 132 MMOL/L — LOW (ref 135–145)
WBC # BLD: 9.2 K/UL — SIGNIFICANT CHANGE UP (ref 3.8–10.5)
WBC # FLD AUTO: 9.2 K/UL — SIGNIFICANT CHANGE UP (ref 3.8–10.5)

## 2021-04-25 PROCEDURE — 71045 X-RAY EXAM CHEST 1 VIEW: CPT | Mod: 26

## 2021-04-25 RX ORDER — INSULIN LISPRO 100/ML
VIAL (ML) SUBCUTANEOUS AT BEDTIME
Refills: 0 | Status: DISCONTINUED | OUTPATIENT
Start: 2021-04-25 | End: 2021-04-26

## 2021-04-25 RX ADMIN — Medication 2: at 16:34

## 2021-04-25 RX ADMIN — Medication 4: at 11:42

## 2021-04-25 RX ADMIN — LINEZOLID 300 MILLIGRAM(S): 600 INJECTION, SOLUTION INTRAVENOUS at 05:07

## 2021-04-25 RX ADMIN — Medication 10: at 08:09

## 2021-04-25 RX ADMIN — GABAPENTIN 300 MILLIGRAM(S): 400 CAPSULE ORAL at 18:04

## 2021-04-25 RX ADMIN — ENOXAPARIN SODIUM 80 MILLIGRAM(S): 100 INJECTION SUBCUTANEOUS at 18:04

## 2021-04-25 RX ADMIN — LISINOPRIL 5 MILLIGRAM(S): 2.5 TABLET ORAL at 06:36

## 2021-04-25 RX ADMIN — LINEZOLID 300 MILLIGRAM(S): 600 INJECTION, SOLUTION INTRAVENOUS at 18:31

## 2021-04-25 RX ADMIN — CEFEPIME 100 MILLIGRAM(S): 1 INJECTION, POWDER, FOR SOLUTION INTRAMUSCULAR; INTRAVENOUS at 18:01

## 2021-04-25 RX ADMIN — Medication 325 MILLIGRAM(S): at 11:36

## 2021-04-25 RX ADMIN — CEFEPIME 100 MILLIGRAM(S): 1 INJECTION, POWDER, FOR SOLUTION INTRAMUSCULAR; INTRAVENOUS at 05:09

## 2021-04-25 RX ADMIN — ENOXAPARIN SODIUM 80 MILLIGRAM(S): 100 INJECTION SUBCUTANEOUS at 05:07

## 2021-04-25 RX ADMIN — GABAPENTIN 300 MILLIGRAM(S): 400 CAPSULE ORAL at 05:07

## 2021-04-25 RX ADMIN — Medication 4: at 22:22

## 2021-04-25 RX ADMIN — CHLORHEXIDINE GLUCONATE 1 APPLICATION(S): 213 SOLUTION TOPICAL at 05:10

## 2021-04-25 RX ADMIN — PANTOPRAZOLE SODIUM 40 MILLIGRAM(S): 20 TABLET, DELAYED RELEASE ORAL at 11:36

## 2021-04-25 NOTE — PROGRESS NOTE ADULT - SUBJECTIVE AND OBJECTIVE BOX
INTERVAL HPI/OVERNIGHT EVENTS:        REVIEW OF SYSTEMS:  CONSTITUTIONAL:  alert  comfortable      MEDICATION:  acetaminophen    Suspension .. 650 milliGRAM(s) Oral every 6 hours PRN  ALBUTerol    90 MICROgram(s) HFA Inhaler 2 Puff(s) Inhalation every 6 hours PRN  ALBUTerol    90 MICROgram(s) HFA Inhaler 2 Puff(s) Inhalation every 4 hours  cefepime   IVPB 1000 milliGRAM(s) IV Intermittent every 12 hours  cefepime   IVPB      chlorhexidine 2% Cloths 1 Application(s) Topical <User Schedule>  dextrose 40% Gel 15 Gram(s) Oral once  dextrose 5%. 1000 milliLiter(s) IV Continuous <Continuous>  dextrose 5%. 1000 milliLiter(s) IV Continuous <Continuous>  dextrose 50% Injectable 25 Gram(s) IV Push once  dextrose 50% Injectable 12.5 Gram(s) IV Push once  dextrose 50% Injectable 25 Gram(s) IV Push once  enoxaparin Injectable 80 milliGRAM(s) SubCutaneous every 12 hours  ferrous    sulfate 325 milliGRAM(s) Oral daily  gabapentin 300 milliGRAM(s) Oral two times a day  glucagon  Injectable 1 milliGRAM(s) IntraMuscular once  insulin lispro (ADMELOG) corrective regimen sliding scale   SubCutaneous three times a day before meals  linezolid  IVPB      linezolid  IVPB 600 milliGRAM(s) IV Intermittent every 12 hours  lisinopril 5 milliGRAM(s) Oral daily  pantoprazole   Suspension 40 milliGRAM(s) Oral daily  polyethylene glycol 3350 17 Gram(s) Oral daily PRN  senna 1 Tablet(s) Oral at bedtime PRN  sodium chloride 0.9%. 1000 milliLiter(s) IV Continuous <Continuous>    Vital Signs Last 24 Hrs  T(C): 36.9 (25 Apr 2021 11:50), Max: 37.6 (24 Apr 2021 17:06)  T(F): 98.5 (25 Apr 2021 11:50), Max: 99.6 (24 Apr 2021 17:06)  HR: 98 (25 Apr 2021 11:50) (98 - 103)  BP: 125/66 (25 Apr 2021 11:50) (102/64 - 129/66)  BP(mean): --  RR: 18 (25 Apr 2021 11:50) (17 - 18)  SpO2: 96% (25 Apr 2021 11:50) (94% - 96%)    PHYSICAL EXAM:  GENERAL: NAD, well-groomed, well-developed  HEENT : Conjuntivae  clear sclerae anicteric  NECK: Supple, No JVD, Normal thyroid  NERVOUS SYSTEM:  Alert   CHEST/LUNG: Clear    HEART: Regular rate and rhythm; No murmurs, rubs, or gallops  ABDOMEN: Soft, Nontender, Nondistended; Bowel sounds present  EXTREMITIES:  no  edema no  tenderness  SKIN: No rashes   LABS:                        8.5    9.20  )-----------( 472      ( 25 Apr 2021 06:42 )             28.6     04-25    132<L>  |  98  |  17  ----------------------------<  341<H>  4.9   |  27  |  0.71    Ca    8.8      25 Apr 2021 06:42    TPro  6.4  /  Alb  1.8<L>  /  TBili  0.6  /  DBili  x   /  AST  24  /  ALT  20  /  AlkPhos  127<H>  04-25        CAPILLARY BLOOD GLUCOSE      POCT Blood Glucose.: 210 mg/dL (25 Apr 2021 11:37)  POCT Blood Glucose.: 351 mg/dL (25 Apr 2021 07:54)  POCT Blood Glucose.: 258 mg/dL (24 Apr 2021 21:41)  POCT Blood Glucose.: 198 mg/dL (24 Apr 2021 17:44)  POCT Blood Glucose.: 204 mg/dL (24 Apr 2021 16:39)      RADIOLOGY & ADDITIONAL TESTS:    Imaging reports  Personally Reviewed:  [ ] YES  [ ] NO    Consultant(s) Notes Reviewed:  [x ] YES  [ ] NO    Care Discussed with Consultants/Other Providers [x ] YES  [ ] NO    Problem/Plan - 1:  ·  Problem: Pulmonary emboli.  Plan: lovenox    Problem/Plan - 2:  ·  Problem: DVT (deep venous thrombosis).  Plan: lovenox    Problem/Plan - 4:  ·  Problem: Asthma.  Plan: symbicort.     Problem/Plan - 5:  ·  Problem: DM (diabetes mellitus).  Plan: insulin coverage.     Problem/Plan - 6:  Problem: Failure to thrive   on  GT feedings    severe  calorie  malnutrition cont  gt  feedings  will  add  additional  protein    anemia   improving continue on  AC  for  dvt  and  PE    monitor  labs  transfuse  as needed   PNEUMONIA UTI  OBSERVE  WITH  CEFEPIME  LINEZOLID

## 2021-04-26 LAB
GLUCOSE BLDC GLUCOMTR-MCNC: 145 MG/DL — HIGH (ref 70–99)
GLUCOSE BLDC GLUCOMTR-MCNC: 175 MG/DL — HIGH (ref 70–99)
GLUCOSE BLDC GLUCOMTR-MCNC: 176 MG/DL — HIGH (ref 70–99)
GLUCOSE BLDC GLUCOMTR-MCNC: 181 MG/DL — HIGH (ref 70–99)
GLUCOSE BLDC GLUCOMTR-MCNC: 185 MG/DL — HIGH (ref 70–99)
GLUCOSE BLDC GLUCOMTR-MCNC: 353 MG/DL — HIGH (ref 70–99)
HCT VFR BLD CALC: 27 % — LOW (ref 34.5–45)
HGB BLD-MCNC: 8.1 G/DL — LOW (ref 11.5–15.5)
MCHC RBC-ENTMCNC: 27.4 PG — SIGNIFICANT CHANGE UP (ref 27–34)
MCHC RBC-ENTMCNC: 30 GM/DL — LOW (ref 32–36)
MCV RBC AUTO: 91.2 FL — SIGNIFICANT CHANGE UP (ref 80–100)
NRBC # BLD: 0 /100 WBCS — SIGNIFICANT CHANGE UP (ref 0–0)
PLATELET # BLD AUTO: 456 K/UL — HIGH (ref 150–400)
RBC # BLD: 2.96 M/UL — LOW (ref 3.8–5.2)
RBC # FLD: 18.1 % — HIGH (ref 10.3–14.5)
WBC # BLD: 9.5 K/UL — SIGNIFICANT CHANGE UP (ref 3.8–10.5)
WBC # FLD AUTO: 9.5 K/UL — SIGNIFICANT CHANGE UP (ref 3.8–10.5)

## 2021-04-26 PROCEDURE — 99232 SBSQ HOSP IP/OBS MODERATE 35: CPT

## 2021-04-26 RX ORDER — DEXTROSE 50 % IN WATER 50 %
12.5 SYRINGE (ML) INTRAVENOUS ONCE
Refills: 0 | Status: DISCONTINUED | OUTPATIENT
Start: 2021-04-26 | End: 2021-04-28

## 2021-04-26 RX ORDER — INSULIN LISPRO 100/ML
3 VIAL (ML) SUBCUTANEOUS EVERY 6 HOURS
Refills: 0 | Status: DISCONTINUED | OUTPATIENT
Start: 2021-04-26 | End: 2021-04-28

## 2021-04-26 RX ORDER — DEXTROSE 50 % IN WATER 50 %
25 SYRINGE (ML) INTRAVENOUS ONCE
Refills: 0 | Status: DISCONTINUED | OUTPATIENT
Start: 2021-04-26 | End: 2021-04-28

## 2021-04-26 RX ORDER — INSULIN GLARGINE 100 [IU]/ML
12 INJECTION, SOLUTION SUBCUTANEOUS AT BEDTIME
Refills: 0 | Status: DISCONTINUED | OUTPATIENT
Start: 2021-04-26 | End: 2021-04-28

## 2021-04-26 RX ORDER — INSULIN LISPRO 100/ML
VIAL (ML) SUBCUTANEOUS EVERY 6 HOURS
Refills: 0 | Status: DISCONTINUED | OUTPATIENT
Start: 2021-04-26 | End: 2021-04-28

## 2021-04-26 RX ORDER — LINEZOLID 600 MG/300ML
600 INJECTION, SOLUTION INTRAVENOUS EVERY 12 HOURS
Refills: 0 | Status: DISCONTINUED | OUTPATIENT
Start: 2021-04-27 | End: 2021-04-28

## 2021-04-26 RX ORDER — INSULIN GLARGINE 100 [IU]/ML
5 INJECTION, SOLUTION SUBCUTANEOUS AT BEDTIME
Refills: 0 | Status: DISCONTINUED | OUTPATIENT
Start: 2021-04-26 | End: 2021-04-26

## 2021-04-26 RX ORDER — GLUCAGON INJECTION, SOLUTION 0.5 MG/.1ML
1 INJECTION, SOLUTION SUBCUTANEOUS ONCE
Refills: 0 | Status: DISCONTINUED | OUTPATIENT
Start: 2021-04-26 | End: 2021-04-28

## 2021-04-26 RX ORDER — NYSTATIN CREAM 100000 [USP'U]/G
1 CREAM TOPICAL
Refills: 0 | Status: DISCONTINUED | OUTPATIENT
Start: 2021-04-26 | End: 2021-04-28

## 2021-04-26 RX ADMIN — CHLORHEXIDINE GLUCONATE 1 APPLICATION(S): 213 SOLUTION TOPICAL at 05:08

## 2021-04-26 RX ADMIN — ENOXAPARIN SODIUM 80 MILLIGRAM(S): 100 INJECTION SUBCUTANEOUS at 18:09

## 2021-04-26 RX ADMIN — CEFEPIME 100 MILLIGRAM(S): 1 INJECTION, POWDER, FOR SOLUTION INTRAMUSCULAR; INTRAVENOUS at 18:09

## 2021-04-26 RX ADMIN — Medication 3 UNIT(S): at 11:59

## 2021-04-26 RX ADMIN — Medication 2: at 23:32

## 2021-04-26 RX ADMIN — NYSTATIN CREAM 1 APPLICATION(S): 100000 CREAM TOPICAL at 18:09

## 2021-04-26 RX ADMIN — Medication 10: at 08:12

## 2021-04-26 RX ADMIN — LINEZOLID 300 MILLIGRAM(S): 600 INJECTION, SOLUTION INTRAVENOUS at 05:08

## 2021-04-26 RX ADMIN — PANTOPRAZOLE SODIUM 40 MILLIGRAM(S): 20 TABLET, DELAYED RELEASE ORAL at 11:59

## 2021-04-26 RX ADMIN — Medication 3 UNIT(S): at 18:09

## 2021-04-26 RX ADMIN — LISINOPRIL 5 MILLIGRAM(S): 2.5 TABLET ORAL at 05:08

## 2021-04-26 RX ADMIN — Medication 2: at 11:59

## 2021-04-26 RX ADMIN — CEFEPIME 100 MILLIGRAM(S): 1 INJECTION, POWDER, FOR SOLUTION INTRAMUSCULAR; INTRAVENOUS at 05:31

## 2021-04-26 RX ADMIN — INSULIN GLARGINE 12 UNIT(S): 100 INJECTION, SOLUTION SUBCUTANEOUS at 22:15

## 2021-04-26 RX ADMIN — GABAPENTIN 300 MILLIGRAM(S): 400 CAPSULE ORAL at 18:09

## 2021-04-26 RX ADMIN — LINEZOLID 300 MILLIGRAM(S): 600 INJECTION, SOLUTION INTRAVENOUS at 18:10

## 2021-04-26 RX ADMIN — GABAPENTIN 300 MILLIGRAM(S): 400 CAPSULE ORAL at 05:08

## 2021-04-26 RX ADMIN — ENOXAPARIN SODIUM 80 MILLIGRAM(S): 100 INJECTION SUBCUTANEOUS at 05:08

## 2021-04-26 RX ADMIN — Medication 3 UNIT(S): at 23:33

## 2021-04-26 RX ADMIN — Medication 325 MILLIGRAM(S): at 11:59

## 2021-04-26 RX ADMIN — Medication 2: at 18:09

## 2021-04-26 RX ADMIN — NYSTATIN CREAM 1 APPLICATION(S): 100000 CREAM TOPICAL at 06:21

## 2021-04-26 NOTE — PROGRESS NOTE ADULT - ASSESSMENT
89F with DM, HTN, dementia who has had fever present intermittently during hospital stay.   Cough, rhonchi on exam concern of aspiration. CXR 4/18 unrevealing, will repeat  Abdominal exam benign  PEG site ok  Has midline cath in site nearly 4 weeks, but blood cx thus far are NTD  UE with edema/erythema B, venous stasis like  No joint inflammation to implicate gout  No evidence of IV catheter related phlebitits  Areas of grade II pressure ulceration on sacrum/perineum, none infected  Soles with desquamation/intact skin underneath- ?prior drug rash. None present now  U/A bland, not possible to ascertain sx, skeptical re: UTI    04/21: Still w fever but too early to expect response to antibiotics. I do not think urine isolate merits treatment at this time. CXR with 'unchanged' infiltrates, whereas prior report did not think that they were significant. Concur with former though not clear to me how much of CXR changes are sequelae/residual of COVID vs. bacterial provess.  4/22: persistent fevers, 101.1 today, WBC increased 13.01, MRSA/MSSA PCR negative, on RA now and comfortable, cefepime continued (day #3), possible aspiration pneumonia, UCx with VRE. Will order another set of cultures, urine and blood, last set of cultures from 4/18 with no growth, will start Linezolid. If the pt will continue having fevers, suggesting CT chest/abdomen/pelvis to evaluate for other sources of infection.   4/23: temps perhaps moderating. U/A unimpressive, WBC elevated, but also increased platelets suggesting reactive component. Cefepime day #4, Linezolid day #1-2. Linezolid. Latter has the potential for interaction with monoamine oxidase inhibitiors.  This rarely can also occur with SSRI, SSRI/VIRIDIANA, tyramine-rich foods, phenylephrine, and phenylpropanolamine resulting in severe hypertension. Serotonin syndrome with tremor, agitation, fever, and altered mental status can occur with SSRI use (<1%). Caution should be excecised with SSRI and SSRI/VIRIDIANA.  Myelosuppression (most commonly thrombocytopenia) will occur in about 15% of patient, and typically becomes evident on about day 10-14 of therapy. Renal insufficiency may increase the likelihood of myelosuppression. Lactic acidosis and reversible mitochondrial toxicity are well reported. These are associated with peripheral neuropathy and optic neuropathy.  Other AE include black hairy tongue, interstital nephritis,  diarrhea, nausea, vomiting, and elevated liver function tests.  4/26: Last low grade fevers 4/23. Will remain at risk for superimposed infection indefinitely.    Suggestiions--  Stop cefepime after todays doses.  Continue Linezolid, today is day 5. Will switch to GT x48H beginning tomorrow.  DVT - anticoagulation as per medicine   Aspiration precautions as able    I'll sign off at this time, please recall as needed.  D/W DW/Case management  Thank you for the courtesy of this referral.    Cesar Muñoz MD  Attending Physician  [facility]  Division of Infectious Diseases  [phone]

## 2021-04-26 NOTE — PROGRESS NOTE ADULT - SUBJECTIVE AND OBJECTIVE BOX
[facility]  Division of Infectious Diseases  [phone]    Name: PAULA MARIO  Age: 89y  Gender: Female  MRN: [id number]    Interval History--  Notes reviewed. Poorly responsive no change.    Past Medical History--  HTN (hypertension)    DM (diabetes mellitus)    Asthma    Dysphagia    Diverticulitis    Pneumonia    Stroke    Lumbar spondylolysis    Hip fracture    Status post right knee replacement        For details regarding the patient's social history, family history, and other miscellaneous elements, please refer the initial infectious diseases consultation and/or the admitting history and physical examination for this admission.    Allergies    No Known Allergies    Intolerances        Medications--  Antibiotics:  cefepime   IVPB 1000 milliGRAM(s) IV Intermittent every 12 hours  cefepime   IVPB      linezolid  IVPB      linezolid  IVPB 600 milliGRAM(s) IV Intermittent every 12 hours    Immunologic:    Other:  acetaminophen    Suspension .. PRN  ALBUTerol    90 MICROgram(s) HFA Inhaler PRN  ALBUTerol    90 MICROgram(s) HFA Inhaler  chlorhexidine 2% Cloths  dextrose 40% Gel  dextrose 5%.  dextrose 5%.  dextrose 50% Injectable  dextrose 50% Injectable  dextrose 50% Injectable  dextrose 50% Injectable  dextrose 50% Injectable  enoxaparin Injectable  ferrous    sulfate  gabapentin  glucagon  Injectable  glucagon  Injectable  insulin glargine Injectable (LANTUS)  insulin lispro (ADMELOG) corrective regimen sliding scale  insulin lispro Injectable (ADMELOG)  lisinopril  nystatin Powder  pantoprazole   Suspension  polyethylene glycol 3350 PRN  senna PRN  sodium chloride 0.9%.      Review of Systems--  Review of systems unable secondary to clinical condition.     Physical Examination--  Vital Signs: T(F): 97.8 (04-26-21 @ 11:39), Max: 98.7 (04-25-21 @ 17:07)  HR: 93 (04-26-21 @ 11:39)  BP: 107/51 (04-26-21 @ 11:39)  RR: 19 (04-26-21 @ 11:51)  SpO2: 99% (04-26-21 @ 11:51)  Wt(kg): --  General: Chronically ill-appearing Female in no acute distress.   HEENT: AT/NC. Pupils/EOM unable secondary to patient compliance. Resists exam of conj/sclera. Oropharynx/dentition unable secondary to patient compliance.   Neck: Not rigid. No sense of mass.  Nodes: None palpable.  Lungs: Poor effort, grossly clear with decreased B BS   Heart: Regular rate and rhythm. No Murmur. No rub. No gallop. No palpable thrill.  Abdomen: Bowel sounds present and normoactive. Soft. Nondistended. Nontender. No sense of mass. No organomegaly.  Back: unable to assess   Extremities: No cyanosis or clubbing. 1-2+ UE edema. 1+ LE edema. right lower extremity anterior aspect of shin swelling - hematoma no change.   Skin: Warm. Dry. Good turgor. No rash. No vasculitic stigmata.  Psychiatric: Unable        Laboratory Studies--  CBC                        8.1    9.50  )-----------( 456      ( 26 Apr 2021 06:59 )             27.0       Chemistries  04-25    132<L>  |  98  |  17  ----------------------------<  341<H>  4.9   |  27  |  0.71    Ca    8.8      25 Apr 2021 06:42    TPro  6.4  /  Alb  1.8<L>  /  TBili  0.6  /  DBili  x   /  AST  24  /  ALT  20  /  AlkPhos  127<H>  04-25      Culture Data    Culture - Urine (collected 23 Apr 2021 08:18)  Source: .Urine Clean Catch (Midstream)  Final Report (24 Apr 2021 09:42):    <10,000 CFU/mL Normal Urogenital Jesenia    Culture - Blood (collected 22 Apr 2021 23:00)  Source: .Blood Blood-Peripheral  Preliminary Report (23 Apr 2021 23:02):    No growth to date.    Culture - Blood (collected 22 Apr 2021 23:00)  Source: .Blood Blood-Peripheral  Preliminary Report (23 Apr 2021 23:02):    No growth to date.

## 2021-04-26 NOTE — PROGRESS NOTE ADULT - SUBJECTIVE AND OBJECTIVE BOX
INTERVAL HPI/OVERNIGHT EVENTS:        REVIEW OF SYSTEMS:  CONSTITUTIONAL:        MEDICATION:  acetaminophen    Suspension .. 650 milliGRAM(s) Oral every 6 hours PRN  ALBUTerol    90 MICROgram(s) HFA Inhaler 2 Puff(s) Inhalation every 6 hours PRN  ALBUTerol    90 MICROgram(s) HFA Inhaler 2 Puff(s) Inhalation every 4 hours  cefepime   IVPB 1000 milliGRAM(s) IV Intermittent every 12 hours  cefepime   IVPB      chlorhexidine 2% Cloths 1 Application(s) Topical <User Schedule>  dextrose 40% Gel 15 Gram(s) Oral once  dextrose 5%. 1000 milliLiter(s) IV Continuous <Continuous>  dextrose 5%. 1000 milliLiter(s) IV Continuous <Continuous>  dextrose 50% Injectable 25 Gram(s) IV Push once  dextrose 50% Injectable 12.5 Gram(s) IV Push once  dextrose 50% Injectable 25 Gram(s) IV Push once  enoxaparin Injectable 80 milliGRAM(s) SubCutaneous every 12 hours  ferrous    sulfate 325 milliGRAM(s) Oral daily  gabapentin 300 milliGRAM(s) Oral two times a day  glucagon  Injectable 1 milliGRAM(s) IntraMuscular once  insulin lispro (ADMELOG) corrective regimen sliding scale   SubCutaneous at bedtime  insulin lispro (ADMELOG) corrective regimen sliding scale   SubCutaneous three times a day before meals  linezolid  IVPB      linezolid  IVPB 600 milliGRAM(s) IV Intermittent every 12 hours  lisinopril 5 milliGRAM(s) Oral daily  nystatin Powder 1 Application(s) Topical two times a day  pantoprazole   Suspension 40 milliGRAM(s) Oral daily  polyethylene glycol 3350 17 Gram(s) Oral daily PRN  senna 1 Tablet(s) Oral at bedtime PRN  sodium chloride 0.9%. 1000 milliLiter(s) IV Continuous <Continuous>    Vital Signs Last 24 Hrs  T(C): 36.6 (26 Apr 2021 05:03), Max: 37.1 (25 Apr 2021 17:07)  T(F): 97.8 (26 Apr 2021 05:03), Max: 98.7 (25 Apr 2021 17:07)  HR: 102 (26 Apr 2021 05:03) (94 - 102)  BP: 150/83 (26 Apr 2021 05:03) (106/71 - 150/83)  BP(mean): --  RR: 18 (26 Apr 2021 05:03) (18 - 18)  SpO2: 99% (26 Apr 2021 05:03) (95% - 99%)    PHYSICAL EXAM:  GENERAL: NAD, well-groomed, well-developed  HEENT : Conjuntivae  clear sclerae anicteric  NECK: Supple, No JVD, Normal thyroid  NERVOUS SYSTEM:  Alert   CHEST/LUNG:  few ronchis bases  HEART: Regular rate and rhythm; No murmurs, rubs, or gallops  ABDOMEN: Soft, Nontender, Nondistended; Bowel sounds present  EXTREMITIES:  diffuse  edamas  SKIN: No rashes   LABS:                        8.1    9.50  )-----------( 456      ( 26 Apr 2021 06:59 )             27.0     04-25    132<L>  |  98  |  17  ----------------------------<  341<H>  4.9   |  27  |  0.71    Ca    8.8      25 Apr 2021 06:42    TPro  6.4  /  Alb  1.8<L>  /  TBili  0.6  /  DBili  x   /  AST  24  /  ALT  20  /  AlkPhos  127<H>  04-25        CAPILLARY BLOOD GLUCOSE      POCT Blood Glucose.: 353 mg/dL (26 Apr 2021 07:56)  POCT Blood Glucose.: 336 mg/dL (25 Apr 2021 21:40)  POCT Blood Glucose.: 189 mg/dL (25 Apr 2021 16:32)  POCT Blood Glucose.: 210 mg/dL (25 Apr 2021 11:37)      RADIOLOGY & ADDITIONAL TESTS:    Imaging reports  Personally Reviewed:  [ ] YES  [ ] NO    Consultant(s) Notes Reviewed:  [ x] YES  [ ] NO    Care Discussed with Consultants/Other Providers [ x] YES  [ ] NO    Problem/Plan - 1:  ·  Problem: Pulmonary emboli.  Plan: lovenox    Problem/Plan - 2:  ·  Problem: DVT (deep venous thrombosis).  Plan: lovenox    Problem/Plan - 4:  ·  Problem: Asthma.  Plan: symbicort.     Problem/Plan - 5:  ·  Problem: DM (diabetes mellitus).  Plan: insulin coverage.     Problem/Plan - 6:  Problem: Failure to thrive   on  GT feedings    severe  calorie  malnutrition cont  gt  feedings  will  add  additional  protein    anemia   improving continue on  AC  for  dvt  and  PE    monitor  labs  transfuse  as needed   PNEUMONIA UTI  OBSERVE  WITH  CEFEPIME  LINEZOLID     discharge  long  term care  facility

## 2021-04-27 LAB
ALBUMIN SERPL ELPH-MCNC: 1.8 G/DL — LOW (ref 3.3–5)
ALP SERPL-CCNC: 111 U/L — SIGNIFICANT CHANGE UP (ref 40–120)
ALT FLD-CCNC: 22 U/L — SIGNIFICANT CHANGE UP (ref 12–78)
ANION GAP SERPL CALC-SCNC: 5 MMOL/L — SIGNIFICANT CHANGE UP (ref 5–17)
AST SERPL-CCNC: 47 U/L — HIGH (ref 15–37)
BILIRUB SERPL-MCNC: 0.4 MG/DL — SIGNIFICANT CHANGE UP (ref 0.2–1.2)
BUN SERPL-MCNC: 22 MG/DL — SIGNIFICANT CHANGE UP (ref 7–23)
CALCIUM SERPL-MCNC: 9.1 MG/DL — SIGNIFICANT CHANGE UP (ref 8.5–10.1)
CHLORIDE SERPL-SCNC: 102 MMOL/L — SIGNIFICANT CHANGE UP (ref 96–108)
CO2 SERPL-SCNC: 27 MMOL/L — SIGNIFICANT CHANGE UP (ref 22–31)
CREAT SERPL-MCNC: 0.66 MG/DL — SIGNIFICANT CHANGE UP (ref 0.5–1.3)
CULTURE RESULTS: SIGNIFICANT CHANGE UP
CULTURE RESULTS: SIGNIFICANT CHANGE UP
FLUAV AG NPH QL: SIGNIFICANT CHANGE UP
FLUBV AG NPH QL: SIGNIFICANT CHANGE UP
GLUCOSE BLDC GLUCOMTR-MCNC: 140 MG/DL — HIGH (ref 70–99)
GLUCOSE BLDC GLUCOMTR-MCNC: 156 MG/DL — HIGH (ref 70–99)
GLUCOSE BLDC GLUCOMTR-MCNC: 162 MG/DL — HIGH (ref 70–99)
GLUCOSE BLDC GLUCOMTR-MCNC: 164 MG/DL — HIGH (ref 70–99)
GLUCOSE BLDC GLUCOMTR-MCNC: 183 MG/DL — HIGH (ref 70–99)
GLUCOSE BLDC GLUCOMTR-MCNC: 199 MG/DL — HIGH (ref 70–99)
GLUCOSE SERPL-MCNC: 119 MG/DL — HIGH (ref 70–99)
POTASSIUM SERPL-MCNC: 5.9 MMOL/L — HIGH (ref 3.5–5.3)
POTASSIUM SERPL-SCNC: 5.9 MMOL/L — HIGH (ref 3.5–5.3)
PROT SERPL-MCNC: 6.4 GM/DL — SIGNIFICANT CHANGE UP (ref 6–8.3)
SARS-COV-2 RNA SPEC QL NAA+PROBE: SIGNIFICANT CHANGE UP
SODIUM SERPL-SCNC: 134 MMOL/L — LOW (ref 135–145)
SPECIMEN SOURCE: SIGNIFICANT CHANGE UP
SPECIMEN SOURCE: SIGNIFICANT CHANGE UP

## 2021-04-27 RX ORDER — RIVAROXABAN 15 MG-20MG
20 KIT ORAL DAILY
Refills: 0 | Status: DISCONTINUED | OUTPATIENT
Start: 2021-04-27 | End: 2021-04-28

## 2021-04-27 RX ADMIN — CHLORHEXIDINE GLUCONATE 1 APPLICATION(S): 213 SOLUTION TOPICAL at 05:54

## 2021-04-27 RX ADMIN — Medication 2: at 23:26

## 2021-04-27 RX ADMIN — Medication 3 UNIT(S): at 17:29

## 2021-04-27 RX ADMIN — Medication 3 UNIT(S): at 23:28

## 2021-04-27 RX ADMIN — LINEZOLID 600 MILLIGRAM(S): 600 INJECTION, SOLUTION INTRAVENOUS at 05:50

## 2021-04-27 RX ADMIN — GABAPENTIN 300 MILLIGRAM(S): 400 CAPSULE ORAL at 05:50

## 2021-04-27 RX ADMIN — Medication 2: at 05:53

## 2021-04-27 RX ADMIN — NYSTATIN CREAM 1 APPLICATION(S): 100000 CREAM TOPICAL at 05:55

## 2021-04-27 RX ADMIN — Medication 2: at 12:00

## 2021-04-27 RX ADMIN — NYSTATIN CREAM 1 APPLICATION(S): 100000 CREAM TOPICAL at 17:30

## 2021-04-27 RX ADMIN — GABAPENTIN 300 MILLIGRAM(S): 400 CAPSULE ORAL at 17:30

## 2021-04-27 RX ADMIN — LINEZOLID 600 MILLIGRAM(S): 600 INJECTION, SOLUTION INTRAVENOUS at 17:30

## 2021-04-27 RX ADMIN — Medication 3 UNIT(S): at 05:54

## 2021-04-27 RX ADMIN — INSULIN GLARGINE 12 UNIT(S): 100 INJECTION, SOLUTION SUBCUTANEOUS at 21:34

## 2021-04-27 RX ADMIN — LISINOPRIL 5 MILLIGRAM(S): 2.5 TABLET ORAL at 05:53

## 2021-04-27 RX ADMIN — Medication 3 UNIT(S): at 12:00

## 2021-04-27 RX ADMIN — RIVAROXABAN 20 MILLIGRAM(S): KIT at 12:37

## 2021-04-27 RX ADMIN — ENOXAPARIN SODIUM 80 MILLIGRAM(S): 100 INJECTION SUBCUTANEOUS at 05:56

## 2021-04-27 RX ADMIN — Medication 2: at 17:29

## 2021-04-27 RX ADMIN — Medication 325 MILLIGRAM(S): at 12:01

## 2021-04-27 RX ADMIN — PANTOPRAZOLE SODIUM 40 MILLIGRAM(S): 20 TABLET, DELAYED RELEASE ORAL at 12:01

## 2021-04-27 NOTE — CHART NOTE - NSCHARTNOTESELECT_GEN_ALL_CORE
Event Note
Nutrition Services
Palliative Care/Off Service Note
hypotension/Event Note
Event Note
Nutrition Services

## 2021-04-27 NOTE — CONSULT NOTE ADULT - CONSULT REQUESTED DATE/TIME
15-Mar-2021 11:51
16-Mar-2021 15:45
18-Mar-2021 15:25
20-Apr-2021 12:14
15-Mar-2021 21:50
27-Apr-2021 15:00
24-Mar-2021 17:44

## 2021-04-27 NOTE — CHART NOTE - NSCHARTNOTEFT_GEN_A_CORE
Pt admitted from [facility] for FTT; found to have b/l PEs. Pt was receiving TF via NGT since 3/22; PEG placed 4/16.  Pt remains lethargic, non-verbal.  PMHx includes HTN, DM, CVA asthma, COVID PNA hx (now COVID+ again).  Plan for d/c is LTC placement.     Factors impacting intake: [x ] none [ ] nausea  [ ] vomiting [ ] diarrhea [ ] constipation  [ ]chewing problems [ ] swallowing issues  [ ] other:     Diet Prescription: Diet, NPO with Tube Feed:   Tube Feeding Modality: Gastrostomy  Glucerna 1.2 Renzo  Total Volume for 24 Hours (mL): 1320  Continuous  Starting Tube Feed Rate {mL per Hour}: 55  Increase Tube Feed Rate by (mL): 0     Every hour  Until Goal Tube Feed Rate (mL per Hour): 55  Tube Feed Duration (in Hours): 24  Tube Feed Start Time: 20:00  Free Water Flush   Total Volume per Flush (mL): 150   Frequency: Every 6 Hours   Total Daily Volume of Flush (mL): 600    Start Time: 14:00  No Carb Prosource (1pkg = 15gms Protein)     Qty per Day:  1 (04-16-21 @ 16:14)    Intake:   TF continues to run at goal rate; tolerating well; no residuals noted (provides 1320 ml, 1584 kcal, 79 g protein, 1069 ml H2O)    Current Weight:   76.2 kg (4/26); admission wt 76.5 kg (3/14)   % Weight Change: basically stable x 1.5 months?  Some recorded wts fluctuate considerably from prior day- accuracy questionable    Unable to perform repeat full nutrition-focused physical exam due to edematous status.  Moderate muscle wasting clearly visible at temples & clavicles; moderate fat depletion at orbital & triceps    1+ generalized edema + 2+ b/l arms,    3+ b/l legs noted; at admission 2+ edema present    Pertinent Medications: MEDICATIONS  (STANDING):  ALBUTerol    90 MICROgram(s) HFA Inhaler 2 Puff(s) Inhalation every 4 hours  chlorhexidine 2% Cloths 1 Application(s) Topical <User Schedule>  dextrose 40% Gel 15 Gram(s) Oral once  dextrose 5%. 1000 milliLiter(s) (50 mL/Hr) IV Continuous <Continuous>  dextrose 5%. 1000 milliLiter(s) (100 mL/Hr) IV Continuous <Continuous>  dextrose 50% Injectable 25 Gram(s) IV Push once  dextrose 50% Injectable 12.5 Gram(s) IV Push once  dextrose 50% Injectable 25 Gram(s) IV Push once  dextrose 50% Injectable 12.5 Gram(s) IV Push once  dextrose 50% Injectable 25 Gram(s) IV Push once  ferrous    sulfate 325 milliGRAM(s) Oral daily  gabapentin 300 milliGRAM(s) Oral two times a day  glucagon  Injectable 1 milliGRAM(s) IntraMuscular once  glucagon  Injectable 1 milliGRAM(s) IntraMuscular once  insulin glargine Injectable (LANTUS) 12 Unit(s) SubCutaneous at bedtime  insulin lispro (ADMELOG) corrective regimen sliding scale   SubCutaneous every 6 hours  insulin lispro Injectable (ADMELOG) 3 Unit(s) SubCutaneous every 6 hours  linezolid    Tablet 600 milliGRAM(s) Oral every 12 hours  lisinopril 5 milliGRAM(s) Oral daily  nystatin Powder 1 Application(s) Topical two times a day  pantoprazole   Suspension 40 milliGRAM(s) Oral daily  rivaroxaban 20 milliGRAM(s) Oral daily  sodium chloride 0.9%. 1000 milliLiter(s) (100 mL/Hr) IV Continuous <Continuous>    MEDICATIONS  (PRN):  acetaminophen    Suspension .. 650 milliGRAM(s) Oral every 6 hours PRN Temp greater or equal to 38C (100.4F), Mild Pain (1 - 3)  ALBUTerol    90 MICROgram(s) HFA Inhaler 2 Puff(s) Inhalation every 6 hours PRN Wheezing  polyethylene glycol 3350 17 Gram(s) Oral daily PRN Constipation  senna 1 Tablet(s) Oral at bedtime PRN Constipation    Pertinent Labs: 04-27 Na134 mmol/L<L> Glu 119 mg/dL<H> K+ 5.9 mmol/L<H> Cr  0.66 mg/dL BUN 22 mg/dL 04-27 Alb 1.8 g/dL<L>  04-26 POCT:  353, 176, 145, 175, 185, 181; 03-15 A1c 7.2%    Skin:   as of 4/7 stage II pressure ulcers documented on sacrum & right buttocks; pt also c perirectal tear & wound (bruise) on right achilles tendon    Estimated Needs:   [X ] no change since previous assessment  (3/17)  [ ] recalculated:     Previous Nutrition Diagnosis:   [X ] Severe Malnutrition in context of acute malnutrition  Etiology:  Inadequate energy/protein intake + increased energy/protein needs related to COVID illness, AMS, FFT dx, presence of stage II pressure ulcers  Signs & Symptoms:  physical findings of moderate fat depletion & muscle wasting as noted    GOAL:  Pt to meet >75% energy/protein needs via tube feeding - goal continues to be met    Nutrition Diagnosis is [X ] ongoing  [ ] resolved [ ] not applicable     New Nutrition Diagnosis: [X ] not applicable    Interventions:   continue current TF as noted  Recommend  [ ] Change Diet To:  [ ] Nutrition Supplement  [ ] Nutrition Support  [ ] Other:     Monitoring and Evaluation:   [ ] PO intake [ x ] Tolerance to diet prescription [ x ] weights [ x ] labs[ x ] follow up per protocol  [ ] other:

## 2021-04-27 NOTE — CONSULT NOTE ADULT - ASSESSMENT
ASSESSMENT: Patient is a 89y old f with left calf hematoma    PLAN:   - Cont AC.   - Rest of care as per Medicine.     -   - Patient seen/examined with Dr. Kay

## 2021-04-27 NOTE — CONSULT NOTE ADULT - REASON FOR ADMISSION
PE  DVT  HX  COVID 19

## 2021-04-27 NOTE — CONSULT NOTE ADULT - SUBJECTIVE AND OBJECTIVE BOX
VASCULAR SURGERY CONSULT NOTE  --------------------------------------------------------------------------------------------    Patient is a 89y old  Female who presents with a chief complaint of PE  DVT  HX  COVID 19 (27 Apr 2021 09:19)    HPI:  89 yo F hx HTN, DM, hypokalemia, non-verbal at baseline, asthma, sent to ED from [facility] for failure to thrive. History obtained from chart review. Overall weight loss of 10 kg since admission. PT  WITH  HX  COVID  19.     ROS: Unable to provide history.     PAST MEDICAL & SURGICAL HISTORY:  HTN (hypertension)  DM (diabetes mellitus)  Asthma  Dysphagia  Diverticulitis  Pneumonia  Stroke- right frontal stroke  Lumbar spondylolysis with degenerative changes  Left hip fracture  Status post right knee replacement      FAMILY HISTORY:    SOCIAL HISTORY:      ALLERGIES: No Known Allergies    CURRENT MEDICATIONS  MEDICATIONS (STANDING): ALBUTerol    90 MICROgram(s) HFA Inhaler 2 Puff(s) Inhalation every 4 hours  dextrose 40% Gel 15 Gram(s) Oral once  dextrose 5%. 1000 milliLiter(s) IV Continuous <Continuous>  dextrose 5%. 1000 milliLiter(s) IV Continuous <Continuous>  dextrose 50% Injectable 25 Gram(s) IV Push once  dextrose 50% Injectable 12.5 Gram(s) IV Push once  dextrose 50% Injectable 25 Gram(s) IV Push once  dextrose 50% Injectable 12.5 Gram(s) IV Push once  dextrose 50% Injectable 25 Gram(s) IV Push once  ferrous    sulfate 325 milliGRAM(s) Oral daily  gabapentin 300 milliGRAM(s) Oral two times a day  glucagon  Injectable 1 milliGRAM(s) IntraMuscular once  glucagon  Injectable 1 milliGRAM(s) IntraMuscular once  insulin glargine Injectable (LANTUS) 12 Unit(s) SubCutaneous at bedtime  insulin lispro (ADMELOG) corrective regimen sliding scale   SubCutaneous every 6 hours  insulin lispro Injectable (ADMELOG) 3 Unit(s) SubCutaneous every 6 hours  linezolid    Tablet 600 milliGRAM(s) Oral every 12 hours  lisinopril 5 milliGRAM(s) Oral daily  pantoprazole   Suspension 40 milliGRAM(s) Oral daily  rivaroxaban 20 milliGRAM(s) Oral daily  sodium chloride 0.9%. 1000 milliLiter(s) IV Continuous <Continuous>    MEDICATIONS (PRN):acetaminophen    Suspension .. 650 milliGRAM(s) Oral every 6 hours PRN Temp greater or equal to 38C (100.4F), Mild Pain (1 - 3)  ALBUTerol    90 MICROgram(s) HFA Inhaler 2 Puff(s) Inhalation every 6 hours PRN Wheezing  polyethylene glycol 3350 17 Gram(s) Oral daily PRN Constipation  senna 1 Tablet(s) Oral at bedtime PRN Constipation    --------------------------------------------------------------------------------------------    Vitals:   T(C): 36.3 (04-27-21 @ 13:52), Max: 37.1 (04-27-21 @ 00:12)  HR: 78 (04-27-21 @ 13:52) (78 - 95)  BP: 118/61 (04-27-21 @ 13:52) (107/57 - 118/63)  RR: 18 (04-27-21 @ 13:52) (18 - 18)  SpO2: 99% (04-27-21 @ 13:52) (96% - 99%)    CAPILLARY BLOOD GLUCOSE    POCT Blood Glucose.: 199 mg/dL (27 Apr 2021 11:56)  POCT Blood Glucose.: 140 mg/dL (27 Apr 2021 07:45)  POCT Blood Glucose.: 156 mg/dL (27 Apr 2021 05:49)  POCT Blood Glucose.: 181 mg/dL (26 Apr 2021 23:27)  POCT Blood Glucose.: 185 mg/dL (26 Apr 2021 22:11)  POCT Blood Glucose.: 175 mg/dL (26 Apr 2021 18:02)  POCT Blood Glucose.: 145 mg/dL (26 Apr 2021 16:27)    04-26 @ 07:01  -  04-27 @ 07:00  --------------------------------------------------------  IN:    Free Water: 300 mL    Glucerna: 660 mL  Total IN: 960 mL    OUT:  Total OUT: 0 mL    Total NET: 960 mL      PHYSICAL EXAM:  General: Chronically ill appearing   HEENT: NC/AT, conjunctivae clear and pink.  Chest:  + Breath sounds  Cardiovascular: S1, S2,   Abdomen:  Soft, non tender, non distended,   Extremities:  no cyanosis, clubbing or edema.     left calf hematoma  Skin:  No rash/erythema/ecchymoses/petechiae/wounds/abscess/warm/dry.  Neuro/Psych:  no focal deficits     --------------------------------------------------------------------------------------------    LABS  CBC (04-27 @ 09:29)                              8.8<L>                         11.97<H>  )----------------(  390        --    % Neutrophils, --    % Lymphocytes, ANC: --                                  30.3<L>  CBC (04-26 @ 06:59)                              8.1<L>                         9.50    )----------------(  456<H>     --    % Neutrophils, --    % Lymphocytes, ANC: --                                  27.0<L>    BMP (04-27 @ 09:29)             134<L>  |  102     |  22    		Ca++ --      Ca 9.1                ---------------------------------( 119<H>		Mg --                 5.9<H>  |  27      |  0.66  			Ph --        LFTs (04-27 @ 09:29)      TPro 6.4 / Alb 1.8<L> / TBili 0.4 / DBili -- / AST 47<H> / ALT 22 / AlkPhos 111    --------------------------------------------------------------------------------------------    MICROBIOLOGY    -> .Urine Clean Catch (Midstream) Culture (04-23 @ 08:18)     NG    NG    <10,000 CFU/mL Normal Urogenital Jesenia    -> .Blood Blood-Peripheral Culture (04-22 @ 23:00)     NG    NG    No growth to date.    -> .Urine Clean Catch (Midstream) Culture (04-18 @ 18:39)     NG    Enterococcus faecium (vancomycin resistant)    >100,000 CFU/ml Enterococcus faecium (vancomycin resistant)    -> .Blood Blood Culture (04-18 @ 18:07)     NG    NG    No Growth Final      --------------------------------------------------------------------------------------------    IMAGING      ASSESSMENT: Patient is a 89y old f with left calf hematoma    PLAN:   - Cont AC.   - Rest of care as per Medicine.     -   - Patient seen/examined with Dr. Kay   VASCULAR SURGERY CONSULT NOTE  --------------------------------------------------------------------------------------------    Patient is a 89y old  Female who presents with a chief complaint of PE  DVT  HX  COVID 19 (27 Apr 2021 09:19)    HPI:  89 yo F hx HTN, DM, hypokalemia, non-verbal at baseline, asthma, sent to ED from [facility] for failure to thrive. History obtained from chart review. Overall weight loss of 10 kg since admission. PT  WITH  HX  COVID  19.     ROS: Unable to provide history.     PAST MEDICAL & SURGICAL HISTORY:  HTN (hypertension)  DM (diabetes mellitus)  Asthma  Dysphagia  Diverticulitis  Pneumonia  Stroke- right frontal stroke  Lumbar spondylolysis with degenerative changes  Left hip fracture  Status post right knee replacement      FAMILY HISTORY:    SOCIAL HISTORY:      ALLERGIES: No Known Allergies    CURRENT MEDICATIONS  MEDICATIONS (STANDING): ALBUTerol    90 MICROgram(s) HFA Inhaler 2 Puff(s) Inhalation every 4 hours  dextrose 40% Gel 15 Gram(s) Oral once  dextrose 5%. 1000 milliLiter(s) IV Continuous <Continuous>  dextrose 5%. 1000 milliLiter(s) IV Continuous <Continuous>  dextrose 50% Injectable 25 Gram(s) IV Push once  dextrose 50% Injectable 12.5 Gram(s) IV Push once  dextrose 50% Injectable 25 Gram(s) IV Push once  dextrose 50% Injectable 12.5 Gram(s) IV Push once  dextrose 50% Injectable 25 Gram(s) IV Push once  ferrous    sulfate 325 milliGRAM(s) Oral daily  gabapentin 300 milliGRAM(s) Oral two times a day  glucagon  Injectable 1 milliGRAM(s) IntraMuscular once  glucagon  Injectable 1 milliGRAM(s) IntraMuscular once  insulin glargine Injectable (LANTUS) 12 Unit(s) SubCutaneous at bedtime  insulin lispro (ADMELOG) corrective regimen sliding scale   SubCutaneous every 6 hours  insulin lispro Injectable (ADMELOG) 3 Unit(s) SubCutaneous every 6 hours  linezolid    Tablet 600 milliGRAM(s) Oral every 12 hours  lisinopril 5 milliGRAM(s) Oral daily  pantoprazole   Suspension 40 milliGRAM(s) Oral daily  rivaroxaban 20 milliGRAM(s) Oral daily  sodium chloride 0.9%. 1000 milliLiter(s) IV Continuous <Continuous>    MEDICATIONS (PRN):acetaminophen    Suspension .. 650 milliGRAM(s) Oral every 6 hours PRN Temp greater or equal to 38C (100.4F), Mild Pain (1 - 3)  ALBUTerol    90 MICROgram(s) HFA Inhaler 2 Puff(s) Inhalation every 6 hours PRN Wheezing  polyethylene glycol 3350 17 Gram(s) Oral daily PRN Constipation  senna 1 Tablet(s) Oral at bedtime PRN Constipation    --------------------------------------------------------------------------------------------    Vitals:   T(C): 36.3 (04-27-21 @ 13:52), Max: 37.1 (04-27-21 @ 00:12)  HR: 78 (04-27-21 @ 13:52) (78 - 95)  BP: 118/61 (04-27-21 @ 13:52) (107/57 - 118/63)  RR: 18 (04-27-21 @ 13:52) (18 - 18)  SpO2: 99% (04-27-21 @ 13:52) (96% - 99%)    CAPILLARY BLOOD GLUCOSE    POCT Blood Glucose.: 199 mg/dL (27 Apr 2021 11:56)  POCT Blood Glucose.: 140 mg/dL (27 Apr 2021 07:45)  POCT Blood Glucose.: 156 mg/dL (27 Apr 2021 05:49)  POCT Blood Glucose.: 181 mg/dL (26 Apr 2021 23:27)  POCT Blood Glucose.: 185 mg/dL (26 Apr 2021 22:11)  POCT Blood Glucose.: 175 mg/dL (26 Apr 2021 18:02)  POCT Blood Glucose.: 145 mg/dL (26 Apr 2021 16:27)    04-26 @ 07:01  -  04-27 @ 07:00  --------------------------------------------------------  IN:    Free Water: 300 mL    Glucerna: 660 mL  Total IN: 960 mL    OUT:  Total OUT: 0 mL    Total NET: 960 mL      PHYSICAL EXAM:  General: Chronically ill appearing   HEENT: NC/AT, conjunctivae clear and pink.  Chest:  Non labor breathing  Cardiovascular: S1, S2,   Abdomen:  Soft, non tender, non distended,   Extremities:  no cyanosis, clubbing or edema.     left calf hematoma  Skin:  No rash/erythema/ecchymoses/petechiae/wounds/abscess/warm/dry.  Neuro/Psych:  no focal deficits     --------------------------------------------------------------------------------------------    LABS  CBC (04-27 @ 09:29)                              8.8<L>                         11.97<H>  )----------------(  390        --    % Neutrophils, --    % Lymphocytes, ANC: --                                  30.3<L>  CBC (04-26 @ 06:59)                              8.1<L>                         9.50    )----------------(  456<H>     --    % Neutrophils, --    % Lymphocytes, ANC: --                                  27.0<L>    BMP (04-27 @ 09:29)             134<L>  |  102     |  22    		Ca++ --      Ca 9.1                ---------------------------------( 119<H>		Mg --                 5.9<H>  |  27      |  0.66  			Ph --        LFTs (04-27 @ 09:29)      TPro 6.4 / Alb 1.8<L> / TBili 0.4 / DBili -- / AST 47<H> / ALT 22 / AlkPhos 111    --------------------------------------------------------------------------------------------    MICROBIOLOGY    -> .Urine Clean Catch (Midstream) Culture (04-23 @ 08:18)     NG    NG    <10,000 CFU/mL Normal Urogenital Jesenia    -> .Blood Blood-Peripheral Culture (04-22 @ 23:00)     NG    NG    No growth to date.    -> .Urine Clean Catch (Midstream) Culture (04-18 @ 18:39)     NG    Enterococcus faecium (vancomycin resistant)    >100,000 CFU/ml Enterococcus faecium (vancomycin resistant)    -> .Blood Blood Culture (04-18 @ 18:07)     NG    NG    No Growth Final      --------------------------------------------------------------------------------------------    IMAGING

## 2021-04-27 NOTE — CONSULT NOTE ADULT - CONSULT REASON
abdominal pain, nausea/vomiting
PE
GOC, advanced dementia
left calf hematoma
Eval for placement of IVC filter
Fever
Afib, Bilateral PTE

## 2021-04-27 NOTE — CONSULT NOTE ADULT - PROVIDER SPECIALTY LIST ADULT
Infectious Disease
Vascular Surgery
Cardiology
Pulmonology
Vascular Surgery
Palliative Care
Gastroenterology

## 2021-04-27 NOTE — PROGRESS NOTE ADULT - SUBJECTIVE AND OBJECTIVE BOX
INTERVAL HPI/OVERNIGHT EVENTS:        REVIEW OF SYSTEMS:  CONSTITUTIONAL:  alert  poorly  communicative      MEDICATION:  acetaminophen    Suspension .. 650 milliGRAM(s) Oral every 6 hours PRN  ALBUTerol    90 MICROgram(s) HFA Inhaler 2 Puff(s) Inhalation every 6 hours PRN  ALBUTerol    90 MICROgram(s) HFA Inhaler 2 Puff(s) Inhalation every 4 hours  chlorhexidine 2% Cloths 1 Application(s) Topical <User Schedule>  dextrose 40% Gel 15 Gram(s) Oral once  dextrose 5%. 1000 milliLiter(s) IV Continuous <Continuous>  dextrose 5%. 1000 milliLiter(s) IV Continuous <Continuous>  dextrose 50% Injectable 25 Gram(s) IV Push once  dextrose 50% Injectable 12.5 Gram(s) IV Push once  dextrose 50% Injectable 25 Gram(s) IV Push once  dextrose 50% Injectable 12.5 Gram(s) IV Push once  dextrose 50% Injectable 25 Gram(s) IV Push once  enoxaparin Injectable 80 milliGRAM(s) SubCutaneous every 12 hours  ferrous    sulfate 325 milliGRAM(s) Oral daily  gabapentin 300 milliGRAM(s) Oral two times a day  glucagon  Injectable 1 milliGRAM(s) IntraMuscular once  glucagon  Injectable 1 milliGRAM(s) IntraMuscular once  insulin glargine Injectable (LANTUS) 12 Unit(s) SubCutaneous at bedtime  insulin lispro (ADMELOG) corrective regimen sliding scale   SubCutaneous every 6 hours  insulin lispro Injectable (ADMELOG) 3 Unit(s) SubCutaneous every 6 hours  linezolid    Tablet 600 milliGRAM(s) Oral every 12 hours  lisinopril 5 milliGRAM(s) Oral daily  nystatin Powder 1 Application(s) Topical two times a day  pantoprazole   Suspension 40 milliGRAM(s) Oral daily  polyethylene glycol 3350 17 Gram(s) Oral daily PRN  senna 1 Tablet(s) Oral at bedtime PRN  sodium chloride 0.9%. 1000 milliLiter(s) IV Continuous <Continuous>    Vital Signs Last 24 Hrs  T(C): 37 (27 Apr 2021 06:39), Max: 37.1 (27 Apr 2021 00:12)  T(F): 98.6 (27 Apr 2021 06:39), Max: 98.8 (27 Apr 2021 00:12)  HR: 95 (27 Apr 2021 06:39) (88 - 95)  BP: 118/63 (27 Apr 2021 06:39) (107/51 - 118/63)  BP(mean): --  RR: 18 (27 Apr 2021 06:39) (18 - 20)  SpO2: 96% (27 Apr 2021 06:39) (96% - 99%)    PHYSICAL EXAM:  GENERAL: NAD, well-groomed, well-developed  HEENT : Conjuntivae  clear sclerae anicteric  NECK: Supple, No JVD, Normal thyroid  NERVOUS SYSTEM:  Alert oriented   no  focal  deficits;   CHEST/LUNG: Clear    HEART: Regular rate and rhythm; No murmurs, rubs, or gallops  ABDOMEN: Soft, Nontender, Nondistended; Bowel sounds present  EXTREMITIES:   diffuse  edemas  rt  8x8 cm soft  mildly  tender  mass  ant  aspect  rt  leg  SKIN: No rashes   LABS:                        8.1    9.50  )-----------( 456      ( 26 Apr 2021 06:59 )             27.0               CAPILLARY BLOOD GLUCOSE      POCT Blood Glucose.: 140 mg/dL (27 Apr 2021 07:45)  POCT Blood Glucose.: 156 mg/dL (27 Apr 2021 05:49)  POCT Blood Glucose.: 181 mg/dL (26 Apr 2021 23:27)  POCT Blood Glucose.: 185 mg/dL (26 Apr 2021 22:11)  POCT Blood Glucose.: 175 mg/dL (26 Apr 2021 18:02)  POCT Blood Glucose.: 145 mg/dL (26 Apr 2021 16:27)  POCT Blood Glucose.: 176 mg/dL (26 Apr 2021 11:27)      RADIOLOGY & ADDITIONAL TESTS:    Imaging reports  Personally Reviewed:  [ ] YES  [ ] NO    Consultant(s) Notes Reviewed:  [x ] YES  [ ] NO    Care Discussed with Consultants/Other Providers [ x] YES  [ ] NO  Problem/Plan - 1:  ·  Problem: Pulmonary emboli.  Plan: lovenox    Problem/Plan - 2:  ·  Problem: DVT (deep venous thrombosis).  Plan: lovenox    Problem/Plan - 4:  ·  Problem: Asthma.  Plan: symbicort.     Problem/Plan - 5:  ·  Problem: DM (diabetes mellitus).  Plan: insulin coverage.     Problem/Plan - 6:  Problem: Failure to thrive   on  GT feedings    severe  calorie  malnutrition cont  gt  feedings  will  add  additional  protein    anemia   improving continue on  AC  for  dvt  and  PE    monitor  labs  transfuse  as needed   PNEUMONIA UTI  OBSERVE  WITH  LINEZOLID   rt  leg  mass  probable  hematoma  warm  compresses  for  vascular  surgery  eval    discharge  long  term care  facility

## 2021-04-28 ENCOUNTER — TRANSCRIPTION ENCOUNTER (OUTPATIENT)
Age: 86
End: 2021-04-28

## 2021-04-28 LAB
ANION GAP SERPL CALC-SCNC: 4 MMOL/L — LOW (ref 5–17)
BUN SERPL-MCNC: 23 MG/DL — SIGNIFICANT CHANGE UP (ref 7–23)
CALCIUM SERPL-MCNC: 9.2 MG/DL — SIGNIFICANT CHANGE UP (ref 8.5–10.1)
CHLORIDE SERPL-SCNC: 101 MMOL/L — SIGNIFICANT CHANGE UP (ref 96–108)
CO2 SERPL-SCNC: 31 MMOL/L — SIGNIFICANT CHANGE UP (ref 22–31)
CREAT SERPL-MCNC: 0.57 MG/DL — SIGNIFICANT CHANGE UP (ref 0.5–1.3)
FLUAV AG NPH QL: SIGNIFICANT CHANGE UP
FLUBV AG NPH QL: SIGNIFICANT CHANGE UP
GLUCOSE BLDC GLUCOMTR-MCNC: 106 MG/DL — HIGH (ref 70–99)
GLUCOSE BLDC GLUCOMTR-MCNC: 120 MG/DL — HIGH (ref 70–99)
GLUCOSE SERPL-MCNC: 93 MG/DL — SIGNIFICANT CHANGE UP (ref 70–99)
HCT VFR BLD CALC: 27.8 % — LOW (ref 34.5–45)
HGB BLD-MCNC: 8 G/DL — LOW (ref 11.5–15.5)
MCHC RBC-ENTMCNC: 27.1 PG — SIGNIFICANT CHANGE UP (ref 27–34)
MCHC RBC-ENTMCNC: 28.8 GM/DL — LOW (ref 32–36)
MCV RBC AUTO: 94.2 FL — SIGNIFICANT CHANGE UP (ref 80–100)
NRBC # BLD: 0 /100 WBCS — SIGNIFICANT CHANGE UP (ref 0–0)
PLATELET # BLD AUTO: 506 K/UL — HIGH (ref 150–400)
POTASSIUM SERPL-MCNC: 4.3 MMOL/L — SIGNIFICANT CHANGE UP (ref 3.5–5.3)
POTASSIUM SERPL-SCNC: 4.3 MMOL/L — SIGNIFICANT CHANGE UP (ref 3.5–5.3)
RBC # BLD: 2.95 M/UL — LOW (ref 3.8–5.2)
RBC # FLD: 18.5 % — HIGH (ref 10.3–14.5)
SARS-COV-2 RNA SPEC QL NAA+PROBE: SIGNIFICANT CHANGE UP
SODIUM SERPL-SCNC: 136 MMOL/L — SIGNIFICANT CHANGE UP (ref 135–145)
WBC # BLD: 10.69 K/UL — HIGH (ref 3.8–10.5)
WBC # FLD AUTO: 10.69 K/UL — HIGH (ref 3.8–10.5)

## 2021-04-28 RX ORDER — GABAPENTIN 400 MG/1
1 CAPSULE ORAL
Qty: 0 | Refills: 0 | DISCHARGE
Start: 2021-04-28

## 2021-04-28 RX ORDER — NYSTATIN CREAM 100000 [USP'U]/G
1 CREAM TOPICAL
Qty: 0 | Refills: 0 | DISCHARGE
Start: 2021-04-28

## 2021-04-28 RX ORDER — RIVAROXABAN 15 MG-20MG
1 KIT ORAL
Qty: 0 | Refills: 0 | DISCHARGE
Start: 2021-04-28

## 2021-04-28 RX ORDER — LINEZOLID 600 MG/300ML
1 INJECTION, SOLUTION INTRAVENOUS
Qty: 0 | Refills: 0 | DISCHARGE
Start: 2021-04-28

## 2021-04-28 RX ADMIN — CHLORHEXIDINE GLUCONATE 1 APPLICATION(S): 213 SOLUTION TOPICAL at 05:39

## 2021-04-28 RX ADMIN — LISINOPRIL 5 MILLIGRAM(S): 2.5 TABLET ORAL at 05:40

## 2021-04-28 RX ADMIN — Medication 3 UNIT(S): at 12:26

## 2021-04-28 RX ADMIN — PANTOPRAZOLE SODIUM 40 MILLIGRAM(S): 20 TABLET, DELAYED RELEASE ORAL at 12:30

## 2021-04-28 RX ADMIN — GABAPENTIN 300 MILLIGRAM(S): 400 CAPSULE ORAL at 05:40

## 2021-04-28 RX ADMIN — Medication 3 UNIT(S): at 05:43

## 2021-04-28 RX ADMIN — NYSTATIN CREAM 1 APPLICATION(S): 100000 CREAM TOPICAL at 05:46

## 2021-04-28 RX ADMIN — RIVAROXABAN 20 MILLIGRAM(S): KIT at 12:30

## 2021-04-28 RX ADMIN — Medication 325 MILLIGRAM(S): at 12:30

## 2021-04-28 RX ADMIN — LINEZOLID 600 MILLIGRAM(S): 600 INJECTION, SOLUTION INTRAVENOUS at 05:41

## 2021-04-28 NOTE — PROGRESS NOTE ADULT - REASON FOR ADMISSION
PE  DVT  HX  COVID 19
PE  DVT  HX  covid19
PE  DVT  HX  COVID 19

## 2021-04-28 NOTE — DISCHARGE NOTE NURSING/CASE MANAGEMENT/SOCIAL WORK - NSDPFAC_GEN_ALL_CORE
Continuity of Care Form    Patient Name: Fely Wasserman   :  1947  MRN:  [id number]    Admit date:  2/10/2021  Discharge date:  2021    Code Status Order: Limited   Advance Directives:   Advance Care Flowsheet Documentation       Date/Time Healthcare Directive Type of Healthcare Directive Copy in [address] Agent's Name Healthcare Agent's Phone Number    02/10/21 1600  No, patient does not have an advance directive for healthcare treatment -- -- -- -- --            Admitting Physician:  Noble Woo MD  PCP: WILLIE Pedroza - CNP    Discharging Nurse: Bruna Mccann.  [id number] Unit/Room#:   Discharging Unit Phone Number: [phone]    Emergency Contact:   Extended Emergency Contact Information  Primary Emergency Contact: Radha Garcia  Home Phone: [phone]  Relation: Child  Secondary Emergency Contact: Ramu Springer  Home Phone: [phone]  Relation: Child    Past Surgical History:  Past Surgical History:   Procedure Laterality Date    COLONOSCOPY      COLONOSCOPY N/A 2020    COLONOSCOPY CONTROL OF BLEEDING performed by Jenifer Huang MD at [facility] Bilateral     cataracts    JOINT REPLACEMENT      Left knee on 18    THORACENTESIS  2020         UPPER GASTROINTESTINAL ENDOSCOPY N/A 2020    EGD performed by Jenifer Huang MD at [facility] 2020    PUSH ENTEROSCOPY/EGD CONTROL BLEEDING performed by Jenifer Huang MD at [facility]       Immunization History:   Immunization History   Administered Date(s) Administered    Influenza Vaccine, unspecified formulation 2018    Influenza Virus Vaccine 2017    Influenza, High Dose (Fluzone 65 yrs and older) 10/19/2012    Influenza, Quadv, IM, PF (6 mo and older Fluzone, Flulaval, Fluarix, and 3 yrs and older Afluria) 2020 No Isolation          Patient Infection Status       Infection Onset Added Last Indicated Last Indicated By Review Planned Expiration Resolved Resolved By    None active    Resolved    COVID-19 Rule Out 12/18/20 12/18/20 12/19/20 COVID-19 (Ordered)   12/19/20 Rule-Out Test Resulted            Nurse Assessment:  Last Vital Signs: BP (!) 88/43   Pulse 91   Temp 97.9 °F (36.6 °C) (Oral)   Resp 16   Ht 5' 5\" (1.651 m)   Wt 173 lb 1 oz (78.5 kg)   SpO2 98%   BMI 28.80 kg/m²     Last documented pain score (0-10 scale): Pain Level: 0  Last Weight:   Wt Readings from Last 1 Encounters:   02/10/21 173 lb 1 oz (78.5 kg)     Mental Status:  oriented and alert with confusion at times. IV Access:  - None    Nursing Mobility/ADLs:  Walking   Assisted  Transfer  Assisted  Bathing  Independent after set up  [address] [place] [address]   whole    Wound Care Documentation and Therapy:        Elimination:  Continence:   · Bowel: Yes  · Bladder: Yes  Urinary Catheter: None   Colostomy/Ileostomy/Ileal Conduit: No       Date of Last BM: ***    Intake/Output Summary (Last 24 hours) at 2/12/2021 1348  Last data filed at 2/12/2021 1317  Gross per 24 hour   Intake 718 ml   Output 2125 ml   Net -1407 ml     I/O last 3 completed shifts: In: 538 [P.O.:275; Blood:263]  Out: [address] [[id number]]    Safety Concerns: At Risk for Falls    Impairments/Disabilities:      None    Nutrition Therapy:  Current Nutrition Therapy:   - Oral Diet:  Carb Control 5 carbs/meal (2000kcals/day)    Routes of Feeding: Oral  Liquids: No Restrictions  Daily Fluid Restriction: no  Last Modified Barium Swallow with Video (Video Swallowing Test): not done    Treatments at the Time of Hospital Discharge:   Respiratory Treatments: See [facility]  Oxygen Therapy:  is on oxygen at 2 L/min per nasal cannula.   Ventilator:    - No ventilator support Onycha

## 2021-04-28 NOTE — PROGRESS NOTE ADULT - NSICDXPILOT_GEN_ALL_CORE
[place]

## 2021-04-28 NOTE — DISCHARGE NOTE NURSING/CASE MANAGEMENT/SOCIAL WORK - PATIENT PORTAL LINK FT
You can access the FollowMyHealth Patient Portal offered by [facility] by registering at the following website: [email or web address]. By joining [facility]’s FollowMyHealth portal, you will also be able to view your health information using other applications (apps) compatible with our system.

## 2021-04-28 NOTE — PROGRESS NOTE ADULT - NUTRITIONAL ASSESSMENT
This patient has been assessed with a concern for Malnutrition and has been determined to have a diagnosis/diagnoses of Severe protein-calorie malnutrition.    This patient is being managed with:   Diet NPO after Midnight-     NPO Start Date: 15-Apr-2021   NPO Start Time: 23:59  Entered: Apr 15 2021  8:03PM    Diet NPO with Tube Feed-  Tube Feeding Modality: Nasogastric  Glucerna 1.2 Renzo  Total Volume for 24 Hours (mL): 1320  Continuous  Starting Tube Feed Rate {mL per Hour}: 55  Increase Tube Feed Rate by (mL): 0     Every hour  Until Goal Tube Feed Rate (mL per Hour): 55  Tube Feed Duration (in Hours): 24  Tube Feed Start Time: 14:00  Free Water Flush   Total Volume per Flush (mL): 150   Frequency: Every 6 Hours   Total Daily Volume of Flush (mL): 600    Start Time: 14:00  No Carb Prosource (1pkg = 15gms Protein)     Qty per Day:  1  Entered: Apr 13 2021  8:54AM    
This patient has been assessed with a concern for Malnutrition and has been determined to have a diagnosis/diagnoses of Severe protein-calorie malnutrition.    This patient is being managed with:   Diet NPO with Tube Feed-  Tube Feeding Modality: Gastrostomy  Glucerna 1.2 Renzo  Total Volume for 24 Hours (mL): 1320  Continuous  Starting Tube Feed Rate {mL per Hour}: 55  Increase Tube Feed Rate by (mL): 0     Every hour  Until Goal Tube Feed Rate (mL per Hour): 55  Tube Feed Duration (in Hours): 24  Tube Feed Start Time: 20:00  Free Water Flush   Total Volume per Flush (mL): 150   Frequency: Every 6 Hours   Total Daily Volume of Flush (mL): 600    Start Time: 14:00  No Carb Prosource (1pkg = 15gms Protein)     Qty per Day:  1  Entered: Apr 16 2021  4:13PM    
This patient has been assessed with a concern for Malnutrition and has been determined to have a diagnosis/diagnoses of Severe protein-calorie malnutrition.    This patient is being managed with:   Diet NPO with Tube Feed-  Tube Feeding Modality: Gastrostomy  Glucerna 1.2 Renzo  Total Volume for 24 Hours (mL): 1320  Continuous  Starting Tube Feed Rate {mL per Hour}: 55  Increase Tube Feed Rate by (mL): 0     Every hour  Until Goal Tube Feed Rate (mL per Hour): 55  Tube Feed Duration (in Hours): 24  Tube Feed Start Time: 20:00  Free Water Flush   Total Volume per Flush (mL): 150   Frequency: Every 6 Hours   Total Daily Volume of Flush (mL): 600    Start Time: 14:00  No Carb Prosource (1pkg = 15gms Protein)     Qty per Day:  1  Entered: Apr 16 2021  4:13PM    
This patient has been assessed with a concern for Malnutrition and has been determined to have a diagnosis/diagnoses of Severe protein-calorie malnutrition.    This patient is being managed with:   Diet NPO with Tube Feed-  Tube Feeding Modality: Nasogastric  Glucerna 1.2 Renzo  Total Volume for 24 Hours (mL): 1320  Continuous  Starting Tube Feed Rate {mL per Hour}: 30  Increase Tube Feed Rate by (mL): 10     Every 8 hours  Until Goal Tube Feed Rate (mL per Hour): 55  Tube Feed Duration (in Hours): 24  Tube Feed Start Time: 16:00  Free Water Flush   Total Volume per Flush (mL): 150   Frequency: Every 6 Hours   Total Daily Volume of Flush (mL): 600    Start Time: 16:00  Entered: Mar 23 2021  3:29PM    
This patient has been assessed with a concern for Malnutrition and has been determined to have a diagnosis/diagnoses of Severe protein-calorie malnutrition.    This patient is being managed with:   Diet NPO with Tube Feed-  Tube Feeding Modality: Nasogastric  Glucerna 1.2 Renzo  Total Volume for 24 Hours (mL): 1320  Continuous  Starting Tube Feed Rate {mL per Hour}: 30  Increase Tube Feed Rate by (mL): 10     Every 8 hours  Until Goal Tube Feed Rate (mL per Hour): 55  Tube Feed Duration (in Hours): 24  Tube Feed Start Time: 16:00  Free Water Flush   Total Volume per Flush (mL): 150   Frequency: Every 6 Hours   Total Daily Volume of Flush (mL): 600    Start Time: 16:00  Entered: Mar 23 2021  3:29PM    
This patient has been assessed with a concern for Malnutrition and has been determined to have a diagnosis/diagnoses of Severe protein-calorie malnutrition.    This patient is being managed with:   Diet NPO with Tube Feed-  Tube Feeding Modality: Nasogastric  Glucerna 1.2 Renzo  Total Volume for 24 Hours (mL): 720  Continuous  Starting Tube Feed Rate {mL per Hour}: 10  Increase Tube Feed Rate by (mL): 5     Every 4 hours  Until Goal Tube Feed Rate (mL per Hour): 30  Tube Feed Duration (in Hours): 24  Tube Feed Start Time: 13:30  Entered: Mar 22 2021  1:00PM    
This patient has been assessed with a concern for Malnutrition and has been determined to have a diagnosis/diagnoses of Severe protein-calorie malnutrition.    This patient is being managed with:   Diet Dysphagia 1 Pureed-Thin Liquids-  Supplement Feeding Modality:  Orogastric  Glucerna Shake Cans or Servings Per Day:  1       Frequency:  Three Times a day  Entered: Mar 17 2021  1:50PM    
This patient has been assessed with a concern for Malnutrition and has been determined to have a diagnosis/diagnoses of Severe protein-calorie malnutrition.    This patient is being managed with:   Diet NPO with Tube Feed-  Tube Feeding Modality: Gastrostomy  Glucerna 1.2 Renzo  Total Volume for 24 Hours (mL): 1320  Continuous  Starting Tube Feed Rate {mL per Hour}: 55  Increase Tube Feed Rate by (mL): 0     Every hour  Until Goal Tube Feed Rate (mL per Hour): 55  Tube Feed Duration (in Hours): 24  Tube Feed Start Time: 20:00  Free Water Flush   Total Volume per Flush (mL): 150   Frequency: Every 6 Hours   Total Daily Volume of Flush (mL): 600    Start Time: 14:00  No Carb Prosource (1pkg = 15gms Protein)     Qty per Day:  1  Entered: Apr 16 2021  4:13PM    
This patient has been assessed with a concern for Malnutrition and has been determined to have a diagnosis/diagnoses of Severe protein-calorie malnutrition.    This patient is being managed with:   Diet NPO with Tube Feed-  Tube Feeding Modality: Nasogastric  Glucerna 1.2 Renzo  Total Volume for 24 Hours (mL): 1320  Continuous  Starting Tube Feed Rate {mL per Hour}: 30  Increase Tube Feed Rate by (mL): 10     Every 8 hours  Until Goal Tube Feed Rate (mL per Hour): 55  Tube Feed Duration (in Hours): 24  Tube Feed Start Time: 16:00  Free Water Flush   Total Volume per Flush (mL): 150   Frequency: Every 6 Hours   Total Daily Volume of Flush (mL): 600    Start Time: 16:00  Entered: Mar 23 2021  3:29PM    
This patient has been assessed with a concern for Malnutrition and has been determined to have a diagnosis/diagnoses of Severe protein-calorie malnutrition.    This patient is being managed with:   Diet NPO with Tube Feed-  Tube Feeding Modality: Nasogastric  Glucerna 1.2 Renzo  Total Volume for 24 Hours (mL): 1320  Continuous  Starting Tube Feed Rate {mL per Hour}: 30  Increase Tube Feed Rate by (mL): 10     Every 8 hours  Until Goal Tube Feed Rate (mL per Hour): 55  Tube Feed Duration (in Hours): 24  Tube Feed Start Time: 16:00  Free Water Flush   Total Volume per Flush (mL): 150   Frequency: Every 6 Hours   Total Daily Volume of Flush (mL): 600    Start Time: 16:00  Prosource Gelatein 20 Sugar Free     Qty per Day:  60 ml  Entered: Apr 7 2021  7:58AM    
This patient has been assessed with a concern for Malnutrition and has been determined to have a diagnosis/diagnoses of Severe protein-calorie malnutrition.    This patient is being managed with:   Diet NPO with Tube Feed-  Tube Feeding Modality: Nasogastric  Glucerna 1.2 Renzo  Total Volume for 24 Hours (mL): 1320  Continuous  Starting Tube Feed Rate {mL per Hour}: 30  Increase Tube Feed Rate by (mL): 10     Every 8 hours  Until Goal Tube Feed Rate (mL per Hour): 55  Tube Feed Duration (in Hours): 24  Tube Feed Start Time: 16:00  Free Water Flush   Total Volume per Flush (mL): 150   Frequency: Every 6 Hours   Total Daily Volume of Flush (mL): 600    Start Time: 16:00  Prosource Gelatein 20 Sugar Free     Qty per Day:  60 ml  Entered: Apr 7 2021  7:58AM    
This patient has been assessed with a concern for Malnutrition and has been determined to have a diagnosis/diagnoses of Severe protein-calorie malnutrition.      
This patient has been assessed with a concern for Malnutrition and has been determined to have a diagnosis/diagnoses of Severe protein-calorie malnutrition.    This patient is being managed with:   Diet NPO with Tube Feed-  Tube Feeding Modality: Gastrostomy  Glucerna 1.2 Renzo  Total Volume for 24 Hours (mL): 1320  Continuous  Starting Tube Feed Rate {mL per Hour}: 55  Increase Tube Feed Rate by (mL): 0     Every hour  Until Goal Tube Feed Rate (mL per Hour): 55  Tube Feed Duration (in Hours): 24  Tube Feed Start Time: 20:00  Free Water Flush   Total Volume per Flush (mL): 150   Frequency: Every 6 Hours   Total Daily Volume of Flush (mL): 600    Start Time: 14:00  No Carb Prosource (1pkg = 15gms Protein)     Qty per Day:  1  Entered: Apr 16 2021  4:13PM    
This patient has been assessed with a concern for Malnutrition and has been determined to have a diagnosis/diagnoses of Severe protein-calorie malnutrition.    This patient is being managed with:   Diet NPO with Tube Feed-  Tube Feeding Modality: Gastrostomy  Glucerna 1.2 Renzo  Total Volume for 24 Hours (mL): 1320  Continuous  Starting Tube Feed Rate {mL per Hour}: 55  Increase Tube Feed Rate by (mL): 0     Every hour  Until Goal Tube Feed Rate (mL per Hour): 55  Tube Feed Duration (in Hours): 24  Tube Feed Start Time: 20:00  Free Water Flush   Total Volume per Flush (mL): 150   Frequency: Every 6 Hours   Total Daily Volume of Flush (mL): 600    Start Time: 14:00  No Carb Prosource (1pkg = 15gms Protein)     Qty per Day:  1  Entered: Apr 16 2021  4:13PM    
This patient has been assessed with a concern for Malnutrition and has been determined to have a diagnosis/diagnoses of Severe protein-calorie malnutrition.    This patient is being managed with:   Diet NPO with Tube Feed-  Tube Feeding Modality: Nasogastric  Glucerna 1.2 Renzo  Total Volume for 24 Hours (mL): 1320  Continuous  Starting Tube Feed Rate {mL per Hour}: 30  Increase Tube Feed Rate by (mL): 10     Every 8 hours  Until Goal Tube Feed Rate (mL per Hour): 55  Tube Feed Duration (in Hours): 24  Tube Feed Start Time: 16:00  Free Water Flush   Total Volume per Flush (mL): 150   Frequency: Every 6 Hours   Total Daily Volume of Flush (mL): 600    Start Time: 16:00  Entered: Mar 23 2021  3:29PM    
This patient has been assessed with a concern for Malnutrition and has been determined to have a diagnosis/diagnoses of Severe protein-calorie malnutrition.    This patient is being managed with:   Diet NPO with Tube Feed-  Tube Feeding Modality: Nasogastric  Glucerna 1.2 Renzo  Total Volume for 24 Hours (mL): 1320  Continuous  Starting Tube Feed Rate {mL per Hour}: 30  Increase Tube Feed Rate by (mL): 10     Every 8 hours  Until Goal Tube Feed Rate (mL per Hour): 55  Tube Feed Duration (in Hours): 24  Tube Feed Start Time: 16:00  Free Water Flush   Total Volume per Flush (mL): 150   Frequency: Every 6 Hours   Total Daily Volume of Flush (mL): 600    Start Time: 16:00  Entered: Mar 23 2021  3:29PM    
This patient has been assessed with a concern for Malnutrition and has been determined to have a diagnosis/diagnoses of Severe protein-calorie malnutrition.    This patient is being managed with:   Diet NPO with Tube Feed-  Tube Feeding Modality: Nasogastric  Glucerna 1.2 Renzo  Total Volume for 24 Hours (mL): 1320  Continuous  Starting Tube Feed Rate {mL per Hour}: 55  Increase Tube Feed Rate by (mL): 0     Every hour  Until Goal Tube Feed Rate (mL per Hour): 55  Tube Feed Duration (in Hours): 24  Tube Feed Start Time: 14:00  Free Water Flush   Total Volume per Flush (mL): 150   Frequency: Every 6 Hours   Total Daily Volume of Flush (mL): 600    Start Time: 14:00  No Carb Prosource (1pkg = 15gms Protein)     Qty per Day:  1  Entered: Apr 13 2021  8:54AM    
This patient has been assessed with a concern for Malnutrition and has been determined to have a diagnosis/diagnoses of Severe protein-calorie malnutrition.    This patient is being managed with:   Diet NPO with Tube Feed-  Tube Feeding Modality: Nasogastric  Glucerna 1.2 Renzo  Total Volume for 24 Hours (mL): 1320  Continuous  Starting Tube Feed Rate {mL per Hour}: 55  Increase Tube Feed Rate by (mL): 0     Every hour  Until Goal Tube Feed Rate (mL per Hour): 55  Tube Feed Duration (in Hours): 24  Tube Feed Start Time: 14:00  Free Water Flush   Total Volume per Flush (mL): 150   Frequency: Every 6 Hours   Total Daily Volume of Flush (mL): 600    Start Time: 14:00  No Carb Prosource (1pkg = 15gms Protein)     Qty per Day:  1  Entered: Apr 13 2021  8:54AM    
This patient has been assessed with a concern for Malnutrition and has been determined to have a diagnosis/diagnoses of Severe protein-calorie malnutrition.    This patient is being managed with:   Diet Dysphagia 1 Pureed-Thin Liquids-  Supplement Feeding Modality:  Orogastric  Glucerna Shake Cans or Servings Per Day:  1       Frequency:  Three Times a day  Entered: Mar 17 2021  1:50PM    
This patient has been assessed with a concern for Malnutrition and has been determined to have a diagnosis/diagnoses of Severe protein-calorie malnutrition.    This patient is being managed with:   Diet NPO with Tube Feed-  Tube Feeding Modality: Gastrostomy  Glucerna 1.2 Renzo  Total Volume for 24 Hours (mL): 1320  Continuous  Starting Tube Feed Rate {mL per Hour}: 55  Increase Tube Feed Rate by (mL): 0     Every hour  Until Goal Tube Feed Rate (mL per Hour): 55  Tube Feed Duration (in Hours): 24  Tube Feed Start Time: 20:00  Free Water Flush   Total Volume per Flush (mL): 150   Frequency: Every 6 Hours   Total Daily Volume of Flush (mL): 600    Start Time: 14:00  No Carb Prosource (1pkg = 15gms Protein)     Qty per Day:  1  Entered: Apr 16 2021  4:13PM    
This patient has been assessed with a concern for Malnutrition and has been determined to have a diagnosis/diagnoses of Severe protein-calorie malnutrition.    This patient is being managed with:   Diet NPO with Tube Feed-  Tube Feeding Modality: Nasogastric  Glucerna 1.2 Renzo  Total Volume for 24 Hours (mL): 1320  Continuous  Starting Tube Feed Rate {mL per Hour}: 30  Increase Tube Feed Rate by (mL): 10     Every 8 hours  Until Goal Tube Feed Rate (mL per Hour): 55  Tube Feed Duration (in Hours): 24  Tube Feed Start Time: 16:00  Free Water Flush   Total Volume per Flush (mL): 150   Frequency: Every 6 Hours   Total Daily Volume of Flush (mL): 600    Start Time: 16:00  Entered: Mar 23 2021  3:29PM    
This patient has been assessed with a concern for Malnutrition and has been determined to have a diagnosis/diagnoses of Severe protein-calorie malnutrition.    This patient is being managed with:   Diet NPO with Tube Feed-  Tube Feeding Modality: Nasogastric  Glucerna 1.2 Renzo  Total Volume for 24 Hours (mL): 1320  Continuous  Starting Tube Feed Rate {mL per Hour}: 30  Increase Tube Feed Rate by (mL): 10     Every 8 hours  Until Goal Tube Feed Rate (mL per Hour): 55  Tube Feed Duration (in Hours): 24  Tube Feed Start Time: 16:00  Free Water Flush   Total Volume per Flush (mL): 150   Frequency: Every 6 Hours   Total Daily Volume of Flush (mL): 600    Start Time: 16:00  Entered: Mar 23 2021  3:29PM    Diet NPO with Tube Feed-  Tube Feeding Modality: Nasogastric  Glucerna 1.2 Renzo  Total Volume for 24 Hours (mL): 720  Continuous  Starting Tube Feed Rate {mL per Hour}: 10  Increase Tube Feed Rate by (mL): 5     Every 4 hours  Until Goal Tube Feed Rate (mL per Hour): 30  Tube Feed Duration (in Hours): 24  Tube Feed Start Time: 13:30  Entered: Mar 22 2021  1:00PM    The following pending diet order is being considered for treatment of Severe protein-calorie malnutrition:null  This patient has been assessed with a concern for Malnutrition and has been determined to have a diagnosis/diagnoses of Severe protein-calorie malnutrition.    This patient is being managed with:   Diet NPO with Tube Feed-  Tube Feeding Modality: Nasogastric  Glucerna 1.2 Renzo  Total Volume for 24 Hours (mL): 1320  Continuous  Starting Tube Feed Rate {mL per Hour}: 30  Increase Tube Feed Rate by (mL): 10     Every 8 hours  Until Goal Tube Feed Rate (mL per Hour): 55  Tube Feed Duration (in Hours): 24  Tube Feed Start Time: 16:00  Free Water Flush   Total Volume per Flush (mL): 150   Frequency: Every 6 Hours   Total Daily Volume of Flush (mL): 600    Start Time: 16:00  Entered: Mar 23 2021  3:29PM    Diet NPO with Tube Feed-  Tube Feeding Modality: Nasogastric  Glucerna 1.2 Renzo  Total Volume for 24 Hours (mL): 720  Continuous  Starting Tube Feed Rate {mL per Hour}: 10  Increase Tube Feed Rate by (mL): 5     Every 4 hours  Until Goal Tube Feed Rate (mL per Hour): 30  Tube Feed Duration (in Hours): 24  Tube Feed Start Time: 13:30  Entered: Mar 22 2021  1:00PM    The following pending diet order is being considered for treatment of Severe protein-calorie malnutrition:null
This patient has been assessed with a concern for Malnutrition and has been determined to have a diagnosis/diagnoses of Severe protein-calorie malnutrition.    This patient is being managed with:   Diet NPO with Tube Feed-  Tube Feeding Modality: Nasogastric  Glucerna 1.2 Renzo  Total Volume for 24 Hours (mL): 1320  Continuous  Starting Tube Feed Rate {mL per Hour}: 30  Increase Tube Feed Rate by (mL): 10     Every 8 hours  Until Goal Tube Feed Rate (mL per Hour): 55  Tube Feed Duration (in Hours): 24  Tube Feed Start Time: 16:00  Free Water Flush   Total Volume per Flush (mL): 150   Frequency: Every 6 Hours   Total Daily Volume of Flush (mL): 600    Start Time: 16:00  Prosource Gelatein 20 Sugar Free     Qty per Day:  60 ml  Entered: Apr 7 2021  7:58AM    
This patient has been assessed with a concern for Malnutrition and has been determined to have a diagnosis/diagnoses of Severe protein-calorie malnutrition.    This patient is being managed with:   Diet NPO with Tube Feed-  Tube Feeding Modality: Nasogastric  Glucerna 1.2 Renzo  Total Volume for 24 Hours (mL): 1320  Continuous  Starting Tube Feed Rate {mL per Hour}: 55  Increase Tube Feed Rate by (mL): 0     Every hour  Until Goal Tube Feed Rate (mL per Hour): 55  Tube Feed Duration (in Hours): 24  Tube Feed Start Time: 14:00  Free Water Flush   Total Volume per Flush (mL): 150   Frequency: Every 6 Hours   Total Daily Volume of Flush (mL): 600    Start Time: 14:00  No Carb Prosource (1pkg = 15gms Protein)     Qty per Day:  1  Entered: Apr 11 2021 12:57PM    Diet NPO with Tube Feed-  Tube Feeding Modality: Nasogastric  Glucerna 1.2 Renzo  Total Volume for 24 Hours (mL): 1320  Continuous  Starting Tube Feed Rate {mL per Hour}: 30  Increase Tube Feed Rate by (mL): 10     Every 8 hours  Until Goal Tube Feed Rate (mL per Hour): 55  Tube Feed Duration (in Hours): 24  Tube Feed Start Time: 16:00  Free Water Flush   Total Volume per Flush (mL): 150   Frequency: Every 6 Hours   Total Daily Volume of Flush (mL): 600    Start Time: 16:00  Prosource Gelatein 20 Sugar Free     Qty per Day:  60 ml  Entered: Apr 7 2021  7:58AM    The following pending diet order is being considered for treatment of Severe protein-calorie malnutrition:null
This patient has been assessed with a concern for Malnutrition and has been determined to have a diagnosis/diagnoses of Severe protein-calorie malnutrition.    This patient is being managed with:   Diet Dysphagia 1 Pureed-Thin Liquids-  Supplement Feeding Modality:  Orogastric  Glucerna Shake Cans or Servings Per Day:  1       Frequency:  Three Times a day  Entered: Mar 17 2021  1:50PM    
This patient has been assessed with a concern for Malnutrition and has been determined to have a diagnosis/diagnoses of Severe protein-calorie malnutrition.    This patient is being managed with:   Diet NPO with Tube Feed-  Tube Feeding Modality: Gastrostomy  Glucerna 1.2 Renzo  Total Volume for 24 Hours (mL): 1320  Continuous  Starting Tube Feed Rate {mL per Hour}: 55  Increase Tube Feed Rate by (mL): 0     Every hour  Until Goal Tube Feed Rate (mL per Hour): 55  Tube Feed Duration (in Hours): 24  Tube Feed Start Time: 20:00  Free Water Flush   Total Volume per Flush (mL): 150   Frequency: Every 6 Hours   Total Daily Volume of Flush (mL): 600    Start Time: 14:00  No Carb Prosource (1pkg = 15gms Protein)     Qty per Day:  1  Entered: Apr 16 2021  4:13PM    
This patient has been assessed with a concern for Malnutrition and has been determined to have a diagnosis/diagnoses of Severe protein-calorie malnutrition.    This patient is being managed with:   Diet NPO with Tube Feed-  Tube Feeding Modality: Nasogastric  Glucerna 1.2 Renzo  Total Volume for 24 Hours (mL): 1320  Continuous  Starting Tube Feed Rate {mL per Hour}: 30  Increase Tube Feed Rate by (mL): 10     Every 8 hours  Until Goal Tube Feed Rate (mL per Hour): 55  Tube Feed Duration (in Hours): 24  Tube Feed Start Time: 16:00  Free Water Flush   Total Volume per Flush (mL): 150   Frequency: Every 6 Hours   Total Daily Volume of Flush (mL): 600    Start Time: 16:00  Prosource Gelatein 20 Sugar Free     Qty per Day:  60 ml  Entered: Apr 7 2021  7:58AM    
This patient has been assessed with a concern for Malnutrition and has been determined to have a diagnosis/diagnoses of Severe protein-calorie malnutrition.    This patient is being managed with:   Diet NPO with Tube Feed-  Tube Feeding Modality: Nasogastric  Glucerna 1.2 Renzo  Total Volume for 24 Hours (mL): 1320  Continuous  Starting Tube Feed Rate {mL per Hour}: 30  Increase Tube Feed Rate by (mL): 10     Every 8 hours  Until Goal Tube Feed Rate (mL per Hour): 55  Tube Feed Duration (in Hours): 24  Tube Feed Start Time: 16:00  Free Water Flush   Total Volume per Flush (mL): 150   Frequency: Every 6 Hours   Total Daily Volume of Flush (mL): 600    Start Time: 16:00  Entered: Mar 23 2021  3:29PM

## 2021-04-28 NOTE — PROGRESS NOTE ADULT - SUBJECTIVE AND OBJECTIVE BOX
INTERVAL HPI/OVERNIGHT EVENTS:        REVIEW OF SYSTEMS:  CONSTITUTIONAL:  alert   MEDICATION:  acetaminophen    Suspension .. 650 milliGRAM(s) Oral every 6 hours PRN  ALBUTerol    90 MICROgram(s) HFA Inhaler 2 Puff(s) Inhalation every 4 hours  ALBUTerol    90 MICROgram(s) HFA Inhaler 2 Puff(s) Inhalation every 6 hours PRN  chlorhexidine 2% Cloths 1 Application(s) Topical <User Schedule>  dextrose 40% Gel 15 Gram(s) Oral once  dextrose 5%. 1000 milliLiter(s) IV Continuous <Continuous>  dextrose 5%. 1000 milliLiter(s) IV Continuous <Continuous>  dextrose 50% Injectable 25 Gram(s) IV Push once  dextrose 50% Injectable 12.5 Gram(s) IV Push once  dextrose 50% Injectable 25 Gram(s) IV Push once  dextrose 50% Injectable 12.5 Gram(s) IV Push once  dextrose 50% Injectable 25 Gram(s) IV Push once  ferrous    sulfate 325 milliGRAM(s) Oral daily  gabapentin 300 milliGRAM(s) Oral two times a day  glucagon  Injectable 1 milliGRAM(s) IntraMuscular once  glucagon  Injectable 1 milliGRAM(s) IntraMuscular once  insulin glargine Injectable (LANTUS) 12 Unit(s) SubCutaneous at bedtime  insulin lispro (ADMELOG) corrective regimen sliding scale   SubCutaneous every 6 hours  insulin lispro Injectable (ADMELOG) 3 Unit(s) SubCutaneous every 6 hours  linezolid    Tablet 600 milliGRAM(s) Oral every 12 hours  lisinopril 5 milliGRAM(s) Oral daily  nystatin Powder 1 Application(s) Topical two times a day  pantoprazole   Suspension 40 milliGRAM(s) Oral daily  polyethylene glycol 3350 17 Gram(s) Oral daily PRN  rivaroxaban 20 milliGRAM(s) Oral daily  senna 1 Tablet(s) Oral at bedtime PRN  sodium chloride 0.9%. 1000 milliLiter(s) IV Continuous <Continuous>    Vital Signs Last 24 Hrs  T(C): 37.7 (28 Apr 2021 05:45), Max: 37.7 (28 Apr 2021 05:45)  T(F): 99.8 (28 Apr 2021 05:45), Max: 99.8 (28 Apr 2021 05:45)  HR: 88 (28 Apr 2021 05:45) (78 - 99)  BP: 123/62 (28 Apr 2021 05:45) (118/61 - 123/66)  BP(mean): --  RR: 18 (28 Apr 2021 05:45) (18 - 18)  SpO2: 97% (28 Apr 2021 05:45) (96% - 99%)    PHYSICAL EXAM:  GENERAL: NAD, well-groomed, well-developed  HEENT : Conjuntivae  clear sclerae anicteric  NECK: Supple, No JVD, Normal thyroid  NERVOUS SYSTEM:  Alert   CHEST/LUNG: Clear    HEART: Regular rate and rhythm; No murmurs, rubs, or gallops  ABDOMEN: Soft, Nontender, Nondistended; Bowel sounds present  EXTREMITIES:  no  edema  rt  leg  hematoma  SKIN: No rashes   LABS:                        8.0    10.69 )-----------( 506      ( 28 Apr 2021 06:56 )             27.8     04-28    136  |  101  |  23  ----------------------------<  93  4.3   |  31  |  0.57    Ca    9.2      28 Apr 2021 06:56    TPro  6.4  /  Alb  1.8<L>  /  TBili  0.4  /  DBili  x   /  AST  47<H>  /  ALT  22  /  AlkPhos  111  04-27        CAPILLARY BLOOD GLUCOSE      POCT Blood Glucose.: 106 mg/dL (28 Apr 2021 05:24)  POCT Blood Glucose.: 183 mg/dL (27 Apr 2021 23:24)  POCT Blood Glucose.: 162 mg/dL (27 Apr 2021 21:32)  POCT Blood Glucose.: 164 mg/dL (27 Apr 2021 17:27)  POCT Blood Glucose.: 199 mg/dL (27 Apr 2021 11:56)      RADIOLOGY & ADDITIONAL TESTS:    Imaging reports  Personally Reviewed:  [ ] YES  [ ] NO    Consultant(s) Notes Reviewed:  [x ] YES  [ ] NO    Care Discussed with Consultants/Other Providers [ x] YES  [ ] NO  Problem/Plan - 1:  ·  Problem: Pulmonary emboli.  Plan: lovenox    Problem/Plan - 2:  ·  Problem: DVT (deep venous thrombosis).  Plan: lovenox    Problem/Plan - 4:  ·  Problem: Asthma.  Plan: symbicort.     Problem/Plan - 5:  ·  Problem: DM (diabetes mellitus).  Plan: insulin coverage.     Problem/Plan - 6:  Problem: Failure to thrive   on  GT feedings    severe  calorie  malnutrition cont  gt  feedings  will  add  additional  protein    anemia   improving continue on  AC  for  dvt  and  PE    monitor  labs  transfuse  as needed   PNEUMONIA UTI  OBSERVE  ON LINEZOLID  FOR  TRANSFE TO LONG  TERM  FACILITY

## 2021-04-28 NOTE — PROGRESS NOTE ADULT - PROVIDER SPECIALTY LIST ADULT
Gastroenterology
Internal Medicine
Pulmonology
Infectious Disease
Infectious Disease
Internal Medicine
Palliative Care
Pulmonology
Gastroenterology
Gastroenterology
Infectious Disease
Internal Medicine
Pulmonology
Cardiology
Gastroenterology
Infectious Disease
Internal Medicine
Intervent Cardiology
Gastroenterology
Gastroenterology
Internal Medicine
Palliative Care
Gastroenterology

## 2021-04-29 VITALS
RESPIRATION RATE: 20 BRPM | OXYGEN SATURATION: 99 % | SYSTOLIC BLOOD PRESSURE: 150 MMHG | TEMPERATURE: 99 F | HEART RATE: 67 BPM | DIASTOLIC BLOOD PRESSURE: 74 MMHG

## 2021-05-03 DIAGNOSIS — E43 UNSPECIFIED SEVERE PROTEIN-CALORIE MALNUTRITION: ICD-10-CM

## 2021-05-03 DIAGNOSIS — R53.2 FUNCTIONAL QUADRIPLEGIA: ICD-10-CM

## 2021-05-03 DIAGNOSIS — Z79.82 LONG TERM (CURRENT) USE OF ASPIRIN: ICD-10-CM

## 2021-05-03 DIAGNOSIS — J45.909 UNSPECIFIED ASTHMA, UNCOMPLICATED: ICD-10-CM

## 2021-05-03 DIAGNOSIS — E11.9 TYPE 2 DIABETES MELLITUS WITHOUT COMPLICATIONS: ICD-10-CM

## 2021-05-03 DIAGNOSIS — Z74.01 BED CONFINEMENT STATUS: ICD-10-CM

## 2021-05-03 DIAGNOSIS — E87.6 HYPOKALEMIA: ICD-10-CM

## 2021-05-03 DIAGNOSIS — I27.20 PULMONARY HYPERTENSION, UNSPECIFIED: ICD-10-CM

## 2021-05-03 DIAGNOSIS — D68.59 OTHER PRIMARY THROMBOPHILIA: ICD-10-CM

## 2021-05-03 DIAGNOSIS — E87.0 HYPEROSMOLALITY AND HYPERNATREMIA: ICD-10-CM

## 2021-05-03 DIAGNOSIS — I26.99 OTHER PULMONARY EMBOLISM WITHOUT ACUTE COR PULMONALE: ICD-10-CM

## 2021-05-03 DIAGNOSIS — B95.2 ENTEROCOCCUS AS THE CAUSE OF DISEASES CLASSIFIED ELSEWHERE: ICD-10-CM

## 2021-05-03 DIAGNOSIS — I48.91 UNSPECIFIED ATRIAL FIBRILLATION: ICD-10-CM

## 2021-05-03 DIAGNOSIS — F03.90 UNSPECIFIED DEMENTIA WITHOUT BEHAVIORAL DISTURBANCE: ICD-10-CM

## 2021-05-03 DIAGNOSIS — I10 ESSENTIAL (PRIMARY) HYPERTENSION: ICD-10-CM

## 2021-05-03 DIAGNOSIS — R62.7 ADULT FAILURE TO THRIVE: ICD-10-CM

## 2021-05-03 DIAGNOSIS — Z86.16 PERSONAL HISTORY OF COVID-19: ICD-10-CM

## 2021-05-03 DIAGNOSIS — J18.9 PNEUMONIA, UNSPECIFIED ORGANISM: ICD-10-CM

## 2021-05-03 DIAGNOSIS — M79.81 NONTRAUMATIC HEMATOMA OF SOFT TISSUE: ICD-10-CM

## 2021-05-03 DIAGNOSIS — I69.391 DYSPHAGIA FOLLOWING CEREBRAL INFARCTION: ICD-10-CM

## 2021-05-03 DIAGNOSIS — M43.06 SPONDYLOLYSIS, LUMBAR REGION: ICD-10-CM

## 2021-05-03 DIAGNOSIS — I82.422 ACUTE EMBOLISM AND THROMBOSIS OF LEFT ILIAC VEIN: ICD-10-CM

## 2021-05-03 DIAGNOSIS — D64.9 ANEMIA, UNSPECIFIED: ICD-10-CM

## 2021-05-03 DIAGNOSIS — N39.0 URINARY TRACT INFECTION, SITE NOT SPECIFIED: ICD-10-CM

## 2021-05-03 DIAGNOSIS — L89.152 PRESSURE ULCER OF SACRAL REGION, STAGE 2: ICD-10-CM

## 2021-05-03 DIAGNOSIS — Z96.659 PRESENCE OF UNSPECIFIED ARTIFICIAL KNEE JOINT: ICD-10-CM
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
## 2021-05-19 NOTE — PROGRESS NOTE ADULT - PROBLEM SELECTOR PLAN 5
Not in exacerbation  -Cont Albuterol, Singular and Symbicort oriented to person, place, time and situation

## 2021-05-26 NOTE — PHYSICAL THERAPY INITIAL EVALUATION ADULT - NS ASR RISK AREAS PT EVAL
[fall

## 2021-07-06 NOTE — SWALLOW BEDSIDE ASSESSMENT ADULT - SWALLOW EVAL: ANTICIPATED DISCHARGE DISPOSITION
Unknown
no diplopia/no blurred vision L/no blurred vision R/no pain L/no pain R/no irritation L/no irritation R

## 2022-01-26 NOTE — DIETITIAN INITIAL EVALUATION ADULT. - PROBLEM/PLAN-8
[FreeTextEntry1] : Ms. MARK MILLER is 30 year female .\par MARK  was diagnosed of diabetes type 2- 5    years back. She was diagnosed in pregnancy \par Till recently she was taking combination of Gliclazide and Metformin- 2 days back \par now only on metformin last two 2 days.  \par suffered miscarriage 5 yrs back \par no insulin use except for pregnancy \par last A1c was 7 % - a year back \par was on glycomet in [place] as well \par was also taking Dr Duong Hanna \par losing weight , , no heart attack , no stroke \par not fasting today - had high carb lunch \par she is tolerating these medications well. \par no recent eye exam \par neuropathic symptoms -none \par weight loss -yes \par  Hypoglycemia frequency is none \par family history of diabetes -father \par mother has thyroid concerns \par high carb diet -  dietary preferences \par Educator visit -never \par \par \par 08/31/2020- FOLLOW UP\par labs discussed in detail \par lipid panel pending \par adv to follow low carb diet \par Review of system \par no chest pain, no palpitations \par no Shortness of breath,no wheezing. \par \par \par 03/05/2021- FOLLOW UP\par 32-year-old lady returns for follow-up.  She is on Metformin thousand twice daily but has been taking less of doses as she is thinks this is a very high dose.  She is also taking Januvia 100 mg daily.  Of recent over last 2 3 months she has tried intermittent fasting which she follows a 12 on 12 of pattern or she would do a 14-hour fasting versus 6-hour eating.\par \par \par 01/10/2022- FOLLOW UP\par 33-year-old lady returns for follow-up she has plans to conceive now wants to make sure she has good glycemic control for the same and Januvia 100 mg daily and metformin thousand twice daily.  She feels well otherwise due for an eye exam.  Today I have advised her to stop Januvia and start Lantus 10 units and hypoglycemia precautions were revisited with the patient.  Continue metformin.  Replacement was performed.  Referral to OB/GYN was given.  Additionally she was urged to get an eye exam and establish with a new ophthalmologist.  I will review her labs and CGM download in 2 to 3 weeks from now.\par \par \par 01/26/2022- FOLLOW UP\par Currently on Lantus 10 units in the evening Metformin thousand twice daily.  Tolerating medications fine she yet has to establish with an OB/GYN and ophthalmologist.  Her lipid report was reviewed over last 15 days time in range 92% high 8% with no very highs or lows.  She is taking Lantus without any concerns and would like to continue the same GMI is of around 6.5%.  Remains he is on 7% I think she is optimized for conception as far as the glycemic control is concerned but I would like to get clearance from OB/GYN as well.  She is also advised to see ophthalmology.  Additionally have advised her to start a prenatal vitamin which at least has 400 mcg of folic acid and 150 mcg of potassium iodide daily.  All questions were answered I would like to see her back in 6 weeks for review DISPLAY PLAN FREE TEXT

## 2022-02-18 NOTE — ED PROVIDER NOTE - CADM POA URETHRAL CATHETER
Detail Level: Simple Instructions: This plan will send the code FBSD to the PM system.  DO NOT or CHANGE the price. Price (Do Not Change): 0.00 No

## 2022-05-09 NOTE — ED PROVIDER NOTE - IV ALTEPLASE DOOR HIDDEN
[No Acute Distress] : no acute distress [Well Nourished] : well nourished [Well Developed] : well developed [Well-Appearing] : well-appearing show [Normal Sclera/Conjunctiva] : normal sclera/conjunctiva [PERRL] : pupils equal round and reactive to light [EOMI] : extraocular movements intact [Normal Outer Ear/Nose] : the outer ears and nose were normal in appearance [Normal Oropharynx] : the oropharynx was normal [No JVD] : no jugular venous distention [No Lymphadenopathy] : no lymphadenopathy [Supple] : supple [Thyroid Normal, No Nodules] : the thyroid was normal and there were no nodules present [No Respiratory Distress] : no respiratory distress  [No Accessory Muscle Use] : no accessory muscle use [Clear to Auscultation] : lungs were clear to auscultation bilaterally [Normal Rate] : normal rate  [Regular Rhythm] : with a regular rhythm [Normal S1, S2] : normal S1 and S2 [No Murmur] : no murmur heard [No Carotid Bruits] : no carotid bruits [No Abdominal Bruit] : a ~M bruit was not heard ~T in the abdomen [No Varicosities] : no varicosities [Pedal Pulses Present] : the pedal pulses are present [No Edema] : there was no peripheral edema [No Palpable Aorta] : no palpable aorta [No Extremity Clubbing/Cyanosis] : no extremity clubbing/cyanosis [Soft] : abdomen soft [Non Tender] : non-tender [Non-distended] : non-distended [No Masses] : no abdominal mass palpated [No HSM] : no HSM [Normal Bowel Sounds] : normal bowel sounds [Normal Posterior Cervical Nodes] : no posterior cervical lymphadenopathy [Normal Anterior Cervical Nodes] : no anterior cervical lymphadenopathy [No CVA Tenderness] : no CVA  tenderness [No Spinal Tenderness] : no spinal tenderness [No Joint Swelling] : no joint swelling [Grossly Normal Strength/Tone] : grossly normal strength/tone [No Rash] : no rash [Coordination Grossly Intact] : coordination grossly intact [No Focal Deficits] : no focal deficits [Normal Gait] : normal gait [Deep Tendon Reflexes (DTR)] : deep tendon reflexes were 2+ and symmetric [Normal Affect] : the affect was normal [Normal Insight/Judgement] : insight and judgment were intact

## 2022-10-04 NOTE — DISCHARGE NOTE PROVIDER - NSDCADMDATE_GEN_ALL_CORE_FT
17-Jul-2020 15:20 Problem: Pressure Injury - Risk of  Goal: *Prevention of pressure injury  Description: Document Gilmar Scale and appropriate interventions in the flowsheet. Outcome: Progressing Towards Goal  Note: Pressure Injury Interventions:       Moisture Interventions: Absorbent underpads    Activity Interventions: PT/OT evaluation    Mobility Interventions: PT/OT evaluation    Nutrition Interventions: Document food/fluid/supplement intake    Friction and Shear Interventions: Minimize layers                Problem: Patient Education: Go to Patient Education Activity  Goal: Patient/Family Education  Outcome: Progressing Towards Goal     Problem: Falls - Risk of  Goal: *Absence of Falls  Description: Document Shemar Fall Risk and appropriate interventions in the flowsheet.   Outcome: Progressing Towards Goal  Note: Fall Risk Interventions:  Mobility Interventions: Bed/chair exit alarm         Medication Interventions: Bed/chair exit alarm, Patient to call before getting OOB    Elimination Interventions: Call light in reach, Bed/chair exit alarm    History of Falls Interventions: Bed/chair exit alarm         Problem: Patient Education: Go to Patient Education Activity  Goal: Patient/Family Education  Outcome: Progressing Towards Goal     Problem: Patient Education: Go to Patient Education Activity  Goal: Patient/Family Education  Outcome: Progressing Towards Goal     Problem: Patient Education: Go to Patient Education Activity  Goal: Patient/Family Education  Outcome: Progressing Towards Goal

## 2023-02-08 NOTE — PATIENT PROFILE ADULT - NSASFALLATTEMPTOOB_GEN_A_NUR
Not sure when you are planning on seeing patient next via home visit. A home health nurse is scheduled to visit patient 2/13. Would you recommend a referral to derm or would you prefer to see patient first? Please advise.    no

## 2023-07-12 NOTE — PROGRESS NOTE ADULT - PROBLEM/PLAN-8
Called patient to discuss mildly elevated A1c at 5.7. We discussed lifestyle changes and patient is motivated to pursue them. During last visit he was referred to the weight management program.     Patient stated that he will try to loose 5 pounds before our next appointment in August 4th. All questions were answered.
DISPLAY PLAN FREE TEXT
DISPLAY PLAN FREE TEXT

## 2023-10-13 NOTE — ED PROVIDER NOTE - IV ALTEPASE ADMIN HIDDEN
Pt has attempted to void x2 with no urine output and does not feel like she needs to void or has a full bladder.  Bladder scan read zero with at least 20 scans over different areas of the abdomen with both bladder scanners.   Reading of 45 shown in the LUQ. Pt had almost 3L LR in OR and only 200 out with kaur but had bowel prep day before surgery. LR restarted and will recheck bladder scan in an hour.  
show
Provider pre-printed instructions given

## 2024-03-01 RX ORDER — ACETAMINOPHEN 500 MG
2 TABLET ORAL
Qty: 0 | Refills: 0 | DISCHARGE

## 2024-03-01 RX ORDER — PANTOPRAZOLE SODIUM 20 MG/1
1 TABLET, DELAYED RELEASE ORAL
Qty: 0 | Refills: 0 | DISCHARGE

## 2024-03-01 RX ORDER — ASPIRIN/CALCIUM CARB/MAGNESIUM 324 MG
1 TABLET ORAL
Qty: 0 | Refills: 0 | DISCHARGE

## 2024-03-01 RX ORDER — SENNA PLUS 8.6 MG/1
2 TABLET ORAL
Qty: 0 | Refills: 0 | DISCHARGE

## 2024-03-01 RX ORDER — INSULIN GLARGINE 100 [IU]/ML
8 INJECTION, SOLUTION SUBCUTANEOUS
Qty: 0 | Refills: 0 | DISCHARGE

## 2024-03-01 RX ORDER — INSULIN LISPRO 100/ML
0 VIAL (ML) SUBCUTANEOUS
Qty: 0 | Refills: 0 | DISCHARGE

## 2024-03-01 RX ORDER — POLYETHYLENE GLYCOL 3350 17 G/17G
17 POWDER, FOR SOLUTION ORAL
Qty: 0 | Refills: 0 | DISCHARGE

## 2024-03-01 RX ORDER — BUDESONIDE AND FORMOTEROL FUMARATE DIHYDRATE 160; 4.5 UG/1; UG/1
2 AEROSOL RESPIRATORY (INHALATION)
Qty: 0 | Refills: 0 | DISCHARGE

## 2024-03-01 RX ORDER — CELECOXIB 200 MG/1
1 CAPSULE ORAL
Qty: 0 | Refills: 0 | DISCHARGE

## 2024-03-01 RX ORDER — ALBUTEROL 90 UG/1
2 AEROSOL, METERED ORAL
Qty: 0 | Refills: 0 | DISCHARGE

## 2024-03-01 RX ORDER — GABAPENTIN 400 MG/1
1 CAPSULE ORAL
Qty: 0 | Refills: 0 | DISCHARGE

## 2024-03-01 RX ORDER — FERROUS SULFATE 325(65) MG
1 TABLET ORAL
Qty: 0 | Refills: 0 | DISCHARGE

## 2024-03-01 RX ORDER — LISINOPRIL 2.5 MG/1
1 TABLET ORAL
Qty: 0 | Refills: 0 | DISCHARGE

## 2024-11-14 NOTE — PHYSICAL THERAPY INITIAL EVALUATION ADULT - OCCUPATION
Called and spoke with pt, she is aware of her mammogram results and a f/u appt has been scheduled for 1/7/25.     ----- Message from Lulú Soler MD sent at 11/14/2024 11:47 AM CST -----  Please let the patient know her mammogram looks fine and she should see me back for a follow up appointment.  Thank you.      
retired

## 2024-12-17 NOTE — DISCHARGE NOTE PROVIDER - NSDCQMSTROKE_NEU_ALL_CORE
Recovery After Anesthesia (Child)  Your child was given medicine to get ready for a procedure. This may have included both a pain medicine and a sleeping medicine. Most of the effects will wear off before your child goes home. But drowsiness may continue for the first 6 to 8 hours after the procedure.  Home care  Follow these guidelines after your child returns home:  Watch your child closely for the first 12 to 24 hours after the procedure. Don’t leave your child alone in the bath or near water. Don't let your child skateboard, skate, or ride a bicycle until he or she is fully alert and has normal balance. This is to help prevent injuries.  It’s OK to let your child sleep. But always ask your child's healthcare provider how often you should wake your child. When you wake your child, check for the signs in When to seek medical advice (below).  Follow-up care  Follow up with your child's healthcare provider, or as advised. Call your child's healthcare provider if you have any concerns about how your child is breathing. Also call your child's healthcare provider if you are concerned about your child's reaction to the procedure or medicine.  When to seek medical advice  Call your child's healthcare provider right away if any of these occur:  Drowsiness that gets worse  Unable to wake your child as usual  Weakness or dizziness  Cough  Fast breathing. One breath is counted each time your child breathes in and out.  For  to 6 weeks old, more than 60 breaths per minute  For a child 6 weeks to 2 years, more than 45 breaths per minute  For a child 3 to 6 years old, more than 35 breaths per minute  For a child 7 to 10 years old, more than 30 breaths per minute  For a child older than 10, more than 25 breaths per minute  Slow breathing:  For  to 6 weeks old, fewer than 25 breaths per minute  For a child 6 weeks to 1 year, fewer than 20 breaths per minute  For a child 1 to 3 years old, fewer than 18 breaths per  minute  For a child 4 to 6 years old, fewer than 16 breaths per minute  For a child 7 to 9 years old, fewer than 14 breaths per minute  For a child 10 to 14 years old, fewer than 12 breaths per minute  For a child older than 14, fewer than 10 breaths per minute  Date Last Reviewed: 10/1/2016  © 2359-6464 [employer]. [address] [place] [address]. All rights reserved. This information is not intended as a substitute for professional medical care. Always follow your healthcare professional's instructions.    Step-by-Step  Washing Your Hands    Date Last Reviewed: 10/1/2017  © 4962-7298 [employer]. [address] [place] [id number]. All rights reserved. This information is not intended as a substitute for professional medical care. Always follow your healthcare professional's instructions.   No

## 2025-02-23 NOTE — PROGRESS NOTE ADULT - SUBJECTIVE AND OBJECTIVE BOX
none PGY 3 Note discussed with primary attending    Patient is a 89y old  Female who presents with a chief complaint of Fever, chills, Covid positive (23 Dec 2020 11:26)      INTERVAL HPI/OVERNIGHT EVENTS: offers no new complaints; current symptoms resolving    MEDICATIONS  (STANDING):  ALBUTerol    90 MICROgram(s) HFA Inhaler 2 Puff(s) Inhalation every 6 hours  ascorbic acid 500 milliGRAM(s) Oral daily  benzonatate 100 milliGRAM(s) Oral every 8 hours  budesonide  80 MICROgram(s)/formoterol 4.5 MICROgram(s) Inhaler 2 Puff(s) Inhalation two times a day  cholecalciferol 1000 Unit(s) Oral daily  dextrose 5%. 1000 milliLiter(s) (100 mL/Hr) IV Continuous <Continuous>  ferrous    sulfate 325 milliGRAM(s) Oral daily  gabapentin 300 milliGRAM(s) Oral every 12 hours  glucagon  Injectable 1 milliGRAM(s) IntraMuscular once  heparin   Injectable 5000 Unit(s) SubCutaneous every 8 hours  insulin glargine Injectable (LANTUS) 22 Unit(s) SubCutaneous at bedtime  insulin lispro (ADMELOG) corrective regimen sliding scale   SubCutaneous at bedtime  insulin lispro (ADMELOG) corrective regimen sliding scale   SubCutaneous three times a day before meals  insulin lispro Injectable (ADMELOG) 8 Unit(s) SubCutaneous three times a day before meals  loratadine 10 milliGRAM(s) Oral daily  montelukast 10 milliGRAM(s) Oral daily  multivitamin 1 Tablet(s) Oral daily  pantoprazole    Tablet 40 milliGRAM(s) Oral before breakfast  potassium phosphate / sodium phosphate Tablet (K-PHOS No. 2) 1 Tablet(s) Oral three times a day  sodium chloride 0.9%. 1000 milliLiter(s) (50 mL/Hr) IV Continuous <Continuous>  sodium zirconium cyclosilicate 5 Gram(s) Oral three times a day  zinc sulfate 220 milliGRAM(s) Oral daily    MEDICATIONS  (PRN):  acetaminophen   Tablet .. 650 milliGRAM(s) Oral every 6 hours PRN Temp greater or equal to 38C (100.4F), Mild Pain (1 - 3)      __________________________________________________  REVIEW OF SYSTEMS:    CONSTITUTIONAL: No fever,   EYES: no acute visual disturbances  NECK: No pain or stiffness  RESPIRATORY: No cough; No shortness of breath  CARDIOVASCULAR: No chest pain, no palpitations  GASTROINTESTINAL: No pain. No nausea or vomiting; No diarrhea   NEUROLOGICAL: No headache or numbness, no tremors  MUSCULOSKELETAL: No joint pain, no muscle pain  GENITOURINARY: no dysuria, no frequency, no hesitancy  PSYCHIATRY: no depression , no anxiety  ALL OTHER  ROS negative        Vital Signs Last 24 Hrs  T(C): 36.7 (23 Dec 2020 08:12), Max: 36.7 (23 Dec 2020 08:12)  T(F): 98 (23 Dec 2020 08:12), Max: 98 (23 Dec 2020 08:12)  HR: 87 (23 Dec 2020 08:12) (73 - 88)  BP: 128/73 (23 Dec 2020 08:12) (124/54 - 137/58)  BP(mean): --  RR: 18 (23 Dec 2020 08:12) (18 - 18)  SpO2: 97% (23 Dec 2020 08:12) (96% - 97%)    ________________________________________________  PHYSICAL EXAM:  GENERAL: NAD  HEENT: Normocephalic;  conjunctivae and sclerae clear; moist mucous membranes;   NECK : supple  CHEST/LUNG: Clear to auscultation bilaterally with good air entry   HEART: S1 S2  regular; no murmurs, gallops or rubs  ABDOMEN: Soft, Nontender, Nondistended; Bowel sounds present  EXTREMITIES: no cyanosis; ++ edema; no calf tenderness  SKIN: warm and dry; no rash  NERVOUS SYSTEM:  Confused, crying and wants to go home    _________________________________________________  LABS:                        13.2   11.81 )-----------( 359      ( 23 Dec 2020 07:42 )             42.4     12-23    135  |  100  |  36<H>  ----------------------------<  200<H>  4.3   |  23  |  1.22    Ca    9.1      23 Dec 2020 07:42  Mg     2.2     12-22    TPro  7.3  /  Alb  2.9<L>  /  TBili  0.4  /  DBili  x   /  AST  51<H>  /  ALT  63<H>  /  AlkPhos  100  12-23    PT/INR - ( 22 Dec 2020 14:57 )   PT: 12.1 sec;   INR: 1.02 ratio         PTT - ( 22 Dec 2020 14:57 )  PTT:53.8 sec    CAPILLARY BLOOD GLUCOSE      POCT Blood Glucose.: 277 mg/dL (23 Dec 2020 08:35)  POCT Blood Glucose.: 178 mg/dL (23 Dec 2020 06:16)  POCT Blood Glucose.: 226 mg/dL (22 Dec 2020 21:11)  POCT Blood Glucose.: 284 mg/dL (22 Dec 2020 17:02)        RADIOLOGY & ADDITIONAL TESTS:    Imaging Personally Reviewed:  YES/NO    Consultant(s) Notes Reviewed:   YES/ No    Care Discussed with Consultants :     Plan of care was discussed with patient and /or primary care giver; all questions and concerns were addressed and care was aligned with patient's wishes.
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
## 2025-07-21 NOTE — ED ADULT NURSE NOTE - DOES PATIENT HAVE ADVANCE DIRECTIVE
Anesthesia Post-op Note    Patient: Kenzie Plasencia  Procedure(s) Performed: ESOPHAGOGASTRODUODENOSCOPY (EGD)  Anesthesia type: MAC    Vitals Value Taken Time   Temp 36.4 07/21/25 1114   Pulse 63 07/21/25 1114   Resp 15 07/21/25 1114   SpO2 100 07/21/25 1114   /70 07/21/25 1114         Patient Location: PACU Phase 1  Post-op Vital Signs:stable  Level of Consciousness: awake and alert  Respiratory Status: spontaneous ventilation  Cardiovascular stable  Hydration: euvolemic  Pain Management: adequately controlled  Handoff: Handoff to receiving clinician was performed and questions were answered  Vomiting: none  Nausea: None  Airway Patency:patent  Post-op Assessment: no complications and patient tolerated procedure well      No notable events documented.                       No

## 2025-07-24 NOTE — PHYSICAL THERAPY INITIAL EVALUATION ADULT - PRECAUTIONS/LIMITATIONS, REHAB EVAL
Spoke to patient to confirm her appointment on 08/14/2025 at 3:30pm. Patient did request I call the [facility]  to confirm transportation. I did leave a message for the transportation person to confirm her appointment.   
fall precautions